# Patient Record
Sex: FEMALE | Race: WHITE | Employment: UNEMPLOYED | URBAN - METROPOLITAN AREA
[De-identification: names, ages, dates, MRNs, and addresses within clinical notes are randomized per-mention and may not be internally consistent; named-entity substitution may affect disease eponyms.]

---

## 2018-07-13 ENCOUNTER — OFFICE VISIT (OUTPATIENT)
Dept: FAMILY MEDICINE CLINIC | Age: 58
End: 2018-07-13

## 2018-07-13 ENCOUNTER — HOSPITAL ENCOUNTER (OUTPATIENT)
Dept: LAB | Age: 58
Discharge: HOME | End: 2018-07-13
Payer: MEDICAID

## 2018-07-13 VITALS
SYSTOLIC BLOOD PRESSURE: 137 MMHG | RESPIRATION RATE: 16 BRPM | OXYGEN SATURATION: 93 % | HEART RATE: 85 BPM | TEMPERATURE: 97.3 F | WEIGHT: 214 LBS | DIASTOLIC BLOOD PRESSURE: 81 MMHG | HEIGHT: 64 IN | BODY MASS INDEX: 36.54 KG/M2

## 2018-07-13 DIAGNOSIS — Z12.39 SCREENING FOR MALIGNANT NEOPLASM OF BREAST: ICD-10-CM

## 2018-07-13 DIAGNOSIS — F33.9 EPISODE OF RECURRENT MAJOR DEPRESSIVE DISORDER, UNSPECIFIED DEPRESSION EPISODE SEVERITY (HCC): ICD-10-CM

## 2018-07-13 DIAGNOSIS — Z76.89 ENCOUNTER TO ESTABLISH CARE: Primary | ICD-10-CM

## 2018-07-13 DIAGNOSIS — Z76.89 ENCOUNTER TO ESTABLISH CARE: ICD-10-CM

## 2018-07-13 PROBLEM — E66.01 SEVERE OBESITY (BMI 35.0-39.9): Status: ACTIVE | Noted: 2018-07-13

## 2018-07-13 LAB
ALBUMIN SERPL-MCNC: 3.5 G/DL (ref 3.4–5)
ALBUMIN/GLOB SERPL: 0.7 {RATIO} (ref 0.8–1.7)
ALP SERPL-CCNC: 144 U/L (ref 45–117)
ALT SERPL-CCNC: 74 U/L (ref 13–56)
ANION GAP SERPL CALC-SCNC: 7 MMOL/L (ref 3–18)
AST SERPL-CCNC: 62 U/L (ref 15–37)
BASOPHILS # BLD: 0 K/UL (ref 0–0.1)
BASOPHILS NFR BLD: 0 % (ref 0–2)
BILIRUB SERPL-MCNC: 0.3 MG/DL (ref 0.2–1)
BUN SERPL-MCNC: 9 MG/DL (ref 7–18)
BUN/CREAT SERPL: 11 (ref 12–20)
CALCIUM SERPL-MCNC: 9.1 MG/DL (ref 8.5–10.1)
CHLORIDE SERPL-SCNC: 102 MMOL/L (ref 100–108)
CHOLEST SERPL-MCNC: 138 MG/DL
CO2 SERPL-SCNC: 29 MMOL/L (ref 21–32)
CREAT SERPL-MCNC: 0.84 MG/DL (ref 0.6–1.3)
DIFFERENTIAL METHOD BLD: ABNORMAL
EOSINOPHIL # BLD: 0.2 K/UL (ref 0–0.4)
EOSINOPHIL NFR BLD: 3 % (ref 0–5)
ERYTHROCYTE [DISTWIDTH] IN BLOOD BY AUTOMATED COUNT: 15.1 % (ref 11.6–14.5)
GLOBULIN SER CALC-MCNC: 4.8 G/DL (ref 2–4)
GLUCOSE SERPL-MCNC: 109 MG/DL (ref 74–99)
HCT VFR BLD AUTO: 48.4 % (ref 35–45)
HDLC SERPL-MCNC: 29 MG/DL (ref 40–60)
HDLC SERPL: 4.8 {RATIO} (ref 0–5)
HGB BLD-MCNC: 15.7 G/DL (ref 12–16)
LDLC SERPL CALC-MCNC: 59 MG/DL (ref 0–100)
LIPID PROFILE,FLP: ABNORMAL
LYMPHOCYTES # BLD: 2.8 K/UL (ref 0.9–3.6)
LYMPHOCYTES NFR BLD: 40 % (ref 21–52)
MCH RBC QN AUTO: 31.3 PG (ref 24–34)
MCHC RBC AUTO-ENTMCNC: 32.4 G/DL (ref 31–37)
MCV RBC AUTO: 96.6 FL (ref 74–97)
MONOCYTES # BLD: 0.6 K/UL (ref 0.05–1.2)
MONOCYTES NFR BLD: 8 % (ref 3–10)
NEUTS SEG # BLD: 3.4 K/UL (ref 1.8–8)
NEUTS SEG NFR BLD: 49 % (ref 40–73)
PLATELET # BLD AUTO: 259 K/UL (ref 135–420)
PMV BLD AUTO: 10.3 FL (ref 9.2–11.8)
POTASSIUM SERPL-SCNC: 4.5 MMOL/L (ref 3.5–5.5)
PROT SERPL-MCNC: 8.3 G/DL (ref 6.4–8.2)
RBC # BLD AUTO: 5.01 M/UL (ref 4.2–5.3)
SODIUM SERPL-SCNC: 138 MMOL/L (ref 136–145)
T4 FREE SERPL-MCNC: 1 NG/DL (ref 0.7–1.5)
TRIGL SERPL-MCNC: 250 MG/DL (ref ?–150)
TSH SERPL DL<=0.05 MIU/L-ACNC: 1.9 UIU/ML (ref 0.36–3.74)
VLDLC SERPL CALC-MCNC: 50 MG/DL
WBC # BLD AUTO: 7.1 K/UL (ref 4.6–13.2)

## 2018-07-13 PROCEDURE — 80053 COMPREHEN METABOLIC PANEL: CPT | Performed by: NURSE PRACTITIONER

## 2018-07-13 PROCEDURE — 80061 LIPID PANEL: CPT | Performed by: NURSE PRACTITIONER

## 2018-07-13 PROCEDURE — 85025 COMPLETE CBC W/AUTO DIFF WBC: CPT | Performed by: NURSE PRACTITIONER

## 2018-07-13 PROCEDURE — 84443 ASSAY THYROID STIM HORMONE: CPT | Performed by: NURSE PRACTITIONER

## 2018-07-13 PROCEDURE — 36415 COLL VENOUS BLD VENIPUNCTURE: CPT | Performed by: NURSE PRACTITIONER

## 2018-07-13 PROCEDURE — 84439 ASSAY OF FREE THYROXINE: CPT | Performed by: NURSE PRACTITIONER

## 2018-07-13 RX ORDER — SERTRALINE HYDROCHLORIDE 50 MG/1
50 TABLET, FILM COATED ORAL DAILY
Qty: 30 TAB | Refills: 0 | Status: SHIPPED | OUTPATIENT
Start: 2018-07-13 | End: 2018-07-20 | Stop reason: DRUGHIGH

## 2018-07-13 NOTE — PROGRESS NOTES
Chief Complaint   Patient presents with    New Patient     Patient states that she suffers from depression and has a history of HTN     1. Have you been to the ER, urgent care clinic since your last visit? Hospitalized since your last visit? No    2. Have you seen or consulted any other health care providers outside of the [address] [place] [address] since your last visit? Include any pap smears or colon screening.  No     Health Maintenance Due   Topic Date Due    Hepatitis C Screening  1960    DTaP/Tdap/Td series (1 - Tdap) 10/29/1982    PAP AKA CERVICAL CYTOLOGY  10/29/1982    BREAST CANCER SCRN MAMMOGRAM  10/29/2011    FOBT Q 1 YEAR AGE 50-75  10/29/2011

## 2018-07-13 NOTE — PROGRESS NOTES
MARIA EUGENIA  Florentino Chambers is a 65 y.o. female  Chief Complaint   Patient presents with    New Patient     Patient states that she suffers from depression and has a history of HTN     Reports a history of depression and reports being on Zoloft in the past and she feels like she needs it. Reports she is in a behavioral health care group and she is on a methadone clinic schedule. Reports she attends the methadone clinic daily since Jan.2018. Admits to sweating caused by her treatments at the methadone clinic. Reports 40 year history of substance abuse. Denies any recent substance use. Denies being on zoloft currently as she lost her insurance earlier in the year. Reports being on 100 mg of Zoloft in the past but states she thought she needed to increase her dose prior to stopping. 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 Denies desire to hurt or harm herself or others. Denies suicidal ideations, chest pain, chest palpitations, anxiety, shortness of breath, or insomnia. Admits to obesity and being overweight which she thinks her eating is related to her depression. Denies having a recent mammogram.    Past Medical History  No past medical history on file. Surgical History  No past surgical history on file. Medications  Current Outpatient Prescriptions   Medication Sig Dispense Refill    methadone HCl (METHADONE PO) Take 85 mg by mouth daily.  sertraline (ZOLOFT) 50 mg tablet Take 1 Tab by mouth daily. 27 Tab 0       Allergies  No Known Allergies    Family History  Family History   Problem Relation Age of Onset    Hypertension Mother     High Cholesterol Mother     COPD Mother     Hypertension Father        Social History  Social History     Social History    Marital status: SINGLE     Spouse name: N/A    Number of children: N/A    Years of education: N/A     Occupational History    Not on file.      Social History Main Topics    Smoking status: Current Every Day Smoker Packs/day: 1.00     Types: Cigarettes    Smokeless tobacco: Never Used    Alcohol use No    Drug use: Yes     Special: Heroin    Sexual activity: No     Other Topics Concern    Not on file     Social History Narrative    No narrative on file       Problem List  Patient Active Problem List   Diagnosis Code    [facility] 35.0-39.9) [facility] E66.01       Review of Systems  Review of Systems   Constitutional: Positive for diaphoresis. Respiratory: Negative for shortness of breath. Cardiovascular: Negative for chest pain and palpitations. Gastrointestinal: Negative for nausea and vomiting. Neurological: Negative for dizziness. Psychiatric/Behavioral: Positive for depression and substance abuse. Negative for suicidal ideas. The patient is not nervous/anxious and does not have insomnia.         Vital Signs  Vitals:    07/13/18 0833   BP: 137/81   Pulse: 85   Resp: 16   Temp: 97.3 °F (36.3 °C)   TempSrc: Oral   SpO2: 93%   Weight: 214 lb (97.1 kg)   Height: 5' 4\" (1.626 m)   PainSc:   4   PainLoc: Knee     PHQ over the last two weeks 7/13/2018   Little interest or pleasure in doing things More than half the days   Feeling down, depressed or hopeless Nearly every day   Total Score PHQ 2 5   Trouble falling or staying asleep, or sleeping too much Not at all   Feeling tired or having little energy More than half the days   Poor appetite or overeating More than half the days   Feeling bad about yourself - or that you are a failure or have let yourself or your family down Nearly every day   Trouble concentrating on things such as school, work, reading or watching TV Not at all   Moving or speaking so slowly that other people could have noticed; or the opposite being so fidgety that others notice Not at all   Thoughts of being better off dead, or hurting yourself in some way Several days   PHQ 9 Score 13   How difficult have these problems made it for you to do your work, take care of your home and get along with others Very difficult       Physical Exam  Physical Exam   Constitutional: She is oriented to person, place, and time. HENT:   Mouth/Throat: Oropharynx is clear and moist.   Cardiovascular: Normal rate, regular rhythm and normal heart sounds. Pulmonary/Chest: Effort normal and breath sounds normal. No respiratory distress. Neurological: She is alert and oriented to person, place, and time. Skin: Skin is warm and dry. Psychiatric: She has a normal mood and affect. Her behavior is normal. Judgment and thought content normal.   Vitals reviewed. Diagnostics  Orders Placed This Encounter    SILVERIO MAMMOGRAM SCREENING DIGITAL BILAT     Standing Status:   Future     Standing Expiration Date:   8/13/2019     Order Specific Question:   Reason for Exam     Answer:   Breast cancer screening    CBC WITH AUTOMATED DIFF     Standing Status:   Future     Standing Expiration Date:   3/65/9327    METABOLIC PANEL, COMPREHENSIVE     Standing Status:   Future     Standing Expiration Date:   1/10/2019    LIPID PANEL     Standing Status:   Future     Standing Expiration Date:   1/10/2019    TSH 3RD GENERATION     Standing Status:   Future     Standing Expiration Date:   1/10/2019    T4, FREE     Standing Status:   Future     Standing Expiration Date:   7/14/2019    REFERRAL TO PSYCHOLOGY     Referral Priority:   Routine     Referral Type:   Behavioral Health     Referral Reason:   Specialty Services Required    REFERRAL TO PSYCHIATRY     Referral Priority:   Routine     Referral Type:   Behavioral Health     Referral Reason:   Specialty Services Required    methadone HCl (METHADONE PO)     Sig: Take 85 mg by mouth daily.  sertraline (ZOLOFT) 50 mg tablet     Sig: Take 1 Tab by mouth daily. Dispense:  30 Tab     Refill:  0       Results  No results found for this or any previous visit. Assessment and Plan  Diagnoses and all orders for this visit:    1.  Encounter to establish care  - REFERRAL TO PSYCHOLOGY  -     [facility] WITH AUTOMATED DIFF; Future  -     METABOLIC PANEL, COMPREHENSIVE; Future  -     LIPID PANEL; Future  -     TSH 3RD GENERATION; Future  -     T4, FREE; Future  -     REFERRAL TO PSYCHIATRY    2. Episode of recurrent major depressive disorder, unspecified depression episode severity ([facility]  -     REFERRAL TO PSYCHOLOGY  -     [facility] WITH AUTOMATED DIFF; Future  -     METABOLIC PANEL, COMPREHENSIVE; Future  -     REFERRAL TO PSYCHIATRY  -     sertraline (ZOLOFT) 50 mg tablet; Take 1 Tab by mouth daily. 3. BMI 36.0-36.9,adult  -     REFERRAL TO PSYCHOLOGY  -     [facility] WITH AUTOMATED DIFF; Future  -     METABOLIC PANEL, COMPREHENSIVE; Future  -     LIPID PANEL; Future  -     TSH 3RD GENERATION; Future  -     T4, FREE; Future    4. Screening for malignant neoplasm of breast  -     SILVERIO MAMMOGRAM SCREENING DIGITAL BILAT; Future    After care summary printed and reviewed with patient. Plan reviewed with patient. Questions answered. Patient verbalized understanding of plan and is in agreement with plan. Patient to follow up in one week or earlier if symptoms worsen. May need to adjust Zoloft at that time. MILADYS Bauman    Discussed the patient's BMI with her. The BMI follow up plan is as follows:     dietary management education, guidance, and counseling  encourage exercise  monitor weight  prescribed dietary intake    An After Visit Summary was printed and given to the patient.     MILADYS Bauman

## 2018-07-13 NOTE — PATIENT INSTRUCTIONS
Please contact our office if you have any questions about your visit today. Body Mass Index: Care Instructions  Your Care Instructions    Body mass index (BMI) can help you see if your weight is raising your risk for health problems. It uses a formula to compare how much you weigh with how tall you are. · A BMI lower than 18.5 is considered underweight. · A BMI between 18.5 and 24.9 is considered healthy. · A BMI between 25 and 29.9 is considered overweight. A BMI of 30 or higher is considered obese. If your BMI is in the normal range, it means that you have a lower risk for weight-related health problems. If your BMI is in the overweight or obese range, you may be at increased risk for weight-related health problems, such as high blood pressure, heart disease, stroke, arthritis or joint pain, and diabetes. If your BMI is in the underweight range, you may be at increased risk for health problems such as fatigue, lower protection (immunity) against illness, muscle loss, bone loss, hair loss, and hormone problems. BMI is just one measure of your risk for weight-related health problems. You may be at higher risk for health problems if you are not active, you eat an unhealthy diet, or you drink too much alcohol or use tobacco products. Follow-up care is a key part of your treatment and safety. Be sure to make and go to all appointments, and call your doctor if you are having problems. It's also a good idea to know your test results and keep a list of the medicines you take. How can you care for yourself at home? · Practice healthy eating habits. This includes eating plenty of fruits, vegetables, whole grains, lean protein, and low-fat dairy. · If your doctor recommends it, get more exercise. Walking is a good choice. Bit by bit, increase the amount you walk every day. Try for at least 30 minutes on most days of the week. · Do not smoke. Smoking can increase your risk for health problems.  If you need help quitting, talk to your doctor about stop-smoking programs and medicines. These can increase your chances of quitting for good. · Limit alcohol to 2 drinks a day for men and 1 drink a day for women. Too much alcohol can cause health problems. If you have a BMI higher than 25  · Your doctor may do other tests to check your risk for weight-related health problems. This may include measuring the distance around your waist. A waist measurement of more than 40 inches in men or 35 inches in women can increase the risk of weight-related health problems. · Talk with your doctor about steps you can take to stay healthy or improve your health. You may need to make lifestyle changes to lose weight and stay healthy, such as changing your diet and getting regular exercise. If you have a BMI lower than 18.5  · Your doctor may do other tests to check your risk for health problems. · Talk with your doctor about steps you can take to stay healthy or improve your health. You may need to make lifestyle changes to gain or maintain weight and stay healthy, such as getting more healthy foods in your diet and doing exercises to build muscle. Where can you learn more? Go to http://[employer].info/. Enter S176 in the search box to learn more about \"Body Mass Index: Care Instructions. \"  Current as of: October 13, 2016  Content Version: 11.4  © 6784-4650 Healthwise, [employer] [place]. Care instructions adapted under license by [employer] (which disclaims liability or warranty for this information). If you have questions about a medical condition or this instruction, always ask your healthcare professional. [place] [address] any warranty or liability for your use of this information.    Sertraline (Zoloft) - (By mouth)   Why this medicine is used:   Treats depression, obsessive-compulsive disorder (OCD), posttraumatic stress disorder (PTSD), premenstrual dysphoric disorder (PMDD), social anxiety, and panic disorder. Contact a nurse or doctor right away if you have:  · Blistering, peeling, red skin rash  · Thoughts of hurting yourself, seeing or hearing things that are not there  · Anxiety, restlessness, fast heartbeat, fever, sweating  · Bloody vomit or vomit that looks like coffee grounds; bloody or black, tarry stools  · Tremors, muscle twitching, uncontrollable movements     Common side effects:  · Sleepiness, trouble sleeping  · Sexual problems  · Mild diarrhea, nausea, vomiting, dry mouth  © 2017 [address]  Information is for End User's use only and may not be sold, redistributed or otherwise used for commercial purposes. Sertraline (By mouth)   Sertraline (SER-tra-jose)  Treats depression, obsessive-compulsive disorder (OCD), posttraumatic stress disorder (PTSD), premenstrual dysphoric disorder (PMDD), social anxiety disorder, and panic disorder. This medicine is an SSRI. Brand Name(s): Zoloft   There may be other brand names for this medicine. When This Medicine Should Not Be Used: This medicine is not right for everyone. Do not use it if you had an allergic reaction to sertraline. How to Use This Medicine:   Liquid, Tablet  · Take your medicine as directed. Your dose may need to be changed several times to find what works best for you. You may need to take it for a few weeks or months before you feel better. · Oral liquid: Use the dropper provided to remove the medicine and mix it with 1/2 cup (4 ounces) of water, ginger ale, lemon-lime soda, lemonade, or orange juice. Drink the mixture right away. It is normal for it to look a bit hazy. · This medicine should come with a Medication Guide. Ask your pharmacist for a copy if you do not have one. · Missed dose: Take a dose as soon as you remember. If it is almost time for your next dose, wait until then and take a regular dose. Do not take extra medicine to make up for a missed dose.   · Store the medicine in a closed container at room temperature, away from heat, moisture, and direct light. Drugs and Foods to Avoid:   Ask your doctor or pharmacist before using any other medicine, including over-the-counter medicines, vitamins, and herbal products. · Do not use this medicine together with pimozide. Do not use this medicine and an MAO inhibitor (MAOI) within 14 days of each other. Do not use the oral liquid form of sertraline if you are also using disulfiram.  · Some medicines can affect how sertraline works. Tell your doctor if you are using the following:   ¨ Buspirone, cimetidine, cisapride, diazepam, digitoxin, fentanyl, flecainide, lithium, phenytoin, propafenone, Doyle's wort, tramadol, tryptophan supplements, or valproate  ¨ A blood thinner (such as warfarin), a diuretic (water pill), an NSAID pain or arthritis medicine (such as aspirin, diclofenac, ibuprofen), a tricyclic antidepressant, a triptan medicine for migraine headaches  · Do not drink alcohol while you are using this medicine. Warnings While Using This Medicine:   · Tell your doctor if you are pregnant or breastfeeding, or if you have liver disease, bleeding problems, glaucoma, heart disease, or a seizure disorder. · For some children, teenagers, and young adults, this medicine may increase mental or emotional problems. This may lead to thoughts of suicide and violence. Talk with your doctor right away if you have any thoughts or behavior changes that concern you. Tell your doctor if you or anyone in your family has a history of bipolar disorder or suicide attempts. · This medicine may cause the following problems:   ¨ Serotonin syndrome (when taken with certain medicines)  ¨ Low sodium levels (more common in elderly patients and those who take diuretics or become dehydrated)  · Tell your doctor if you are sensitive to latex, because the oral liquid comes with a latex rubber dropper. · This medicine may make you dizzy or drowsy.  Do not drive or do anything that could be dangerous until you know how this medicine affects you. · Do not stop using this medicine suddenly. Your doctor will need to slowly decrease your dose before you stop it completely. · Your doctor will check your progress and the effects of this medicine at regular visits. Keep all appointments. · Keep all medicine out of the reach of children. Never share your medicine with anyone. Possible Side Effects While Using This Medicine:   Call your doctor right away if you notice any of these side effects:  · Allergic reaction: Itching or hives, swelling in your face or hands, swelling or tingling in your mouth or throat, chest tightness, trouble breathing  · Anxiety, restlessness, fast heartbeat, fever, sweating, muscle spasms, twitching, nausea, vomiting, diarrhea, seeing or hearing things that are not there  · Blistering, peeling, or red skin rash  · Confusion, weakness, and muscle twitching  · Eye pain, vision changes, seeing halos around lights  · Feeling more excited or energetic than usual  · Thoughts of hurting yourself or others, unusual behavior  · Unusual bleeding or bruising  If you notice these less serious side effects, talk with your doctor:   · Dry mouth  · Loss of appetite, weight loss  · Mild diarrhea, constipation, nausea, vomiting  · Sexual problems  · Sleepiness, or trouble sleeping  If you notice other side effects that you think are caused by this medicine, tell your doctor. Call your doctor for medical advice about side effects. You may report side effects to FDA at 8-851-FDA-9835  © 2017 [address] Information is for End User's use only and may not be sold, redistributed or otherwise used for commercial purposes. The above information is an  only. It is not intended as medical advice for individual conditions or treatments.  Talk to your doctor, nurse or pharmacist before following any medical regimen to see if it is safe and effective for you.

## 2018-07-13 NOTE — MR AVS SNAPSHOT
[facility] 
 
 
 [address] [address] [address] [address] [phone] Patient: Jane Varela MRN: [id number] :1960 Visit Information Date & Time Provider Department Dept. Phone Encounter #  
 2018  8:30 AM Piedad Nair NP [address] [id number] Follow-up Instructions Return in about 1 week (around 2018), or if symptoms worsen or fail to improve, for 30 min follow up. Upcoming Health Maintenance Date Due Hepatitis C Screening 1960 DTaP/Tdap/Td series (1 - Tdap) 10/29/1982 PAP AKA CERVICAL CYTOLOGY 10/29/1982 BREAST CANCER SCRN MAMMOGRAM 10/29/2011 FOBT Q 1 YEAR AGE 50-75 10/29/2011 Influenza Age 5 to Adult 2018 Allergies as of 2018  Review Complete On: 2018 By: Johann Martinez LPN No Known Allergies Current Immunizations  Never Reviewed No immunizations on file. Not reviewed this visit You Were Diagnosed With   
  
 Codes Comments Encounter to establish care    -  Primary ICD-10-CM: Z76.89 
ICD-9-CM: V65.8 Episode of recurrent major depressive disorder, unspecified depression episode severity ([facility]     ICD-10-CM: F33.9 ICD-9-CM: 296.30 BMI 36.0-36.9,adult     ICD-10-CM: X72.47 
ICD-9-CM: V85.36 Screening for malignant neoplasm of breast     ICD-10-CM: Z12.31 
ICD-9-CM: V76.10 Vitals BP Pulse Temp Resp Height(growth percentile) Weight(growth percentile) 137/81 (BP 1 Location: Right arm, BP Patient Position: Sitting) 85 97.3 °F (36.3 °C) (Oral) 16 5' 4\" (1.626 m) 214 lb (97.1 kg) SpO2 BMI OB Status Smoking Status 93% 36.73 kg/m2 Menopause Current Every Day Smoker BMI and BSA Data Body Mass Index Body Surface Area  
 36.73 kg/m 2 2.09 m 2 Preferred Pharmacy Pharmacy Name Phone [facility], [facility] [address] [facility] Your Updated Medication List  
  
   
This list is accurate as of 7/13/18  9:41 AM.  Always use your most recent med list.  
  
  
  
  
 METHADONE PO Take 85 mg by mouth daily. sertraline 50 mg tablet Commonly known as:  ZOLOFT Take 1 Tab by mouth daily. Prescriptions Sent to Pharmacy Refills  
 sertraline (ZOLOFT) 50 mg tablet 0 Sig: Take 1 Tab by mouth daily. Class: Normal  
 Pharmacy: [address] #: [phone] Route: Oral  
  
We Performed the Following REFERRAL TO PSYCHIATRY [REF91 Custom] Comments:  
 Please evaluate and treat patient for depression. REFERRAL TO PSYCHOLOGY [XSD37 Custom] Comments:  
 Please evaluate and treat patient for depression. Follow-up Instructions Return in about 1 week (around 7/20/2018), or if symptoms worsen or fail to improve, for 30 min follow up. To-Do List   
 07/13/2018 Lab:  CBC WITH AUTOMATED DIFF   
  
 07/13/2018 Imaging:  SILVERIO MAMMO BI SCREENING INCL CAD   
  
 07/13/2018 Lab:  T4, FREE Around 10/11/2018 Lab:  LIPID PANEL Around 10/11/2018 Lab:  METABOLIC PANEL, COMPREHENSIVE Around 10/11/2018 Lab:  TSH 3RD GENERATION Referral Information Referral ID Referred By Referred To  
  
 [id number] Naseem YATES Not Available Visits Status Start Date End Date 1 New Request 7/13/18 7/13/19 If your referral has a status of pending review or denied, additional information will be sent to support the outcome of this decision. Referral ID Referred By Referred To  
 [id number] Naseem YATES Not Available Visits Status Start Date End Date 1 New Request 7/13/18 7/13/19 If your referral has a status of pending review or denied, additional information will be sent to support the outcome of this decision. Patient Instructions Please contact our office if you have any questions about your visit today. Body Mass Index: Care Instructions Your Care Instructions Body mass index (BMI) can help you see if your weight is raising your risk for health problems. It uses a formula to compare how much you weigh with how tall you are. · A BMI lower than 18.5 is considered underweight. · A BMI between 18.5 and 24.9 is considered healthy. · A BMI between 25 and 29.9 is considered overweight. A BMI of 30 or higher is considered obese. If your BMI is in the normal range, it means that you have a lower risk for weight-related health problems. If your BMI is in the overweight or obese range, you may be at increased risk for weight-related health problems, such as high blood pressure, heart disease, stroke, arthritis or joint pain, and diabetes. If your BMI is in the underweight range, you may be at increased risk for health problems such as fatigue, lower protection (immunity) against illness, muscle loss, bone loss, hair loss, and hormone problems. BMI is just one measure of your risk for weight-related health problems. You may be at higher risk for health problems if you are not active, you eat an unhealthy diet, or you drink too much alcohol or use tobacco products. Follow-up care is a key part of your treatment and safety. Be sure to make and go to all appointments, and call your doctor if you are having problems. It's also a good idea to know your test results and keep a list of the medicines you take. How can you care for yourself at home? · Practice healthy eating habits. This includes eating plenty of fruits, vegetables, whole grains, lean protein, and low-fat dairy. · If your doctor recommends it, get more exercise. Walking is a good choice. Bit by bit, increase the amount you walk every day. Try for at least 30 minutes on most days of the week. · Do not smoke. Smoking can increase your risk for health problems.  If you need help quitting, talk to your doctor about stop-smoking programs and medicines. These can increase your chances of quitting for good. · Limit alcohol to 2 drinks a day for men and 1 drink a day for women. Too much alcohol can cause health problems. If you have a BMI higher than 25 · Your doctor may do other tests to check your risk for weight-related health problems. This may include measuring the distance around your waist. A waist measurement of more than 40 inches in men or 35 inches in women can increase the risk of weight-related health problems. · Talk with your doctor about steps you can take to stay healthy or improve your health. You may need to make lifestyle changes to lose weight and stay healthy, such as changing your diet and getting regular exercise. If you have a BMI lower than 18.5 · Your doctor may do other tests to check your risk for health problems. · Talk with your doctor about steps you can take to stay healthy or improve your health. You may need to make lifestyle changes to gain or maintain weight and stay healthy, such as getting more healthy foods in your diet and doing exercises to build muscle. Where can you learn more? Go to http://sameera-miles.info/. Enter S176 in the search box to learn more about \"Body Mass Index: Care Instructions. \" Current as of: October 13, 2016 Content Version: 11.4 © 1369-5960 [employer]wise, [employer]orporated. Care instructions adapted under license by [employer] (which disclaims liability or warranty for this information). If you have questions about a medical condition or this instruction, always ask your healthcare professional. [place] [address] any warranty or liability for your use of this information. Sertraline (Zoloft) - (By mouth) Why this medicine is used:  
Treats depression, obsessive-compulsive disorder (OCD), posttraumatic stress disorder (PTSD), premenstrual dysphoric disorder (PMDD), social anxiety, and panic disorder. Contact a nurse or doctor right away if you have: · Blistering, peeling, red skin rash · Thoughts of hurting yourself, seeing or hearing things that are not there · Anxiety, restlessness, fast heartbeat, fever, sweating · Bloody vomit or vomit that looks like coffee grounds; bloody or black, tarry stools · Tremors, muscle twitching, uncontrollable movements Common side effects: · Sleepiness, trouble sleeping · Sexual problems · Mild diarrhea, nausea, vomiting, dry mouth © 2017 [address] Information is for End User's use only and may not be sold, redistributed or otherwise used for commercial purposes. Sertraline (By mouth) Sertraline (SER-tra-jose) Treats depression, obsessive-compulsive disorder (OCD), posttraumatic stress disorder (PTSD), premenstrual dysphoric disorder (PMDD), social anxiety disorder, and panic disorder. This medicine is an SSRI. Brand Name(s): Zoloft There may be other brand names for this medicine. When This Medicine Should Not Be Used: This medicine is not right for everyone. Do not use it if you had an allergic reaction to sertraline. How to Use This Medicine:  
Liquid, Tablet · Take your medicine as directed. Your dose may need to be changed several times to find what works best for you. You may need to take it for a few weeks or months before you feel better. · Oral liquid: Use the dropper provided to remove the medicine and mix it with 1/2 cup (4 ounces) of water, ginger ale, lemon-lime soda, lemonade, or orange juice. Drink the mixture right away. It is normal for it to look a bit hazy. · This medicine should come with a Medication Guide. Ask your pharmacist for a copy if you do not have one. · Missed dose: Take a dose as soon as you remember. If it is almost time for your next dose, wait until then and take a regular dose.  Do not take extra medicine to make up for a missed dose. · Store the medicine in a closed container at room temperature, away from heat, moisture, and direct light. Drugs and Foods to Avoid: Ask your doctor or pharmacist before using any other medicine, including over-the-counter medicines, vitamins, and herbal products. · Do not use this medicine together with pimozide. Do not use this medicine and an MAO inhibitor (MAOI) within 14 days of each other. Do not use the oral liquid form of sertraline if you are also using disulfiram. 
· Some medicines can affect how sertraline works. Tell your doctor if you are using the following:  
¨ Buspirone, cimetidine, cisapride, diazepam, digitoxin, fentanyl, flecainide, lithium, phenytoin, propafenone, Doyle's wort, tramadol, tryptophan supplements, or valproate ¨ A blood thinner (such as warfarin), a diuretic (water pill), an NSAID pain or arthritis medicine (such as aspirin, diclofenac, ibuprofen), a tricyclic antidepressant, a triptan medicine for migraine headaches · Do not drink alcohol while you are using this medicine. Warnings While Using This Medicine: · Tell your doctor if you are pregnant or breastfeeding, or if you have liver disease, bleeding problems, glaucoma, heart disease, or a seizure disorder. · For some children, teenagers, and young adults, this medicine may increase mental or emotional problems. This may lead to thoughts of suicide and violence. Talk with your doctor right away if you have any thoughts or behavior changes that concern you. Tell your doctor if you or anyone in your family has a history of bipolar disorder or suicide attempts. · This medicine may cause the following problems:  
¨ Serotonin syndrome (when taken with certain medicines) ¨ Low sodium levels (more common in elderly patients and those who take diuretics or become dehydrated) · Tell your doctor if you are sensitive to latex, because the oral liquid comes with a latex rubber dropper. · This medicine may make you dizzy or drowsy. Do not drive or do anything that could be dangerous until you know how this medicine affects you. · Do not stop using this medicine suddenly. Your doctor will need to slowly decrease your dose before you stop it completely. · Your doctor will check your progress and the effects of this medicine at regular visits. Keep all appointments. · Keep all medicine out of the reach of children. Never share your medicine with anyone. Possible Side Effects While Using This Medicine:  
Call your doctor right away if you notice any of these side effects: · Allergic reaction: Itching or hives, swelling in your face or hands, swelling or tingling in your mouth or throat, chest tightness, trouble breathing · Anxiety, restlessness, fast heartbeat, fever, sweating, muscle spasms, twitching, nausea, vomiting, diarrhea, seeing or hearing things that are not there · Blistering, peeling, or red skin rash · Confusion, weakness, and muscle twitching · Eye pain, vision changes, seeing halos around lights · Feeling more excited or energetic than usual 
· Thoughts of hurting yourself or others, unusual behavior · Unusual bleeding or bruising If you notice these less serious side effects, talk with your doctor: · Dry mouth · Loss of appetite, weight loss · Mild diarrhea, constipation, nausea, vomiting · Sexual problems · Sleepiness, or trouble sleeping If you notice other side effects that you think are caused by this medicine, tell your doctor. Call your doctor for medical advice about side effects. You may report side effects to FDA at 5-882-FDA-8004 © 2017 [address] Information is for End User's use only and may not be sold, redistributed or otherwise used for commercial purposes. The above information is an  only. It is not intended as medical advice for individual conditions or treatments.  Talk to your doctor, nurse or pharmacist before following any medical regimen to see if it is safe and effective for you. Introducing [facility]! [facility] introduces Goozzy patient portal. Now you can access parts of your medical record, email your doctor's office, and request medication refills online. 1. In your internet browser, go to [email or web address] 2. Click on the First Time User? Click Here link in the Sign In box. You will see the New Member Sign Up page. 3. Enter your Goozzy Access Code exactly as it appears below. You will not need to use this code after youve completed the sign-up process. If you do not sign up before the expiration date, you must request a new code. · Goozzy Access Code: [email or web address] Expires: 10/11/2018  9:30 AM 
 
4. Enter the last four digits of your Social Security Number (xxxx) and Date of Birth (mm/dd/yyyy) as indicated and click Submit. You will be taken to the next sign-up page. 5. Create a Goozzy ID. This will be your Goozzy login ID and cannot be changed, so think of one that is secure and easy to remember. 6. Create a Goozzy password. You can change your password at any time. 7. Enter your Password Reset Question and Answer. This can be used at a later time if you forget your password. 8. Enter your e-mail address. You will receive e-mail notification when new information is available in [address] 9. Click Sign Up. You can now view and download portions of your medical record. 10. Click the Download Summary menu link to download a portable copy of your medical information. If you have questions, please visit the Frequently Asked Questions section of the Goozzy website. Remember, Goozzy is NOT to be used for urgent needs. For medical emergencies, dial 911. Now available from your iPhone and Android! Please provide this summary of care documentation to your next provider. If you have any questions after today's visit, please call [phone].

## 2018-07-20 ENCOUNTER — HOSPITAL ENCOUNTER (OUTPATIENT)
Dept: MAMMOGRAPHY | Age: 58
Discharge: HOME | End: 2018-07-20
Attending: NURSE PRACTITIONER
Payer: MEDICAID

## 2018-07-20 ENCOUNTER — HOSPITAL ENCOUNTER (OUTPATIENT)
Dept: LAB | Age: 58
Discharge: HOME | End: 2018-07-20
Payer: MEDICAID

## 2018-07-20 ENCOUNTER — OFFICE VISIT (OUTPATIENT)
Dept: FAMILY MEDICINE CLINIC | Age: 58
End: 2018-07-20

## 2018-07-20 ENCOUNTER — TELEPHONE (OUTPATIENT)
Dept: FAMILY MEDICINE CLINIC | Age: 58
End: 2018-07-20

## 2018-07-20 VITALS
DIASTOLIC BLOOD PRESSURE: 74 MMHG | TEMPERATURE: 97.7 F | OXYGEN SATURATION: 92 % | RESPIRATION RATE: 16 BRPM | HEART RATE: 76 BPM | BODY MASS INDEX: 36.02 KG/M2 | SYSTOLIC BLOOD PRESSURE: 124 MMHG | WEIGHT: 211 LBS | HEIGHT: 64 IN

## 2018-07-20 DIAGNOSIS — F33.9 EPISODE OF RECURRENT MAJOR DEPRESSIVE DISORDER, UNSPECIFIED DEPRESSION EPISODE SEVERITY (HCC): ICD-10-CM

## 2018-07-20 DIAGNOSIS — Z71.2 ENCOUNTER TO DISCUSS TEST RESULTS: Primary | ICD-10-CM

## 2018-07-20 DIAGNOSIS — Z11.59 NEED FOR HEPATITIS C SCREENING TEST: ICD-10-CM

## 2018-07-20 DIAGNOSIS — Z12.11 COLON CANCER SCREENING: ICD-10-CM

## 2018-07-20 DIAGNOSIS — Z12.39 SCREENING FOR MALIGNANT NEOPLASM OF BREAST: ICD-10-CM

## 2018-07-20 DIAGNOSIS — E78.1 HIGH TRIGLYCERIDES: ICD-10-CM

## 2018-07-20 PROCEDURE — 77067 SCR MAMMO BI INCL CAD: CPT

## 2018-07-20 PROCEDURE — 36415 COLL VENOUS BLD VENIPUNCTURE: CPT | Performed by: NURSE PRACTITIONER

## 2018-07-20 PROCEDURE — 86803 HEPATITIS C AB TEST: CPT | Performed by: NURSE PRACTITIONER

## 2018-07-20 RX ORDER — SERTRALINE HYDROCHLORIDE 100 MG/1
100 TABLET, FILM COATED ORAL DAILY
Qty: 30 TAB | Refills: 0 | Status: SHIPPED | OUTPATIENT
Start: 2018-07-20 | End: 2018-07-20 | Stop reason: SDUPTHER

## 2018-07-20 RX ORDER — SERTRALINE HYDROCHLORIDE 100 MG/1
100 TABLET, FILM COATED ORAL DAILY
Qty: 30 TAB | Refills: 0 | Status: SHIPPED | OUTPATIENT
Start: 2018-07-20 | End: 2018-10-01 | Stop reason: SDUPTHER

## 2018-07-20 NOTE — PROGRESS NOTES
MARIA EUGENIA Casiano is a 65 y.o. female  Chief Complaint   Patient presents with    Follow-up     Patient was seen 1 week ago to establish care. Patient in office today to discuss lab results and follow up to initial visit     Reports she is taking the Zoloft but states she has had no improvement at all. Denies desire to hurt or harm herself or others. Denies being hurt or harmed. Requesting an increase in the dose. Requesting lab results. Denies chest pain or shortness of breath. Denies anxiousness but does report sweating as this has been present since she was at the methadone clinic. Requesting to be screened for Hep C due to her history of drug use and abuse. Past Medical History  No past medical history on file. Surgical History  No past surgical history on file. Medications  Current Outpatient Prescriptions   Medication Sig Dispense Refill    sertraline (ZOLOFT) 100 mg tablet Take 1 Tab by mouth daily. 30 Tab 0    methadone HCl (METHADONE PO) Take 85 mg by mouth daily. Allergies  No Known Allergies    Family History  Family History   Problem Relation Age of Onset    Hypertension Mother     High Cholesterol Mother     COPD Mother     Hypertension Father        Social History  Social History     Social History    Marital status: SINGLE     Spouse name: N/A    Number of children: N/A    Years of education: N/A     Occupational History    Not on file. 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    Respiratory: Negative for shortness of breath. Cardiovascular: Negative for chest pain and palpitations. Gastrointestinal: Negative for nausea and vomiting. Psychiatric/Behavioral: Positive for depression. Negative for suicidal ideas. Vital Signs  Vitals:    07/20/18 0742 07/20/18 0743   BP: 141/85 124/74   Pulse: 76    Resp: 16    Temp: 97.7 °F (36.5 °C)    TempSrc: Oral    SpO2: 92%    Weight: 211 lb (95.7 kg)    Height: 5' 4\" (1.626 m)    PainSc:   0 - No pain        Physical Exam  Physical Exam   Constitutional: She is oriented to person, place, and time. HENT:   Mouth/Throat: Oropharynx is clear and moist.   Eyes: Pupils are equal, round, and reactive to light. Cardiovascular: Normal rate, regular rhythm and normal heart sounds. No murmur heard. Pulmonary/Chest: Effort normal and breath sounds normal. No respiratory distress. Neurological: She is alert and oriented to person, place, and time. Psychiatric: She has a normal mood and affect. Her behavior is normal. Thought content normal.   Vitals reviewed. Diagnostics  Orders Placed This Encounter    OCCULT BLOOD, IMMUNOASSAY (FIT)     Standing Status:   Future     Standing Expiration Date:   7/21/2019    HEPATITIS C AB     Standing Status:   Future     Standing Expiration Date:   7/21/2019    DISCONTD: sertraline (ZOLOFT) 100 mg tablet     Sig: Take 1 Tab by mouth daily. Dispense:  30 Tab     Refill:  0    sertraline (ZOLOFT) 100 mg tablet     Sig: Take 1 Tab by mouth daily.      Dispense:  30 Tab     Refill:  0       Results  Results for orders placed or performed during the hospital encounter of 07/13/18   CBC WITH AUTOMATED DIFF   Result Value Ref Range    WBC 7.1 4.6 - 13.2 K/uL    RBC 5.01 4.20 - 5.30 M/uL    HGB 15.7 12.0 - 16.0 g/dL    HCT 48.4 (H) 35.0 - 45.0 %    MCV 96.6 74.0 - 97.0 FL    MCH 31.3 24.0 - 34.0 PG    MCHC 32.4 31.0 - 37.0 g/dL    RDW 15.1 (H) 11.6 - 14.5 %    PLATELET 960 625 - 803 K/uL    MPV 10.3 9.2 - 11.8 FL NEUTROPHILS 49 40 - 73 %    LYMPHOCYTES 40 21 - 52 %    MONOCYTES 8 3 - 10 %    EOSINOPHILS 3 0 - 5 %    BASOPHILS 0 0 - 2 %    ABS. NEUTROPHILS 3.4 1.8 - 8.0 K/UL    ABS. LYMPHOCYTES 2.8 0.9 - 3.6 K/UL    ABS. MONOCYTES 0.6 0.05 - 1.2 K/UL    ABS. EOSINOPHILS 0.2 0.0 - 0.4 K/UL    ABS. BASOPHILS 0.0 0.0 - 0.1 K/UL    DF AUTOMATED     METABOLIC PANEL, COMPREHENSIVE   Result Value Ref Range    Sodium 138 136 - 145 mmol/L    Potassium 4.5 3.5 - 5.5 mmol/L    Chloride 102 100 - 108 mmol/L    CO2 29 21 - 32 mmol/L    Anion gap 7 3.0 - 18 mmol/L    Glucose 109 (H) 74 - 99 mg/dL    BUN 9 7.0 - 18 MG/DL    Creatinine 0.84 0.6 - 1.3 MG/DL    BUN/Creatinine ratio 11 (L) 12 - 20      GFR est AA >60 >60 ml/min/1.73m2    GFR est non-AA >60 >60 ml/min/1.73m2    Calcium 9.1 8.5 - 10.1 MG/DL    Bilirubin, total 0.3 0.2 - 1.0 MG/DL    ALT (SGPT) 74 (H) 13 - 56 U/L    AST (SGOT) 62 (H) 15 - 37 U/L    Alk. phosphatase 144 (H) 45 - 117 U/L    Protein, total 8.3 (H) 6.4 - 8.2 g/dL    Albumin 3.5 3.4 - 5.0 g/dL    Globulin 4.8 (H) 2.0 - 4.0 g/dL    A-G Ratio 0.7 (L) 0.8 - 1.7     LIPID PANEL   Result Value Ref Range    LIPID PROFILE          Cholesterol, total 138 <200 MG/DL    Triglyceride 250 (H) <150 MG/DL    HDL Cholesterol 29 (L) 40 - 60 MG/DL    LDL, calculated 59 0 - 100 MG/DL    VLDL, calculated 50 MG/DL    CHOL/HDL Ratio 4.8 0 - 5.0     TSH 3RD GENERATION   Result Value Ref Range    TSH 1.90 0.36 - 3.74 uIU/mL   T4, FREE   Result Value Ref Range    T4, Free 1.0 0.7 - 1.5 NG/DL         Assessment and Plan  Diagnoses and all orders for this visit:    1. Encounter to discuss test results    2. Need for hepatitis C screening test  -     HEPATITIS C AB; Future    3. Colon cancer screening  -     OCCULT BLOOD, IMMUNOASSAY (FIT); Future    4. Episode of recurrent major depressive disorder, unspecified depression episode severity (HCC)  -     sertraline (ZOLOFT) 100 mg tablet; Take 1 Tab by mouth daily.     5. High triglycerides      Medication, side effects, possible allergic reactions and warnings reviewed with patient. Patient verbalized understanding. Discussed labs. Discussed diet and exercise in detail. Patient to take OTC Omega 3/fish oil. After care summary printed and reviewed with patient. Plan reviewed with patient. Questions answered. Patient verbalized understanding of plan and is in agreement with plan. Patient to follow up in one month or earlier if symptoms worsen.     CHASTITY MontelongoC

## 2018-07-20 NOTE — PROGRESS NOTES
Chief Complaint   Patient presents with    Follow-up     Patient was seen 1 week ago to establish care. Patient in office today to discuss lab results and follow up to initial visit     1. Have you been to the ER, urgent care clinic since your last visit? Hospitalized since your last visit? No    2. Have you seen or consulted any other health care providers outside of the [address] [place] [address] since your last visit? Include any pap smears or colon screening.  No     Health Maintenance Due   Topic Date Due    Hepatitis C Screening  1960    Pneumococcal 19-64 Medium Risk (1 of 1 - PPSV23) 10/29/1980    DTaP/Tdap/Td series (1 - Tdap) 10/29/1982    PAP AKA CERVICAL CYTOLOGY  10/29/1982    BREAST CANCER SCRN MAMMOGRAM  10/29/2011    FOBT Q 1 YEAR AGE 50-75  10/29/2011

## 2018-07-20 NOTE — PATIENT INSTRUCTIONS
Please contact our office if you have any questions about your visit today. Sertraline (Zoloft) - (By mouth)   Why this medicine is used:   Treats depression, obsessive-compulsive disorder (OCD), posttraumatic stress disorder (PTSD), premenstrual dysphoric disorder (PMDD), social anxiety, and panic disorder. Contact a nurse or doctor right away if you have:  · Blistering, peeling, red skin rash  · Thoughts of hurting yourself, seeing or hearing things that are not there  · Anxiety, restlessness, fast heartbeat, fever, sweating  · Bloody vomit or vomit that looks like coffee grounds; bloody or black, tarry stools  · Tremors, muscle twitching, uncontrollable movements     Common side effects:  · Sleepiness, trouble sleeping  · Sexual problems  · Mild diarrhea, nausea, vomiting, dry mouth  © 2017 [address]  Information is for End User's use only and may not be sold, redistributed or otherwise used for commercial purposes. Sertraline (By mouth)   Sertraline (SER-tra-jose)  Treats depression, obsessive-compulsive disorder (OCD), posttraumatic stress disorder (PTSD), premenstrual dysphoric disorder (PMDD), social anxiety disorder, and panic disorder. This medicine is an SSRI. Brand Name(s): Zoloft   There may be other brand names for this medicine. When This Medicine Should Not Be Used: This medicine is not right for everyone. Do not use it if you had an allergic reaction to sertraline. How to Use This Medicine:   Liquid, Tablet  · Take your medicine as directed. Your dose may need to be changed several times to find what works best for you. You may need to take it for a few weeks or months before you feel better. · Oral liquid: Use the dropper provided to remove the medicine and mix it with 1/2 cup (4 ounces) of water, ginger ale, lemon-lime soda, lemonade, or orange juice. Drink the mixture right away. It is normal for it to look a bit hazy.   · This medicine should come with a Medication Guide. Ask your pharmacist for a copy if you do not have one. · Missed dose: Take a dose as soon as you remember. If it is almost time for your next dose, wait until then and take a regular dose. Do not take extra medicine to make up for a missed dose. · Store the medicine in a closed container at room temperature, away from heat, moisture, and direct light. Drugs and Foods to Avoid:   Ask your doctor or pharmacist before using any other medicine, including over-the-counter medicines, vitamins, and herbal products. · Do not use this medicine together with pimozide. Do not use this medicine and an MAO inhibitor (MAOI) within 14 days of each other. Do not use the oral liquid form of sertraline if you are also using disulfiram.  · Some medicines can affect how sertraline works. Tell your doctor if you are using the following:   ¨ Buspirone, cimetidine, cisapride, diazepam, digitoxin, fentanyl, flecainide, lithium, phenytoin, propafenone, Doyle's wort, tramadol, tryptophan supplements, or valproate  ¨ A blood thinner (such as warfarin), a diuretic (water pill), an NSAID pain or arthritis medicine (such as aspirin, diclofenac, ibuprofen), a tricyclic antidepressant, a triptan medicine for migraine headaches  · Do not drink alcohol while you are using this medicine. Warnings While Using This Medicine:   · Tell your doctor if you are pregnant or breastfeeding, or if you have liver disease, bleeding problems, glaucoma, heart disease, or a seizure disorder. · For some children, teenagers, and young adults, this medicine may increase mental or emotional problems. This may lead to thoughts of suicide and violence. Talk with your doctor right away if you have any thoughts or behavior changes that concern you. Tell your doctor if you or anyone in your family has a history of bipolar disorder or suicide attempts.   · This medicine may cause the following problems:   ¨ Serotonin syndrome (when taken with certain medicines)  ¨ Low sodium levels (more common in elderly patients and those who take diuretics or become dehydrated)  · Tell your doctor if you are sensitive to latex, because the oral liquid comes with a latex rubber dropper. · This medicine may make you dizzy or drowsy. Do not drive or do anything that could be dangerous until you know how this medicine affects you. · Do not stop using this medicine suddenly. Your doctor will need to slowly decrease your dose before you stop it completely. · Your doctor will check your progress and the effects of this medicine at regular visits. Keep all appointments. · Keep all medicine out of the reach of children. Never share your medicine with anyone. Possible Side Effects While Using This Medicine:   Call your doctor right away if you notice any of these side effects:  · Allergic reaction: Itching or hives, swelling in your face or hands, swelling or tingling in your mouth or throat, chest tightness, trouble breathing  · Anxiety, restlessness, fast heartbeat, fever, sweating, muscle spasms, twitching, nausea, vomiting, diarrhea, seeing or hearing things that are not there  · Blistering, peeling, or red skin rash  · Confusion, weakness, and muscle twitching  · Eye pain, vision changes, seeing halos around lights  · Feeling more excited or energetic than usual  · Thoughts of hurting yourself or others, unusual behavior  · Unusual bleeding or bruising  If you notice these less serious side effects, talk with your doctor:   · Dry mouth  · Loss of appetite, weight loss  · Mild diarrhea, constipation, nausea, vomiting  · Sexual problems  · Sleepiness, or trouble sleeping  If you notice other side effects that you think are caused by this medicine, tell your doctor. Call your doctor for medical advice about side effects.  You may report side effects to FDA at 1-979-FDA-9912  © 2017 [address] Information is for End User's use only and may not be sold, redistributed or otherwise used for commercial purposes. The above information is an  only. It is not intended as medical advice for individual conditions or treatments. Talk to your doctor, nurse or pharmacist before following any medical regimen to see if it is safe and effective for you.

## 2018-07-20 NOTE — MR AVS SNAPSHOT
[facility] 
 
 
 [address] [address] [address] [address] [phone] Patient: Marylee Bristol MRN: [id number] :1960 Visit Information Date & Time Provider Department Dept. Phone Encounter #  
 2018  7:45 AM Sindhu Baca NP [address] [phone] [id number] Follow-up Instructions Return in about 1 month (around 2018), or if symptoms worsen or fail to improve, for 45 min follow up. Upcoming Health Maintenance Date Due Hepatitis C Screening 1960 Pneumococcal 19-64 Medium Risk (1 of 1 - PPSV23) 10/29/1980 DTaP/Tdap/Td series (1 - Tdap) 10/29/1982 PAP AKA CERVICAL CYTOLOGY 10/29/1982 BREAST CANCER SCRN MAMMOGRAM 10/29/2011 FOBT Q 1 YEAR AGE 50-75 10/29/2011 Influenza Age 5 to Adult 2018 Allergies as of 2018  Review Complete On: 2018 By: Sindhu Baca NP No Known Allergies Current Immunizations  Never Reviewed No immunizations on file. Not reviewed this visit You Were Diagnosed With   
  
 Codes Comments Encounter to discuss test results    -  Primary ICD-10-CM: Z71.89 ICD-9-CM: V65.49 Need for hepatitis C screening test     ICD-10-CM: Z11.59 
ICD-9-CM: V73.89 Colon cancer screening     ICD-10-CM: Z12.11 ICD-9-CM: V76.51 Episode of recurrent major depressive disorder, unspecified depression episode severity ([facility] [address]     ICD-10-CM: F33.9 ICD-9-CM: 296.30 Vitals BP Pulse Temp Resp Height(growth percentile) Weight(growth percentile) 124/74 (BP 1 Location: Right arm, BP Patient Position: Sitting) 76 97.7 °F (36.5 °C) (Oral) 16 5' 4\" (1.626 m) 211 lb (95.7 kg) SpO2 BMI OB Status Smoking Status 92% 36.22 kg/m2 Menopause Current Every Day Smoker Vitals History BMI and BSA Data Body Mass Index Body Surface Area  
 36.22 kg/m 2 2.08 m 2 Preferred Pharmacy Pharmacy Name Phone [facility] [address] [place] [address] Your Updated Medication List  
  
   
This list is accurate as of 7/20/18  8:03 AM.  Always use your most recent med list.  
  
  
  
  
 METHADONE PO Take 85 mg by mouth daily. Follow-up Instructions Return in about 1 month (around 8/20/2018), or if symptoms worsen or fail to improve, for 45 min follow up. To-Do List   
 07/20/2018 Lab:  OCCULT BLOOD, IMMUNOASSAY (FIT)   
  
 07/20/2018  1:30 PM  
  Appointment with [facility]  1 at [address] [place] [address] ([phone]) OUTSIDE FILMS  - Any outside films related to the study being scheduled should be brought with you on the day of the exam.  If this cannot be done there may be a delay in the reading of the study. MEDICATIONS  - Patient must bring a complete list of all medications currently taking to include prescriptions, over-the-counter meds, herbals, vitamins & any dietary supplements  GENERAL INSTRUCTIONS  - On the day of your exam do not use any bath powder, deodorant or lotions on the armpit area. -Tenderness of breasts may cause an increase of discomfort during procedure. If you are experiencing breast tenderness on the day of your appointment and would like to reschedule, please call [phone]. Patient Instructions Please contact our office if you have any questions about your visit today. Sertraline (Zoloft) - (By mouth) Why this medicine is used:  
Treats depression, obsessive-compulsive disorder (OCD), posttraumatic stress disorder (PTSD), premenstrual dysphoric disorder (PMDD), social anxiety, and panic disorder. Contact a nurse or doctor right away if you have: · Blistering, peeling, red skin rash · Thoughts of hurting yourself, seeing or hearing things that are not there · Anxiety, restlessness, fast heartbeat, fever, sweating · Bloody vomit or vomit that looks like coffee grounds; bloody or black, tarry stools · Tremors, muscle twitching, uncontrollable movements Common side effects: · Sleepiness, trouble sleeping · Sexual problems · Mild diarrhea, nausea, vomiting, dry mouth © 2017 [address] Information is for End User's use only and may not be sold, redistributed or otherwise used for commercial purposes. Sertraline (By mouth) Sertraline (SER-tra-jose) Treats depression, obsessive-compulsive disorder (OCD), posttraumatic stress disorder (PTSD), premenstrual dysphoric disorder (PMDD), social anxiety disorder, and panic disorder. This medicine is an SSRI. Brand Name(s): Zoloft There may be other brand names for this medicine. When This Medicine Should Not Be Used: This medicine is not right for everyone. Do not use it if you had an allergic reaction to sertraline. How to Use This Medicine:  
Liquid, Tablet · Take your medicine as directed. Your dose may need to be changed several times to find what works best for you. You may need to take it for a few weeks or months before you feel better. · Oral liquid: Use the dropper provided to remove the medicine and mix it with 1/2 cup (4 ounces) of water, ginger ale, lemon-lime soda, lemonade, or orange juice. Drink the mixture right away. It is normal for it to look a bit hazy. · This medicine should come with a Medication Guide. Ask your pharmacist for a copy if you do not have one. · Missed dose: Take a dose as soon as you remember. If it is almost time for your next dose, wait until then and take a regular dose. Do not take extra medicine to make up for a missed dose. · Store the medicine in a closed container at room temperature, away from heat, moisture, and direct light. Drugs and Foods to Avoid: Ask your doctor or pharmacist before using any other medicine, including over-the-counter medicines, vitamins, and herbal products. · Do not use this medicine together with pimozide.  Do not use this medicine and an MAO inhibitor (MAOI) within 14 days of each other. Do not use the oral liquid form of sertraline if you are also using disulfiram. 
· Some medicines can affect how sertraline works. Tell your doctor if you are using the following:  
¨ Buspirone, cimetidine, cisapride, diazepam, digitoxin, fentanyl, flecainide, lithium, phenytoin, propafenone, Doyle's wort, tramadol, tryptophan supplements, or valproate ¨ A blood thinner (such as warfarin), a diuretic (water pill), an NSAID pain or arthritis medicine (such as aspirin, diclofenac, ibuprofen), a tricyclic antidepressant, a triptan medicine for migraine headaches · Do not drink alcohol while you are using this medicine. Warnings While Using This Medicine: · Tell your doctor if you are pregnant or breastfeeding, or if you have liver disease, bleeding problems, glaucoma, heart disease, or a seizure disorder. · For some children, teenagers, and young adults, this medicine may increase mental or emotional problems. This may lead to thoughts of suicide and violence. Talk with your doctor right away if you have any thoughts or behavior changes that concern you. Tell your doctor if you or anyone in your family has a history of bipolar disorder or suicide attempts. · This medicine may cause the following problems:  
¨ Serotonin syndrome (when taken with certain medicines) ¨ Low sodium levels (more common in elderly patients and those who take diuretics or become dehydrated) · Tell your doctor if you are sensitive to latex, because the oral liquid comes with a latex rubber dropper. · This medicine may make you dizzy or drowsy. Do not drive or do anything that could be dangerous until you know how this medicine affects you. · Do not stop using this medicine suddenly. Your doctor will need to slowly decrease your dose before you stop it completely.  
· Your doctor will check your progress and the effects of this medicine at regular visits. Keep all appointments. · Keep all medicine out of the reach of children. Never share your medicine with anyone. Possible Side Effects While Using This Medicine:  
Call your doctor right away if you notice any of these side effects: · Allergic reaction: Itching or hives, swelling in your face or hands, swelling or tingling in your mouth or throat, chest tightness, trouble breathing · Anxiety, restlessness, fast heartbeat, fever, sweating, muscle spasms, twitching, nausea, vomiting, diarrhea, seeing or hearing things that are not there · Blistering, peeling, or red skin rash · Confusion, weakness, and muscle twitching · Eye pain, vision changes, seeing halos around lights · Feeling more excited or energetic than usual 
· Thoughts of hurting yourself or others, unusual behavior · Unusual bleeding or bruising If you notice these less serious side effects, talk with your doctor: · Dry mouth · Loss of appetite, weight loss · Mild diarrhea, constipation, nausea, vomiting · Sexual problems · Sleepiness, or trouble sleeping If you notice other side effects that you think are caused by this medicine, tell your doctor. Call your doctor for medical advice about side effects. You may report side effects to FDA at 0-723-FDA-1381 © 2017 [facility] Information is for End User's use only and may not be sold, redistributed or otherwise used for commercial purposes. The above information is an  only. It is not intended as medical advice for individual conditions or treatments. Talk to your doctor, nurse or pharmacist before following any medical regimen to see if it is safe and effective for you. Introducing [facility]! Tim Lara introduces [employer] patient portal. Now you can access parts of your medical record, email your doctor's office, and request medication refills online.    
 
1. In your internet browser, go to [email or web address] 2. Click on the First Time User? Click Here link in the Sign In box. You will see the New Member Sign Up page. 3. Enter your [employer] Access Code exactly as it appears below. You will not need to use this code after youve completed the sign-up process. If you do not sign up before the expiration date, you must request a new code. · [employer] Access Code: [email or web address] Expires: 10/11/2018  9:30 AM 
 
4. Enter the last four digits of your Social Security Number (xxxx) and Date of Birth (mm/dd/yyyy) as indicated and click Submit. You will be taken to the next sign-up page. 5. Create a [employer]t ID. This will be your [employer] login ID and cannot be changed, so think of one that is secure and easy to remember. 6. Create a [employer] password. You can change your password at any time. 7. Enter your Password Reset Question and Answer. This can be used at a later time if you forget your password. 8. Enter your e-mail address. You will receive e-mail notification when new information is available in [address] 9. Click Sign Up. You can now view and download portions of your medical record. 10. Click the Download Summary menu link to download a portable copy of your medical information. If you have questions, please visit the Frequently Asked Questions section of the [employer] website. Remember, [employer] is NOT to be used for urgent needs. For medical emergencies, dial 911. Now available from your iPhone and Android! Please provide this summary of care documentation to your next provider. If you have any questions after today's visit, please call [phone].

## 2018-07-20 NOTE — TELEPHONE ENCOUNTER
[address] [place] [address] called and wanted to know if the dosage on the  script for Zoloft 100 mg that was sent over this morning is correct. The patient was seen in the office on July 13 th and she was give a prescription for 50 mg tabs.  Please advise

## 2018-07-20 NOTE — TELEPHONE ENCOUNTER
I called the pharmacy and made them spoke with them about the change is the script this morning.      Thanks

## 2018-07-23 LAB
HCV AB SER IA-ACNC: >11 INDEX
HCV AB SERPL QL IA: POSITIVE
HCV COMMENT,HCGAC: ABNORMAL

## 2018-07-24 ENCOUNTER — TELEPHONE (OUTPATIENT)
Dept: FAMILY MEDICINE CLINIC | Age: 58
End: 2018-07-24

## 2018-07-24 DIAGNOSIS — B19.20 HEPATITIS C VIRUS INFECTION WITHOUT HEPATIC COMA, UNSPECIFIED CHRONICITY: Primary | ICD-10-CM

## 2018-07-24 NOTE — TELEPHONE ENCOUNTER
Patient verified her name, , and address. Made patient aware of Hep. C  Results and referral. Patient also aware of mammogram results.  [facility]

## 2018-08-17 ENCOUNTER — OFFICE VISIT (OUTPATIENT)
Dept: FAMILY MEDICINE CLINIC | Age: 58
End: 2018-08-17

## 2018-08-17 VITALS
WEIGHT: 214 LBS | DIASTOLIC BLOOD PRESSURE: 79 MMHG | RESPIRATION RATE: 16 BRPM | TEMPERATURE: 98.1 F | HEART RATE: 76 BPM | SYSTOLIC BLOOD PRESSURE: 130 MMHG | BODY MASS INDEX: 36.54 KG/M2 | HEIGHT: 64 IN

## 2018-08-17 DIAGNOSIS — Z71.2 ENCOUNTER TO DISCUSS TEST RESULTS: Primary | ICD-10-CM

## 2018-08-17 DIAGNOSIS — E78.1 HIGH TRIGLYCERIDES: ICD-10-CM

## 2018-08-17 DIAGNOSIS — B19.20 HEPATITIS C VIRUS INFECTION WITHOUT HEPATIC COMA, UNSPECIFIED CHRONICITY: ICD-10-CM

## 2018-08-17 DIAGNOSIS — F33.9 EPISODE OF RECURRENT MAJOR DEPRESSIVE DISORDER, UNSPECIFIED DEPRESSION EPISODE SEVERITY (HCC): ICD-10-CM

## 2018-08-17 NOTE — MR AVS SNAPSHOT
[facility] 
 
 
 [address] [address] [address] [address] [phone] Patient: Misael Queen MRN: [id number] :1960 Visit Information Date & Time Provider Department Dept. Phone Encounter #  
 2018  8:00 AM Mary Mota NP [address] [id number] Follow-up Instructions Return in about 1 month (around 2018), or if symptoms worsen or fail to improve. Upcoming Health Maintenance Date Due Pneumococcal 19-64 Medium Risk (1 of 1 - PPSV23) 10/29/1980 DTaP/Tdap/Td series (1 - Tdap) 10/29/1982 PAP AKA CERVICAL CYTOLOGY 10/29/1982 FOBT Q 1 YEAR AGE 50-75 10/29/2011 Influenza Age 5 to Adult 2018* BREAST CANCER SCRN MAMMOGRAM 2020 *Topic was postponed. The date shown is not the original due date. Allergies as of 2018  Review Complete On: 2018 By: Mary Mota NP No Known Allergies Current Immunizations  Never Reviewed No immunizations on file. Not reviewed this visit You Were Diagnosed With   
  
 Codes Comments Encounter to discuss test results    -  Primary ICD-10-CM: Z71.89 ICD-9-CM: V65.49 Episode of recurrent major depressive disorder, unspecified depression episode severity ([facility]     ICD-10-CM: F33.9 ICD-9-CM: 296.30 High triglycerides     ICD-10-CM: E78.1 ICD-9-CM: 272.1 Hepatitis C virus infection without hepatic coma, unspecified chronicity     ICD-10-CM: B19.20 ICD-9-CM: 070.70 Vitals BP Pulse Temp Resp Height(growth percentile) Weight(growth percentile) 130/79 (BP 1 Location: Left arm, BP Patient Position: Sitting) 76 98.1 °F (36.7 °C) (Oral) 16 5' 4\" (1.626 m) 214 lb (97.1 kg) BMI OB Status Smoking Status 36.73 kg/m2 Menopause Current Every Day Smoker BMI and BSA Data Body Mass Index Body Surface Area  
 36.73 kg/m 2 2.09 m 2 Preferred Pharmacy Pharmacy Name Phone [facility] Your Updated Medication List  
  
   
This list is accurate as of 8/17/18  8:27 AM.  Always use your most recent med list.  
  
  
  
  
 METHADONE PO Take 85 mg by mouth daily. sertraline 100 mg tablet Commonly known as:  ZOLOFT Take 1 Tab by mouth daily. Follow-up Instructions Return in about 1 month (around 9/17/2018), or if symptoms worsen or fail to improve. Patient Instructions Please contact our office if you have any questions about your visit today. Hepatitis C: Care Instructions Your Care Instructions Hepatitis C is an infection of the liver caused by a virus. This virus spreads when blood or body fluids from an infected person enter another person's body. This occurs most often when people share needles that have the virus on them. In the past, people got the virus through blood transfusions and organ transplants. But since 1992, all donated blood and organs have been screened for hepatitis C. So getting the virus this way is now very rare. Less often, hepatitis C can spread through sex and sharing items such as razor blades or toothbrushes. Needles used for tattoos and body piercings can also spread the virus. The virus doesn't always cause symptoms. But you may feel tired. And you may have a headache, sore muscles, nausea, and pain in the upper right belly. Other symptoms include yellowish skin and dark urine. Home treatment can help ease symptoms. And your doctor may prescribe antiviral medicine. Long-term infection can lead to severe liver damage. So make sure to go to your follow-up appointments. Follow-up care is a key part of your treatment and safety. Be sure to make and go to all appointments, and call your doctor if you are having problems. It's also a good idea to know your test results and keep a list of the medicines you take. How can you care for yourself at home? · Be safe with medicines. If your doctor prescribes antiviral medicine, take it exactly as prescribed. Call your doctor if you think you are having a problem with your medicine. · Do not drink alcohol. Alcohol can damage the liver. Tell your doctor if you need help to quit. Counseling, support groups, and sometimes medicines can help you stay sober. · Do not take drugs or herbal medicines. They can make liver problems worse. · Make sure your doctor knows all of the medicines you take. Some medicines, such as acetaminophen (Tylenol), can make liver problems worse. Do not take any new medicines unless your doctor tells you to. This includes over-the-counter medicines. · Maintain a healthy lifestyle. Get plenty of exercise if you feel up to it. Eat a healthy diet. · Drink plenty of fluids, enough so that your urine is light yellow or clear like water. If you have kidney, heart, or liver disease and have to limit fluids, talk with your doctor before you increase the amount of fluids you drink. · Get the vaccines (if you have not already) to protect yourself from hepatitis A and hepatitis B, influenza, and pneumococcus. · The infection can make you itch. Keep cool and stay out of the sun. Try to wear cotton clothing. Talk to your doctor about using over-the-counter medicines for itching. These include diphenhydramine (Benadryl) and chlorpheniramine (Chlor-Trimeton). Follow the instructions on the label. · If you feel depressed, talk to your doctor about treatment. Many people who have long-term illnesses get depressed. Keep in mind that antiviral medicine can make depression worse. To avoid spreading hepatitis C to others · Tell the people that you live with or have sex with about your illness as soon as you can. · Don't share needles to inject drugs. Don't share other equipment (such as cotton, spoons, and water) with others.  Find out if a needle exchange program is available in your area, and use it. Get into a drug treatment program. 
· Practice safer sex. Reduce your number of sex partners if you have more than one. Unless you are in a long-term relationship in which neither partner has sex with anyone else, always use latex condoms when you have sex. · Don't donate blood or blood products, organs, semen, or eggs (ova). · Make sure that all equipment is sterilized if you get a tattoo, have your body pierced, or have acupuncture. · Do not share your personal items. These include razors, toothbrushes, towels, and nail files. · Tell your doctor, dentist, and anyone else who may come in contact with your blood about your illness. · Prevent others from coming in contact with your blood and other body fluids. Keep any cuts, scrapes, or blisters covered. · Wash your hands-and any object that has come in contact with your blood-thoroughly with water and soap. When should you call for help? Call 911 anytime you think you may need emergency care. For example, call if: 
  · You have trouble breathing.  
  · You vomit blood or what looks like coffee grounds.  
 Call your doctor now or seek immediate medical care if: 
  · You feel very sleepy or confused.  
  · You have a fever.  
  · There is a new or increasing yellow tint to your skin or the whites of your eyes.  
  · You have new or worse belly pain.  
  · You have any abnormal bleeding, such as: 
¨ Nosebleeds. ¨ Vaginal bleeding that is different (heavier, more frequent, at a different time of the month) than what you are used to. ¨ Bloody or black stools, or rectal bleeding. ¨ Bloody or pink urine.  
 Watch closely for changes in your health, and be sure to contact your doctor if: 
  · You have any problems.  
  · Your belly is getting bigger.  
  · You are gaining weight. Where can you learn more? Go to http://sameera-miles.info/. Enter V118 in the search box to learn more about \"Hepatitis C: Care Instructions. \" Current as of: November 18, 2017 Content Version: 11.7 © 5556-1900 [employer]. Care instructions adapted under license by [employer] (which disclaims liability or warranty for this information). If you have questions about a medical condition or this instruction, always ask your healthcare professional. [address] any warranty or liability for your use of this information. Learning About Hepatitis C What is hepatitis C? Hepatitis C is a liver infection. It is caused by the hepatitis C virus. The virus is spread through infected blood and body fluids. Hepatitis C is often spread when a person shares infected needles used to inject illegal drugs. It also can be spread if a person uses a needle that has infected blood on it. This could happen when you get a tattoo or piercing. Or it can happen when you get a shot in some developing countries where they use needles more than once to give shots. In rare cases, a mother with hepatitis C can spread the virus to her baby at birth. Or a health care worker may accidentally be exposed to blood that is infected with hepatitis C. There is a very small risk of getting the virus through sexual contact. The risk is higher if your sex partner also has HIV or another sexually transmitted infection, or if you have many sex partners. You can't get hepatitis C from casual contact. This is contact such as hugging, kissing, sneezing, coughing, and sharing food or drinks. What happens when you have hepatitis C? Some people who get hepatitis C have it for a short time and then get better. This is called acute hepatitis C. But most people get long-term, or chronic, hepatitis C. This can lead to liver damage as well as cirrhosis, liver cancer, and liver failure. Experts recommend that certain groups of people get tested for the virus. These include people who have signs of liver disease or have ever shared needles while using illegal drugs. Ask your doctor if testing is right for you. You can also buy a home test called a Home Access Hepatitis C Check kit at [facility]. If the test shows that you have been exposed to the virus in the past, be sure to talk to your doctor to find out if you have the virus now. What are the symptoms? Most people who get hepatitis C do not have symptoms at first. Symptoms may include: · Tiredness. · Headache. · Sore muscles. · Nausea. · Pain in the upper right belly. · Yellowing of your skin and eyes (jaundice). · Dark urine. How can you prevent hepatitis C? There is no vaccine to prevent the disease. Anyone who has hepatitis C can spread the virus to someone else. You can take steps to make infection less likely. · Do not share needles to inject drugs. · Follow safety guidelines if you work in a health care setting. Wear protective gloves and clothing. Dispose of needles and other sharp objects properly. · Make sure all instruments and supplies are sterilized if you get a tattoo, have your body pierced, or have acupuncture. To avoid spreading hepatitis C if you have it: 
· Do not share needles or other equipment, such as cotton, spoons, and water, if you use needles to inject drugs. · Keep cuts, scrapes, and blisters covered. This will prevent others from coming in contact with your blood and other body fluids. Throw out any blood-soaked items such as used bandages. · Do not donate blood or sperm. · Wash your hands and anything that has come in contact with your blood. Use soap and water. · Do not share your toothbrush, razor, nail clippers, or anything else that might have your blood on it. · Use latex condoms during sex if you have HIV, multiple sex partners, or a sexually transmitted infection. How is hepatitis C treated? · If you have acute hepatitis C, your doctor will probably prescribe medicine. · If you have chronic hepatitis C, your treatment depends on whether you have liver damage, other health problems you may have, and how much virus is in your body and what type it is. · You will need to see your doctor regularly to have blood tests to check your liver. Follow-up care is a key part of your treatment and safety. Be sure to make and go to all appointments, and call your doctor if you are having problems. It's also a good idea to know your test results and keep a list of the medicines you take. Where can you learn more? Go to http://sameera-miles.info/. Enter C666 in the search box to learn more about \"Learning About Hepatitis C.\" 
Current as of: November 18, 2017 Content Version: 11.7 © 2802-3452 [employer]. Care instructions adapted under license by [employer] (which disclaims liability or warranty for this information). If you have questions about a medical condition or this instruction, always ask your healthcare professional. [place] [address] any warranty or liability for your use of this information. Introducing [facility]! [employer] introduces [employer] patient portal. Now you can access parts of your medical record, email your doctor's office, and request medication refills online. 1. In your internet browser, go to [email or web address] 2. Click on the First Time User? Click Here link in the Sign In box. You will see the New Member Sign Up page. 3. Enter your [employer] Access Code exactly as it appears below. You will not need to use this code after youve completed the sign-up process. If you do not sign up before the expiration date, you must request a new code. · [employer] Access Code: [email or web address] Expires: 10/11/2018  9:30 AM 
 
4.  Enter the last four digits of your Social Security Number (xxxx) and Date of Birth (mm/dd/yyyy) as indicated and click Submit. You will be taken to the next sign-up page. 5. Create a Lumoid ID. This will be your Lumoid login ID and cannot be changed, so think of one that is secure and easy to remember. 6. Create a Lumoid password. You can change your password at any time. 7. Enter your Password Reset Question and Answer. This can be used at a later time if you forget your password. 8. Enter your e-mail address. You will receive e-mail notification when new information is available in [address] 9. Click Sign Up. You can now view and download portions of your medical record. 10. Click the Download Summary menu link to download a portable copy of your medical information. If you have questions, please visit the Frequently Asked Questions section of the Lumoid website. Remember, Lumoid is NOT to be used for urgent needs. For medical emergencies, dial 911. Now available from your iPhone and Android! Please provide this summary of care documentation to your next provider. Your primary care clinician is listed as Mary Jane Ornelas. If you have any questions after today's visit, please call [phone].

## 2018-08-17 NOTE — PATIENT INSTRUCTIONS
Please contact our office if you have any questions about your visit today. Hepatitis C: Care Instructions  Your Care Instructions    Hepatitis C is an infection of the liver caused by a virus. This virus spreads when blood or body fluids from an infected person enter another person's body. This occurs most often when people share needles that have the virus on them. In the past, people got the virus through blood transfusions and organ transplants. But since 1992, all donated blood and organs have been screened for hepatitis C. So getting the virus this way is now very rare. Less often, hepatitis C can spread through sex and sharing items such as razor blades or toothbrushes. Needles used for tattoos and body piercings can also spread the virus. The virus doesn't always cause symptoms. But you may feel tired. And you may have a headache, sore muscles, nausea, and pain in the upper right belly. Other symptoms include yellowish skin and dark urine. Home treatment can help ease symptoms. And your doctor may prescribe antiviral medicine. Long-term infection can lead to severe liver damage. So make sure to go to your follow-up appointments. Follow-up care is a key part of your treatment and safety. Be sure to make and go to all appointments, and call your doctor if you are having problems. It's also a good idea to know your test results and keep a list of the medicines you take. How can you care for yourself at home? · Be safe with medicines. If your doctor prescribes antiviral medicine, take it exactly as prescribed. Call your doctor if you think you are having a problem with your medicine. · Do not drink alcohol. Alcohol can damage the liver. Tell your doctor if you need help to quit. Counseling, support groups, and sometimes medicines can help you stay sober. · Do not take drugs or herbal medicines. They can make liver problems worse. · Make sure your doctor knows all of the medicines you take.  Some medicines, such as acetaminophen (Tylenol), can make liver problems worse. Do not take any new medicines unless your doctor tells you to. This includes over-the-counter medicines. · Maintain a healthy lifestyle. Get plenty of exercise if you feel up to it. Eat a healthy diet. · Drink plenty of fluids, enough so that your urine is light yellow or clear like water. If you have kidney, heart, or liver disease and have to limit fluids, talk with your doctor before you increase the amount of fluids you drink. · Get the vaccines (if you have not already) to protect yourself from hepatitis A and hepatitis B, influenza, and pneumococcus. · The infection can make you itch. Keep cool and stay out of the sun. Try to wear cotton clothing. Talk to your doctor about using over-the-counter medicines for itching. These include diphenhydramine (Benadryl) and chlorpheniramine (Chlor-Trimeton). Follow the instructions on the label. · If you feel depressed, talk to your doctor about treatment. Many people who have long-term illnesses get depressed. Keep in mind that antiviral medicine can make depression worse. To avoid spreading hepatitis C to others  · Tell the people that you live with or have sex with about your illness as soon as you can. · Don't share needles to inject drugs. Don't share other equipment (such as cotton, spoons, and water) with others. Find out if a needle exchange program is available in your area, and use it. Get into a drug treatment program.  · Practice safer sex. Reduce your number of sex partners if you have more than one. Unless you are in a long-term relationship in which neither partner has sex with anyone else, always use latex condoms when you have sex. · Don't donate blood or blood products, organs, semen, or eggs (ova). · Make sure that all equipment is sterilized if you get a tattoo, have your body pierced, or have acupuncture. · Do not share your personal items.  These include razors, toothbrushes, towels, and nail files. · Tell your doctor, dentist, and anyone else who may come in contact with your blood about your illness. · Prevent others from coming in contact with your blood and other body fluids. Keep any cuts, scrapes, or blisters covered. · Wash your hands-and any object that has come in contact with your blood-thoroughly with water and soap. When should you call for help? Call 911 anytime you think you may need emergency care. For example, call if:    · You have trouble breathing.     · You vomit blood or what looks like coffee grounds.    Call your doctor now or seek immediate medical care if:    · You feel very sleepy or confused.     · You have a fever.     · There is a new or increasing yellow tint to your skin or the whites of your eyes.     · You have new or worse belly pain.     · You have any abnormal bleeding, such as:  ¨ Nosebleeds. ¨ Vaginal bleeding that is different (heavier, more frequent, at a different time of the month) than what you are used to. ¨ Bloody or black stools, or rectal bleeding. ¨ Bloody or pink urine.    Watch closely for changes in your health, and be sure to contact your doctor if:    · You have any problems.     · Your belly is getting bigger.     · You are gaining weight. Where can you learn more? Go to http://sameera-miles.info/. Enter X534 in the search box to learn more about \"Hepatitis C: Care Instructions. \"  Current as of: November 18, 2017  Content Version: 11.7  © 5177-3229 [employer]. Care instructions adapted under license by [employer] (which disclaims liability or warranty for this information). If you have questions about a medical condition or this instruction, always ask your healthcare professional. [place] [address] any warranty or liability for your use of this information. Learning About Hepatitis C  What is hepatitis C? Hepatitis C is a liver infection. It is caused by the hepatitis C virus. The virus is spread through infected blood and body fluids. Hepatitis C is often spread when a person shares infected needles used to inject illegal drugs. It also can be spread if a person uses a needle that has infected blood on it. This could happen when you get a tattoo or piercing. Or it can happen when you get a shot in some developing countries where they use needles more than once to give shots. In rare cases, a mother with hepatitis C can spread the virus to her baby at birth. Or a health care worker may accidentally be exposed to blood that is infected with hepatitis C. There is a very small risk of getting the virus through sexual contact. The risk is higher if your sex partner also has HIV or another sexually transmitted infection, or if you have many sex partners. You can't get hepatitis C from casual contact. This is contact such as hugging, kissing, sneezing, coughing, and sharing food or drinks. What happens when you have hepatitis C? Some people who get hepatitis C have it for a short time and then get better. This is called acute hepatitis C. But most people get long-term, or chronic, hepatitis C. This can lead to liver damage as well as cirrhosis, liver cancer, and liver failure. Experts recommend that certain groups of people get tested for the virus. These include people who have signs of liver disease or have ever shared needles while using illegal drugs. Ask your doctor if testing is right for you. You can also buy a home test called a Home Access Hepatitis C Check kit at [facility]. If the test shows that you have been exposed to the virus in the past, be sure to talk to your doctor to find out if you have the virus now. What are the symptoms? Most people who get hepatitis C do not have symptoms at first. Symptoms may include:  · Tiredness. · Headache. · Sore muscles. · Nausea. · Pain in the upper right belly.   · Yellowing of your skin and eyes (jaundice). · Dark urine. How can you prevent hepatitis C? There is no vaccine to prevent the disease. Anyone who has hepatitis C can spread the virus to someone else. You can take steps to make infection less likely. · Do not share needles to inject drugs. · Follow safety guidelines if you work in a health care setting. Wear protective gloves and clothing. Dispose of needles and other sharp objects properly. · Make sure all instruments and supplies are sterilized if you get a tattoo, have your body pierced, or have acupuncture. To avoid spreading hepatitis C if you have it:  · Do not share needles or other equipment, such as cotton, spoons, and water, if you use needles to inject drugs. · Keep cuts, scrapes, and blisters covered. This will prevent others from coming in contact with your blood and other body fluids. Throw out any blood-soaked items such as used bandages. · Do not donate blood or sperm. · Wash your hands and anything that has come in contact with your blood. Use soap and water. · Do not share your toothbrush, razor, nail clippers, or anything else that might have your blood on it. · Use latex condoms during sex if you have HIV, multiple sex partners, or a sexually transmitted infection. How is hepatitis C treated? · If you have acute hepatitis C, your doctor will probably prescribe medicine. · If you have chronic hepatitis C, your treatment depends on whether you have liver damage, other health problems you may have, and how much virus is in your body and what type it is. · You will need to see your doctor regularly to have blood tests to check your liver. Follow-up care is a key part of your treatment and safety. Be sure to make and go to all appointments, and call your doctor if you are having problems. It's also a good idea to know your test results and keep a list of the medicines you take. Where can you learn more?   Go to http://sameera-miles.info/. Enter C666 in the search box to learn more about \"Learning About Hepatitis C.\"  Current as of: November 18, 2017  Content Version: 11.7  © 9184-3092 [employer]. Care instructions adapted under license by [employer] (which disclaims liability or warranty for this information). If you have questions about a medical condition or this instruction, always ask your healthcare professional. [place] [address] any warranty or liability for your use of this information.

## 2018-08-17 NOTE — PROGRESS NOTES
Chief Complaint   Patient presents with    Follow-up     1 month    Depression    Results     discuss lab results       Health Maintenance Due   Topic Date Due    Pneumococcal 19-64 Medium Risk (1 of 1 - PPSV23) 10/29/1980    DTaP/Tdap/Td series (1 - Tdap) 10/29/1982    PAP AKA CERVICAL CYTOLOGY  10/29/1982    FOBT Q 1 YEAR AGE 50-75  10/29/2011    Influenza Age 9 to Adult  08/01/2018       Health Maintenance reviewed       1. Have you been to the ER, urgent care clinic since your last visit? Hospitalized since your last visit? No    2. Have you seen or consulted any other health care providers outside of the [address] [place] [address] since your last visit? Include any pap smears or colon screening.  No      PHQ screening updated

## 2018-08-17 NOTE — PROGRESS NOTES
HPI  Charise Angelucci is a 65 y.o. female  Chief Complaint   Patient presents with    Follow-up     1 month    Depression    Results     discuss lab results   Requesting to review her lab results. Hep C - Reports appointment with liver specialist is next Monday. Denies nausea, vomiting, diarrhea, or constipation. Denies blood in her urine or stool. Reports having normal bowels and has no trouble with her appetite. Denies fevers or chills. Denies night sweats. Denies abdominal pain. Denies desire to hurt or harm herself or others. Denies suicidal ideations. Reports she feels her depression is better and states she just started feeling better in the last two weeks. Reports she does not want to increase her dosage at this time but would like to wait to see if this dose continue to be effective. Denies chest pain or shortness of breath. 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 Past Medical History  No past medical history on file. Surgical History  No past surgical history on file. Medications  Current Outpatient Prescriptions   Medication Sig Dispense Refill    sertraline (ZOLOFT) 100 mg tablet Take 1 Tab by mouth daily. 30 Tab 0    methadone HCl (METHADONE PO) Take 85 mg by mouth daily. Allergies  No Known Allergies    Family History  Family History   Problem Relation Age of Onset    Hypertension Mother     High Cholesterol Mother     COPD Mother     Hypertension Father        Social History  Social History     Social History    Marital status: SINGLE     Spouse name: N/A    Number of children: N/A    Years of education: N/A     Occupational History    Not on file.      Social History Main Topics    Smoking status: Current Every Day Smoker     Packs/day: 1.00     Types: Cigarettes    Smokeless tobacco: Never Used    Alcohol use No    Drug use: Yes     Special: Heroin    Sexual activity: No     Other Topics Concern    Not on file Social History Narrative       Problem List  Patient Active Problem List   Diagnosis Code    Severe obesity (BMI 35.0-39.9) ([facility] E66.01       Review of Systems  Review of Systems   Constitutional: Negative for chills and fever. Respiratory: Negative for shortness of breath. Cardiovascular: Negative for chest pain. Gastrointestinal: Negative for abdominal pain, blood in stool, constipation, diarrhea, nausea and vomiting. Neurological: Negative for weakness. Psychiatric/Behavioral: Positive for depression. Negative for substance abuse and suicidal ideas. Vital Signs  Vitals:    08/17/18 0809   BP: 130/79   Pulse: 76   Resp: 16   Temp: 98.1 °F (36.7 °C)   TempSrc: Oral   Weight: 214 lb (97.1 kg)   Height: 5' 4\" (1.626 m)   PainSc:   0 - No pain     PHQ over the last two weeks 8/17/2018   Little interest or pleasure in doing things Several days   Feeling down, depressed, irritable, or hopeless Several days   Total Score PHQ 2 2   Trouble falling or staying asleep, or sleeping too much -   Feeling tired or having little energy -   Poor appetite, weight loss, or overeating -   Feeling bad about yourself - or that you are a failure or have let yourself or your family down -   Trouble concentrating on things such as school, work, reading, or watching TV -   Moving or speaking so slowly that other people could have noticed; or the opposite being so fidgety that others notice -   Thoughts of being better off dead, or hurting yourself in some way -   PHQ 9 Score -   How difficult have these problems made it for you to do your work, take care of your home and get along with others -     Physical Exam  Physical Exam   Constitutional: She is oriented to person, place, and time. HENT:   Mouth/Throat: Oropharynx is clear and moist.   Eyes: Pupils are equal, round, and reactive to light. Cardiovascular: Normal rate, regular rhythm, normal heart sounds and intact distal pulses.     Pulmonary/Chest: Effort normal and breath sounds normal.   Abdominal: Soft. Bowel sounds are normal. She exhibits no distension. There is no tenderness. There is no rebound and no guarding. Neurological: She is alert and oriented to person, place, and time. Skin: Skin is warm and dry. Psychiatric: She has a normal mood and affect. Her behavior is normal. Judgment and thought content normal.   Vitals reviewed. Diagnostics  No orders of the defined types were placed in this encounter. Results  Results for orders placed or performed during the hospital encounter of 07/20/18   HEPATITIS C AB   Result Value Ref Range    Hepatitis C virus Ab >11.00 (H) <0.80 Index    Hep C  virus Ab Interp. POSITIVE (A) NEG      Hep C  virus Ab comment                 Assessment and Plan  Diagnoses and all orders for this visit:    1. Encounter to discuss test results    2. Episode of recurrent major depressive disorder, unspecified depression episode severity ([facility]    3. High triglycerides    4. Hepatitis C virus infection without hepatic coma, unspecified chronicity      Will keep on current dosage of zoloft. She should continue stress reduction. Treatment for triglycerides discussed. She declines at this time and I am in agreement in light of her liver function. She agrees to work on her diet and exercise. After care summary printed and reviewed with patient. Plan reviewed with patient. Questions answered. Patient verbalized understanding of plan and is in agreement with plan. Patient to follow up in one month or earlier if symptoms worsen. Will evaluate the need to change dosage of zoloft at next visit.      Lázaro Lopez, CHASTITYC

## 2018-08-23 ENCOUNTER — HOSPITAL ENCOUNTER (OUTPATIENT)
Dept: ULTRASOUND IMAGING | Age: 58
Discharge: HOME | End: 2018-08-23
Attending: INTERNAL MEDICINE
Payer: MEDICAID

## 2018-08-23 DIAGNOSIS — R79.89 ABNORMAL LFTS (LIVER FUNCTION TESTS): ICD-10-CM

## 2018-08-23 DIAGNOSIS — Z12.11 COLON CANCER SCREENING: ICD-10-CM

## 2018-08-23 DIAGNOSIS — B18.2 CHRONIC HEPATITIS C (HCC): ICD-10-CM

## 2018-08-23 PROCEDURE — 76705 ECHO EXAM OF ABDOMEN: CPT

## 2018-10-01 ENCOUNTER — OFFICE VISIT (OUTPATIENT)
Dept: FAMILY MEDICINE CLINIC | Age: 58
End: 2018-10-01

## 2018-10-01 VITALS
WEIGHT: 215.5 LBS | HEIGHT: 64 IN | SYSTOLIC BLOOD PRESSURE: 135 MMHG | RESPIRATION RATE: 20 BRPM | BODY MASS INDEX: 36.79 KG/M2 | TEMPERATURE: 98.9 F | DIASTOLIC BLOOD PRESSURE: 86 MMHG | HEART RATE: 77 BPM

## 2018-10-01 DIAGNOSIS — F33.9 EPISODE OF RECURRENT MAJOR DEPRESSIVE DISORDER, UNSPECIFIED DEPRESSION EPISODE SEVERITY (HCC): ICD-10-CM

## 2018-10-01 RX ORDER — SERTRALINE HYDROCHLORIDE 100 MG/1
TABLET, FILM COATED ORAL
Qty: 60 TAB | Refills: 1 | Status: SHIPPED | OUTPATIENT
Start: 2018-10-01 | End: 2018-11-29 | Stop reason: DRUGHIGH

## 2018-10-01 NOTE — MR AVS SNAPSHOT
[facility] 
 
 
 [address] [id number] [address] [address] [phone] Patient: Gail Dorantes MRN: [id number] :1960 Visit Information Date & Time Provider Department Dept. Phone Encounter #  
 10/1/2018  8:30 AM Kari Tinoco NP [id number] [id number] Follow-up Instructions Return in about 2 months (around 2018), or if symptoms worsen or fail to improve. Upcoming Health Maintenance Date Due Pneumococcal 19-64 Medium Risk (1 of 1 - PPSV23) 10/29/1980 DTaP/Tdap/Td series (1 - Tdap) 10/29/1982 PAP AKA CERVICAL CYTOLOGY 10/29/1982 Shingrix Vaccine Age 50> (1 of 2) 10/29/2011 FOBT Q 1 YEAR AGE 50-75 10/29/2011 Influenza Age 5 to Adult 10/1/2019* BREAST CANCER SCRN MAMMOGRAM 2020 *Topic was postponed. The date shown is not the original due date. Allergies as of 10/1/2018  Review Complete On: 10/1/2018 By: Kari Tinoco NP No Known Allergies Current Immunizations  Never Reviewed No immunizations on file. Not reviewed this visit You Were Diagnosed With   
  
 Codes Comments Episode of recurrent major depressive disorder, unspecified depression episode severity ([facility]     ICD-10-CM: F33.9 ICD-9-CM: 296.30 Vitals BP Pulse Temp Resp Height(growth percentile) Weight(growth percentile) 135/86 (BP 1 Location: Left arm, BP Patient Position: Sitting) 77 98.9 °F (37.2 °C) (Oral) 20 5' 4\" (1.626 m) 215 lb 8 oz (97.8 kg) BMI OB Status Smoking Status 36.99 kg/m2 Menopause Current Every Day Smoker BMI and BSA Data Body Mass Index Body Surface Area  
 36.99 kg/m 2 2.1 m 2 Preferred Pharmacy Pharmacy Name Phone [facility] Your Updated Medication List  
  
   
This list is accurate as of 10/1/18  8:53 AM.  Always use your most recent med list.  
 MAVYRET PO Take  by mouth. METHADONE PO Take 85 mg by mouth daily. sertraline 100 mg tablet Commonly known as:  ZOLOFT Take one and a half tablets a day for a total of 150 mg daily. Prescriptions Sent to Pharmacy Refills  
 sertraline (ZOLOFT) 100 mg tablet 1 Sig: Take one and a half tablets a day for a total of 150 mg daily. Class: Normal  
 Pharmacy: [address] #: [phone] Follow-up Instructions Return in about 2 months (around 12/1/2018), or if symptoms worsen or fail to improve. To-Do List   
 10/04/2018 8:00 AM  
  Appointment with [facility] MRI RM 1 at [facility] ([phone]) DIET RESTRICTIONS  Nothing to eat or drink 4 hours prior to study. May have water to take meds. GENERAL INSTRUCTIONS  Bring information (ID card) if you have any medically implanted devices. You will be required to lie still while the procedure is being performed. Remove any jewelry (including body piercing, hairpins) prior to MRI. If you have had a creatinine level drawn within the past 30 days, please bring most recent results to your appt. Bring any films, CD's, and reports related to your study with you on the day of your exam.  This only includes studies done outside of [address] [place] [address], [facility], [facility], and [facility]. Bring a complete list of all medications you are currently taking to include prescriptions, over-the-counter meds, herbals, vitamins & any dietary supplements. If you were given medications for claustrophobia or anxiety, please arrange to have someone drive you to your appointment. QUESTIONS  Notify the MRI Department if you have any questions concerning your study. [facility] - [phone] [address] - [phone] Patient Instructions Please contact our office if you have any questions about your visit today. Sertraline (By mouth) Sertraline (SER-tra-jose) Treats depression, obsessive-compulsive disorder (OCD), posttraumatic stress disorder (PTSD), premenstrual dysphoric disorder (PMDD), social anxiety disorder, and panic disorder. This medicine is an SSRI. Brand Name(s): Zoloft There may be other brand names for this medicine. When This Medicine Should Not Be Used: This medicine is not right for everyone. Do not use it if you had an allergic reaction to sertraline. How to Use This Medicine:  
Liquid, Tablet · Take your medicine as directed. Your dose may need to be changed several times to find what works best for you. You may need to take it for a few weeks or months before you feel better. · Oral liquid: Use the dropper provided to remove the medicine and mix it with 1/2 cup (4 ounces) of water, ginger ale, lemon-lime soda, lemonade, or orange juice. Drink the mixture right away. It is normal for it to look a bit hazy. · This medicine should come with a Medication Guide. Ask your pharmacist for a copy if you do not have one. · Missed dose: Take a dose as soon as you remember. If it is almost time for your next dose, wait until then and take a regular dose. Do not take extra medicine to make up for a missed dose. · Store the medicine in a closed container at room temperature, away from heat, moisture, and direct light. Drugs and Foods to Avoid: Ask your doctor or pharmacist before using any other medicine, including over-the-counter medicines, vitamins, and herbal products. · Do not use this medicine together with pimozide. Do not use this medicine and an MAO inhibitor (MAOI) within 14 days of each other. Do not use the oral liquid form of sertraline if you are also using disulfiram. 
· Some medicines can affect how sertraline works.  Tell your doctor if you are using the following:  
¨ Buspirone, cimetidine, cisapride, diazepam, digitoxin, fentanyl, flecainide, lithium, phenytoin, propafenone, Doyle's wort, tramadol, tryptophan supplements, or valproate ¨ A blood thinner (such as warfarin), a diuretic (water pill), an NSAID pain or arthritis medicine (such as aspirin, diclofenac, ibuprofen), a tricyclic antidepressant, a triptan medicine for migraine headaches · Do not drink alcohol while you are using this medicine. Warnings While Using This Medicine: · Tell your doctor if you are pregnant or breastfeeding, or if you have liver disease, bleeding problems, glaucoma, heart disease, or a seizure disorder. · For some children, teenagers, and young adults, this medicine may increase mental or emotional problems. This may lead to thoughts of suicide and violence. Talk with your doctor right away if you have any thoughts or behavior changes that concern you. Tell your doctor if you or anyone in your family has a history of bipolar disorder or suicide attempts. · This medicine may cause the following problems:  
¨ Serotonin syndrome (when taken with certain medicines) ¨ Low sodium levels (more common in elderly patients and those who take diuretics or become dehydrated) · Tell your doctor if you are sensitive to latex, because the oral liquid comes with a latex rubber dropper. · This medicine may make you dizzy or drowsy. Do not drive or do anything that could be dangerous until you know how this medicine affects you. · Do not stop using this medicine suddenly. Your doctor will need to slowly decrease your dose before you stop it completely. · Your doctor will check your progress and the effects of this medicine at regular visits. Keep all appointments. · Keep all medicine out of the reach of children. Never share your medicine with anyone. Possible Side Effects While Using This Medicine:  
Call your doctor right away if you notice any of these side effects: · Allergic reaction: Itching or hives, swelling in your face or hands, swelling or tingling in your mouth or throat, chest tightness, trouble breathing · Anxiety, restlessness, fast heartbeat, fever, sweating, muscle spasms, twitching, nausea, vomiting, diarrhea, seeing or hearing things that are not there · Blistering, peeling, or red skin rash · Confusion, weakness, and muscle twitching · Eye pain, vision changes, seeing halos around lights · Feeling more excited or energetic than usual 
· Thoughts of hurting yourself or others, unusual behavior · Unusual bleeding or bruising If you notice these less serious side effects, talk with your doctor: · Dry mouth · Loss of appetite, weight loss · Mild diarrhea, constipation, nausea, vomiting · Sexual problems · Sleepiness, or trouble sleeping If you notice other side effects that you think are caused by this medicine, tell your doctor. Call your doctor for medical advice about side effects. You may report side effects to FDA at 8-775-FDA-5112 © 2017 [address]  Information is for End User's use only and may not be sold, redistributed or otherwise used for commercial purposes. The above information is an  only. It is not intended as medical advice for individual conditions or treatments. Talk to your doctor, nurse or pharmacist before following any medical regimen to see if it is safe and effective for you. Sertraline (Zoloft) - (By mouth) Why this medicine is used:  
Treats depression, obsessive-compulsive disorder (OCD), posttraumatic stress disorder (PTSD), premenstrual dysphoric disorder (PMDD), social anxiety, and panic disorder. Contact a nurse or doctor right away if you have: · Blistering, peeling, red skin rash · Thoughts of hurting yourself, seeing or hearing things that are not there · Anxiety, restlessness, fast heartbeat, fever, sweating · Bloody vomit or vomit that looks like coffee grounds; bloody or black, tarry stools · Tremors, muscle twitching, uncontrollable movements Common side effects: · Sleepiness, trouble sleeping · Sexual problems · Mild diarrhea, nausea, vomiting, dry mouth © 2017 [facility] Information is for End User's use only and may not be sold, redistributed or otherwise used for commercial purposes. Introducing \[facility]\"" SERVICES! Yessica Irvin introduces [employer] patient portal. Now you can access parts of your medical record, email your doctor's office, and request medication refills online. 1. In your internet browser, go to [email or web address] 2. Click on the First Time User? Click Here link in the Sign In box. You will see the New Member Sign Up page. 3. Enter your [employer] Access Code exactly as it appears below. You will not need to use this code after youve completed the sign-up process. If you do not sign up before the expiration date, you must request a new code. · [employer] Access Code: [email or web address] Expires: 10/11/2018  9:30 AM 
 
4. Enter the last four digits of your Social Security Number (xxxx) and Date of Birth (mm/dd/yyyy) as indicated and click Submit. You will be taken to the next sign-up page. 5. Create a [employer] ID. This will be your [employer] login ID and cannot be changed, so think of one that is secure and easy to remember. 6. Create a [employer] password. You can change your password at any time. 7. Enter your Password Reset Question and Answer. This can be used at a later time if you forget your password. 8. Enter your e-mail address. You will receive e-mail notification when new information is available in [address] 9. Click Sign Up. You can now view and download portions of your medical record. 10. Click the Download Summary menu link to download a portable copy of your medical information.  
 
If you have questions, please visit the Frequently Asked Questions section of the [employer]. Remember, [employer] is NOT to be used for urgent needs. For medical emergencies, dial 911. Now available from your iPhone and Android! Please provide this summary of care documentation to your next provider. Your primary care clinician is listed as Jonah Rivas. If you have any questions after today's visit, please call [phone].

## 2018-10-01 NOTE — PROGRESS NOTES
MARIA EUGENIA Parrish is a 65 y.o. female Chief Complaint Patient presents with  Follow-up 1 month f/u  Depression  
  patient states dosage need to be increased on Zoloft Reports she has more sad days than good days but states she does feel better than she did prior to starting on the Zoloft. 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 She denies the desire to hurt or harm herself or others. Denies suicidal ideations. Denies desire to hurt or harm herself or others. Requesting adjustment of her Zoloft. Past Medical History No past medical history on file. Surgical History No past surgical history on file. Medications Current Outpatient Prescriptions Medication Sig Dispense Refill  sertraline (ZOLOFT) 100 mg tablet Take one and a half tablets a day for a total of 150 mg daily. 60 Tab 1  
 glecaprevir/pibrentasvir (MAVYRET PO) Take  by mouth.  methadone HCl (METHADONE PO) Take 85 mg by mouth daily. Allergies No Known Allergies Family History Family History Problem Relation Age of Onset  Hypertension Mother  High Cholesterol Mother  COPD Mother  Hypertension Father Social History Social History Social History  Marital status: SINGLE Spouse name: N/A  
 Number of children: N/A  
 Years of education: N/A Occupational History  Not on file. Social History Main Topics  Smoking status: Current Every Day Smoker Packs/day: 1.00 Types: Cigarettes  Smokeless tobacco: Never Used  Alcohol use No  
 Drug use: Yes Special: Heroin  Sexual activity: No  
 
Other Topics Concern  Not on file Social History Narrative Problem List 
Patient Active Problem List  
Diagnosis Code  Severe obesity (BMI 35.0-39.9) ([facility]) E66.01 Review of Systems Review of Systems Respiratory: Negative for shortness of breath. Cardiovascular: Negative for chest pain and palpitations. Gastrointestinal: Negative for abdominal pain, nausea and vomiting. Psychiatric/Behavioral: Negative for depression, substance abuse and suicidal ideas. Vital Signs Vitals:  
 10/01/18 0831 BP: 135/86 Pulse: 77 Resp: 20 Temp: 98.9 °F (37.2 °C) TempSrc: Oral  
Weight: 215 lb 8 oz (97.8 kg) Height: 5' 4\" (1.626 m) PainSc:   0 - No pain PHQ over the last two weeks 10/1/2018 Little interest or pleasure in doing things Nearly every day Feeling down, depressed, irritable, or hopeless Several days Total Score PHQ 2 4 Trouble falling or staying asleep, or sleeping too much Several days Feeling tired or having little energy Nearly every day Poor appetite, weight loss, or overeating Several days Feeling bad about yourself - or that you are a failure or have let yourself or your family down Nearly every day Trouble concentrating on things such as school, work, reading, or watching TV Nearly every day Moving or speaking so slowly that other people could have noticed; or the opposite being so fidgety that others notice Not at all Thoughts of being better off dead, or hurting yourself in some way Not at all PHQ 9 Score 15 How difficult have these problems made it for you to do your work, take care of your home and get along with others Somewhat difficult Physical Exam 
Physical Exam  
Constitutional: She is oriented to person, place, and time. HENT:  
Mouth/Throat: Oropharynx is clear and moist.  
Eyes: Pupils are equal, round, and reactive to light. Cardiovascular: Normal rate, regular rhythm and normal heart sounds. Exam reveals no friction rub. No murmur heard. Pulmonary/Chest: Effort normal and breath sounds normal. No respiratory distress. Abdominal: Soft. Bowel sounds are normal. She exhibits no distension. There is no tenderness. Neurological: She is alert and oriented to person, place, and time. Psychiatric: She has a normal mood and affect. Her speech is normal and behavior is normal. Judgment and thought content normal. Cognition and memory are normal.  
Vitals reviewed. Diagnostics Orders Placed This Encounter  sertraline (ZOLOFT) 100 mg tablet Sig: Take one and a half tablets a day for a total of 150 mg daily. Dispense:  60 Tab Refill:  1  
 glecaprevir/pibrentasvir (MAVYRET PO) Sig: Take  by mouth. Results Results for orders placed or performed during the hospital encounter of 07/20/18 HEPATITIS C AB Result Value Ref Range Hepatitis C virus Ab >11.00 (H) <0.80 Index Hep C  virus Ab Interp. POSITIVE (A) NEG Hep C  virus Ab comment Assessment and Plan Diagnoses and all orders for this visit: 1. Episode of recurrent major depressive disorder, unspecified depression episode severity (HCC) 
-     sertraline (ZOLOFT) 100 mg tablet; Take one and a half tablets a day for a total of 150 mg daily. Medication, side effects, possible allergic reactions and warnings re-reviewed with patient. Patient verbalized understanding. After care summary printed and reviewed with patient. Plan reviewed with patient. Questions answered. Patient verbalized understanding of plan and is in agreement with plan. Patient to follow up in two months or earlier if symptoms worsen.  
 
Stephie Rose, CHASTITYC

## 2018-10-01 NOTE — PROGRESS NOTES
Chief Complaint Patient presents with  Follow-up 1 month f/u  Depression  
  patient states dosage need to be increased on Zoloft Health Maintenance Due Topic Date Due  Pneumococcal 19-64 Medium Risk (1 of 1 - PPSV23) 10/29/1980  
 DTaP/Tdap/Td series (1 - Tdap) 10/29/1982  PAP AKA CERVICAL CYTOLOGY  10/29/1982  Shingrix Vaccine Age 50> (1 of 2) 10/29/2011  
 FOBT Q 1 YEAR AGE 50-75  10/29/2011  Influenza Age 5 to Adult  08/01/2018 Health Maintenance reviewed 1. Have you been to the ER, urgent care clinic since your last visit? Hospitalized since your last visit? No 
 
2. Have you seen or consulted any other health care providers outside of the [address] [place] [address] since your last visit? Include any pap smears or colon screening. No 
 
 
PHQ screening updated

## 2018-10-01 NOTE — PATIENT INSTRUCTIONS
Please contact our office if you have any questions about your visit today. Sertraline (By mouth) Sertraline (SER-tra-jose) Treats depression, obsessive-compulsive disorder (OCD), posttraumatic stress disorder (PTSD), premenstrual dysphoric disorder (PMDD), social anxiety disorder, and panic disorder. This medicine is an SSRI. Brand Name(s): Zoloft There may be other brand names for this medicine. When This Medicine Should Not Be Used: This medicine is not right for everyone. Do not use it if you had an allergic reaction to sertraline. How to Use This Medicine:  
Liquid, Tablet · Take your medicine as directed. Your dose may need to be changed several times to find what works best for you. You may need to take it for a few weeks or months before you feel better. · Oral liquid: Use the dropper provided to remove the medicine and mix it with 1/2 cup (4 ounces) of water, ginger ale, lemon-lime soda, lemonade, or orange juice. Drink the mixture right away. It is normal for it to look a bit hazy. · This medicine should come with a Medication Guide. Ask your pharmacist for a copy if you do not have one. · Missed dose: Take a dose as soon as you remember. If it is almost time for your next dose, wait until then and take a regular dose. Do not take extra medicine to make up for a missed dose. · Store the medicine in a closed container at room temperature, away from heat, moisture, and direct light. Drugs and Foods to Avoid: Ask your doctor or pharmacist before using any other medicine, including over-the-counter medicines, vitamins, and herbal products. · Do not use this medicine together with pimozide. Do not use this medicine and an MAO inhibitor (MAOI) within 14 days of each other. Do not use the oral liquid form of sertraline if you are also using disulfiram. 
· Some medicines can affect how sertraline works. Tell your doctor if you are using the following: ¨ Buspirone, cimetidine, cisapride, diazepam, digitoxin, fentanyl, flecainide, lithium, phenytoin, propafenone, Doyle's wort, tramadol, tryptophan supplements, or valproate ¨ A blood thinner (such as warfarin), a diuretic (water pill), an NSAID pain or arthritis medicine (such as aspirin, diclofenac, ibuprofen), a tricyclic antidepressant, a triptan medicine for migraine headaches · Do not drink alcohol while you are using this medicine. Warnings While Using This Medicine: · Tell your doctor if you are pregnant or breastfeeding, or if you have liver disease, bleeding problems, glaucoma, heart disease, or a seizure disorder. · For some children, teenagers, and young adults, this medicine may increase mental or emotional problems. This may lead to thoughts of suicide and violence. Talk with your doctor right away if you have any thoughts or behavior changes that concern you. Tell your doctor if you or anyone in your family has a history of bipolar disorder or suicide attempts. · This medicine may cause the following problems:  
¨ Serotonin syndrome (when taken with certain medicines) ¨ Low sodium levels (more common in elderly patients and those who take diuretics or become dehydrated) · Tell your doctor if you are sensitive to latex, because the oral liquid comes with a latex rubber dropper. · This medicine may make you dizzy or drowsy. Do not drive or do anything that could be dangerous until you know how this medicine affects you. · Do not stop using this medicine suddenly. Your doctor will need to slowly decrease your dose before you stop it completely. · Your doctor will check your progress and the effects of this medicine at regular visits. Keep all appointments. · Keep all medicine out of the reach of children. Never share your medicine with anyone. Possible Side Effects While Using This Medicine:  
Call your doctor right away if you notice any of these side effects: · Allergic reaction: Itching or hives, swelling in your face or hands, swelling or tingling in your mouth or throat, chest tightness, trouble breathing · Anxiety, restlessness, fast heartbeat, fever, sweating, muscle spasms, twitching, nausea, vomiting, diarrhea, seeing or hearing things that are not there · Blistering, peeling, or red skin rash · Confusion, weakness, and muscle twitching · Eye pain, vision changes, seeing halos around lights · Feeling more excited or energetic than usual 
· Thoughts of hurting yourself or others, unusual behavior · Unusual bleeding or bruising If you notice these less serious side effects, talk with your doctor: · Dry mouth · Loss of appetite, weight loss · Mild diarrhea, constipation, nausea, vomiting · Sexual problems · Sleepiness, or trouble sleeping If you notice other side effects that you think are caused by this medicine, tell your doctor. Call your doctor for medical advice about side effects. You may report side effects to FDA at 8-051-YFA-7680 © 2017 [facility] Information is for End User's use only and may not be sold, redistributed or otherwise used for commercial purposes. The above information is an  only. It is not intended as medical advice for individual conditions or treatments. Talk to your doctor, nurse or pharmacist before following any medical regimen to see if it is safe and effective for you. Sertraline (Zoloft) - (By mouth) Why this medicine is used:  
Treats depression, obsessive-compulsive disorder (OCD), posttraumatic stress disorder (PTSD), premenstrual dysphoric disorder (PMDD), social anxiety, and panic disorder. Contact a nurse or doctor right away if you have: · Blistering, peeling, red skin rash · Thoughts of hurting yourself, seeing or hearing things that are not there · Anxiety, restlessness, fast heartbeat, fever, sweating · Bloody vomit or vomit that looks like coffee grounds; bloody or black, tarry stools · Tremors, muscle twitching, uncontrollable movements Common side effects: · Sleepiness, trouble sleeping · Sexual problems · Mild diarrhea, nausea, vomiting, dry mouth © 2017 [facility] Information is for End User's use only and may not be sold, redistributed or otherwise used for commercial purposes.

## 2018-10-10 ENCOUNTER — HOSPITAL ENCOUNTER (OUTPATIENT)
Age: 58
Discharge: HOME | End: 2018-10-10
Attending: INTERNAL MEDICINE
Payer: MEDICAID

## 2018-10-10 DIAGNOSIS — K83.8 OTHER SPECIFIED DISEASES OF BILIARY TRACT: ICD-10-CM

## 2018-10-10 PROCEDURE — 74181 MRI ABDOMEN W/O CONTRAST: CPT

## 2018-11-07 ENCOUNTER — APPOINTMENT (OUTPATIENT)
Dept: CT IMAGING | Age: 58
End: 2018-11-07
Attending: EMERGENCY MEDICINE
Payer: SELF-PAY

## 2018-11-07 ENCOUNTER — APPOINTMENT (OUTPATIENT)
Dept: GENERAL RADIOLOGY | Age: 58
End: 2018-11-07
Attending: EMERGENCY MEDICINE
Payer: SELF-PAY

## 2018-11-07 ENCOUNTER — HOSPITAL ENCOUNTER (EMERGENCY)
Age: 58
Discharge: HOME | End: 2018-11-07
Attending: EMERGENCY MEDICINE | Admitting: EMERGENCY MEDICINE
Payer: SELF-PAY

## 2018-11-07 VITALS
SYSTOLIC BLOOD PRESSURE: 189 MMHG | WEIGHT: 200 LBS | DIASTOLIC BLOOD PRESSURE: 98 MMHG | HEIGHT: 64 IN | RESPIRATION RATE: 16 BRPM | HEART RATE: 65 BPM | TEMPERATURE: 98.4 F | BODY MASS INDEX: 34.15 KG/M2 | OXYGEN SATURATION: 99 %

## 2018-11-07 DIAGNOSIS — R10.84 ABDOMINAL PAIN, GENERALIZED: Primary | ICD-10-CM

## 2018-11-07 LAB
ALBUMIN SERPL-MCNC: 3 G/DL (ref 3.4–5)
ALBUMIN/GLOB SERPL: 0.5 {RATIO} (ref 0.8–1.7)
ALP SERPL-CCNC: 141 U/L (ref 45–117)
ALT SERPL-CCNC: 21 U/L (ref 13–56)
ANION GAP SERPL CALC-SCNC: 10 MMOL/L (ref 3–18)
AST SERPL-CCNC: 22 U/L (ref 15–37)
ATRIAL RATE: 78 BPM
BASOPHILS # BLD: 0 K/UL (ref 0–0.1)
BASOPHILS NFR BLD: 0 % (ref 0–2)
BILIRUB SERPL-MCNC: 0.2 MG/DL (ref 0.2–1)
BUN SERPL-MCNC: 11 MG/DL (ref 7–18)
BUN/CREAT SERPL: 15 (ref 12–20)
CALCIUM SERPL-MCNC: 8.8 MG/DL (ref 8.5–10.1)
CALCULATED P AXIS, ECG09: -2 DEGREES
CALCULATED R AXIS, ECG10: 8 DEGREES
CALCULATED T AXIS, ECG11: 50 DEGREES
CHLORIDE SERPL-SCNC: 100 MMOL/L (ref 100–108)
CO2 SERPL-SCNC: 27 MMOL/L (ref 21–32)
CREAT SERPL-MCNC: 0.75 MG/DL (ref 0.6–1.3)
D DIMER PPP FEU-MCNC: 1.14 UG/ML(FEU)
DIAGNOSIS, 93000: NORMAL
DIFFERENTIAL METHOD BLD: ABNORMAL
EOSINOPHIL # BLD: 0.3 K/UL (ref 0–0.4)
EOSINOPHIL NFR BLD: 4 % (ref 0–5)
ERYTHROCYTE [DISTWIDTH] IN BLOOD BY AUTOMATED COUNT: 14.4 % (ref 11.6–14.5)
GLOBULIN SER CALC-MCNC: 6 G/DL (ref 2–4)
GLUCOSE SERPL-MCNC: 101 MG/DL (ref 74–99)
HCT VFR BLD AUTO: 45 % (ref 35–45)
HGB BLD-MCNC: 14.1 G/DL (ref 12–16)
LIPASE SERPL-CCNC: 136 U/L (ref 73–393)
LYMPHOCYTES # BLD: 2.4 K/UL (ref 0.9–3.6)
LYMPHOCYTES NFR BLD: 32 % (ref 21–52)
MCH RBC QN AUTO: 30.5 PG (ref 24–34)
MCHC RBC AUTO-ENTMCNC: 31.3 G/DL (ref 31–37)
MCV RBC AUTO: 97.2 FL (ref 74–97)
MONOCYTES # BLD: 0.6 K/UL (ref 0.05–1.2)
MONOCYTES NFR BLD: 8 % (ref 3–10)
NEUTS SEG # BLD: 4.3 K/UL (ref 1.8–8)
NEUTS SEG NFR BLD: 56 % (ref 40–73)
P-R INTERVAL, ECG05: 126 MS
PLATELET # BLD AUTO: 348 K/UL (ref 135–420)
PMV BLD AUTO: 9.3 FL (ref 9.2–11.8)
POTASSIUM SERPL-SCNC: 3.8 MMOL/L (ref 3.5–5.5)
PROT SERPL-MCNC: 9 G/DL (ref 6.4–8.2)
Q-T INTERVAL, ECG07: 398 MS
QRS DURATION, ECG06: 84 MS
QTC CALCULATION (BEZET), ECG08: 453 MS
RBC # BLD AUTO: 4.63 M/UL (ref 4.2–5.3)
SODIUM SERPL-SCNC: 137 MMOL/L (ref 136–145)
VENTRICULAR RATE, ECG03: 78 BPM
WBC # BLD AUTO: 7.7 K/UL (ref 4.6–13.2)

## 2018-11-07 PROCEDURE — 85379 FIBRIN DEGRADATION QUANT: CPT | Performed by: EMERGENCY MEDICINE

## 2018-11-07 PROCEDURE — 71046 X-RAY EXAM CHEST 2 VIEWS: CPT

## 2018-11-07 PROCEDURE — 96374 THER/PROPH/DIAG INJ IV PUSH: CPT

## 2018-11-07 PROCEDURE — 80053 COMPREHEN METABOLIC PANEL: CPT | Performed by: EMERGENCY MEDICINE

## 2018-11-07 PROCEDURE — 99284 EMERGENCY DEPT VISIT MOD MDM: CPT

## 2018-11-07 PROCEDURE — 71275 CT ANGIOGRAPHY CHEST: CPT

## 2018-11-07 PROCEDURE — 99283 EMERGENCY DEPT VISIT LOW MDM: CPT

## 2018-11-07 PROCEDURE — 85025 COMPLETE CBC W/AUTO DIFF WBC: CPT | Performed by: EMERGENCY MEDICINE

## 2018-11-07 PROCEDURE — 93005 ELECTROCARDIOGRAM TRACING: CPT

## 2018-11-07 PROCEDURE — 74011636320 HC RX REV CODE- 636/320: Performed by: EMERGENCY MEDICINE

## 2018-11-07 PROCEDURE — 83690 ASSAY OF LIPASE: CPT | Performed by: EMERGENCY MEDICINE

## 2018-11-07 PROCEDURE — 74011250636 HC RX REV CODE- 250/636: Performed by: EMERGENCY MEDICINE

## 2018-11-07 PROCEDURE — 96375 TX/PRO/DX INJ NEW DRUG ADDON: CPT

## 2018-11-07 RX ORDER — ONDANSETRON 2 MG/ML
4 INJECTION INTRAMUSCULAR; INTRAVENOUS
Status: COMPLETED | OUTPATIENT
Start: 2018-11-07 | End: 2018-11-07

## 2018-11-07 RX ORDER — MORPHINE SULFATE 4 MG/ML
4 INJECTION INTRAVENOUS
Status: COMPLETED | OUTPATIENT
Start: 2018-11-07 | End: 2018-11-07

## 2018-11-07 RX ADMIN — ONDANSETRON 4 MG: 2 INJECTION INTRAMUSCULAR; INTRAVENOUS at 11:35

## 2018-11-07 RX ADMIN — IOPAMIDOL 80 ML: 755 INJECTION, SOLUTION INTRAVENOUS at 11:14

## 2018-11-07 RX ADMIN — MORPHINE SULFATE 4 MG: 4 INJECTION INTRAVENOUS at 11:36

## 2018-11-07 NOTE — ED TRIAGE NOTES
Pt presents with mid right and left back pain and upper right and left abdominal pain since 10/15/18 and shortness of breath x 2 days. Pt states seen at [facility] on 10/18 for same, had mri and ultrasound for same. States only result info she knows from testing is blocked bile duct. States hx of Hep C, states taking tylenol frequently for pain but not getting relief.

## 2018-11-07 NOTE — ED NOTES
Pritesh Gonzalez is a 63 y.o. female that was discharged in stable. Pt was accompanied by friend. Pt is not driving. The patients diagnosis, condition and treatment were explained to  patient and aftercare instructions were given. The patient verbalized understanding. Patient armband removed and shredded.

## 2018-11-07 NOTE — ED NOTES
Bedside and Verbal shift change report given to Shey Epperson RN (oncoming nurse) by Selena Isaac RN (offgoing nurse). Report included the following information SBAR, ED Summary and Procedure Summary.

## 2018-11-07 NOTE — DISCHARGE INSTRUCTIONS

## 2018-11-07 NOTE — ED PROVIDER NOTES
EMERGENCY DEPARTMENT HISTORY AND PHYSICAL EXAM 
 
10:40 AM 
 
 
Date: 11/7/2018 Patient Name: Jacquie Little History of Presenting Illness Chief Complaint Patient presents with  Abdominal Pain  Back Pain  Shortness of Breath History Provided By: Patient Chief Complaint: Abdominal Pain Duration: 1 Months Timing:  Constant Location: RUQ, LUQ Quality: Rock Reas Severity: Moderate Modifying Factors: worse with taking a deep breath Associated Symptoms: radiates to upper back Additional History (Context): Jacquie Little is a 63 y.o. female with PMHx of Hepatitis C who presents with constant sharp moderate RUQ and LUQ abdominal pain that started x 1 month ago and continues to worsen with taking a deep breath. The pain radiates to her upper back. She has tried Tylenol which provides some relief. Pt reports she is currently seeing Dr. Renetta Callejas for treatment for her Hepatitis C. She recently had an ultrasound and MRI of her gallbladder. Denies n/v, problems with bowel movements, chest pain. Denies any further complaints or symptoms at the moment. PCP: Jon Alvarez NP Current Outpatient Medications Medication Sig Dispense Refill  glecaprevir/pibrentasvir (MAVYRET PO) Take  by mouth.  sertraline (ZOLOFT) 100 mg tablet Take one and a half tablets a day for a total of 150 mg daily. (Patient taking differently: Take 150 mg by mouth daily. Take one and a half tablets a day for a total of 150 mg daily.) 60 Tab 1  
 methadone HCl (METHADONE PO) Take 85 mg by mouth daily. Past History Past Medical History: 
Past Medical History:  
Diagnosis Date  Depression  Hepatitis C Past Surgical History: 
History reviewed. No pertinent surgical history. Family History: 
Family History Problem Relation Age of Onset  Hypertension Mother  High Cholesterol Mother  COPD Mother  Hypertension Father Social History: 
Social History Tobacco Use  Smoking status: Current Every Day Smoker Packs/day: 0.25 Types: Cigarettes  Smokeless tobacco: Never Used Substance Use Topics  Alcohol use: No  
 Drug use: No  
  Comment: on methadone matinance program  
 
 
Allergies: 
No Known Allergies Review of Systems Review of Systems Constitutional: Negative for chills, fatigue and fever. HENT: Negative. Negative for sore throat. Eyes: Negative. Respiratory: Negative for cough and shortness of breath. Cardiovascular: Negative for chest pain and palpitations. Gastrointestinal: Positive for abdominal pain. Negative for nausea and vomiting. Genitourinary: Negative for dysuria. Musculoskeletal: Positive for back pain. Skin: Negative. Neurological: Negative for dizziness, weakness, light-headedness and headaches. Psychiatric/Behavioral: Negative. All other systems reviewed and are negative. Physical Exam  
 
Visit Vitals BP (!) 189/105 (BP 1 Location: Left arm) Pulse 83 Temp 98.4 °F (36.9 °C) Resp 18 Ht 5' 4\" (1.626 m) Wt 90.7 kg (200 lb) SpO2 98% BMI 34.33 kg/m² Physical Exam  
Constitutional: She is oriented to person, place, and time. She appears well-developed and well-nourished. HENT:  
Head: Normocephalic and atraumatic. Mouth/Throat: Oropharynx is clear and moist.  
Eyes: Conjunctivae are normal. Pupils are equal, round, and reactive to light. No scleral icterus. Neck: Normal range of motion. Neck supple. No JVD present. No tracheal deviation present. Cardiovascular: Normal rate, regular rhythm and normal heart sounds. Pulmonary/Chest: Effort normal and breath sounds normal. No respiratory distress. She has no wheezes. Abdominal: Soft. Bowel sounds are normal.  
Positive for mild upper abdominal TTP. No peritoneal signs. Musculoskeletal: Normal range of motion. Neurological: She is alert and oriented to person, place, and time.  She has normal strength. Gait normal. GCS eye subscore is 4. GCS verbal subscore is 5. GCS motor subscore is 6. Skin: Skin is warm and dry. Psychiatric: She has a normal mood and affect. Nursing note and vitals reviewed. Diagnostic Study Results Labs - Recent Results (from the past 12 hour(s)) EKG, 12 LEAD, INITIAL Collection Time: 11/07/18 10:22 AM  
Result Value Ref Range Ventricular Rate 78 BPM  
 Atrial Rate 78 BPM  
 P-R Interval 126 ms  
 QRS Duration 84 ms Q-T Interval 398 ms QTC Calculation (Bezet) 453 ms Calculated P Axis -2 degrees Calculated R Axis 8 degrees Calculated T Axis 50 degrees Diagnosis Normal sinus rhythm Normal ECG No previous ECGs available CBC WITH AUTOMATED DIFF Collection Time: 11/07/18 10:27 AM  
Result Value Ref Range WBC 7.7 4.6 - 13.2 K/uL  
 RBC 4.63 4.20 - 5.30 M/uL  
 HGB 14.1 12.0 - 16.0 g/dL HCT 45.0 35.0 - 45.0 % MCV 97.2 (H) 74.0 - 97.0 FL  
 MCH 30.5 24.0 - 34.0 PG  
 MCHC 31.3 31.0 - 37.0 g/dL  
 RDW 14.4 11.6 - 14.5 % PLATELET 314 422 - 527 K/uL MPV 9.3 9.2 - 11.8 FL  
 NEUTROPHILS 56 40 - 73 % LYMPHOCYTES 32 21 - 52 % MONOCYTES 8 3 - 10 % EOSINOPHILS 4 0 - 5 % BASOPHILS 0 0 - 2 %  
 ABS. NEUTROPHILS 4.3 1.8 - 8.0 K/UL  
 ABS. LYMPHOCYTES 2.4 0.9 - 3.6 K/UL  
 ABS. MONOCYTES 0.6 0.05 - 1.2 K/UL  
 ABS. EOSINOPHILS 0.3 0.0 - 0.4 K/UL  
 ABS. BASOPHILS 0.0 0.0 - 0.1 K/UL  
 DF AUTOMATED METABOLIC PANEL, COMPREHENSIVE Collection Time: 11/07/18 10:27 AM  
Result Value Ref Range Sodium 137 136 - 145 mmol/L Potassium 3.8 3.5 - 5.5 mmol/L Chloride 100 100 - 108 mmol/L  
 CO2 27 21 - 32 mmol/L Anion gap 10 3.0 - 18 mmol/L Glucose 101 (H) 74 - 99 mg/dL BUN 11 7.0 - 18 MG/DL Creatinine 0.75 0.6 - 1.3 MG/DL  
 BUN/Creatinine ratio 15 12 - 20 GFR est AA >60 >60 ml/min/1.73m2 GFR est non-AA >60 >60 ml/min/1.73m2  Calcium 8.8 8.5 - 10.1 MG/DL  
 Bilirubin, total 0.2 0.2 - 1.0 MG/DL  
 ALT (SGPT) 21 13 - 56 U/L  
 AST (SGOT) 22 15 - 37 U/L Alk. phosphatase 141 (H) 45 - 117 U/L Protein, total 9.0 (H) 6.4 - 8.2 g/dL Albumin 3.0 (L) 3.4 - 5.0 g/dL Globulin 6.0 (H) 2.0 - 4.0 g/dL A-G Ratio 0.5 (L) 0.8 - 1.7 LIPASE Collection Time: 11/07/18 10:27 AM  
Result Value Ref Range Lipase 136 73 - 393 U/L  
D DIMER Collection Time: 11/07/18 10:27 AM  
Result Value Ref Range D DIMER 1.14 (H) <0.46 ug/ml(FEU) Radiologic Studies -  
CTA CHEST W OR W WO CONT Final Result IMPRESSION: 
      
1.  No convincing evidence of pulmonary embolism. 2.  Multiple pulmonary nodules.  The source for the nodules is unclear. Post-inflammatory changes vs. metastatic. 3.  Suspected discitis at T7-8.  MR can be utilized to confirm. 4.  Hepatosteatosis and hepatomegaly with mild splenomegaly.  Multiple 
nonspecific periportal slightly enlarged nodes. XR CHEST PA LAT Final Result IMPRESSION: 
1. Sequela of mild to moderate congestion. Follow-up with plain imaging. Medical Decision Making I am the first provider for this patient. I reviewed the vital signs, available nursing notes, past medical history, past surgical history, family history and social history. Vital Signs-Reviewed the patient's vital signs. Pulse Oximetry Analysis -  98 % on RA, normal   
 
EKG: Interpreted by the EP. Time Interpreted: 10:22 AM  
 Rate: 78 bpm  
 Rhythm: NSR Interpretation: no acute changes Records Reviewed: Nursing Notes and Old Medical Records (Time of Review: 10:40 AM) ED Course: Progress Notes, Reevaluation, and Consults: 
 
Provider Notes (Medical Decision Making): Results reviwed with pt, she feels better knowing that PE workup is negative. She agrees to continue with her out patient workup that is currently underway for these sx of long-term duration. Agrees to return to ED if any sx change or worsen. Isabella Mai MD 
12:52 PM 
 
 
 
Diagnosis Clinical Impression: No diagnosis found. Disposition:  
 
Follow-up Information None Medication List  
  
ASK your doctor about these medications MAVYRET PO 
  
METHADONE PO 
  
sertraline 100 mg tablet Commonly known as:  ZOLOFT Take one and a half tablets a day for a total of 150 mg daily. _______________________________ Scribe Attestation Doris Saleem (Aj) acting as a scribe for and in the presence of Shilpa Hugo MD     
November 07, 2018 at 10:40 AM 
    
Provider Attestation:     
I personally performed the services described in the documentation, reviewed the documentation, as recorded by the scribe in my presence, and it accurately and completely records my words and actions. November 07, 2018 at 10:40 AM - Shilpa Hugo MD   
 
 
_______________________________

## 2018-11-11 ENCOUNTER — APPOINTMENT (OUTPATIENT)
Age: 58
DRG: 478 | End: 2018-11-11
Attending: EMERGENCY MEDICINE
Payer: SELF-PAY

## 2018-11-11 ENCOUNTER — HOSPITAL ENCOUNTER (INPATIENT)
Age: 58
LOS: 4 days | Discharge: HOME HEALTH | DRG: 478 | End: 2018-11-15
Attending: EMERGENCY MEDICINE | Admitting: INTERNAL MEDICINE
Payer: SELF-PAY

## 2018-11-11 DIAGNOSIS — M46.44 DISCITIS OF THORACIC REGION: Primary | ICD-10-CM

## 2018-11-11 LAB
ANION GAP SERPL CALC-SCNC: 10 MMOL/L (ref 3–18)
APPEARANCE UR: CLEAR
BACTERIA URNS QL MICRO: ABNORMAL /HPF
BASOPHILS # BLD: 0 K/UL (ref 0–0.1)
BASOPHILS NFR BLD: 0 % (ref 0–2)
BILIRUB UR QL: NEGATIVE
BUN SERPL-MCNC: 15 MG/DL (ref 7–18)
BUN/CREAT SERPL: 20 (ref 12–20)
CALCIUM SERPL-MCNC: 9.2 MG/DL (ref 8.5–10.1)
CHLORIDE SERPL-SCNC: 99 MMOL/L (ref 100–108)
CO2 SERPL-SCNC: 29 MMOL/L (ref 21–32)
COLOR UR: YELLOW
CREAT SERPL-MCNC: 0.76 MG/DL (ref 0.6–1.3)
DIFFERENTIAL METHOD BLD: ABNORMAL
EOSINOPHIL # BLD: 0.2 K/UL (ref 0–0.4)
EOSINOPHIL NFR BLD: 2 % (ref 0–5)
EPITH CASTS URNS QL MICRO: ABNORMAL /LPF (ref 0–5)
ERYTHROCYTE [DISTWIDTH] IN BLOOD BY AUTOMATED COUNT: 14.2 % (ref 11.6–14.5)
ERYTHROCYTE [SEDIMENTATION RATE] IN BLOOD: 26 MM/HR (ref 0–30)
GLUCOSE SERPL-MCNC: 83 MG/DL (ref 74–99)
GLUCOSE UR STRIP.AUTO-MCNC: NEGATIVE MG/DL
HCT VFR BLD AUTO: 43.3 % (ref 35–45)
HGB BLD-MCNC: 13.7 G/DL (ref 12–16)
HGB UR QL STRIP: NEGATIVE
KETONES UR QL STRIP.AUTO: NEGATIVE MG/DL
LEUKOCYTE ESTERASE UR QL STRIP.AUTO: ABNORMAL
LYMPHOCYTES # BLD: 2.7 K/UL (ref 0.9–3.6)
LYMPHOCYTES NFR BLD: 31 % (ref 21–52)
MAGNESIUM SERPL-MCNC: 1.9 MG/DL (ref 1.6–2.6)
MCH RBC QN AUTO: 30.2 PG (ref 24–34)
MCHC RBC AUTO-ENTMCNC: 31.6 G/DL (ref 31–37)
MCV RBC AUTO: 95.6 FL (ref 74–97)
MONOCYTES # BLD: 0.6 K/UL (ref 0.05–1.2)
MONOCYTES NFR BLD: 7 % (ref 3–10)
MUCOUS THREADS URNS QL MICRO: ABNORMAL /LPF
NEUTS SEG # BLD: 5.3 K/UL (ref 1.8–8)
NEUTS SEG NFR BLD: 60 % (ref 40–73)
NITRITE UR QL STRIP.AUTO: NEGATIVE
PH UR STRIP: 6 [PH] (ref 5–8)
PLATELET # BLD AUTO: 333 K/UL (ref 135–420)
PMV BLD AUTO: 9.1 FL (ref 9.2–11.8)
POTASSIUM SERPL-SCNC: 3.3 MMOL/L (ref 3.5–5.5)
PROT UR STRIP-MCNC: ABNORMAL MG/DL
RBC # BLD AUTO: 4.53 M/UL (ref 4.2–5.3)
RBC #/AREA URNS HPF: ABNORMAL /HPF (ref 0–5)
SODIUM SERPL-SCNC: 138 MMOL/L (ref 136–145)
SP GR UR REFRACTOMETRY: >1.03 (ref 1–1.03)
UROBILINOGEN UR QL STRIP.AUTO: 1 EU/DL (ref 0.2–1)
WBC # BLD AUTO: 8.7 K/UL (ref 4.6–13.2)
WBC URNS QL MICRO: ABNORMAL /HPF (ref 0–4)

## 2018-11-11 PROCEDURE — 74011636320 HC RX REV CODE- 636/320: Performed by: EMERGENCY MEDICINE

## 2018-11-11 PROCEDURE — 86141 C-REACTIVE PROTEIN HS: CPT

## 2018-11-11 PROCEDURE — A9575 INJ GADOTERATE MEGLUMI 0.1ML: HCPCS | Performed by: EMERGENCY MEDICINE

## 2018-11-11 PROCEDURE — 85025 COMPLETE CBC W/AUTO DIFF WBC: CPT

## 2018-11-11 PROCEDURE — 72157 MRI CHEST SPINE W/O & W/DYE: CPT

## 2018-11-11 PROCEDURE — 96374 THER/PROPH/DIAG INJ IV PUSH: CPT

## 2018-11-11 PROCEDURE — 74011250636 HC RX REV CODE- 250/636: Performed by: EMERGENCY MEDICINE

## 2018-11-11 PROCEDURE — 80048 BASIC METABOLIC PNL TOTAL CA: CPT

## 2018-11-11 PROCEDURE — 74011250637 HC RX REV CODE- 250/637: Performed by: INTERNAL MEDICINE

## 2018-11-11 PROCEDURE — 87040 BLOOD CULTURE FOR BACTERIA: CPT

## 2018-11-11 PROCEDURE — 99285 EMERGENCY DEPT VISIT HI MDM: CPT

## 2018-11-11 PROCEDURE — 96375 TX/PRO/DX INJ NEW DRUG ADDON: CPT

## 2018-11-11 PROCEDURE — 87086 URINE CULTURE/COLONY COUNT: CPT

## 2018-11-11 PROCEDURE — 85652 RBC SED RATE AUTOMATED: CPT

## 2018-11-11 PROCEDURE — 77010033678 HC OXYGEN DAILY

## 2018-11-11 PROCEDURE — 81001 URINALYSIS AUTO W/SCOPE: CPT

## 2018-11-11 PROCEDURE — 83735 ASSAY OF MAGNESIUM: CPT

## 2018-11-11 PROCEDURE — 65270000029 HC RM PRIVATE

## 2018-11-11 RX ORDER — PROCHLORPERAZINE EDISYLATE 5 MG/ML
5 INJECTION INTRAMUSCULAR; INTRAVENOUS
Status: DISCONTINUED | OUTPATIENT
Start: 2018-11-11 | End: 2018-11-15 | Stop reason: HOSPADM

## 2018-11-11 RX ORDER — ONDANSETRON 2 MG/ML
4 INJECTION INTRAMUSCULAR; INTRAVENOUS
Status: COMPLETED | OUTPATIENT
Start: 2018-11-11 | End: 2018-11-11

## 2018-11-11 RX ORDER — SODIUM CHLORIDE 9 MG/ML
125 INJECTION, SOLUTION INTRAVENOUS CONTINUOUS
Status: DISCONTINUED | OUTPATIENT
Start: 2018-11-11 | End: 2018-11-13

## 2018-11-11 RX ORDER — METHADONE HYDROCHLORIDE 10 MG/ML
85 CONCENTRATE ORAL DAILY
Status: DISCONTINUED | OUTPATIENT
Start: 2018-11-12 | End: 2018-11-15 | Stop reason: HOSPADM

## 2018-11-11 RX ORDER — KETOROLAC TROMETHAMINE 30 MG/ML
30 INJECTION, SOLUTION INTRAMUSCULAR; INTRAVENOUS
Status: DISCONTINUED | OUTPATIENT
Start: 2018-11-11 | End: 2018-11-15 | Stop reason: HOSPADM

## 2018-11-11 RX ORDER — SERTRALINE HYDROCHLORIDE 50 MG/1
150 TABLET, FILM COATED ORAL DAILY
Status: DISCONTINUED | OUTPATIENT
Start: 2018-11-12 | End: 2018-11-15 | Stop reason: HOSPADM

## 2018-11-11 RX ORDER — ACETAMINOPHEN 325 MG/1
650 TABLET ORAL
Status: DISCONTINUED | OUTPATIENT
Start: 2018-11-11 | End: 2018-11-15 | Stop reason: HOSPADM

## 2018-11-11 RX ORDER — KETOROLAC TROMETHAMINE 30 MG/ML
15 INJECTION, SOLUTION INTRAMUSCULAR; INTRAVENOUS
Status: COMPLETED | OUTPATIENT
Start: 2018-11-11 | End: 2018-11-11

## 2018-11-11 RX ORDER — MORPHINE SULFATE 4 MG/ML
4 INJECTION INTRAVENOUS
Status: COMPLETED | OUTPATIENT
Start: 2018-11-11 | End: 2018-11-11

## 2018-11-11 RX ADMIN — SODIUM CHLORIDE 125 ML/HR: 900 INJECTION, SOLUTION INTRAVENOUS at 15:25

## 2018-11-11 RX ADMIN — KETOROLAC TROMETHAMINE 15 MG: 30 INJECTION, SOLUTION INTRAMUSCULAR at 15:31

## 2018-11-11 RX ADMIN — ONDANSETRON 4 MG: 2 INJECTION INTRAMUSCULAR; INTRAVENOUS at 15:31

## 2018-11-11 RX ADMIN — GADOTERATE MEGLUMINE 20 ML: 376.9 INJECTION INTRAVENOUS at 13:28

## 2018-11-11 RX ADMIN — ACETAMINOPHEN 650 MG: 325 TABLET ORAL at 23:40

## 2018-11-11 RX ADMIN — ACETAMINOPHEN 650 MG: 325 TABLET ORAL at 19:53

## 2018-11-11 RX ADMIN — MORPHINE SULFATE 4 MG: 4 INJECTION INTRAVENOUS at 15:31

## 2018-11-11 NOTE — ED NOTES
Patient noted to have oxygen sats per pulse oximetry of 88% on room air. MD made aware of patient's sats.   Per vorbv, patient placed on 2 liters / min supplemental oxygen via NC.

## 2018-11-11 NOTE — ED PROVIDER NOTES
EMERGENCY DEPARTMENT HISTORY AND PHYSICAL EXAM 
 
12:24 PM 
 
 
Date: 11/11/2018 Patient Name: Rhonda Wild History of Presenting Illness Chief Complaint Patient presents with  Back Pain  Nausea  Vomiting  Diarrhea History Provided By: Patient Chief Complaint: Back Pain Duration:  1 Month Timing:  Constant Location: Lower thoracic back radiating bilaterally and anterior around to chest, worse on the left Quality: N/A Severity: Moderate Modifying Factors: No improvement with Tylenol Associated Symptoms: Nausea; Vomiting Additional History (Context): Rhonda Wild is a 63 y.o. female with PMHx of hepatitis C presenting to the ED c/o constant moderate back pain for the past month. Pt was seen here 4 days ago for epigastric pain and states pain has now moved around to her back, which is where her pain started one month ago. States her current pain is located in her lower thoracic back radiating bilaterally and anteriorly around to chest, worse on her left side. Reports taking Tylenol with no significant relief. Associated symptoms include nausea and vomiting. Admits to past history of IV drug use. Denies any other symptoms or complaints. PCP: Kwabena Gotti NP Current Outpatient Medications Medication Sig Dispense Refill  sertraline (ZOLOFT) 100 mg tablet Take one and a half tablets a day for a total of 150 mg daily. (Patient taking differently: Take 150 mg by mouth daily. Take one and a half tablets a day for a total of 150 mg daily.) 60 Tab 1  
 glecaprevir/pibrentasvir (MAVYRET PO) Take  by mouth.  methadone HCl (METHADONE PO) Take 85 mg by mouth daily. Past History Past Medical History: 
Past Medical History:  
Diagnosis Date  Depression  Hepatitis C Past Surgical History: 
History reviewed. No pertinent surgical history. Family History: 
Family History Problem Relation Age of Onset  Hypertension Mother  High Cholesterol Mother  COPD Mother  Hypertension Father Social History: 
Social History Tobacco Use  Smoking status: Current Every Day Smoker Packs/day: 0.25 Types: Cigarettes  Smokeless tobacco: Never Used Substance Use Topics  Alcohol use: No  
 Drug use: No  
  Comment: on methadone matinance program  
 
 
Allergies: 
No Known Allergies Review of Systems Review of Systems Constitutional: Negative for activity change, fatigue and fever. HENT: Negative for congestion and rhinorrhea. Eyes: Negative for visual disturbance. Respiratory: Negative for shortness of breath. Cardiovascular: Negative for chest pain and palpitations. Gastrointestinal: Positive for nausea and vomiting. Negative for abdominal pain. Genitourinary: Negative for dysuria and hematuria. Musculoskeletal: Positive for back pain. Skin: Negative for rash. Neurological: Negative for dizziness, weakness and light-headedness. All other systems reviewed and are negative. Physical Exam  
 
Visit Vitals BP (!) 151/92 Pulse 76 Temp 98.7 °F (37.1 °C) Resp 14 Ht 5' 4\" (1.626 m) Wt 97.5 kg (215 lb) SpO2 96% BMI 36.90 kg/m² Physical Exam  
Constitutional: She is oriented to person, place, and time. She appears well-developed and well-nourished. No distress. HENT:  
Head: Normocephalic and atraumatic. Right Ear: External ear normal.  
Left Ear: External ear normal.  
Nose: Nose normal.  
Mouth/Throat: Oropharynx is clear and moist.  
Eyes: Conjunctivae and EOM are normal. Pupils are equal, round, and reactive to light. No scleral icterus. Neck: Normal range of motion. Neck supple. No JVD present. No tracheal deviation present. No thyromegaly present. Cardiovascular: Normal rate, regular rhythm, normal heart sounds and intact distal pulses. Exam reveals no gallop and no friction rub. No murmur heard. Pulmonary/Chest: Effort normal and breath sounds normal. She exhibits no tenderness. Abdominal: Soft. Bowel sounds are normal. She exhibits no distension. There is no tenderness. There is no rebound and no guarding. Musculoskeletal: Normal range of motion. She exhibits no edema or tenderness. Localized tenderness to palp along lower thoracic spine over T7-T8 area. No swelling or skin changes Lymphadenopathy:  
  She has no cervical adenopathy. Neurological: She is alert and oriented to person, place, and time. No cranial nerve deficit. Coordination normal.  
Skin: Skin is warm and dry. Psychiatric: She has a normal mood and affect. Her behavior is normal. Judgment and thought content normal.  
Nursing note and vitals reviewed. Diagnostic Study Results Labs - No results found for this or any previous visit (from the past 12 hour(s)). Radiologic Studies -  
MRI [facility] SPINE W WO CONT Final Result IMPRESSION Impression: 
  
Significant soft tissue and osseous inflammation centered at the T7-8 disc level 
and pronounced circumferential prevertebral and epidural inflammation centered 
at this level most suggestive of discitis osteomyelitis. No peripherally 
enhancing collection to suggest abscess. Mild compromise of the central canal at 
the level of T7-8. Medical Decision Making I am the first provider for this patient. I reviewed the vital signs, available nursing notes, past medical history, past surgical history, family history and social history. Vital Signs-Reviewed the patient's vital signs. Records Reviewed: Nursing Notes and Old Medical Records (Time of Review: 12:24 PM) 
 
ED Course: Progress Notes, Reevaluation, and Consults: 
2:28 PM Consult: I discussed care with Dr. Chela Diaz (Hospitalist). It was a standard discussion including patient history, chief complaint, available diagnostic results, and predicted treatment course.  Agrees with admission. Requests I discuss with Dr. Jacquelyn Franklin for antibiotic guidance. 2:52 PM Consult: I discussed care with Dr. Merle Humphrey (Orthopedics). It was a standard discussion including patient history, chief complaint, available diagnostic results, and predicted treatment course. Agrees to consult. Provider Notes (Medical Decision Making): Pt has a thoracic discitis, presumed source is past hx of IV drug use. Will obtain cultures and IV access, pain control, admit to [facility] for continued care. Pt understands and agrees with this plan. Isabella Mai MD 
3:05 PM 
 
 
For Hospitalized Patients: 
 
1. Hospitalization Decision Time: The decision to hospitalize the patient was made by Dr. Deepthi Berg at 2:28 on 11/11/2018 2. Aspirin: Aspirin was not given because the patient did not present with a stroke at the time of their Emergency Department evaluation Diagnosis Clinical Impression: 1. Discitis of thoracic region Disposition: admit Follow-up Information None Medication List  
  
ASK your doctor about these medications MAVYRET PO 
  
METHADONE PO 
  
sertraline 100 mg tablet Commonly known as:  ZOLOFT Take one and a half tablets a day for a total of 150 mg daily. _______________________________ Scribe Attestation Asia Dumas acting as a scribe for and in the presence of Shilpa Hugo MD     
November 11, 2018 at 12:24 PM 
    
Provider Attestation:     
I personally performed the services described in the documentation, reviewed the documentation, as recorded by the scribe in my presence, and it accurately and completely records my words and actions. November 11, 2018 at 12:24 PM - Shilpa Hugo MD   
 
 
_______________________________

## 2018-11-11 NOTE — H&P
History & Physical 
Patient: Tracy Joy MRN: [id number]  CSN: [id number] YOB: 1960  Age: 63 y.o. Sex: female DOA: 11/11/2018 CC: back pain HPI:  
 
Tracy Joy is a 63 y.o. female with medical history listed below presented to [facility] ER with chronic back pain. She has had this mid-back pain for a month but seem to worsened over the last few days. She initially though it was abdominal pain, but the pain was described as \"wraping around the trunk\" from back to front. She has came to the ER several times. She had associated n/v. No fever, no chill. In the ER, MRI thoracic spine showed soft tissue and osseous inflammation at T7-8 with prevertebral and epidural inflammation suggestive of discitis osteomyelitis. Dr. Nisha Sidhu was consult and recommended to hold off antibiotic until he can do biopsy of the area. She remains hemodynamically table without fever, no leukocytosis. ROS: no fever/chills, no headache, no dizziness, no facial pain, no sinus congestion, No swallowing pain, No chest pain, no palpitation, no shortness of breath, no abd pain, ++back pain No diarrhea, no urinary complaint, no leg pain or swelling Past Medical History:  
Diagnosis Date  Depression  Hepatitis C   
 Heroin use disorder, moderate, in early remission ([facility] [address] NOW on [facility] History reviewed. No pertinent surgical history. Family History Problem Relation Age of Onset  Hypertension Mother  High Cholesterol Mother  COPD Mother  Hypertension Father Social History Socioeconomic History  Marital status: SINGLE Spouse name: Not on file  Number of children: Not on file  Years of education: Not on file  Highest education level: Not on file Social Needs  Financial resource strain: Not on file  Food insecurity - worry: Not on file  Food insecurity - inability: Not on file  Transportation needs - medical: Not on file  Transportation needs - non-medical: Not on file Occupational History  Not on file Tobacco Use  Smoking status: Current Every Day Smoker Packs/day: 0.25 Types: Cigarettes  Smokeless tobacco: Never Used Substance and Sexual Activity  Alcohol use: No  
 Drug use: No  
  Comment: on methadone matinance program  
 Sexual activity: No  
Other Topics Concern  Not on file Social History Narrative  Not on file Prior to Admission medications Medication Sig Start Date End Date Taking? Authorizing Provider  
sertraline (ZOLOFT) 100 mg tablet Take one and a half tablets a day for a total of 150 mg daily. Patient taking differently: Take 150 mg by mouth daily. Take one and a half tablets a day for a total of 150 mg daily. 10/1/18  Yes Gloria YATES NP  
glecaprevir/pibrentasvir (MAVYRET PO) Take  by mouth. Yes Provider, Historical  
methadone HCl (METHADONE PO) Take 85 mg by mouth daily. Yes Provider, Historical  
 
 
No Known Allergies Physical Exam:  
  
Visit Vitals BP (!) 151/92 (BP 1 Location: Right arm, BP Patient Position: At rest) Pulse 65 Temp 97.4 °F (36.3 °C) Resp 18 Ht 5' 4\" (1.626 m) Wt 97.5 kg (215 lb) SpO2 96% BMI 36.90 kg/m² Physical Exam: 
General:  Cooperative, Not in acute distress, speaks in full sentence while in bed HEENT: PERRL, EOMI, supple neck, no JVD, dry oral mucosa Cardiovascular: S1S2 regular, no rub/gallop Pulmonary: Clear air entry bilaterally, no wheezing, no crackle Thoracic spine with tenderness on palpation at mid region GI:  Soft, non tender, non distended, +bs, no guarding Extremities:  No pedal edema, +distal pulses appreciated Neuro: AOx3, moving all extremities, no gross deficit. Lab/Data Review: 
Labs: Results:  
   
Chemistry Recent Labs 11/11/18 [address] GLU 83   
K 3.3*  
CL 99* CO2 29 BUN 15  
CREA 0.76 CA 9.2 AGAP 10 BUCR 20 CBC w/Diff Recent Labs 11/11/18 [address] WBC 8.7  
RBC 4.53 HGB 13.7 HCT 43.3  GRANS 60  
LYMPH 31 EOS 2 Coagulation No results for input(s): PTP, INR, APTT in the last 72 hours. No lab exists for component: INREXT, INREXT Iron/Ferritin No results for input(s): IRON in the last 72 hours. No lab exists for component: TIBCCALC BNP No results for input(s): BNPP in the last 72 hours. Cardiac Enzymes No results for input(s): CPK, CKND1, KB in the last 72 hours. No lab exists for component: Omaira Bethany Liver Enzymes No results for input(s): TP, ALB, TBIL, AP, SGOT, GPT in the last 72 hours. No lab exists for component: DBIL Thyroid Studies Lab Results Component Value Date/Time TSH 1.90 07/13/2018 09:44 AM  
    
 
All Micro Results Procedure Component Value Units Date/Time CULTURE, URINE [[id number]] Collected:  11/11/18 1510 Order Status:  Completed Specimen:  Urine from Clean catch Updated:  11/11/18 1808 CULTURE, BLOOD [[id number]] Collected:  11/11/18 1510 Order Status:  Completed Specimen:  Blood Updated:  11/11/18 1708 Imaging Reviewed: MRI thoracic spine: 
Impression: 
  
Significant soft tissue and osseous inflammation centered at the T7-8 disc level 
and pronounced circumferential prevertebral and epidural inflammation centered 
at this level most suggestive of discitis osteomyelitis. No peripherally 
enhancing collection to suggest abscess. Mild compromise of the central canal at 
the level of T7-8. Assessment:  
 
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 
3)  Heroin use in remission, on methadone dose 4)  Depression 5)  Active tobacco smoking. 6)  Asymptomatic bacteriuria, no need for treatmetn Plan:  
 
1) Admit for further evaluation, including tissue biopsy. Dr. Harrison Bull consultation appreciated. Will need ID consult for complete care. Blood culture follow up Will hold off antibiotic for now 2) She is to bring her home hep c medication for use here. 3)  Pharmacy will dose her methadone, with continue Toradol prn for pain control. 4)  Smoke cessation education DVT prophylaxis: SCD Full Code Bryan Robins MD 
11/11/2018, 6:20 PM

## 2018-11-11 NOTE — ROUTINE PROCESS
TRANSFER - OUT REPORT: 
 
Verbal report given to Milena Ham RN(name) on Rhonda Wild  being transferred to [facility] room 512(unit) for routine progression of care Report consisted of patients Situation, Background, Assessment and  
Recommendations(SBAR). Information from the following report(s) SBAR, ED Summary, [facility] and Recent Results was reviewed with the receiving nurse. Opportunity for questions and clarification was provided.

## 2018-11-11 NOTE — ED TRIAGE NOTES
Patient c/o torso pain and back pain. C/o diarrhea, vomiting and nausea. Patient states that she is currently being treated for Hepatitis.

## 2018-11-11 NOTE — ROUTINE PROCESS
Patient admitted to floor from [facility] ER. She is awake and A&O x4. No acute distress noted. Dr.Nguyen marcano.

## 2018-11-12 ENCOUNTER — APPOINTMENT (OUTPATIENT)
Dept: INTERVENTIONAL RADIOLOGY/VASCULAR | Age: 58
DRG: 478 | End: 2018-11-12
Attending: PHYSICIAN ASSISTANT
Payer: SELF-PAY

## 2018-11-12 ENCOUNTER — APPOINTMENT (OUTPATIENT)
Dept: CT IMAGING | Age: 58
DRG: 478 | End: 2018-11-12
Attending: PHYSICIAN ASSISTANT
Payer: SELF-PAY

## 2018-11-12 LAB
ANION GAP SERPL CALC-SCNC: 6 MMOL/L (ref 3–18)
APTT PPP: 32 SEC (ref 23–36.4)
BUN SERPL-MCNC: 11 MG/DL (ref 7–18)
BUN/CREAT SERPL: 18 (ref 12–20)
CALCIUM SERPL-MCNC: 8.6 MG/DL (ref 8.5–10.1)
CHLORIDE SERPL-SCNC: 105 MMOL/L (ref 100–108)
CO2 SERPL-SCNC: 29 MMOL/L (ref 21–32)
CREAT SERPL-MCNC: 0.62 MG/DL (ref 0.6–1.3)
ERYTHROCYTE [DISTWIDTH] IN BLOOD BY AUTOMATED COUNT: 14 % (ref 11.6–14.5)
GLUCOSE SERPL-MCNC: 96 MG/DL (ref 74–99)
HCT VFR BLD AUTO: 41.8 % (ref 35–45)
HGB BLD-MCNC: 13.6 G/DL (ref 12–16)
INR PPP: 1 (ref 0.8–1.2)
MAGNESIUM SERPL-MCNC: 2.3 MG/DL (ref 1.6–2.6)
MCH RBC QN AUTO: 30.8 PG (ref 24–34)
MCHC RBC AUTO-ENTMCNC: 32.5 G/DL (ref 31–37)
MCV RBC AUTO: 94.8 FL (ref 74–97)
PLATELET # BLD AUTO: 272 K/UL (ref 135–420)
PMV BLD AUTO: 9.4 FL (ref 9.2–11.8)
POTASSIUM SERPL-SCNC: 3.5 MMOL/L (ref 3.5–5.5)
PROTHROMBIN TIME: 12.6 SEC (ref 11.5–15.2)
RBC # BLD AUTO: 4.41 M/UL (ref 4.2–5.3)
SODIUM SERPL-SCNC: 140 MMOL/L (ref 136–145)
WBC # BLD AUTO: 7.1 K/UL (ref 4.6–13.2)

## 2018-11-12 PROCEDURE — 74011250637 HC RX REV CODE- 250/637: Performed by: INTERNAL MEDICINE

## 2018-11-12 PROCEDURE — 83735 ASSAY OF MAGNESIUM: CPT

## 2018-11-12 PROCEDURE — 74011250636 HC RX REV CODE- 250/636

## 2018-11-12 PROCEDURE — 77010033678 HC OXYGEN DAILY

## 2018-11-12 PROCEDURE — 74011250636 HC RX REV CODE- 250/636: Performed by: RADIOLOGY

## 2018-11-12 PROCEDURE — 65270000029 HC RM PRIVATE

## 2018-11-12 PROCEDURE — 87070 CULTURE OTHR SPECIMN AEROBIC: CPT

## 2018-11-12 PROCEDURE — 85610 PROTHROMBIN TIME: CPT

## 2018-11-12 PROCEDURE — 87106 FUNGI IDENTIFICATION YEAST: CPT

## 2018-11-12 PROCEDURE — 85730 THROMBOPLASTIN TIME PARTIAL: CPT

## 2018-11-12 PROCEDURE — 88307 TISSUE EXAM BY PATHOLOGIST: CPT

## 2018-11-12 PROCEDURE — 85027 COMPLETE CBC AUTOMATED: CPT

## 2018-11-12 PROCEDURE — 07DS3ZX EXTRACTION OF VERTEBRAL BONE MARROW, PERCUTANEOUS APPROACH, DIAGNOSTIC: ICD-10-PCS | Performed by: RADIOLOGY

## 2018-11-12 PROCEDURE — 88311 DECALCIFY TISSUE: CPT

## 2018-11-12 PROCEDURE — 74011250636 HC RX REV CODE- 250/636: Performed by: EMERGENCY MEDICINE

## 2018-11-12 PROCEDURE — 74150 CT ABDOMEN W/O CONTRAST: CPT

## 2018-11-12 PROCEDURE — 80048 BASIC METABOLIC PNL TOTAL CA: CPT

## 2018-11-12 PROCEDURE — 87102 FUNGUS ISOLATION CULTURE: CPT

## 2018-11-12 PROCEDURE — 99152 MOD SED SAME PHYS/QHP 5/>YRS: CPT

## 2018-11-12 PROCEDURE — 36415 COLL VENOUS BLD VENIPUNCTURE: CPT

## 2018-11-12 PROCEDURE — 87206 SMEAR FLUORESCENT/ACID STAI: CPT

## 2018-11-12 PROCEDURE — 74011250636 HC RX REV CODE- 250/636: Performed by: INTERNAL MEDICINE

## 2018-11-12 RX ORDER — MIDAZOLAM HYDROCHLORIDE 1 MG/ML
INJECTION, SOLUTION INTRAMUSCULAR; INTRAVENOUS
Status: COMPLETED
Start: 2018-11-12 | End: 2018-11-12

## 2018-11-12 RX ORDER — FENTANYL CITRATE 50 UG/ML
50 INJECTION, SOLUTION INTRAMUSCULAR; INTRAVENOUS
Status: DISCONTINUED | OUTPATIENT
Start: 2018-11-12 | End: 2018-11-13 | Stop reason: HOSPADM

## 2018-11-12 RX ORDER — LIDOCAINE HYDROCHLORIDE 10 MG/ML
INJECTION, SOLUTION EPIDURAL; INFILTRATION; INTRACAUDAL; PERINEURAL
Status: DISPENSED
Start: 2018-11-12 | End: 2018-11-13

## 2018-11-12 RX ORDER — SODIUM CHLORIDE 0.9 % (FLUSH) 0.9 %
5-10 SYRINGE (ML) INJECTION EVERY 8 HOURS
Status: DISCONTINUED | OUTPATIENT
Start: 2018-11-12 | End: 2018-11-15 | Stop reason: HOSPADM

## 2018-11-12 RX ORDER — LIDOCAINE HYDROCHLORIDE 10 MG/ML
INJECTION, SOLUTION EPIDURAL; INFILTRATION; INTRACAUDAL; PERINEURAL
Status: COMPLETED
Start: 2018-11-12 | End: 2018-11-12

## 2018-11-12 RX ORDER — FENTANYL CITRATE 50 UG/ML
INJECTION, SOLUTION INTRAMUSCULAR; INTRAVENOUS
Status: COMPLETED
Start: 2018-11-12 | End: 2018-11-12

## 2018-11-12 RX ORDER — SODIUM CHLORIDE 0.9 % (FLUSH) 0.9 %
5-10 SYRINGE (ML) INJECTION AS NEEDED
Status: DISCONTINUED | OUTPATIENT
Start: 2018-11-12 | End: 2018-11-15 | Stop reason: HOSPADM

## 2018-11-12 RX ORDER — FLUMAZENIL 0.1 MG/ML
0.2 INJECTION INTRAVENOUS
Status: DISCONTINUED | OUTPATIENT
Start: 2018-11-12 | End: 2018-11-13 | Stop reason: HOSPADM

## 2018-11-12 RX ORDER — NIFEDIPINE 10 MG/1
10 CAPSULE ORAL ONCE
Status: COMPLETED | OUTPATIENT
Start: 2018-11-12 | End: 2018-11-12

## 2018-11-12 RX ORDER — NALOXONE HYDROCHLORIDE 0.4 MG/ML
0.1 INJECTION, SOLUTION INTRAMUSCULAR; INTRAVENOUS; SUBCUTANEOUS
Status: DISCONTINUED | OUTPATIENT
Start: 2018-11-12 | End: 2018-11-13 | Stop reason: HOSPADM

## 2018-11-12 RX ORDER — MIDAZOLAM HYDROCHLORIDE 1 MG/ML
1 INJECTION, SOLUTION INTRAMUSCULAR; INTRAVENOUS
Status: DISCONTINUED | OUTPATIENT
Start: 2018-11-12 | End: 2018-11-13 | Stop reason: HOSPADM

## 2018-11-12 RX ADMIN — FENTANYL CITRATE 50 MCG: 50 INJECTION INTRAMUSCULAR; INTRAVENOUS at 13:25

## 2018-11-12 RX ADMIN — KETOROLAC TROMETHAMINE 30 MG: 30 INJECTION, SOLUTION INTRAMUSCULAR at 07:47

## 2018-11-12 RX ADMIN — LIDOCAINE HYDROCHLORIDE: 10 INJECTION, SOLUTION EPIDURAL; INFILTRATION; INTRACAUDAL; PERINEURAL at 15:12

## 2018-11-12 RX ADMIN — ACETAMINOPHEN 650 MG: 325 TABLET ORAL at 21:27

## 2018-11-12 RX ADMIN — FENTANYL CITRATE 50 MCG: 50 INJECTION INTRAMUSCULAR; INTRAVENOUS at 15:10

## 2018-11-12 RX ADMIN — FENTANYL CITRATE 50 MCG: 50 INJECTION INTRAMUSCULAR; INTRAVENOUS at 14:50

## 2018-11-12 RX ADMIN — SODIUM CHLORIDE 125 ML/HR: 900 INJECTION, SOLUTION INTRAVENOUS at 08:18

## 2018-11-12 RX ADMIN — Medication 10 ML: at 14:00

## 2018-11-12 RX ADMIN — MIDAZOLAM HYDROCHLORIDE 1 MG: 2 INJECTION, SOLUTION INTRAMUSCULAR; INTRAVENOUS at 14:50

## 2018-11-12 RX ADMIN — MIDAZOLAM HYDROCHLORIDE 1 MG: 2 INJECTION, SOLUTION INTRAMUSCULAR; INTRAVENOUS at 14:45

## 2018-11-12 RX ADMIN — KETOROLAC TROMETHAMINE 30 MG: 30 INJECTION, SOLUTION INTRAMUSCULAR at 23:23

## 2018-11-12 RX ADMIN — NIFEDIPINE 10 MG: 10 CAPSULE, LIQUID FILLED ORAL at 00:24

## 2018-11-12 RX ADMIN — KETOROLAC TROMETHAMINE 30 MG: 30 INJECTION, SOLUTION INTRAMUSCULAR at 18:06

## 2018-11-12 RX ADMIN — MIDAZOLAM HYDROCHLORIDE 1 MG: 2 INJECTION, SOLUTION INTRAMUSCULAR; INTRAVENOUS at 13:25

## 2018-11-12 RX ADMIN — SERTRALINE HYDROCHLORIDE 150 MG: 50 TABLET ORAL at 08:16

## 2018-11-12 RX ADMIN — FENTANYL CITRATE 50 MCG: 50 INJECTION INTRAMUSCULAR; INTRAVENOUS at 14:45

## 2018-11-12 RX ADMIN — FENTANYL CITRATE 50 MCG: 50 INJECTION INTRAMUSCULAR; INTRAVENOUS at 15:05

## 2018-11-12 RX ADMIN — Medication 10 ML: at 21:27

## 2018-11-12 RX ADMIN — METHADONE HYDROCHLORIDE 85 MG: 10 CONCENTRATE ORAL at 09:42

## 2018-11-12 RX ADMIN — KETOROLAC TROMETHAMINE 30 MG: 30 INJECTION, SOLUTION INTRAMUSCULAR at 01:50

## 2018-11-12 NOTE — CONSULTS
Consult Note    Patient: Pritesh Gonzalez               Sex: female          DOA: 11/11/2018         YOB: 1960      Age:  63 y.o.        LOS:  LOS: 1 day              HPI:     Pritesh Gonzalez is a 63 y.o. female who has been seen in evaluation of low back pain. Patient presented to the ED on 11/11 with complaints of worsening mid-back pain x 1 month with radiation around the trunk. MRI of the thoracic spine showed soft tissue and osseous inflammation at T7-8 with pronounced prevertebral and epidural inflammation suggestive of discitis osteomyelitis. Orthopedic Surgery was consulted and the patient was admitted for further management. Past Medical History:   Diagnosis Date    Depression     Hepatitis C     Heroin use disorder, moderate, in early remission ([facility] [address]     NOW on [facility] clinic       History reviewed. No pertinent surgical history.     Family History   Problem Relation Age of Onset    Hypertension Mother     High Cholesterol Mother     COPD Mother     Hypertension Father        Social History     Socioeconomic History    Marital status: SINGLE     Spouse name: Not on file    Number of children: Not on file    Years of education: Not on file    Highest education level: Not on file   Social Needs    Financial resource strain: Not on file    Food insecurity - worry: Not on file    Food insecurity - inability: Not on file   [employer] needs - medical: Not on file   [employer] needs - non-medical: Not on file   Occupational History    Not on file   Tobacco Use    Smoking status: Current Every Day Smoker     Packs/day: 0.25     Types: Cigarettes    Smokeless tobacco: Never Used   Substance and Sexual Activity    Alcohol use: No    Drug use: No     Comment: on methadone matinance program    Sexual activity: No   Other Topics Concern    Not on file   Social History Narrative    Not on file       Prior to Admission medications    Medication Sig Start Date End Date Taking? Authorizing Provider   sertraline (ZOLOFT) 100 mg tablet Take one and a half tablets a day for a total of 150 mg daily. Patient taking differently: Take 150 mg by mouth daily. Take one and a half tablets a day for a total of 150 mg daily. 10/1/18  Yes Candace YATES NP   glecaprevir/pibrentasvir (MAVYRET PO) Take  by mouth. Yes Provider, Historical   methadone HCl (METHADONE PO) Take 85 mg by mouth daily. Yes Provider, Historical       No Known Allergies    Review of Systems  Pertinent items are noted in the History of Present Illness. Physical Exam:      Visit Vitals  /89   Pulse 69   Temp 98.6 °F (37 °C)   Resp 17   Ht 5' 4\" (1.626 m)   Wt 97.5 kg (215 lb)   SpO2 100%   BMI 36.90 kg/m²       Physical Exam:  Constitutional: NAD. A&Ox4. Obese. Respiratory: Normal respiratory effort. Symmetrical rise and fall of chest.   Cardiovascular: Regular rate. Gastrointestinal: Soft, NT, ND.    MSK: No tenderness to palpation. Labs Reviewed:  CMP:   Lab Results   Component Value Date/Time     11/12/2018 04:28 AM    K 3.5 11/12/2018 04:28 AM     11/12/2018 04:28 AM    CO2 29 11/12/2018 04:28 AM    AGAP 6 11/12/2018 04:28 AM    GLU 96 11/12/2018 04:28 AM    BUN 11 11/12/2018 04:28 AM    CREA 0.62 11/12/2018 04:28 AM    GFRAA >60 11/12/2018 04:28 AM    GFRNA >60 11/12/2018 04:28 AM    CA 8.6 11/12/2018 04:28 AM    MG 2.3 11/12/2018 04:28 AM     CBC:   Lab Results   Component Value Date/Time    WBC 7.1 11/12/2018 04:28 AM    HGB 13.6 11/12/2018 04:28 AM    HCT 41.8 11/12/2018 04:28 AM     11/12/2018 04:28 AM     COAGS: No results found for: APTT, PTP, INR    Imaging:  Procedure: MRI of the thoracic spine with and without contrast.     Indications: Discitis     Comparisons: CT chest 11/7/2018        Technique: T1 weighted, T2 weighted FSE, and FSE inversion recovery sagittal  images of the thoracic spine are supplemented by T2 weighted FSE axial images.   Patient received 20 mL Dotarem intravenously and postcontrast images were  obtained.     Findings:      No vertebral subluxation. Mild central T7 vertebral body height loss with  irregularity and concavity of the inferior endplate. Intense diffuse enhancing  bone marrow edema of T7 and T8 vertebral body centered at the T7-8 disc. Subtle  irregularity of the endplate with subchondral intensely enhancing linear edema  signal. Small linear high T2 signal within the disc. There is intense  prevertebral enhancing soft tissue thickening extending from T5-6 to mid T8  level anteriorly. Intense enhancing circumferential epidural thickening  extending ventrally from mid T5-6 to T7-8 level and dorsally from C5-6 level to  low T10 level. No peripherally enhancing fluid collection to indicate abscess. Mild compromise of the thecal sac at the level of T7 and T8 vertebral body but  no high-grade canal stenosis. Otherwise the spinal canal and neural foramina are  patent.     Remainder of the vertebral body heights are maintained. Otherwise mild  discogenic degenerative reactive bone marrow changes T11-12. Remainder of disc  space heights and signal are unremarkable.      Thoracic cord is unremarkable in morphology and signal intensity. Conus  medullaris ends at the L1 vertebral body level.     Multilevel degenerative disc disease in the cervical and lumbar spine noted on  the localizer images.     IMPRESSION  Impression:     Significant soft tissue and osseous inflammation centered at the T7-8 disc level  and pronounced circumferential prevertebral and epidural inflammation centered  at this level most suggestive of discitis osteomyelitis. No peripherally  enhancing collection to suggest abscess. Mild compromise of the central canal at  the level of T7-8. Assessment/Plan     Active Problems:    Discitis of thoracic region (11/11/2018)      Case and images reviewed by Dr. Emily Garvey.  Will plan for image-guided T7-8 disc biopsy with moderate sedation as IR schedule allows. Patient to remain NPO with blood thinning medications held. Consent to be obtained prior to procedure.

## 2018-11-12 NOTE — PROGRESS NOTES
Preprocedure Assessment Today 11/12/2018 Indication/Symptoms:   Ophelia Bearden is a 63 y.o. female with a history of inflamed T7-8 on MRI who who presents to IR for an image-guided T7-8 biopsy with moderate sedation. Medications and labs reviewed. Patient has been NPO since midnight. Blood thinners held. The H & P and/or progress notes and any available imaging were reviewed. The risks, indications and possible alternatives to the procedure, including doing nothing, were discussed and informed consent was obtained. Physical Exam: 
  
 Heart:  Regular rate Lungs:  Normal respiratory effort The patient is an appropriate candidate to undergo the planned procedure and sedation.  
 
Evangelina Serrama

## 2018-11-12 NOTE — CONSULTS
Infectious Disease Consultation Note    Requested by: Dr. Luis A Xiong    Reason: thoracic spine discitis, hep c. Current abx Prior abx         Lines:       Assessment :    63 y.o. female with PMHx of hepatitis C presenting to the ED c/o constant moderate back pain for the past month. MRI thoracic spine 11/11: showed soft tissue and osseous inflammation at T7-8 with prevertebral and epidural inflammation suggestive of discitis osteomyelitis. Patient presents with highly complex clinical picture. History and MRI findings concerning for T7-T8 discitis/osteomyelitis. But lack of fever, chills, bacteremia, low esr argues against routine bacterial infection. D/D; discitis due to indolent pathogens related to poor dentition versus indolent fungal/mycobacterial infection related to IVDA versus undiagnosed malignancy. Multiple pulmonary nodules seen on ct chest 11/7, unexplained biliary dilatation concerning for undiagnosed malignancy. Rule out HIV    Recommendations:    1. Hold off abx  2. Add on bacterial/fungal/afb cultures for disc tissue bx - d/w IR - orders entered  3. Add on crp to today am's lab  4. F/u ct abd  5. F/u histology of disc tissue  6. ?oncology evaluation for pulmonary nodules  7. Obtain HIV serology    Advance Care planning: full code: discussed  with patient/surrogate decision maker:Ankita Monaco: [phone]      Thank you for consultation request. Above plan was discussed in details with patient, will d/w dr. Alida Roy and dr Verónica Guzman. Please call me if any further questions or concerns. Will continue to participate in the care of this patient. HPI:    63 y.o. female with PMHx of hepatitis C presenting to the ED c/o constant moderate back pain for the past month. Patient was referred to GI for treatment of hepatitis C. Had [address] liver in august - noted to have dilated cbd. Subsequently had MRCP which confirmed dilated cbd.  Was started on treatment for hepatitis C in September. Took dose in September, October. Few days after her dose in October, she started having back pain. Pt was seen here 5 days ago for epigastric pain and states pain has now moved around to her back, which is where her pain started one month ago. States her current pain is located in her lower thoracic back radiating bilaterally and anteriorly around to chest, worse on her left side. Admits to past history of IV drug use - last use few months ago. Denies any other symptoms or complaints. In the ER, MRI thoracic spine showed soft tissue and osseous inflammation at T7-8 with prevertebral and epidural inflammation suggestive of discitis osteomyelitis. Dr. Fish Aldridge was consult and recommended to hold off antibiotic until he can do biopsy of the area. She remains hemodynamically table without fever, no leukocytosis. I have been consulted for further recommendations. Patient denies subjective fever/chills throughout this time. Still has pain in entire torso. Denies significant RUQ pain. Patient denies headaches, visual disturbances, sore throat, runny nose, earaches, cp, sob, chills, cough, diarrhea, burning micturition, pain or weakness in extremities. no/flank pain. denies recent sick contacts. No h/o recent travel. No known h/o MRSA colonization or infection in the past.              Past Medical History:   Diagnosis Date    Depression     Hepatitis C     Heroin use disorder, moderate, in early remission ([facility] [address]     [facility]       History reviewed. No pertinent surgical history. Medication List      ASK your doctor about these medications    MAVYRET PO     METHADONE PO     sertraline 100 mg tablet  Commonly known as:  ZOLOFT  Take one and a half tablets a day for a total of 150 mg daily.             Current Facility-Administered Medications   Medication Dose Route Frequency    0.9% sodium chloride infusion  125 mL/hr IntraVENous CONTINUOUS    methadone (DOLOPHINE) 10 mg/mL concentrated solution 85 mg  85 mg Oral DAILY    sertraline (ZOLOFT) tablet 150 mg  150 mg Oral DAILY    acetaminophen (TYLENOL) tablet 650 mg  650 mg Oral Q4H PRN    prochlorperazine (COMPAZINE) injection 5 mg  5 mg IntraVENous Q8H PRN    ketorolac (TORADOL) injection 30 mg  30 mg IntraVENous Q6H PRN       Allergies: Patient has no known allergies. Family History   Problem Relation Age of Onset    Hypertension Mother     High Cholesterol Mother     COPD Mother     Hypertension Father      Social History     Socioeconomic History    Marital status: SINGLE     Spouse name: Not on file    Number of children: Not on file    Years of education: Not on file    Highest education level: Not on file   Social Needs    Financial resource strain: Not on file    Food insecurity - worry: Not on file    Food insecurity - inability: Not on file   [employer] needs - medical: Not on file   [employer] needs - non-medical: Not on file   Occupational History    Not on file   Tobacco Use    Smoking status: Current Every Day Smoker     Packs/day: 0.25     Types: Cigarettes    Smokeless tobacco: Never Used   Substance and Sexual Activity    Alcohol use: No    Drug use: No     Comment: on methadone matinance program    Sexual activity: No   Other Topics Concern    Not on file   Social History Narrative    Not on file     Social History     Tobacco Use   Smoking Status Current Every Day Smoker    Packs/day: 0.25    Types: Cigarettes   Smokeless Tobacco Never Used        Temp (24hrs), Av °F (36.7 °C), Min:96.9 °F (36.1 °C), Max:98.8 °F (37.1 °C)    Visit Vitals  BP (!) 166/98 (BP 1 Location: Right arm) Comment: nurse knows   Pulse 64   Temp 98.6 °F (37 °C)   Resp 18   Ht 5' 4\" (1.626 m)   Wt 97.5 kg (215 lb)   SpO2 97%   BMI 36.90 kg/m²       ROS: 12 point ROS obtained in details.  Pertinent positives as mentioned in HPI,   otherwise negative    Physical Exam:    Constitutional: She is oriented to person, place, and time. She appears well-developed and well-nourished. No distress. HENT:   Head: Normocephalic and atraumatic. Right Ear: External ear normal.   Left Ear: External ear normal.   Nose: Nose normal.   Mouth/Throat: Oropharynx is clear and moist.   Eyes: Conjunctivae and EOM are normal. Pupils are equal, round, and reactive to light. No scleral icterus. Neck: Normal range of motion. Neck supple. No JVD present. No tracheal deviation present. No thyromegaly present. Cardiovascular: Normal rate, regular rhythm, normal heart sounds and intact distal pulses. Exam reveals no gallop and no friction rub. No murmur heard. Pulmonary/Chest: Effort normal and breath sounds normal. She exhibits no tenderness. Abdominal: Soft. Bowel sounds are normal. She exhibits no distension. There is no tenderness. There is no rebound and no guarding. Musculoskeletal: Normal range of motion. She exhibits no edema or tenderness. Localized tenderness to palp along lower thoracic spine over T7-T8 area. No swelling or skin changes   Lymphadenopathy:     She has no cervical adenopathy. Neurological: She is alert and oriented to person, place, and time. No cranial nerve deficit. Coordination normal.   Skin: Skin is warm and dry. Psychiatric: She has a normal mood and affect.  Her behavior is normal. Judgment and thought content normal.   Nursing note and vitals reviewed.              Labs: Results:   Chemistry Recent Labs     11/12/18  0428 11/11/18  1510   GLU 96 83    138   K 3.5 3.3*    99*   CO2 29 29   BUN 11 15   CREA 0.62 0.76   CA 8.6 9.2   AGAP 6 10   BUCR 18 20      CBC w/Diff Recent Labs     11/12/18  0428 11/11/18  1510   WBC 7.1 8.7   RBC 4.41 4.53   HGB 13.6 13.7   HCT 41.8 43.3    333   GRANS  --  60   LYMPH  --  31   EOS  --  2      Microbiology Recent Labs     11/11/18  1510   CULT NO GROWTH AFTER 17 HOURS  NO GROWTH AFTER 14 HOURS          RADIOLOGY:    All available imaging studies/reports in [facility] for this admission were reviewed    Dr. Myla Fry, Infectious Disease Specialist  [phone]  November 12, 2018  1:03 PM

## 2018-11-12 NOTE — PROGRESS NOTES
Dr. Kenney Bound in talking with pt, explaining the need to move pt from CT to IR for better positioning and imaging for the T7/T8 biopsy. Pt given Fentanyl 50 mcg IV and Versed 1 mg IV. Will cont to monitor pt until transported to IR.

## 2018-11-12 NOTE — PROCEDURES
RADIOLOGY POST PROCEDURE NOTE          Preoperative Diagnosis:   T7/8 dscitis. Postoperative Diagnosis:  Successful IR Flouro and coned beam CT guided Bone marrow biopsy. :  Shweta Bullard    Assistant:  None. Type of Anesthesia: 1% plain lidocaine, moderate sedation. Procedure/Description:  Bone marrow biopsy in IR. Findings:   Patient was placed prone. T7 vertebral body was localized. 11 gauge needle was inserted and the needle directed towards the end plate at W6/9. Core was obtained and sent for culture and histology. Estimated blood Loss:  Minimal    Specimen Removed:   Yes, Hematology. Complications: None    Condition: Stable    Discharge Plan:  Transferred back to nursing unit for observation.     Jerod Cornejo MD

## 2018-11-12 NOTE — ROUTINE PROCESS
Received pt from angio. Pt is alert and oriented x4. Pt advised that she is on bedrest for 4 hours. Diet ordered for patient. Pt has no pain. Dressing to the back dry and intact.

## 2018-11-12 NOTE — ROUTINE PROCESS
Bedside and Verbal shift change report given to Fernie Herlion Martinez (oncoming nurse) by Mary Quiroz RN (offgoing nurse). Report included the following information SBAR, Kardex, MAR and Recent Results. SITUATION:  
? Code Status: Full Code 
? Reason for Admission: Discitis of thoracic region ? Hospital day: 0 
? Problem List:  
   
Hospital Problems  Date Reviewed: 10/1/2018 Codes Class Noted POA Discitis of thoracic region ICD-10-CM: M46.44 
ICD-9-CM: 722.92  11/11/2018 Unknown BACKGROUND:  
 Past Medical History:  
Past Medical History:  
Diagnosis Date  Depression  Hepatitis C   
 Heroin use disorder, moderate, in early remission ([facility] [address] NOW on [facility] Patient taking anticoagulants no ASSESSMENT:  
? Changes in Assessment Throughout Shift: No 
 
? Patient has Central Line: no Reasons if yes: No 
? Patient has Larkin Cath: no Reasons if yes: No  
 
? Last Vitals: 
  
Vitals:  
 11/11/18 1630 11/11/18 1645 11/11/18 1808 11/11/18 1920 BP: 152/85 (!) 151/95 (!) 153/91 (!) 151/92 Pulse:   71 65 Resp:   16 18 Temp:   98.8 °F (37.1 °C) 97.4 °F (36.3 °C) SpO2: 96% 97% 95% 96% Weight:      
Height:      
 
 
? IV and DRAINS (will only show if present) Peripheral IV 11/11/18 Right Forearm-Site Assessment: Clean, dry, & intact ? WOUND (if present) Wound Type:  none Dressing present Dressing Present : No 
 Wound Concerns/Notes:  none ? PAIN Pain Assessment Pain Intensity 1: 3 (11/11/18 1958) Pain Location 1: Back Patient Stated Pain Goal: 0 
o Interventions for Pain:  none 
o Intervention effective: no 
o Time of last intervention: 0700  
o Reassessment Completed: no  
 
? Last 3 Weights: 
Last 3 Recorded Weights in this Encounter 11/11/18 1214 Weight: 97.5 kg (215 lb) Weight change: ? INTAKE/OUPUT Current Shift: 11/11 1901 - 11/12 0700 In: -  
Out: 400 [Urine:400] Last three shifts: No intake/output data recorded. ? LAB RESULTS Recent Labs 11/11/18 [address] WBC 8.7 HGB 13.7 HCT 43.3  Recent Labs 11/11/18 [address]   
K 3.3*  
GLU 83 BUN 15  
CREA 0.76 CA 9.2 MG 1.9  
 
 
RECOMMENDATIONS AND DISCHARGE PLANNING 1. Pending tests/procedures/ Plan of Care or Other Needs: TBD 2. Discharge plan for patient and Needs/Barriers: TBD 3. Estimated Discharge Date: TBD Posted on Whiteboard in Patients Room: no   
 
4. The patient's care plan was reviewed with the oncoming nurse. \"HEALS\" SAFETY CHECK Fall Risk Total Score: 1 Safety Measures: Safety Measures: Bed/Chair alarm on, Bed/Chair-Wheels locked, Bed in low position, Call light within reach, Fall prevention (comment) A safety check occurred in the patient's room between off going nurse and oncoming nurse listed above. The safety check included the below items Area Items H High Alert Medications ? Verify all high alert medication drips (heparin, PCA, etc.) E Equipment ? Suction is set up for ALL patients (with casimiro) ? Red plugs utilized for all equipment (IV pumps, etc.) ? WOWs wiped down at end of shift. ? Room stocked with oxygen, suction, and other unit-specific supplies A Alarms ? Bed alarm is set for fall risk patients ? Ensure chair alarm is in place and activated if patient is up in a chair L Lines ? Check IV for any infiltration ? Larkin bag is empty if patient has a Lrakin ? Tubing and IV bags are labeled Rahanuj Rosado Safety ? Room is clean, patient is clean, and equipment is clean. ? Hallways are clear from equipment besides carts. ? Fall bracelet on for fall risk patients ? Ensure room is clear and free of clutter ? Suction is set up for ALL patients (with casimiro) ? Hallways are clear from equipment besides carts. ? Isolation precautions followed, supplies available outside room, sign posted Hang Nash RN

## 2018-11-12 NOTE — PROGRESS NOTES
When  attempted to visit patient it was not a good time to visit. Patient was out of the hospital room.  provided a Spiritual Care greeting card and a devotional. Toni Navarrete will provide spiritual care as needed, as requested. Brandan Robertson MDiv. Board Certified [employer] [phone]

## 2018-11-12 NOTE — PROGRESS NOTES
[facility] Progress Note Patient: Cassius Collado Age: 63 y.o. : 1960 MR#: [id number] SSN: [id number] Date/Time: 2018 Subjective: I personally saw and evaluated this patient on 18. Patient in NAD, awake, alert, s/p Bx Assessment/Plan: 1- T7-8 Discitis,OM 2- Back Pain 3- h/o IVDA, on methadone 4- h/o Hep c 
5-Lung Nodules on recent CT 
 
PLAN Follow path ID, Spine following, Off abx for now Bowel regimen, IS 
Pulm consult in am 
D/w ID Case discussed with:  [x]Patient  []Family  []Nursing  []Case Management DVT Prophylaxis:  []Lovenox  []Hep SQ  [x]SCDs  []Coumadin   []On Heparin gtt Objective:  
VS:  
Visit Vitals /86 (BP 1 Location: Left arm, BP Patient Position: At rest) Pulse 67 Temp 98.6 °F (37 °C) Resp 12 Ht 5' 4\" (1.626 m) Wt 97.5 kg (215 lb) SpO2 97% BMI 36.90 kg/m² Tmax/24hrs: Temp (24hrs), Av °F (36.7 °C), Min:96.9 °F (36.1 °C), Max:98.8 °F (37.1 °C) Input/Output:  
 
Intake/Output Summary (Last 24 hours) at 2018 1433 Last data filed at 2018 8846 Gross per 24 hour Intake 0 ml Output 1900 ml Net -1900 ml General:  Awake, alert Cardiovascular:  S1S2+, RRR Pulmonary:  CTA b/l GI:  Soft, BS+, NT, ND Extremities:  No edema Labs:   
Recent Results (from the past 24 hour(s)) METABOLIC PANEL, BASIC Collection Time: 18  3:10 PM  
Result Value Ref Range Sodium 138 136 - 145 mmol/L Potassium 3.3 (L) 3.5 - 5.5 mmol/L Chloride 99 (L) 100 - 108 mmol/L  
 CO2 29 21 - 32 mmol/L Anion gap 10 3.0 - 18 mmol/L Glucose 83 74 - 99 mg/dL BUN 15 7.0 - 18 MG/DL Creatinine 0.76 0.6 - 1.3 MG/DL  
 BUN/Creatinine ratio 20 12 - 20 GFR est AA >60 >60 ml/min/1.73m2 GFR est non-AA >60 >60 ml/min/1.73m2 Calcium 9.2 8.5 - 10.1 MG/DL  
CBC WITH AUTOMATED DIFF Collection Time: 18  3:10 PM  
Result Value Ref Range WBC 8.7 4.6 - 13.2 K/uL  
 RBC 4.53 4.20 - 5.30 M/uL  
 HGB 13.7 12.0 - 16.0 g/dL HCT 43.3 35.0 - 45.0 % MCV 95.6 74.0 - 97.0 FL  
 MCH 30.2 24.0 - 34.0 PG  
 MCHC 31.6 31.0 - 37.0 g/dL  
 RDW 14.2 11.6 - 14.5 % PLATELET 152 965 - 039 K/uL MPV 9.1 (L) 9.2 - 11.8 FL  
 NEUTROPHILS 60 40 - 73 % LYMPHOCYTES 31 21 - 52 % MONOCYTES 7 3 - 10 % EOSINOPHILS 2 0 - 5 % BASOPHILS 0 0 - 2 %  
 ABS. NEUTROPHILS 5.3 1.8 - 8.0 K/UL  
 ABS. LYMPHOCYTES 2.7 0.9 - 3.6 K/UL  
 ABS. MONOCYTES 0.6 0.05 - 1.2 K/UL  
 ABS. EOSINOPHILS 0.2 0.0 - 0.4 K/UL  
 ABS. BASOPHILS 0.0 0.0 - 0.1 K/UL  
 DF AUTOMATED MAGNESIUM Collection Time: 11/11/18  3:10 PM  
Result Value Ref Range Magnesium 1.9 1.6 - 2.6 mg/dL CULTURE, BLOOD Collection Time: 11/11/18  3:10 PM  
Result Value Ref Range Special Requests: NO SPECIAL REQUESTS Culture result: NO GROWTH AFTER 14 HOURS    
URINALYSIS W/ RFLX MICROSCOPIC Collection Time: 11/11/18  3:10 PM  
Result Value Ref Range Color YELLOW Appearance CLEAR Specific gravity >1.030 (H) 1.005 - 1.030  
 pH (UA) 6.0 5.0 - 8.0 Protein TRACE (A) NEG mg/dL Glucose NEGATIVE  NEG mg/dL Ketone NEGATIVE  NEG mg/dL Bilirubin NEGATIVE  NEG Blood NEGATIVE  NEG Urobilinogen 1.0 0.2 - 1.0 EU/dL Nitrites NEGATIVE  NEG Leukocyte Esterase TRACE (A) NEG    
CULTURE, URINE Collection Time: 11/11/18  3:10 PM  
Result Value Ref Range Special Requests: NO SPECIAL REQUESTS Culture result: NO GROWTH AFTER 17 HOURS    
SED RATE (ESR) Collection Time: 11/11/18  3:10 PM  
Result Value Ref Range Sed rate, automated 26 0 - 30 mm/hr URINE MICROSCOPIC ONLY Collection Time: 11/11/18  3:10 PM  
Result Value Ref Range WBC 0 to 3 0 - 4 /hpf  
 RBC NONE 0 - 5 /hpf Epithelial cells 2+ 0 - 5 /lpf Bacteria FEW (A) NEG /hpf Mucus 2+ (A) NEG /lpf METABOLIC PANEL, BASIC  Collection Time: 11/12/18  4:28 AM  
 Result Value Ref Range Sodium 140 136 - 145 mmol/L Potassium 3.5 3.5 - 5.5 mmol/L Chloride 105 100 - 108 mmol/L  
 CO2 29 21 - 32 mmol/L Anion gap 6 3.0 - 18 mmol/L Glucose 96 74 - 99 mg/dL BUN 11 7.0 - 18 MG/DL Creatinine 0.62 0.6 - 1.3 MG/DL  
 BUN/Creatinine ratio 18 12 - 20 GFR est AA >60 >60 ml/min/1.73m2 GFR est non-AA >60 >60 ml/min/1.73m2 Calcium 8.6 8.5 - 10.1 MG/DL MAGNESIUM Collection Time: 11/12/18  4:28 AM  
Result Value Ref Range Magnesium 2.3 1.6 - 2.6 mg/dL CBC W/O DIFF Collection Time: 11/12/18  4:28 AM  
Result Value Ref Range WBC 7.1 4.6 - 13.2 K/uL  
 RBC 4.41 4.20 - 5.30 M/uL  
 HGB 13.6 12.0 - 16.0 g/dL HCT 41.8 35.0 - 45.0 % MCV 94.8 74.0 - 97.0 FL  
 MCH 30.8 24.0 - 34.0 PG  
 MCHC 32.5 31.0 - 37.0 g/dL  
 RDW 14.0 11.6 - 14.5 % PLATELET 555 999 - 142 K/uL MPV 9.4 9.2 - 11.8 FL PROTHROMBIN TIME + INR Collection Time: 11/12/18  9:10 AM  
Result Value Ref Range Prothrombin time 12.6 11.5 - 15.2 sec INR 1.0 0.8 - 1.2 PTT Collection Time: 11/12/18  9:10 AM  
Result Value Ref Range aPTT 32.0 23.0 - 36.4 SEC Additional Data Reviewed:   
 
Signed By: Enedina Quach MD   
 November 12, 2018 2:33 PM

## 2018-11-12 NOTE — CONSULTS
Consult    Patient: Daisha Stratton MRN: [id number]  SSN: [id number]    YOB: 1960  Age: 63 y.o. Sex: female      Subjective:      Daisha Stratton is a 63 y.o. female who is being seen for thoracic back pain. Has had subacute course of back pain for over a month. Initially LBP now focused in mid thoracic region. No fever chills. No other hx infections other than bad dentition. Has previous hx of IVDA and hepatitis C, but denies any recent drug use. Pain currently in mid thoracic region, paraspinal bilaterally no midline pain or tenderness. PE normal  Motor sensation reflex, normal ambulation. No bowel or bladder issues. Patient in no apparent distress. MRI reviewed demonstrates apparent discitis at T7/8 without abscess. WBC is normal at 7 and ESR 26. CRP P. Patient with pan cultures in ER. Past Medical History:   Diagnosis Date    Depression     Hepatitis C     Heroin use disorder, moderate, in early remission ([facility] [address]     NOW on [facility]     History reviewed. No pertinent surgical history.    Family History   Problem Relation Age of Onset    Hypertension Mother     High Cholesterol Mother     COPD Mother     Hypertension Father      Social History     Tobacco Use    Smoking status: Current Every Day Smoker     Packs/day: 0.25     Types: Cigarettes    Smokeless tobacco: Never Used   Substance Use Topics    Alcohol use: No      Current Facility-Administered Medications   Medication Dose Route Frequency Provider Last Rate Last Dose    0.9% sodium chloride infusion  125 mL/hr IntraVENous CONTINUOUS Meño Mae  mL/hr at 11/11/18 1525 125 mL/hr at 11/11/18 1525    methadone (DOLOPHINE) 10 mg/mL concentrated solution 85 mg  85 mg Oral DAILY Valerio Soulier, MD        sertraline (ZOLOFT) tablet 150 mg  150 mg Oral DAILY Valerio Soulier, MD        acetaminophen (TYLENOL) tablet 650 mg  650 mg Oral Q4H PRN Valerio Soulier, MD   650 mg at 11/11/18 9851    prochlorperazine (COMPAZINE) injection 5 mg  5 mg IntraVENous Q8H PRN Coco Hall MD        ketorolac (TORADOL) injection 30 mg  30 mg IntraVENous Q6H PRN Coco Hall MD   30 mg at 11/12/18 0150        No Known Allergies    Review of Systems:  A comprehensive review of systems was negative except for that written in the History of Present Illness. Objective:     Vitals:    11/11/18 2350 11/12/18 0144 11/12/18 0436 11/12/18 0438   BP: (!) 168/98 159/89 133/78 98/67   Pulse: 68 80 68 85   Resp: 18 17 18 18   Temp: 97.5 °F (36.4 °C) 96.9 °F (36.1 °C) 97.7 °F (36.5 °C) 98 °F (36.7 °C)   SpO2: 96% 94% 96% 100%   Weight:       Height:            Physical Exam:  General:  Alert, cooperative, no distress, appears stated age. Eyes:  Conjunctivae/corneas clear. PERRL, EOMs intact. Fundi benign   Ears:  Normal TMs and external ear canals both ears. Nose: Nares normal. Septum midline. Mucosa normal. No drainage or sinus tenderness. Mouth/Throat: Lips, mucosa, and tongue normal. Teeth and gums normal.   Neck: Supple, symmetrical, trachea midline, no adenopathy, thyroid: no enlargment/tenderness/nodules, no carotid bruit and no JVD. Back:   Symmetric, no curvature. ROM normal. No CVA tenderness. Lungs:   Clear to auscultation bilaterally. Heart:  Regular rate and rhythm, S1, S2 normal, no murmur, click, rub or gallop. Abdomen:   Soft, non-tender. Bowel sounds normal. No masses,  No organomegaly. Extremities: Extremities normal, atraumatic, no cyanosis or edema. Pulses: 2+ and symmetric all extremities. Skin: Skin color, texture, turgor normal. No rashes or lesions   Lymph nodes: Cervical, supraclavicular, and axillary nodes normal.   Neurologic: CNII-XII intact. Normal strength, sensation and reflexes throughout. Assessment:     Patient with progressive thoracic pain, with MRI findings cw discitis. No clear source for infection, nl wbc and ESR.   No abscess and normal exam other than pain.    Plan:     Would hold abx until lesion cultured  Have contacted IR for biopsy and cultures/path. Maybe a indolent or unusual organism if infection  ID consult to determine appropriate abx treatment  No surgical indication at this time.       Signed By: Luis Fernando Wasserman MD     November 12, 2018

## 2018-11-12 NOTE — ROUTINE PROCESS
2200: Pt resting in bed. Tylenol given for pain. No complain of nausea , vomiting or diarrhea. Pt ambulates well without assist to the bathroom. Voiding well clear yellow urine. No issue. Will continue to monitor the pt.

## 2018-11-12 NOTE — ROUTINE PROCESS
Mobility Intervention:  
 
  [x] Pt dangled at edge of bed 
  [] Pt assisted OOB to bedside commode 
  [] Pt assisted OOB to chair 
  [x] Pt ambulated to bathroom [x] Patient was ambulated in room/hallway Assistive Device Utilized:  
  
 [] Rolling walker 
 [] Crutches 
 [] Straight Cane 
 [] Knee immobilizer [x] IV pole After Mobilization:  
 
[x] Patient left in no apparent distress sitting up in chair 
[x] Patient left in no apparent distress in bed 
[x] Call bell left within reach [x] SCDs on & machine turned on 
[] Ice applied 
[] RN notified 
[] Caregiver present 
[] Bed alarm activated Reason patient not mobilized:  
 
 [] Patient refused 
 [] Nausea/vomiting 
 [] Low blood pressure 
 [] Drowsy/lethargic Pain Rating:  
 
[] 0 [] 1 Assistive Device:       
[] 2 [x] 3 [] 4 
[] 5 
[] 6 Assistive Device:       
[] 7 
[] 8 
[] 9 [] 10 Comments:

## 2018-11-12 NOTE — PROGRESS NOTES
Problem: Falls - Risk of 
Goal: *Absence of Falls Document Efrem Lim Fall Risk and appropriate interventions in the flowsheet. Outcome: Progressing Towards Goal 
Fall Risk Interventions: 
  
 
  
 
Medication Interventions: Teach patient to arise slowly

## 2018-11-13 LAB
BACTERIA SPEC CULT: NORMAL
CRP SERPL HS-MCNC: 17.87 MG/L (ref 0–3)
SERVICE CMNT-IMP: NORMAL

## 2018-11-13 PROCEDURE — 36415 COLL VENOUS BLD VENIPUNCTURE: CPT

## 2018-11-13 PROCEDURE — 87389 HIV-1 AG W/HIV-1&-2 AB AG IA: CPT

## 2018-11-13 PROCEDURE — 74011250636 HC RX REV CODE- 250/636: Performed by: INTERNAL MEDICINE

## 2018-11-13 PROCEDURE — 65270000029 HC RM PRIVATE

## 2018-11-13 PROCEDURE — 74011250636 HC RX REV CODE- 250/636: Performed by: HOSPITALIST

## 2018-11-13 PROCEDURE — 74011250637 HC RX REV CODE- 250/637: Performed by: EMERGENCY MEDICINE

## 2018-11-13 PROCEDURE — 77030028872 HC BN BIOP NDL ON CNTRL TY TELE -C

## 2018-11-13 PROCEDURE — 74011250637 HC RX REV CODE- 250/637: Performed by: INTERNAL MEDICINE

## 2018-11-13 RX ORDER — DOCUSATE SODIUM 100 MG/1
100 CAPSULE, LIQUID FILLED ORAL 2 TIMES DAILY
Status: DISCONTINUED | OUTPATIENT
Start: 2018-11-13 | End: 2018-11-15 | Stop reason: HOSPADM

## 2018-11-13 RX ORDER — HYDRALAZINE HYDROCHLORIDE 20 MG/ML
10 INJECTION INTRAMUSCULAR; INTRAVENOUS ONCE
Status: COMPLETED | OUTPATIENT
Start: 2018-11-13 | End: 2018-11-13

## 2018-11-13 RX ORDER — BISACODYL 5 MG
5 TABLET, DELAYED RELEASE (ENTERIC COATED) ORAL DAILY PRN
Status: DISCONTINUED | OUTPATIENT
Start: 2018-11-13 | End: 2018-11-15 | Stop reason: HOSPADM

## 2018-11-13 RX ADMIN — ACETAMINOPHEN 650 MG: 325 TABLET ORAL at 10:30

## 2018-11-13 RX ADMIN — METHADONE HYDROCHLORIDE 85 MG: 10 CONCENTRATE ORAL at 08:58

## 2018-11-13 RX ADMIN — ACETAMINOPHEN 650 MG: 325 TABLET ORAL at 21:53

## 2018-11-13 RX ADMIN — Medication 10 ML: at 14:00

## 2018-11-13 RX ADMIN — HYDRALAZINE HYDROCHLORIDE 10 MG: 20 INJECTION INTRAMUSCULAR; INTRAVENOUS at 21:46

## 2018-11-13 RX ADMIN — DOCUSATE SODIUM 100 MG: 100 CAPSULE, LIQUID FILLED ORAL at 17:38

## 2018-11-13 RX ADMIN — DOCUSATE SODIUM 100 MG: 100 CAPSULE, LIQUID FILLED ORAL at 09:05

## 2018-11-13 RX ADMIN — ACETAMINOPHEN 650 MG: 325 TABLET ORAL at 15:32

## 2018-11-13 RX ADMIN — KETOROLAC TROMETHAMINE 30 MG: 30 INJECTION, SOLUTION INTRAMUSCULAR at 18:34

## 2018-11-13 RX ADMIN — KETOROLAC TROMETHAMINE 30 MG: 30 INJECTION, SOLUTION INTRAMUSCULAR at 05:42

## 2018-11-13 RX ADMIN — Medication 10 ML: at 21:47

## 2018-11-13 RX ADMIN — SERTRALINE HYDROCHLORIDE 150 MG: 50 TABLET ORAL at 09:05

## 2018-11-13 RX ADMIN — KETOROLAC TROMETHAMINE 30 MG: 30 INJECTION, SOLUTION INTRAMUSCULAR at 12:35

## 2018-11-13 NOTE — PROGRESS NOTES
Pt refused iv fluids , notified Dr Carolyn Humphrey, orders received to d/c fluids for now, no accuted distress noted, call bell in reach

## 2018-11-13 NOTE — CONSULTS
[facility]. Pulmonary, Critical Care, and Sleep Medicine    Initial Patient Consult    Name: Rhonda Wild MRN: [id number]   : 1960 Hospital: [address] [place] [address]    Date: 2018        This patient has been seen and evaluated at the request of Dr. Abhinav Pate for nodules. IMPRESSION:   · B/L pulmonary nodules:  Etiology unclear at this point, however they are all subcentimeter - about 5mm at largest, some are ground glass -- likely due to inflammatory process - pt is active smoker. Pt is higher risk category but risk for malignancy is lower based on size of nodules  · Suspect underlying COPD  · Diskitis/osteomyelitis  · Hx of IVDU on methadone  · Hx of Hep C  · Tobacco dependence: Pt smoking 0.25-0.5PPD, prior 1PPD smoker (suspect higher amount of exposure)  · Biliary duct dilation     Patient Active Problem List   Diagnosis Code    Severe obesity (BMI 35.0-39. 9) E66.01    Discitis of thoracic region M46.44      RECOMMENDATIONS:   · Based on results of CT scan, nodules are too small to be biopsied and even a PET/CT scan will be of no value based on small size. Based on [facility]  guidelines updated in 2017, pt will need a repeat CT chest in 3-6 months. Pt can undergo any planned treatments for her spine at this time and we will reevaluate at that time if biopsy or additional procedure is necessary  · Discussed CT scan with pt and primary service  · ABx per ID  · Diskitis management per Spine Surgery  · I offered bronchodilators to patient including MDI or nebulizers, pt declined at this time.  Would advise use PRN  · PFTs as outpatient  · Management of biliary duct dilation per primary service and GI  Supplemental oxygen to maintain SpO2 >88%  Please assess for home oxygen need prior to discharge  Aggressive pulmonary toileting/bronchial hygiene  Frequent incentive spirometry  Aspiration precautions including elevating HOB >30deg  PT/OT, OOB, ambulate with assistance as tolerated  DVT ppx per primary service  · Followup in pulmonary clinic in 4-6 weeks post discharge  · We will sign off at this time  ·      Subjective:     Patient is a 63 y.o. female with hx of IVDU and tobacco dependence presented to [facility] with complaints of worsening lower back pain. Pt reported constant back pain in the lower thoracic region for about a month, progressively worsening - radiating down around her lower chest - no relief with tylenol, aggravated by deep inspiration or exertion, pain was 10/10 at max, currently controlled. Pt reported hx of IVDU in the past.  Of note, pt reports some dyspnea that has been present during this time, prior to that, no dyspnea. Pt reports dyspnea caused by pain, pt feels like she can't take deep inspirations - no alleviating factors, no other aggravating factors. Pt denies N/V/D, sputum, hemoptysis, night sweats, wt loss, voice hoarseness. Workup at [facility] revealed diskitis in thoracic spine and CT chest showed subcentimeter pulm nodules. Pt reports she continues to smoke, but it has went down to a few cigarettes per day, prior to this she was a heavy smoker. Past Medical History:   Diagnosis Date    Depression     Hepatitis C     Heroin use disorder, moderate, in early remission ([facility] [address]     NOW on [facility]      History reviewed. No pertinent surgical history. Prior to Admission medications    Medication Sig Start Date End Date Taking? Authorizing Provider   sertraline (ZOLOFT) 100 mg tablet Take one and a half tablets a day for a total of 150 mg daily. Patient taking differently: Take 150 mg by mouth daily. Take one and a half tablets a day for a total of 150 mg daily. 10/1/18  Yes Hollie YATES, NP   glecaprevir/pibrentasvir (MAVYRET PO) Take  by mouth. Yes Provider, Historical   methadone HCl (METHADONE PO) Take 85 mg by mouth daily.    Yes Provider, Historical     No Known Allergies   Social History     Tobacco Use    Smoking status: Current Every Day Smoker     Packs/day: 0.25     Types: Cigarettes    Smokeless tobacco: Never Used   Substance Use Topics    Alcohol use: No      Family History   Problem Relation Age of Onset    Hypertension Mother     High Cholesterol Mother     COPD Mother     Hypertension Father         Current Facility-Administered Medications   Medication Dose Route Frequency    docusate sodium (COLACE) capsule 100 mg  100 mg Oral BID    sodium chloride (NS) flush 5-10 mL  5-10 mL IntraVENous Q8H    methadone (DOLOPHINE) 10 mg/mL concentrated solution 85 mg  85 mg Oral DAILY    sertraline (ZOLOFT) tablet 150 mg  150 mg Oral DAILY       Review of Systems:  A comprehensive ROS was obtained as stated in HPI, all others are negative      Objective:   Vital Signs:    Visit Vitals  BP (!) 166/98 (BP 1 Location: Left arm, BP Patient Position: At rest)   Pulse 69   Temp 97.3 °F (36.3 °C)   Resp 16   Ht 5' 4\" (1.626 m)   Wt 97.5 kg (215 lb)   SpO2 94% Comment: room air   BMI 36.90 kg/m²       O2 Device: Room air   O2 Flow Rate (L/min): 2 l/min   Temp (24hrs), Av.7 °F (36.5 °C), Min:97.3 °F (36.3 °C), Max:98.2 °F (36.8 °C)       Intake/Output:   Last shift:      No intake/output data recorded. Last 3 shifts:  1901 -  0700  In: 540 [P.O.:540]  Out: 3150 [Urine:3150]    Intake/Output Summary (Last 24 hours) at 2018 1302  Last data filed at 2018 8069  Gross per 24 hour   Intake 540 ml   Output 1250 ml   Net -710 ml      Physical Exam:   General:  Alert, cooperative, no distress, appears stated age, sitting upright in bed on room air   Head:  Normocephalic, without obvious abnormality, atraumatic. Eyes:  Conjunctivae/corneas clear. ANicteric, PERRLA, EOMI   Nose: Nares normal. Mucosa normal. No drainage or sinus tenderness. Throat: Lips, mucosa dry. NO thrush; fair dentition, no oral lesions   Neck: Supple, symmetrical, trachea midline, no adenopathy, no carotid bruit and no JVD.  No crepitus Back:   Symmetric, no curvature, no spine tenderness or flank pain   Lungs:   Good air entry B/L, CTABL, no wheezes/rales/rhonchi   Chest wall:  No tenderness or deformity. NO CREPITUS   Heart:  Regular rate and rhythm, S1, S2 normal, no murmur, click, rub or gallop. Abdomen:   Soft, non-tender. Bowel sounds normal. No masses,  No organomegaly. No paradoxical motion   Extremities: normal, atraumatic, no cyanosis or edema. Pulses: 1-2+ and symmetric all extremities. Skin: Skin color, texture, turgor normal. No rashes or lesions   Lymph nodes: Cervical, supraclavicular, and axillary nodes normal.   Neurologic: Grossly nonfocal, some mild strength limitation in legs B/L but good ROM, arms have full strength, no coordination deficits grossly          Data review:   Labs:  Recent Results (from the past 24 hour(s))   CULTURE, TISSUE W GRAM STAIN    Collection Time: 11/12/18  3:00 PM   Result Value Ref Range    Special Requests: T7,T8 DISK BOX     GRAM STAIN NO WBC'S SEEN      GRAM STAIN NO ORGANISMS SEEN      Culture result: NO GROWTH AFTER 18 HOURS     CULTURE, FUNGUS    Collection Time: 11/12/18  3:00 PM   Result Value Ref Range    Special Requests: T7,T8 DISK BOX     FUNGUS SMEAR NO FUNGAL ELEMENTS SEEN      Culture result: PENDING      ABG:  No results found for: PH, PHI, PCO2, PCO2I, PO2, PO2I, HCO3, HCO3I, FIO2, FIO2I        PFT Results  (Last 48 hours)    None        Echo Results  (Last 48 hours)    None        Imaging:  I have personally reviewed the patients radiographs (personal review of CT chest from 11/12/18 shows some mild emphysematous changes B/L as well as some scattered subcentimeter nodules with no masses or effusions and have reviewed the reports:  CXR Results  (Last 48 hours)    None        CT Results  (Last 48 hours)               11/12/18 1344  CT ABD WO CONT Final result    Impression:  IMPRESSION:       1. CT-guided biopsy procedure changed to fluoroscopic procedure.  Biopsy planning   CT performed. 2. T7-T8 osteomyelitis/discitis redemonstrated. Refer to MR thoracic spine   report. 3. Mild hepatosplenomegaly with hepatic steatosis. 4. Evidence of chronic pancreatitis. Narrative:  CT abdomen without IV contrast        INDICATION: CT of T7-T8 discitis planning. TECHNIQUE: Serial axial images were obtained of the abdomen without intravenous   contrast. Images are performed prone for the intended CT procedure. Decision was   then made by Dr. Juan Jose Rob to perform the procedure fluoroscopically in IR. All CT scans are performed using dose optimization techniques as appropriate to   the performed exam including the following: Automated exposure control,   adjustment of mA and/or kV according to patient size, and use of iterative   reconstructive technique. COMPARISON: CTA chest 11/7/2018. MRI thoracic spine 11/11/2018. FINDINGS:       Hepatic steatosis. Mild hepatosplenomegaly. Chronic calcifications in the   pancreas without acute findings. The kidneys, adrenal glands, gallbladder and   partially imaged bowel are within normal limits. No lymphadenopathy or free   fluid. The level of T7-T8 osteomyelitis/discitis has mild surrounding soft tissue   thickening as seen on prior MRI. Refer to prior MRI report. High complexity decision making was performed during the evaluation of this patient at high risk for decompensation with multiple organ involvement     Above mentioned total time spent on reviewing the case/medical record/data/notes/EMR/patient examination/documentation/coordinating care with nurse/consultants, exclusive of procedures with complex decision making performed and > 50% time spent in face to face evaluation.            Juany Miranda MD/MPH     Pulmonary, Critical Care Medicine  [facility]

## 2018-11-13 NOTE — PROGRESS NOTES
Infectious Disease Progress Note Requested by: Dr. Neri Cabezas 
 
Reason: thoracic spine discitis, hep c. Current abx Prior abx Lines:  
 
 
Assessment : 
 
63 y.o. female with PMHx of hepatitis C presenting to the ED c/o constant moderate back pain for the past month. MRI thoracic spine 11/11: showed soft tissue and osseous inflammation at T7-8 with prevertebral and epidural inflammation suggestive of discitis osteomyelitis. Patient presents with highly complex clinical picture. History and MRI findings concerning for T7-T8 discitis/osteomyelitis. But lack of fever, chills, bacteremia, low esr argues against routine bacterial infection. D/D; discitis due to indolent pathogens related to poor dentition versus indolent fungal/mycobacterial infection related to IVDA versus undiagnosed malignancy. Multiple pulmonary nodules seen on ct chest 11/7, unexplained biliary dilatation concerning for undiagnosed malignancy. Rule out HIV Recommendations: 
 
1. Hold off abx 
2. f/u bacterial/fungal/afb cultures for disc tissue bx - d/w IR - orders entered 3. F/u histology of disc tissue 4. F/u pulmonary recommendations regarding lung nodules 5. f/u HIV serology Advance Care planning: full code: discussed  with patient/surrogate decision maker:Ankita Monaco: [phone] Above plan was discussed in details with patient, dr. Milo Murphy. Please call me if any further questions or concerns. Will continue to participate in the care of this patient. subjective: 
 
Still has back pain. Patient denies subjective fever/chills throughout this time. Still has pain in entire torso. Denies significant RUQ pain. Patient denies headaches, visual disturbances, sore throat, runny nose, earaches, cp, sob, chills, cough, diarrhea, burning micturition, pain or weakness in extremities. no/flank pain. Medication List  
  
ASK your doctor about these medications MAVYRET PO 
  
 METHADONE PO 
  
sertraline 100 mg tablet Commonly known as:  ZOLOFT Take one and a half tablets a day for a total of 150 mg daily. Current Facility-Administered Medications Medication Dose Route Frequency  docusate sodium (COLACE) capsule 100 mg  100 mg Oral BID  
 bisacodyl (DULCOLAX) tablet 5 mg  5 mg Oral DAILY PRN  
 sodium chloride (NS) flush 5-10 mL  5-10 mL IntraVENous Q8H  
 sodium chloride (NS) flush 5-10 mL  5-10 mL IntraVENous PRN  
 methadone (DOLOPHINE) 10 mg/mL concentrated solution 85 mg  85 mg Oral DAILY  sertraline (ZOLOFT) tablet 150 mg  150 mg Oral DAILY  acetaminophen (TYLENOL) tablet 650 mg  650 mg Oral Q4H PRN  prochlorperazine (COMPAZINE) injection 5 mg  5 mg IntraVENous Q8H PRN  
 ketorolac (TORADOL) injection 30 mg  30 mg IntraVENous Q6H PRN Allergies: Patient has no known allergies. Temp (24hrs), Av.7 °F (36.5 °C), Min:97.3 °F (36.3 °C), Max:98.2 °F (36.8 °C) Visit Vitals BP (!) 166/98 (BP 1 Location: Left arm, BP Patient Position: At rest) Pulse 69 Temp 97.3 °F (36.3 °C) Resp 16 Ht 5' 4\" (1.626 m) Wt 97.5 kg (215 lb) SpO2 94% Comment: room air BMI 36.90 kg/m² ROS: 12 point ROS obtained in details. Pertinent positives as mentioned in HPI,  
otherwise negative Physical Exam: 
 
Constitutional: She is oriented to person, place, and time. She appears well-developed and well-nourished. No distress. HENT:  
Head: Normocephalic and atraumatic. Right Ear: External ear normal.  
Left Ear: External ear normal.  
Nose: Nose normal.  
Mouth/Throat: Oropharynx is clear and moist.  
Eyes: Conjunctivae and EOM are normal. Pupils are equal, round, and reactive to light. No scleral icterus. Neck: Normal range of motion. Neck supple. No JVD present. No tracheal deviation present. No thyromegaly present.   
Cardiovascular: Normal rate, regular rhythm, normal heart sounds and intact distal pulses. Exam reveals no gallop and no friction rub. No murmur heard. Pulmonary/Chest: Effort normal and breath sounds normal. She exhibits no tenderness. Abdominal: Soft. Bowel sounds are normal. She exhibits no distension. There is no tenderness. There is no rebound and no guarding. Musculoskeletal: Normal range of motion. She exhibits no edema or tenderness. Localized tenderness to palp along lower thoracic spine over T7-T8 area. No swelling or skin changes Lymphadenopathy:  
  She has no cervical adenopathy. Neurological: She is alert and oriented to person, place, and time. No cranial nerve deficit. Coordination normal.  
Skin: Skin is warm and dry. Psychiatric: She has a normal mood and affect. Her behavior is normal. Judgment and thought content normal.  
Nursing note and vitals reviewed. 
  
  
 
 
 
Labs: Results:  
Chemistry Recent Labs 11/12/18 
0428 11/11/18 [address] GLU 96 83  138  
K 3.5 3.3*  
 99* CO2 29 29 BUN 11 15 CREA 0.62 0.76 CA 8.6 9.2 AGAP 6 10 BUCR 18 20 CBC w/Diff Recent Labs 11/12/18 
0428 11/11/18 [address] WBC 7.1 8.7  
RBC 4.41 4.53 HGB 13.6 13.7 HCT 41.8 43.3  333 GRANS  --  60  
LYMPH  --  31  
EOS  --  2 Microbiology Recent Labs 11/12/18 
1500 11/11/18 [address] CULT NO GROWTH AFTER 18 HOURS  PENDING NO GROWTH 2 DAYS  NO GROWTH 2 DAYS  
  
 
 
RADIOLOGY: 
 
All available imaging studies/reports in [facility] for this admission were reviewed Dr. Brady Hamilton, Infectious Disease Specialist 
[phone] November 13, 2018 
1:03 PM

## 2018-11-13 NOTE — PROGRESS NOTES
vss afeb Neuro intact Continued pain CRP P Cultures and biopsy P Agree with castañeda outlined by ID

## 2018-11-13 NOTE — PROGRESS NOTES
PT orders received, screen completed. Patient found sitting up in recliner eating lunch. Patient reports she has been ambulating in room without balance or strength impairment, denies need for PT services at this time. Will D/C current orders.  
 
Thank you, Ary Nielsen PT, DPT

## 2018-11-13 NOTE — ROUTINE PROCESS
Bedside and Verbal shift change report given to [address] (oncoming nurse) by Latha Luu RN (offgoing nurse). Report included the following information SBAR, Kardex and MAR.

## 2018-11-13 NOTE — PROGRESS NOTES
[facility] Hospitalist Group Progress Note Patient: Naomie Hernandez Age: 63 y.o. : 1960 MR#: [id number] SSN: [id number] Date/Time: 2018 Subjective:  
 
Patient sitting in bed in NAD, awake , alert Assessment/Plan: 1- T7-8 Discitis,OM 2- Back Pain 3- h/o IVDA, on methadone 4- h/o Hep c 
5-Lung Nodules on recent CT 
 
PLAN Follow path ID, spine, input noted Bowel regimen, IS 
D/w pulm , OP f/u Discharge planning Case discussed with:  [x]Patient  []Family  []Nursing  []Case Management DVT Prophylaxis:  []Lovenox  []Hep SQ  [x]SCDs  []Coumadin   []On Heparin gtt Objective:  
VS:  
Visit Vitals BP (!) 184/95 Comment: RN notified Pulse 69 Temp 98.3 °F (36.8 °C) Resp 18 Ht 5' 4\" (1.626 m) Wt 97.5 kg (215 lb) SpO2 95% BMI 36.90 kg/m² Tmax/24hrs: Temp (24hrs), Av.8 °F (36.6 °C), Min:97.3 °F (36.3 °C), Max:98.3 °F (36.8 °C) Input/Output:  
 
Intake/Output Summary (Last 24 hours) at 2018 1725 Last data filed at 2018 6417 Gross per 24 hour Intake 540 ml Output 700 ml Net -160 ml General:  Awake, alert Cardiovascular:  S1S2+, RRR Pulmonary:  CTA b/l GI:  Soft, BS+, NT, ND Extremities:  No edema Labs:   
No results found for this or any previous visit (from the past 24 hour(s)).  
Additional Data Reviewed:   
 
Signed By: Jose Pérez MD   
 2018 2:33 PM

## 2018-11-14 LAB
HIV 1+2 AB+HIV1 P24 AG SERPL QL IA: NONREACTIVE
HIV12 RESULT COMMENT, HHIVC: NORMAL

## 2018-11-14 PROCEDURE — 65270000029 HC RM PRIVATE

## 2018-11-14 PROCEDURE — 74011250637 HC RX REV CODE- 250/637: Performed by: INTERNAL MEDICINE

## 2018-11-14 PROCEDURE — 74011250637 HC RX REV CODE- 250/637: Performed by: EMERGENCY MEDICINE

## 2018-11-14 PROCEDURE — 97165 OT EVAL LOW COMPLEX 30 MIN: CPT

## 2018-11-14 PROCEDURE — 74011250636 HC RX REV CODE- 250/636: Performed by: INTERNAL MEDICINE

## 2018-11-14 RX ORDER — HYDROCHLOROTHIAZIDE 25 MG/1
12.5 TABLET ORAL DAILY
Status: DISCONTINUED | OUTPATIENT
Start: 2018-11-14 | End: 2018-11-15 | Stop reason: HOSPADM

## 2018-11-14 RX ORDER — HYDRALAZINE HYDROCHLORIDE 25 MG/1
25 TABLET, FILM COATED ORAL
Status: DISCONTINUED | OUTPATIENT
Start: 2018-11-14 | End: 2018-11-15 | Stop reason: HOSPADM

## 2018-11-14 RX ADMIN — METHADONE HYDROCHLORIDE 85 MG: 10 CONCENTRATE ORAL at 09:45

## 2018-11-14 RX ADMIN — ACETAMINOPHEN 650 MG: 325 TABLET ORAL at 05:53

## 2018-11-14 RX ADMIN — ACETAMINOPHEN 650 MG: 325 TABLET ORAL at 17:25

## 2018-11-14 RX ADMIN — KETOROLAC TROMETHAMINE 30 MG: 30 INJECTION, SOLUTION INTRAMUSCULAR at 00:43

## 2018-11-14 RX ADMIN — ACETAMINOPHEN 650 MG: 325 TABLET ORAL at 21:43

## 2018-11-14 RX ADMIN — DOCUSATE SODIUM 100 MG: 100 CAPSULE, LIQUID FILLED ORAL at 08:17

## 2018-11-14 RX ADMIN — DOCUSATE SODIUM 100 MG: 100 CAPSULE, LIQUID FILLED ORAL at 17:25

## 2018-11-14 RX ADMIN — KETOROLAC TROMETHAMINE 30 MG: 30 INJECTION, SOLUTION INTRAMUSCULAR at 14:13

## 2018-11-14 RX ADMIN — Medication 10 ML: at 05:56

## 2018-11-14 RX ADMIN — KETOROLAC TROMETHAMINE 30 MG: 30 INJECTION, SOLUTION INTRAMUSCULAR at 20:25

## 2018-11-14 RX ADMIN — SERTRALINE HYDROCHLORIDE 150 MG: 50 TABLET ORAL at 08:17

## 2018-11-14 RX ADMIN — HYDROCHLOROTHIAZIDE 12.5 MG: 25 TABLET ORAL at 12:16

## 2018-11-14 RX ADMIN — KETOROLAC TROMETHAMINE 30 MG: 30 INJECTION, SOLUTION INTRAMUSCULAR at 08:17

## 2018-11-14 RX ADMIN — Medication 10 ML: at 21:48

## 2018-11-14 RX ADMIN — ACETAMINOPHEN 650 MG: 325 TABLET ORAL at 12:17

## 2018-11-14 RX ADMIN — HYDRALAZINE HYDROCHLORIDE 25 MG: 25 TABLET, FILM COATED ORAL at 20:25

## 2018-11-14 NOTE — ROUTINE PROCESS
Bedside and Verbal shift change report given to [address] [place] [address] (oncoming nurse) by Rizwan Sosa RN (offgoing nurse). Report included the following information SBAR, Kardex, MAR and Recent Results. SITUATION:  
? Code Status: Full Code 
? Reason for Admission: Discitis of thoracic region ? Hospital day: 2 
? Problem List:  
   
Hospital Problems  Date Reviewed: 10/1/2018 Codes Class Noted POA Discitis of thoracic region ICD-10-CM: M46.44 
ICD-9-CM: 722.92  11/11/2018 Unknown BACKGROUND:  
 Past Medical History:  
Past Medical History:  
Diagnosis Date  Depression  Hepatitis C   
 Heroin use disorder, moderate, in early remission ([facility] [address] NOW on [facility] Patient taking anticoagulants no ASSESSMENT:  
? Changes in Assessment Throughout Shift: No 
 
? Patient has Central Line: no Reasons if yes: No 
? Patient has Larkin Cath: no Reasons if yes: No  
 
? Last Vitals: 
  
Vitals:  
 11/13/18 0726 11/13/18 1124 11/13/18 1554 11/13/18 2036 BP: (!) 182/92 (!) 166/98 (!) 184/95 146/81 Pulse: 65 69 69 68 Resp: 18 16 18 18 Temp: 97.9 °F (36.6 °C) 97.3 °F (36.3 °C) 98.3 °F (36.8 °C) 99.9 °F (37.7 °C) SpO2: 92% 94% 95% 92% Weight:      
Height:      
 
 
? IV and DRAINS (will only show if present) [REMOVED] Peripheral IV 11/11/18 Right Forearm-Site Assessment: Clean, dry, & intact Peripheral IV 11/12/18 Right Forearm-Site Assessment: Clean, dry, & intact ? WOUND (if present) Wound Type:  none Dressing present Dressing Present : No 
 Wound Concerns/Notes:  none ? PAIN Pain Assessment Pain Intensity 1: 6 (11/13/18 1933) Pain Location 1: Abdomen, Back Pain Intervention(s) 1: Medication (see MAR) Patient Stated Pain Goal: 0 
o Interventions for Pain:  none 
o Intervention effective: no 
o Time of last intervention: 0700  
o Reassessment Completed: no  
 
? Last 3 Weights: 
Last 3 Recorded Weights in this Encounter 11/11/18 1214 Weight: 97.5 kg (215 lb) Weight change: ? INTAKE/OUPUT Current Shift: No intake/output data recorded. Last three shifts: 11/12 0701 - 11/13 1900 In: 540 [P.O.:540] Out: 1350 [[id number]] ? LAB RESULTS Recent Labs 11/12/18 
0428 11/11/18 [address] WBC 7.1 8.7 HGB 13.6 13.7 HCT 41.8 43.3  333 Recent Labs 11/12/18 
0910 11/12/18 
0428 11/11/18 [address] NA  --  140 138 K  --  3.5 3.3*  
GLU  --  96 83 BUN  --  11 15 CREA  --  0.62 0.76 CA  --  8.6 9.2 MG  --  2.3 1.9 INR 1.0  --   --   
 
 
RECOMMENDATIONS AND DISCHARGE PLANNING 1. Pending tests/procedures/ Plan of Care or Other Needs: TBD 2. Discharge plan for patient and Needs/Barriers: TBD 3. Estimated Discharge Date: TBD Posted on Whiteboard in Patients Room: no   
 
4. The patient's care plan was reviewed with the oncoming nurse. \"HEALS\" SAFETY CHECK Fall Risk Total Score: 0 Safety Measures: Safety Measures: Bed/Chair alarm on, Bed/Chair-Wheels locked, Bed in low position, Call light within reach, Fall prevention (comment) A safety check occurred in the patient's room between off going nurse and oncoming nurse listed above. The safety check included the below items Area Items H High Alert Medications ? Verify all high alert medication drips (heparin, PCA, etc.) E Equipment ? Suction is set up for ALL patients (with yanker) ? Red plugs utilized for all equipment (IV pumps, etc.) ? WOWs wiped down at end of shift. ? Room stocked with oxygen, suction, and other unit-specific supplies A Alarms ? Bed alarm is set for fall risk patients ? Ensure chair alarm is in place and activated if patient is up in a chair L Lines ? Check IV for any infiltration ? Larkin bag is empty if patient has a Larkin ? Tubing and IV bags are labeled [facility] ? Room is clean, patient is clean, and equipment is clean. ? Hallways are clear from equipment besides carts. ? Fall bracelet on for fall risk patients ? Ensure room is clear and free of clutter ? Suction is set up for ALL patients (with casimiro) ? Hallways are clear from equipment besides carts. ? Isolation precautions followed, supplies available outside room, sign posted Chiqui Reddy RN

## 2018-11-14 NOTE — ROUTINE PROCESS
Spoke to Dr. Saskia Hammer about blood pressure. Reported he will go in chart, review and put orders in.

## 2018-11-14 NOTE — PROGRESS NOTES
[facility] Patient: Jordan Goodman Age: 63 y.o. : 1960 MR#: [id number] SSN: [id number] Date: 2018 Subjective:  
 
Pt reports back pain. Otherwise, no complaints. No chest pain, SOB, abd pain, F/C. Assessment/Plan: 1- T7-8 Discitis,OM 2- Back Pain 3- H/o IVDA, on methadone 4- H/o Hep c 
5- Lung Nodules on recent CT 
  
PLAN Pathology w/ bone fibrosis w/ no findings of malignancy. ID, spine, input noted. Bowel regimen, IS. Pain control. Attending d/w Pulm recs - OP f/u. Spine surg, ID, and IR ffg. Plan for image-guided repeat T7-8 biopsy tomorrow. NPO at midnight. Off antibiotics for IR procedure tomorrow. PICC insert tomorrow as well for IV abx. Discharge planning. Goals of care: full code Disposition:  [x]PT/OT ordered   [x] Case management referral 
 
Case discussed with:  [x]Patient  []Family  [x]Nursing  []Case Management DVT Prophylaxis:  []Lovenox  []Hep SQ  []SCDs  []Coumadin   []On Heparin gtt Objective:  
VS:  
Visit Vitals BP (!) 158/91 (BP 1 Location: Left arm, BP Patient Position: At rest) Pulse 64 Temp 99.2 °F (37.3 °C) Resp 18 Ht 5' 4\" (1.626 m) Wt 97.5 kg (215 lb) SpO2 95% BMI 36.90 kg/m² Tmax/24hrs: Temp (24hrs), Av.1 °F (36.7 °C), Min:97.1 °F (36.2 °C), Max:99.9 °F (37.7 °C) Intake/Output Summary (Last 24 hours) at 2018 1607 Last data filed at 2018 3693 Gross per 24 hour Intake 150 ml Output 1200 ml Net -1050 ml General:  Awake, alert, NAD Cardiovascular:  RRR Pulmonary: CTA  
GI:  NT, normal BS Extremities:  No edema or cyanosis Neuro: AAOx3 Labs:   
No results found for this or any previous visit (from the past 24 hour(s)). Signed By: Jaelyn Ritchie PA-C  2018 4:07 PM

## 2018-11-14 NOTE — ROUTINE PROCESS
Mobility Intervention:  
 
  [x] Pt dangled at edge of bed 
  [] Pt assisted OOB to bedside commode 
  [] Pt assisted OOB to chair 
  [x] Pt ambulated to bathroom [x] Patient was ambulated in room/hallway Assistive Device Utilized:  
  
 [] Rolling walker 
 [] Crutches 
 [] Straight Cane 
 [] Knee immobilizer [] IV pole After Mobilization:  
 
[x] Patient left in no apparent distress sitting up in chair 
[x] Patient left in no apparent distress in bed 
[x] Call bell left within reach [x] SCDs on & machine turned on 
[] Ice applied 
[] RN notified 
[] Caregiver present 
[] Bed alarm activated Reason patient not mobilized:  
 
 [] Patient refused 
 [] Nausea/vomiting 
 [] Low blood pressure 
 [] Drowsy/lethargic Pain Rating:  
 
[] 0 [] 1 Assistive Device:       
[x] 2 [] 3 [] 4 
[] 5 
[] 6 Assistive Device:       
[] 7 
[] 8 
[] 9 [] 10 Comments:

## 2018-11-14 NOTE — ROUTINE PROCESS
Made a page out to DR Anand about patient blood pressure. 1st read was 199/100 and the second read was 187/97. Waiting for Call back

## 2018-11-14 NOTE — ROUTINE PROCESS
Bedside and Verbal shift change report given to Arnulfo James (oncoming nurse) by Jaswinder Santana RN (offgoing nurse). Report included the following information SBAR, Kardex and MAR.

## 2018-11-14 NOTE — CONSULTS
Interventional Radiology     Consult received from Dr. Mely Alvarenga for evaluation of T7-8 suspected discitis/osteomyelitis.     Patient known to our service as she had a T7/8 biopsy on 11/12 by Dr. Jerod Lakhani. Cultures with no growth to date. Case and images reviewed by Dr. Brian Perkins. Will plan for image-guided repeat T7-8 biopsy with moderate sedation as IR schedule allows. Patient to remain NPO after midnight with any blood thinning medications held. Orders placed.  Consent to be obtained prior to procedure.         Erika Mcknight 91.

## 2018-11-14 NOTE — PROGRESS NOTES
Problem: Self Care Deficits Care Plan (Adult) Goal: *Acute Goals and Plan of Care (Insert Text) Outcome: Resolved/Met Date Met: 11/14/18 Occupational Therapy EVALUATION/discharge Patient: Tracy Joy (89 y.o. female) Date: 11/14/2018 Primary Diagnosis: Discitis of thoracic region Precautions:   (back protection) ASSESSMENT AND RECOMMENDATIONS: 
Pt approached for OT session sitting in [facility] anmd agreeable to OT evaluation. Pt described back pain as constant and worse with movement wrapping around bust line and to thoracic back region. Pt educated on back protection strategies to alleviate pain (no twisting/bending) and pt stated understanding. Pt then educated on the benefits of AE for dressing and bathing to increase I and decrease back pain. Pt states she has shower chair at home and OT recommended use of shower chair with family near by in case of back spasm and decreased balance. Pt agreed. Pt then provided with demonstration of reacher, sock aide, and LH sponge with pt declining to practice at this time but states she attempt at a later time today. Pt receptove to education and states that she has had deficits in UE and has been able to walk around room without any issues at this time transferring to commode as needed. Skilled occupational therapy is not indicated at this time. Discharge Recommendations: None Further Equipment Recommendations for Discharge: N/A Barriers to Learning/Limitations: None Compensate with: visual, verbal, tactile, kinesthetic cues/model COMPLEXITY Eval Complexity: History: LOW Complexity : Brief history review ; Examination: LOW Complexity : 1-3 performance deficits relating to physical, cognitive , or psychosocial skils that result in activity limitations and / or participation restrictions ;  Decision Making:MEDIUM Complexity : Patient may present with comorbidities that affect occupational performnce. Miniml to moderate modification of tasks or assistance (eg, physical or verbal ) with assesment(s) is necessary to enable patient to complete evaluation  Assessment: low Complexity G-CODES:  
 
Self Care  Current  CI= 1-19%  Goal  CI= 1-19%  D/C  CI= 1-19%. The severity rating is based on the Level of Assistance required for Functional Mobility and ADLs. SUBJECTIVE:  
Patient stated I really want to take a shower.  Pt educated on requesting nurse okay shower prior to getting incision back wet OBJECTIVE DATA SUMMARY:  
 
Past Medical History:  
Diagnosis Date  Depression  Hepatitis C   
 Heroin use disorder, moderate, in early remission ([facility] [address] [facility] History reviewed. No pertinent surgical history. Prior Level of Function/Home Situation: PTA pt I in ADL and ambulation Home Situation Home Environment: Private residence # Steps to Enter: 4 One/Two Story Residence: One story Living Alone: No 
Support Systems: Family member(s) Patient Expects to be Discharged to[de-identified] Private residence Current DME Used/Available at Home: Shower chair Tub or Shower Type: Tub/Shower combination []     Right hand dominant   []     Left hand dominantCognitive/Behavioral Status: 
Neurologic State: Alert Orientation Level: Oriented X4 Cognition: Appropriate decision making; Follows commands Safety/Judgement: Fall prevention;Home safety Skin: intact BUEs Edema: none noted BUEs Coordination: 
Fine Motor Skills-Upper: Left Intact; Right Intact Gross Motor Skills-Upper: Left Intact; Right Intact Balance: 
Sitting: Intact Standing: Intact ADL Assessment: 
Feeding: Independent Oral Facial Hygiene/Grooming: Independent Bathing: Modified independent Upper Body Dressing: Modified independent Lower Body Dressing: Modified independent Toileting: Modified independent ADL Intervention: 
 Cognitive Retraining Safety/Judgement: Fall prevention;Home safety Pain: 
Pt reports pain in abdomen and thoracic back area with movement. Activity Tolerance:  
good Please refer to the flowsheet for vital signs taken during this treatment. After treatment:  
[x]  Patient left in no apparent distress sitting up in chair 
[]  Patient left in no apparent distress in bed 
[x]  Call bell left within reach [x]  Nursing notified 
[]  Caregiver present 
[]  Bed alarm activated COMMUNICATION/EDUCATION:  
Communication/Collaboration: 
[x]      Home safety education was provided and the patient/caregiver indicated understanding. [x]      Patient/family have participated as able and agree with findings and recommendations. []      Patient is unable to participate in plan of care at this time. Alberta Bales, OT Time Calculation: 19 mins

## 2018-11-14 NOTE — PROGRESS NOTES
Outpatient infusion set up at [facility] on 11/16/18 at 11am. Notified pt. CM will continue to monitor for transitional needs. Eugenia Ganser, BSN, RN Pager # [phone] Care Manager

## 2018-11-14 NOTE — PROGRESS NOTES
Infectious Disease Progress Note Requested by: Dr. Masha Glover 
 
Reason: thoracic spine discitis, hep c. Current abx Prior abx Lines:  
 
 
Assessment : 
 
63 y.o. female with PMHx of hepatitis C presenting to the ED c/o constant moderate back pain for the past month. MRI thoracic spine 11/11: showed soft tissue and osseous inflammation at T7-8 with prevertebral and epidural inflammation suggestive of discitis osteomyelitis. Patient presents with highly complex clinical picture. History and MRI findings concerning for T7-T8 discitis/osteomyelitis. But lack of fever, chills, bacteremia, low esr argues against routine bacterial infection. D/D; discitis due to indolent pathogens related to poor dentition versus indolent fungal/mycobacterial infection related to IVDA versus undiagnosed malignancy. Multiple pulmonary nodules seen on ct chest 11/7, unexplained biliary dilatation concerning for undiagnosed malignancy. HIV serology 11/13- negative D/w patient about options of observation without antibiotics and f/u imaging studies, inflammatory markers versus empiric abx while awaiting cultures, f/u with MRI. Patient wishes to proceed with empiric abx. Discussed with dr. Ricardo Bro. He recommends to repeat disc biopsy to improve the yield of cultures Recommendations: 
 
1. Hold off abx 
2. f/u bacterial/fungal/afb cultures for disc tissue bx 3. Repeat disc biopsy - send tissue for  Histology, AFB/fungal cultures - start abx after repeat biopsy in am 
4. F/u pulmonary recommendations regarding lung nodules 5. Will start making arrangements for outpatient iv abx 6. Patient will f/u with dr. Alonso Clark and dr. Jeannette Beltrán (ID) as outpatient. F/u MRI after 3 weeks, f/u with dr. Jeannette Beltrán after 4 weeks. 7. Weekly cbc, bmo, LFTs, crp, cpk while on abx. Will use ceftriaxone/daptomycin as outpatient since patient will go to [facility]x. Advance Care planning: full code: discussed  with patient/surrogate decision maker:Ankita Monaco: [phone] Above plan was discussed in details with patient, dr. Annie Ku, dr. Emmalene Severance. Please call me if any further questions or concerns. Will continue to participate in the care of this patient. subjective: 
 
Still has back pain. Patient denies subjective fever/chills throughout this time. Still has pain in entire torso. Denies significant RUQ pain. Patient denies headaches, visual disturbances, sore throat, runny nose, earaches, cp, sob, chills, cough, diarrhea, burning micturition, pain or weakness in extremities. no/flank pain. Medication List  
  
ASK your doctor about these medications MAVYRET PO 
  
METHADONE PO 
  
sertraline 100 mg tablet Commonly known as:  ZOLOFT Take one and a half tablets a day for a total of 150 mg daily. Current Facility-Administered Medications Medication Dose Route Frequency  docusate sodium (COLACE) capsule 100 mg  100 mg Oral BID  
 bisacodyl (DULCOLAX) tablet 5 mg  5 mg Oral DAILY PRN  
 sodium chloride (NS) flush 5-10 mL  5-10 mL IntraVENous Q8H  
 sodium chloride (NS) flush 5-10 mL  5-10 mL IntraVENous PRN  
 methadone (DOLOPHINE) 10 mg/mL concentrated solution 85 mg  85 mg Oral DAILY  sertraline (ZOLOFT) tablet 150 mg  150 mg Oral DAILY  acetaminophen (TYLENOL) tablet 650 mg  650 mg Oral Q4H PRN  prochlorperazine (COMPAZINE) injection 5 mg  5 mg IntraVENous Q8H PRN  
 ketorolac (TORADOL) injection 30 mg  30 mg IntraVENous Q6H PRN Allergies: Patient has no known allergies. Temp (24hrs), Av °F (36.7 °C), Min:97.1 °F (36.2 °C), Max:99.9 °F (37.7 °C) Visit Vitals BP (!) 188/97 (BP 1 Location: Left arm, BP Patient Position: At rest) Comment: nurse knows Pulse 68 Temp 97.2 °F (36.2 °C) Resp 17 Ht 5' 4\" (1.626 m) Wt 97.5 kg (215 lb) SpO2 97% BMI 36.90 kg/m² ROS: 12 point ROS obtained in details. Pertinent positives as mentioned in HPI,  
otherwise negative Physical Exam: 
 
Constitutional: She is oriented to person, place, and time. She appears well-developed and well-nourished. No distress. HENT:  
Head: Normocephalic and atraumatic. Right Ear: External ear normal.  
Left Ear: External ear normal.  
Nose: Nose normal.  
Mouth/Throat: Oropharynx is clear and moist.  
Eyes: Conjunctivae and EOM are normal. Pupils are equal, round, and reactive to light. No scleral icterus. Neck: Normal range of motion. Neck supple. No JVD present. No tracheal deviation present. No thyromegaly present. Cardiovascular: Normal rate, regular rhythm, normal heart sounds and intact distal pulses. Exam reveals no gallop and no friction rub. No murmur heard. Pulmonary/Chest: Effort normal and breath sounds normal. She exhibits no tenderness. Abdominal: Soft. Bowel sounds are normal. She exhibits no distension. There is no tenderness. There is no rebound and no guarding. Musculoskeletal: Normal range of motion. She exhibits no edema or tenderness. Localized tenderness to palp along lower thoracic spine over T7-T8 area. No swelling or skin changes Lymphadenopathy:  
  She has no cervical adenopathy. Neurological: She is alert and oriented to person, place, and time. No cranial nerve deficit. Coordination normal.  
Skin: Skin is warm and dry. Psychiatric: She has a normal mood and affect. Her behavior is normal. Judgment and thought content normal.  
Nursing note and vitals reviewed. 
  
  
 
 
 
Labs: Results:  
Chemistry Recent Labs 11/12/18 
0428 11/11/18 [address] GLU 96 83  138  
K 3.5 3.3*  
 99* CO2 29 29 BUN 11 15 CREA 0.62 0.76 CA 8.6 9.2 AGAP 6 10 BUCR 18 20 CBC w/Diff Recent Labs 11/12/18 
0428 11/11/18 [address] WBC 7.1 8.7  
RBC 4.41 4.53 HGB 13.6 13.7 HCT 41.8 43.3  333 GRANS  --  60  
LYMPH  --  31  
 EOS  --  2 Microbiology Recent Labs 11/12/18 
1500 11/11/18 [address] CULT NO GROWTH 2 DAYS  PENDING NO GROWTH 3 DAYS  NO GROWTH 2 DAYS  
  
 
 
RADIOLOGY: 
 
All available imaging studies/reports in [facility] for this admission were reviewed Dr. Jack Tian, Infectious Disease Specialist 
[phone] November 14, 2018 
1:03 PM

## 2018-11-14 NOTE — PROGRESS NOTES
vss afeb bp still elevated 171/86 Neuro intact Continued pain 
crp elevated 17 Wbc nl Cultures/path negative to date Plan Likely indolent infection with elevated crp nl esr/wbc 
rx with abx per ID - have not started yet

## 2018-11-14 NOTE — ROUTINE PROCESS
2321: Pt stable and sleeping. Pain and scheduled medication given. No nausea or vomiting. Will continue to monitor the pt.

## 2018-11-14 NOTE — PROGRESS NOTES
Pt reports Thoracic back pain w/ thoracic radicular pain remains unchanged Pt NPO past midnight tonight for repeat IR bx. Afebrile Neuro intact Naz Lombardo, SHANI

## 2018-11-15 ENCOUNTER — APPOINTMENT (OUTPATIENT)
Dept: INTERVENTIONAL RADIOLOGY/VASCULAR | Age: 58
DRG: 478 | End: 2018-11-15
Attending: PHYSICIAN ASSISTANT
Payer: SELF-PAY

## 2018-11-15 ENCOUNTER — APPOINTMENT (OUTPATIENT)
Dept: INTERVENTIONAL RADIOLOGY/VASCULAR | Age: 58
DRG: 478 | End: 2018-11-15
Attending: INTERNAL MEDICINE
Payer: SELF-PAY

## 2018-11-15 VITALS
RESPIRATION RATE: 16 BRPM | HEIGHT: 64 IN | HEART RATE: 69 BPM | TEMPERATURE: 97 F | SYSTOLIC BLOOD PRESSURE: 157 MMHG | DIASTOLIC BLOOD PRESSURE: 86 MMHG | BODY MASS INDEX: 36.7 KG/M2 | WEIGHT: 215 LBS | OXYGEN SATURATION: 92 %

## 2018-11-15 LAB
ANION GAP SERPL CALC-SCNC: 6 MMOL/L (ref 3–18)
BASOPHILS # BLD: 0 K/UL (ref 0–0.1)
BASOPHILS NFR BLD: 0 % (ref 0–2)
BUN SERPL-MCNC: 9 MG/DL (ref 7–18)
BUN/CREAT SERPL: 12 (ref 12–20)
CALCIUM SERPL-MCNC: 8.9 MG/DL (ref 8.5–10.1)
CHLORIDE SERPL-SCNC: 103 MMOL/L (ref 100–108)
CO2 SERPL-SCNC: 30 MMOL/L (ref 21–32)
CREAT SERPL-MCNC: 0.78 MG/DL (ref 0.6–1.3)
DIFFERENTIAL METHOD BLD: ABNORMAL
EOSINOPHIL # BLD: 0.3 K/UL (ref 0–0.4)
EOSINOPHIL NFR BLD: 4 % (ref 0–5)
ERYTHROCYTE [DISTWIDTH] IN BLOOD BY AUTOMATED COUNT: 14.1 % (ref 11.6–14.5)
GLUCOSE SERPL-MCNC: 94 MG/DL (ref 74–99)
HCT VFR BLD AUTO: 38.7 % (ref 35–45)
HGB BLD-MCNC: 12.7 G/DL (ref 12–16)
LYMPHOCYTES # BLD: 2.4 K/UL (ref 0.9–3.6)
LYMPHOCYTES NFR BLD: 34 % (ref 21–52)
MCH RBC QN AUTO: 30.6 PG (ref 24–34)
MCHC RBC AUTO-ENTMCNC: 32.8 G/DL (ref 31–37)
MCV RBC AUTO: 93.3 FL (ref 74–97)
MONOCYTES # BLD: 0.5 K/UL (ref 0.05–1.2)
MONOCYTES NFR BLD: 7 % (ref 3–10)
NEUTS SEG # BLD: 3.9 K/UL (ref 1.8–8)
NEUTS SEG NFR BLD: 55 % (ref 40–73)
PLATELET # BLD AUTO: 258 K/UL (ref 135–420)
PMV BLD AUTO: 9.5 FL (ref 9.2–11.8)
POTASSIUM SERPL-SCNC: 3.4 MMOL/L (ref 3.5–5.5)
RBC # BLD AUTO: 4.15 M/UL (ref 4.2–5.3)
SODIUM SERPL-SCNC: 139 MMOL/L (ref 136–145)
WBC # BLD AUTO: 7 K/UL (ref 4.6–13.2)

## 2018-11-15 PROCEDURE — 02HV33Z INSERTION OF INFUSION DEVICE INTO SUPERIOR VENA CAVA, PERCUTANEOUS APPROACH: ICD-10-PCS | Performed by: PHYSICIAN ASSISTANT

## 2018-11-15 PROCEDURE — 74011250636 HC RX REV CODE- 250/636: Performed by: INTERNAL MEDICINE

## 2018-11-15 PROCEDURE — 99153 MOD SED SAME PHYS/QHP EA: CPT

## 2018-11-15 PROCEDURE — 87102 FUNGUS ISOLATION CULTURE: CPT

## 2018-11-15 PROCEDURE — 36415 COLL VENOUS BLD VENIPUNCTURE: CPT

## 2018-11-15 PROCEDURE — 87116 MYCOBACTERIA CULTURE: CPT

## 2018-11-15 PROCEDURE — 87106 FUNGI IDENTIFICATION YEAST: CPT

## 2018-11-15 PROCEDURE — 74011250636 HC RX REV CODE- 250/636

## 2018-11-15 PROCEDURE — 87070 CULTURE OTHR SPECIMN AEROBIC: CPT

## 2018-11-15 PROCEDURE — 0PB43ZX EXCISION OF THORACIC VERTEBRA, PERCUTANEOUS APPROACH, DIAGNOSTIC: ICD-10-PCS | Performed by: STUDENT IN AN ORGANIZED HEALTH CARE EDUCATION/TRAINING PROGRAM

## 2018-11-15 PROCEDURE — 85025 COMPLETE CBC W/AUTO DIFF WBC: CPT

## 2018-11-15 PROCEDURE — 77030028872 HC BN BIOP NDL ON CNTRL TY TELE -C

## 2018-11-15 PROCEDURE — 74011250637 HC RX REV CODE- 250/637: Performed by: INTERNAL MEDICINE

## 2018-11-15 PROCEDURE — 74011250636 HC RX REV CODE- 250/636: Performed by: STUDENT IN AN ORGANIZED HEALTH CARE EDUCATION/TRAINING PROGRAM

## 2018-11-15 PROCEDURE — 87186 SC STD MICRODIL/AGAR DIL: CPT

## 2018-11-15 PROCEDURE — 74011250637 HC RX REV CODE- 250/637: Performed by: EMERGENCY MEDICINE

## 2018-11-15 PROCEDURE — 76937 US GUIDE VASCULAR ACCESS: CPT

## 2018-11-15 PROCEDURE — 87077 CULTURE AEROBIC IDENTIFY: CPT

## 2018-11-15 PROCEDURE — 74011000250 HC RX REV CODE- 250: Performed by: INTERNAL MEDICINE

## 2018-11-15 PROCEDURE — 80048 BASIC METABOLIC PNL TOTAL CA: CPT

## 2018-11-15 RX ORDER — HYDROCHLOROTHIAZIDE 12.5 MG/1
12.5 TABLET ORAL DAILY
Qty: 30 TAB | Refills: 0 | Status: SHIPPED | OUTPATIENT
Start: 2018-11-16 | End: 2019-01-07 | Stop reason: SDUPTHER

## 2018-11-15 RX ORDER — MIDAZOLAM HYDROCHLORIDE 1 MG/ML
1 INJECTION, SOLUTION INTRAMUSCULAR; INTRAVENOUS
Status: DISCONTINUED | OUTPATIENT
Start: 2018-11-15 | End: 2018-11-15 | Stop reason: ALTCHOICE

## 2018-11-15 RX ORDER — VANCOMYCIN/0.9 % SOD CHLORIDE 1.5G/250ML
1500 PLASTIC BAG, INJECTION (ML) INTRAVENOUS ONCE
Status: COMPLETED | OUTPATIENT
Start: 2018-11-15 | End: 2018-11-15

## 2018-11-15 RX ORDER — LIDOCAINE HYDROCHLORIDE 10 MG/ML
30 INJECTION, SOLUTION EPIDURAL; INFILTRATION; INTRACAUDAL; PERINEURAL ONCE
Status: COMPLETED | OUTPATIENT
Start: 2018-11-15 | End: 2018-11-15

## 2018-11-15 RX ORDER — DOCUSATE SODIUM 100 MG/1
100 CAPSULE, LIQUID FILLED ORAL 2 TIMES DAILY
Qty: 60 CAP | Refills: 0 | Status: SHIPPED | OUTPATIENT
Start: 2018-11-15 | End: 2020-02-04 | Stop reason: ALTCHOICE

## 2018-11-15 RX ORDER — POTASSIUM CHLORIDE 20 MEQ/1
40 TABLET, EXTENDED RELEASE ORAL
Status: COMPLETED | OUTPATIENT
Start: 2018-11-15 | End: 2018-11-15

## 2018-11-15 RX ORDER — FENTANYL CITRATE 50 UG/ML
12.5-5 INJECTION, SOLUTION INTRAMUSCULAR; INTRAVENOUS
Status: DISCONTINUED | OUTPATIENT
Start: 2018-11-15 | End: 2018-11-15 | Stop reason: ALTCHOICE

## 2018-11-15 RX ORDER — DIPHENHYDRAMINE HYDROCHLORIDE 50 MG/ML
INJECTION, SOLUTION INTRAMUSCULAR; INTRAVENOUS
Status: COMPLETED
Start: 2018-11-15 | End: 2018-11-15

## 2018-11-15 RX ADMIN — FENTANYL CITRATE 50 MCG: 50 INJECTION INTRAMUSCULAR; INTRAVENOUS at 10:20

## 2018-11-15 RX ADMIN — ACETAMINOPHEN 650 MG: 325 TABLET ORAL at 02:33

## 2018-11-15 RX ADMIN — FENTANYL CITRATE 25 MCG: 50 INJECTION INTRAMUSCULAR; INTRAVENOUS at 10:45

## 2018-11-15 RX ADMIN — MIDAZOLAM HYDROCHLORIDE 0.5 MG: 2 INJECTION, SOLUTION INTRAMUSCULAR; INTRAVENOUS at 10:50

## 2018-11-15 RX ADMIN — Medication 10 ML: at 06:00

## 2018-11-15 RX ADMIN — ACETAMINOPHEN 650 MG: 325 TABLET ORAL at 06:45

## 2018-11-15 RX ADMIN — FENTANYL CITRATE 50 MCG: 50 INJECTION INTRAMUSCULAR; INTRAVENOUS at 10:10

## 2018-11-15 RX ADMIN — FENTANYL CITRATE 25 MCG: 50 INJECTION INTRAMUSCULAR; INTRAVENOUS at 10:50

## 2018-11-15 RX ADMIN — FENTANYL CITRATE 25 MCG: 50 INJECTION INTRAMUSCULAR; INTRAVENOUS at 10:40

## 2018-11-15 RX ADMIN — FENTANYL CITRATE 25 MCG: 50 INJECTION INTRAMUSCULAR; INTRAVENOUS at 10:30

## 2018-11-15 RX ADMIN — SERTRALINE HYDROCHLORIDE 150 MG: 50 TABLET ORAL at 08:33

## 2018-11-15 RX ADMIN — HYDROCHLOROTHIAZIDE 12.5 MG: 25 TABLET ORAL at 08:33

## 2018-11-15 RX ADMIN — MIDAZOLAM HYDROCHLORIDE 1 MG: 2 INJECTION, SOLUTION INTRAMUSCULAR; INTRAVENOUS at 10:25

## 2018-11-15 RX ADMIN — LIDOCAINE HYDROCHLORIDE 30 ML: 10 INJECTION, SOLUTION EPIDURAL; INFILTRATION; INTRACAUDAL; PERINEURAL at 10:10

## 2018-11-15 RX ADMIN — MIDAZOLAM HYDROCHLORIDE 1 MG: 2 INJECTION, SOLUTION INTRAMUSCULAR; INTRAVENOUS at 10:20

## 2018-11-15 RX ADMIN — KETOROLAC TROMETHAMINE 30 MG: 30 INJECTION, SOLUTION INTRAMUSCULAR at 02:33

## 2018-11-15 RX ADMIN — DOCUSATE SODIUM 100 MG: 100 CAPSULE, LIQUID FILLED ORAL at 08:33

## 2018-11-15 RX ADMIN — MIDAZOLAM HYDROCHLORIDE 1 MG: 2 INJECTION, SOLUTION INTRAMUSCULAR; INTRAVENOUS at 10:10

## 2018-11-15 RX ADMIN — METHADONE HYDROCHLORIDE 85 MG: 10 CONCENTRATE ORAL at 08:41

## 2018-11-15 RX ADMIN — MIDAZOLAM HYDROCHLORIDE 0.5 MG: 2 INJECTION, SOLUTION INTRAMUSCULAR; INTRAVENOUS at 10:45

## 2018-11-15 RX ADMIN — CEFTRIAXONE SODIUM 2 G: 2 INJECTION, POWDER, FOR SOLUTION INTRAMUSCULAR; INTRAVENOUS at 14:15

## 2018-11-15 RX ADMIN — ACETAMINOPHEN 650 MG: 325 TABLET ORAL at 13:31

## 2018-11-15 RX ADMIN — MIDAZOLAM HYDROCHLORIDE 1 MG: 2 INJECTION, SOLUTION INTRAMUSCULAR; INTRAVENOUS at 10:15

## 2018-11-15 RX ADMIN — MIDAZOLAM HYDROCHLORIDE 0.5 MG: 2 INJECTION, SOLUTION INTRAMUSCULAR; INTRAVENOUS at 10:40

## 2018-11-15 RX ADMIN — POTASSIUM CHLORIDE 40 MEQ: 20 TABLET, EXTENDED RELEASE ORAL at 14:14

## 2018-11-15 RX ADMIN — FENTANYL CITRATE 50 MCG: 50 INJECTION INTRAMUSCULAR; INTRAVENOUS at 10:25

## 2018-11-15 RX ADMIN — VANCOMYCIN HYDROCHLORIDE 1500 MG: 10 INJECTION, POWDER, LYOPHILIZED, FOR SOLUTION INTRAVENOUS at 14:51

## 2018-11-15 RX ADMIN — KETOROLAC TROMETHAMINE 30 MG: 30 INJECTION, SOLUTION INTRAMUSCULAR at 14:30

## 2018-11-15 RX ADMIN — DIPHENHYDRAMINE HYDROCHLORIDE 25 MG: 50 INJECTION, SOLUTION INTRAMUSCULAR; INTRAVENOUS at 10:10

## 2018-11-15 RX ADMIN — Medication 10 ML: at 14:15

## 2018-11-15 RX ADMIN — MIDAZOLAM HYDROCHLORIDE 0.5 MG: 2 INJECTION, SOLUTION INTRAMUSCULAR; INTRAVENOUS at 10:30

## 2018-11-15 RX ADMIN — FENTANYL CITRATE 50 MCG: 50 INJECTION INTRAMUSCULAR; INTRAVENOUS at 10:15

## 2018-11-15 RX ADMIN — KETOROLAC TROMETHAMINE 30 MG: 30 INJECTION, SOLUTION INTRAMUSCULAR at 08:30

## 2018-11-15 NOTE — PROGRESS NOTES
TRANSFER - IN REPORT: 
 
Verbal report received from 5S(name) on [employer]  being received from [facility] RN(unit) for ordered procedure Report consisted of patients Situation, Background, Assessment and  
Recommendations(SBAR). Information from the following report(s) SBAR, Kardex, Intake/Output and MAR was reviewed with the receiving nurse. Opportunity for questions and clarification was provided. Assessment completed upon patients arrival to unit and care assumed.

## 2018-11-15 NOTE — BRIEF OP NOTE
Interventional Radiology Brief Procedure Note Interventional Radiologist: Debbie Huntley MD 
Pre-operative Diagnosis: T7/8 discitis/osteomyelitis Post-operative Diagnosis: Same as pre-operative diagnosis Procedure(s) Performed:  T7/8 disc biopsy. Anesthesia:  Local and Moderate Sedation Findings: Successful. 11 G core of the T7/8 disc obtained. Split in two, sent for gram stain, bacterial culture, AFB, fungal culture Complications: None Estimated Blood Loss:  minimal 
 
Tubes and Drains: None Specimens: One 6 G core of the T7/8 disc Condition: Good Disposition: Will stay in  for PICC line placement Debbie Huntley MD 
11/15/2018

## 2018-11-15 NOTE — PROGRESS NOTES
vss afeb Continued pain No neuro Cultures negative Plan Repeat biopsy today PICC and abx per ID Peewee Juan see in office after discharge

## 2018-11-15 NOTE — DISCHARGE SUMMARY
[facility]  [facility]       Patient: Eloina Parrish Age: 63 y.o. : 1960 MR#: [id number] SSN: [id number]  PCP on record: Judy Singer NP  Admit date: 2018  Discharge date: 11/15/2018    Disposition:    []Home   []Home with Home Health   []SNF/NH   []Rehab   [x]Home with family   []Alternate Facility:____________________    Admission Diagnoses:  Discitis of thoracic region    Discharge Diagnoses:                             1- T7-8 Discitis,OM  2- Back Pain  3- H/o IVDA, on methadone  4- H/o Hep c  5- Lung Nodules on recent CT  6- Methadone dependence     Discharge Medications:     Current Discharge Medication List      START taking these medications    Details   docusate sodium (COLACE) 100 mg capsule Take 1 Cap by mouth two (2) times a day. Qty: 60 Cap, Refills: 0      cefTRIAXone 2 gram IV syringe 2 gm iv daily through 18 at [facility]- arranged by case management  Qty: 30 Dose, Refills: 0      OTHER Daptomycin through 18 at [facility]- arranged by   Qty: 1 Each, Refills: 0      hydroCHLOROthiazide (HYDRODIURIL) 12.5 mg tablet Take 1 Tab by mouth daily. Qty: 30 Tab, Refills: 0         CONTINUE these medications which have NOT CHANGED    Details   sertraline (ZOLOFT) 100 mg tablet Take one and a half tablets a day for a total of 150 mg daily. Qty: 60 Tab, Refills: 1    Associated Diagnoses: Episode of recurrent major depressive disorder, unspecified depression episode severity ([address]      glecaprevir/pibrentasvir (MAVYRET PO) Take  by mouth.      methadone HCl (METHADONE PO) Take 85 mg by mouth daily.              Consults: Infectious Disease,  Interventional Radiology,  Spine Surgery  -   Procedures: Fluoroscopically guided T7-8 disc biopsy on 11/15/18, DUAL LUMEN PICC on 11/15/18, transpedicular T8-7-8 disc biopsy using  transparticular approach of T7 on 2018  -     Significant Diagnostic Studies:   18 MRI Thoracic spine  IMPRESSION  Impression:     Significant soft tissue and osseous inflammation centered at the T7-8 disc level  and pronounced circumferential prevertebral and epidural inflammation centered  at this level most suggestive of discitis osteomyelitis. No peripherally  enhancing collection to suggest abscess. Mild compromise of the central canal at  the level of T7-8. CT Abd/pelvis:  IMPRESSION:     1. CT-guided biopsy procedure changed to fluoroscopic procedure. Biopsy planning  CT performed. 2. T7-T8 osteomyelitis/discitis redemonstrated. Refer to MR thoracic spine  report. 3. Mild hepatosplenomegaly with hepatic steatosis. 4. Evidence of chronic pancreatitis. Hospital Course by Problem   HPI per admitting MD  Marce Tobar is a 63 y.o. female with medical history listed below presented to [facility] ER with chronic back pain. She has had this mid-back pain for a month but seem to worsened over the last few days. She initially though it was abdominal pain, but the pain was described as \"wraping around the trunk\" from back to front. She has came to the ER several times. She had associated n/v. No fever, no chill.      In the ER, MRI thoracic spine showed soft tissue and osseous inflammation at T7-8 with prevertebral and epidural inflammation suggestive of discitis osteomyelitis. Dr. Carrol Cortez was consult and recommended to hold off antibiotic until he can do biopsy of the area. She remains hemodynamically table without fever, no leukocytosis.      ROS: no fever/chills, no headache, no dizziness, no facial pain, no sinus congestion,   No swallowing pain, No chest pain, no palpitation, no shortness of breath, no abd pain, ++back pain\"    1- T7-8 Discitis,OM: Admitted due to chronic back pain with findings suggestive of discitis osteomyelitis on MRI T-spine. Spine surgery was consulted and recommended IR consult for biopsy and cultures/path. ID was also consulted.  When procedure done, pt was started on empiric abx. Initial pathology showed bone fibrosis with crush artifact with no malignancy. Then abx was held, and repeat biopsy was done per spine surgery recs and then restarted. Repeat path pending. ID recommended a PICC line and IV abx (ceftriaxone and daptomycin) infusion at [facility] infusion center on 18. Pt will follow up with spine surgery and Dr. Osbaldo Harris (ID) as outpatient. F/u MRI after 3 weeks with Weekly cbc, bmp, LFTs, crp, cpk while on abx. 2- Back Pain: managed with pain control. 3- H/o IVDA, on methadone  4- H/o Hep c  5- Lung Nodules on recent CTA chest: not done at this admission. Pt is to follow up with PCP on this. 6- Methadone dependence s/t #3    Today's examination of the patient revealed:     Subjective:   Pt reports improvement in her back pain. No chest pain, SOB, abd pain, N/V.    Objective:   VS:   Visit Vitals  /84 (BP 1 Location: Right arm, BP Patient Position: Sitting)   Pulse 66   Temp 97 °F (36.1 °C)   Resp 16   Ht 5' 4\" (1.626 m)   Wt 97.5 kg (215 lb)   SpO2 91%   BMI 36.90 kg/m²      Tmax/24hrs: Temp (24hrs), Av °F (36.7 °C), Min:97 °F (36.1 °C), Max:99.2 °F (37.3 °C)     Input/Output:     Intake/Output Summary (Last 24 hours) at 11/15/2018 1512  Last data filed at 11/15/2018 0647  Gross per 24 hour   Intake 970 ml   Output 1750 ml   Net -780 ml       General:  Alert, NAD  Cardiovascular:  RRR  Pulmonary:  LSC throughout; respiratory effort WNL  GI:  +BS in all four quadrants, soft, non-tender  Extremities:  No edema; 2+ dorsalis pedis pulses bilaterally  Additional:      Labs:    Recent Results (from the past 24 hour(s))   CBC WITH AUTOMATED DIFF    Collection Time: 11/15/18  3:16 AM   Result Value Ref Range    WBC 7.0 4.6 - 13.2 K/uL    RBC 4.15 (L) 4.20 - 5.30 M/uL    HGB 12.7 12.0 - 16.0 g/dL    HCT 38.7 35.0 - 45.0 %    MCV 93.3 74.0 - 97.0 FL    MCH 30.6 24.0 - 34.0 PG    MCHC 32.8 31.0 - 37.0 g/dL    RDW 14.1 11.6 - 14.5 %    PLATELET 688 473 - 878 K/uL MPV 9.5 9.2 - 11.8 FL    NEUTROPHILS 55 40 - 73 %    LYMPHOCYTES 34 21 - 52 %    MONOCYTES 7 3 - 10 %    EOSINOPHILS 4 0 - 5 %    BASOPHILS 0 0 - 2 %    ABS. NEUTROPHILS 3.9 1.8 - 8.0 K/UL    ABS. LYMPHOCYTES 2.4 0.9 - 3.6 K/UL    ABS. MONOCYTES 0.5 0.05 - 1.2 K/UL    ABS. EOSINOPHILS 0.3 0.0 - 0.4 K/UL    ABS. BASOPHILS 0.0 0.0 - 0.1 K/UL    DF AUTOMATED     METABOLIC PANEL, BASIC    Collection Time: 11/15/18  3:16 AM   Result Value Ref Range    Sodium 139 136 - 145 mmol/L    Potassium 3.4 (L) 3.5 - 5.5 mmol/L    Chloride 103 100 - 108 mmol/L    CO2 30 21 - 32 mmol/L    Anion gap 6 3.0 - 18 mmol/L    Glucose 94 74 - 99 mg/dL    BUN 9 7.0 - 18 MG/DL    Creatinine 0.78 0.6 - 1.3 MG/DL    BUN/Creatinine ratio 12 12 - 20      GFR est AA >60 >60 ml/min/1.73m2    GFR est non-AA >60 >60 ml/min/1.73m2    Calcium 8.9 8.5 - 10.1 MG/DL   CULTURE, FUNGUS    Collection Time: 11/15/18 10:30 AM   Result Value Ref Range    Special Requests: T7 T8 DISK BX     FUNGUS SMEAR NO FUNGAL ELEMENTS SEEN      Culture result: PENDING    CULTURE, TISSUE W GRAM STAIN    Collection Time: 11/15/18 10:30 AM   Result Value Ref Range    Special Requests: D7 D8 DISK BX     GRAM STAIN NO WBC'S SEEN      GRAM STAIN NO ORGANISMS SEEN      Culture result: PENDING      Additional Data Reviewed:     Condition: Stable  Follow-up Appointments:   1. Your PCP: Tia Jimenes NP, within 7-10days  2. Your Infectious Disease physician: Franko Pa MD within 2 weeks  3.  Your Spine Surgeon: Dr. Carrol Cortez within 2 weeks      >30 minutes spent coordinating this discharge (review instructions/follow-up, prescriptions, preparing report for sign off)    Signed:  Nieves Wisdom PA-C  11/15/2018  3:12 PM

## 2018-11-15 NOTE — PROGRESS NOTES
TRANSFER - OUT REPORT: 
 
Verbal report given to Mireya SERRATO(name) on Johnny Escobar  being transferred to (unit) for routine post - op Report consisted of patients Situation, Background, Assessment and  
Recommendations(SBAR). Information from the following report(s) SBAR, Kardex, Procedure Summary, MAR and Cardiac Rhythm NSR was reviewed with the receiving nurse. Lines: PICC Double Lumen 71/66/45 Right;Basilic (Active) Peripheral IV 11/12/18 Right Forearm (Active) Site Assessment Clean, dry, & intact 11/13/2018  8:52 AM  
Phlebitis Assessment 0 11/13/2018  8:52 AM  
Infiltration Assessment 0 11/13/2018  8:52 AM  
Dressing Status Clean, dry, & intact 11/13/2018  8:52 AM  
Dressing Type Tape;Transparent 11/12/2018  8:25 AM  
Hub Color/Line Status Pink; Infusing 11/12/2018  8:25 AM  
Alcohol Cap Used Yes 11/12/2018  8:25 AM  
  
 
Opportunity for questions and clarification was provided. Patient transported with: 
 [employer]

## 2018-11-15 NOTE — CDMP QUERY
Please clarify if this patient is being treated/managed for: 
 
=> Methadone dependence  
=>Other Explanation of clinical findings =>Unable to Determine (no explanation of clinical findings) The medical record reflects the following: 
 
Risk:58 y.o. female with ED c/o constant moderate back pain Clinical Indicators:hx chronic back pain and Heroin use disorder, moderate, in early remission,NOW on [facility],presently dx Discitis osteomyelitis of T7-8 per MRI 
  
Treatment: Methadone daily Please clarify and document your clinical opinion in the progress notes and discharge summary including the definitive and/or presumptive diagnosis, (suspected or probable), related to the above clinical findings. Please include clinical findings supporting your diagnosis. If you DECLINE this query or would like to communicate with [facility], please utilize the \"[facility] message box\" at the TOP of the Progress Note on the right. Thank you,. ..Cecille . Marie Robles ext [phone]

## 2018-11-15 NOTE — PROGRESS NOTES
Discharge order noted for today. Met with pt and agreeable to the transition plan today. Transport has been arranged with friend. GURMEET Lopez, RN Pager # [phone] Care Manager

## 2018-11-15 NOTE — H&P
[facility] Halle is a 63 y.o. female with suspected discitis/osteomyelitis who is here for T7-8 disc biopsy. Past Medical History:  
Diagnosis Date  Depression  Hepatitis C   
 Heroin use disorder, moderate, in early remission ([address] NOW on [facility] clinic No Known Allergies Home Medications:  
 
Prior to Admission medications Medication Sig Start Date End Date Taking? Authorizing Provider  
sertraline (ZOLOFT) 100 mg tablet Take one and a half tablets a day for a total of 150 mg daily. Patient taking differently: Take 150 mg by mouth daily. Take one and a half tablets a day for a total of 150 mg daily. 10/1/18  Yes Marko YATES, SHANI  
glecaprevir/pibrentasvir (MAVYRET PO) Take  by mouth. Yes Provider, Historical  
methadone HCl (METHADONE PO) Take 85 mg by mouth daily. Yes Provider, Historical  
 
 
 
Data Basic Metabolic Profile Lab Results Component Value Date  11/15/2018 CO2 30 11/15/2018 BUN 9 11/15/2018 BUN 11 11/12/2018 BUN 15 11/11/2018 CBC w/Diff Lab Results Component Value Date/Time WBC 7.0 11/15/2018 03:16 AM  
 WBC 7.1 11/12/2018 04:28 AM  
 WBC 8.7 11/11/2018 03:10 PM  
 HCT 38.7 11/15/2018 03:16 AM  
 HCT 41.8 11/12/2018 04:28 AM  
 HCT 43.3 11/11/2018 03:10 PM  
 Lab Results Component Value Date/Time MONOS 7 11/15/2018 03:16 AM  
 EOS 4 11/15/2018 03:16 AM  
 BASOS 0 11/15/2018 03:16 AM  
 RDW 14.1 11/15/2018 03:16 AM  
  
 
Coagulation Lab Results Component Value Date INR 1.0 11/12/2018 APTT 32.0 11/12/2018 Hepatic Function No results found for: ALBUMIN No components found for: SGOTAST, SGPTALT, CONJUGATEDF, BILIT Physical Assessment:  
 
Visit Vitals /86 (BP 1 Location: Right arm, BP Patient Position: At rest) Pulse (!) 59 Temp 97.2 °F (36.2 °C) Resp 18 Ht 5' 4\" (1.626 m) Wt 97.5 kg (215 lb) SpO2 95% BMI 36.90 kg/m² Heart: RR 
Lungs: clear, no wheezes, rales, ronchi Psych: appropriate mood and affect Impression/Plan:  
Patient is an appropriate candidate for procedure and for moderate sedation. Valeria Gustafson MD 
11/15/2018

## 2018-11-15 NOTE — ROUTINE PROCESS
2359; Pt is stable. NPO as of midnight. Pain medication given. Denies nausea or vomiting. Will continue to monitor the pt.

## 2018-11-15 NOTE — H&P
[id number],  Pritesh Gonzalez is a 63 y.o. female with discitis/osteomyelitis who is here for PICC placement. Past Medical History:  
Diagnosis Date  Depression  Hepatitis C   
 Heroin use disorder, moderate, in early remission ([address] [facility] No Known Allergies Home Medications:  
 
Prior to Admission medications Medication Sig Start Date End Date Taking? Authorizing Provider  
sertraline (ZOLOFT) 100 mg tablet Take one and a half tablets a day for a total of 150 mg daily. Patient taking differently: Take 150 mg by mouth daily. Take one and a half tablets a day for a total of 150 mg daily. 10/1/18  Yes Danielle YATES, SHANI  
glecaprevir/pibrentasvir (MAVYRET PO) Take  by mouth. Yes Provider, Historical  
methadone HCl (METHADONE PO) Take 85 mg by mouth daily. Yes Provider, Historical  
 
 
 
Data Basic Metabolic Profile Lab Results Component Value Date  11/15/2018 CO2 30 11/15/2018 BUN 9 11/15/2018 BUN 11 11/12/2018 BUN 15 11/11/2018 CBC w/Diff Lab Results Component Value Date/Time WBC 7.0 11/15/2018 03:16 AM  
 WBC 7.1 11/12/2018 04:28 AM  
 WBC 8.7 11/11/2018 03:10 PM  
 HCT 38.7 11/15/2018 03:16 AM  
 HCT 41.8 11/12/2018 04:28 AM  
 HCT 43.3 11/11/2018 03:10 PM  
 Lab Results Component Value Date/Time MONOS 7 11/15/2018 03:16 AM  
 EOS 4 11/15/2018 03:16 AM  
 BASOS 0 11/15/2018 03:16 AM  
 RDW 14.1 11/15/2018 03:16 AM  
  
 
Coagulation Lab Results Component Value Date INR 1.0 11/12/2018 APTT 32.0 11/12/2018 Hepatic Function No results found for: ALBUMIN No components found for: SGOTAST, SGPTALT, CONJUGATEDF, BILIT Physical Assessment:  
 
Visit Vitals /86 (BP 1 Location: Right arm, BP Patient Position: At rest) Pulse (!) 59 Temp 97.2 °F (36.2 °C) Resp 18 Ht 5' 4\" (1.626 m) Wt 97.5 kg (215 lb) SpO2 95% BMI 36.90 kg/m² Heart: RR 
Lungs: clear, no wheezes, rales, ronchi Psych: appropriate mood and affect Impression/Plan:  
Patient is an appropriate candidate for procedure and for moderate sedation. Elijah Ayoub MD 
11/15/2018

## 2018-11-15 NOTE — ROUTINE PROCESS
Mobility Intervention:  
 
  [x] Pt dangled at edge of bed 
  [] Pt assisted OOB to bedside commode 
  [] Pt assisted OOB to chair 
  [x] Pt ambulated to bathroom [x] Patient was ambulated in room/hallway Assistive Device Utilized:  
  
 [] Rolling walker 
 [] Crutches 
 [] Straight Cane 
 [] Knee immobilizer [] IV pole After Mobilization:  
 
[x] Patient left in no apparent distress sitting up in chair 
[x] Patient left in no apparent distress in bed 
[x] Call bell left within reach [x] SCDs on & machine turned on 
[] Ice applied 
[] RN notified 
[] Caregiver present 
[] Bed alarm activated Reason patient not mobilized:  
 
 [] Patient refused 
 [] Nausea/vomiting 
 [] Low blood pressure 
 [] Drowsy/lethargic Pain Rating:  
 
[] 0 [] 1 Assistive Device:       
[] 2 [x] 3 [] 4 
[] 5 
[] 6 Assistive Device:       
[] 7 
[] 8 
[] 9 [] 10 Comments:

## 2018-11-15 NOTE — PROGRESS NOTES
Infectious Disease Progress Note Requested by: Dr. Theodore Mcarthur 
 
Reason: thoracic spine discitis, hep c. Current abx Prior abx Lines:  
 
 
Assessment : 
 
63 y.o. female with PMHx of hepatitis C presenting to the ED c/o constant moderate back pain for the past month. MRI thoracic spine 11/11: showed soft tissue and osseous inflammation at T7-8 with prevertebral and epidural inflammation suggestive of discitis osteomyelitis. Patient presents with highly complex clinical picture. History and MRI findings concerning for T7-T8 discitis/osteomyelitis. But lack of fever, chills, bacteremia, low esr argues against routine bacterial infection. D/D; discitis due to indolent pathogens related to poor dentition versus indolent fungal/mycobacterial infection related to IVDA versus undiagnosed malignancy. Multiple pulmonary nodules seen on ct chest 11/7, unexplained biliary dilatation concerning for undiagnosed malignancy. HIV serology 11/13- negative D/w patient about options of observation without antibiotics and f/u imaging studies, inflammatory markers versus empiric abx while awaiting cultures, f/u with MRI. Patient wishes to proceed with empiric abx. Discussed with dr. Corina Peres. He recommends to repeat disc biopsy to improve the yield of cultures S/p repeat biopsy 11/15 Recommendations: 1. Start ceftriaxone, vancomycin. Switch to ceftriaxone and daptomycin at [facility] till 12/29/18 as outpatient 2. f/u bacterial/fungal/afb cultures for disc tissue bx  
3. F/u Repeat disc biopsy - send tissue for  Histology, AFB/fungal cultures - start abx after repeat biopsy in am 
5. F/u pulmonary recommendations regarding lung nodules 6. Patient will f/u with dr. Boy Onofre and dr. Navi Echevarria (ID) as outpatient. F/u MRI after 3 weeks, f/u with dr. Navi Echevarria after 2 weeks. 7. Weekly cbc, bmp, LFTs, crp, cpk while on abx. Advance Care planning: full code: discussed  with patient/surrogate decision maker:Ankita Monaco: [phone] Above plan was discussed in details with patient, dr. Mabel Mustafa, dr. Ricardo Bro. Please call me if any further questions or concerns. Will continue to participate in the care of this patient. subjective: 
 
Still has back pain. Patient denies subjective fever/chills throughout this time. Still has pain in entire torso. Denies significant RUQ pain. Patient denies headaches, visual disturbances, sore throat, runny nose, earaches, cp, sob, chills, cough, diarrhea, burning micturition, pain or weakness in extremities. no/flank pain. Medication List  
  
ASK your doctor about these medications MAVYRET PO 
  
METHADONE PO 
  
sertraline 100 mg tablet Commonly known as:  ZOLOFT Take one and a half tablets a day for a total of 150 mg daily. Current Facility-Administered Medications Medication Dose Route Frequency  hydrALAZINE (APRESOLINE) tablet 25 mg  25 mg Oral Q6H PRN  
 hydroCHLOROthiazide (HYDRODIURIL) tablet 12.5 mg  12.5 mg Oral DAILY  docusate sodium (COLACE) capsule 100 mg  100 mg Oral BID  
 bisacodyl (DULCOLAX) tablet 5 mg  5 mg Oral DAILY PRN  
 sodium chloride (NS) flush 5-10 mL  5-10 mL IntraVENous Q8H  
 sodium chloride (NS) flush 5-10 mL  5-10 mL IntraVENous PRN  
 methadone (DOLOPHINE) 10 mg/mL concentrated solution 85 mg  85 mg Oral DAILY  sertraline (ZOLOFT) tablet 150 mg  150 mg Oral DAILY  acetaminophen (TYLENOL) tablet 650 mg  650 mg Oral Q4H PRN  prochlorperazine (COMPAZINE) injection 5 mg  5 mg IntraVENous Q8H PRN  
 ketorolac (TORADOL) injection 30 mg  30 mg IntraVENous Q6H PRN Facility-Administered Medications Ordered in Other Encounters Medication Dose Route Frequency  [START ON 11/16/2018] cefTRIAXone (ROCEPHIN) 2 g in sterile water (preservative free) 20 mL IV syringe  2 g IntraVENous Q24H  [START ON 2018] DAPTOmycin (CUBICIN) 600 mg in sodium chloride 0.9% 12 mL IV Syringe  600 mg IntraVENous ONCE Allergies: Patient has no known allergies. Temp (24hrs), Av °F (36.7 °C), Min:97.2 °F (36.2 °C), Max:99.2 °F (37.3 °C) Visit Vitals BP (!) 173/108 (BP 1 Location: Right arm, BP Patient Position: Prone) Pulse 72 Temp 97.2 °F (36.2 °C) Resp 14 Ht 5' 4\" (1.626 m) Wt 97.5 kg (215 lb) SpO2 97% BMI 36.90 kg/m² ROS: 12 point ROS obtained in details. Pertinent positives as mentioned in HPI,  
otherwise negative Physical Exam: 
 
Constitutional: She is oriented to person, place, and time. She appears well-developed and well-nourished. No distress. HENT:  
Head: Normocephalic and atraumatic. Right Ear: External ear normal.  
Left Ear: External ear normal.  
Nose: Nose normal.  
Mouth/Throat: Oropharynx is clear and moist.  
Eyes: Conjunctivae and EOM are normal. Pupils are equal, round, and reactive to light. No scleral icterus. Neck: Normal range of motion. Neck supple. No JVD present. No tracheal deviation present. No thyromegaly present. Cardiovascular: Normal rate, regular rhythm, normal heart sounds and intact distal pulses. Exam reveals no gallop and no friction rub. No murmur heard. Pulmonary/Chest: Effort normal and breath sounds normal. She exhibits no tenderness. Abdominal: Soft. Bowel sounds are normal. She exhibits no distension. There is no tenderness. There is no rebound and no guarding. Musculoskeletal: Normal range of motion. She exhibits no edema or tenderness. Localized tenderness to palp along lower thoracic spine over T7-T8 area. No swelling or skin changes Lymphadenopathy:  
  She has no cervical adenopathy. Neurological: She is alert and oriented to person, place, and time. No cranial nerve deficit. Coordination normal.  
Skin: Skin is warm and dry. Psychiatric: She has a normal mood and affect.  Her behavior is normal. Judgment and thought content normal.  
Nursing note and vitals reviewed. 
  
  
 
 
 
Labs: Results:  
Chemistry Recent Labs 11/15/18 
0316 GLU 94   
K 3.4*  
 CO2 30 BUN 9  
CREA 0.78  
CA 8.9 AGAP 6  
BUCR 12  
  
CBC w/Diff Recent Labs 11/15/18 
0316 WBC 7.0  
RBC 4.15* HGB 12.7 HCT 38.7  GRANS 55 LYMPH 34 EOS 4 Microbiology Recent Labs 11/12/18 
1500 CULT NO GROWTH 3 DAYS  PENDING  
  
 
 
RADIOLOGY: 
 
All available imaging studies/reports in [facility] for this admission were reviewed Dr. Ad Denny, Infectious Disease Specialist 
[phone] November 15, 2018 
1:03 PM

## 2018-11-15 NOTE — ROUTINE PROCESS
Bedside and Verbal shift change report given to Fernie Sharon Juan (oncoming nurse) by Lindy Martin RN (offgoing nurse). Report included the following information SBAR, Kardex, MAR and Recent Results. SITUATION:  
? Code Status: Full Code 
? Reason for Admission: Discitis of thoracic region ? Hospital day: 4 
? Problem List:  
   
Hospital Problems  Date Reviewed: 10/1/2018 Codes Class Noted POA Discitis of thoracic region ICD-10-CM: M46.44 
ICD-9-CM: 722.92  11/11/2018 Unknown BACKGROUND:  
 Past Medical History:  
Past Medical History:  
Diagnosis Date  Depression  Hepatitis C   
 Heroin use disorder, moderate, in early remission ([facility] [address] NOW on [facility] Patient taking anticoagulants no ASSESSMENT:  
? Changes in Assessment Throughout Shift: No 
 
? Patient has Central Line: no Reasons if yes: No 
? Patient has Larkin Cath: no Reasons if yes: No  
 
? Last Vitals: 
  
Vitals:  
 11/14/18 1247 11/14/18 1542 11/14/18 1949 11/14/18 2347 BP: 163/84 (!) 158/91 (!) 166/93 Pulse: 68 64 62 (P) 65 Resp: 19 18 18 (P) 18 Temp: 97.3 °F (36.3 °C) 99.2 °F (37.3 °C) 98.3 °F (36.8 °C) (P) 98.1 °F (36.7 °C) SpO2: 95%  94% (P) 92% Weight:      
Height:      
 
 
? IV and DRAINS (will only show if present) [REMOVED] Peripheral IV 11/11/18 Right Forearm-Site Assessment: Clean, dry, & intact Peripheral IV 11/12/18 Right Forearm-Site Assessment: Clean, dry, & intact ? WOUND (if present) Wound Type:  none Dressing present Dressing Present : No 
 Wound Concerns/Notes:  none ? PAIN Pain Assessment Pain Intensity 1: 6 (11/14/18 2036) Pain Location 1: Back Pain Intervention(s) 1: Medication (see MAR) Patient Stated Pain Goal: 0 
o Interventions for Pain:  none 
o Intervention effective: no 
o Time of last intervention: 0700  
o Reassessment Completed: no  
 
? Last 3 Weights: 
Last 3 Recorded Weights in this Encounter 11/11/18 1214 Weight: 97.5 kg (215 lb) Weight change: ? INTAKE/OUPUT Current Shift: 11/14 1901 - 11/15 0700 In: -  
Out: 149 [NIFPH:387] Last three shifts: 11/13 0701 - 11/14 1900 In: 18 [P.O.:570] Out: 1700 [Urine:1700] ? LAB RESULTS Recent Labs 11/12/18 
4171 WBC 7.1 HGB 13.6 HCT 41.8  Recent Labs 11/12/18 
0910 11/12/18 
4651 NA  --  140 K  --  3.5 GLU  --  96 BUN  --  11  
CREA  --  0.62 CA  --  8.6 MG  --  2.3 INR 1.0  --   
 
 
RECOMMENDATIONS AND DISCHARGE PLANNING 1. Pending tests/procedures/ Plan of Care or Other Needs: TBD 2. Discharge plan for patient and Needs/Barriers: TBD 3. Estimated Discharge Date: TBD Posted on Whiteboard in Patients Room: no   
 
4. The patient's care plan was reviewed with the oncoming nurse. \"HEALS\" SAFETY CHECK Fall Risk Total Score: 0 Safety Measures: Safety Measures: Bed/Chair alarm on, Bed/Chair-Wheels locked, Bed in low position, Call light within reach, Side rails X 3 A safety check occurred in the patient's room between off going nurse and oncoming nurse listed above. The safety check included the below items Area Items H High Alert Medications ? Verify all high alert medication drips (heparin, PCA, etc.) E Equipment ? Suction is set up for ALL patients (with yanker) ? Red plugs utilized for all equipment (IV pumps, etc.) ? WOWs wiped down at end of shift. ? Room stocked with oxygen, suction, and other unit-specific supplies A Alarms ? Bed alarm is set for fall risk patients ? Ensure chair alarm is in place and activated if patient is up in a chair L Lines ? Check IV for any infiltration ? Larkin bag is empty if patient has a Larkin ? Tubing and IV bags are labeled Dorothey Lazaro Safety ? Room is clean, patient is clean, and equipment is clean. ? Hallways are clear from equipment besides carts. ? Fall bracelet on for fall risk patients ? Ensure room is clear and free of clutter ? Suction is set up for ALL patients (with casimiro) ? Hallways are clear from equipment besides carts. ? Isolation precautions followed, supplies available outside room, sign posted Pako Olivera RN

## 2018-11-16 ENCOUNTER — HOSPITAL ENCOUNTER (OUTPATIENT)
Dept: INFUSION THERAPY | Age: 58
Discharge: HOME | End: 2018-11-16
Payer: MEDICAID

## 2018-11-16 ENCOUNTER — OFFICE VISIT (OUTPATIENT)
Dept: FAMILY MEDICINE CLINIC | Age: 58
End: 2018-11-16

## 2018-11-16 VITALS
HEIGHT: 64 IN | OXYGEN SATURATION: 100 % | HEART RATE: 72 BPM | TEMPERATURE: 98.2 F | BODY MASS INDEX: 36.37 KG/M2 | DIASTOLIC BLOOD PRESSURE: 81 MMHG | WEIGHT: 213 LBS | SYSTOLIC BLOOD PRESSURE: 161 MMHG | RESPIRATION RATE: 20 BRPM

## 2018-11-16 VITALS
OXYGEN SATURATION: 95 % | RESPIRATION RATE: 18 BRPM | HEART RATE: 72 BPM | TEMPERATURE: 98.3 F | SYSTOLIC BLOOD PRESSURE: 157 MMHG | DIASTOLIC BLOOD PRESSURE: 91 MMHG

## 2018-11-16 DIAGNOSIS — G89.29 CHRONIC BILATERAL THORACIC BACK PAIN: ICD-10-CM

## 2018-11-16 DIAGNOSIS — M54.6 CHRONIC BILATERAL THORACIC BACK PAIN: ICD-10-CM

## 2018-11-16 DIAGNOSIS — M46.44 DISCITIS OF THORACIC REGION: Primary | ICD-10-CM

## 2018-11-16 PROCEDURE — 74011250636 HC RX REV CODE- 250/636

## 2018-11-16 PROCEDURE — 74011250636 HC RX REV CODE- 250/636: Performed by: INTERNAL MEDICINE

## 2018-11-16 PROCEDURE — 36593 DECLOT VASCULAR DEVICE: CPT

## 2018-11-16 PROCEDURE — 96375 TX/PRO/DX INJ NEW DRUG ADDON: CPT

## 2018-11-16 PROCEDURE — 74011000250 HC RX REV CODE- 250: Performed by: INTERNAL MEDICINE

## 2018-11-16 PROCEDURE — 96374 THER/PROPH/DIAG INJ IV PUSH: CPT

## 2018-11-16 RX ORDER — KETOROLAC TROMETHAMINE 30 MG/ML
60 INJECTION, SOLUTION INTRAMUSCULAR; INTRAVENOUS ONCE
Qty: 1 VIAL | Refills: 0
Start: 2018-11-16 | End: 2018-11-16

## 2018-11-16 RX ORDER — KETOROLAC TROMETHAMINE 30 MG/ML
60 INJECTION, SOLUTION INTRAMUSCULAR; INTRAVENOUS ONCE
Qty: 1 VIAL | Refills: 0
Start: 2018-11-16 | End: 2018-11-16 | Stop reason: CLARIF

## 2018-11-16 RX ORDER — SODIUM CHLORIDE 0.9 % (FLUSH) 0.9 %
10-40 SYRINGE (ML) INJECTION AS NEEDED
Status: DISCONTINUED | OUTPATIENT
Start: 2018-11-16 | End: 2018-11-20 | Stop reason: HOSPADM

## 2018-11-16 RX ORDER — HEPARIN 100 UNIT/ML
500 SYRINGE INTRAVENOUS AS NEEDED
Status: DISCONTINUED | OUTPATIENT
Start: 2018-11-16 | End: 2018-11-20 | Stop reason: HOSPADM

## 2018-11-16 RX ORDER — HEPARIN 100 UNIT/ML
SYRINGE INTRAVENOUS
Status: COMPLETED
Start: 2018-11-16 | End: 2018-11-16

## 2018-11-16 RX ADMIN — CEFTRIAXONE SODIUM 2 G: 2 INJECTION, POWDER, FOR SOLUTION INTRAMUSCULAR; INTRAVENOUS at 11:37

## 2018-11-16 RX ADMIN — Medication 10 ML: at 11:30

## 2018-11-16 RX ADMIN — WATER 2 MG: 1 INJECTION INTRAMUSCULAR; INTRAVENOUS; SUBCUTANEOUS at 11:45

## 2018-11-16 RX ADMIN — HEPARIN 500 UNITS: 100 SYRINGE at 11:42

## 2018-11-16 RX ADMIN — Medication 20 ML: at 12:30

## 2018-11-16 RX ADMIN — Medication 10 ML: at 11:23

## 2018-11-16 RX ADMIN — HEPARIN 500 UNITS: 100 SYRINGE at 12:31

## 2018-11-16 RX ADMIN — HEPARIN 500 UNITS: 100 SYRINGE at 11:28

## 2018-11-16 RX ADMIN — Medication 10 ML: at 11:36

## 2018-11-16 RX ADMIN — Medication 10 ML: at 11:41

## 2018-11-16 RX ADMIN — Medication 10 ML: at 11:27

## 2018-11-16 RX ADMIN — DAPTOMYCIN 600 MG: 500 INJECTION, POWDER, LYOPHILIZED, FOR SOLUTION INTRAVENOUS at 11:24

## 2018-11-16 NOTE — PROGRESS NOTES
OFFICE NOTE    Ophelia Bearden is a 63 y.o. female presenting today for office visit. 11/16/2018  9:04 AM    Chief Complaint   Patient presents with    Back Pain     chronic pain       HPI: Patient is coming into the office today for chronic back pain. She was previously in the hospital for SOB and chronic back pain. Patient states she is taking tylenol with no relief. She is on Methadone for history of drug abuse but is not requesting any narcotics today but needs some relief for her back pain. Patient having a hard time sitting, walking and standing. She was diagnosed with Discitis of thoracic region while in the hospital.  Patient describes pain as squeezing, tight and stabbing at times and pain goes from her flank areas to the mid and lower back areas. Pain is a 9/10 today. Patient states the pain is unbearable today and need relief. Denies SOB, urinary difficulties, pelvic pain,  or Chest pain. Review of Systems   Constitutional: Negative for chills, fatigue and fever. HENT: Negative. Eyes: Negative. Respiratory: Negative for cough, chest tightness, shortness of breath and wheezing. Cardiovascular: Negative for chest pain and leg swelling. Gastrointestinal: Negative. Genitourinary: Negative. Negative for difficulty urinating. Musculoskeletal: Positive for back pain and gait problem. Negative for neck pain and neck stiffness. Neurological: Negative for dizziness, light-headedness, numbness and headaches.          PHQ Screening   PHQ over the last two weeks 10/1/2018   Little interest or pleasure in doing things Nearly every day   Feeling down, depressed, irritable, or hopeless Several days   Total Score PHQ 2 4   Trouble falling or staying asleep, or sleeping too much Several days   Feeling tired or having little energy Nearly every day   Poor appetite, weight loss, or overeating Several days   Feeling bad about yourself - or that you are a failure or have let yourself or your family down Nearly every day   Trouble concentrating on things such as school, work, reading, or watching TV Nearly every day   Moving or speaking so slowly that other people could have noticed; or the opposite being so fidgety that others notice Not at all   Thoughts of being better off dead, or hurting yourself in some way Not at all   PHQ 9 Score 15   How difficult have these problems made it for you to do your work, take care of your home and get along with others Somewhat difficult         History  Past Medical History:   Diagnosis Date    Depression     Hepatitis C     Heroin use disorder, moderate, in early remission ([facility] [address]     NOW on [address]       No past surgical history on file. Social History     Socioeconomic History    Marital status: SINGLE     Spouse name: Not on file    Number of children: Not on file    Years of education: Not on file    Highest education level: Not on file   Social Needs    Financial resource strain: Not on file    Food insecurity - worry: Not on file    Food insecurity - inability: Not on file    Transportation needs - medical: Not on file   [employer] needs - non-medical: Not on file   Occupational History    Not on file   Tobacco Use    Smoking status: Current Every Day Smoker     Packs/day: 0.25     Types: Cigarettes    Smokeless tobacco: Never Used   Substance and Sexual Activity    Alcohol use: No    Drug use: No     Comment: on methadone matinance program    Sexual activity: No   Other Topics Concern    Not on file   Social History Narrative    Not on file       Family History   Problem Relation Age of Onset    Hypertension Mother     High Cholesterol Mother     COPD Mother     Hypertension Father        No Known Allergies    Current Outpatient Medications   Medication Sig Dispense Refill    ketorolac tromethamine (TORADOL) 60 mg/2 mL soln 2 mL by IntraMUSCular route once for 1 dose.  1 Vial 0    docusate sodium (COLACE) 100 mg capsule Take 1 Cap by mouth two (2) times a day. 60 Cap 0    hydroCHLOROthiazide (HYDRODIURIL) 12.5 mg tablet Take 1 Tab by mouth daily. 30 Tab 0    sertraline (ZOLOFT) 100 mg tablet Take one and a half tablets a day for a total of 150 mg daily. (Patient taking differently: Take 150 mg by mouth daily. Take one and a half tablets a day for a total of 150 mg daily.) 60 Tab 1    glecaprevir/pibrentasvir (MAVYRET PO) Take  by mouth.  methadone HCl (METHADONE PO) Take 85 mg by mouth daily.       cefTRIAXone 2 gram IV syringe 2 gm iv daily through 12/29/18 at [facility]- arranged by case management 30 Dose 0    OTHER Daptomycin through 12/29/18 at [facility]- arranged by  1 Each 0     Facility-Administered Medications Ordered in Other Visits   Medication Dose Route Frequency Provider Last Rate Last Dose    sodium chloride (NS) flush 10-40 mL  10-40 mL IntraVENous PRN Linh Mansfield MD   20 mL at 11/16/18 1230    heparin (porcine) pf 500 Units  500 Units InterCATHeter PRN Linh Mansfield MD   500 Units at 11/16/18 1231    [START ON 11/17/2018] DAPTOmycin (CUBICIN) 600 mg in sodium chloride 0.9% 12 mL IV Syringe  600 mg IntraVENous ONCE Sofya Fuller MD       [address] [place] [address] ON 11/17/2018] cefTRIAXone (ROCEPHIN) 2 g in sterile water (preservative free) 20 mL IV syringe  2 g IntraVENous ONCE Sofya Fuller MD        [START ON 11/18/2018] DAPTOmycin (CUBICIN) 600 mg in sodium chloride 0.9% 12 mL IV Syringe  600 mg IntraVENous ONCE Sofya Fuller MD       [address] [place] [address] ON 11/18/2018] cefTRIAXone (ROCEPHIN) 2 g in sterile water (preservative free) 20 mL IV syringe  2 g IntraVENous ONCE Sofya Fuller MD       [address] [place] [address] ON 11/19/2018] cefTRIAXone (ROCEPHIN) 2 g in sterile water (preservative free) 20 mL IV syringe  2 g IntraVENous ONCE Sofya Fuller MD        [START ON 11/19/2018] DAPTOmycin (CUBICIN) 600 mg in sodium chloride 0.9% 12 mL IV Syringe  600 mg IntraVENous ONCE MD Karen Cordon ON 11/20/2018] cefTRIAXone (ROCEPHIN) 2 g in sterile water (preservative free) 20 mL IV syringe  2 g IntraVENous ONCE Bonnie Etienne MD        [START ON 11/20/2018] DAPTOmycin (CUBICIN) 600 mg in sodium chloride 0.9% 12 mL IV Syringe  600 mg IntraVENous ONCE Katelyn Hand MD       [address] [place] [address] Doing ON 11/21/2018] cefTRIAXone (ROCEPHIN) 2 g in sterile water (preservative free) 20 mL IV syringe  2 g IntraVENous ONCE Bonnie Etienne MD        [facility] Doing ON 11/21/2018] DAPTOmycin (CUBICIN) 600 mg in sodium chloride 0.9% 12 mL IV Syringe  600 mg IntraVENous ONCE Katelyn Hand MD        [START ON 11/22/2018] DAPTOmycin (CUBICIN) 600 mg in sodium chloride 0.9% 12 mL IV Syringe  600 mg IntraVENous ONCE Katelyn Hand MD       [address] [place] [address] Doing ON 11/22/2018] cefTRIAXone (ROCEPHIN) 2 g in sterile water (preservative free) 20 mL IV syringe  2 g IntraVENous ONCE Katelyn Hand MD       [address] [place] [address] Doing ON 11/23/2018] cefTRIAXone (ROCEPHIN) 2 g in sterile water (preservative free) 20 mL IV syringe  2 g IntraVENous ONCE Bonnie Etienne MD       [address] [place] [address] Doing ON 11/23/2018] DAPTOmycin (CUBICIN) 600 mg in sodium chloride 0.9% 12 mL IV Syringe  600 mg IntraVENous ONCE Katelyn Hand MD        cefTRIAXone (ROCEPHIN) 2 g in sterile water (preservative free) 20 mL IV syringe  2 g IntraVENous Q24H Katelyn Hand MD   2 g at 11/16/18 1137         Patient Care Team:  Patient Care Team:  George Garcia NP as PCP - General (Nurse Practitioner)        LABS/Results:  Results for orders placed or performed during the hospital encounter of 11/11/18   CULTURE, BLOOD   Result Value Ref Range    Special Requests: NO SPECIAL REQUESTS      Culture result: NO GROWTH 5 DAYS     CULTURE, URINE   Result Value Ref Range    Special Requests: NO SPECIAL REQUESTS      Culture result: NO GROWTH 2 DAYS     CULTURE, TISSUE W GRAM STAIN   Result Value Ref Range    Special Requests: T7,T8 DISK BOX     GRAM STAIN NO WBC'S SEEN      GRAM STAIN NO ORGANISMS SEEN      Culture result: NO GROWTH 4 DAYS     CULTURE, FUNGUS   Result Value Ref Range    Special Requests: T7,T8 DISK BOX     FUNGUS SMEAR NO FUNGAL ELEMENTS SEEN      Culture result: PENDING    AFB CULTURE + SMEAR W/RFLX PCR AND ID   Result Value Ref Range    Source TISSUE BIOPSY T7, T8 DISK BOX     AFB Specimen processing Concentration     Acid Fast Smear NEGATIVE       Acid Fast Culture PENDING    CULTURE, FUNGUS   Result Value Ref Range    Special Requests: T7 T8 DISK BX     FUNGUS SMEAR NO FUNGAL ELEMENTS SEEN      Culture result: PENDING    CULTURE, TISSUE W GRAM STAIN   Result Value Ref Range    Special Requests: D7 D8 DISK BX     GRAM STAIN NO WBC'S SEEN      GRAM STAIN NO ORGANISMS SEEN      Culture result: CULTURE IN PROGRESS,FURTHER UPDATES TO FOLLOW     METABOLIC PANEL, BASIC   Result Value Ref Range    Sodium 138 136 - 145 mmol/L    Potassium 3.3 (L) 3.5 - 5.5 mmol/L    Chloride 99 (L) 100 - 108 mmol/L    CO2 29 21 - 32 mmol/L    Anion gap 10 3.0 - 18 mmol/L    Glucose 83 74 - 99 mg/dL    BUN 15 7.0 - 18 MG/DL    Creatinine 0.76 0.6 - 1.3 MG/DL    BUN/Creatinine ratio 20 12 - 20      GFR est AA >60 >60 ml/min/1.73m2    GFR est non-AA >60 >60 ml/min/1.73m2    Calcium 9.2 8.5 - 10.1 MG/DL   CBC WITH AUTOMATED DIFF   Result Value Ref Range    WBC 8.7 4.6 - 13.2 K/uL    RBC 4.53 4.20 - 5.30 M/uL    HGB 13.7 12.0 - 16.0 g/dL    HCT 43.3 35.0 - 45.0 %    MCV 95.6 74.0 - 97.0 FL    MCH 30.2 24.0 - 34.0 PG    MCHC 31.6 31.0 - 37.0 g/dL    RDW 14.2 11.6 - 14.5 %    PLATELET 505 737 - 644 K/uL    MPV 9.1 (L) 9.2 - 11.8 FL    NEUTROPHILS 60 40 - 73 %    LYMPHOCYTES 31 21 - 52 %    MONOCYTES 7 3 - 10 %    EOSINOPHILS 2 0 - 5 %    BASOPHILS 0 0 - 2 %    ABS. NEUTROPHILS 5.3 1.8 - 8.0 K/UL    ABS. LYMPHOCYTES 2.7 0.9 - 3.6 K/UL    ABS. MONOCYTES 0.6 0.05 - 1.2 K/UL    ABS. EOSINOPHILS 0.2 0.0 - 0.4 K/UL    ABS.  BASOPHILS 0.0 0.0 - 0.1 K/UL    DF AUTOMATED     MAGNESIUM   Result Value Ref Range    Magnesium 1.9 1.6 - 2.6 mg/dL URINALYSIS W/ RFLX MICROSCOPIC   Result Value Ref Range    Color YELLOW      Appearance CLEAR      Specific gravity >1.030 (H) 1.005 - 1.030    pH (UA) 6.0 5.0 - 8.0      Protein TRACE (A) NEG mg/dL    Glucose NEGATIVE  NEG mg/dL    Ketone NEGATIVE  NEG mg/dL    Bilirubin NEGATIVE  NEG      Blood NEGATIVE  NEG      Urobilinogen 1.0 0.2 - 1.0 EU/dL    Nitrites NEGATIVE  NEG      Leukocyte Esterase TRACE (A) NEG     CRP, HIGH SENSITIVITY   Result Value Ref Range    C-Reactive Protein, Cardiac 17.87 (H) 0.00 - 3.00 mg/L   SED RATE (ESR)   Result Value Ref Range    Sed rate, automated 26 0 - 30 mm/hr   URINE MICROSCOPIC ONLY   Result Value Ref Range    WBC 0 to 3 0 - 4 /hpf    RBC NONE 0 - 5 /hpf    Epithelial cells 2+ 0 - 5 /lpf    Bacteria FEW (A) NEG /hpf    Mucus 2+ (A) NEG /lpf   METABOLIC PANEL, BASIC   Result Value Ref Range    Sodium 140 136 - 145 mmol/L    Potassium 3.5 3.5 - 5.5 mmol/L    Chloride 105 100 - 108 mmol/L    CO2 29 21 - 32 mmol/L    Anion gap 6 3.0 - 18 mmol/L    Glucose 96 74 - 99 mg/dL    BUN 11 7.0 - 18 MG/DL    Creatinine 0.62 0.6 - 1.3 MG/DL    BUN/Creatinine ratio 18 12 - 20      GFR est AA >60 >60 ml/min/1.73m2    GFR est non-AA >60 >60 ml/min/1.73m2    Calcium 8.6 8.5 - 10.1 MG/DL   MAGNESIUM   Result Value Ref Range    Magnesium 2.3 1.6 - 2.6 mg/dL   CBC W/O DIFF   Result Value Ref Range    WBC 7.1 4.6 - 13.2 K/uL    RBC 4.41 4.20 - 5.30 M/uL    HGB 13.6 12.0 - 16.0 g/dL    HCT 41.8 35.0 - 45.0 %    MCV 94.8 74.0 - 97.0 FL    MCH 30.8 24.0 - 34.0 PG    MCHC 32.5 31.0 - 37.0 g/dL    RDW 14.0 11.6 - 14.5 %    PLATELET 973 353 - 545 K/uL    MPV 9.4 9.2 - 11.8 FL   PROTHROMBIN TIME + INR   Result Value Ref Range    Prothrombin time 12.6 11.5 - 15.2 sec    INR 1.0 0.8 - 1.2     PTT   Result Value Ref Range    aPTT 32.0 23.0 - 36.4 SEC   HIV 1/2 AG/AB, 4TH GENERATION,W RFLX CONFIRM   Result Value Ref Range    HIV 1/2 Interpretation NONREACTIVE NR      HIV 1/2 result comment SEE NOTE     CBC WITH AUTOMATED DIFF   Result Value Ref Range    WBC 7.0 4.6 - 13.2 K/uL    RBC 4.15 (L) 4.20 - 5.30 M/uL    HGB 12.7 12.0 - 16.0 g/dL    HCT 38.7 35.0 - 45.0 %    MCV 93.3 74.0 - 97.0 FL    MCH 30.6 24.0 - 34.0 PG    MCHC 32.8 31.0 - 37.0 g/dL    RDW 14.1 11.6 - 14.5 %    PLATELET 512 205 - 575 K/uL    MPV 9.5 9.2 - 11.8 FL    NEUTROPHILS 55 40 - 73 %    LYMPHOCYTES 34 21 - 52 %    MONOCYTES 7 3 - 10 %    EOSINOPHILS 4 0 - 5 %    BASOPHILS 0 0 - 2 %    ABS. NEUTROPHILS 3.9 1.8 - 8.0 K/UL    ABS. LYMPHOCYTES 2.4 0.9 - 3.6 K/UL    ABS. MONOCYTES 0.5 0.05 - 1.2 K/UL    ABS. EOSINOPHILS 0.3 0.0 - 0.4 K/UL    ABS. BASOPHILS 0.0 0.0 - 0.1 K/UL    DF AUTOMATED     METABOLIC PANEL, BASIC   Result Value Ref Range    Sodium 139 136 - 145 mmol/L    Potassium 3.4 (L) 3.5 - 5.5 mmol/L    Chloride 103 100 - 108 mmol/L    CO2 30 21 - 32 mmol/L    Anion gap 6 3.0 - 18 mmol/L    Glucose 94 74 - 99 mg/dL    BUN 9 7.0 - 18 MG/DL    Creatinine 0.78 0.6 - 1.3 MG/DL    BUN/Creatinine ratio 12 12 - 20      GFR est AA >60 >60 ml/min/1.73m2    GFR est non-AA >60 >60 ml/min/1.73m2    Calcium 8.9 8.5 - 10.1 MG/DL     RADIOLOGY:  None new to review      Physical Exam   Constitutional: She is oriented to person, place, and time. She appears well-developed and well-nourished. Neck: Normal range of motion. Cardiovascular: Normal rate, regular rhythm and normal heart sounds. Pulmonary/Chest: Effort normal and breath sounds normal.   Abdominal: Soft. Bowel sounds are normal. She exhibits no distension. There is no tenderness. There is no rebound and no guarding. Musculoskeletal: She exhibits tenderness (mid and lower back ). She exhibits no edema or deformity. Slow gait   Neurological: She is alert and oriented to person, place, and time. She has normal reflexes. Skin: Skin is warm and dry. Psychiatric: She has a normal mood and affect.          Vitals:    11/16/18 0847   BP: 161/81   Pulse: 72   Resp: 20   Temp: 98.2 °F (36.8 °C) TempSrc: Oral   SpO2: 100%   Weight: 213 lb (96.6 kg)   Height: 5' 4\" (1.626 m)   PainSc:   7   PainLoc: Back         Assessment and Plan      ICD-10-CM ICD-9-CM    1. Discitis of thoracic region M46.44 722.92 KETOROLAC TROMETHAMINE INJ      MD THER/PROPH/DIAG INJECTION, SUBCUT/IM      CANCELED: MD THER/PROPH/DIAG INJECTION, SUBCUT/IM      CANCELED: KETOROLAC TROMETHAMINE INJ   2. Chronic bilateral thoracic back pain M54.6 724.1 KETOROLAC TROMETHAMINE INJ    G89.29 338.29 MD THER/PROPH/DIAG INJECTION, SUBCUT/IM      CANCELED: KETOROLAC TROMETHAMINE INJ       **Patient has a scheduled follow-up visit with PCP on 11/29/2018    ** Patient was reassessed in the office after 20 minutes after injection with no  concerns. Patient expressed relief with pain now 5/10. *Plan of care reviewed with patient. Patient in agreement with plan and expresses understanding. All questions answered and patient encouraged to call or RTO if further questions or concerns. *After summary care printed, reviewed and given to patient. Follow-up Disposition:  Return if symptoms worsen or fail to improve with PCP, for Appt scheduled with PCP.

## 2018-11-16 NOTE — PATIENT INSTRUCTIONS
Back Pain: Care Instructions  Your Care Instructions    Back pain has many possible causes. It is often related to problems with muscles and ligaments of the back. It may also be related to problems with the nerves, discs, or bones of the back. Moving, lifting, standing, sitting, or sleeping in an awkward way can strain the back. Sometimes you don't notice the injury until later. Arthritis is another common cause of back pain. Although it may hurt a lot, back pain usually improves on its own within several weeks. Most people recover in 12 weeks or less. Using good home treatment and being careful not to stress your back can help you feel better sooner. Follow-up care is a key part of your treatment and safety. Be sure to make and go to all appointments, and call your doctor if you are having problems. It's also a good idea to know your test results and keep a list of the medicines you take. How can you care for yourself at home? · Sit or lie in positions that are most comfortable and reduce your pain. Try one of these positions when you lie down:  ? Lie on your back with your knees bent and supported by large pillows. ? Lie on the floor with your legs on the seat of a sofa or chair. ? Lie on your side with your knees and hips bent and a pillow between your legs. ? Lie on your stomach if it does not make pain worse. · Do not sit up in bed, and avoid soft couches and twisted positions. Bed rest can help relieve pain at first, but it delays healing. Avoid bed rest after the first day of back pain. · Change positions every 30 minutes. If you must sit for long periods of time, take breaks from sitting. Get up and walk around, or lie in a comfortable position. · Try using a heating pad on a low or medium setting for 15 to 20 minutes every 2 or 3 hours. Try a warm shower in place of one session with the heating pad. · You can also try an ice pack for 10 to 15 minutes every 2 to 3 hours.  Put a thin cloth between the ice pack and your skin. · Take pain medicines exactly as directed. ? If the doctor gave you a prescription medicine for pain, take it as prescribed. ? If you are not taking a prescription pain medicine, ask your doctor if you can take an over-the-counter medicine. · Take short walks several times a day. You can start with 5 to 10 minutes, 3 or 4 times a day, and work up to longer walks. Walk on level surfaces and avoid hills and stairs until your back is better. · Return to work and other activities as soon as you can. Continued rest without activity is usually not good for your back. · To prevent future back pain, do exercises to stretch and strengthen your back and stomach. Learn how to use good posture, safe lifting techniques, and proper body mechanics. When should you call for help? Call your doctor now or seek immediate medical care if:    · You have new or worsening numbness in your legs.     · You have new or worsening weakness in your legs. (This could make it hard to stand up.)     · You lose control of your bladder or bowels.    Watch closely for changes in your health, and be sure to contact your doctor if:    · You have a fever, lose weight, or don't feel well.     · You do not get better as expected. Where can you learn more? Go to http://sameera-miles.info/. Enter B236 in the search box to learn more about \"Back Pain: Care Instructions. \"  Current as of: November 29, 2017  Content Version: 11.8  © 9956-5521 [employer]. Care instructions adapted under license by [employer] (which disclaims liability or warranty for this information). If you have questions about a medical condition or this instruction, always ask your healthcare professional. [place] [address] any warranty or liability for your use of this information.

## 2018-11-16 NOTE — PROGRESS NOTES
Gail Dorantes is a 63 y.o. female presented in clinic for   Chief Complaint   Patient presents with    Back Pain     chronic pain         1. Have you been to the ER, urgent care clinic since your last visit? Hospitalized since your last visit? yes, [facility] 11/11/18 and discharged 11/15/18    2. Have you seen or consulted any other health care providers outside of the [address] [place] [address] since your last visit? Include any pap smears or colon screening.  No

## 2018-11-16 NOTE — PROGRESS NOTES
[facility] Progress Note Date: 2018 Name: Albert Bowser MRN: [id number] : 1960 Daily dapto/rocephin injections Ms. Nigel Santos arrived to [facility] at 1100 ambulatory. States back pain 5/10. States she took pain medication prior opic admit. Reassured and positioned for comfort Ms. Nigel Santos was assessed and education was provided. Care notes given for dapto/rocephin. Patient verbalized understanding of actions, route, side effects, possible reaction, management in case of reaction, and care of picc line Ms. Matute's vitals were reviewed. Visit Vitals BP (!) 157/91 (BP 1 Location: Left arm, BP Patient Position: At rest) Pulse 72 Temp 98.3 °F (36.8 °C) Resp 18 SpO2 95% Pt with RIGHT upper arm double lumen PICC line. Purple lumen flushes without difficulty and positive for blood return. Dressing CDI. No redness, bruising, or swelling noted at site and/ or extremity. Pt denies tenderness. Daptomycin 600 mg was administered IVP over 2 minutes as ordered via purple lumen followed by normal saline flush. Unable to flush red lumen. Cath paulo 2 mg/ 2 ml instilled to dwell 30-45 minutes. Rocephin 2 gms given IVP over 3 minutes via purple lumen. Brisk flush/blood returns noted. Purple lumen heparinized with 500 units of heparin per protocol. Red lumen with brisk blood returns post cath paulo. Flushed with Normal Saline 20 ml, and heparinized with 500 units per protocol. Curos applied to both lumens, lumens wrapped with coban, and stockinette applied over picc site Ms. Matute tolerated well without complaints. Patient Vitals for the past 8 hrs: 
 Temp Pulse Resp BP SpO2  
18 1233 98.3 °F (36.8 °C) 72 18 (!) 157/91   
18 1100 98.5 °F (36.9 °C) 71 18 153/90 95 % Patient to resume pain and blood pressure medications on return home when due.  
 
Ms. Nigel Santos was discharged from [place] [address] in stable condition at 1235. She is to return on 11/17/18 at 1000 for her next appointment. Estelita Bailey RN November 16, 2018

## 2018-11-17 ENCOUNTER — HOSPITAL ENCOUNTER (OUTPATIENT)
Dept: INFUSION THERAPY | Age: 58
Discharge: HOME | End: 2018-11-17
Payer: MEDICAID

## 2018-11-17 VITALS
HEART RATE: 70 BPM | DIASTOLIC BLOOD PRESSURE: 85 MMHG | TEMPERATURE: 97.7 F | RESPIRATION RATE: 18 BRPM | SYSTOLIC BLOOD PRESSURE: 147 MMHG

## 2018-11-17 LAB
BACTERIA SPEC CULT: NORMAL
SERVICE CMNT-IMP: NORMAL

## 2018-11-17 PROCEDURE — 96375 TX/PRO/DX INJ NEW DRUG ADDON: CPT

## 2018-11-17 PROCEDURE — 74011250636 HC RX REV CODE- 250/636: Performed by: INTERNAL MEDICINE

## 2018-11-17 PROCEDURE — 74011000250 HC RX REV CODE- 250: Performed by: INTERNAL MEDICINE

## 2018-11-17 PROCEDURE — 74011250636 HC RX REV CODE- 250/636

## 2018-11-17 PROCEDURE — 96374 THER/PROPH/DIAG INJ IV PUSH: CPT

## 2018-11-17 RX ORDER — HEPARIN 100 UNIT/ML
SYRINGE INTRAVENOUS
Status: DISPENSED
Start: 2018-11-17 | End: 2018-11-17

## 2018-11-17 RX ORDER — SODIUM CHLORIDE 0.9 % (FLUSH) 0.9 %
5-10 SYRINGE (ML) INJECTION EVERY 8 HOURS
Status: COMPLETED | OUTPATIENT
Start: 2018-11-17 | End: 2018-11-17

## 2018-11-17 RX ORDER — HEPARIN 100 UNIT/ML
500 SYRINGE INTRAVENOUS ONCE
Status: COMPLETED | OUTPATIENT
Start: 2018-11-17 | End: 2018-11-17

## 2018-11-17 RX ADMIN — DAPTOMYCIN 600 MG: 500 INJECTION, POWDER, LYOPHILIZED, FOR SOLUTION INTRAVENOUS at 09:00

## 2018-11-17 RX ADMIN — HEPARIN 500 UNITS: 100 SYRINGE at 09:03

## 2018-11-17 RX ADMIN — Medication 10 ML: at 09:06

## 2018-11-17 RX ADMIN — CEFTRIAXONE SODIUM 2 G: 2 INJECTION, POWDER, FOR SOLUTION INTRAMUSCULAR; INTRAVENOUS at 09:01

## 2018-11-17 NOTE — PROGRESS NOTES
[facility] Progress Note Date: 2018 Name: Gopal Wright MRN: [id number] : 1960 Daily dapto/rocephin injections Ms. Qing Triplett arrived to [facility] at 4700 ambulatory. States back pain /10. Ms. Qing Triplett was assessed and education was provided. Care notes given for dapto/rocephin. Patient verbalized understanding of actions, route, side effects, possible reaction, management in case of reaction, and care of picc line Ms. Matute's vitals were reviewed. Visit Vitals /85 (BP 1 Location: Left arm, BP Patient Position: Sitting) Pulse 70 Temp 97.7 °F (36.5 °C) Resp 18 Pt with RIGHT upper arm double lumen PICC line. Purple lumen flushes without difficulty and positive for blood return. Dressing CDI. No redness, bruising, or swelling noted at site and/ or extremity. Pt denies tenderness. Daptomycin 600 mg was administered IVP over 2 minutes as ordered via purple lumen followed by normal saline flush. Rocephin 2 gms given IVP over 3 minutes via purple lumen. Brisk flush/blood returns noted. Purple lumen heparinized with 500 units of heparin per protocol. Red lumen with brisk blood return. Flushed with Normal Saline 20 ml, and heparinized with 500 units per protocol. Curos applied to both lumens, lumens wrapped with coban, and stockinette applied over picc site Ms. Matute tolerated well without complaints. Patient Vitals for the past 8 hrs: 
 Temp Pulse Resp BP  
18 0854 97.7 °F (36.5 °C) 70 18 147/85 Patient to resume pain and blood pressure medications on return home when due. Ms. Qing Triplett was discharged from [place] [address] in stable condition at [phone] 26. She is to return on 18 at 1000 for her next appointment. Nadine Beasley RN 2018

## 2018-11-18 ENCOUNTER — HOSPITAL ENCOUNTER (OUTPATIENT)
Dept: INFUSION THERAPY | Age: 58
Discharge: HOME | End: 2018-11-18
Payer: MEDICAID

## 2018-11-18 VITALS
HEART RATE: 79 BPM | SYSTOLIC BLOOD PRESSURE: 177 MMHG | TEMPERATURE: 97.4 F | DIASTOLIC BLOOD PRESSURE: 105 MMHG | OXYGEN SATURATION: 95 % | RESPIRATION RATE: 18 BRPM

## 2018-11-18 LAB
BACTERIA SPEC CULT: ABNORMAL
BACTERIA SPEC CULT: ABNORMAL
GRAM STN SPEC: ABNORMAL
GRAM STN SPEC: ABNORMAL
SERVICE CMNT-IMP: ABNORMAL

## 2018-11-18 PROCEDURE — 74011250636 HC RX REV CODE- 250/636: Performed by: INTERNAL MEDICINE

## 2018-11-18 PROCEDURE — 96375 TX/PRO/DX INJ NEW DRUG ADDON: CPT

## 2018-11-18 PROCEDURE — 74011250636 HC RX REV CODE- 250/636

## 2018-11-18 PROCEDURE — 96374 THER/PROPH/DIAG INJ IV PUSH: CPT

## 2018-11-18 PROCEDURE — 74011000250 HC RX REV CODE- 250: Performed by: INTERNAL MEDICINE

## 2018-11-18 RX ORDER — SODIUM CHLORIDE 0.9 % (FLUSH) 0.9 %
10-40 SYRINGE (ML) INJECTION AS NEEDED
Status: DISCONTINUED | OUTPATIENT
Start: 2018-11-18 | End: 2018-11-22 | Stop reason: HOSPADM

## 2018-11-18 RX ORDER — HEPARIN 100 UNIT/ML
500 SYRINGE INTRAVENOUS ONCE
Status: COMPLETED | OUTPATIENT
Start: 2018-11-18 | End: 2018-11-18

## 2018-11-18 RX ORDER — HEPARIN 100 UNIT/ML
SYRINGE INTRAVENOUS
Status: COMPLETED
Start: 2018-11-18 | End: 2018-11-18

## 2018-11-18 RX ADMIN — CEFTRIAXONE SODIUM 2 G: 2 INJECTION, POWDER, FOR SOLUTION INTRAMUSCULAR; INTRAVENOUS at 09:01

## 2018-11-18 RX ADMIN — DAPTOMYCIN 600 MG: 500 INJECTION, POWDER, LYOPHILIZED, FOR SOLUTION INTRAVENOUS at 09:01

## 2018-11-18 RX ADMIN — HEPARIN 500 UNITS: 100 SYRINGE at 09:01

## 2018-11-18 RX ADMIN — Medication 40 ML: at 09:00

## 2018-11-18 NOTE — PROGRESS NOTES
[address] [facility] Progress Note Date: 2018 Name: Albert Earl MRN: [id number] : 1960 Daily dapto/rocephin injections Ms. Judith Garcia arrived to [facility] at 4736 ambulatory. Patient states her pain is 10 out of 10 this morning. Ms. Judith Garcia was assessed and education was provided. Care notes given for dapto/rocephin. Patient verbalized understanding of actions, route, side effects, possible reaction, management in case of reaction, and care of picc line Patient Vitals for the past 12 hrs: 
 Temp Pulse Resp BP SpO2  
18 0912    (!) 177/105   
18 0905    (!) 192/109   
18 0901 97.4 °F (36.3 °C) 79 18 (!) 219/106 95 % Patient's BP is elevated. She denies any chest pain. She states she is in a lot of pain this morning but she did take her BP medication. Patients BP did trend down during her visit. Patient was advised that if her pain does not improve or if she begins to have any chest pain she needs to report to the ED. Patient verbalized understanding. Pt with RIGHT upper arm double lumen PICC line. Purple lumen flushes without difficulty and positive for blood return. Dressing CDI. No redness, bruising, or swelling noted at site and/ or extremity. Pt denies tenderness. Daptomycin 600 mg was administered IVP over 2 minutes as ordered via purple lumen followed by normal saline flush. Rocephin 2 gms given IVP over 3 minutes via purple lumen. Brisk flush/blood returns noted. Purple lumen heparinized with 500 units of heparin per protocol. Red lumen with brisk blood return. Flushed with Normal Saline 20 ml, and heparinized with 500 units per protocol. Curos applied to both lumens, lumens wrapped with coban, and stockinette applied over picc site Ms. Matute tolerated well without complaints. Ms. Judith Garcia was discharged from [place] [address] in stable condition at [address] [place] [address].   She is to return on 18 at 1000 for her next appointment. Terrie Suarez RN November 18, 2018

## 2018-11-19 ENCOUNTER — HOSPITAL ENCOUNTER (OUTPATIENT)
Dept: INFUSION THERAPY | Age: 58
Discharge: HOME | End: 2018-11-19
Payer: MEDICAID

## 2018-11-19 VITALS
SYSTOLIC BLOOD PRESSURE: 135 MMHG | HEART RATE: 85 BPM | TEMPERATURE: 98 F | DIASTOLIC BLOOD PRESSURE: 78 MMHG | RESPIRATION RATE: 18 BRPM | OXYGEN SATURATION: 94 %

## 2018-11-19 LAB
ALBUMIN SERPL-MCNC: 3.4 G/DL (ref 3.4–5)
ALBUMIN/GLOB SERPL: 0.7 {RATIO} (ref 0.8–1.7)
ALP SERPL-CCNC: 147 U/L (ref 45–117)
ALT SERPL-CCNC: 16 U/L (ref 13–56)
ANION GAP SERPL CALC-SCNC: 7 MMOL/L (ref 3–18)
AST SERPL-CCNC: 14 U/L (ref 15–37)
BASO+EOS+MONOS # BLD AUTO: 0.4 K/UL (ref 0–2.3)
BASO+EOS+MONOS # BLD AUTO: 6 % (ref 0.1–17)
BILIRUB SERPL-MCNC: 0.3 MG/DL (ref 0.2–1)
BUN SERPL-MCNC: 14 MG/DL (ref 7–18)
BUN/CREAT SERPL: 16 (ref 12–20)
CALCIUM SERPL-MCNC: 8.9 MG/DL (ref 8.5–10.1)
CHLORIDE SERPL-SCNC: 100 MMOL/L (ref 100–108)
CK SERPL-CCNC: 24 U/L (ref 26–192)
CO2 SERPL-SCNC: 29 MMOL/L (ref 21–32)
CREAT SERPL-MCNC: 0.85 MG/DL (ref 0.6–1.3)
CRP SERPL-MCNC: 4.1 MG/DL (ref 0–0.3)
DIFFERENTIAL METHOD BLD: ABNORMAL
ERYTHROCYTE [DISTWIDTH] IN BLOOD BY AUTOMATED COUNT: 14 % (ref 11.5–14.5)
GLOBULIN SER CALC-MCNC: 4.9 G/DL (ref 2–4)
GLUCOSE SERPL-MCNC: 113 MG/DL (ref 74–99)
HCT VFR BLD AUTO: 41.4 % (ref 36–48)
HGB BLD-MCNC: 13.4 G/DL (ref 12–16)
LYMPHOCYTES # BLD: 1.7 K/UL (ref 1.1–5.9)
LYMPHOCYTES NFR BLD: 23 % (ref 14–44)
MCH RBC QN AUTO: 31.1 PG (ref 25–35)
MCHC RBC AUTO-ENTMCNC: 32.4 G/DL (ref 31–37)
MCV RBC AUTO: 96.1 FL (ref 78–102)
NEUTS SEG # BLD: 5.4 K/UL (ref 1.8–9.5)
NEUTS SEG NFR BLD: 72 % (ref 40–70)
PLATELET # BLD AUTO: 287 K/UL (ref 140–440)
POTASSIUM SERPL-SCNC: 3.7 MMOL/L (ref 3.5–5.5)
PROT SERPL-MCNC: 8.3 G/DL (ref 6.4–8.2)
RBC # BLD AUTO: 4.31 M/UL (ref 4.1–5.1)
SODIUM SERPL-SCNC: 136 MMOL/L (ref 136–145)
WBC # BLD AUTO: 7.5 K/UL (ref 4.5–13)

## 2018-11-19 PROCEDURE — 74011250636 HC RX REV CODE- 250/636: Performed by: INTERNAL MEDICINE

## 2018-11-19 PROCEDURE — 74011250636 HC RX REV CODE- 250/636

## 2018-11-19 PROCEDURE — 85025 COMPLETE CBC W/AUTO DIFF WBC: CPT

## 2018-11-19 PROCEDURE — 80053 COMPREHEN METABOLIC PANEL: CPT

## 2018-11-19 PROCEDURE — 96374 THER/PROPH/DIAG INJ IV PUSH: CPT

## 2018-11-19 PROCEDURE — 86140 C-REACTIVE PROTEIN: CPT

## 2018-11-19 PROCEDURE — 96375 TX/PRO/DX INJ NEW DRUG ADDON: CPT

## 2018-11-19 PROCEDURE — 82550 ASSAY OF CK (CPK): CPT

## 2018-11-19 PROCEDURE — 74011000250 HC RX REV CODE- 250: Performed by: INTERNAL MEDICINE

## 2018-11-19 RX ORDER — SODIUM CHLORIDE 0.9 % (FLUSH) 0.9 %
10-40 SYRINGE (ML) INJECTION AS NEEDED
Status: DISCONTINUED | OUTPATIENT
Start: 2018-11-19 | End: 2018-11-23 | Stop reason: HOSPADM

## 2018-11-19 RX ORDER — HEPARIN 100 UNIT/ML
500 SYRINGE INTRAVENOUS AS NEEDED
Status: DISCONTINUED | OUTPATIENT
Start: 2018-11-19 | End: 2018-11-23 | Stop reason: HOSPADM

## 2018-11-19 RX ORDER — HEPARIN 100 UNIT/ML
SYRINGE INTRAVENOUS
Status: COMPLETED
Start: 2018-11-19 | End: 2018-11-19

## 2018-11-19 RX ADMIN — HEPARIN 500 UNITS: 100 SYRINGE at 10:23

## 2018-11-19 RX ADMIN — Medication 20 ML: at 10:22

## 2018-11-19 RX ADMIN — HEPARIN 500 UNITS: 100 SYRINGE at 10:22

## 2018-11-19 RX ADMIN — CEFTRIAXONE SODIUM 2 G: 2 INJECTION, POWDER, FOR SOLUTION INTRAMUSCULAR; INTRAVENOUS at 10:19

## 2018-11-19 RX ADMIN — DAPTOMYCIN 600 MG: 500 INJECTION, POWDER, LYOPHILIZED, FOR SOLUTION INTRAVENOUS at 10:15

## 2018-11-19 RX ADMIN — Medication 10 ML: at 10:19

## 2018-11-19 RX ADMIN — Medication 30 ML: at 10:15

## 2018-11-19 NOTE — PROGRESS NOTES
[facility] Progress Note Date: 2018 Name: Jagdeep Westfall MRN: [id number] : 1960 Daily Dapto/Rocephin Antibiotics Ms. Patrick Arnold arrived to [facility] at 1010 ambulatory. Patient states her pain is 4 out of 10 this morning. Ms. Patrick Arnold was assessed and education was provided. Patient Vitals for the past 12 hrs: 
 Temp Pulse Resp BP SpO2  
18 1013 98 °F (36.7 °C) 85 18 135/78 94 % Pt with RIGHT upper arm double lumen PICC line. Purple lumen flushes without difficulty and positive for blood return. Dressing CDI. No redness, bruising, or swelling noted at site and/ or extremity. Pt denies tenderness. Daptomycin 600 mg was administered IVP over 2 minutes as ordered via purple lumen followed by normal saline flush. Rocephin 2 gms given IVP over 3 minutes via purple lumen. Brisk flush/blood returns noted. Purple lumen heparinized with 500 units of heparin per protocol. Red lumen with brisk blood return. Flushed with Normal Saline 20 ml, and heparinized with 500 units per protocol. Curos applied to both lumens, lumens wrapped with coban, and stockinette applied over picc site Ms. Matute tolerated well without complaints. Ms. Patrick Arnold was discharged from [place] [address] in stable condition at 1025. She is to return on 18 at 1000 for her next appointment. Geraldo Quintana RN 2018 
1025

## 2018-11-20 ENCOUNTER — HOSPITAL ENCOUNTER (OUTPATIENT)
Dept: INFUSION THERAPY | Age: 58
Discharge: HOME | End: 2018-11-20
Payer: MEDICAID

## 2018-11-20 VITALS
TEMPERATURE: 98.1 F | DIASTOLIC BLOOD PRESSURE: 79 MMHG | OXYGEN SATURATION: 94 % | SYSTOLIC BLOOD PRESSURE: 131 MMHG | HEART RATE: 84 BPM | RESPIRATION RATE: 18 BRPM

## 2018-11-20 PROCEDURE — 96375 TX/PRO/DX INJ NEW DRUG ADDON: CPT

## 2018-11-20 PROCEDURE — 74011000250 HC RX REV CODE- 250: Performed by: INTERNAL MEDICINE

## 2018-11-20 PROCEDURE — 74011250636 HC RX REV CODE- 250/636

## 2018-11-20 PROCEDURE — 74011250636 HC RX REV CODE- 250/636: Performed by: INTERNAL MEDICINE

## 2018-11-20 PROCEDURE — 96374 THER/PROPH/DIAG INJ IV PUSH: CPT

## 2018-11-20 RX ORDER — SODIUM CHLORIDE 0.9 % (FLUSH) 0.9 %
10-40 SYRINGE (ML) INJECTION AS NEEDED
Status: DISCONTINUED | OUTPATIENT
Start: 2018-11-20 | End: 2018-11-24 | Stop reason: HOSPADM

## 2018-11-20 RX ORDER — HEPARIN 100 UNIT/ML
500 SYRINGE INTRAVENOUS AS NEEDED
Status: DISCONTINUED | OUTPATIENT
Start: 2018-11-20 | End: 2018-11-24 | Stop reason: HOSPADM

## 2018-11-20 RX ORDER — HEPARIN 100 UNIT/ML
SYRINGE INTRAVENOUS
Status: COMPLETED
Start: 2018-11-20 | End: 2018-11-20

## 2018-11-20 RX ADMIN — Medication 10 ML: at 09:32

## 2018-11-20 RX ADMIN — Medication 10 ML: at 09:28

## 2018-11-20 RX ADMIN — HEPARIN 500 UNITS: 100 SYRINGE at 09:32

## 2018-11-20 RX ADMIN — HEPARIN 500 UNITS: 100 SYRINGE at 09:33

## 2018-11-20 RX ADMIN — DAPTOMYCIN 600 MG: 500 INJECTION, POWDER, LYOPHILIZED, FOR SOLUTION INTRAVENOUS at 09:29

## 2018-11-20 RX ADMIN — CEFTRIAXONE SODIUM 2 G: 2 INJECTION, POWDER, FOR SOLUTION INTRAMUSCULAR; INTRAVENOUS at 09:29

## 2018-11-20 RX ADMIN — Medication 10 ML: at 09:33

## 2018-11-20 RX ADMIN — Medication 10 ML: at 09:29

## 2018-11-20 NOTE — PROGRESS NOTES
[facility] Progress Note Date: 2018 Name: Colletta Kite MRN: [id number] : 1960 Daily Dapto/Rocephin Antibiotics Ms. Sebastian Lott arrived to [facility] at 5101 ambulatory. Patient states her pain is 3 out of 10 this morning. Ms. Sebastian Lott was assessed and education was provided. Patient Vitals for the past 12 hrs: 
 Temp Pulse Resp BP SpO2  
18 0927 98.1 °F (36.7 °C) 84 18 131/79 94 % Pt with RIGHT upper arm double lumen PICC line. Purple lumen flushes without difficulty and positive for blood return. Dressing CDI. No redness, bruising, or swelling noted at site and/ or extremity. Pt denies tenderness. Daptomycin 600 mg was administered IVP over 2 minutes as ordered via purple lumen followed by normal saline flush. Rocephin 2 gms given IVP over 3 minutes via purple lumen. Brisk flush/blood returns noted. Purple lumen heparinized with 500 units of heparin per protocol. Red lumen with brisk blood return. Flushed with Normal Saline 20 ml, and heparinized with 500 units per protocol. Curos applied to both lumens, lumens wrapped with coban, and stockinette applied over picc site Ms. Matute tolerated well without complaints. Ms. Sebastian Lott was discharged from [place] [address] in stable condition at [address] She is to return on 18 at 1000 for her next appointment. Charan Maya RN 2018 
[id number]

## 2018-11-21 ENCOUNTER — HOSPITAL ENCOUNTER (OUTPATIENT)
Dept: INFUSION THERAPY | Age: 58
Discharge: HOME | End: 2018-11-21
Payer: MEDICAID

## 2018-11-21 VITALS
OXYGEN SATURATION: 95 % | SYSTOLIC BLOOD PRESSURE: 142 MMHG | DIASTOLIC BLOOD PRESSURE: 87 MMHG | TEMPERATURE: 98.3 F | HEART RATE: 72 BPM | RESPIRATION RATE: 18 BRPM

## 2018-11-21 PROCEDURE — 96375 TX/PRO/DX INJ NEW DRUG ADDON: CPT

## 2018-11-21 PROCEDURE — 96374 THER/PROPH/DIAG INJ IV PUSH: CPT

## 2018-11-21 PROCEDURE — 74011250636 HC RX REV CODE- 250/636: Performed by: INTERNAL MEDICINE

## 2018-11-21 PROCEDURE — 74011250636 HC RX REV CODE- 250/636

## 2018-11-21 PROCEDURE — 74011000250 HC RX REV CODE- 250: Performed by: INTERNAL MEDICINE

## 2018-11-21 RX ORDER — HEPARIN 100 UNIT/ML
500 SYRINGE INTRAVENOUS AS NEEDED
Status: DISCONTINUED | OUTPATIENT
Start: 2018-11-21 | End: 2018-11-24 | Stop reason: HOSPADM

## 2018-11-21 RX ORDER — HEPARIN 100 UNIT/ML
SYRINGE INTRAVENOUS
Status: COMPLETED
Start: 2018-11-21 | End: 2018-11-21

## 2018-11-21 RX ORDER — SODIUM CHLORIDE 0.9 % (FLUSH) 0.9 %
10-40 SYRINGE (ML) INJECTION AS NEEDED
Status: DISCONTINUED | OUTPATIENT
Start: 2018-11-21 | End: 2018-11-24 | Stop reason: HOSPADM

## 2018-11-21 RX ADMIN — WATER 2 G: 1 INJECTION INTRAMUSCULAR; INTRAVENOUS; SUBCUTANEOUS at 09:19

## 2018-11-21 RX ADMIN — Medication 10 ML: at 09:25

## 2018-11-21 RX ADMIN — HEPARIN 500 UNITS: 100 SYRINGE at 09:30

## 2018-11-21 RX ADMIN — Medication 10 ML: at 09:18

## 2018-11-21 RX ADMIN — Medication 10 ML: at 09:29

## 2018-11-21 RX ADMIN — Medication 10 ML: at 09:23

## 2018-11-21 RX ADMIN — HEPARIN 500 UNITS: 100 SYRINGE at 09:24

## 2018-11-21 RX ADMIN — DAPTOMYCIN 600 MG: 500 INJECTION, POWDER, LYOPHILIZED, FOR SOLUTION INTRAVENOUS at 09:26

## 2018-11-22 ENCOUNTER — HOSPITAL ENCOUNTER (OUTPATIENT)
Dept: INFUSION THERAPY | Age: 58
Discharge: HOME | End: 2018-11-22
Payer: MEDICAID

## 2018-11-22 VITALS
DIASTOLIC BLOOD PRESSURE: 82 MMHG | HEART RATE: 83 BPM | SYSTOLIC BLOOD PRESSURE: 149 MMHG | RESPIRATION RATE: 16 BRPM | TEMPERATURE: 99.5 F

## 2018-11-22 PROCEDURE — 74011250636 HC RX REV CODE- 250/636: Performed by: INTERNAL MEDICINE

## 2018-11-22 PROCEDURE — 96374 THER/PROPH/DIAG INJ IV PUSH: CPT

## 2018-11-22 PROCEDURE — 99211 OFF/OP EST MAY X REQ PHY/QHP: CPT

## 2018-11-22 PROCEDURE — 74011000250 HC RX REV CODE- 250: Performed by: INTERNAL MEDICINE

## 2018-11-22 PROCEDURE — 74011250636 HC RX REV CODE- 250/636

## 2018-11-22 PROCEDURE — 96375 TX/PRO/DX INJ NEW DRUG ADDON: CPT

## 2018-11-22 RX ORDER — HEPARIN 100 UNIT/ML
500 SYRINGE INTRAVENOUS AS NEEDED
Status: DISCONTINUED | OUTPATIENT
Start: 2018-11-22 | End: 2018-11-26 | Stop reason: HOSPADM

## 2018-11-22 RX ORDER — SODIUM CHLORIDE 0.9 % (FLUSH) 0.9 %
10-40 SYRINGE (ML) INJECTION AS NEEDED
Status: DISCONTINUED | OUTPATIENT
Start: 2018-11-22 | End: 2018-11-26 | Stop reason: HOSPADM

## 2018-11-22 RX ORDER — HEPARIN 100 UNIT/ML
SYRINGE INTRAVENOUS
Status: COMPLETED
Start: 2018-11-22 | End: 2018-11-22

## 2018-11-22 RX ADMIN — Medication 10 ML: at 09:58

## 2018-11-22 RX ADMIN — Medication 10 ML: at 09:45

## 2018-11-22 RX ADMIN — Medication 10 ML: at 10:00

## 2018-11-22 RX ADMIN — CEFTRIAXONE SODIUM 2 G: 2 INJECTION, POWDER, FOR SOLUTION INTRAMUSCULAR; INTRAVENOUS at 09:50

## 2018-11-22 RX ADMIN — Medication 10 ML: at 10:02

## 2018-11-22 RX ADMIN — HEPARIN 500 UNITS: 100 SYRINGE at 10:00

## 2018-11-22 RX ADMIN — Medication 10 ML: at 09:46

## 2018-11-22 RX ADMIN — DAPTOMYCIN 600 MG: 500 INJECTION, POWDER, LYOPHILIZED, FOR SOLUTION INTRAVENOUS at 09:55

## 2018-11-22 RX ADMIN — Medication 10 ML: at 09:54

## 2018-11-22 RX ADMIN — HEPARIN 500 UNITS: 100 SYRINGE at 10:02

## 2018-11-22 NOTE — PROGRESS NOTES
[facility] Progress Note Date: 2018 Name: Leane Gitelman MRN: [id number] : 1960 Ms. Raf King arrived in the [facility] today at [facility], in stable condition, here for Daily IV Rocephin (Ceftriaxone) & Daptomycin antibiotic therapies. She was assessed and education was provided. Ms. Cory Cogan vitals were reviewed. Visit Vitals /82 (BP 1 Location: Left arm, BP Patient Position: Sitting) Pulse 83 Temp 99.5 °F (37.5 °C) Resp 16 Her right upper arm double lumen PICC line was noted to be dry and intact. Rocephin (Ceftriaxone) 2 grams IV, was administered over approximately 3 minutes, per order, and without incident. Daptomycin 600 mg IV, was administered over approximately 2 minutes, per order, and without incident. The PICC line dressing was changed per protocol, and without incident. After completion of all ordered medications, both lumens of the PICC line were flushed well per protocol with NS & Heparin, and the PICC line was left dry and intact. Ms. Raf King tolerated well, and had no complaints. Ms. Raf King was discharged from [place] [address] in stable condition at 1015. Marcos Anna She is to return on tomorrow, Friday, 18, at 1000, for her next appointment, for daily IV antibiotic therapy. Rox Gooden RN 2018 
9:42 AM

## 2018-11-23 ENCOUNTER — HOSPITAL ENCOUNTER (OUTPATIENT)
Dept: INFUSION THERAPY | Age: 58
Discharge: HOME | End: 2018-11-23
Payer: MEDICAID

## 2018-11-23 VITALS
OXYGEN SATURATION: 97 % | SYSTOLIC BLOOD PRESSURE: 171 MMHG | RESPIRATION RATE: 18 BRPM | HEART RATE: 85 BPM | TEMPERATURE: 98.9 F | DIASTOLIC BLOOD PRESSURE: 88 MMHG

## 2018-11-23 LAB
BACTERIA SPEC CULT: ABNORMAL
GRAM STN SPEC: ABNORMAL
GRAM STN SPEC: ABNORMAL
SERVICE CMNT-IMP: ABNORMAL

## 2018-11-23 PROCEDURE — 74011250636 HC RX REV CODE- 250/636: Performed by: INTERNAL MEDICINE

## 2018-11-23 PROCEDURE — 96375 TX/PRO/DX INJ NEW DRUG ADDON: CPT

## 2018-11-23 PROCEDURE — C1751 CATH, INF, PER/CENT/MIDLINE: HCPCS

## 2018-11-23 PROCEDURE — 96374 THER/PROPH/DIAG INJ IV PUSH: CPT

## 2018-11-23 PROCEDURE — 74011250636 HC RX REV CODE- 250/636

## 2018-11-23 PROCEDURE — 74011000250 HC RX REV CODE- 250: Performed by: INTERNAL MEDICINE

## 2018-11-23 RX ORDER — HEPARIN 100 UNIT/ML
500 SYRINGE INTRAVENOUS AS NEEDED
Status: DISCONTINUED | OUTPATIENT
Start: 2018-11-23 | End: 2018-11-27 | Stop reason: HOSPADM

## 2018-11-23 RX ORDER — SODIUM CHLORIDE 0.9 % (FLUSH) 0.9 %
10-40 SYRINGE (ML) INJECTION AS NEEDED
Status: DISCONTINUED | OUTPATIENT
Start: 2018-11-23 | End: 2018-11-27 | Stop reason: HOSPADM

## 2018-11-23 RX ORDER — HEPARIN 100 UNIT/ML
SYRINGE INTRAVENOUS
Status: COMPLETED
Start: 2018-11-23 | End: 2018-11-23

## 2018-11-23 RX ADMIN — Medication 10 ML: at 09:58

## 2018-11-23 RX ADMIN — Medication 10 ML: at 10:02

## 2018-11-23 RX ADMIN — CEFTRIAXONE SODIUM 2 G: 2 INJECTION, POWDER, FOR SOLUTION INTRAMUSCULAR; INTRAVENOUS at 09:58

## 2018-11-23 RX ADMIN — DAPTOMYCIN 600 MG: 500 INJECTION, POWDER, LYOPHILIZED, FOR SOLUTION INTRAVENOUS at 09:58

## 2018-11-23 RX ADMIN — SODIUM CHLORIDE, PRESERVATIVE FREE 500 UNITS: 5 INJECTION INTRAVENOUS at 10:02

## 2018-11-23 RX ADMIN — SODIUM CHLORIDE, PRESERVATIVE FREE 500 UNITS: 5 INJECTION INTRAVENOUS at 10:01

## 2018-11-23 RX ADMIN — Medication 10 ML: at 10:01

## 2018-11-23 NOTE — PROGRESS NOTES
[facility] Progress Note Date: 2018 Name: Albert Bowser MRN: [id number] : 1960 Daily Dapto/Rocephin Antibiotics Ms. Nigel Santos arrived to [facility] at 1728 ambulatory. Patient states her pain is 3 out of 10 this morning. Ms. Nigel Santos was assessed and education was provided. Patient Vitals for the past 12 hrs: 
 Temp Pulse Resp BP SpO2  
18 0957 98.9 °F (37.2 °C) 85 18 171/88 97 % Pt with RIGHT upper arm double lumen PICC line. Purple lumen flushes without difficulty and positive for blood return. Dressing CDI. No redness, bruising, or swelling noted at site and/ or extremity. Pt denies tenderness. Daptomycin 600 mg was administered IVP over 2 minutes as ordered via purple lumen followed by normal saline flush. Rocephin 2 gms given IVP over 3 minutes via purple lumen. Brisk flush/blood returns noted. Purple lumen heparinized with 500 units of heparin per protocol. Red lumen with brisk blood return. Flushed with Normal Saline 20 ml, and heparinized with 500 units per protocol. Curos applied to both lumens, lumens wrapped with coban, and stockinette applied over picc site Ms. Matute tolerated well without complaints. Ms. Nigel Santos was discharged from [place] [address] in stable condition at 1010. She is to return on 18 at 1000 for her next appointment. Stacie Ho RN 2018 
1010

## 2018-11-24 ENCOUNTER — HOSPITAL ENCOUNTER (OUTPATIENT)
Dept: INFUSION THERAPY | Age: 58
Discharge: HOME | End: 2018-11-24
Payer: MEDICAID

## 2018-11-24 VITALS
SYSTOLIC BLOOD PRESSURE: 170 MMHG | DIASTOLIC BLOOD PRESSURE: 105 MMHG | TEMPERATURE: 98.2 F | HEART RATE: 76 BPM | RESPIRATION RATE: 20 BRPM

## 2018-11-24 PROCEDURE — 74011000250 HC RX REV CODE- 250: Performed by: INTERNAL MEDICINE

## 2018-11-24 PROCEDURE — 96374 THER/PROPH/DIAG INJ IV PUSH: CPT

## 2018-11-24 PROCEDURE — 74011250636 HC RX REV CODE- 250/636

## 2018-11-24 PROCEDURE — 96375 TX/PRO/DX INJ NEW DRUG ADDON: CPT

## 2018-11-24 PROCEDURE — 74011250636 HC RX REV CODE- 250/636: Performed by: INTERNAL MEDICINE

## 2018-11-24 RX ORDER — HEPARIN 100 UNIT/ML
SYRINGE INTRAVENOUS
Status: DISPENSED
Start: 2018-11-24 | End: 2018-11-24

## 2018-11-24 RX ORDER — HEPARIN 100 UNIT/ML
500 SYRINGE INTRAVENOUS AS NEEDED
Status: DISCONTINUED | OUTPATIENT
Start: 2018-11-24 | End: 2018-11-28 | Stop reason: HOSPADM

## 2018-11-24 RX ORDER — SODIUM CHLORIDE 0.9 % (FLUSH) 0.9 %
10-40 SYRINGE (ML) INJECTION AS NEEDED
Status: DISCONTINUED | OUTPATIENT
Start: 2018-11-24 | End: 2018-11-28 | Stop reason: HOSPADM

## 2018-11-24 RX ADMIN — Medication 10 ML: at 09:36

## 2018-11-24 RX ADMIN — CEFTRIAXONE SODIUM 2 G: 2 INJECTION, POWDER, FOR SOLUTION INTRAMUSCULAR; INTRAVENOUS at 09:40

## 2018-11-24 RX ADMIN — Medication 20 ML: at 09:50

## 2018-11-24 RX ADMIN — Medication 10 ML: at 09:35

## 2018-11-24 RX ADMIN — HEPARIN 500 UNITS: 100 SYRINGE at 09:50

## 2018-11-24 RX ADMIN — HEPARIN 500 UNITS: 100 SYRINGE at 09:48

## 2018-11-24 RX ADMIN — DAPTOMYCIN 600 MG: 500 INJECTION, POWDER, LYOPHILIZED, FOR SOLUTION INTRAVENOUS at 09:45

## 2018-11-24 RX ADMIN — Medication 10 ML: at 09:44

## 2018-11-24 RX ADMIN — Medication 20 ML: at 09:48

## 2018-11-24 NOTE — PROGRESS NOTES
[facility] Progress Note Date: 2018 Name: Brooke Perez MRN: [id number] : 1960 Ms. Nikolai Mast arrived in the [facility] today at 0930, in stable condition, here for Daily IV Rocephin (Ceftriaxone) & Daptomycin antibiotic therapies. She was assessed and education was provided. Ms. Dinesh Tabor vitals were reviewed. Visit Vitals BP (!) 166/104 (BP 1 Location: Left arm, BP Patient Position: Sitting) Pulse 69 Temp 98.2 °F (36.8 °C) Resp 20 Ms. Matute's blood pressure reading listed above, was reviewed. Ms. Nikolai Mast stated that she already took her BP medicine this AM, and had no further doses to take today. Her BP was taken again at 0945 (167/108), and again at 0950 (170/105). Ms. Nikolai Mast was strongly encouraged to report to the emergency department for BP management. She stated that she knew that her BP was dangerously high, and also stated that she would think about going to the ED. Her right upper arm double lumen PICC line was noted to be dry and intact. Rocephin (Ceftriaxone) 2 grams IV, was administered over approximately 3 minutes, per order, and without incident. Daptomycin 600 mg IV, was administered over approximately 2 minutes, per order, and without incident. After completion of all ordered medications, both lumens of the PICC line were flushed well per protocol with NS & Heparin, and the PICC line was left dry and intact. Ms. Nikolai Mast tolerated well, and had no complaints. Ms. Nikolai Mast was discharged from [place] [address] in stable condition at [phone]. Edilberto Rios She is to return on tomorrow, , 18, at 0930, for her next appointment, for daily IV antibiotic therapy. Isabel Ramirez RN 2018 
9:42 AM

## 2018-11-25 ENCOUNTER — HOSPITAL ENCOUNTER (OUTPATIENT)
Dept: INFUSION THERAPY | Age: 58
Discharge: HOME | End: 2018-11-25
Payer: MEDICAID

## 2018-11-25 ENCOUNTER — HOSPITAL ENCOUNTER (EMERGENCY)
Age: 58
Discharge: HOME | End: 2018-11-25
Attending: EMERGENCY MEDICINE
Payer: SUBSIDIZED

## 2018-11-25 VITALS
HEIGHT: 64 IN | TEMPERATURE: 98.3 F | BODY MASS INDEX: 36.7 KG/M2 | RESPIRATION RATE: 18 BRPM | OXYGEN SATURATION: 98 % | HEART RATE: 76 BPM | WEIGHT: 215 LBS | SYSTOLIC BLOOD PRESSURE: 170 MMHG | DIASTOLIC BLOOD PRESSURE: 103 MMHG

## 2018-11-25 VITALS
TEMPERATURE: 98.3 F | HEART RATE: 76 BPM | SYSTOLIC BLOOD PRESSURE: 175 MMHG | RESPIRATION RATE: 20 BRPM | DIASTOLIC BLOOD PRESSURE: 96 MMHG

## 2018-11-25 DIAGNOSIS — G89.29 OTHER CHRONIC PAIN: ICD-10-CM

## 2018-11-25 DIAGNOSIS — M46.49 DISCITIS OF MULTIPLE SITES OF SPINE: Primary | ICD-10-CM

## 2018-11-25 LAB
ALBUMIN SERPL-MCNC: 3.1 G/DL (ref 3.4–5)
ALBUMIN/GLOB SERPL: 0.5 {RATIO} (ref 0.8–1.7)
ALP SERPL-CCNC: 126 U/L (ref 45–117)
ALT SERPL-CCNC: 20 U/L (ref 13–56)
ANION GAP SERPL CALC-SCNC: 9 MMOL/L (ref 3–18)
AST SERPL-CCNC: 28 U/L (ref 15–37)
BILIRUB SERPL-MCNC: 0.2 MG/DL (ref 0.2–1)
BUN SERPL-MCNC: 9 MG/DL (ref 7–18)
BUN/CREAT SERPL: 14 (ref 12–20)
CALCIUM SERPL-MCNC: 9.1 MG/DL (ref 8.5–10.1)
CHLORIDE SERPL-SCNC: 99 MMOL/L (ref 100–108)
CO2 SERPL-SCNC: 30 MMOL/L (ref 21–32)
CREAT SERPL-MCNC: 0.66 MG/DL (ref 0.6–1.3)
GLOBULIN SER CALC-MCNC: 5.7 G/DL (ref 2–4)
GLUCOSE SERPL-MCNC: 107 MG/DL (ref 74–99)
POTASSIUM SERPL-SCNC: 3.5 MMOL/L (ref 3.5–5.5)
PROT SERPL-MCNC: 8.8 G/DL (ref 6.4–8.2)
SODIUM SERPL-SCNC: 138 MMOL/L (ref 136–145)

## 2018-11-25 PROCEDURE — 74011250636 HC RX REV CODE- 250/636

## 2018-11-25 PROCEDURE — 96374 THER/PROPH/DIAG INJ IV PUSH: CPT

## 2018-11-25 PROCEDURE — 80053 COMPREHEN METABOLIC PANEL: CPT

## 2018-11-25 PROCEDURE — 74011000250 HC RX REV CODE- 250: Performed by: INTERNAL MEDICINE

## 2018-11-25 PROCEDURE — 74011250636 HC RX REV CODE- 250/636: Performed by: INTERNAL MEDICINE

## 2018-11-25 PROCEDURE — 96375 TX/PRO/DX INJ NEW DRUG ADDON: CPT

## 2018-11-25 PROCEDURE — 74011250636 HC RX REV CODE- 250/636: Performed by: EMERGENCY MEDICINE

## 2018-11-25 PROCEDURE — 99282 EMERGENCY DEPT VISIT SF MDM: CPT

## 2018-11-25 RX ORDER — SODIUM CHLORIDE 0.9 % (FLUSH) 0.9 %
10-40 SYRINGE (ML) INJECTION AS NEEDED
Status: DISCONTINUED | OUTPATIENT
Start: 2018-11-25 | End: 2018-11-30 | Stop reason: HOSPADM

## 2018-11-25 RX ORDER — KETOROLAC TROMETHAMINE 30 MG/ML
30 INJECTION, SOLUTION INTRAMUSCULAR; INTRAVENOUS
Status: COMPLETED | OUTPATIENT
Start: 2018-11-25 | End: 2018-11-25

## 2018-11-25 RX ORDER — NAPROXEN 500 MG/1
500 TABLET ORAL 2 TIMES DAILY WITH MEALS
Qty: 20 TAB | Refills: 0 | Status: SHIPPED | OUTPATIENT
Start: 2018-11-25 | End: 2018-12-05

## 2018-11-25 RX ORDER — HEPARIN 100 UNIT/ML
SYRINGE INTRAVENOUS
Status: DISPENSED
Start: 2018-11-25 | End: 2018-11-25

## 2018-11-25 RX ORDER — HEPARIN 100 UNIT/ML
500 SYRINGE INTRAVENOUS AS NEEDED
Status: DISCONTINUED | OUTPATIENT
Start: 2018-11-25 | End: 2018-11-30 | Stop reason: HOSPADM

## 2018-11-25 RX ADMIN — Medication 20 ML: at 09:44

## 2018-11-25 RX ADMIN — KETOROLAC TROMETHAMINE 30 MG: 30 INJECTION, SOLUTION INTRAMUSCULAR at 08:27

## 2018-11-25 RX ADMIN — Medication 10 ML: at 09:39

## 2018-11-25 RX ADMIN — Medication 10 ML: at 09:33

## 2018-11-25 RX ADMIN — HEPARIN 500 UNITS: 100 SYRINGE at 09:45

## 2018-11-25 RX ADMIN — Medication 10 ML: at 09:34

## 2018-11-25 RX ADMIN — HEPARIN 500 UNITS: 100 SYRINGE at 09:44

## 2018-11-25 RX ADMIN — Medication 20 ML: at 09:45

## 2018-11-25 RX ADMIN — DAPTOMYCIN 600 MG: 500 INJECTION, POWDER, LYOPHILIZED, FOR SOLUTION INTRAVENOUS at 09:40

## 2018-11-25 RX ADMIN — CEFTRIAXONE SODIUM 2 G: 2 INJECTION, POWDER, FOR SOLUTION INTRAMUSCULAR; INTRAVENOUS at 09:35

## 2018-11-25 NOTE — ED TRIAGE NOTES
Patient states she has osteomyelitis and c/o severe back pain. She states she normally takes methadone but hasn't taken it. Se states she was seen here before and given Toradol which helped before.

## 2018-11-25 NOTE — PROGRESS NOTES
[facility] Progress Note Date: 2018 Name: Colletta Kite MRN: [id number] : 1960 Ms. Sebastian Lott arrived in the [facility] today at [id number], in stable condition, here for Daily IV Rocephin (Ceftriaxone) & Daptomycin antibiotic therapies. She was assessed and education was provided. Ms. Sebastian Lott stated that she did go to the ED this morning, for severe back pain & hypertension. She stated that she was given a dose of Toradol for pain. However, she stated that she was told that her blood pressure would not be treated, only monitored for now, since her hypertension was likely caused by her severe pain. Ms. Dashawn Deras vitals were reviewed. Visit Vitals BP (!) 175/96 (BP 1 Location: Left arm, BP Patient Position: Sitting) Pulse 76 Temp 98.3 °F (36.8 °C) Resp 20 Her right upper arm double lumen PICC line was noted to be dry and intact. Rocephin (Ceftriaxone) 2 grams IV, was administered over approximately 3 minutes, per order, and without incident. Daptomycin 600 mg IV, was administered over approximately 2 minutes, per order, and without incident. After completion of all ordered medications, both lumens of the PICC line were flushed well per protocol with NS & Heparin, and the PICC line was left dry and intact. Ms. Sebastian Lott tolerated well, and had no complaints. Ms. Sebastian Lott was discharged from [place] [address] in stable condition at [phone]. Luan Paredes She is to return on tomorrow, Monday, 18, at 0900, for her next appointment, for daily IV antibiotic therapy. Mario Dennis RN 2018 
9:42 AM

## 2018-11-25 NOTE — ED PROVIDER NOTES
EMERGENCY DEPARTMENT HISTORY AND PHYSICAL EXAM 
 
7:38 AM 
 
 
Date: 11/25/2018 Patient Name: Cassius Neri History of Presenting Illness Chief Complaint Patient presents with  Back Pain  Hypertension History Provided By: Patient Chief Complaint: Back Pain Duration:  6 weeks Timing:  Constant and Worsening Location: Lower Back with radiation up spine Quality: Burning and Rudolfo Glee Severity: Severe Modifying Factors: Minimal relief with Naproxen taken at home. Associated Symptoms: hot flashes (occurs at baseline) Additional History (Context): Cassius Neri is a 63 y.o. female with a hx of hepatitis C, IVDA and depression who presents with c/o severe, constant and worsening, burning and sharp lower back pain onset 6 weeks. Pt reports she was recently admitted to the hospital and discharged on November 15th. Pt has been experiencing chronic back pain that started as a sharp and burning pain in her lower back and radiates up her back. She reports that it feels like someone has \"dug their fingers into her spine and is pulling it apart\". Pt does get IV Infusions for pain at 9:30 AM daily, she has not had her infusion yet today. She admits that she is a recovering addict and currently is compliant with Methadone 85 mg daily, says she wants to stay clean. Pt notes that her pain is best controlled with Tylenol and Toradol, she does take Naproxen at home but says that she gets minimal relief. Denies any fever, chills, CP, SOB, abdominal pain, nausea, vomiting, diarrhea, urinary sx and any associated signs or sx. No further concerns or complaints at this time. PCP: Natasha Olsen NP Current Outpatient Medications Medication Sig Dispense Refill  naproxen (NAPROSYN) 500 mg tablet Take 1 Tab by mouth two (2) times daily (with meals) for 10 days. 20 Tab 0  
 docusate sodium (COLACE) 100 mg capsule Take 1 Cap by mouth two (2) times a day.  60 Cap 0  
  cefTRIAXone 2 gram IV syringe 2 gm iv daily through 12/29/18 at [facility]- arranged by case management 30 Dose 0  
 OTHER Daptomycin through 12/29/18 at [facility]- arranged by  1 Each 0  
 hydroCHLOROthiazide (HYDRODIURIL) 12.5 mg tablet Take 1 Tab by mouth daily. 30 Tab 0  
 sertraline (ZOLOFT) 100 mg tablet Take one and a half tablets a day for a total of 150 mg daily. (Patient taking differently: Take 150 mg by mouth daily. Take one and a half tablets a day for a total of 150 mg daily.) 60 Tab 1  
 methadone HCl (METHADONE PO) Take 85 mg by mouth daily.  glecaprevir/pibrentasvir (MAVYRET PO) Take  by mouth. Facility-Administered Medications Ordered in Other Encounters Medication Dose Route Frequency Provider Last Rate Last Dose  sodium chloride (NS) flush 10-40 mL  10-40 mL IntraVENous PRN Darien Magallanes MD   20 mL at 11/25/18 0945  
 heparin (porcine) pf 500 Units  500 Units InterCATHeter PRN Darien Magallanes MD   500 Units at 11/25/18 0945  
 heparin (porcine) pf 100 unit/mL  sodium chloride (NS) flush 10-40 mL  10-40 mL IntraVENous PRN Darien Magallanes MD   20 mL at 11/24/18 4814  heparin (porcine) pf 500 Units  500 Units InterCATHeter PRN Darien Magallanes MD   500 Units at 11/24/18 0173  sodium chloride (NS) flush 10-40 mL  10-40 mL IntraVENous PRN Darien Magallanes MD   10 mL at 11/23/18 1002  heparin (porcine) pf 500 Units  500 Units InterCATHeter PRN Darien Magallanes MD   500 Units at 11/23/18 1002  [START ON 11/29/2018] cefTRIAXone (ROCEPHIN) 2 g in sterile water (preservative free) 20 mL IV syringe  2 g IntraVENous ONCE Ashly Etienne MD      
 [START ON 11/29/2018] DAPTOmycin (CUBICIN) 600 mg in sodium chloride 0.9% 12 mL IV Syringe  600 mg IntraVENous ONCE Ashly Etienne MD      
 sodium chloride (NS) flush 10-40 mL  10-40 mL IntraVENous PRN Darien Magallanes MD   10 mL at 11/22/18 1002  heparin (porcine) pf 500 Units  500 Units InterCATHeter PRN Calvin Rausch MD   500 Units at 11/22/18 1002  [START ON 11/27/2018] cefTRIAXone (ROCEPHIN) 2 g in sterile water (preservative free) 20 mL IV syringe  2 g IntraVENous ONCE Edna Etienne MD      
 [START ON 11/27/2018] DAPTOmycin (CUBICIN) 600 mg in sodium chloride 0.9% 12 mL IV Syringe  600 mg IntraVENous ONCE Mirlande Bower MD      
[address] [place] [address] ON 11/28/2018] cefTRIAXone (ROCEPHIN) 2 g in sterile water (preservative free) 20 mL IV syringe  2 g IntraVENous ONCE Mirlande Bower MD      
 [START ON 11/28/2018] DAPTOmycin (CUBICIN) 600 mg in sodium chloride 0.9% 12 mL IV Syringe  600 mg IntraVENous ONCE Mirlande Bower MD      
[address] [place] [address] ON 11/26/2018] cefTRIAXone (ROCEPHIN) 2 g in sterile water (preservative free) 20 mL IV syringe  2 g IntraVENous ONCE Edna Etienne MD      
[address] [place] [address] ON 11/26/2018] DAPTOmycin (CUBICIN) 600 mg in sodium chloride 0.9% 12 mL IV Syringe  600 mg IntraVENous ONCE Mirlande Bower MD      
 
 
Past History Past Medical History: 
Past Medical History:  
Diagnosis Date  Depression  Hepatitis C   
 Heroin use disorder, moderate, in early remission ([facility] [address] NOW on [facility]  Ill-defined condition Chronic back pain Past Surgical History: 
History reviewed. No pertinent surgical history. Family History: 
Family History Problem Relation Age of Onset  Hypertension Mother  High Cholesterol Mother  COPD Mother  Hypertension Father Social History: 
Social History Tobacco Use  Smoking status: Current Every Day Smoker Packs/day: 0.25 Types: Cigarettes  Smokeless tobacco: Never Used Substance Use Topics  Alcohol use: No  
 Drug use: No  
  Comment: on methadone matinance program  
 
 
Allergies: 
No Known Allergies Review of Systems Review of Systems Constitutional: Negative for activity change, fatigue and fever. HENT: Negative for congestion and rhinorrhea. Eyes: Negative for visual disturbance. Respiratory: Negative for shortness of breath. Cardiovascular: Negative for chest pain and palpitations. Gastrointestinal: Negative for abdominal pain, diarrhea, nausea and vomiting. Genitourinary: Negative for dysuria and hematuria. Musculoskeletal: Positive for back pain. Skin: Negative for rash. Neurological: Negative for dizziness, weakness and light-headedness. All other systems reviewed and are negative. Physical Exam  
 
Visit Vitals BP (!) 170/103 (BP 1 Location: Left arm) Pulse 76 Temp 98.3 °F (36.8 °C) Resp 18 Ht 5' 4\" (1.626 m) Wt 97.5 kg (215 lb) SpO2 98% BMI 36.90 kg/m² Physical Exam  
Constitutional: She is oriented to person, place, and time. She appears well-developed and well-nourished. No distress. Lying down, able to get up and move around the bed HENT:  
Head: Normocephalic and atraumatic. Right Ear: External ear normal.  
Left Ear: External ear normal.  
Nose: Nose normal.  
Mouth/Throat: Oropharynx is clear and moist.  
Eyes: Conjunctivae and EOM are normal. Pupils are equal, round, and reactive to light. No scleral icterus. Neck: Normal range of motion. Neck supple. No JVD present. No tracheal deviation present. No thyromegaly present. Cardiovascular: Normal rate, regular rhythm, normal heart sounds and intact distal pulses. Exam reveals no gallop and no friction rub. No murmur heard. Pulmonary/Chest: Effort normal and breath sounds normal. She exhibits no tenderness. Abdominal: Soft. Bowel sounds are normal. She exhibits no distension. There is no tenderness. There is no rebound and no guarding. Musculoskeletal: Normal range of motion. She exhibits no edema or tenderness. Paraspinal and midline pain from approximately T7-L3, no erythema R arm with PICC in place Lymphadenopathy:  
  She has no cervical adenopathy. Neurological: She is alert and oriented to person, place, and time. No cranial nerve deficit. Coordination normal.  
Ambulates with steady gait Skin: Skin is warm and dry. Psychiatric: She has a normal mood and affect. Her behavior is normal. Judgment and thought content normal.  
Nursing note and vitals reviewed. Diagnostic Study Results Labs - Recent Results (from the past 12 hour(s)) METABOLIC PANEL, COMPREHENSIVE Collection Time: 11/25/18  8:00 AM  
Result Value Ref Range Sodium 138 136 - 145 mmol/L Potassium 3.5 3.5 - 5.5 mmol/L Chloride 99 (L) 100 - 108 mmol/L  
 CO2 30 21 - 32 mmol/L Anion gap 9 3.0 - 18 mmol/L Glucose 107 (H) 74 - 99 mg/dL BUN 9 7.0 - 18 MG/DL Creatinine 0.66 0.6 - 1.3 MG/DL  
 BUN/Creatinine ratio 14 12 - 20 GFR est AA >60 >60 ml/min/1.73m2 GFR est non-AA >60 >60 ml/min/1.73m2 Calcium 9.1 8.5 - 10.1 MG/DL Bilirubin, total 0.2 0.2 - 1.0 MG/DL  
 ALT (SGPT) 20 13 - 56 U/L  
 AST (SGOT) 28 15 - 37 U/L Alk. phosphatase 126 (H) 45 - 117 U/L Protein, total 8.8 (H) 6.4 - 8.2 g/dL Albumin 3.1 (L) 3.4 - 5.0 g/dL Globulin 5.7 (H) 2.0 - 4.0 g/dL A-G Ratio 0.5 (L) 0.8 - 1.7 Radiologic Studies - No orders to display Medical Decision Making I am the first provider for this patient. I reviewed the vital signs, available nursing notes, past medical history, past surgical history, family history and social history. Vital Signs-Reviewed the patient's vital signs. Pulse Oximetry Analysis -  98% on room air (Normal) Cardiac Monitor: 
Rate: 76 Rhythm:  Normal Sinus Rhythm Records Reviewed: Nursing Notes (Time of Review: 7:38 AM) ED Course: Progress Notes, Reevaluation, and Consults: 
 
Provider Notes (Medical Decision Making): MDM Number of Diagnoses or Management Options Diagnosis management comments: Pt is a 59yo female with a hx of Hep C, HTN, active OM/discitis at T7-8, hx of IVDA now on MMTP with AM dosing presents with midthoracic pain that is not responding to home meds. She is working to avoid any dependence forming medications and is asking for toradol. Will check renal fucntion, if reassuring, will give toradol and proceed with close outpatient care. Will continue close f/u for her BP elevation that may be pain related. Tony Enriquez, DO 8:00 AM 
 
 
 
Diagnosis Clinical Impression: 1. Discitis of multiple sites of spine 2. Other chronic pain Disposition: Home. Follow-up Information Follow up With Specialties Details Why Contact Info Obdulio Villafuerte, NP Nurse Practitioner Go in 2 days  [address] [facility] 
[phone] [address] [facility] EMERGENCY DEPT Emergency Medicine Go to As needed, If symptoms worsen [address] [address] 
[phone] Medication List  
  
START taking these medications   
naproxen 500 mg tablet Commonly known as:  NAPROSYN Take 1 Tab by mouth two (2) times daily (with meals) for 10 days. ASK your doctor about these medications   
cefTRIAXone 2 gram IV syringe 2 gm iv daily through 12/29/18 at [address] [place] [address]- arranged by case management 
  
docusate sodium 100 mg capsule Commonly known as:  Orien  Take 1 Cap by mouth two (2) times a day. hydroCHLOROthiazide 12.5 mg tablet Commonly known as:  HYDRODIURIL Take 1 Tab by mouth daily. MAVYRET PO 
  
METHADONE PO 
  
OTHER Daptomycin through 12/29/18 at [address] [place] [address]- arranged by  
  
sertraline 100 mg tablet Commonly known as:  ZOLOFT Take one and a half tablets a day for a total of 150 mg daily. Where to Get Your Medications These medications were sent to [place] [address], [address], [address] [address] Phone:  [phone]  
· naproxen 500 mg tablet 
  
 
_______________________________ Scribe Attestation Hermelinda Aguilar acting as a scribe for and in the presence of Jailene Blackwell MD     
November 25, 2018 at 7:38 AM 
    
Provider Attestation:     
I personally performed the services described in the documentation, reviewed the documentation, as recorded by the scribe in my presence, and it accurately and completely records my words and actions. November 25, 2018 at 7:38 AM - Jailene Blackwell MD   
 
_______________________________

## 2018-11-25 NOTE — DISCHARGE INSTRUCTIONS

## 2018-11-25 NOTE — ED NOTES
I have reviewed discharge instructions with the patient. The patient verbalized understanding. Current Discharge Medication List  
  
START taking these medications Details  
naproxen (NAPROSYN) 500 mg tablet Take 1 Tab by mouth two (2) times daily (with meals) for 10 days. Qty: 20 Tab, Refills: 0 Patient armband removed and shredded

## 2018-11-26 ENCOUNTER — HOSPITAL ENCOUNTER (OUTPATIENT)
Dept: INFUSION THERAPY | Age: 58
Discharge: HOME | End: 2018-11-26
Payer: MEDICAID

## 2018-11-26 VITALS
TEMPERATURE: 98.4 F | DIASTOLIC BLOOD PRESSURE: 90 MMHG | RESPIRATION RATE: 20 BRPM | SYSTOLIC BLOOD PRESSURE: 146 MMHG | HEART RATE: 78 BPM

## 2018-11-26 LAB
ALBUMIN SERPL-MCNC: 3.2 G/DL (ref 3.4–5)
ALBUMIN/GLOB SERPL: 0.6 {RATIO} (ref 0.8–1.7)
ALP SERPL-CCNC: 126 U/L (ref 45–117)
ALT SERPL-CCNC: 19 U/L (ref 13–56)
ANION GAP SERPL CALC-SCNC: 6 MMOL/L (ref 3–18)
AST SERPL-CCNC: 30 U/L (ref 15–37)
BILIRUB DIRECT SERPL-MCNC: <0.1 MG/DL (ref 0–0.2)
BILIRUB SERPL-MCNC: 0.1 MG/DL (ref 0.2–1)
BUN SERPL-MCNC: 12 MG/DL (ref 7–18)
BUN/CREAT SERPL: 18 (ref 12–20)
CALCIUM SERPL-MCNC: 9 MG/DL (ref 8.5–10.1)
CHLORIDE SERPL-SCNC: 99 MMOL/L (ref 100–108)
CK SERPL-CCNC: 120 U/L (ref 26–192)
CO2 SERPL-SCNC: 31 MMOL/L (ref 21–32)
CREAT SERPL-MCNC: 0.67 MG/DL (ref 0.6–1.3)
CRP SERPL-MCNC: 2.2 MG/DL (ref 0–0.3)
GLOBULIN SER CALC-MCNC: 5.8 G/DL (ref 2–4)
GLUCOSE SERPL-MCNC: 80 MG/DL (ref 74–99)
POTASSIUM SERPL-SCNC: 3.4 MMOL/L (ref 3.5–5.5)
PROT SERPL-MCNC: 9 G/DL (ref 6.4–8.2)
SODIUM SERPL-SCNC: 136 MMOL/L (ref 136–145)

## 2018-11-26 PROCEDURE — 96375 TX/PRO/DX INJ NEW DRUG ADDON: CPT

## 2018-11-26 PROCEDURE — 80048 BASIC METABOLIC PNL TOTAL CA: CPT

## 2018-11-26 PROCEDURE — 74011250636 HC RX REV CODE- 250/636

## 2018-11-26 PROCEDURE — 74011250636 HC RX REV CODE- 250/636: Performed by: INTERNAL MEDICINE

## 2018-11-26 PROCEDURE — 80076 HEPATIC FUNCTION PANEL: CPT

## 2018-11-26 PROCEDURE — 96374 THER/PROPH/DIAG INJ IV PUSH: CPT

## 2018-11-26 PROCEDURE — 36415 COLL VENOUS BLD VENIPUNCTURE: CPT

## 2018-11-26 PROCEDURE — 85025 COMPLETE CBC W/AUTO DIFF WBC: CPT

## 2018-11-26 PROCEDURE — 86140 C-REACTIVE PROTEIN: CPT

## 2018-11-26 PROCEDURE — 74011000250 HC RX REV CODE- 250: Performed by: INTERNAL MEDICINE

## 2018-11-26 PROCEDURE — 82550 ASSAY OF CK (CPK): CPT

## 2018-11-26 RX ORDER — HEPARIN 100 UNIT/ML
SYRINGE INTRAVENOUS
Status: DISPENSED
Start: 2018-11-26 | End: 2018-11-26

## 2018-11-26 RX ORDER — HEPARIN 100 UNIT/ML
500 SYRINGE INTRAVENOUS AS NEEDED
Status: DISCONTINUED | OUTPATIENT
Start: 2018-11-26 | End: 2018-11-30 | Stop reason: HOSPADM

## 2018-11-26 RX ORDER — SODIUM CHLORIDE 0.9 % (FLUSH) 0.9 %
10-40 SYRINGE (ML) INJECTION AS NEEDED
Status: DISCONTINUED | OUTPATIENT
Start: 2018-11-26 | End: 2018-11-30 | Stop reason: HOSPADM

## 2018-11-26 RX ADMIN — CEFTRIAXONE SODIUM 2 G: 2 INJECTION, POWDER, FOR SOLUTION INTRAMUSCULAR; INTRAVENOUS at 09:26

## 2018-11-26 RX ADMIN — DAPTOMYCIN 600 MG: 500 INJECTION, POWDER, LYOPHILIZED, FOR SOLUTION INTRAVENOUS at 09:23

## 2018-11-26 RX ADMIN — SODIUM CHLORIDE, PRESERVATIVE FREE 500 UNITS: 5 INJECTION INTRAVENOUS at 09:30

## 2018-11-26 RX ADMIN — Medication 50 ML: at 09:21

## 2018-11-26 NOTE — PROGRESS NOTES
[facility] Progress Note Date: 2018 Name: Carlos Mansfield MRN: [id number] : 1960 Ms. Denton Hinojosa arrived in the [facility] today at 96 86 26, in stable condition, here for Daily IV Rocephin (Ceftriaxone) & Daptomycin antibiotic therapies. She was assessed and education was provided. Ms. Sharmila Callahan vitals were reviewed. Visit Vitals /90 (BP 1 Location: Left arm, BP Patient Position: Sitting) Pulse 78 Temp 98.4 °F (36.9 °C) Resp 20 Her right upper arm double lumen PICC line was noted to be dry and intact. Labs drawn from red lumen after flushing with 10 ml NS, and aspirating 10 ml blood for waste. Flushed with 20 ml NS and 5 ml Heparin. Line clamped and Curos cap applied. Rocephin (Ceftriaxone) 2 grams IV, was administered over approximately 3 minutes, per order, and without incident, in purple lumen. Daptomycin 600 mg IV, was administered over approximately 2 minutes, per order, and without incident, in purple lumen, after flushing with 10 ml NS. After completion of all ordered medications, both lumens of the PICC line were flushed well per protocol with NS & Heparin, and the PICC line was left dry and intact. Ms. Denton Hinojosa tolerated well, and had no complaints. Ms. Denton Hinojosa was discharged from [place] [address] in stable condition at [address] Tonye Cogan She is to return on tomorrow, Tuesday, 18, at 1030, for her next appointment, for daily IV antibiotic therapy. Sheila Hutchins RN 2018 1215 pm

## 2018-11-27 ENCOUNTER — HOSPITAL ENCOUNTER (OUTPATIENT)
Dept: INFUSION THERAPY | Age: 58
Discharge: HOME | End: 2018-11-27
Payer: MEDICAID

## 2018-11-27 VITALS
SYSTOLIC BLOOD PRESSURE: 172 MMHG | OXYGEN SATURATION: 94 % | TEMPERATURE: 98.3 F | DIASTOLIC BLOOD PRESSURE: 89 MMHG | HEART RATE: 89 BPM | RESPIRATION RATE: 20 BRPM

## 2018-11-27 LAB
BASOPHILS # BLD: 0 K/UL (ref 0–0.1)
BASOPHILS NFR BLD: 0 % (ref 0–2)
DIFFERENTIAL METHOD BLD: NORMAL
EOSINOPHIL # BLD: 0.2 K/UL (ref 0–0.4)
EOSINOPHIL NFR BLD: 2 % (ref 0–5)
ERYTHROCYTE [DISTWIDTH] IN BLOOD BY AUTOMATED COUNT: 14.5 % (ref 11.6–14.5)
HCT VFR BLD AUTO: 41.6 % (ref 35–45)
HGB BLD-MCNC: 13.4 G/DL (ref 12–16)
LYMPHOCYTES # BLD: 2 K/UL (ref 0.9–3.6)
LYMPHOCYTES NFR BLD: 23 % (ref 21–52)
MCH RBC QN AUTO: 30.9 PG (ref 24–34)
MCHC RBC AUTO-ENTMCNC: 32.2 G/DL (ref 31–37)
MCV RBC AUTO: 95.9 FL (ref 74–97)
MONOCYTES # BLD: 0.6 K/UL (ref 0.05–1.2)
MONOCYTES NFR BLD: 7 % (ref 3–10)
NEUTS SEG # BLD: 5.9 K/UL (ref 1.8–8)
NEUTS SEG NFR BLD: 68 % (ref 40–73)
PLATELET # BLD AUTO: 368 K/UL (ref 135–420)
PMV BLD AUTO: 10 FL (ref 9.2–11.8)
RBC # BLD AUTO: 4.34 M/UL (ref 4.2–5.3)
WBC # BLD AUTO: 8.7 K/UL (ref 4.6–13.2)

## 2018-11-27 PROCEDURE — 74011250636 HC RX REV CODE- 250/636: Performed by: INTERNAL MEDICINE

## 2018-11-27 PROCEDURE — 74011250636 HC RX REV CODE- 250/636

## 2018-11-27 PROCEDURE — 96374 THER/PROPH/DIAG INJ IV PUSH: CPT

## 2018-11-27 PROCEDURE — 96375 TX/PRO/DX INJ NEW DRUG ADDON: CPT

## 2018-11-27 PROCEDURE — 74011000250 HC RX REV CODE- 250: Performed by: INTERNAL MEDICINE

## 2018-11-27 RX ORDER — SODIUM CHLORIDE 0.9 % (FLUSH) 0.9 %
10-40 SYRINGE (ML) INJECTION AS NEEDED
Status: DISCONTINUED | OUTPATIENT
Start: 2018-11-27 | End: 2018-12-01 | Stop reason: HOSPADM

## 2018-11-27 RX ORDER — HEPARIN 100 UNIT/ML
500 SYRINGE INTRAVENOUS AS NEEDED
Status: DISCONTINUED | OUTPATIENT
Start: 2018-11-27 | End: 2018-12-01 | Stop reason: HOSPADM

## 2018-11-27 RX ORDER — HEPARIN 100 UNIT/ML
SYRINGE INTRAVENOUS
Status: COMPLETED
Start: 2018-11-27 | End: 2018-11-27

## 2018-11-27 RX ADMIN — Medication 20 ML: at 10:43

## 2018-11-27 RX ADMIN — SODIUM CHLORIDE, PRESERVATIVE FREE 500 UNITS: 5 INJECTION INTRAVENOUS at 10:44

## 2018-11-27 RX ADMIN — SODIUM CHLORIDE, PRESERVATIVE FREE 500 UNITS: 5 INJECTION INTRAVENOUS at 10:43

## 2018-11-27 RX ADMIN — DAPTOMYCIN 600 MG: 500 INJECTION, POWDER, LYOPHILIZED, FOR SOLUTION INTRAVENOUS at 10:39

## 2018-11-27 RX ADMIN — CEFTRIAXONE SODIUM 2 G: 2 INJECTION, POWDER, FOR SOLUTION INTRAMUSCULAR; INTRAVENOUS at 10:39

## 2018-11-27 RX ADMIN — Medication 20 ML: at 10:39

## 2018-11-27 NOTE — PROGRESS NOTES
[facility] Progress Note Date: 2018 Name: Carlos Mansfield MRN: [id number] : 1960 Daily Dapto/Rocephin Antibiotics Ms. Denton Hinojosa arrived to [facility] at [phone] ambulatory. Patient states her pain is 4 out of 10 this morning. Ms. Denton Hinojosa was assessed and education was provided. Patient Vitals for the past 12 hrs: 
 Temp Pulse Resp BP SpO2  
18 1040 98.3 °F (36.8 °C) 89 20 172/89 94 % Pt with RIGHT upper arm double lumen PICC line. Purple lumen flushes without difficulty and positive for blood return. Dressing CDI. No redness, bruising, or swelling noted at site and/ or extremity. Pt denies tenderness. Daptomycin 600 mg was administered IVP over 2 minutes as ordered via purple lumen followed by normal saline flush. Rocephin 2 gms given IVP over 3 minutes via red lumen. Brisk flush/blood returns noted. Purple lumen heparinized with 500 units of heparin per protocol. Red lumen with brisk blood return. Flushed with Normal Saline 20 ml, and heparinized with 500 units per protocol. Curos applied to both lumens, lumens wrapped with coban, and stockinette applied over picc site Ms. Matute tolerated well without complaints. Ms. Denton Hinojosa was discharged from [place] [address] in stable condition at 1045. She is to return on 18 at 1000 for her next appointment. Ang Crocker RN 2018 
1045

## 2018-11-28 ENCOUNTER — HOSPITAL ENCOUNTER (OUTPATIENT)
Dept: INFUSION THERAPY | Age: 58
Discharge: HOME | End: 2018-11-28
Payer: MEDICAID

## 2018-11-28 VITALS
SYSTOLIC BLOOD PRESSURE: 149 MMHG | TEMPERATURE: 98 F | DIASTOLIC BLOOD PRESSURE: 89 MMHG | RESPIRATION RATE: 20 BRPM | HEART RATE: 77 BPM | OXYGEN SATURATION: 93 %

## 2018-11-28 PROCEDURE — 96375 TX/PRO/DX INJ NEW DRUG ADDON: CPT

## 2018-11-28 PROCEDURE — 74011250636 HC RX REV CODE- 250/636: Performed by: INTERNAL MEDICINE

## 2018-11-28 PROCEDURE — 74011000250 HC RX REV CODE- 250: Performed by: INTERNAL MEDICINE

## 2018-11-28 PROCEDURE — 96374 THER/PROPH/DIAG INJ IV PUSH: CPT

## 2018-11-28 PROCEDURE — 74011250636 HC RX REV CODE- 250/636

## 2018-11-28 RX ORDER — HEPARIN 100 UNIT/ML
500 SYRINGE INTRAVENOUS AS NEEDED
Status: DISCONTINUED | OUTPATIENT
Start: 2018-11-28 | End: 2018-12-02 | Stop reason: HOSPADM

## 2018-11-28 RX ORDER — HEPARIN 100 UNIT/ML
SYRINGE INTRAVENOUS
Status: COMPLETED
Start: 2018-11-28 | End: 2018-11-28

## 2018-11-28 RX ORDER — SODIUM CHLORIDE 0.9 % (FLUSH) 0.9 %
10-40 SYRINGE (ML) INJECTION AS NEEDED
Status: DISCONTINUED | OUTPATIENT
Start: 2018-11-28 | End: 2018-12-02 | Stop reason: HOSPADM

## 2018-11-28 RX ADMIN — HEPARIN 500 UNITS: 100 SYRINGE at 15:25

## 2018-11-28 RX ADMIN — Medication 10 ML: at 15:19

## 2018-11-28 RX ADMIN — Medication 10 ML: at 15:20

## 2018-11-28 RX ADMIN — CEFTRIAXONE SODIUM 2 G: 2 INJECTION, POWDER, FOR SOLUTION INTRAMUSCULAR; INTRAVENOUS at 15:20

## 2018-11-28 RX ADMIN — Medication 10 ML: at 15:25

## 2018-11-28 RX ADMIN — Medication 10 ML: at 15:24

## 2018-11-28 RX ADMIN — DAPTOMYCIN 600 MG: 500 INJECTION, POWDER, LYOPHILIZED, FOR SOLUTION INTRAVENOUS at 15:21

## 2018-11-28 NOTE — PROGRESS NOTES
[facility] Progress Note Date: 2018 Name: Chloé Malik MRN: [id number] : 1960 Daily dapto/rocephin injections Ms. Yenni Hayden arrived to [facility] at  ambulatory. States back pain 6/10. States she took pain medication prior opic admit. Reassured and positioned for comfort Ms. Yenni Hayden was assessed and education was provided. Ms. Elvira Locke vitals were reviewed. Visit Vitals /89 (BP 1 Location: Left arm, BP Patient Position: Sitting) Pulse 77 Temp 98 °F (36.7 °C) Resp 20 SpO2 93% Pt with RIGHT upper arm double lumen PICC line. Both lumens flushes without difficulty and positive for blood return. Dressing CDI. No redness, bruising, or swelling noted at site and/ or extremity. Pt denies tenderness. Daptomycin 600 mg was administered IVP over 2 minutes as ordered via red lumen followed by normal saline flush. Rocephin 2 gms given IVP over 3 minutes via purple lumen. Brisk flush/blood returns noted. Both lumens heparinized with 500 units of heparin per protocol each Curos applied to both lumens, lumens wrapped with coban, and stockinette applied over picc site Ms. Matute tolerated well without complaints. Patient Vitals for the past 8 hrs: 
 Temp Pulse Resp BP SpO2  
18 1526 98 °F (36.7 °C) 77 20 149/89 93 % Ms. Yenni Hayden was discharged from [place] [address] in stable condition at 1525. She is to return on 18 at 0930 for her next appointment. Amrit Marin RN 2018

## 2018-11-29 ENCOUNTER — HOSPITAL ENCOUNTER (OUTPATIENT)
Dept: INFUSION THERAPY | Age: 58
Discharge: HOME | End: 2018-11-29
Payer: MEDICAID

## 2018-11-29 ENCOUNTER — OFFICE VISIT (OUTPATIENT)
Dept: FAMILY MEDICINE CLINIC | Age: 58
End: 2018-11-29

## 2018-11-29 VITALS
HEIGHT: 64 IN | BODY MASS INDEX: 36.06 KG/M2 | HEART RATE: 80 BPM | TEMPERATURE: 98.5 F | RESPIRATION RATE: 20 BRPM | WEIGHT: 211.2 LBS | DIASTOLIC BLOOD PRESSURE: 64 MMHG | SYSTOLIC BLOOD PRESSURE: 100 MMHG

## 2018-11-29 VITALS
HEART RATE: 81 BPM | SYSTOLIC BLOOD PRESSURE: 168 MMHG | TEMPERATURE: 98.8 F | OXYGEN SATURATION: 94 % | DIASTOLIC BLOOD PRESSURE: 93 MMHG | RESPIRATION RATE: 18 BRPM

## 2018-11-29 DIAGNOSIS — G89.29 CHRONIC BILATERAL THORACIC BACK PAIN: ICD-10-CM

## 2018-11-29 DIAGNOSIS — F33.9 EPISODE OF RECURRENT MAJOR DEPRESSIVE DISORDER, UNSPECIFIED DEPRESSION EPISODE SEVERITY (HCC): Primary | ICD-10-CM

## 2018-11-29 DIAGNOSIS — M54.6 CHRONIC BILATERAL THORACIC BACK PAIN: ICD-10-CM

## 2018-11-29 PROCEDURE — 74011000250 HC RX REV CODE- 250: Performed by: INTERNAL MEDICINE

## 2018-11-29 PROCEDURE — 96375 TX/PRO/DX INJ NEW DRUG ADDON: CPT

## 2018-11-29 PROCEDURE — 74011250636 HC RX REV CODE- 250/636: Performed by: INTERNAL MEDICINE

## 2018-11-29 PROCEDURE — 96374 THER/PROPH/DIAG INJ IV PUSH: CPT

## 2018-11-29 PROCEDURE — C1751 CATH, INF, PER/CENT/MIDLINE: HCPCS

## 2018-11-29 PROCEDURE — 74011250636 HC RX REV CODE- 250/636

## 2018-11-29 RX ORDER — HEPARIN 100 UNIT/ML
500 SYRINGE INTRAVENOUS ONCE
Status: COMPLETED | OUTPATIENT
Start: 2018-11-29 | End: 2018-11-29

## 2018-11-29 RX ORDER — SODIUM CHLORIDE 0.9 % (FLUSH) 0.9 %
10-40 SYRINGE (ML) INJECTION AS NEEDED
Status: DISCONTINUED | OUTPATIENT
Start: 2018-11-29 | End: 2018-12-03 | Stop reason: HOSPADM

## 2018-11-29 RX ORDER — SERTRALINE HYDROCHLORIDE 100 MG/1
100 TABLET, FILM COATED ORAL DAILY
Status: CANCELLED | OUTPATIENT
Start: 2018-11-29

## 2018-11-29 RX ORDER — METHOCARBAMOL 500 MG/1
500 TABLET, FILM COATED ORAL 3 TIMES DAILY
Qty: 45 TAB | Refills: 0 | Status: SHIPPED | OUTPATIENT
Start: 2018-11-29 | End: 2019-01-07 | Stop reason: SDUPTHER

## 2018-11-29 RX ADMIN — DAPTOMYCIN 600 MG: 500 INJECTION, POWDER, LYOPHILIZED, FOR SOLUTION INTRAVENOUS at 09:39

## 2018-11-29 RX ADMIN — CEFTRIAXONE SODIUM 2 G: 2 INJECTION, POWDER, FOR SOLUTION INTRAMUSCULAR; INTRAVENOUS at 09:39

## 2018-11-29 RX ADMIN — Medication 40 ML: at 09:38

## 2018-11-29 RX ADMIN — HEPARIN 500 UNITS: 100 SYRINGE at 09:38

## 2018-11-29 NOTE — PROGRESS NOTES
MARIA EUGENIA  Mg Gage is a 63 y.o. female  Chief Complaint   Patient presents with    Depression     states she is doing better but feel like med should be increased a little. Denies desire to hurt or harm herself or others. Denies suicidal ideations. Denies being hurt or harmed. Reports she does think the medicine is working but states she is just not there yet. Reports she is seeing a counselor with her last visit this morning. Reports she has a lot going on  with new medical concerns and she is worried. Requesting something for pain that is in the middle of her back as she is feeling a pulling. Reports she was diagnosed with osteomyelitis and she has had this back infection that is causing her pain. Reports the pain is sharp pain. Reports the pain is 9/10 and is constant. Reports movement causes her back pain to increase. Requesting a shot of Toradol as this did help the last time she was here. Denies taking narcotic pain medications or any illegal substances. Willing to try a muscle relaxer but declines flexeril as she has had this in the past and this generally does not work for her. Past Medical History  Past Medical History:   Diagnosis Date    Depression     Hepatitis C     Heroin use disorder, moderate, in early remission ([facility] [address]     NOW on [facility]    Ill-defined condition     Chronic back pain       Surgical History  No past surgical history on file. Medications  Current Outpatient Medications   Medication Sig Dispense Refill    methocarbamol (ROBAXIN) 500 mg tablet Take 1 Tab by mouth three (3) times daily. 45 Tab 0    naproxen (NAPROSYN) 500 mg tablet Take 1 Tab by mouth two (2) times daily (with meals) for 10 days. 20 Tab 0    docusate sodium (COLACE) 100 mg capsule Take 1 Cap by mouth two (2) times a day.  60 Cap 0    cefTRIAXone 2 gram IV syringe 2 gm iv daily through 12/29/18 at [facility]- arranged by case management 30 Dose 0    OTHER Daptomycin through 12/29/18 at [facility]- arranged by  1 Each 0    hydroCHLOROthiazide (HYDRODIURIL) 12.5 mg tablet Take 1 Tab by mouth daily. 30 Tab 0    glecaprevir/pibrentasvir (MAVYRET PO) Take  by mouth.  methadone HCl (METHADONE PO) Take 85 mg by mouth daily.        Facility-Administered Medications Ordered in Other Visits   Medication Dose Route Frequency Provider Last Rate Last Dose    sodium chloride (NS) flush 10-40 mL  10-40 mL IntraVENous PRN Linh Mansfield MD   40 mL at 11/29/18 0938    [START ON 12/6/2018] cefTRIAXone (ROCEPHIN) 2 g in sterile water (preservative free) 20 mL IV syringe  2 g IntraVENous ONCE Desiree Etienne MD        [START ON 12/6/2018] DAPTOmycin (CUBICIN) 600 mg in sodium chloride 0.9% 12 mL IV Syringe  600 mg IntraVENous ONCE Sofya Fuller MD        heparin (porcine) pf 500 Units  500 Units InterCATHeter PRN Linh Mansfield MD   500 Units at 11/28/18 1525    sodium chloride (NS) flush 10-40 mL  10-40 mL IntraVENous PRN Linh Mansfield MD   10 mL at 11/28/18 1525    [START ON 12/1/2018] DAPTOmycin (CUBICIN) 600 mg in sodium chloride 0.9% 12 mL IV Syringe  600 mg IntraVENous ONCE MD Sergey Cordon.Jazmin Preston ON 12/1/2018] cefTRIAXone (ROCEPHIN) 2 g in sterile water (preservative free) 20 mL IV syringe  2 g IntraVENous ONCE MD Sergey Cordon.Jazmin Preston ON 12/2/2018] cefTRIAXone (ROCEPHIN) 2 g in sterile water (preservative free) 20 mL IV syringe  2 g IntraVENous ONCE MD Sergey Cordon.Jazmin Preston ON 12/2/2018] DAPTOmycin (CUBICIN) 600 mg in sodium chloride 0.9% 12 mL IV Syringe  600 mg IntraVENous ONCE MD Sergey Cordon.Jazmin Preston ON 12/3/2018] cefTRIAXone (ROCEPHIN) 2 g in sterile water (preservative free) 20 mL IV syringe  2 g IntraVENous ONCE Desiree Etienne MD  Ruddy Alba ON 12/3/2018] DAPTOmycin (CUBICIN) 600 mg in sodium chloride 0.9% 12 mL IV Syringe  600 mg IntraVENous ONCE Sofya Fuller MD        [START ON 12/4/2018] DAPTOmycin (CUBICIN) 600 mg in sodium chloride 0.9% 12 mL IV Syringe  600 mg IntraVENous ONCE Angle Etienne MD        [START ON 12/4/2018] cefTRIAXone (ROCEPHIN) 2 g in sterile water (preservative free) 20 mL IV syringe  2 g IntraVENous ONCE Angle Etienne MD        [START ON 12/5/2018] cefTRIAXone (ROCEPHIN) 2 g in sterile water (preservative free) 20 mL IV syringe  2 g IntraVENous ONCE Angle Etienne MD  Morna Fortemily ON 12/5/2018] DAPTOmycin (CUBICIN) 600 mg in sodium chloride 0.9% 12 mL IV Syringe  600 mg IntraVENous ONCE Paloma Rene MD        heparin (porcine) pf 500 Units  500 Units InterCATHeter PRN Rita Galeana MD   500 Units at 11/27/18 1044    sodium chloride (NS) flush 10-40 mL  10-40 mL IntraVENous PRN Rita Galeana MD   20 mL at 11/27/18 1043    [START ON 11/30/2018] cefTRIAXone (ROCEPHIN) 2 g in sterile water (preservative free) 20 mL IV syringe  2 g IntraVENous ONCE Kianna Etienne MD  Morna Fortemily ON 11/30/2018] DAPTOmycin (CUBICIN) 600 mg in sodium chloride 0.9% 12 mL IV Syringe  600 mg IntraVENous ONCE Paloma Rene MD        heparin (porcine) pf 500 Units  500 Units InterCATHeter PRN Rita Galeana MD   500 Units at 11/26/18 0930    sodium chloride (NS) flush 10-40 mL  10-40 mL IntraVENous PRN Rita Galeana MD   50 mL at 11/26/18 3199    sodium chloride (NS) flush 10-40 mL  10-40 mL IntraVENous PRN Rita Galeana MD   20 mL at 11/25/18 0945    heparin (porcine) pf 500 Units  500 Units InterCATHeter PRN Rita Galeana MD   500 Units at 11/25/18 0945       Allergies  No Known Allergies    Family History  Family History   Problem Relation Age of Onset    Hypertension Mother     High Cholesterol Mother     COPD Mother     Hypertension Father        Social History  Social History     Socioeconomic History    Marital status: SINGLE     Spouse name: Not on file    Number of children: Not on file    Years of education: Not on file    Highest education level: Not on file   Social Needs    Financial resource strain: Not on file    Food insecurity - worry: Not on file    Food insecurity - inability: Not on file    Transportation needs - medical: Not on file   Shoefitr needs - non-medical: Not on file   Occupational History    Not on file   Tobacco Use    Smoking status: Current Every Day Smoker     Packs/day: 0.25     Types: Cigarettes    Smokeless tobacco: Never Used   Substance and Sexual Activity    Alcohol use: No    Drug use: No     Comment: on methadone matinance program    Sexual activity: No   Other Topics Concern    Not on file   Social History Narrative    Not on file       Problem List  Patient Active Problem List   Diagnosis Code    Severe obesity (BMI 35.0-39. 9) E66.01    Discitis of thoracic region M46.44       Review of Systems  Review of Systems   Constitutional: Negative for chills and fever. Respiratory: Negative for shortness of breath. Cardiovascular: Negative for chest pain. Musculoskeletal: Positive for back pain. Psychiatric/Behavioral: Positive for depression. Negative for substance abuse and suicidal ideas. Vital Signs  Vitals:    11/29/18 1337   BP: 100/64   Pulse: 80   Resp: 20   Temp: 98.5 °F (36.9 °C)   TempSrc: Oral   Weight: 211 lb 3.2 oz (95.8 kg)   Height: 5' 4\" (1.626 m)   PainSc:   9   PainLoc: Back       Physical Exam  Physical Exam   Constitutional: She is oriented to person, place, and time. HENT:   Mouth/Throat: Oropharynx is clear and moist.   Eyes: Pupils are equal, round, and reactive to light. Cardiovascular: Normal rate and regular rhythm. Pulmonary/Chest: Effort normal and breath sounds normal.   Musculoskeletal: She exhibits tenderness. She exhibits no edema. Neurological: She is alert and oriented to person, place, and time. Skin: Skin is warm and dry. 2 small scabs to left upper back area and one small scab to mid back area. No redness or bruising to back. Point tenderness to mid upper lumbar area.    Psychiatric: She has a normal mood and affect. Her behavior is normal. Judgment and thought content normal.       Diagnostics  Orders Placed This Encounter    REFERRAL TO PSYCHIATRY     Referral Priority:   Routine     Referral Type:   Behavioral Health     Referral Reason:   Specialty Services Required     Referred to Provider:   Trey Martinez MD    methocarbamol (ROBAXIN) 500 mg tablet     Sig: Take 1 Tab by mouth three (3) times daily. Dispense:  45 Tab     Refill:  0       Results  Results for orders placed or performed during the hospital encounter of 11/26/18   HEPATIC FUNCTION PANEL   Result Value Ref Range    Protein, total 9.0 (H) 6.4 - 8.2 g/dL    Albumin 3.2 (L) 3.4 - 5.0 g/dL    Globulin 5.8 (H) 2.0 - 4.0 g/dL    A-G Ratio 0.6 (L) 0.8 - 1.7      Bilirubin, total 0.1 (L) 0.2 - 1.0 MG/DL    Bilirubin, direct <0.1 0.0 - 0.2 MG/DL    Alk.  phosphatase 126 (H) 45 - 117 U/L    AST (SGOT) 30 15 - 37 U/L    ALT (SGPT) 19 13 - 56 U/L   METABOLIC PANEL, BASIC   Result Value Ref Range    Sodium 136 136 - 145 mmol/L    Potassium 3.4 (L) 3.5 - 5.5 mmol/L    Chloride 99 (L) 100 - 108 mmol/L    CO2 31 21 - 32 mmol/L    Anion gap 6 3.0 - 18 mmol/L    Glucose 80 74 - 99 mg/dL    BUN 12 7.0 - 18 MG/DL    Creatinine 0.67 0.6 - 1.3 MG/DL    BUN/Creatinine ratio 18 12 - 20      GFR est AA >60 >60 ml/min/1.73m2    GFR est non-AA >60 >60 ml/min/1.73m2    Calcium 9.0 8.5 - 10.1 MG/DL   CK   Result Value Ref Range     26 - 192 U/L   C REACTIVE PROTEIN, QT   Result Value Ref Range    C-Reactive protein 2.2 (H) 0 - 0.3 mg/dL   CBC WITH AUTOMATED DIFF   Result Value Ref Range    WBC 8.7 4.6 - 13.2 K/uL    RBC 4.34 4.20 - 5.30 M/uL    HGB 13.4 12.0 - 16.0 g/dL    HCT 41.6 35.0 - 45.0 %    MCV 95.9 74.0 - 97.0 FL    MCH 30.9 24.0 - 34.0 PG    MCHC 32.2 31.0 - 37.0 g/dL    RDW 14.5 11.6 - 14.5 %    PLATELET 572 987 - 450 K/uL    MPV 10.0 9.2 - 11.8 FL    NEUTROPHILS 68 40 - 73 %    LYMPHOCYTES 23 21 - 52 %    MONOCYTES 7 3 - 10 % EOSINOPHILS 2 0 - 5 %    BASOPHILS 0 0 - 2 %    ABS. NEUTROPHILS 5.9 1.8 - 8.0 K/UL    ABS. LYMPHOCYTES 2.0 0.9 - 3.6 K/UL    ABS. MONOCYTES 0.6 0.05 - 1.2 K/UL    ABS. EOSINOPHILS 0.2 0.0 - 0.4 K/UL    ABS. BASOPHILS 0.0 0.0 - 0.1 K/UL    DF AUTOMATED           Assessment and Plan  Diagnoses and all orders for this visit:    1. Episode of recurrent major depressive disorder, unspecified depression episode severity ([facility] [address]  -     REFERRAL TO PSYCHIATRY    2. Chronic bilateral thoracic back pain  -     methocarbamol (ROBAXIN) 500 mg tablet; Take 1 Tab by mouth three (3) times daily. Continue to see psychology. List of area psychologist given. After care summary printed and reviewed with patient. Plan reviewed with patient. Questions answered. Patient verbalized understanding of plan and is in agreement with plan. Patient to follow up in one month or earlier if symptoms worsen.      Lucius Cadet, FNP-C

## 2018-11-29 NOTE — PROGRESS NOTES
[facility] Progress Note Date: 2018 Name: Melvina Suero MRN: [id number] : 1960 Daily dapto/rocephin injections Ms. Peyton Joshi arrived to [facility] at [address] [facility] ambulatory. Ms. Peyton Joshi was assessed and education was provided. Ms. Roman Logan vitals were reviewed. Visit Vitals BP (!) 168/93 (BP 1 Location: Left arm, BP Patient Position: Sitting) Pulse 81 Temp 98.8 °F (37.1 °C) Resp 18 SpO2 94% Pt with RIGHT upper arm double lumen PICC line. Both lumens flushes without difficulty and positive for blood return. No redness, bruising, or swelling noted at site and/ or extremity. Pt denies tenderness. Daptomycin 600 mg was administered IVP over 2 minutes as ordered via red lumen followed by normal saline flush. Rocephin 2 gms given IVP over 3 minutes via purple lumen. Brisk flush/blood returns noted. Dressing changed per protocol. Stat lock, end caps and stockinet changed as well. Both lumens heparinized with 500 units of heparin per protocol each Curos applied to both lumens, lumens wrapped with coban, and stockinette applied over picc site Ms. Matute tolerated well without complaints. Ms. Peyton Joshi was discharged from [place] [address] in stable condition at 1000. She is to return on 18 at 1430 for her next appointment. Grace Alonso RN 2018

## 2018-11-29 NOTE — PROGRESS NOTES
Chief Complaint   Patient presents with    Depression     states she is doing better but feel like med should be increased a little. 1. Have you been to the ER, urgent care clinic since your last visit? Hospitalized since your last visit? No    2. Have you seen or consulted any other health care providers outside of the [address] [place] [address] since your last visit? Include any pap smears or colon screening.  No

## 2018-11-30 ENCOUNTER — HOSPITAL ENCOUNTER (OUTPATIENT)
Dept: INFUSION THERAPY | Age: 58
Discharge: HOME | End: 2018-11-30
Payer: MEDICAID

## 2018-11-30 VITALS
HEART RATE: 88 BPM | RESPIRATION RATE: 18 BRPM | SYSTOLIC BLOOD PRESSURE: 126 MMHG | TEMPERATURE: 98 F | OXYGEN SATURATION: 96 % | DIASTOLIC BLOOD PRESSURE: 78 MMHG

## 2018-11-30 PROCEDURE — 74011250636 HC RX REV CODE- 250/636: Performed by: INTERNAL MEDICINE

## 2018-11-30 PROCEDURE — 96374 THER/PROPH/DIAG INJ IV PUSH: CPT

## 2018-11-30 PROCEDURE — 74011000250 HC RX REV CODE- 250: Performed by: INTERNAL MEDICINE

## 2018-11-30 PROCEDURE — 74011250636 HC RX REV CODE- 250/636

## 2018-11-30 PROCEDURE — 96375 TX/PRO/DX INJ NEW DRUG ADDON: CPT

## 2018-11-30 RX ORDER — HEPARIN 100 UNIT/ML
500 SYRINGE INTRAVENOUS ONCE
Status: COMPLETED | OUTPATIENT
Start: 2018-11-30 | End: 2018-11-30

## 2018-11-30 RX ORDER — SODIUM CHLORIDE 0.9 % (FLUSH) 0.9 %
10-40 SYRINGE (ML) INJECTION AS NEEDED
Status: COMPLETED | OUTPATIENT
Start: 2018-11-30 | End: 2018-11-30

## 2018-11-30 RX ADMIN — HEPARIN 500 UNITS: 100 SYRINGE at 14:34

## 2018-11-30 RX ADMIN — DAPTOMYCIN 600 MG: 500 INJECTION, POWDER, LYOPHILIZED, FOR SOLUTION INTRAVENOUS at 14:33

## 2018-11-30 RX ADMIN — HEPARIN 500 UNITS: 100 SYRINGE at 14:33

## 2018-11-30 RX ADMIN — CEFTRIAXONE SODIUM 2 G: 2 INJECTION, POWDER, FOR SOLUTION INTRAMUSCULAR; INTRAVENOUS at 14:33

## 2018-11-30 RX ADMIN — Medication 20 ML: at 14:34

## 2018-11-30 NOTE — PROGRESS NOTES
[facility] Progress Note Date: 2018 Name: Gopal Wright MRN: [id number] : 1960 Daily dapto/rocephin injections Ms. Qing Triplett arrived to [facility] at 1430 ambulatory. Ms. Qing Triplett was assessed and education was provided. Ms. Kelsi Reveles vitals were reviewed. Visit Vitals /78 (BP 1 Location: Left arm) Pulse 88 Temp 98 °F (36.7 °C) Resp 18 SpO2 96% Pt with RIGHT upper arm double lumen PICC line. Both lumens flushes without difficulty and positive for blood return. No redness, bruising, or swelling noted at site and/ or extremity. Pt denies tenderness. Daptomycin 600 mg was administered IVP over 2 minutes as ordered via red lumen followed by normal saline flush. Rocephin 2 gms given IVP over 3 minutes via purple lumen. Brisk flush/blood returns noted. Both lumens heparinized with 500 units of heparin per protocol each Curos applied to both lumens, lumens wrapped with coban, and stockinette applied over picc site Ms. Matute tolerated well without complaints. Ms. Qing Triplett was discharged from [place] [address] in stable condition at [phone] 90. She is to return on 18 at 1000 for her next appointment. Nadine Beasley RN 2018

## 2018-12-01 ENCOUNTER — HOSPITAL ENCOUNTER (OUTPATIENT)
Dept: INFUSION THERAPY | Age: 58
Discharge: HOME | End: 2018-12-01
Payer: SUBSIDIZED

## 2018-12-01 VITALS
DIASTOLIC BLOOD PRESSURE: 92 MMHG | OXYGEN SATURATION: 95 % | TEMPERATURE: 98.6 F | HEART RATE: 76 BPM | RESPIRATION RATE: 18 BRPM | SYSTOLIC BLOOD PRESSURE: 185 MMHG

## 2018-12-01 PROCEDURE — 74011000250 HC RX REV CODE- 250: Performed by: INTERNAL MEDICINE

## 2018-12-01 PROCEDURE — 74011250636 HC RX REV CODE- 250/636: Performed by: INTERNAL MEDICINE

## 2018-12-01 PROCEDURE — 96374 THER/PROPH/DIAG INJ IV PUSH: CPT

## 2018-12-01 PROCEDURE — 96375 TX/PRO/DX INJ NEW DRUG ADDON: CPT

## 2018-12-01 PROCEDURE — 74011250636 HC RX REV CODE- 250/636

## 2018-12-01 RX ORDER — HEPARIN 100 UNIT/ML
500 SYRINGE INTRAVENOUS AS NEEDED
Status: DISCONTINUED | OUTPATIENT
Start: 2018-12-01 | End: 2018-12-05 | Stop reason: HOSPADM

## 2018-12-01 RX ORDER — SODIUM CHLORIDE 0.9 % (FLUSH) 0.9 %
5-10 SYRINGE (ML) INJECTION AS NEEDED
Status: DISCONTINUED | OUTPATIENT
Start: 2018-12-01 | End: 2018-12-05 | Stop reason: HOSPADM

## 2018-12-01 RX ORDER — HEPARIN 100 UNIT/ML
SYRINGE INTRAVENOUS
Status: DISPENSED
Start: 2018-12-01 | End: 2018-12-01

## 2018-12-01 RX ADMIN — Medication 10 ML: at 09:31

## 2018-12-01 RX ADMIN — Medication 10 ML: at 09:24

## 2018-12-01 RX ADMIN — CEFTRIAXONE SODIUM 2 G: 2 INJECTION, POWDER, FOR SOLUTION INTRAMUSCULAR; INTRAVENOUS at 09:25

## 2018-12-01 RX ADMIN — SODIUM CHLORIDE, PRESERVATIVE FREE 500 UNITS: 5 INJECTION INTRAVENOUS at 09:31

## 2018-12-01 RX ADMIN — DAPTOMYCIN 600 MG: 500 INJECTION, POWDER, LYOPHILIZED, FOR SOLUTION INTRAVENOUS at 09:24

## 2018-12-01 RX ADMIN — Medication 10 ML: at 09:30

## 2018-12-01 RX ADMIN — SODIUM CHLORIDE, PRESERVATIVE FREE 500 UNITS: 5 INJECTION INTRAVENOUS at 09:30

## 2018-12-01 RX ADMIN — Medication 10 ML: at 09:25

## 2018-12-01 NOTE — PROGRESS NOTES
[facility] Progress Note Date: 2018 Name: Gopal Wright MRN: [id number] : 1960 Daily dapto/rocephin injections Ms. Qing Triplett arrived to [facility] at 56  ambulatory. Ms. Qing Triplett was assessed and education was provided. Ms. Kelsi Reveles vitals were reviewed. Visit Vitals BP (!) 185/92 Pulse 76 Temp 98.6 °F (37 °C) Resp 18 SpO2 95% Breastfeeding? No  
 
 
Pt with RIGHT upper arm double lumen PICC line. Both lumens flushes without difficulty and positive for blood return. No redness, bruising, or swelling noted at site and/ or extremity. Pt denies tenderness. Daptomycin 600 mg was administered IVP over 2 minutes as ordered via purple followed by normal saline flush. Rocephin 2 gms given IVP over 3 minutes via red lumen. Brisk flush/blood returns noted. Both lumens heparinized with 500 units of heparin per protocol each. Curos applied to both lumens, lumens wrapped with coban, and stockinette applied over picc site Ms. Matute tolerated well without complaints. Ms. Qing Triplett was discharged from [place] [address] in stable condition at [address] [place] [address]. She is to return on 18 at 1000 for her next appointment. Jocy Yeboah RN 2018

## 2018-12-02 ENCOUNTER — HOSPITAL ENCOUNTER (OUTPATIENT)
Dept: INFUSION THERAPY | Age: 58
Discharge: HOME | End: 2018-12-02
Payer: SUBSIDIZED

## 2018-12-02 VITALS
DIASTOLIC BLOOD PRESSURE: 86 MMHG | HEART RATE: 79 BPM | RESPIRATION RATE: 18 BRPM | TEMPERATURE: 98 F | OXYGEN SATURATION: 93 % | SYSTOLIC BLOOD PRESSURE: 161 MMHG

## 2018-12-02 PROCEDURE — 74011250636 HC RX REV CODE- 250/636: Performed by: INTERNAL MEDICINE

## 2018-12-02 PROCEDURE — 96375 TX/PRO/DX INJ NEW DRUG ADDON: CPT

## 2018-12-02 PROCEDURE — 74011250636 HC RX REV CODE- 250/636

## 2018-12-02 PROCEDURE — 74011000250 HC RX REV CODE- 250: Performed by: INTERNAL MEDICINE

## 2018-12-02 PROCEDURE — 96374 THER/PROPH/DIAG INJ IV PUSH: CPT

## 2018-12-02 RX ORDER — SODIUM CHLORIDE 0.9 % (FLUSH) 0.9 %
10-40 SYRINGE (ML) INJECTION AS NEEDED
Status: DISCONTINUED | OUTPATIENT
Start: 2018-12-02 | End: 2018-12-06 | Stop reason: HOSPADM

## 2018-12-02 RX ORDER — HEPARIN 100 UNIT/ML
500 SYRINGE INTRAVENOUS AS NEEDED
Status: DISCONTINUED | OUTPATIENT
Start: 2018-12-02 | End: 2018-12-06 | Stop reason: HOSPADM

## 2018-12-02 RX ORDER — HEPARIN 100 UNIT/ML
SYRINGE INTRAVENOUS
Status: DISPENSED
Start: 2018-12-02 | End: 2018-12-02

## 2018-12-02 RX ADMIN — SODIUM CHLORIDE, PRESERVATIVE FREE 500 UNITS: 5 INJECTION INTRAVENOUS at 09:30

## 2018-12-02 RX ADMIN — Medication 10 ML: at 09:25

## 2018-12-02 RX ADMIN — DAPTOMYCIN 600 MG: 500 INJECTION, POWDER, LYOPHILIZED, FOR SOLUTION INTRAVENOUS at 09:25

## 2018-12-02 RX ADMIN — Medication 10 ML: at 09:30

## 2018-12-02 RX ADMIN — Medication 10 ML: at 09:27

## 2018-12-02 RX ADMIN — Medication 10 ML: at 09:24

## 2018-12-02 RX ADMIN — SODIUM CHLORIDE, PRESERVATIVE FREE 500 UNITS: 5 INJECTION INTRAVENOUS at 09:31

## 2018-12-02 RX ADMIN — CEFTRIAXONE SODIUM 2 G: 2 INJECTION, POWDER, FOR SOLUTION INTRAMUSCULAR; INTRAVENOUS at 09:26

## 2018-12-02 NOTE — PROGRESS NOTES
[facility] Progress Note Date: 2018 Name: Sarah Mccray MRN: [id number] : 1960 Daily dapto/rocephin injections Ms. Tess Rees arrived to [facility]. Ms. Tess Rees was assessed and education was provided. Ms. Catrina Morales vitals were reviewed. Visit Vitals /86 Pulse 79 Temp 98 °F (36.7 °C) Resp 18 SpO2 93% Pt with RIGHT upper arm double lumen PICC line. Both lumens flushes without difficulty and positive for blood return. No redness, bruising, or swelling noted at site and/ or extremity. Pt denies tenderness. Daptomycin 600 mg was administered IVP over 2 minutes as ordered via red followed by normal saline flush. Rocephin 2 gms given IVP over 3 minutes via purple lumen. Brisk flush/blood returns noted. Both lumens heparinized with 500 units of heparin per protocol each. Curos applied to both lumens, lumens wrapped with coban, and stockinette applied over picc site Ms. Matute tolerated well without complaints. Ms. Tess Rees was discharged from [place] [address] in stable condition at [address] [place] [address]. She is to return on 12/3/18 at 56 for her next appointment. Tu Mccabe RN 2018

## 2018-12-03 ENCOUNTER — HOSPITAL ENCOUNTER (OUTPATIENT)
Dept: INFUSION THERAPY | Age: 58
Discharge: HOME | End: 2018-12-03
Payer: SUBSIDIZED

## 2018-12-03 VITALS
SYSTOLIC BLOOD PRESSURE: 159 MMHG | RESPIRATION RATE: 18 BRPM | DIASTOLIC BLOOD PRESSURE: 93 MMHG | HEART RATE: 81 BPM | OXYGEN SATURATION: 94 % | TEMPERATURE: 98 F

## 2018-12-03 LAB
ALBUMIN SERPL-MCNC: 3.3 G/DL (ref 3.4–5)
ALBUMIN/GLOB SERPL: 0.7 {RATIO} (ref 0.8–1.7)
ALP SERPL-CCNC: 125 U/L (ref 45–117)
ALT SERPL-CCNC: 35 U/L (ref 13–56)
ANION GAP SERPL CALC-SCNC: 6 MMOL/L (ref 3–18)
AST SERPL-CCNC: 52 U/L (ref 15–37)
BASO+EOS+MONOS # BLD AUTO: 0.6 K/UL (ref 0–2.3)
BASO+EOS+MONOS # BLD AUTO: 9 % (ref 0.1–17)
BILIRUB DIRECT SERPL-MCNC: <0.1 MG/DL (ref 0–0.2)
BILIRUB SERPL-MCNC: 0.2 MG/DL (ref 0.2–1)
BUN SERPL-MCNC: 13 MG/DL (ref 7–18)
BUN/CREAT SERPL: 19 (ref 12–20)
CALCIUM SERPL-MCNC: 8.7 MG/DL (ref 8.5–10.1)
CHLORIDE SERPL-SCNC: 100 MMOL/L (ref 100–108)
CK SERPL-CCNC: 392 U/L (ref 26–192)
CO2 SERPL-SCNC: 29 MMOL/L (ref 21–32)
CREAT SERPL-MCNC: 0.67 MG/DL (ref 0.6–1.3)
DIFFERENTIAL METHOD BLD: NORMAL
ERYTHROCYTE [DISTWIDTH] IN BLOOD BY AUTOMATED COUNT: 13.7 % (ref 11.5–14.5)
GLOBULIN SER CALC-MCNC: 4.9 G/DL (ref 2–4)
GLUCOSE SERPL-MCNC: 109 MG/DL (ref 74–99)
HCT VFR BLD AUTO: 39.6 % (ref 36–48)
HGB BLD-MCNC: 13 G/DL (ref 12–16)
LYMPHOCYTES # BLD: 2 K/UL (ref 1.1–5.9)
LYMPHOCYTES NFR BLD: 28 % (ref 14–44)
MCH RBC QN AUTO: 31.2 PG (ref 25–35)
MCHC RBC AUTO-ENTMCNC: 32.8 G/DL (ref 31–37)
MCV RBC AUTO: 95 FL (ref 78–102)
NEUTS SEG # BLD: 4.8 K/UL (ref 1.8–9.5)
NEUTS SEG NFR BLD: 64 % (ref 40–70)
PLATELET # BLD AUTO: 310 K/UL (ref 140–440)
POTASSIUM SERPL-SCNC: 3.7 MMOL/L (ref 3.5–5.5)
PROT SERPL-MCNC: 8.2 G/DL (ref 6.4–8.2)
RBC # BLD AUTO: 4.17 M/UL (ref 4.1–5.1)
SODIUM SERPL-SCNC: 135 MMOL/L (ref 136–145)
WBC # BLD AUTO: 7.4 K/UL (ref 4.5–13)

## 2018-12-03 PROCEDURE — 74011250636 HC RX REV CODE- 250/636: Performed by: INTERNAL MEDICINE

## 2018-12-03 PROCEDURE — 74011000250 HC RX REV CODE- 250: Performed by: INTERNAL MEDICINE

## 2018-12-03 PROCEDURE — 85025 COMPLETE CBC W/AUTO DIFF WBC: CPT

## 2018-12-03 PROCEDURE — 74011250636 HC RX REV CODE- 250/636

## 2018-12-03 PROCEDURE — 96375 TX/PRO/DX INJ NEW DRUG ADDON: CPT

## 2018-12-03 PROCEDURE — 96374 THER/PROPH/DIAG INJ IV PUSH: CPT

## 2018-12-03 PROCEDURE — 86140 C-REACTIVE PROTEIN: CPT

## 2018-12-03 PROCEDURE — 82550 ASSAY OF CK (CPK): CPT

## 2018-12-03 PROCEDURE — 80048 BASIC METABOLIC PNL TOTAL CA: CPT

## 2018-12-03 PROCEDURE — 80076 HEPATIC FUNCTION PANEL: CPT

## 2018-12-03 RX ORDER — SODIUM CHLORIDE 0.9 % (FLUSH) 0.9 %
10-40 SYRINGE (ML) INJECTION AS NEEDED
Status: DISCONTINUED | OUTPATIENT
Start: 2018-12-03 | End: 2018-12-07 | Stop reason: HOSPADM

## 2018-12-03 RX ORDER — HEPARIN 100 UNIT/ML
SYRINGE INTRAVENOUS
Status: COMPLETED
Start: 2018-12-03 | End: 2018-12-03

## 2018-12-03 RX ORDER — HEPARIN 100 UNIT/ML
500 SYRINGE INTRAVENOUS AS NEEDED
Status: DISCONTINUED | OUTPATIENT
Start: 2018-12-03 | End: 2018-12-07 | Stop reason: HOSPADM

## 2018-12-03 RX ADMIN — HEPARIN 500 UNITS: 100 SYRINGE at 10:55

## 2018-12-03 RX ADMIN — Medication 10 ML: at 10:54

## 2018-12-03 RX ADMIN — Medication 10 ML: at 10:55

## 2018-12-03 RX ADMIN — Medication 10 ML: at 10:59

## 2018-12-03 RX ADMIN — Medication 30 ML: at 10:50

## 2018-12-03 RX ADMIN — CEFTRIAXONE SODIUM 2 G: 2 INJECTION, POWDER, FOR SOLUTION INTRAMUSCULAR; INTRAVENOUS at 10:56

## 2018-12-03 RX ADMIN — HEPARIN 500 UNITS: 100 SYRINGE at 11:00

## 2018-12-03 RX ADMIN — DAPTOMYCIN 600 MG: 500 INJECTION, POWDER, LYOPHILIZED, FOR SOLUTION INTRAVENOUS at 10:52

## 2018-12-03 NOTE — PROGRESS NOTES
[facility] Progress Note Date: December 3, 2018 Name: Leane Gitelman MRN: [id number] : 1960 Daily dapto/rocephin injections Ms. Raf King arrived to [facility] at [id number] ambulatory. States back pain 6/10. States she took pain medication prior opic admit. Reassured and positioned for comfort. Took bp medication today. States MD aware of hypertension and is following same Ms. Raf King was assessed and education was provided. Ms. Cory Cogan vitals were reviewed. Visit Vitals BP (!) 159/93 Pulse 81 Temp 98 °F (36.7 °C) Resp 18 SpO2 94% Pt with RIGHT upper arm double lumen PICC line. Both lumens flushes without difficulty and positive for blood return. Dressing CDI. No redness, bruising, or swelling noted at site and/ or extremity. Pt denies tenderness. Blood sample obtained from red lumen for cbc,bmp,hepatic panel,crp and cpk Daptomycin 600 mg was administered IVP over 2 minutes as ordered via red lumen followed by normal saline flush. Rocephin 2 gms given IVP over 3 minutes via purple lumen. Brisk flush/blood returns noted. Both lumens heparinized with 500 units of heparin per protocol each Curos applied to both lumens, lumens wrapped with coban, and stockinette applied over picc site Ms. Matute tolerated well without complaints. Patient Vitals for the past 8 hrs: 
 Temp Pulse Resp BP SpO2  
18 1040 98 °F (36.7 °C) 81 18 (!) 159/93 94 % Ms. Raf King was discharged from [place] [address] in stable condition at 1100. She is to return on 18 at 1000 for her next appointment. Alexis Alaniz RN December 3, 2018

## 2018-12-04 ENCOUNTER — HOSPITAL ENCOUNTER (OUTPATIENT)
Dept: INFUSION THERAPY | Age: 58
Discharge: HOME | End: 2018-12-04
Payer: SUBSIDIZED

## 2018-12-04 VITALS
SYSTOLIC BLOOD PRESSURE: 154 MMHG | HEART RATE: 80 BPM | OXYGEN SATURATION: 95 % | TEMPERATURE: 97.7 F | DIASTOLIC BLOOD PRESSURE: 104 MMHG | RESPIRATION RATE: 18 BRPM

## 2018-12-04 LAB — CRP SERPL-MCNC: 5.6 MG/DL (ref 0–0.3)

## 2018-12-04 PROCEDURE — 74011000250 HC RX REV CODE- 250: Performed by: INTERNAL MEDICINE

## 2018-12-04 PROCEDURE — 74011250636 HC RX REV CODE- 250/636

## 2018-12-04 PROCEDURE — 74011250636 HC RX REV CODE- 250/636: Performed by: INTERNAL MEDICINE

## 2018-12-04 PROCEDURE — 96374 THER/PROPH/DIAG INJ IV PUSH: CPT

## 2018-12-04 PROCEDURE — 96375 TX/PRO/DX INJ NEW DRUG ADDON: CPT

## 2018-12-04 RX ORDER — HEPARIN 100 UNIT/ML
500 SYRINGE INTRAVENOUS ONCE
Status: COMPLETED | OUTPATIENT
Start: 2018-12-04 | End: 2018-12-04

## 2018-12-04 RX ORDER — SODIUM CHLORIDE 0.9 % (FLUSH) 0.9 %
10-40 SYRINGE (ML) INJECTION AS NEEDED
Status: COMPLETED | OUTPATIENT
Start: 2018-12-04 | End: 2018-12-04

## 2018-12-04 RX ADMIN — CEFTRIAXONE SODIUM 2 G: 2 INJECTION, POWDER, FOR SOLUTION INTRAMUSCULAR; INTRAVENOUS at 10:09

## 2018-12-04 RX ADMIN — Medication 20 ML: at 10:10

## 2018-12-04 RX ADMIN — HEPARIN 500 UNITS: 100 SYRINGE at 10:11

## 2018-12-04 RX ADMIN — DAPTOMYCIN 600 MG: 500 INJECTION, POWDER, LYOPHILIZED, FOR SOLUTION INTRAVENOUS at 10:09

## 2018-12-04 NOTE — PROGRESS NOTES
[facility] Progress Note Date: 2018 Name: Gopal Wright MRN: [id number] : 1960 Daily dapto/rocephin injections Ms. Qing Triplett arrived to [facility] at 1000 ambulatory. States back pain 9/10. States she took pain medication prior opic admit. Reassured and positioned for comfort. Ms. Qing Triplett was assessed and education was provided. Ms. Kelsi Reveles vitals were reviewed. Visit Vitals BP (!) 154/104 Pulse 80 Temp 97.7 °F (36.5 °C) Resp 18 SpO2 95% Pt with RIGHT upper arm double lumen PICC line. Both lumens flushes without difficulty and positive for blood return. Dressing CDI. No redness, bruising, or swelling noted at site and/ or extremity. Pt denies tenderness. Daptomycin 600 mg was administered IVP over 2 minutes as ordered via red lumen followed by normal saline flush. Rocephin 2 gms given IVP over 3 minutes via purple lumen. Brisk flush/blood returns noted. Both lumens heparinized with 500 units of heparin per protocol each Curos applied to both lumens, lumens wrapped with coban, and stockinette applied over picc site Ms. Matute tolerated well without complaints. Patient Vitals for the past 8 hrs: 
 Temp Pulse Resp BP SpO2  
18 1007 97.7 °F (36.5 °C) 80 18 (!) 154/104 95 % Ms. Qing Triplett was discharged from [place] [address] in stable condition at 1020. She is to return on 18 at 0900 for her next appointment. Nadine Beasley RN 2018

## 2018-12-05 ENCOUNTER — HOSPITAL ENCOUNTER (OUTPATIENT)
Dept: INFUSION THERAPY | Age: 58
Discharge: HOME | End: 2018-12-05
Payer: SUBSIDIZED

## 2018-12-05 RX ORDER — HEPARIN 100 UNIT/ML
500 SYRINGE INTRAVENOUS ONCE
Status: ACTIVE | OUTPATIENT
Start: 2018-12-05 | End: 2018-12-05

## 2018-12-05 RX ORDER — SODIUM CHLORIDE 0.9 % (FLUSH) 0.9 %
10-40 SYRINGE (ML) INJECTION AS NEEDED
Status: DISCONTINUED | OUTPATIENT
Start: 2018-12-05 | End: 2019-01-05 | Stop reason: HOSPADM

## 2018-12-06 ENCOUNTER — HOSPITAL ENCOUNTER (OUTPATIENT)
Dept: INFUSION THERAPY | Age: 58
Discharge: HOME | End: 2018-12-06
Payer: SUBSIDIZED

## 2018-12-06 VITALS
HEART RATE: 80 BPM | TEMPERATURE: 97.5 F | SYSTOLIC BLOOD PRESSURE: 178 MMHG | OXYGEN SATURATION: 94 % | RESPIRATION RATE: 18 BRPM | DIASTOLIC BLOOD PRESSURE: 92 MMHG

## 2018-12-06 DIAGNOSIS — F33.9 EPISODE OF RECURRENT MAJOR DEPRESSIVE DISORDER, UNSPECIFIED DEPRESSION EPISODE SEVERITY (HCC): ICD-10-CM

## 2018-12-06 PROCEDURE — 74011000250 HC RX REV CODE- 250: Performed by: INTERNAL MEDICINE

## 2018-12-06 PROCEDURE — 96374 THER/PROPH/DIAG INJ IV PUSH: CPT

## 2018-12-06 PROCEDURE — 74011250636 HC RX REV CODE- 250/636: Performed by: INTERNAL MEDICINE

## 2018-12-06 PROCEDURE — C1751 CATH, INF, PER/CENT/MIDLINE: HCPCS

## 2018-12-06 PROCEDURE — 74011250636 HC RX REV CODE- 250/636

## 2018-12-06 PROCEDURE — 96375 TX/PRO/DX INJ NEW DRUG ADDON: CPT

## 2018-12-06 RX ORDER — HEPARIN 100 UNIT/ML
500 SYRINGE INTRAVENOUS ONCE
Status: COMPLETED | OUTPATIENT
Start: 2018-12-06 | End: 2018-12-06

## 2018-12-06 RX ORDER — SERTRALINE HYDROCHLORIDE 100 MG/1
TABLET, FILM COATED ORAL
Qty: 45 TAB | Refills: 0 | Status: SHIPPED | OUTPATIENT
Start: 2018-12-06 | End: 2019-01-07 | Stop reason: SDUPTHER

## 2018-12-06 RX ORDER — HEPARIN 100 UNIT/ML
SYRINGE INTRAVENOUS
Status: DISPENSED
Start: 2018-12-06 | End: 2018-12-06

## 2018-12-06 RX ORDER — SODIUM CHLORIDE 0.9 % (FLUSH) 0.9 %
10-40 SYRINGE (ML) INJECTION AS NEEDED
Status: COMPLETED | OUTPATIENT
Start: 2018-12-06 | End: 2018-12-06

## 2018-12-06 RX ADMIN — Medication 30 ML: at 10:15

## 2018-12-06 RX ADMIN — CEFTRIAXONE SODIUM 2 G: 2 INJECTION, POWDER, FOR SOLUTION INTRAMUSCULAR; INTRAVENOUS at 10:15

## 2018-12-06 RX ADMIN — HEPARIN 500 UNITS: 100 SYRINGE at 10:15

## 2018-12-06 RX ADMIN — DAPTOMYCIN 600 MG: 500 INJECTION, POWDER, LYOPHILIZED, FOR SOLUTION INTRAVENOUS at 10:14

## 2018-12-06 NOTE — PROGRESS NOTES
[facility] Progress Note Date: 2018 Name: Fabrice [place] MRN: [id number] : 1960 Daily dapto/rocephin injections Ms. Sherlyn Gao arrived to [facility] at 1000 ambulatory. Ms. Sherlyn Gao was assessed and education was provided. Ms. Cordell Carias vitals were reviewed. Visit Vitals BP (!) 178/92 Pulse 80 Temp 97.5 °F (36.4 °C) Resp 18 SpO2 94% Pt with RIGHT upper arm double lumen PICC line. Both lumens flushes without difficulty and positive for blood return. Dressing CDI. No redness, bruising, or swelling noted at site and/ or extremity. Pt denies tenderness. DRSG changed per protocol using sterile technique. Daptomycin 600 mg was administered IVP over 2 minutes as ordered via red lumen followed by normal saline flush. Rocephin 2 gms given IVP over 3 minutes via purple lumen. Brisk flush/blood returns noted. Both lumens heparinized with 500 units of heparin per protocol each Curos applied to both lumens, lumens wrapped with coban, and stockinette applied over picc site Ms. Matute tolerated well without complaints. Patient Vitals for the past 8 hrs: 
 Temp Pulse Resp BP SpO2  
18 1001 97.5 °F (36.4 °C) 80 18 (!) 178/92 94 % Ms. Sherlyn Gao was discharged from [place] [address] in stable condition at 1020. She is to return on 18 at 1000 for her next appointment. Isaias Marmolejo RN 2018

## 2018-12-07 ENCOUNTER — HOSPITAL ENCOUNTER (OUTPATIENT)
Dept: INFUSION THERAPY | Age: 58
Discharge: HOME | End: 2018-12-07
Payer: SUBSIDIZED

## 2018-12-07 VITALS
RESPIRATION RATE: 18 BRPM | TEMPERATURE: 98.3 F | HEART RATE: 79 BPM | DIASTOLIC BLOOD PRESSURE: 94 MMHG | OXYGEN SATURATION: 96 % | SYSTOLIC BLOOD PRESSURE: 166 MMHG

## 2018-12-07 PROCEDURE — 74011250636 HC RX REV CODE- 250/636: Performed by: INTERNAL MEDICINE

## 2018-12-07 PROCEDURE — 74011000250 HC RX REV CODE- 250: Performed by: INTERNAL MEDICINE

## 2018-12-07 PROCEDURE — 74011250636 HC RX REV CODE- 250/636

## 2018-12-07 PROCEDURE — 96374 THER/PROPH/DIAG INJ IV PUSH: CPT

## 2018-12-07 PROCEDURE — 96375 TX/PRO/DX INJ NEW DRUG ADDON: CPT

## 2018-12-07 RX ORDER — HEPARIN 100 UNIT/ML
500 SYRINGE INTRAVENOUS AS NEEDED
Status: DISCONTINUED | OUTPATIENT
Start: 2018-12-07 | End: 2018-12-11 | Stop reason: HOSPADM

## 2018-12-07 RX ORDER — SODIUM CHLORIDE 0.9 % (FLUSH) 0.9 %
10-40 SYRINGE (ML) INJECTION AS NEEDED
Status: DISCONTINUED | OUTPATIENT
Start: 2018-12-07 | End: 2018-12-11 | Stop reason: HOSPADM

## 2018-12-07 RX ADMIN — WATER 2 G: 1 INJECTION INTRAMUSCULAR; INTRAVENOUS; SUBCUTANEOUS at 10:08

## 2018-12-07 RX ADMIN — Medication 40 ML: at 10:08

## 2018-12-07 RX ADMIN — DAPTOMYCIN 600 MG: 500 INJECTION, POWDER, LYOPHILIZED, FOR SOLUTION INTRAVENOUS at 10:07

## 2018-12-07 RX ADMIN — SODIUM CHLORIDE, PRESERVATIVE FREE 500 UNITS: 5 INJECTION INTRAVENOUS at 10:12

## 2018-12-07 RX ADMIN — SODIUM CHLORIDE, PRESERVATIVE FREE 500 UNITS: 5 INJECTION INTRAVENOUS at 10:13

## 2018-12-07 RX ADMIN — Medication 20 ML: at 10:14

## 2018-12-07 NOTE — PROGRESS NOTES
[facility] Progress Note Date: 2018 Name: Gopal [place] MRN: [id number] : 1960 Daily Dapto/Rocephin Antibiotics Ms. Qing Triplett arrived to [facility] at [phone] ambulatory. Patient states her pain is 5 out of 10 this morning. Ms. Qing Triplett was assessed and education was provided. Patient Vitals for the past 12 hrs: 
 Temp Pulse Resp BP SpO2  
18 1006 98.3 °F (36.8 °C) 79 18 (!) 166/94 96 % Pt with RIGHT upper arm double lumen PICC line. Both lumens flush without difficulty and positive for blood return. Dressing CDI. No redness, bruising, or swelling noted at site and/ or extremity. Pt denies tenderness. Daptomycin 600 mg was administered IVP over 2 minutes as ordered via purple lumen followed by normal saline flush. Rocephin 2 gms given IVP over 3 minutes via red lumen. Brisk flush/blood returns noted. Both lumens flushed with Normal Saline 20 ml, and heparinized with 500 units per protocol. Curos applied to both lumens, lumens wrapped with coban, and stockinette applied over picc site Ms. Matute tolerated well without complaints. Ms. Qing Triplett was discharged from [place] [address] in stable condition at 1015. She is to return on 18 at 0900 for her next appointment. Jimmy Goins RN 2018 [address]

## 2018-12-08 ENCOUNTER — HOSPITAL ENCOUNTER (OUTPATIENT)
Dept: INFUSION THERAPY | Age: 58
Discharge: HOME | End: 2018-12-08
Payer: SUBSIDIZED

## 2018-12-08 VITALS
DIASTOLIC BLOOD PRESSURE: 90 MMHG | RESPIRATION RATE: 18 BRPM | TEMPERATURE: 97.8 F | HEART RATE: 79 BPM | OXYGEN SATURATION: 95 % | SYSTOLIC BLOOD PRESSURE: 150 MMHG

## 2018-12-08 PROCEDURE — 96374 THER/PROPH/DIAG INJ IV PUSH: CPT

## 2018-12-08 PROCEDURE — 74011000250 HC RX REV CODE- 250: Performed by: INTERNAL MEDICINE

## 2018-12-08 PROCEDURE — 74011250636 HC RX REV CODE- 250/636

## 2018-12-08 PROCEDURE — 74011250636 HC RX REV CODE- 250/636: Performed by: INTERNAL MEDICINE

## 2018-12-08 PROCEDURE — 96375 TX/PRO/DX INJ NEW DRUG ADDON: CPT

## 2018-12-08 RX ORDER — HEPARIN 100 UNIT/ML
500 SYRINGE INTRAVENOUS ONCE
Status: COMPLETED | OUTPATIENT
Start: 2018-12-08 | End: 2018-12-08

## 2018-12-08 RX ORDER — SODIUM CHLORIDE 0.9 % (FLUSH) 0.9 %
5-10 SYRINGE (ML) INJECTION AS NEEDED
Status: COMPLETED | OUTPATIENT
Start: 2018-12-08 | End: 2018-12-08

## 2018-12-08 RX ORDER — HEPARIN 100 UNIT/ML
SYRINGE INTRAVENOUS
Status: DISPENSED
Start: 2018-12-08 | End: 2018-12-08

## 2018-12-08 RX ADMIN — Medication 10 ML: at 09:28

## 2018-12-08 RX ADMIN — HEPARIN 500 UNITS: 100 SYRINGE at 09:26

## 2018-12-08 RX ADMIN — DAPTOMYCIN 600 MG: 500 INJECTION, POWDER, LYOPHILIZED, FOR SOLUTION INTRAVENOUS at 09:25

## 2018-12-08 RX ADMIN — CEFTRIAXONE SODIUM 2 G: 2 INJECTION, POWDER, FOR SOLUTION INTRAMUSCULAR; INTRAVENOUS at 09:26

## 2018-12-08 NOTE — PROGRESS NOTES
[facility] Progress Note Date: 2018 Name: Fabrice [place] MRN: [id number] : 1960 Daily Dapto/Rocephin Antibiotics Ms. Sherlyn Gao arrived to [facility] at 0546 ambulatory. Patient states her pain is 5 out of 10 this morning. Ms. Sherlyn Gao was assessed and education was provided. Patient Vitals for the past 12 hrs: 
 Temp Pulse Resp BP SpO2  
18 0924 97.8 °F (36.6 °C) 79 18 150/90 95 % Pt with RIGHT upper arm double lumen PICC line. Both lumens flush without difficulty and positive for blood return. Dressing CDI. No redness, bruising, or swelling noted at site and/ or extremity. Pt denies tenderness. Daptomycin 600 mg was administered IVP over 2 minutes as ordered via purple lumen followed by normal saline flush. Rocephin 2 gms given IVP over 3 minutes via red lumen. Brisk flush/blood returns noted. Both lumens flushed with Normal Saline 20 ml, and heparinized with 500 units per protocol. Curos applied to both lumens, lumens wrapped with coban, and stockinette applied over picc site Ms. Matute tolerated well without complaints. Ms. Sherlyn Gao was discharged from [place] [address] in stable condition at [address] [place] [address]. She is to return on 18 at 0900 for her next appointment. Isaias Marmolejo RN 2018

## 2018-12-09 ENCOUNTER — HOSPITAL ENCOUNTER (OUTPATIENT)
Dept: INFUSION THERAPY | Age: 58
Discharge: HOME | End: 2018-12-09
Payer: SUBSIDIZED

## 2018-12-09 VITALS
RESPIRATION RATE: 18 BRPM | SYSTOLIC BLOOD PRESSURE: 165 MMHG | HEART RATE: 81 BPM | DIASTOLIC BLOOD PRESSURE: 92 MMHG | OXYGEN SATURATION: 97 % | TEMPERATURE: 97.5 F

## 2018-12-09 PROCEDURE — 96375 TX/PRO/DX INJ NEW DRUG ADDON: CPT

## 2018-12-09 PROCEDURE — 74011000250 HC RX REV CODE- 250: Performed by: INTERNAL MEDICINE

## 2018-12-09 PROCEDURE — 74011250636 HC RX REV CODE- 250/636

## 2018-12-09 PROCEDURE — 96374 THER/PROPH/DIAG INJ IV PUSH: CPT

## 2018-12-09 PROCEDURE — 74011250636 HC RX REV CODE- 250/636: Performed by: INTERNAL MEDICINE

## 2018-12-09 RX ORDER — HEPARIN 100 UNIT/ML
500 SYRINGE INTRAVENOUS ONCE
Status: COMPLETED | OUTPATIENT
Start: 2018-12-09 | End: 2018-12-09

## 2018-12-09 RX ORDER — SODIUM CHLORIDE 0.9 % (FLUSH) 0.9 %
10-40 SYRINGE (ML) INJECTION AS NEEDED
Status: COMPLETED | OUTPATIENT
Start: 2018-12-09 | End: 2018-12-09

## 2018-12-09 RX ORDER — HEPARIN 100 UNIT/ML
SYRINGE INTRAVENOUS
Status: DISPENSED
Start: 2018-12-09 | End: 2018-12-09

## 2018-12-09 RX ADMIN — HEPARIN 500 UNITS: 100 SYRINGE at 09:32

## 2018-12-09 RX ADMIN — Medication 40 ML: at 09:32

## 2018-12-09 RX ADMIN — HEPARIN 500 UNITS: 100 SYRINGE at 09:33

## 2018-12-09 RX ADMIN — DAPTOMYCIN 600 MG: 500 INJECTION, POWDER, LYOPHILIZED, FOR SOLUTION INTRAVENOUS at 09:32

## 2018-12-09 RX ADMIN — CEFTRIAXONE SODIUM 2 G: 2 INJECTION, POWDER, FOR SOLUTION INTRAMUSCULAR; INTRAVENOUS at 09:32

## 2018-12-09 NOTE — PROGRESS NOTES
[facility] Progress Note Date: 2018 Name: Fernanda Ulloa MRN: [id number] : 1960 Daily Dapto/Rocephin Antibiotics Ms. Bella Suggs arrived to [facility] at 8424 ambulatory. Patient states her pain is 3 out of 10 this morning. Ms. Bella Suggs was assessed and education was provided. Patient Vitals for the past 12 hrs: 
 Temp Pulse Resp BP SpO2  
18 0928 97.5 °F (36.4 °C) 81 18 (!) 165/92 97 % Pt with RIGHT upper arm double lumen PICC line. Both lumens flush without difficulty and positive for blood return. Dressing CDI. No redness, bruising, or swelling noted at site and/ or extremity. Pt denies tenderness. Daptomycin 600 mg was administered IVP over 2 minutes as ordered via purple lumen followed by normal saline flush. Rocephin 2 gms given IVP over 3 minutes via red lumen. Brisk flush/blood returns noted. Both lumens flushed with Normal Saline 20 ml, and heparinized with 500 units per protocol. Curos applied to both lumens, lumens wrapped with coban, and stockinette applied over picc site Ms. Matute tolerated well without complaints. Ms. Bella Suggs was discharged from [place] [address] in stable condition at [address] She is to return on 12/10/18 at 0900 for her next appointment. Gregoria Catherine RN 2018

## 2018-12-10 ENCOUNTER — HOSPITAL ENCOUNTER (OUTPATIENT)
Dept: INFUSION THERAPY | Age: 58
Discharge: HOME | End: 2018-12-10
Payer: SUBSIDIZED

## 2018-12-10 VITALS
SYSTOLIC BLOOD PRESSURE: 138 MMHG | HEART RATE: 78 BPM | TEMPERATURE: 97.7 F | RESPIRATION RATE: 18 BRPM | OXYGEN SATURATION: 96 % | DIASTOLIC BLOOD PRESSURE: 90 MMHG

## 2018-12-10 LAB
ALBUMIN SERPL-MCNC: 3.5 G/DL (ref 3.4–5)
ALBUMIN/GLOB SERPL: 0.8 {RATIO} (ref 0.8–1.7)
ALP SERPL-CCNC: 110 U/L (ref 45–117)
ALT SERPL-CCNC: 17 U/L (ref 13–56)
ANION GAP SERPL CALC-SCNC: 6 MMOL/L (ref 3–18)
AST SERPL-CCNC: 18 U/L (ref 15–37)
BACTERIA SPEC CULT: ABNORMAL
BASO+EOS+MONOS # BLD AUTO: 0.7 K/UL (ref 0–2.3)
BASO+EOS+MONOS # BLD AUTO: 9 % (ref 0.1–17)
BILIRUB DIRECT SERPL-MCNC: 0.1 MG/DL (ref 0–0.2)
BILIRUB SERPL-MCNC: 0.5 MG/DL (ref 0.2–1)
BUN SERPL-MCNC: 15 MG/DL (ref 7–18)
BUN/CREAT SERPL: 24 (ref 12–20)
CALCIUM SERPL-MCNC: 8.9 MG/DL (ref 8.5–10.1)
CHLORIDE SERPL-SCNC: 98 MMOL/L (ref 100–108)
CK SERPL-CCNC: 90 U/L (ref 26–192)
CO2 SERPL-SCNC: 31 MMOL/L (ref 21–32)
CREAT SERPL-MCNC: 0.62 MG/DL (ref 0.6–1.3)
CRP SERPL-MCNC: 2.2 MG/DL (ref 0–0.3)
DIFFERENTIAL METHOD BLD: NORMAL
ERYTHROCYTE [DISTWIDTH] IN BLOOD BY AUTOMATED COUNT: 13.7 % (ref 11.5–14.5)
FUNGUS SMEAR,FNGSMR: ABNORMAL
GLOBULIN SER CALC-MCNC: 4.6 G/DL (ref 2–4)
GLUCOSE SERPL-MCNC: 60 MG/DL (ref 74–99)
HCT VFR BLD AUTO: 39.9 % (ref 36–48)
HGB BLD-MCNC: 13.3 G/DL (ref 12–16)
LYMPHOCYTES # BLD: 2.1 K/UL (ref 1.1–5.9)
LYMPHOCYTES NFR BLD: 26 % (ref 14–44)
MCH RBC QN AUTO: 31.2 PG (ref 25–35)
MCHC RBC AUTO-ENTMCNC: 33.3 G/DL (ref 31–37)
MCV RBC AUTO: 93.7 FL (ref 78–102)
NEUTS SEG # BLD: 5.3 K/UL (ref 1.8–9.5)
NEUTS SEG NFR BLD: 66 % (ref 40–70)
PLATELET # BLD AUTO: 349 K/UL (ref 140–440)
POTASSIUM SERPL-SCNC: 4 MMOL/L (ref 3.5–5.5)
PROT SERPL-MCNC: 8.1 G/DL (ref 6.4–8.2)
RBC # BLD AUTO: 4.26 M/UL (ref 4.1–5.1)
SERVICE CMNT-IMP: ABNORMAL
SODIUM SERPL-SCNC: 135 MMOL/L (ref 136–145)
WBC # BLD AUTO: 8.1 K/UL (ref 4.5–13)

## 2018-12-10 PROCEDURE — 96375 TX/PRO/DX INJ NEW DRUG ADDON: CPT

## 2018-12-10 PROCEDURE — 74011000250 HC RX REV CODE- 250: Performed by: INTERNAL MEDICINE

## 2018-12-10 PROCEDURE — 80076 HEPATIC FUNCTION PANEL: CPT

## 2018-12-10 PROCEDURE — 86140 C-REACTIVE PROTEIN: CPT

## 2018-12-10 PROCEDURE — 74011250636 HC RX REV CODE- 250/636: Performed by: INTERNAL MEDICINE

## 2018-12-10 PROCEDURE — 82550 ASSAY OF CK (CPK): CPT

## 2018-12-10 PROCEDURE — 96374 THER/PROPH/DIAG INJ IV PUSH: CPT

## 2018-12-10 PROCEDURE — 74011250636 HC RX REV CODE- 250/636

## 2018-12-10 PROCEDURE — 80048 BASIC METABOLIC PNL TOTAL CA: CPT

## 2018-12-10 PROCEDURE — 85025 COMPLETE CBC W/AUTO DIFF WBC: CPT

## 2018-12-10 RX ORDER — HEPARIN 100 UNIT/ML
SYRINGE INTRAVENOUS
Status: DISPENSED
Start: 2018-12-10 | End: 2018-12-10

## 2018-12-10 RX ORDER — HEPARIN 100 UNIT/ML
500 SYRINGE INTRAVENOUS AS NEEDED
Status: DISCONTINUED | OUTPATIENT
Start: 2018-12-10 | End: 2018-12-14 | Stop reason: HOSPADM

## 2018-12-10 RX ORDER — SODIUM CHLORIDE 0.9 % (FLUSH) 0.9 %
10-40 SYRINGE (ML) INJECTION AS NEEDED
Status: DISCONTINUED | OUTPATIENT
Start: 2018-12-10 | End: 2018-12-14 | Stop reason: HOSPADM

## 2018-12-10 RX ADMIN — CEFTRIAXONE SODIUM 2 G: 2 INJECTION, POWDER, FOR SOLUTION INTRAMUSCULAR; INTRAVENOUS at 10:06

## 2018-12-10 RX ADMIN — HEPARIN 500 UNITS: 100 SYRINGE at 10:04

## 2018-12-10 RX ADMIN — HEPARIN 500 UNITS: 100 SYRINGE at 10:11

## 2018-12-10 RX ADMIN — Medication 30 ML: at 09:58

## 2018-12-10 RX ADMIN — Medication 10 ML: at 10:05

## 2018-12-10 RX ADMIN — Medication 10 ML: at 10:10

## 2018-12-10 RX ADMIN — Medication 10 ML: at 10:03

## 2018-12-10 RX ADMIN — DAPTOMYCIN 600 MG: 500 INJECTION, POWDER, LYOPHILIZED, FOR SOLUTION INTRAVENOUS at 10:00

## 2018-12-10 NOTE — PROGRESS NOTES
[facility] Progress Note Date: December 10, 2018 Name: Albert Earl MRN: [id number] : 1960 Daily dapto/rocephin injections Ms. Judith Garcia arrived to [place] at 7969 ambulatory. States back pain 6/10. States she took pain medication prior opic admit. Reassured and positioned for comfort. Took bp medication today. States MD aware of hypertension and is following same Ms. Judith Garcia was assessed and education was provided. Ms. Carmen Grigsby vitals were reviewed. Visit Vitals /90 (BP 1 Location: Left arm, BP Patient Position: Sitting) Pulse 78 Temp 97.7 °F (36.5 °C) Resp 18 SpO2 96% Pt with RIGHT upper arm double lumen PICC line. Both lumens flushes without difficulty and positive for blood return. Dressing CDI. No redness, bruising, or swelling noted at site and/ or extremity. Pt denies tenderness. Blood sample obtained from red lumen for cbc,bmp,hepatic panel,crp and cpk Daptomycin 600 mg was administered IVP over 2 minutes as ordered via red lumen followed by normal saline flush. Rocephin 2 gms given IVP over 3 minutes via purple lumen. Brisk flush/blood returns noted. Both lumens heparinized with 500 units of heparin per protocol each Curos applied to both lumens, lumens wrapped with coban, and stockinette applied over picc site Ms. Matute tolerated well without complaints. Patient Vitals for the past 8 hrs: 
 Temp Pulse Resp BP SpO2  
12/10/18 0950 97.7 °F (36.5 °C) 78 18 138/90 96 % Ms. Judith Garcia was discharged from [place] [address] in stable condition at 1015. She is to return on 18 at 1000 for her next appointment. Robin Hansen RN December 10, 2018

## 2018-12-11 ENCOUNTER — OFFICE VISIT (OUTPATIENT)
Dept: INTERNAL MEDICINE CLINIC | Age: 58
End: 2018-12-11

## 2018-12-11 ENCOUNTER — TELEPHONE (OUTPATIENT)
Dept: INTERNAL MEDICINE CLINIC | Age: 58
End: 2018-12-11

## 2018-12-11 ENCOUNTER — HOSPITAL ENCOUNTER (OUTPATIENT)
Dept: INFUSION THERAPY | Age: 58
Discharge: HOME | End: 2018-12-11
Payer: SUBSIDIZED

## 2018-12-11 ENCOUNTER — OFFICE VISIT (OUTPATIENT)
Dept: ORTHOPEDIC SURGERY | Age: 58
End: 2018-12-11

## 2018-12-11 VITALS
HEART RATE: 79 BPM | TEMPERATURE: 98.3 F | OXYGEN SATURATION: 93 % | SYSTOLIC BLOOD PRESSURE: 147 MMHG | RESPIRATION RATE: 18 BRPM | DIASTOLIC BLOOD PRESSURE: 87 MMHG

## 2018-12-11 VITALS
HEIGHT: 64 IN | DIASTOLIC BLOOD PRESSURE: 100 MMHG | SYSTOLIC BLOOD PRESSURE: 148 MMHG | TEMPERATURE: 98.2 F | WEIGHT: 207.5 LBS | HEART RATE: 82 BPM | BODY MASS INDEX: 35.42 KG/M2 | RESPIRATION RATE: 14 BRPM | OXYGEN SATURATION: 98 %

## 2018-12-11 VITALS
BODY MASS INDEX: 35.51 KG/M2 | RESPIRATION RATE: 19 BRPM | WEIGHT: 208 LBS | HEIGHT: 64 IN | DIASTOLIC BLOOD PRESSURE: 84 MMHG | SYSTOLIC BLOOD PRESSURE: 149 MMHG | HEART RATE: 83 BPM

## 2018-12-11 DIAGNOSIS — B37.9 CANDIDA PARAPSILOSIS INFECTION: ICD-10-CM

## 2018-12-11 DIAGNOSIS — F11.90 METHADONE USE: ICD-10-CM

## 2018-12-11 DIAGNOSIS — M54.6 THORACIC SPINE PAIN: Primary | ICD-10-CM

## 2018-12-11 DIAGNOSIS — F19.90 IVDU (INTRAVENOUS DRUG USER): ICD-10-CM

## 2018-12-11 DIAGNOSIS — E66.01 SEVERE OBESITY WITH BODY MASS INDEX (BMI) OF 35.0 TO 39.9 WITH SERIOUS COMORBIDITY (HCC): ICD-10-CM

## 2018-12-11 DIAGNOSIS — Z86.19 HISTORY OF HEPATITIS C: ICD-10-CM

## 2018-12-11 DIAGNOSIS — R91.8 PULMONARY NODULES: ICD-10-CM

## 2018-12-11 DIAGNOSIS — M46.44 DISCITIS OF THORACIC REGION: Primary | ICD-10-CM

## 2018-12-11 DIAGNOSIS — B37.9 CANDIDA INFECTION: ICD-10-CM

## 2018-12-11 PROCEDURE — 74011000250 HC RX REV CODE- 250: Performed by: INTERNAL MEDICINE

## 2018-12-11 PROCEDURE — 96374 THER/PROPH/DIAG INJ IV PUSH: CPT

## 2018-12-11 PROCEDURE — 74011250636 HC RX REV CODE- 250/636

## 2018-12-11 PROCEDURE — 96375 TX/PRO/DX INJ NEW DRUG ADDON: CPT

## 2018-12-11 PROCEDURE — 74011250636 HC RX REV CODE- 250/636: Performed by: INTERNAL MEDICINE

## 2018-12-11 RX ORDER — HEPARIN 100 UNIT/ML
SYRINGE INTRAVENOUS
Status: COMPLETED
Start: 2018-12-11 | End: 2018-12-11

## 2018-12-11 RX ORDER — FLUCONAZOLE 2 MG/ML
400 INJECTION, SOLUTION INTRAVENOUS ONCE
Status: CANCELLED | OUTPATIENT
Start: 2018-12-14 | End: 2018-12-14

## 2018-12-11 RX ORDER — SODIUM CHLORIDE 0.9 % (FLUSH) 0.9 %
10-40 SYRINGE (ML) INJECTION AS NEEDED
Status: DISCONTINUED | OUTPATIENT
Start: 2018-12-11 | End: 2018-12-15 | Stop reason: HOSPADM

## 2018-12-11 RX ORDER — LIDOCAINE 50 MG/G
PATCH TOPICAL
Qty: 28 EACH | Refills: 0 | Status: SHIPPED | OUTPATIENT
Start: 2018-12-11 | End: 2019-01-07 | Stop reason: ALTCHOICE

## 2018-12-11 RX ORDER — FLUCONAZOLE 2 MG/ML
400 INJECTION, SOLUTION INTRAVENOUS ONCE
Status: CANCELLED | OUTPATIENT
Start: 2018-12-13 | End: 2018-12-13

## 2018-12-11 RX ORDER — KETOROLAC TROMETHAMINE 10 MG/1
10 TABLET, FILM COATED ORAL
Qty: 60 TAB | Refills: 1 | Status: SHIPPED | OUTPATIENT
Start: 2018-12-11 | End: 2018-12-20 | Stop reason: ALTCHOICE

## 2018-12-11 RX ORDER — FLUCONAZOLE 200 MG/1
400 TABLET ORAL DAILY
Qty: 4 TAB | Refills: 0 | Status: SHIPPED | OUTPATIENT
Start: 2018-12-11 | End: 2018-12-11 | Stop reason: ALTCHOICE

## 2018-12-11 RX ADMIN — Medication 40 ML: at 10:09

## 2018-12-11 RX ADMIN — WATER 2 G: 1 INJECTION INTRAMUSCULAR; INTRAVENOUS; SUBCUTANEOUS at 10:08

## 2018-12-11 RX ADMIN — DAPTOMYCIN 600 MG: 500 INJECTION, POWDER, LYOPHILIZED, FOR SOLUTION INTRAVENOUS at 10:08

## 2018-12-11 RX ADMIN — HEPARIN 500 UNITS: 100 SYRINGE at 10:09

## 2018-12-11 NOTE — TELEPHONE ENCOUNTER
Received call from infusion Center [facility]) stating that the diflucan prescribed today interacts with the patients methadone. They would like to switch to Anidulafungin. Phone number is [phone]. Please advise.

## 2018-12-11 NOTE — LETTER
12/11/2018 11:43 AM 
 
Ms. Delonte Hughes [address] [facility] [address] The patient was seen today in the infectious disease clinic. She is being followed up after recent hospital discharge with the presumptive diagnosis of discitis and osteomyelitis of thoracic spine of possible infectious etiology. She will remain on antibiotic therapy to complete 6 weeks as planned from the hospital and is recommended to start an antifungal medication for the next couple of weeks due to bone culture results. She is recommended to follow up in clinic closely and contact us in case of any clinical changes or concerns.   
 
Sincerely, 
 
 
Lidia Castillo MD

## 2018-12-11 NOTE — TELEPHONE ENCOUNTER
Faxed order to change to diflucan to amphotericin  470 mg or 450 mg per DR. Peraza Krishna to infusion center attention Lola or Jessica Razo.

## 2018-12-11 NOTE — PROGRESS NOTES
[address]  [address], [address]  [place]  Phone: [phone]  Fax: [phone]  INITIAL CONSULTATION  Patient: Natasha Mcginnis                MRN: [id number]       SSN: [id number]  YOB: 1960        AGE: 63 y.o. SEX: female  Body mass index is 35.7 kg/m². PCP: Landen Klein NP  12/11/18    Chief Complaint   Patient presents with    Back Pain     fu from hospital         HISTORY OF PRESENT ILLNESS, RADIOGRAPHS, and PLAN:         HISTORY OF PRESENT ILLNESS:  Ms. Raghav Regalado returns today. I had previously seen her in the hospital. She had suffered from thoracic discitis with presumed staph in mid 11/2018. She was treated. It was an early infection with minimal changes, treated with IV antibiotics with followup with ID. She comes back in today having had continued back pain. Her white count and CRP have trended down. It is now 2.2. She has a normal white count, relatively low ESR. She says her pain has not improved at all. If anything, she feels it is worse. She has had no neurology. PHYSICAL EXAMINATION:  Her physical exam demonstrates normal strength, sensation, and reflexes. RADIOGRAPHS:  Her x-rays I obtained in my office demonstrate what appears to be subsidence and collapse at T7-8 anteriorly. Cultures have just come back late, demonstrating candida in both the surgical cultures obtained. ID has already contacted her and have changed her antibiotic regimen to cover her evident fungal discitis. The patient is an intravenous drug abuser, and this atypical organism is consistent with something that she may get. ASSESSMENT/PLAN:  I discussed the findings. She is neurologically intact. Now that we have her on these antifungal agents, we will have to continue to monitor her. I will see her back after an MRI that has been ordered to see degree of involvement of the segments. It is an indolent infection. She is already had bone loss. I have concern she may require a thoracotomy and debridement of this fungal abscess. I will see her back after her MRI. She would like to avoid surgery if possible. It is going to take time to see if she can resolve this with nonsurgical means. cc:  Mary Ann Head, FNP-C          Past Medical History:   Diagnosis Date    Depression     Hepatitis C     Heroin use disorder, moderate, in early remission ([facility] [address]     [facility]    Ill-defined condition     Chronic back pain       Family History   Problem Relation Age of Onset    Hypertension Mother     High Cholesterol Mother     COPD Mother     Hypertension Father        Current Outpatient Medications   Medication Sig Dispense Refill    ketorolac (TORADOL) 10 mg tablet Take 1 Tab by mouth every six (6) hours as needed for Pain. 60 Tab 1    lidocaine (LIDODERM) 5 % Apply patch to the affected area for 12 hours a day and remove for 12 hours a day. 28 Each 0    sertraline (ZOLOFT) 100 mg tablet TAKE 1 & 1/2 TABLET BY MOUTH FOR A TOTAL OF 150MG DAILY 45 Tab 0    docusate sodium (COLACE) 100 mg capsule Take 1 Cap by mouth two (2) times a day. 60 Cap 0    cefTRIAXone 2 gram IV syringe 2 gm iv daily through 12/29/18 at [facility]- arranged by case management 30 Dose 0    hydroCHLOROthiazide (HYDRODIURIL) 12.5 mg tablet Take 1 Tab by mouth daily. 30 Tab 0    glecaprevir/pibrentasvir (MAVYRET PO) Take  by mouth.  methadone HCl (METHADONE PO) Take 85 mg by mouth daily.  methocarbamol (ROBAXIN) 500 mg tablet Take 1 Tab by mouth three (3) times daily.  45 Tab 0    OTHER Daptomycin through 12/29/18 at [facility]- arranged by  1 Each 0     Facility-Administered Medications Ordered in Other Visits   Medication Dose Route Frequency Provider Last Rate Last Dose    sodium chloride (NS) flush 10-40 mL  10-40 mL IntraVENous PRN Linh Mansfield MD   40 mL at 12/11/18 1009    sodium chloride (NS) flush 10-40 mL  10-40 mL IntraVENous PRN Sophie Hernandez MD   10 mL at 12/10/18 1010    heparin (porcine) pf 500 Units  500 Units InterCATHeter PRN Sophie Hernandez MD   500 Units at 12/10/18 1011    sodium chloride (NS) flush 10-40 mL  10-40 mL IntraVENous PRN Sophie Hernandez MD           No Known Allergies    History reviewed. No pertinent surgical history. Past Medical History:   Diagnosis Date    Depression     Hepatitis C     Heroin use disorder, moderate, in early remission ([facility] [address]     NOW on [facility]    Ill-defined condition     Chronic back pain       Social History     Socioeconomic History    Marital status: SINGLE     Spouse name: Not on file    Number of children: Not on file    Years of education: Not on file    Highest education level: Not on file   Social Needs    Financial resource strain: Not on file    Food insecurity - worry: Not on file    Food insecurity - inability: Not on file    Transportation needs - medical: Not on file   [place] needs - non-medical: Not on file   Occupational History    Not on file   Tobacco Use    Smoking status: Current Every Day Smoker     Packs/day: 0.25     Types: Cigarettes    Smokeless tobacco: Never Used   Substance and Sexual Activity    Alcohol use: No    Drug use: No     Comment: on methadone matinance program    Sexual activity: No   Other Topics Concern    Not on file   Social History Narrative    Not on file           REVIEW OF SYSTEMS:   CONSTITUTIONAL SYMPTOMS:  Negative. EYES:  Negative. EARS, NOSE, THROAT AND MOUTH:  Negative. CARDIOVASCULAR:  Negative. RESPIRATORY:  Negative. GENITOURINARY: Per HPI. GASTROINTESTINAL:  Per HPI. INTEGUMENTARY (SKIN AND/OR BREAST):  Negative. MUSCULOSKELETAL: Per HPI.   ENDOCRINE/RHEUMATOLOGIC:  Negative. NEUROLOGICAL:  Per HPI. HEMATOLOGIC/LYMPHATIC:  Negative. ALLERGIC/IMMUNOLOGIC:  Negative. PSYCHIATRIC:  Negative.     PHYSICAL EXAMINATION:   Visit Vitals  /84   Pulse 83   Resp 19   Ht 5' 4\" (1.626 m)   Wt 208 lb (94.3 kg)   BMI 35.70 kg/m²    PAIN SCALE: 6/10    CONSTITUTIONAL: The patient is in no apparent distress and is alert and oriented x 3. HEENT: Normocephalic. Hearing grossly intact. NECK: Supple and symmetric. no tenderness, or masses were felt. RESPIRATORY: No labored breathing. CARDIOVASCULAR: The carotid pulses were normal. Peripheral pulses were 2+. CHEST: Normal AP diameter and normal contour without any kyphoscoliosis. LYMPHATIC: No lymphadenopathy was appreciated in the neck, axillae or groin. SKIN:  Negative for scars, rashes, lesions, or ulcers on the right upper, right lower, left upper, left lower and trunk. NEUROLOGICAL: Alert and oriented x 3. Ambulation without assistive device. FWB. EXTREMITIES:  See musculoskeletal.  MUSCULOSKELETAL:   Head and Neck:  Negative for misalignment, asymmetry, crepitation, defects, tenderness masses or effusions.  Left Upper Extremity: Inspection, percussion and palpation performed. Camachos sign is negative.  Right Upper Extremity: Inspection, percussion and palpation performed. Camachos sign is negative.  Spine, Ribs and Pelvis: Inspection, percussion and palpation performed. Negative for misalignment, asymmetry, crepitation, defects, tenderness masses or effusions.  Left Lower Extremity: Inspection, percussion and palpation performed. Negative straight leg raise.  Right Lower Extremity: Inspection, percussion and palpation performed. Negative straight leg raise. SPINE EXAM:     Cervical spine: Neck is midline. Normal muscle tone. No focal atrophy is noted. Lumbar spine: No rash, ecchymosis, or gross obliquity. No focal atrophy is noted. ASSESSMENT    ICD-10-CM ICD-9-CM    1. Thoracic spine pain M54.6 724.1 AMB POC XRAY, SPINE; THORACIC, 2 VIEW   2.  Candida infection B37.9 112.9        Written by Elkin Galarza, as dictated by Cory Padron MD.    I, Dr. Cory Padron MD, confirm that all documentation is accurate.

## 2018-12-11 NOTE — PROGRESS NOTES
1. Have you been to the ER, urgent care clinic or hospitalized since your last visit? YES.     2. Have you seen or consulted any other health care providers outside of the [address] [place] [address] since your last visit (Include any pap smears or colon screening)? YES  Seeing a counselor for her addiction/recovery, Dr Agatha Morales you have an Advanced Directive? NO    Would you like information on Advanced Directives?  YES

## 2018-12-11 NOTE — PROGRESS NOTES
[facility] Progress Note Date: 2018 Name: Jagdeep Westfall MRN: [id number] : 1960 Daily Dapto/Rocephin Antibiotics Ms. Patrick Arnold arrived to [facility] at 1 ambulatory. Patient states her pain is 5 out of 10 this morning. Ms. Patrick Arnold was assessed and education was provided. Patient Vitals for the past 12 hrs: 
 Temp Pulse Resp BP SpO2  
18 1007 98.3 °F (36.8 °C) 79 18 147/87 93 % Pt with RIGHT upper arm double lumen PICC line. Both lumens flush without difficulty and positive for blood return. Dressing CDI. No redness, bruising, or swelling noted at site and/ or extremity. Pt denies tenderness. Daptomycin 600 mg was administered IVP over 2 minutes as ordered via purple lumen followed by normal saline flush. Rocephin 2 gms given IVP over 3 minutes via red lumen. Brisk flush/blood returns noted. Both lumens flushed with Normal Saline 20 ml, and heparinized with 500 units per protocol. Curos applied to both lumens, lumens wrapped with coban, and stockinette applied over picc site Ms. Matute tolerated well without complaints. Ms. Patrick Arnold was discharged from [place] [address] in stable condition at 1020. She is to return on 18 at 56 for her next appointment. Farzana Quintana RN 2018

## 2018-12-11 NOTE — TELEPHONE ENCOUNTER
[facility] called- she had already picked  up the medication you called them about- they contacted the PT and she will not take it

## 2018-12-11 NOTE — PATIENT INSTRUCTIONS

## 2018-12-12 ENCOUNTER — TELEPHONE (OUTPATIENT)
Dept: INTERNAL MEDICINE CLINIC | Age: 58
End: 2018-12-12

## 2018-12-12 ENCOUNTER — HOSPITAL ENCOUNTER (OUTPATIENT)
Dept: INFUSION THERAPY | Age: 58
Discharge: HOME | End: 2018-12-12
Payer: SUBSIDIZED

## 2018-12-12 NOTE — TELEPHONE ENCOUNTER
Chief Complaint   Patient presents with    Prior Auth     PA submitted for Toradol 10 mg thru cover my meds.

## 2018-12-13 ENCOUNTER — HOSPITAL ENCOUNTER (OUTPATIENT)
Dept: INFUSION THERAPY | Age: 58
Discharge: HOME | End: 2018-12-13
Payer: SUBSIDIZED

## 2018-12-13 VITALS
OXYGEN SATURATION: 95 % | TEMPERATURE: 99.7 F | SYSTOLIC BLOOD PRESSURE: 173 MMHG | HEART RATE: 77 BPM | RESPIRATION RATE: 18 BRPM | DIASTOLIC BLOOD PRESSURE: 98 MMHG

## 2018-12-13 PROCEDURE — C1751 CATH, INF, PER/CENT/MIDLINE: HCPCS

## 2018-12-13 PROCEDURE — 74011000258 HC RX REV CODE- 258: Performed by: INTERNAL MEDICINE

## 2018-12-13 PROCEDURE — 96366 THER/PROPH/DIAG IV INF ADDON: CPT

## 2018-12-13 PROCEDURE — 96365 THER/PROPH/DIAG IV INF INIT: CPT

## 2018-12-13 PROCEDURE — 74011250636 HC RX REV CODE- 250/636: Performed by: INTERNAL MEDICINE

## 2018-12-13 PROCEDURE — 74011250636 HC RX REV CODE- 250/636

## 2018-12-13 RX ORDER — HEPARIN 100 UNIT/ML
SYRINGE INTRAVENOUS
Status: COMPLETED
Start: 2018-12-13 | End: 2018-12-13

## 2018-12-13 RX ORDER — SODIUM CHLORIDE 0.9 % (FLUSH) 0.9 %
10-40 SYRINGE (ML) INJECTION AS NEEDED
Status: DISCONTINUED | OUTPATIENT
Start: 2018-12-13 | End: 2018-12-17 | Stop reason: HOSPADM

## 2018-12-13 RX ORDER — HEPARIN 100 UNIT/ML
500 SYRINGE INTRAVENOUS ONCE
Status: COMPLETED | OUTPATIENT
Start: 2018-12-13 | End: 2018-12-13

## 2018-12-13 RX ADMIN — HEPARIN 500 UNITS: 100 SYRINGE at 12:51

## 2018-12-13 RX ADMIN — AMPHOTERICIN B 450 MG: 50 INJECTION, POWDER, LYOPHILIZED, FOR SOLUTION INTRAVENOUS at 10:42

## 2018-12-13 RX ADMIN — Medication 20 ML: at 12:47

## 2018-12-13 NOTE — PROGRESS NOTES
[facility] Progress Note    Date: 2018    Name: Rufina Clark    MRN: [id number]         : 1960    [facility]    Ms. Matute to [facility], ambulatory at 1030. Pt was assessed and education was provided. Ms. Shakeel Triana vitals were reviewed. Visit Vitals  BP (!) 173/98 (BP 1 Location: Left arm, BP Patient Position: Supine)   Pulse 77   Temp 99.7 °F (37.6 °C)   Resp 18   SpO2 95%       Right upper arm PICC flushed easily and had brisk blood return via both ports. PICC dressing c/d/i and not due to be changed. No swelling, redness, streaking, warmth or drainage noted in arm. Pt denied c/o pain in arm. [x] Ambisome 450 mg     []  Invanz     []  Cubicin     []  Rocephin    was infused over 120 minutes as ordered. Line flushed with D5 before and after. PICC dressing and stat lock changed under sterile technique after cleansing site with chlorhexidine. Biopatch and skin protectant applied, and microclaves changed. No redness, streaking, warmth, or drainage noted at site. Ms. Heather Lomas tolerated infusion, and had no complaints at this time. Both lumens of PICC flushed with NS 10 ml and Heparin 250 units. Green end caps applied, and lumens wrapped with guaze and paper tape. Stockinette placed over dressing for protection. Patient armband removed and shredded. Ms. Heather Lomas was discharged from [place] [address] in stable condition at 1300. She is to return on 18 at 1000 for her next antibiotic appointment.     Jaci Saenz RN  2018

## 2018-12-14 ENCOUNTER — HOSPITAL ENCOUNTER (OUTPATIENT)
Dept: INFUSION THERAPY | Age: 58
Discharge: HOME | End: 2018-12-14
Payer: SUBSIDIZED

## 2018-12-14 VITALS
HEART RATE: 67 BPM | OXYGEN SATURATION: 95 % | TEMPERATURE: 98 F | RESPIRATION RATE: 18 BRPM | SYSTOLIC BLOOD PRESSURE: 169 MMHG | DIASTOLIC BLOOD PRESSURE: 96 MMHG

## 2018-12-14 PROCEDURE — 74011250636 HC RX REV CODE- 250/636: Performed by: INTERNAL MEDICINE

## 2018-12-14 PROCEDURE — 96366 THER/PROPH/DIAG IV INF ADDON: CPT

## 2018-12-14 PROCEDURE — 74011000258 HC RX REV CODE- 258: Performed by: INTERNAL MEDICINE

## 2018-12-14 PROCEDURE — 96365 THER/PROPH/DIAG IV INF INIT: CPT

## 2018-12-14 RX ORDER — SODIUM CHLORIDE 0.9 % (FLUSH) 0.9 %
10-40 SYRINGE (ML) INJECTION AS NEEDED
Status: DISCONTINUED | OUTPATIENT
Start: 2018-12-14 | End: 2018-12-18 | Stop reason: HOSPADM

## 2018-12-14 RX ORDER — HEPARIN 100 UNIT/ML
500 SYRINGE INTRAVENOUS AS NEEDED
Status: DISCONTINUED | OUTPATIENT
Start: 2018-12-14 | End: 2018-12-18 | Stop reason: HOSPADM

## 2018-12-14 RX ADMIN — SODIUM CHLORIDE, PRESERVATIVE FREE 500 UNITS: 5 INJECTION INTRAVENOUS at 12:31

## 2018-12-14 RX ADMIN — Medication 30 ML: at 12:30

## 2018-12-14 RX ADMIN — SODIUM CHLORIDE, PRESERVATIVE FREE 500 UNITS: 5 INJECTION INTRAVENOUS at 12:30

## 2018-12-14 RX ADMIN — Medication 10 ML: at 10:14

## 2018-12-14 RX ADMIN — AMPHOTERICIN B 450 MG: 50 INJECTION, POWDER, LYOPHILIZED, FOR SOLUTION INTRAVENOUS at 10:14

## 2018-12-14 NOTE — PROGRESS NOTES
[facility] Progress Note    Date: 2018    Name: Fabrice     MRN: [id number]         : 1960    [facility]    Ms. Matute to [facility], ambulatory at 1000. Pt was assessed and education was provided. Ms. Cordell Carias vitals were reviewed. Visit Vitals  BP (!) 169/96 (BP 1 Location: Left arm, BP Patient Position: Sitting)   Pulse 67   Temp 98 °F (36.7 °C)   Resp 18   SpO2 95%       Right upper arm PICC flushed easily and had brisk blood return via both ports. PICC dressing c/d/i and not due to be changed. No swelling, redness, streaking, warmth or drainage noted in arm. Pt denied c/o pain in arm. [x] Ambisome 450 mg     []  Invanz     []  Cubicin     []  Rocephin    was infused over 120 minutes as ordered. Line flushed with D5 before and after. Ms. Sherlyn Gao tolerated infusion, and had no complaints at this time. Both lumens of PICC flushed with NS 10 ml and Heparin 500 units. Green end caps applied, and lumens wrapped with guaze and paper tape. Stockinette placed over dressing for protection. Patient armband removed and shredded. Ms. Sherlyn Gao was discharged from [place] [address] in stable condition at 1230. She is to return on 12/15/18 at 0830 for her next antibiotic appointment.     Claude Jennings RN  2018  1230

## 2018-12-15 ENCOUNTER — HOSPITAL ENCOUNTER (OUTPATIENT)
Dept: INFUSION THERAPY | Age: 58
Discharge: HOME | End: 2018-12-15
Payer: SUBSIDIZED

## 2018-12-15 VITALS
HEART RATE: 70 BPM | DIASTOLIC BLOOD PRESSURE: 76 MMHG | RESPIRATION RATE: 18 BRPM | TEMPERATURE: 99.2 F | SYSTOLIC BLOOD PRESSURE: 123 MMHG

## 2018-12-15 PROCEDURE — 74011250636 HC RX REV CODE- 250/636: Performed by: INTERNAL MEDICINE

## 2018-12-15 PROCEDURE — 74011000258 HC RX REV CODE- 258: Performed by: INTERNAL MEDICINE

## 2018-12-15 PROCEDURE — 96365 THER/PROPH/DIAG IV INF INIT: CPT

## 2018-12-15 RX ORDER — HEPARIN 100 UNIT/ML
SYRINGE INTRAVENOUS
Status: DISPENSED
Start: 2018-12-15 | End: 2018-12-15

## 2018-12-15 RX ORDER — HEPARIN 100 UNIT/ML
500 SYRINGE INTRAVENOUS ONCE
Status: COMPLETED | OUTPATIENT
Start: 2018-12-15 | End: 2018-12-15

## 2018-12-15 RX ORDER — SODIUM CHLORIDE 0.9 % (FLUSH) 0.9 %
10-40 SYRINGE (ML) INJECTION AS NEEDED
Status: DISCONTINUED | OUTPATIENT
Start: 2018-12-15 | End: 2018-12-19 | Stop reason: HOSPADM

## 2018-12-15 RX ORDER — DEXTROSE MONOHYDRATE 50 MG/ML
25 INJECTION, SOLUTION INTRAVENOUS ONCE
Status: COMPLETED | OUTPATIENT
Start: 2018-12-15 | End: 2018-12-15

## 2018-12-15 RX ADMIN — Medication 30 ML: at 10:12

## 2018-12-15 RX ADMIN — AMPHOTERICIN B 450 MG: 50 INJECTION, POWDER, LYOPHILIZED, FOR SOLUTION INTRAVENOUS at 08:49

## 2018-12-15 RX ADMIN — HEPARIN 500 UNITS: 100 SYRINGE at 10:11

## 2018-12-15 RX ADMIN — DEXTROSE MONOHYDRATE 25 ML/HR: 5 INJECTION, SOLUTION INTRAVENOUS at 08:49

## 2018-12-15 NOTE — PROGRESS NOTES
[facility] Progress Note    Date: December 15, 2018    Name: Natalie Espinosa    MRN: [id number]         : 1960    [facility]    Ms. Francisca Patel to [facility], ambulatory at [address] [place] [address]. Pt was assessed and education was provided. Ms. Lawyer Escobar vitals were reviewed. Visit Vitals  /76 (BP 1 Location: Left arm, BP Patient Position: Sitting)   Pulse 70   Temp 99.2 °F (37.3 °C)   Resp 18       Right upper arm PICC flushed easily and had brisk blood return via both ports. PICC dressing c/d/i and not due to be changed. No swelling, redness, streaking, warmth or drainage noted in arm. Pt denied c/o pain in arm. [x] Ambisome 450 mg     []  Invanz     []  Cubicin     []  Rocephin    was infused over 60 minutes as ordered. (Per pharmacy since pt has tolerated previous infusions) Line flushed with D5 before and after. Ms. Francisca Patel tolerated infusion, and had no complaints at this time. Both lumens of PICC flushed with NS 10 ml and Heparin 500 units. Green end caps applied, and lumens wrapped with guaze and paper tape. Stockinette placed over dressing for protection. Patient armband removed and shredded. Ms. Francisca Patel was discharged from [place] [address] in stable condition at 1020. She is to return on 18 at 0830 for her next antibiotic appointment.     Juice Echevarria RN  December 15, 2018

## 2018-12-16 ENCOUNTER — HOSPITAL ENCOUNTER (OUTPATIENT)
Dept: INFUSION THERAPY | Age: 58
Discharge: HOME | End: 2018-12-16
Payer: SUBSIDIZED

## 2018-12-16 VITALS
DIASTOLIC BLOOD PRESSURE: 91 MMHG | SYSTOLIC BLOOD PRESSURE: 174 MMHG | TEMPERATURE: 98.8 F | RESPIRATION RATE: 18 BRPM | HEART RATE: 63 BPM

## 2018-12-16 PROCEDURE — 74011250636 HC RX REV CODE- 250/636: Performed by: INTERNAL MEDICINE

## 2018-12-16 PROCEDURE — 96365 THER/PROPH/DIAG IV INF INIT: CPT

## 2018-12-16 PROCEDURE — 74011000258 HC RX REV CODE- 258: Performed by: INTERNAL MEDICINE

## 2018-12-16 RX ORDER — HEPARIN 100 UNIT/ML
SYRINGE INTRAVENOUS
Status: DISPENSED
Start: 2018-12-16 | End: 2018-12-16

## 2018-12-16 RX ORDER — HEPARIN 100 UNIT/ML
500 SYRINGE INTRAVENOUS ONCE
Status: COMPLETED | OUTPATIENT
Start: 2018-12-16 | End: 2018-12-16

## 2018-12-16 RX ORDER — DEXTROSE MONOHYDRATE 50 MG/ML
25 INJECTION, SOLUTION INTRAVENOUS ONCE
Status: COMPLETED | OUTPATIENT
Start: 2018-12-16 | End: 2018-12-16

## 2018-12-16 RX ORDER — SODIUM CHLORIDE 0.9 % (FLUSH) 0.9 %
10-40 SYRINGE (ML) INJECTION AS NEEDED
Status: DISCONTINUED | OUTPATIENT
Start: 2018-12-16 | End: 2018-12-20 | Stop reason: HOSPADM

## 2018-12-16 RX ADMIN — Medication 10 ML: at 08:45

## 2018-12-16 RX ADMIN — AMPHOTERICIN B 450 MG: 50 INJECTION, POWDER, LYOPHILIZED, FOR SOLUTION INTRAVENOUS at 08:46

## 2018-12-16 RX ADMIN — DEXTROSE MONOHYDRATE 25 ML/HR: 5 INJECTION, SOLUTION INTRAVENOUS at 08:46

## 2018-12-16 RX ADMIN — Medication 10 ML: at 09:50

## 2018-12-16 RX ADMIN — HEPARIN 500 UNITS: 100 SYRINGE at 09:51

## 2018-12-16 NOTE — PROGRESS NOTES
[facility] Progress Note    Date: 2018    Name: Sita Bustillos    MRN: [id number]         : 1960    [facility]    Ms. Tonny Rios to [facility], ambulatory at [address] [address] [facility]. Pt was assessed and education was provided. Ms. Emigdio Nogueira vitals were reviewed. Visit Vitals  BP (!) 174/91 (BP 1 Location: Left arm, BP Patient Position: Sitting)   Pulse 63   Temp 98.8 °F (37.1 °C)   Resp 18       Right upper arm PICC flushed easily and had brisk blood return via both ports. PICC dressing c/d/i and not due to be changed. No swelling, redness, streaking, warmth or drainage noted in arm. Pt denied c/o pain in arm. [x] Ambisome 450 mg     []  Invanz     []  Cubicin     []  Rocephin    was infused over 60 minutes as ordered. (Per pharmacy since pt has tolerated previous infusions) Line flushed with D5 before and after. Ms. Tonny Rios tolerated infusion, and had no complaints at this time. Both lumens of PICC flushed with NS 10 ml and Heparin 500 units. Green end caps applied, and lumens wrapped with guaze and paper tape. Stockinette placed over dressing for protection. Patient armband removed and shredded. Ms. Tonny Rios was discharged from [place] [address] in stable condition at [phone]. She is to return on 18 at 1000 for her next antibiotic appointment.     Yodit Wolff RN  2018

## 2018-12-17 ENCOUNTER — HOSPITAL ENCOUNTER (OUTPATIENT)
Dept: INFUSION THERAPY | Age: 58
Discharge: HOME | End: 2018-12-17
Payer: SUBSIDIZED

## 2018-12-17 VITALS
OXYGEN SATURATION: 95 % | HEART RATE: 82 BPM | SYSTOLIC BLOOD PRESSURE: 128 MMHG | DIASTOLIC BLOOD PRESSURE: 85 MMHG | TEMPERATURE: 98.4 F | RESPIRATION RATE: 18 BRPM

## 2018-12-17 LAB
ANION GAP SERPL CALC-SCNC: 10 MMOL/L (ref 3–18)
BACTERIA SPEC CULT: NORMAL
BASO+EOS+MONOS # BLD AUTO: 0.4 K/UL (ref 0–2.3)
BASO+EOS+MONOS # BLD AUTO: 6 % (ref 0.1–17)
BUN SERPL-MCNC: 28 MG/DL (ref 7–18)
BUN/CREAT SERPL: 15 (ref 12–20)
CALCIUM SERPL-MCNC: 9.4 MG/DL (ref 8.5–10.1)
CHLORIDE SERPL-SCNC: 99 MMOL/L (ref 100–108)
CO2 SERPL-SCNC: 25 MMOL/L (ref 21–32)
CREAT SERPL-MCNC: 1.85 MG/DL (ref 0.6–1.3)
CRP SERPL-MCNC: 0.5 MG/DL (ref 0–0.3)
DIFFERENTIAL METHOD BLD: NORMAL
ERYTHROCYTE [DISTWIDTH] IN BLOOD BY AUTOMATED COUNT: 13.2 % (ref 11.5–14.5)
FUNGUS SMEAR,FNGSMR: NORMAL
GLUCOSE SERPL-MCNC: 89 MG/DL (ref 74–99)
HCT VFR BLD AUTO: 41.7 % (ref 36–48)
HGB BLD-MCNC: 14 G/DL (ref 12–16)
LYMPHOCYTES # BLD: 2.1 K/UL (ref 1.1–5.9)
LYMPHOCYTES NFR BLD: 29 % (ref 14–44)
MCH RBC QN AUTO: 30.6 PG (ref 25–35)
MCHC RBC AUTO-ENTMCNC: 33.6 G/DL (ref 31–37)
MCV RBC AUTO: 91 FL (ref 78–102)
NEUTS SEG # BLD: 4.8 K/UL (ref 1.8–9.5)
NEUTS SEG NFR BLD: 65 % (ref 40–70)
PLATELET # BLD AUTO: 377 K/UL (ref 140–440)
POTASSIUM SERPL-SCNC: 3.6 MMOL/L (ref 3.5–5.5)
RBC # BLD AUTO: 4.58 M/UL (ref 4.1–5.1)
SERVICE CMNT-IMP: NORMAL
SODIUM SERPL-SCNC: 134 MMOL/L (ref 136–145)
WBC # BLD AUTO: 7.3 K/UL (ref 4.5–13)

## 2018-12-17 PROCEDURE — 86140 C-REACTIVE PROTEIN: CPT

## 2018-12-17 PROCEDURE — 74011250636 HC RX REV CODE- 250/636: Performed by: INTERNAL MEDICINE

## 2018-12-17 PROCEDURE — 74011000258 HC RX REV CODE- 258: Performed by: INTERNAL MEDICINE

## 2018-12-17 PROCEDURE — 74011250636 HC RX REV CODE- 250/636

## 2018-12-17 PROCEDURE — 96365 THER/PROPH/DIAG IV INF INIT: CPT

## 2018-12-17 PROCEDURE — 85025 COMPLETE CBC W/AUTO DIFF WBC: CPT

## 2018-12-17 PROCEDURE — 80048 BASIC METABOLIC PNL TOTAL CA: CPT

## 2018-12-17 RX ORDER — SODIUM CHLORIDE 0.9 % (FLUSH) 0.9 %
10-40 SYRINGE (ML) INJECTION AS NEEDED
Status: DISCONTINUED | OUTPATIENT
Start: 2018-12-17 | End: 2018-12-20 | Stop reason: HOSPADM

## 2018-12-17 RX ORDER — HEPARIN 100 UNIT/ML
500 SYRINGE INTRAVENOUS AS NEEDED
Status: DISCONTINUED | OUTPATIENT
Start: 2018-12-17 | End: 2018-12-20 | Stop reason: HOSPADM

## 2018-12-17 RX ORDER — HEPARIN 100 UNIT/ML
SYRINGE INTRAVENOUS
Status: COMPLETED
Start: 2018-12-17 | End: 2018-12-17

## 2018-12-17 RX ADMIN — AMPHOTERICIN B 450 MG: 50 INJECTION, POWDER, LYOPHILIZED, FOR SOLUTION INTRAVENOUS at 10:30

## 2018-12-17 RX ADMIN — Medication 500 UNITS: at 11:40

## 2018-12-17 RX ADMIN — Medication 20 ML: at 10:25

## 2018-12-17 RX ADMIN — HEPARIN 500 UNITS: 100 SYRINGE at 11:37

## 2018-12-17 RX ADMIN — Medication 10 ML: at 11:36

## 2018-12-17 RX ADMIN — Medication 10 ML: at 11:38

## 2018-12-17 NOTE — PROGRESS NOTES
[facility] Progress Note    Date: 2018    Name: Emely James    MRN: [id number]         : 1960    [facility]    Ms. Matute to [facility], ambulatory at 1020. Pain to back 3/10. No distress noted     Pt was assessed and education was provided. Ms. Mason Saldaña vitals were reviewed. Visit Vitals  /85 (BP 1 Location: Left arm, BP Patient Position: Sitting)   Pulse 82   Temp 98.4 °F (36.9 °C)   Resp 18   SpO2 95%       Right upper arm PICC flushed easily and had brisk blood return via both ports. Blood sample obtained via red lumen for cbc,cmp and crp. PICC dressing c/d/i and not due to be changed. No swelling, redness, streaking, warmth or drainage noted in arm. Pt denied c/o pain in arm. [x] Ambisome 450 mg     []  Invanz     []  Cubicin     []  Rocephin    was infused over 60 minutes as ordered via red lumen. (Per pharmacy since pt has tolerated previous infusions) Line flushed with D5 before and after. Ms. Keila Powell tolerated infusion, and had no complaints at this time. Both lumens of PICC flushed with NS 10 ml and Heparin 500 units each. Picc site and drsg without change. Green end caps applied, and lumens wrapped with guaze and paper tape. Stockinette placed over dressing for protection. Patient armband removed and shredded. Ms. Keila Powell was discharged from [place] [address] in stable condition at 1145. She is to return on 18 at 1130 for her next antibiotic appointment.     Zenaida Caruso RN  2018

## 2018-12-18 ENCOUNTER — TELEPHONE (OUTPATIENT)
Dept: INTERNAL MEDICINE CLINIC | Age: 58
End: 2018-12-18

## 2018-12-18 NOTE — PROGRESS NOTES
Stopped ambisome. Planning on starting fluconazol, but will have to discuss with methadone clinic to see if patient can decrease dose, to prevent side effects including QT prolongation and increased levels of methadone with concomitant use. This was discussed with patient and she agreed. In the meantime, will switch to anidalofungin with close monitoring. 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

## 2018-12-18 NOTE — PROGRESS NOTES
MARIA EUGENIA Bearden is a 63 y.o. female with relevant past medical depression/anxiety, history of IVDU now on methadone clinic, h/o Hep C infection s/p treatment with a pangenotypic agent (Luvenia Ta) around Sept- Nov, 2018, severe obesity, HIV status neg (11/13/18) here for outpatient ID follow up of thoracic discitis/osteomyelitis. The patient apparently presented with progressively worse abdominal/thoracic back pain since around Oct, 2018, but no fever, chills, malaise, night sweats. No recent trauma, falls. Last IVDU a few months ago, unclear dates. She was seen in the ED on 11/7/18 and had a CTA to rule out PE, that showed multiple pulmonary nodules, suspected discitis at T7-8, hepatosteatosis and hepatomegaly with mild splenomegaly. Multiple nonspecific periportal slight enlarged nodules. She was discharged to continue outpatient work up. She returned to the ED on 11/1/18 due to persistent pain, associated with nausea and vomiting, an MRI of her thoracic spine was done on11/11/18 that showed significant soft tissue and osseous inflammation centered at the T7-8 disc level  and pronounced circumferential prevertebral and epidural inflammation centered at this level most suggestive of discitis osteomyelitis. No peripheral enhancing collection to suggest abscess. Mild compromise of the central canal at the level of T7-8. On 11/15/18 IR performs T7-8 biopsy. BC on 11/11/18 neg x , UC neg  Disc biopsy culture from 11/12/18  And 11/15/18: Candida parapsilosis. Bone cultures from 11/15/18 also grew MRSE. This results apparently were not available at the time of discharge. Fungal and AFB cultures neg from bone. The patient was initially treated with ceftriaxone and vancomycin. She was discharged on ceftriaxone and daptomycin to complete around 12/29/18 with out patient ID follow up for further recommendations.    The patient was seen again in the ED on 11/15/18 for persistent severe pain and was recommended to continue with treatment as recommended from discharge. The patient persists with severe thoracic pain today and reports minimal improvement. Denies any systemic symptoms. Reports compliance with medication, but tells me she might not be able to get antibiotic dose tomorrow due to having a medical appointment conflicting with dose. CRP 2.2 (12/10) from 5.6 (12/3) from 2.2 (11/26) from 4.1 (11/19)  CRP 0.5 on 12/17    ROS  As above included in HPI. Otherwise 11 point review of systems negative including constitutional, skin, HENT, eyes, respiratory, cardiovascular, gastrointestinal, genitourinary, musculoskeletal, endocrine, hematologic, allergy, and neurologic. Past Medical History  Past Medical History:   Diagnosis Date    Depression     Hepatitis C     Heroin use disorder, moderate, in early remission ([facility] [address]     NOW on [facility] [address]    Ill-defined condition     Chronic back pain     History reviewed. No pertinent surgical history. Family History  Family History   Problem Relation Age of Onset    Hypertension Mother     High Cholesterol Mother     COPD Mother     Hypertension Father        Social History  She  reports that she has been smoking cigarettes. She has been smoking about 0.25 packs per day. she has never used smokeless tobacco.   Social History     Substance and Sexual Activity   Alcohol Use No       Immunization History    There is no immunization history on file for this patient. Allergies  No Known Allergies    Medications  Current Outpatient Medications   Medication Sig    ketorolac (TORADOL) 10 mg tablet Take 1 Tab by mouth every six (6) hours as needed for Pain.  lidocaine (LIDODERM) 5 % Apply patch to the affected area for 12 hours a day and remove for 12 hours a day.  sertraline (ZOLOFT) 100 mg tablet TAKE 1 & 1/2 TABLET BY MOUTH FOR A TOTAL OF 150MG DAILY    methocarbamol (ROBAXIN) 500 mg tablet Take 1 Tab by mouth three (3) times daily.     docusate sodium (COLACE) 100 mg capsule Take 1 Cap by mouth two (2) times a day.  cefTRIAXone 2 gram IV syringe 2 gm iv daily through 12/29/18 at [facility]- arranged by case management    OTHER Daptomycin through 12/29/18 at [facility]- arranged by     hydroCHLOROthiazide (HYDRODIURIL) 12.5 mg tablet Take 1 Tab by mouth daily.  glecaprevir/pibrentasvir (MAVYRET PO) Take  by mouth.  methadone HCl (METHADONE PO) Take 85 mg by mouth daily. No current facility-administered medications for this visit. Facility-Administered Medications Ordered in Other Visits   Medication Dose Route Frequency    [START ON 12/22/2018] amphotericin B liposome (AMBISOme) 450 mg in dextrose 5% 250 mL IVPB  450 mg IntraVENous ONCE    sodium chloride (NS) flush 10-40 mL  10-40 mL IntraVENous PRN    heparin (porcine) pf 500 Units  500 Units InterCATHeter ONCE    sodium chloride (NS) flush 10-40 mL  10-40 mL IntraVENous PRN    heparin (porcine) pf 500 Units  500 Units InterCATHeter PRN    sodium chloride (NS) flush 10-40 mL  10-40 mL IntraVENous PRN    sodium chloride (NS) flush 10-40 mL  10-40 mL IntraVENous PRN    sodium chloride (NS) flush 10-40 mL  10-40 mL IntraVENous PRN         Visit Vitals  BP (!) 148/100 (BP 1 Location: Left arm, BP Patient Position: Sitting)   Pulse 82   Temp 98.2 °F (36.8 °C) (Oral)   Resp 14   Ht 5' 4\" (1.626 m)   Wt 207 lb 8 oz (94.1 kg)   SpO2 98%   BMI 35.62 kg/m²     Body mass index is 35.62 kg/m². Physical Exam   Constitutional: She is oriented to person, place, and time. She appears distressed (from pain, looks uncomfortable but non toxic). HENT:   Head: Normocephalic and atraumatic. Neck: Neck supple. Cardiovascular: Normal rate and regular rhythm. Pulmonary/Chest: Effort normal and breath sounds normal.   Abdominal: Soft. Musculoskeletal: She exhibits no edema. Neurological: She is alert and oriented to person, place, and time. Skin: Skin is warm.    Mid back is slightly tender to touch, extending around costal area on both sides   Nursing note and vitals reviewed. [address] Outpatient Visit on 12/10/2018   Component Date Value Ref Range Status    WBC 12/10/2018 8.1  4.5 - 13.0 K/uL Final    RBC 12/10/2018 4.26  4.10 - 5.10 M/uL Final    HGB 12/10/2018 13.3  12.0 - 16 g/dL Final    HCT 12/10/2018 39.9  36 - 48 % Final    MCV 12/10/2018 93.7  78 - 102 FL Final    MCH 12/10/2018 31.2  25.0 - 35.0 PG Final    MCHC 12/10/2018 33.3  31 - 37 g/dL Final    RDW 12/10/2018 13.7  11.5 - 14.5 % Final    PLATELET 01/51/6149 572  140 - 440 K/uL Final    NEUTROPHILS 12/10/2018 66  40 - 70 % Final    MIXED CELLS 12/10/2018 9  0.1 - 17 % Final    LYMPHOCYTES 12/10/2018 26  14 - 44 % Final    ABS. NEUTROPHILS 12/10/2018 5.3  1.8 - 9.5 K/UL Final    ABS. MIXED CELLS 12/10/2018 0.7  0.0 - 2.3 K/uL Final    ABS. LYMPHOCYTES 12/10/2018 2.1  1.1 - 5.9 K/UL Final    DF 12/10/2018 AUTOMATED    Final    Sodium 12/10/2018 135* 136 - 145 mmol/L Final    Potassium 12/10/2018 4.0  3.5 - 5.5 mmol/L Final    Chloride 12/10/2018 98* 100 - 108 mmol/L Final    CO2 12/10/2018 31  21 - 32 mmol/L Final    Anion gap 12/10/2018 6  3.0 - 18 mmol/L Final    Glucose 12/10/2018 60* 74 - 99 mg/dL Final    BUN 12/10/2018 15  7.0 - 18 MG/DL Final    Creatinine 12/10/2018 0.62  0.6 - 1.3 MG/DL Final    BUN/Creatinine ratio 12/10/2018 24* 12 - 20   Final    GFR est AA 12/10/2018 >60  >60 ml/min/1.73m2 Final    GFR est non-AA 12/10/2018 >60  >60 ml/min/1.73m2 Final    Calcium 12/10/2018 8.9  8.5 - 10.1 MG/DL Final    Protein, total 12/10/2018 8.1  6.4 - 8.2 g/dL Final    Albumin 12/10/2018 3.5  3.4 - 5.0 g/dL Final    Globulin 12/10/2018 4.6* 2.0 - 4.0 g/dL Final    A-G Ratio 12/10/2018 0.8  0.8 - 1.7   Final    Bilirubin, total 12/10/2018 0.5  0.2 - 1.0 MG/DL Final    Bilirubin, direct 12/10/2018 0.1  0.0 - 0.2 MG/DL Final    Alk.  phosphatase 12/10/2018 110 45 - 117 U/L Final    AST (SGOT) 12/10/2018 18  15 - 37 U/L Final    ALT (SGPT) 12/10/2018 17  13 - 56 U/L Final    CK 12/10/2018 90  26 - 192 U/L Final    C-Reactive protein 12/10/2018 2.2* 0 - 0.3 mg/dL Final   Hospital Outpatient Visit on 12/03/2018   Component Date Value Ref Range Status    WBC 12/03/2018 7.4  4.5 - 13.0 K/uL Final    RBC 12/03/2018 4.17  4.10 - 5.10 M/uL Final    HGB 12/03/2018 13.0  12.0 - 16 g/dL Final    HCT 12/03/2018 39.6  36 - 48 % Final    MCV 12/03/2018 95.0  78 - 102 FL Final    MCH 12/03/2018 31.2  25.0 - 35.0 PG Final    MCHC 12/03/2018 32.8  31 - 37 g/dL Final    RDW 12/03/2018 13.7  11.5 - 14.5 % Final    PLATELET 62/93/5381 889  140 - 440 K/uL Final    NEUTROPHILS 12/03/2018 64  40 - 70 % Final    MIXED CELLS 12/03/2018 9  0.1 - 17 % Final    LYMPHOCYTES 12/03/2018 28  14 - 44 % Final    ABS. NEUTROPHILS 12/03/2018 4.8  1.8 - 9.5 K/UL Final    ABS. MIXED CELLS 12/03/2018 0.6  0.0 - 2.3 K/uL Final    ABS.  LYMPHOCYTES 12/03/2018 2.0  1.1 - 5.9 K/UL Final    DF 12/03/2018 AUTOMATED    Final    Sodium 12/03/2018 135* 136 - 145 mmol/L Final    Potassium 12/03/2018 3.7  3.5 - 5.5 mmol/L Final    Chloride 12/03/2018 100  100 - 108 mmol/L Final    CO2 12/03/2018 29  21 - 32 mmol/L Final    Anion gap 12/03/2018 6  3.0 - 18 mmol/L Final    Glucose 12/03/2018 109* 74 - 99 mg/dL Final    BUN 12/03/2018 13  7.0 - 18 MG/DL Final    Creatinine 12/03/2018 0.67  0.6 - 1.3 MG/DL Final    BUN/Creatinine ratio 12/03/2018 19  12 - 20   Final    GFR est AA 12/03/2018 >60  >60 ml/min/1.73m2 Final    GFR est non-AA 12/03/2018 >60  >60 ml/min/1.73m2 Final    Calcium 12/03/2018 8.7  8.5 - 10.1 MG/DL Final    Protein, total 12/03/2018 8.2  6.4 - 8.2 g/dL Final    Albumin 12/03/2018 3.3* 3.4 - 5.0 g/dL Final    Globulin 12/03/2018 4.9* 2.0 - 4.0 g/dL Final    A-G Ratio 12/03/2018 0.7* 0.8 - 1.7   Final    Bilirubin, total 12/03/2018 0.2  0.2 - 1.0 MG/DL Final    Bilirubin, direct 12/03/2018 <0.1  0.0 - 0.2 MG/DL Final    Alk. phosphatase 12/03/2018 125* 45 - 117 U/L Final    AST (SGOT) 12/03/2018 52* 15 - 37 U/L Final    ALT (SGPT) 12/03/2018 35  13 - 56 U/L Final    C-Reactive protein 12/03/2018 5.6* 0 - 0.3 mg/dL Final    CK 12/03/2018 392* 26 - 192 U/L Final   Hospital Outpatient Visit on 11/26/2018   Component Date Value Ref Range Status    Protein, total 11/26/2018 9.0* 6.4 - 8.2 g/dL Final    Albumin 11/26/2018 3.2* 3.4 - 5.0 g/dL Final    Globulin 11/26/2018 5.8* 2.0 - 4.0 g/dL Final    A-G Ratio 11/26/2018 0.6* 0.8 - 1.7   Final    Bilirubin, total 11/26/2018 0.1* 0.2 - 1.0 MG/DL Final    Bilirubin, direct 11/26/2018 <0.1  0.0 - 0.2 MG/DL Final    Alk.  phosphatase 11/26/2018 126* 45 - 117 U/L Final    AST (SGOT) 11/26/2018 30  15 - 37 U/L Final    ALT (SGPT) 11/26/2018 19  13 - 56 U/L Final    Sodium 11/26/2018 136  136 - 145 mmol/L Final    Potassium 11/26/2018 3.4* 3.5 - 5.5 mmol/L Final    Chloride 11/26/2018 99* 100 - 108 mmol/L Final    CO2 11/26/2018 31  21 - 32 mmol/L Final    Anion gap 11/26/2018 6  3.0 - 18 mmol/L Final    Glucose 11/26/2018 80  74 - 99 mg/dL Final    BUN 11/26/2018 12  7.0 - 18 MG/DL Final    Creatinine 11/26/2018 0.67  0.6 - 1.3 MG/DL Final    BUN/Creatinine ratio 11/26/2018 18  12 - 20   Final    GFR est AA 11/26/2018 >60  >60 ml/min/1.73m2 Final    GFR est non-AA 11/26/2018 >60  >60 ml/min/1.73m2 Final    Calcium 11/26/2018 9.0  8.5 - 10.1 MG/DL Final    CK 11/26/2018 120  26 - 192 U/L Final    C-Reactive protein 11/26/2018 2.2* 0 - 0.3 mg/dL Final    WBC 11/26/2018 8.7  4.6 - 13.2 K/uL Final    RBC 11/26/2018 4.34  4.20 - 5.30 M/uL Final    HGB 11/26/2018 13.4  12.0 - 16.0 g/dL Final    HCT 11/26/2018 41.6  35.0 - 45.0 % Final    MCV 11/26/2018 95.9  74.0 - 97.0 FL Final    MCH 11/26/2018 30.9  24.0 - 34.0 PG Final    MCHC 11/26/2018 32.2  31.0 - 37.0 g/dL Final    RDW 11/26/2018 14.5  11.6 - 14.5 % Final    PLATELET 94/73/2193 403  135 - 420 K/uL Final    MPV 11/26/2018 10.0  9.2 - 11.8 FL Final    NEUTROPHILS 11/26/2018 68  40 - 73 % Final    LYMPHOCYTES 11/26/2018 23  21 - 52 % Final    MONOCYTES 11/26/2018 7  3 - 10 % Final    EOSINOPHILS 11/26/2018 2  0 - 5 % Final    BASOPHILS 11/26/2018 0  0 - 2 % Final    ABS. NEUTROPHILS 11/26/2018 5.9  1.8 - 8.0 K/UL Final    ABS. LYMPHOCYTES 11/26/2018 2.0  0.9 - 3.6 K/UL Final    ABS. MONOCYTES 11/26/2018 0.6  0.05 - 1.2 K/UL Final    ABS. EOSINOPHILS 11/26/2018 0.2  0.0 - 0.4 K/UL Final    ABS. BASOPHILS 11/26/2018 0.0  0.0 - 0.1 K/UL Final    DF 11/26/2018 AUTOMATED    Final   Admission on 11/25/2018, Discharged on 11/25/2018   Component Date Value Ref Range Status    Sodium 11/25/2018 138  136 - 145 mmol/L Final    Potassium 11/25/2018 3.5  3.5 - 5.5 mmol/L Final    Chloride 11/25/2018 99* 100 - 108 mmol/L Final    CO2 11/25/2018 30  21 - 32 mmol/L Final    Anion gap 11/25/2018 9  3.0 - 18 mmol/L Final    Glucose 11/25/2018 107* 74 - 99 mg/dL Final    BUN 11/25/2018 9  7.0 - 18 MG/DL Final    Creatinine 11/25/2018 0.66  0.6 - 1.3 MG/DL Final    BUN/Creatinine ratio 11/25/2018 14  12 - 20   Final    GFR est AA 11/25/2018 >60  >60 ml/min/1.73m2 Final    GFR est non-AA 11/25/2018 >60  >60 ml/min/1.73m2 Final    Calcium 11/25/2018 9.1  8.5 - 10.1 MG/DL Final    Bilirubin, total 11/25/2018 0.2  0.2 - 1.0 MG/DL Final    ALT (SGPT) 11/25/2018 20  13 - 56 U/L Final    AST (SGOT) 11/25/2018 28  15 - 37 U/L Final    Alk.  phosphatase 11/25/2018 126* 45 - 117 U/L Final    Protein, total 11/25/2018 8.8* 6.4 - 8.2 g/dL Final    Albumin 11/25/2018 3.1* 3.4 - 5.0 g/dL Final    Globulin 11/25/2018 5.7* 2.0 - 4.0 g/dL Final    A-G Ratio 11/25/2018 0.5* 0.8 - 1.7   Final   Hospital Outpatient Visit on 11/19/2018   Component Date Value Ref Range Status    WBC 11/19/2018 7.5  4.5 - 13.0 K/uL Final    RBC 11/19/2018 4.31  4.10 - 5.10 M/uL Final    HGB 11/19/2018 13.4  12.0 - 16 g/dL Final    HCT 11/19/2018 41.4  36 - 48 % Final    MCV 11/19/2018 96.1  78 - 102 FL Final    MCH 11/19/2018 31.1  25.0 - 35.0 PG Final    MCHC 11/19/2018 32.4  31 - 37 g/dL Final    RDW 11/19/2018 14.0  11.5 - 14.5 % Final    PLATELET 82/10/6182 364  140 - 440 K/uL Final    NEUTROPHILS 11/19/2018 72* 40 - 70 % Final    MIXED CELLS 11/19/2018 6  0.1 - 17 % Final    LYMPHOCYTES 11/19/2018 23  14 - 44 % Final    ABS. NEUTROPHILS 11/19/2018 5.4  1.8 - 9.5 K/UL Final    ABS. MIXED CELLS 11/19/2018 0.4  0.0 - 2.3 K/uL Final    ABS. LYMPHOCYTES 11/19/2018 1.7  1.1 - 5.9 K/UL Final    DF 11/19/2018 AUTOMATED    Final    Sodium 11/19/2018 136  136 - 145 mmol/L Final    Potassium 11/19/2018 3.7  3.5 - 5.5 mmol/L Final    Chloride 11/19/2018 100  100 - 108 mmol/L Final    CO2 11/19/2018 29  21 - 32 mmol/L Final    Anion gap 11/19/2018 7  3.0 - 18 mmol/L Final    Glucose 11/19/2018 113* 74 - 99 mg/dL Final    BUN 11/19/2018 14  7.0 - 18 MG/DL Final    Creatinine 11/19/2018 0.85  0.6 - 1.3 MG/DL Final    BUN/Creatinine ratio 11/19/2018 16  12 - 20   Final    GFR est AA 11/19/2018 >60  >60 ml/min/1.73m2 Final    GFR est non-AA 11/19/2018 >60  >60 ml/min/1.73m2 Final    Calcium 11/19/2018 8.9  8.5 - 10.1 MG/DL Final    Bilirubin, total 11/19/2018 0.3  0.2 - 1.0 MG/DL Final    ALT (SGPT) 11/19/2018 16  13 - 56 U/L Final    AST (SGOT) 11/19/2018 14* 15 - 37 U/L Final    Alk.  phosphatase 11/19/2018 147* 45 - 117 U/L Final    Protein, total 11/19/2018 8.3* 6.4 - 8.2 g/dL Final    Albumin 11/19/2018 3.4  3.4 - 5.0 g/dL Final    Globulin 11/19/2018 4.9* 2.0 - 4.0 g/dL Final    A-G Ratio 11/19/2018 0.7* 0.8 - 1.7   Final    CK 11/19/2018 24* 26 - 192 U/L Final    C-Reactive protein 11/19/2018 4.1* 0 - 0.3 mg/dL Final   Admission on 11/11/2018, Discharged on 11/15/2018   Component Date Value Ref Range Status    Sodium 11/11/2018 138  136 - 145 mmol/L Final    Potassium 11/11/2018 3.3* 3.5 - 5.5 mmol/L Final    Chloride 11/11/2018 99* 100 - 108 mmol/L Final    CO2 11/11/2018 29  21 - 32 mmol/L Final    Anion gap 11/11/2018 10  3.0 - 18 mmol/L Final    Glucose 11/11/2018 83  74 - 99 mg/dL Final    BUN 11/11/2018 15  7.0 - 18 MG/DL Final    Creatinine 11/11/2018 0.76  0.6 - 1.3 MG/DL Final    BUN/Creatinine ratio 11/11/2018 20  12 - 20   Final    GFR est AA 11/11/2018 >60  >60 ml/min/1.73m2 Final    GFR est non-AA 11/11/2018 >60  >60 ml/min/1.73m2 Final    Calcium 11/11/2018 9.2  8.5 - 10.1 MG/DL Final    WBC 11/11/2018 8.7  4.6 - 13.2 K/uL Final    RBC 11/11/2018 4.53  4.20 - 5.30 M/uL Final    HGB 11/11/2018 13.7  12.0 - 16.0 g/dL Final    HCT 11/11/2018 43.3  35.0 - 45.0 % Final    MCV 11/11/2018 95.6  74.0 - 97.0 FL Final    MCH 11/11/2018 30.2  24.0 - 34.0 PG Final    MCHC 11/11/2018 31.6  31.0 - 37.0 g/dL Final    RDW 11/11/2018 14.2  11.6 - 14.5 % Final    PLATELET 11/01/0439 260  135 - 420 K/uL Final    MPV 11/11/2018 9.1* 9.2 - 11.8 FL Final    NEUTROPHILS 11/11/2018 60  40 - 73 % Final    LYMPHOCYTES 11/11/2018 31  21 - 52 % Final    MONOCYTES 11/11/2018 7  3 - 10 % Final    EOSINOPHILS 11/11/2018 2  0 - 5 % Final    BASOPHILS 11/11/2018 0  0 - 2 % Final    ABS. NEUTROPHILS 11/11/2018 5.3  1.8 - 8.0 K/UL Final    ABS. LYMPHOCYTES 11/11/2018 2.7  0.9 - 3.6 K/UL Final    ABS. MONOCYTES 11/11/2018 0.6  0.05 - 1.2 K/UL Final    ABS. EOSINOPHILS 11/11/2018 0.2  0.0 - 0.4 K/UL Final    ABS.  BASOPHILS 11/11/2018 0.0  0.0 - 0.1 K/UL Final    DF 11/11/2018 AUTOMATED    Final    Magnesium 11/11/2018 1.9  1.6 - 2.6 mg/dL Final    Special Requests: 11/11/2018 NO SPECIAL REQUESTS    Final    Culture result: 11/11/2018 NO GROWTH 6 DAYS    Final    Color 11/11/2018 YELLOW    Final    Appearance 11/11/2018 CLEAR    Final    Specific gravity 11/11/2018 >1.030* 1.005 - 1.030 Final    pH (UA) 11/11/2018 6.0  5.0 - 8.0   Final    Protein 11/11/2018 TRACE* NEG mg/dL Final    Glucose 11/11/2018 NEGATIVE   NEG mg/dL Final    Ketone 11/11/2018 NEGATIVE   NEG mg/dL Final    Bilirubin 11/11/2018 NEGATIVE   NEG   Final    Blood 11/11/2018 NEGATIVE   NEG   Final    Urobilinogen 11/11/2018 1.0  0.2 - 1.0 EU/dL Final    Nitrites 11/11/2018 NEGATIVE   NEG   Final    Leukocyte Esterase 11/11/2018 TRACE* NEG   Final    Special Requests: 11/11/2018 NO SPECIAL REQUESTS    Final    Culture result: 11/11/2018 NO GROWTH 2 DAYS    Final    C-Reactive Protein, Cardiac 11/11/2018 17.87* 0.00 - 3.00 mg/L Final    Sed rate, automated 11/11/2018 26  0 - 30 mm/hr Final    WBC 11/11/2018 0 to 3  0 - 4 /hpf Final    RBC 11/11/2018 NONE  0 - 5 /hpf Final    Epithelial cells 11/11/2018 2+  0 - 5 /lpf Final    Bacteria 11/11/2018 FEW* NEG /hpf Final    Mucus 11/11/2018 2+* NEG /lpf Final    Sodium 11/12/2018 140  136 - 145 mmol/L Final    Potassium 11/12/2018 3.5  3.5 - 5.5 mmol/L Final    Chloride 11/12/2018 105  100 - 108 mmol/L Final    CO2 11/12/2018 29  21 - 32 mmol/L Final    Anion gap 11/12/2018 6  3.0 - 18 mmol/L Final    Glucose 11/12/2018 96  74 - 99 mg/dL Final    BUN 11/12/2018 11  7.0 - 18 MG/DL Final    Creatinine 11/12/2018 0.62  0.6 - 1.3 MG/DL Final    BUN/Creatinine ratio 11/12/2018 18  12 - 20   Final    GFR est AA 11/12/2018 >60  >60 ml/min/1.73m2 Final    GFR est non-AA 11/12/2018 >60  >60 ml/min/1.73m2 Final    Calcium 11/12/2018 8.6  8.5 - 10.1 MG/DL Final    Magnesium 11/12/2018 2.3  1.6 - 2.6 mg/dL Final    WBC 11/12/2018 7.1  4.6 - 13.2 K/uL Final    RBC 11/12/2018 4.41  4.20 - 5.30 M/uL Final    HGB 11/12/2018 13.6  12.0 - 16.0 g/dL Final    HCT 11/12/2018 41.8  35.0 - 45.0 % Final    MCV 11/12/2018 94.8  74.0 - 97.0 FL Final    MCH 11/12/2018 30.8  24.0 - 34.0 PG Final    MCHC 11/12/2018 32.5  31.0 - 37.0 g/dL Final    RDW 11/12/2018 14.0  11.6 - 14.5 % Final    PLATELET 45/22/5599 362  135 - 420 K/uL Final    MPV 11/12/2018 9.4 9.2 - 11.8 FL Final    Prothrombin time 11/12/2018 12.6  11.5 - 15.2 sec Final    INR 11/12/2018 1.0  0.8 - 1.2   Final    aPTT 11/12/2018 32.0  23.0 - 36.4 SEC Final    Special Requests: 11/12/2018 T7,T8 DISK BOX   Final    GRAM STAIN 11/12/2018 NO WBC'S SEEN    Final    GRAM STAIN 11/12/2018 NO ORGANISMS SEEN    Final    Culture result: 11/12/2018 CANDIDA PARAPSILOSIS ISOLATED FROM BROTH ONLY*   Final    Special Requests: 11/12/2018 T7,T8 DISK BOX   Final    FUNGUS SMEAR 11/12/2018 NO FUNGAL ELEMENTS SEEN    Final    Culture result: 11/12/2018 NO FUNGUS ISOLATED 35 DAYS    Final    Source 11/12/2018 TISSUE BIOPSY T7, T8 DISK BOX   Corrected    AFB Specimen processing 11/12/2018 Concentration   Final    Acid Fast Smear 11/12/2018 NEGATIVE     Final    Acid Fast Culture 11/12/2018 PENDING   Incomplete    HIV 1/2 Interpretation 11/13/2018 NONREACTIVE  NR   Final    HIV 1/2 result comment 11/13/2018 SEE NOTE    Final    Source 11/15/2018 BIOPSY    Corrected    AFB Specimen processing 11/15/2018 Comment    Final    Acid Fast Smear 11/15/2018 NEGATIVE     Final    Acid Fast Culture 11/15/2018 PENDING   Incomplete    Special Requests: 11/15/2018 T7 T8 DISK BX   Final    FUNGUS SMEAR 11/15/2018 NO FUNGAL ELEMENTS SEEN    Final    Culture result: 11/15/2018 FEW CANDIDA PARAPSILOSIS*   Final    Special Requests: 11/15/2018 D7 D8 DISK BX   Final    GRAM STAIN 11/15/2018 NO WBC'S SEEN    Final    GRAM STAIN 11/15/2018 NO ORGANISMS SEEN    Final    Culture result: 11/15/2018 RARE STAPHYLOCOCCUS EPIDERMIDIS*   Final    Culture result: 11/15/2018 FEW CANDIDA PARAPSILOSIS*   Final    WBC 11/15/2018 7.0  4.6 - 13.2 K/uL Final    RBC 11/15/2018 4.15* 4.20 - 5.30 M/uL Final    HGB 11/15/2018 12.7  12.0 - 16.0 g/dL Final    HCT 11/15/2018 38.7  35.0 - 45.0 % Final    MCV 11/15/2018 93.3  74.0 - 97.0 FL Final    MCH 11/15/2018 30.6  24.0 - 34.0 PG Final    MCHC 11/15/2018 32.8  31.0 - 37.0 g/dL Final  RDW 11/15/2018 14.1  11.6 - 14.5 % Final    PLATELET 37/27/9205 885  135 - 420 K/uL Final    MPV 11/15/2018 9.5  9.2 - 11.8 FL Final    NEUTROPHILS 11/15/2018 55  40 - 73 % Final    LYMPHOCYTES 11/15/2018 34  21 - 52 % Final    MONOCYTES 11/15/2018 7  3 - 10 % Final    EOSINOPHILS 11/15/2018 4  0 - 5 % Final    BASOPHILS 11/15/2018 0  0 - 2 % Final    ABS. NEUTROPHILS 11/15/2018 3.9  1.8 - 8.0 K/UL Final    ABS. LYMPHOCYTES 11/15/2018 2.4  0.9 - 3.6 K/UL Final    ABS. MONOCYTES 11/15/2018 0.5  0.05 - 1.2 K/UL Final    ABS. EOSINOPHILS 11/15/2018 0.3  0.0 - 0.4 K/UL Final    ABS. BASOPHILS 11/15/2018 0.0  0.0 - 0.1 K/UL Final    DF 11/15/2018 AUTOMATED    Final    Sodium 11/15/2018 139  136 - 145 mmol/L Final    Potassium 11/15/2018 3.4* 3.5 - 5.5 mmol/L Final    Chloride 11/15/2018 103  100 - 108 mmol/L Final    CO2 11/15/2018 30  21 - 32 mmol/L Final    Anion gap 11/15/2018 6  3.0 - 18 mmol/L Final    Glucose 11/15/2018 94  74 - 99 mg/dL Final    BUN 11/15/2018 9  7.0 - 18 MG/DL Final    Creatinine 11/15/2018 0.78  0.6 - 1.3 MG/DL Final    BUN/Creatinine ratio 11/15/2018 12  12 - 20   Final    GFR est AA 11/15/2018 >60  >60 ml/min/1.73m2 Final    GFR est non-AA 11/15/2018 >60  >60 ml/min/1.73m2 Final    Calcium 11/15/2018 8.9  8.5 - 10.1 MG/DL Final         CT Results (most recent):  Results from [place] encounter on 11/11/18   CT ABD WO CONT    Narrative CT abdomen without IV contrast     INDICATION: CT of T7-T8 discitis planning. TECHNIQUE: Serial axial images were obtained of the abdomen without intravenous  contrast. Images are performed prone for the intended CT procedure. Decision was  then made by Dr. Nata Zuñiga to perform the procedure fluoroscopically in IR.     All CT scans are performed using dose optimization techniques as appropriate to  the performed exam including the following: Automated exposure control,  adjustment of mA and/or kV according to patient size, and use of iterative  reconstructive technique. COMPARISON: CTA chest 11/7/2018. MRI thoracic spine 11/11/2018. FINDINGS:    Hepatic steatosis. Mild hepatosplenomegaly. Chronic calcifications in the  pancreas without acute findings. The kidneys, adrenal glands, gallbladder and  partially imaged bowel are within normal limits. No lymphadenopathy or free  fluid. The level of T7-T8 osteomyelitis/discitis has mild surrounding soft tissue  thickening as seen on prior MRI. Refer to prior MRI report. Impression IMPRESSION:    1. CT-guided biopsy procedure changed to fluoroscopic procedure. Biopsy planning  CT performed. 2. T7-T8 osteomyelitis/discitis redemonstrated. Refer to MR thoracic spine  report. 3. Mild hepatosplenomegaly with hepatic steatosis. 4. Evidence of chronic pancreatitis. XR Results (most recent):  Results from Hospital Encounter encounter on 11/07/18   XR CHEST PA LAT    Narrative HISTORY: Shortness of breath. Exam: Chest.    Technique: Two views of the chest were obtained. No prior studies for  comparison. FINDINGS: Mild to moderate bilateral diffuse interstitial prominence is noted  with basilar dominance without hemothorax pneumonia or pleural effusions. Heart  is enlarged. Vascular pedicle is unremarkable. Visualized bony thorax and soft  tissues are within normal limits. Impression  IMPRESSION:  1. Sequela of mild to moderate congestion. Follow-up with plain imaging. CT   All Micro Results     None              DIAGNOSIS AND PLAN  Patient Active Problem List   Diagnosis Code    Severe obesity with body mass index (BMI) of 35.0 to 39.9 with serious comorbidity (HCC) E66.01    Discitis of thoracic region M46.44     1. Discitis of thoracic region  2. Severe obesity with body mass index (BMI) of 35.0 to 39.9 with serious comorbidity (HCC)  3. Candida parapsilosis infection  4. IVDU (intravenous drug user)  5. Methadone use ([facility] [address]  6. History of hepatitis C  7. 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 Patient not significantly improving with ceftriaxone and daptomycin. Given culture results and indolent clinical presentation in the setting of a patient with h/o IVDU, her infection is most likely due to Candida parapsilosis. I recommend to complete IV antibiotics as prescribed from hospital to complete on 12/29/18. Patient has been on antibiotics since approx 11/15/18. Last apparent dose of antibiotics per infusion center notes was 11/11/18. Patient will not be able to return to infusion center until 11/13/18 she says. Initially given a prescription to start fluconazol by mouth 400 mg until able to return to infusion center to start doses IV. This prescription was discontinued before patient took the first dose for concerns of QT prolongation given use of high dose methadone. This was discussed with patient and given nature of expected less response with echinocandins with c. Parapsilosis, we decided to proceed with Ambisome, which the patient started on 12/13/18. Repeat MRI ordered today to rule out progression/extension of infection and rule out abscess formation- pending. Addendum. At the closure of this noted, review of labs from 12/17/18 were notable for elevated Cr of 1.85 on Ambisome from 12/13 to 12/17 (5 days), 2018. Patient and infusion center were notified that we need to stop this medication due to renal complications. I have discussed with the patient that the best treatment option at this point would be fluconazol, but we would prefer to discuss with [facility] if okay to lower methadone dose to avoid increased levels expected with fluconazol and risk of QT prolongation. The patient will need to be on fluconazol for 6-12 months.  For now we will have patient on anidalofungin 100 mg IV daily until cleared to proceed with fluconazol, possibly 2 weeks and then transition to fluconazol by mouth, if clinically improving. CRP has improved on labs from 12/17/18 to 0.5. Follow-up Disposition:  Return in about 2 weeks (around 12/25/2018). Brenda Srinivasan MD

## 2018-12-19 ENCOUNTER — HOSPITAL ENCOUNTER (OUTPATIENT)
Dept: INFUSION THERAPY | Age: 58
Discharge: HOME | End: 2018-12-19
Payer: SUBSIDIZED

## 2018-12-19 VITALS
RESPIRATION RATE: 18 BRPM | TEMPERATURE: 97.9 F | DIASTOLIC BLOOD PRESSURE: 83 MMHG | HEART RATE: 65 BPM | SYSTOLIC BLOOD PRESSURE: 146 MMHG | OXYGEN SATURATION: 98 %

## 2018-12-19 PROCEDURE — 96366 THER/PROPH/DIAG IV INF ADDON: CPT

## 2018-12-19 PROCEDURE — 74011250636 HC RX REV CODE- 250/636: Performed by: INTERNAL MEDICINE

## 2018-12-19 PROCEDURE — 96365 THER/PROPH/DIAG IV INF INIT: CPT

## 2018-12-19 PROCEDURE — 74011000258 HC RX REV CODE- 258: Performed by: INTERNAL MEDICINE

## 2018-12-19 RX ORDER — HEPARIN 100 UNIT/ML
500 SYRINGE INTRAVENOUS AS NEEDED
Status: DISCONTINUED | OUTPATIENT
Start: 2018-12-19 | End: 2018-12-23 | Stop reason: HOSPADM

## 2018-12-19 RX ORDER — SODIUM CHLORIDE 0.9 % (FLUSH) 0.9 %
10-40 SYRINGE (ML) INJECTION AS NEEDED
Status: DISCONTINUED | OUTPATIENT
Start: 2018-12-19 | End: 2018-12-23 | Stop reason: HOSPADM

## 2018-12-19 RX ADMIN — Medication 10 ML: at 13:14

## 2018-12-19 RX ADMIN — SODIUM CHLORIDE, PRESERVATIVE FREE 500 UNITS: 5 INJECTION INTRAVENOUS at 13:15

## 2018-12-19 RX ADMIN — SODIUM CHLORIDE, PRESERVATIVE FREE 500 UNITS: 5 INJECTION INTRAVENOUS at 13:14

## 2018-12-19 RX ADMIN — Medication 10 ML: at 13:15

## 2018-12-19 RX ADMIN — SODIUM CHLORIDE 100 MG: 900 INJECTION, SOLUTION INTRAVENOUS at 11:55

## 2018-12-19 NOTE — PROGRESS NOTES
[facility] Progress Note    Date: 2018    Name: Natalie Espinosa    MRN: [id number]         : 1960    Anidalofungin (Eraxis) Infusion    Ms. Francisca Patel to [facility], ambulatory at . Pain to back /10. No distress noted     Pt was assessed and education was provided. Carenotes provided. Ms. Lawyer Escobar vitals were reviewed. Visit Vitals  /83 (BP 1 Location: Left arm, BP Patient Position: Sitting)   Pulse 65   Temp 97.9 °F (36.6 °C)   Resp 18   SpO2 98%       Right upper arm PICC flushed easily and had brisk blood return via both ports. PICC dressing c/d/i and not due to be changed. No swelling, redness, streaking, warmth or drainage noted in arm. Pt denied c/o pain in arm. Anidalofungin infused as ordered. Care notes provided and reviewed with patient. Patient declined an observation period, and verbalized signs of an allergic reaction, and appropriate actions to take. Ms. Francisca Patel tolerated infusion, and had no complaints at this time. Both lumens of PICC flushed with NS 10 ml and Heparin 500 units each. Green end caps applied, and lumens wrapped with guaze and paper tape. Stockinette placed over dressing for protection. Patient armband removed and shredded. Ms. Francisca Patel was discharged from [place] [address] in stable condition at 1325. She is to return on 18 at 1130 for her next antibiotic appointment.     Leann De La Cruz RN  2018  1341

## 2018-12-20 ENCOUNTER — OFFICE VISIT (OUTPATIENT)
Dept: INTERNAL MEDICINE CLINIC | Age: 58
End: 2018-12-20

## 2018-12-20 ENCOUNTER — HOSPITAL ENCOUNTER (OUTPATIENT)
Dept: INFUSION THERAPY | Age: 58
Discharge: HOME | End: 2018-12-20
Payer: SUBSIDIZED

## 2018-12-20 VITALS
TEMPERATURE: 98.2 F | DIASTOLIC BLOOD PRESSURE: 48 MMHG | HEART RATE: 79 BPM | SYSTOLIC BLOOD PRESSURE: 146 MMHG | OXYGEN SATURATION: 96 % | RESPIRATION RATE: 18 BRPM

## 2018-12-20 VITALS
DIASTOLIC BLOOD PRESSURE: 82 MMHG | SYSTOLIC BLOOD PRESSURE: 164 MMHG | BODY MASS INDEX: 35.65 KG/M2 | WEIGHT: 208.8 LBS | RESPIRATION RATE: 26 BRPM | TEMPERATURE: 98.4 F | HEART RATE: 74 BPM | HEIGHT: 64 IN | OXYGEN SATURATION: 97 %

## 2018-12-20 DIAGNOSIS — M46.44 DISCITIS OF THORACIC REGION: Primary | ICD-10-CM

## 2018-12-20 DIAGNOSIS — B37.9 CANDIDA PARAPSILOSIS INFECTION: ICD-10-CM

## 2018-12-20 DIAGNOSIS — E66.01 SEVERE OBESITY WITH BODY MASS INDEX (BMI) OF 35.0 TO 39.9 WITH SERIOUS COMORBIDITY (HCC): ICD-10-CM

## 2018-12-20 DIAGNOSIS — F11.90 METHADONE USE: ICD-10-CM

## 2018-12-20 DIAGNOSIS — F19.90 IVDU (INTRAVENOUS DRUG USER): ICD-10-CM

## 2018-12-20 PROCEDURE — 74011000258 HC RX REV CODE- 258: Performed by: INTERNAL MEDICINE

## 2018-12-20 PROCEDURE — C1751 CATH, INF, PER/CENT/MIDLINE: HCPCS

## 2018-12-20 PROCEDURE — 74011250636 HC RX REV CODE- 250/636: Performed by: INTERNAL MEDICINE

## 2018-12-20 PROCEDURE — 96365 THER/PROPH/DIAG IV INF INIT: CPT

## 2018-12-20 PROCEDURE — 74011250636 HC RX REV CODE- 250/636

## 2018-12-20 PROCEDURE — 96366 THER/PROPH/DIAG IV INF ADDON: CPT

## 2018-12-20 RX ORDER — HEPARIN 100 UNIT/ML
SYRINGE INTRAVENOUS
Status: COMPLETED
Start: 2018-12-20 | End: 2018-12-20

## 2018-12-20 RX ORDER — HEPARIN 100 UNIT/ML
500 SYRINGE INTRAVENOUS AS NEEDED
Status: DISCONTINUED | OUTPATIENT
Start: 2018-12-20 | End: 2018-12-24 | Stop reason: HOSPADM

## 2018-12-20 RX ORDER — SODIUM CHLORIDE 0.9 % (FLUSH) 0.9 %
10-40 SYRINGE (ML) INJECTION AS NEEDED
Status: DISCONTINUED | OUTPATIENT
Start: 2018-12-20 | End: 2018-12-24 | Stop reason: HOSPADM

## 2018-12-20 RX ADMIN — SODIUM CHLORIDE 100 MG: 900 INJECTION, SOLUTION INTRAVENOUS at 11:54

## 2018-12-20 RX ADMIN — Medication 10 ML: at 13:19

## 2018-12-20 RX ADMIN — Medication 10 ML: at 11:38

## 2018-12-20 RX ADMIN — HEPARIN 500 UNITS: 100 SYRINGE at 13:20

## 2018-12-20 RX ADMIN — HEPARIN 500 UNITS: 100 SYRINGE at 11:39

## 2018-12-20 RX ADMIN — Medication 10 ML: at 11:50

## 2018-12-20 NOTE — PROGRESS NOTES
[facility]    Date: 2018    Name: Dennis Treadwell    MRN: [id number]         : 1960    Anidalofungin (Eraxis) Infusion    Ms. Elio Ruano to [facility], ambulatory at [facility]. Pain to back 3/10. No distress noted. Took pain meds today     Pt was assessed and education was provided. Ms. Eddie Tim vitals were reviewed. Visit Vitals  /48 (BP 1 Location: Left arm, BP Patient Position: Sitting)   Pulse 79   Temp 98.2 °F (36.8 °C)   Resp 18   SpO2 96%       Right upper arm PICC flushed easily and had brisk blood return via both ports. PICC dressing c/d/i and due to be changed. No swelling, redness, streaking, warmth, tenderness or drainage noted in arm. Pt denied c/o pain in arm. Drsg change done per protocol using sterile technique. Biopatch and stat lock used. End caps changed. Anidalofungin infused as ordered. Picc site and drsg without change. Ms. Elio Ruano tolerated infusion, and had no complaints. Both lumens of PICC flushed with NS 10 ml and Heparin 500 units each. Green end caps applied, and lumens wrapped with guaze and paper tape. Stockinette placed over dressing for protection. Patient armband removed and shredded. Ms. Elio Ruano was discharged from [place] [address] in stable condition at 1325. She is to return on 18 at 56 for her next antibiotic appointment.     Alex Taylor RN  2018  1341

## 2018-12-20 NOTE — PROGRESS NOTES
Amari Quintana is a 63 y.o. female (: 1960) presenting to address:    Chief Complaint   Patient presents with    Wound Check       Vitals:    18 1337   BP: 164/82   Pulse: 74   Resp: 26   Temp: 98.4 °F (36.9 °C)   TempSrc: Oral   SpO2: 97%   Weight: 208 lb 12.8 oz (94.7 kg)   Height: 5' 4\" (1.626 m)   PainSc:   4       Hearing/Vision:   No exam data present    Learning Assessment:     Learning Assessment 2018   PRIMARY LEARNER Patient   HIGHEST LEVEL OF EDUCATION - PRIMARY LEARNER  GRADUATED HIGH SCHOOL OR GED   BARRIERS PRIMARY LEARNER NONE   CO-LEARNER CAREGIVER No   PRIMARY LANGUAGE ENGLISH   LEARNER PREFERENCE PRIMARY VIDEOS   ANSWERED BY patient    RELATIONSHIP SELF     Depression Screening:     PHQ over the last two weeks 2018   Little interest or pleasure in doing things Not at all   Feeling down, depressed, irritable, or hopeless Not at all   Total Score PHQ 2 0   Trouble falling or staying asleep, or sleeping too much -   Feeling tired or having little energy -   Poor appetite, weight loss, or overeating -   Feeling bad about yourself - or that you are a failure or have let yourself or your family down -   Trouble concentrating on things such as school, work, reading, or watching TV -   Moving or speaking so slowly that other people could have noticed; or the opposite being so fidgety that others notice -   Thoughts of being better off dead, or hurting yourself in some way -   PHQ 9 Score -   How difficult have these problems made it for you to do your work, take care of your home and get along with others -     Fall Risk Assessment:   No flowsheet data found. Abuse Screening:     Abuse Screening Questionnaire 2018   Do you ever feel afraid of your partner? N   Are you in a relationship with someone who physically or mentally threatens you? N   Is it safe for you to go home? Y     Coordination of Care Questionaire:   1.  Have you been to the ER, urgent care clinic since your last visit? Hospitalized since your last visit? NO    2. Have you seen or consulted any other health care providers outside of the [address] [place] [address] since your last visit? Include any pap smears or colon screening. YES, patient was seen on 12/20/18 for infusion. Advanced Directive:   1. Do you have an Advanced Directive? NO    2. Would you like information on Advanced Directives?  NO

## 2018-12-20 NOTE — PROGRESS NOTES
MARIA EUGENIA Thorne is a 63 y.o. female with relevant past medical depression/anxiety, history of IVDU now on methadone clinic, h/o Hep C infection s/p treatment with a pangenotypic agent (Frosty Jania) around Sept- Nov, 2018, severe obesity, HIV status neg (11/13/18) here for outpatient ID follow up of thoracic discitis/osteomyelitis. The patient apparently presented with progressively worse abdominal/thoracic back pain since around Oct, 2018, but no fever, chills, malaise, night sweats. No recent trauma, falls. Last IVDU a few months ago, unclear dates. She was seen in the ED on 11/7/18 and had a CTA to rule out PE, that showed multiple pulmonary nodules, suspected discitis at T7-8, hepatosteatosis and hepatomegaly with mild splenomegaly. Multiple nonspecific periportal slight enlarged nodules. She was discharged to continue outpatient work up. She returned to the ED on 11/1/18 due to persistent pain, associated with nausea and vomiting, an MRI of her thoracic spine was done on11/11/18 that showed significant soft tissue and osseous inflammation centered at the T7-8 disc level  and pronounced circumferential prevertebral and epidural inflammation centered at this level most suggestive of discitis osteomyelitis. No peripheral enhancing collection to suggest abscess. Mild compromise of the central canal at the level of T7-8. On 11/15/18 IR performs T7-8 biopsy. BC on 11/11/18 neg x , UC neg  Disc biopsy culture from 11/12/18  And 11/15/18: Candida parapsilosis. Bone cultures from 11/15/18 also grew MRSE. This results apparently were not available at the time of discharge. Fungal and AFB cultures neg from bone. The patient was initially treated with ceftriaxone and vancomycin. She was discharged on ceftriaxone and daptomycin to complete around 12/29/18 with out patient ID follow up for further recommendations.    The patient was seen again in the ED on 11/15/18 for persistent severe pain and was recommended to continue with treatment as recommended from discharge. The patient completed daptomycin and ceftriaxone from 11/15/18 to 12/11/18 (+-4 weeks), followed by ambisome from 12/13/18 to 12/17/18. Stopped due to increased creatinine to 1.85. The patient persists with thoracic pain but reports mild improvement since starting antifungal therapy. Denies any systemic symptoms. CRP 2.2 (12/10) from 5.6 (12/3) from 2.2 (11/26) from 4.1 (11/19)  CRP 0.5 on 12/17    ROS  As above included in HPI. Otherwise 11 point review of systems negative including constitutional, skin, HENT, eyes, respiratory, cardiovascular, gastrointestinal, genitourinary, musculoskeletal, endocrine, hematologic, allergy, and neurologic. Past Medical History  Past Medical History:   Diagnosis Date    Depression     Hepatitis C     Heroin use disorder, moderate, in early remission ([facility] [address]     NOW on [facility]    Ill-defined condition     Chronic back pain     History reviewed. No pertinent surgical history. Family History  Family History   Problem Relation Age of Onset    Hypertension Mother     High Cholesterol Mother     COPD Mother     Hypertension Father        Social History  She  reports that she has been smoking cigarettes. She has been smoking about 0.25 packs per day. she has never used smokeless tobacco.   Social History     Substance and Sexual Activity   Alcohol Use No       Immunization History    There is no immunization history on file for this patient. Allergies  No Known Allergies    Medications  Current Outpatient Medications   Medication Sig    ketorolac (TORADOL) 10 mg tablet Take 1 Tab by mouth every six (6) hours as needed for Pain.  lidocaine (LIDODERM) 5 % Apply patch to the affected area for 12 hours a day and remove for 12 hours a day.     sertraline (ZOLOFT) 100 mg tablet TAKE 1 & 1/2 TABLET BY MOUTH FOR A TOTAL OF 150MG DAILY    methocarbamol (ROBAXIN) 500 mg tablet Take 1 Tab by mouth three (3) times daily.  docusate sodium (COLACE) 100 mg capsule Take 1 Cap by mouth two (2) times a day.  cefTRIAXone 2 gram IV syringe 2 gm iv daily through 12/29/18 at [facility]- arranged by case management    OTHER Daptomycin through 12/29/18 at [facility]- arranged by     hydroCHLOROthiazide (HYDRODIURIL) 12.5 mg tablet Take 1 Tab by mouth daily.  glecaprevir/pibrentasvir (MAVYRET PO) Take  by mouth.  methadone HCl (METHADONE PO) Take 85 mg by mouth daily. No current facility-administered medications for this visit. Facility-Administered Medications Ordered in Other Visits   Medication Dose Route Frequency    sodium chloride (NS) flush 10-40 mL  10-40 mL IntraVENous PRN    heparin (porcine) pf 500 Units  500 Units InterCATHeter PRN    heparin (porcine) pf 500 Units  500 Units InterCATHeter PRN    sodium chloride (NS) flush 10-40 mL  10-40 mL IntraVENous PRN    [START ON 12/21/2018] anidulafungin (ERAXIS) 100 mg in 0.9% sodium chloride 130 mL IVPB  100 mg IntraVENous ONCE    sodium chloride (NS) flush 10-40 mL  10-40 mL IntraVENous PRN    sodium chloride (NS) flush 10-40 mL  10-40 mL IntraVENous PRN         Visit Vitals  /82 (BP 1 Location: Left arm, BP Patient Position: Sitting) Comment: manual   Pulse 74   Temp 98.4 °F (36.9 °C) (Oral)   Resp 26   Ht 5' 4\" (1.626 m)   Wt 208 lb 12.8 oz (94.7 kg)   SpO2 97%   BMI 35.84 kg/m²     Body mass index is 35.84 kg/m². Physical Exam   Constitutional: She is oriented to person, place, and time. She appears distressed (from pain, looks uncomfortable but non toxic). HENT:   Head: Normocephalic and atraumatic. Neck: Neck supple. Cardiovascular: Normal rate and regular rhythm. Pulmonary/Chest: Effort normal and breath sounds normal.   Abdominal: Soft. Musculoskeletal: She exhibits no edema. Neurological: She is alert and oriented to person, place, and time. Skin: Skin is warm.    Mid back is slightly tender to touch, extending around costal area on both sides   Nursing note and vitals reviewed. [address] Outpatient Visit on 12/17/2018   Component Date Value Ref Range Status    WBC 12/17/2018 7.3  4.5 - 13.0 K/uL Final    RBC 12/17/2018 4.58  4.10 - 5.10 M/uL Final    HGB 12/17/2018 14.0  12.0 - 16 g/dL Final    HCT 12/17/2018 41.7  36 - 48 % Final    MCV 12/17/2018 91.0  78 - 102 FL Final    MCH 12/17/2018 30.6  25.0 - 35.0 PG Final    MCHC 12/17/2018 33.6  31 - 37 g/dL Final    RDW 12/17/2018 13.2  11.5 - 14.5 % Final    PLATELET 08/95/8913 266  140 - 440 K/uL Final    NEUTROPHILS 12/17/2018 65  40 - 70 % Final    MIXED CELLS 12/17/2018 6  0.1 - 17 % Final    LYMPHOCYTES 12/17/2018 29  14 - 44 % Final    ABS. NEUTROPHILS 12/17/2018 4.8  1.8 - 9.5 K/UL Final    ABS. MIXED CELLS 12/17/2018 0.4  0.0 - 2.3 K/uL Final    ABS.  LYMPHOCYTES 12/17/2018 2.1  1.1 - 5.9 K/UL Final    DF 12/17/2018 AUTOMATED    Final    Sodium 12/17/2018 134* 136 - 145 mmol/L Final    Potassium 12/17/2018 3.6  3.5 - 5.5 mmol/L Final    Chloride 12/17/2018 99* 100 - 108 mmol/L Final    CO2 12/17/2018 25  21 - 32 mmol/L Final    Anion gap 12/17/2018 10  3.0 - 18 mmol/L Final    Glucose 12/17/2018 89  74 - 99 mg/dL Final    BUN 12/17/2018 28* 7.0 - 18 MG/DL Final    Creatinine 12/17/2018 1.85* 0.6 - 1.3 MG/DL Final    BUN/Creatinine ratio 12/17/2018 15  12 - 20   Final    GFR est AA 12/17/2018 34* >60 ml/min/1.73m2 Final    GFR est non-AA 12/17/2018 28* >60 ml/min/1.73m2 Final    Calcium 12/17/2018 9.4  8.5 - 10.1 MG/DL Final    C-Reactive protein 12/17/2018 0.5* 0 - 0.3 mg/dL Final   Hospital Outpatient Visit on 12/10/2018   Component Date Value Ref Range Status    WBC 12/10/2018 8.1  4.5 - 13.0 K/uL Final    RBC 12/10/2018 4.26  4.10 - 5.10 M/uL Final    HGB 12/10/2018 13.3  12.0 - 16 g/dL Final    HCT 12/10/2018 39.9  36 - 48 % Final    MCV 12/10/2018 93.7  78 - 102 FL Final    MCH 12/10/2018 31.2  25.0 - 35.0 PG Final    MCHC 12/10/2018 33.3  31 - 37 g/dL Final    RDW 12/10/2018 13.7  11.5 - 14.5 % Final    PLATELET 95/93/7746 698  140 - 440 K/uL Final    NEUTROPHILS 12/10/2018 66  40 - 70 % Final    MIXED CELLS 12/10/2018 9  0.1 - 17 % Final    LYMPHOCYTES 12/10/2018 26  14 - 44 % Final    ABS. NEUTROPHILS 12/10/2018 5.3  1.8 - 9.5 K/UL Final    ABS. MIXED CELLS 12/10/2018 0.7  0.0 - 2.3 K/uL Final    ABS. LYMPHOCYTES 12/10/2018 2.1  1.1 - 5.9 K/UL Final    DF 12/10/2018 AUTOMATED    Final    Sodium 12/10/2018 135* 136 - 145 mmol/L Final    Potassium 12/10/2018 4.0  3.5 - 5.5 mmol/L Final    Chloride 12/10/2018 98* 100 - 108 mmol/L Final    CO2 12/10/2018 31  21 - 32 mmol/L Final    Anion gap 12/10/2018 6  3.0 - 18 mmol/L Final    Glucose 12/10/2018 60* 74 - 99 mg/dL Final    BUN 12/10/2018 15  7.0 - 18 MG/DL Final    Creatinine 12/10/2018 0.62  0.6 - 1.3 MG/DL Final    BUN/Creatinine ratio 12/10/2018 24* 12 - 20   Final    GFR est AA 12/10/2018 >60  >60 ml/min/1.73m2 Final    GFR est non-AA 12/10/2018 >60  >60 ml/min/1.73m2 Final    Calcium 12/10/2018 8.9  8.5 - 10.1 MG/DL Final    Protein, total 12/10/2018 8.1  6.4 - 8.2 g/dL Final    Albumin 12/10/2018 3.5  3.4 - 5.0 g/dL Final    Globulin 12/10/2018 4.6* 2.0 - 4.0 g/dL Final    A-G Ratio 12/10/2018 0.8  0.8 - 1.7   Final    Bilirubin, total 12/10/2018 0.5  0.2 - 1.0 MG/DL Final    Bilirubin, direct 12/10/2018 0.1  0.0 - 0.2 MG/DL Final    Alk.  phosphatase 12/10/2018 110  45 - 117 U/L Final    AST (SGOT) 12/10/2018 18  15 - 37 U/L Final    ALT (SGPT) 12/10/2018 17  13 - 56 U/L Final    CK 12/10/2018 90  26 - 192 U/L Final    C-Reactive protein 12/10/2018 2.2* 0 - 0.3 mg/dL Final   Hospital Outpatient Visit on 12/03/2018   Component Date Value Ref Range Status    WBC 12/03/2018 7.4  4.5 - 13.0 K/uL Final    RBC 12/03/2018 4.17  4.10 - 5.10 M/uL Final    HGB 12/03/2018 13.0  12.0 - 16 g/dL Final    HCT 12/03/2018 39.6  36 - 48 % Final    MCV 12/03/2018 95.0  78 - 102 FL Final    MCH 12/03/2018 31.2  25.0 - 35.0 PG Final    MCHC 12/03/2018 32.8  31 - 37 g/dL Final    RDW 12/03/2018 13.7  11.5 - 14.5 % Final    PLATELET 69/95/8512 282  140 - 440 K/uL Final    NEUTROPHILS 12/03/2018 64  40 - 70 % Final    MIXED CELLS 12/03/2018 9  0.1 - 17 % Final    LYMPHOCYTES 12/03/2018 28  14 - 44 % Final    ABS. NEUTROPHILS 12/03/2018 4.8  1.8 - 9.5 K/UL Final    ABS. MIXED CELLS 12/03/2018 0.6  0.0 - 2.3 K/uL Final    ABS. LYMPHOCYTES 12/03/2018 2.0  1.1 - 5.9 K/UL Final    DF 12/03/2018 AUTOMATED    Final    Sodium 12/03/2018 135* 136 - 145 mmol/L Final    Potassium 12/03/2018 3.7  3.5 - 5.5 mmol/L Final    Chloride 12/03/2018 100  100 - 108 mmol/L Final    CO2 12/03/2018 29  21 - 32 mmol/L Final    Anion gap 12/03/2018 6  3.0 - 18 mmol/L Final    Glucose 12/03/2018 109* 74 - 99 mg/dL Final    BUN 12/03/2018 13  7.0 - 18 MG/DL Final    Creatinine 12/03/2018 0.67  0.6 - 1.3 MG/DL Final    BUN/Creatinine ratio 12/03/2018 19  12 - 20   Final    GFR est AA 12/03/2018 >60  >60 ml/min/1.73m2 Final    GFR est non-AA 12/03/2018 >60  >60 ml/min/1.73m2 Final    Calcium 12/03/2018 8.7  8.5 - 10.1 MG/DL Final    Protein, total 12/03/2018 8.2  6.4 - 8.2 g/dL Final    Albumin 12/03/2018 3.3* 3.4 - 5.0 g/dL Final    Globulin 12/03/2018 4.9* 2.0 - 4.0 g/dL Final    A-G Ratio 12/03/2018 0.7* 0.8 - 1.7   Final    Bilirubin, total 12/03/2018 0.2  0.2 - 1.0 MG/DL Final    Bilirubin, direct 12/03/2018 <0.1  0.0 - 0.2 MG/DL Final    Alk.  phosphatase 12/03/2018 125* 45 - 117 U/L Final    AST (SGOT) 12/03/2018 52* 15 - 37 U/L Final    ALT (SGPT) 12/03/2018 35  13 - 56 U/L Final    C-Reactive protein 12/03/2018 5.6* 0 - 0.3 mg/dL Final    CK 12/03/2018 392* 26 - 192 U/L Final   Hospital Outpatient Visit on 11/26/2018   Component Date Value Ref Range Status    Protein, total 11/26/2018 9.0* 6.4 - 8.2 g/dL Final    Albumin 11/26/2018 3.2* 3.4 - 5.0 g/dL Final    Globulin 11/26/2018 5.8* 2.0 - 4.0 g/dL Final    A-G Ratio 11/26/2018 0.6* 0.8 - 1.7   Final    Bilirubin, total 11/26/2018 0.1* 0.2 - 1.0 MG/DL Final    Bilirubin, direct 11/26/2018 <0.1  0.0 - 0.2 MG/DL Final    Alk. phosphatase 11/26/2018 126* 45 - 117 U/L Final    AST (SGOT) 11/26/2018 30  15 - 37 U/L Final    ALT (SGPT) 11/26/2018 19  13 - 56 U/L Final    Sodium 11/26/2018 136  136 - 145 mmol/L Final    Potassium 11/26/2018 3.4* 3.5 - 5.5 mmol/L Final    Chloride 11/26/2018 99* 100 - 108 mmol/L Final    CO2 11/26/2018 31  21 - 32 mmol/L Final    Anion gap 11/26/2018 6  3.0 - 18 mmol/L Final    Glucose 11/26/2018 80  74 - 99 mg/dL Final    BUN 11/26/2018 12  7.0 - 18 MG/DL Final    Creatinine 11/26/2018 0.67  0.6 - 1.3 MG/DL Final    BUN/Creatinine ratio 11/26/2018 18  12 - 20   Final    GFR est AA 11/26/2018 >60  >60 ml/min/1.73m2 Final    GFR est non-AA 11/26/2018 >60  >60 ml/min/1.73m2 Final    Calcium 11/26/2018 9.0  8.5 - 10.1 MG/DL Final    CK 11/26/2018 120  26 - 192 U/L Final    C-Reactive protein 11/26/2018 2.2* 0 - 0.3 mg/dL Final    WBC 11/26/2018 8.7  4.6 - 13.2 K/uL Final    RBC 11/26/2018 4.34  4.20 - 5.30 M/uL Final    HGB 11/26/2018 13.4  12.0 - 16.0 g/dL Final    HCT 11/26/2018 41.6  35.0 - 45.0 % Final    MCV 11/26/2018 95.9  74.0 - 97.0 FL Final    MCH 11/26/2018 30.9  24.0 - 34.0 PG Final    MCHC 11/26/2018 32.2  31.0 - 37.0 g/dL Final    RDW 11/26/2018 14.5  11.6 - 14.5 % Final    PLATELET 95/88/2824 607  135 - 420 K/uL Final    MPV 11/26/2018 10.0  9.2 - 11.8 FL Final    NEUTROPHILS 11/26/2018 68  40 - 73 % Final    LYMPHOCYTES 11/26/2018 23  21 - 52 % Final    MONOCYTES 11/26/2018 7  3 - 10 % Final    EOSINOPHILS 11/26/2018 2  0 - 5 % Final    BASOPHILS 11/26/2018 0  0 - 2 % Final    ABS. NEUTROPHILS 11/26/2018 5.9  1.8 - 8.0 K/UL Final    ABS. LYMPHOCYTES 11/26/2018 2.0  0.9 - 3.6 K/UL Final    ABS. MONOCYTES 11/26/2018 0.6  0.05 - 1.2 K/UL Final    ABS. EOSINOPHILS 11/26/2018 0.2  0.0 - 0.4 K/UL Final    ABS. BASOPHILS 11/26/2018 0.0  0.0 - 0.1 K/UL Final    DF 11/26/2018 AUTOMATED    Final   Admission on 11/25/2018, Discharged on 11/25/2018   Component Date Value Ref Range Status    Sodium 11/25/2018 138  136 - 145 mmol/L Final    Potassium 11/25/2018 3.5  3.5 - 5.5 mmol/L Final    Chloride 11/25/2018 99* 100 - 108 mmol/L Final    CO2 11/25/2018 30  21 - 32 mmol/L Final    Anion gap 11/25/2018 9  3.0 - 18 mmol/L Final    Glucose 11/25/2018 107* 74 - 99 mg/dL Final    BUN 11/25/2018 9  7.0 - 18 MG/DL Final    Creatinine 11/25/2018 0.66  0.6 - 1.3 MG/DL Final    BUN/Creatinine ratio 11/25/2018 14  12 - 20   Final    GFR est AA 11/25/2018 >60  >60 ml/min/1.73m2 Final    GFR est non-AA 11/25/2018 >60  >60 ml/min/1.73m2 Final    Calcium 11/25/2018 9.1  8.5 - 10.1 MG/DL Final    Bilirubin, total 11/25/2018 0.2  0.2 - 1.0 MG/DL Final    ALT (SGPT) 11/25/2018 20  13 - 56 U/L Final    AST (SGOT) 11/25/2018 28  15 - 37 U/L Final    Alk. phosphatase 11/25/2018 126* 45 - 117 U/L Final    Protein, total 11/25/2018 8.8* 6.4 - 8.2 g/dL Final    Albumin 11/25/2018 3.1* 3.4 - 5.0 g/dL Final    Globulin 11/25/2018 5.7* 2.0 - 4.0 g/dL Final    A-G Ratio 11/25/2018 0.5* 0.8 - 1.7   Final         CT Results (most recent):  Results from Hospital Encounter encounter on 11/11/18   CT ABD WO CONT    Narrative CT abdomen without IV contrast     INDICATION: CT of T7-T8 discitis planning. TECHNIQUE: Serial axial images were obtained of the abdomen without intravenous  contrast. Images are performed prone for the intended CT procedure. Decision was  then made by Dr. Juan Jose Rob to perform the procedure fluoroscopically in IR.     All CT scans are performed using dose optimization techniques as appropriate to  the performed exam including the following: Automated exposure control,  adjustment of mA and/or kV according to patient size, and use of iterative  reconstructive technique. COMPARISON: CTA chest 11/7/2018. MRI thoracic spine 11/11/2018. FINDINGS:    Hepatic steatosis. Mild hepatosplenomegaly. Chronic calcifications in the  pancreas without acute findings. The kidneys, adrenal glands, gallbladder and  partially imaged bowel are within normal limits. No lymphadenopathy or free  fluid. The level of T7-T8 osteomyelitis/discitis has mild surrounding soft tissue  thickening as seen on prior MRI. Refer to prior MRI report. Impression IMPRESSION:    1. CT-guided biopsy procedure changed to fluoroscopic procedure. Biopsy planning  CT performed. 2. T7-T8 osteomyelitis/discitis redemonstrated. Refer to MR thoracic spine  report. 3. Mild hepatosplenomegaly with hepatic steatosis. 4. Evidence of chronic pancreatitis. XR Results (most recent):  Results from Hospital Encounter encounter on 11/07/18   XR CHEST PA LAT    Narrative HISTORY: Shortness of breath. Exam: Chest.    Technique: Two views of the chest were obtained. No prior studies for  comparison. FINDINGS: Mild to moderate bilateral diffuse interstitial prominence is noted  with basilar dominance without hemothorax pneumonia or pleural effusions. Heart  is enlarged. Vascular pedicle is unremarkable. Visualized bony thorax and soft  tissues are within normal limits. Impression  IMPRESSION:  1. Sequela of mild to moderate congestion. Follow-up with plain imaging. CT   All Micro Results     None              DIAGNOSIS AND PLAN  Patient Active Problem List   Diagnosis Code    Severe obesity with body mass index (BMI) of 35.0 to 39.9 with serious comorbidity (HCC) E66.01    Discitis of thoracic region M46.44     1. Discitis of thoracic region  2. Severe obesity with body mass index (BMI) of 35.0 to 39.9 with serious comorbidity (HCC)  3. Candida parapsilosis infection  4. IVDU (intravenous drug user)  5. Methadone use ([facility] [address]  6.  History of hepatitis C  7. 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 Patient not significantly improving with ceftriaxone and daptomycin. Given culture results and indolent clinical presentation in the setting of a patient with h/o IVDU, her infection is most likely due to Candida parapsilosis. S/p daptomycin and ceftriaxone from 11/15/18 to 12/11/18 (+-4 weeks), followed by ambisome from 12/13/18 to 12/17/18. Stopped due to increased creatinine to 1.85. I have discussed with the patient that the best treatment option at this point would be fluconazol, but we would prefer to discuss with methadone clinic first if okay to lower methadone dose to avoid increased levels expected with fluconazol and risk of QT prolongation. The patient will need to be on fluconazol for 6-12 months. For now we will have patient on anidalofungin 100 mg IV daily until Sigifredo 10, 2019 (4 weeks), depending on clinical progress may need to transition to fluconazol sooner. The patient has tolerated the medication well so far. CRP has improved on labs from 12/17/18 to 0.5. MRI thoracic spine is pending  The patient is very concerned about coming off methadone or lowering the dose. She says that it not only helps with her pain, but also otherwise she is concerned about relapsing in heroin use. This is a big concern to me as well, because of the risk of QT prolongation on high doses of fluconazol the patient would require for a very prolonged period. The patient was very overwhelmed today after this discussion, and asked politely to leave, and resume this conversation on her next clinic visit. Recommended to stop NSAIDs including toradol due to renal function issues. Follow-up Disposition: Not on File     Brenda Finn MD

## 2018-12-21 ENCOUNTER — HOSPITAL ENCOUNTER (OUTPATIENT)
Dept: INFUSION THERAPY | Age: 58
Discharge: HOME | End: 2018-12-21
Payer: SUBSIDIZED

## 2018-12-21 ENCOUNTER — TELEPHONE (OUTPATIENT)
Dept: INTERNAL MEDICINE CLINIC | Age: 58
End: 2018-12-21

## 2018-12-21 VITALS
RESPIRATION RATE: 18 BRPM | DIASTOLIC BLOOD PRESSURE: 84 MMHG | OXYGEN SATURATION: 95 % | HEART RATE: 67 BPM | TEMPERATURE: 98.6 F | SYSTOLIC BLOOD PRESSURE: 167 MMHG

## 2018-12-21 PROCEDURE — 74011250636 HC RX REV CODE- 250/636: Performed by: INTERNAL MEDICINE

## 2018-12-21 PROCEDURE — 74011000258 HC RX REV CODE- 258: Performed by: INTERNAL MEDICINE

## 2018-12-21 PROCEDURE — 96365 THER/PROPH/DIAG IV INF INIT: CPT

## 2018-12-21 RX ORDER — SODIUM CHLORIDE 0.9 % (FLUSH) 0.9 %
10-40 SYRINGE (ML) INJECTION AS NEEDED
Status: DISCONTINUED | OUTPATIENT
Start: 2018-12-21 | End: 2018-12-25 | Stop reason: HOSPADM

## 2018-12-21 RX ORDER — HEPARIN 100 UNIT/ML
500 SYRINGE INTRAVENOUS AS NEEDED
Status: DISCONTINUED | OUTPATIENT
Start: 2018-12-21 | End: 2018-12-25 | Stop reason: HOSPADM

## 2018-12-21 RX ADMIN — SODIUM CHLORIDE 100 MG: 900 INJECTION, SOLUTION INTRAVENOUS at 10:31

## 2018-12-21 RX ADMIN — Medication 10 ML: at 10:31

## 2018-12-21 RX ADMIN — Medication 10 ML: at 12:02

## 2018-12-21 RX ADMIN — SODIUM CHLORIDE, PRESERVATIVE FREE 500 UNITS: 5 INJECTION INTRAVENOUS at 12:02

## 2018-12-21 RX ADMIN — SODIUM CHLORIDE, PRESERVATIVE FREE 500 UNITS: 5 INJECTION INTRAVENOUS at 12:03

## 2018-12-21 RX ADMIN — Medication 10 ML: at 12:01

## 2018-12-21 NOTE — TELEPHONE ENCOUNTER
Patient picked up the note you left for her. She just called back because she has to go to the methadone clinic every day. She is worried because you are out of the office next week. Can you please write her something so they can do the swab since they wont be able to speak with you until you are back in the office?

## 2018-12-21 NOTE — PROGRESS NOTES
[facility] Progress Note    Date: 2018    Name: Gail Valencia    MRN: [id number]         : 1960    Anidalofungin (Eraxis) Infusion    Ms. Matute to [facility], ambulatory at 1020. Pain to back 5/10. No distress noted. Took pain meds today     Pt was assessed and education was provided. Ms. Yojana Pedersen vitals were reviewed. Visit Vitals  /84 (BP 1 Location: Left arm, BP Patient Position: Sitting)   Pulse 67   Temp 98.6 °F (37 °C)   Resp 18   SpO2 95%       Right upper arm PICC flushed easily and had brisk blood return via both ports. Anidalofungin infused as ordered. PICC dressing clean,dry, and intact. Ms. Adalgisa Gong tolerated infusion, and had no complaints. Both lumens of PICC flushed with NS 10 ml and Heparin 500 units each. Green end caps applied, and lumens wrapped with guaze and paper tape. Stockinette placed over dressing for protection. Patient armband removed and shredded. Ms. Adalgisa Gong was discharged from [place] [address] in stable condition at 1325. She is to return on 18 at 56 for her next antibiotic appointment.     Porsha Kumar RN  2018  1222

## 2018-12-21 NOTE — TELEPHONE ENCOUNTER
Thank you for your help. I would like to speak to them about this and other issues related to her care. I will send a note with her so they will reach back to us.

## 2018-12-21 NOTE — TELEPHONE ENCOUNTER
Patient goes to the Methadone clinic and has to do urine screens. All of the medicine she is currently on affects how she urinates. It either goes towards the front or the back and she has to stand up to go. She has to be watched when they do the screen and she doesn't feel comfortable standing in front of someone. They can do a drug screen through a mouth swab. She is asking for you to write her a note stating that until she is off of the medications she needs to be tested through a swab. She will be downstairs for her infusion at 10:30 this morning so she's hoping to be able to come up and get this before she drives and hour and a half back home.

## 2018-12-21 NOTE — TELEPHONE ENCOUNTER
If they call back, and I am not in the office, please let Claude Ruffin or Consuelo Sharp know, to notify me, and I will call them back to the number they provide.  Thanks

## 2018-12-21 NOTE — TELEPHONE ENCOUNTER
ARMIDAI: The Methadone clinic is going to call next week to speak to Dr. Janet Paredes. Please let Silvano Machuca know so that she can contact Dr. Janet Paredes so she can call and speak to them prior to them giving her treatment.

## 2018-12-22 ENCOUNTER — HOSPITAL ENCOUNTER (OUTPATIENT)
Dept: INFUSION THERAPY | Age: 58
Discharge: HOME | End: 2018-12-22
Payer: SUBSIDIZED

## 2018-12-22 VITALS
SYSTOLIC BLOOD PRESSURE: 148 MMHG | TEMPERATURE: 98.3 F | DIASTOLIC BLOOD PRESSURE: 84 MMHG | OXYGEN SATURATION: 94 % | RESPIRATION RATE: 18 BRPM | HEART RATE: 79 BPM

## 2018-12-22 PROCEDURE — 74011250636 HC RX REV CODE- 250/636: Performed by: INTERNAL MEDICINE

## 2018-12-22 PROCEDURE — 96366 THER/PROPH/DIAG IV INF ADDON: CPT

## 2018-12-22 PROCEDURE — 74011000258 HC RX REV CODE- 258: Performed by: INTERNAL MEDICINE

## 2018-12-22 PROCEDURE — 96365 THER/PROPH/DIAG IV INF INIT: CPT

## 2018-12-22 RX ORDER — HEPARIN 100 UNIT/ML
SYRINGE INTRAVENOUS
Status: DISPENSED
Start: 2018-12-22 | End: 2018-12-22

## 2018-12-22 RX ORDER — HEPARIN 100 UNIT/ML
500 SYRINGE INTRAVENOUS AS NEEDED
Status: DISCONTINUED | OUTPATIENT
Start: 2018-12-22 | End: 2018-12-26 | Stop reason: HOSPADM

## 2018-12-22 RX ORDER — SODIUM CHLORIDE 0.9 % (FLUSH) 0.9 %
5-10 SYRINGE (ML) INJECTION AS NEEDED
Status: DISCONTINUED | OUTPATIENT
Start: 2018-12-22 | End: 2018-12-26 | Stop reason: HOSPADM

## 2018-12-22 RX ADMIN — Medication 10 ML: at 10:14

## 2018-12-22 RX ADMIN — SODIUM CHLORIDE, PRESERVATIVE FREE 500 UNITS: 5 INJECTION INTRAVENOUS at 08:45

## 2018-12-22 RX ADMIN — Medication 10 ML: at 08:30

## 2018-12-22 RX ADMIN — SODIUM CHLORIDE 100 MG: 9 INJECTION, SOLUTION INTRAVENOUS at 08:35

## 2018-12-22 RX ADMIN — SODIUM CHLORIDE, PRESERVATIVE FREE 500 UNITS: 5 INJECTION INTRAVENOUS at 10:15

## 2018-12-22 RX ADMIN — Medication 10 ML: at 08:45

## 2018-12-23 ENCOUNTER — HOSPITAL ENCOUNTER (OUTPATIENT)
Dept: INFUSION THERAPY | Age: 58
Discharge: HOME | End: 2018-12-23
Payer: SUBSIDIZED

## 2018-12-23 VITALS
HEART RATE: 69 BPM | OXYGEN SATURATION: 94 % | TEMPERATURE: 98.3 F | SYSTOLIC BLOOD PRESSURE: 148 MMHG | RESPIRATION RATE: 18 BRPM | DIASTOLIC BLOOD PRESSURE: 88 MMHG

## 2018-12-23 PROCEDURE — 74011250636 HC RX REV CODE- 250/636: Performed by: INTERNAL MEDICINE

## 2018-12-23 PROCEDURE — 96366 THER/PROPH/DIAG IV INF ADDON: CPT

## 2018-12-23 PROCEDURE — 74011000258 HC RX REV CODE- 258: Performed by: INTERNAL MEDICINE

## 2018-12-23 PROCEDURE — 96365 THER/PROPH/DIAG IV INF INIT: CPT

## 2018-12-23 RX ORDER — SODIUM CHLORIDE 0.9 % (FLUSH) 0.9 %
10-40 SYRINGE (ML) INJECTION AS NEEDED
Status: DISCONTINUED | OUTPATIENT
Start: 2018-12-23 | End: 2018-12-27 | Stop reason: HOSPADM

## 2018-12-23 RX ORDER — HEPARIN 100 UNIT/ML
500 SYRINGE INTRAVENOUS AS NEEDED
Status: DISCONTINUED | OUTPATIENT
Start: 2018-12-23 | End: 2018-12-27 | Stop reason: HOSPADM

## 2018-12-23 RX ORDER — HEPARIN 100 UNIT/ML
SYRINGE INTRAVENOUS
Status: DISPENSED
Start: 2018-12-23 | End: 2018-12-23

## 2018-12-23 RX ADMIN — Medication 10 ML: at 10:09

## 2018-12-23 RX ADMIN — SODIUM CHLORIDE, PRESERVATIVE FREE 500 UNITS: 5 INJECTION INTRAVENOUS at 08:44

## 2018-12-23 RX ADMIN — SODIUM CHLORIDE 100 MG: 9 INJECTION, SOLUTION INTRAVENOUS at 08:37

## 2018-12-23 RX ADMIN — Medication 10 ML: at 08:44

## 2018-12-23 RX ADMIN — SODIUM CHLORIDE, PRESERVATIVE FREE 500 UNITS: 5 INJECTION INTRAVENOUS at 10:09

## 2018-12-23 RX ADMIN — Medication 10 ML: at 08:37

## 2018-12-23 NOTE — PROGRESS NOTES
[facility] Progress Note    Date: 2018    Name: Jean-Pierre Gutierrez    MRN: [id number]         : 1960      Ms. Mendel Burnette arrived to [facility]. Ms. Mendel Burnette was assessed and education was provided. Ms. Sylvain Zuniga vitals were reviewed. Visit Vitals  /88 (BP 1 Location: Left arm, BP Patient Position: Sitting)   Pulse 69   Temp 98.3 °F (36.8 °C)   Resp 18   SpO2 94%       Pt with Right upper arm double lumen PICC line. Each lumen flushes without difficulty and positive for blood return. Dressing CDI. No redness, bruising, or swelling noted at site and/ or extremity. Pt denies tenderness. Anidulafungin was administered as ordered via purple lumen followed by normal saline flush. Ms. Mendel Burnette tolerated well without complaints. Flushed both lumens of pt's PICC line with heparin per order. New curos caps applied to the end of each lumen. Ms. Mendel Burnette was discharged from [place] [address] in stable condition at 1015. She is to return on 2019 at 11:00 for her next appointment.     Monalisa Cohen RN  2018  1015

## 2018-12-24 ENCOUNTER — HOSPITAL ENCOUNTER (OUTPATIENT)
Dept: INFUSION THERAPY | Age: 58
Discharge: HOME | End: 2018-12-24
Payer: SUBSIDIZED

## 2018-12-24 VITALS
SYSTOLIC BLOOD PRESSURE: 174 MMHG | HEART RATE: 76 BPM | OXYGEN SATURATION: 95 % | DIASTOLIC BLOOD PRESSURE: 87 MMHG | RESPIRATION RATE: 18 BRPM | TEMPERATURE: 98.6 F

## 2018-12-24 LAB
ALBUMIN SERPL-MCNC: 3.5 G/DL (ref 3.4–5)
ALBUMIN/GLOB SERPL: 0.9 {RATIO} (ref 0.8–1.7)
ALP SERPL-CCNC: 120 U/L (ref 45–117)
ALT SERPL-CCNC: 16 U/L (ref 13–56)
ANION GAP SERPL CALC-SCNC: 6 MMOL/L (ref 3–18)
AST SERPL-CCNC: 18 U/L (ref 15–37)
BASO+EOS+MONOS # BLD AUTO: 0.6 K/UL (ref 0–2.3)
BASO+EOS+MONOS # BLD AUTO: 8 % (ref 0.1–17)
BILIRUB SERPL-MCNC: 0.3 MG/DL (ref 0.2–1)
BUN SERPL-MCNC: 11 MG/DL (ref 7–18)
BUN/CREAT SERPL: 17 (ref 12–20)
CALCIUM SERPL-MCNC: 9.1 MG/DL (ref 8.5–10.1)
CHLORIDE SERPL-SCNC: 103 MMOL/L (ref 100–108)
CO2 SERPL-SCNC: 30 MMOL/L (ref 21–32)
CREAT SERPL-MCNC: 0.64 MG/DL (ref 0.6–1.3)
CRP SERPL-MCNC: 0.8 MG/DL (ref 0–0.3)
DIFFERENTIAL METHOD BLD: ABNORMAL
ERYTHROCYTE [DISTWIDTH] IN BLOOD BY AUTOMATED COUNT: 14.8 % (ref 11.5–14.5)
GLOBULIN SER CALC-MCNC: 4 G/DL (ref 2–4)
GLUCOSE SERPL-MCNC: 110 MG/DL (ref 74–99)
HCT VFR BLD AUTO: 37 % (ref 36–48)
HGB BLD-MCNC: 12.2 G/DL (ref 12–16)
LYMPHOCYTES # BLD: 2 K/UL (ref 1.1–5.9)
LYMPHOCYTES NFR BLD: 29 % (ref 14–44)
MCH RBC QN AUTO: 31.2 PG (ref 25–35)
MCHC RBC AUTO-ENTMCNC: 33 G/DL (ref 31–37)
MCV RBC AUTO: 94.6 FL (ref 78–102)
NEUTS SEG # BLD: 4.3 K/UL (ref 1.8–9.5)
NEUTS SEG NFR BLD: 63 % (ref 40–70)
PLATELET # BLD AUTO: 268 K/UL (ref 140–440)
POTASSIUM SERPL-SCNC: 3.5 MMOL/L (ref 3.5–5.5)
PROT SERPL-MCNC: 7.5 G/DL (ref 6.4–8.2)
RBC # BLD AUTO: 3.91 M/UL (ref 4.1–5.1)
SODIUM SERPL-SCNC: 139 MMOL/L (ref 136–145)
WBC # BLD AUTO: 6.9 K/UL (ref 4.5–13)

## 2018-12-24 PROCEDURE — 96366 THER/PROPH/DIAG IV INF ADDON: CPT

## 2018-12-24 PROCEDURE — 74011250636 HC RX REV CODE- 250/636: Performed by: INTERNAL MEDICINE

## 2018-12-24 PROCEDURE — 85025 COMPLETE CBC W/AUTO DIFF WBC: CPT

## 2018-12-24 PROCEDURE — 86140 C-REACTIVE PROTEIN: CPT

## 2018-12-24 PROCEDURE — 80053 COMPREHEN METABOLIC PANEL: CPT

## 2018-12-24 PROCEDURE — 96365 THER/PROPH/DIAG IV INF INIT: CPT

## 2018-12-24 PROCEDURE — 74011000258 HC RX REV CODE- 258: Performed by: INTERNAL MEDICINE

## 2018-12-24 RX ORDER — HEPARIN 100 UNIT/ML
500 SYRINGE INTRAVENOUS ONCE
Status: ACTIVE | OUTPATIENT
Start: 2018-12-24 | End: 2018-12-24

## 2018-12-24 RX ORDER — HEPARIN 100 UNIT/ML
SYRINGE INTRAVENOUS
Status: DISPENSED
Start: 2018-12-24 | End: 2018-12-24

## 2018-12-24 RX ORDER — SODIUM CHLORIDE 0.9 % (FLUSH) 0.9 %
10-40 SYRINGE (ML) INJECTION AS NEEDED
Status: DISCONTINUED | OUTPATIENT
Start: 2018-12-24 | End: 2018-12-28 | Stop reason: HOSPADM

## 2018-12-24 RX ADMIN — SODIUM CHLORIDE 100 MG: 900 INJECTION, SOLUTION INTRAVENOUS at 10:02

## 2018-12-24 RX ADMIN — Medication 20 ML: at 10:02

## 2018-12-24 NOTE — PROGRESS NOTES
[facility] Progress Note    Date: 2018    Name: Dennis Treadwell    MRN: [id number]         : 1960      Ms. Elio Ruano arrived to [facility] at 1102. Ms. Elio Ruano was assessed and education was provided. Ms. Eddie Tim vitals were reviewed. Visit Vitals  /87 (BP 1 Location: Left arm, BP Patient Position: Sitting)   Pulse 76   Temp 98.6 °F (37 °C)   Resp 18   SpO2 95%       Pt with Right upper arm double lumen PICC line. Each lumen flushes without difficulty and positive for blood return. Dressing CDI. No redness, bruising, or swelling noted at site and/ or extremity. Pt denies tenderness. Blood drawn off for CBC, CMP and CRP as ordered. Anidulafungin was administered as ordered via purple lumen followed by normal saline flush. Ms. Elio Ruano tolerated well without complaints. Flushed both lumens of pt's PICC line with heparin per order. New curos caps applied to the end of each lumen. Ms. Elio Ruano was discharged from [place] [address] in stable condition at 1135. She is to return on 2019 for her next appointment.     Harmeet Bernabe RN  2018

## 2018-12-25 ENCOUNTER — HOSPITAL ENCOUNTER (OUTPATIENT)
Dept: INFUSION THERAPY | Age: 58
Discharge: HOME | End: 2018-12-25
Payer: SUBSIDIZED

## 2018-12-25 VITALS
HEART RATE: 70 BPM | RESPIRATION RATE: 16 BRPM | TEMPERATURE: 98.4 F | OXYGEN SATURATION: 95 % | SYSTOLIC BLOOD PRESSURE: 160 MMHG | DIASTOLIC BLOOD PRESSURE: 96 MMHG

## 2018-12-25 PROCEDURE — 96365 THER/PROPH/DIAG IV INF INIT: CPT

## 2018-12-25 PROCEDURE — 74011250636 HC RX REV CODE- 250/636: Performed by: INTERNAL MEDICINE

## 2018-12-25 PROCEDURE — 74011000258 HC RX REV CODE- 258: Performed by: INTERNAL MEDICINE

## 2018-12-25 PROCEDURE — 74011250636 HC RX REV CODE- 250/636

## 2018-12-25 RX ORDER — HEPARIN 100 UNIT/ML
500 SYRINGE INTRAVENOUS AS NEEDED
Status: DISCONTINUED | OUTPATIENT
Start: 2018-12-25 | End: 2018-12-29 | Stop reason: HOSPADM

## 2018-12-25 RX ORDER — SODIUM CHLORIDE 0.9 % (FLUSH) 0.9 %
10-40 SYRINGE (ML) INJECTION AS NEEDED
Status: DISCONTINUED | OUTPATIENT
Start: 2018-12-25 | End: 2018-12-29 | Stop reason: HOSPADM

## 2018-12-25 RX ORDER — HEPARIN 100 UNIT/ML
SYRINGE INTRAVENOUS
Status: COMPLETED
Start: 2018-12-25 | End: 2018-12-25

## 2018-12-25 RX ADMIN — HEPARIN 500 UNITS: 100 SYRINGE at 10:19

## 2018-12-25 RX ADMIN — Medication 20 ML: at 10:19

## 2018-12-25 RX ADMIN — HEPARIN 500 UNITS: 100 SYRINGE at 10:20

## 2018-12-25 RX ADMIN — SODIUM CHLORIDE 100 MG: 900 INJECTION, SOLUTION INTRAVENOUS at 08:51

## 2018-12-25 RX ADMIN — Medication 20 ML: at 10:20

## 2018-12-25 RX ADMIN — Medication 10 ML: at 08:47

## 2018-12-25 NOTE — PROGRESS NOTES
[facility] Progress Note    Date: 2018    Name: Jim Wilde    MRN: [id number]         : 1960      Ms. Nancy Hurley arrived to [facility] at [facility]. Ms. Nancy Hurley was assessed and education was provided. Ms. Naveed Rosas vitals were reviewed. Visit Vitals  BP (!) 160/96 (BP 1 Location: Left arm, BP Patient Position: At rest;Sitting)   Pulse 70   Temp 98.4 °F (36.9 °C)   Resp 16   SpO2 95%       Pt with Right upper arm double lumen PICC line. Each lumen flushes without difficulty and positive for blood return. Dressing CDI. No redness, bruising, or swelling noted at site and/ or extremity. Pt denies tenderness. Anidulafungin 100 mg IVwas administered as ordered via purple lumen after brisk blood return obtained. Ms. Nancy Hurley tolerated well without complaints. Flushed both lumens of pt's PICC line with NS abd heparin per order. New curos caps applied to the end of each lumen. Ms. Nancy Hurley was discharged from [place] [address] in stable condition at 1030. She is to return on 2019  for her next appointment. Pt stated she has appts scheduled until 1/10/19 and has them on a paper at home.     Cullen Chopra RN  2018

## 2018-12-26 ENCOUNTER — HOSPITAL ENCOUNTER (OUTPATIENT)
Dept: INFUSION THERAPY | Age: 58
End: 2018-12-26
Payer: SUBSIDIZED

## 2018-12-26 ENCOUNTER — HOSPITAL ENCOUNTER (OUTPATIENT)
Dept: INFUSION THERAPY | Age: 58
Discharge: HOME | End: 2018-12-26
Payer: SUBSIDIZED

## 2018-12-26 VITALS
TEMPERATURE: 98.5 F | OXYGEN SATURATION: 95 % | RESPIRATION RATE: 18 BRPM | SYSTOLIC BLOOD PRESSURE: 161 MMHG | HEART RATE: 78 BPM | DIASTOLIC BLOOD PRESSURE: 95 MMHG

## 2018-12-26 LAB
ACID FAST STN SPEC: NEGATIVE
MYCOBACTERIUM SPEC QL CULT: NEGATIVE
SPECIMEN PREPARATION: NORMAL
SPECIMEN SOURCE: NORMAL

## 2018-12-26 PROCEDURE — 74011250636 HC RX REV CODE- 250/636: Performed by: INTERNAL MEDICINE

## 2018-12-26 PROCEDURE — 96365 THER/PROPH/DIAG IV INF INIT: CPT

## 2018-12-26 PROCEDURE — 74011000258 HC RX REV CODE- 258: Performed by: INTERNAL MEDICINE

## 2018-12-26 PROCEDURE — 74011250636 HC RX REV CODE- 250/636

## 2018-12-26 PROCEDURE — 96366 THER/PROPH/DIAG IV INF ADDON: CPT

## 2018-12-26 RX ORDER — HEPARIN 100 UNIT/ML
SYRINGE INTRAVENOUS
Status: COMPLETED
Start: 2018-12-26 | End: 2018-12-26

## 2018-12-26 RX ORDER — HEPARIN 100 UNIT/ML
500 SYRINGE INTRAVENOUS ONCE
Status: COMPLETED | OUTPATIENT
Start: 2018-12-26 | End: 2018-12-26

## 2018-12-26 RX ORDER — SODIUM CHLORIDE 0.9 % (FLUSH) 0.9 %
10-40 SYRINGE (ML) INJECTION AS NEEDED
Status: DISCONTINUED | OUTPATIENT
Start: 2018-12-26 | End: 2018-12-30 | Stop reason: HOSPADM

## 2018-12-26 RX ADMIN — HEPARIN 500 UNITS: 100 SYRINGE at 16:45

## 2018-12-26 RX ADMIN — SODIUM CHLORIDE 100 MG: 900 INJECTION, SOLUTION INTRAVENOUS at 15:16

## 2018-12-26 RX ADMIN — Medication 30 ML: at 16:45

## 2018-12-26 NOTE — PROGRESS NOTES
[facility] Progress Note    Date: 2018    Name: Gail Valencia    MRN: [id number]         : 1960    Anidalofungin (Eraxis) Infusion    Ms. Adalgisa Gong to [facility], ambulatory at 97 70 84. Pain to back 2/10. No distress noted. Pt was assessed and education was provided. Ms. Yojana Pedersen vitals were reviewed. Visit Vitals  BP (!) 161/95 (BP 1 Location: Left arm, BP Patient Position: Sitting)   Pulse 78   Temp 98.5 °F (36.9 °C)   Resp 18   SpO2 95%       Right upper arm PICC flushed easily and had brisk blood return via both ports. Anidalofungin infused as ordered. PICC dressing clean,dry, and intact. Ms. Adalgisa Gong tolerated infusion, and had no complaints. Both lumens of PICC flushed with NS 10 ml and Heparin 500 units each. Green end caps applied, and lumens wrapped with guaze and paper tape. Stockinette placed over dressing for protection. Patient armband removed and shredded. Ms. Adalgisa Gong was discharged from [place] [address] in stable condition at 1650. She is to return on 18 at 1430 for her next antibiotic appointment.     Zahra Clay RN  2018

## 2018-12-27 ENCOUNTER — APPOINTMENT (OUTPATIENT)
Dept: INFUSION THERAPY | Age: 58
End: 2018-12-27
Payer: SUBSIDIZED

## 2018-12-27 ENCOUNTER — HOSPITAL ENCOUNTER (OUTPATIENT)
Dept: INFUSION THERAPY | Age: 58
Discharge: HOME | End: 2018-12-27
Payer: SUBSIDIZED

## 2018-12-27 VITALS
HEART RATE: 84 BPM | SYSTOLIC BLOOD PRESSURE: 178 MMHG | TEMPERATURE: 98.1 F | RESPIRATION RATE: 18 BRPM | DIASTOLIC BLOOD PRESSURE: 92 MMHG | OXYGEN SATURATION: 92 %

## 2018-12-27 PROCEDURE — 74011250636 HC RX REV CODE- 250/636: Performed by: INTERNAL MEDICINE

## 2018-12-27 PROCEDURE — 74011000258 HC RX REV CODE- 258: Performed by: INTERNAL MEDICINE

## 2018-12-27 PROCEDURE — 96366 THER/PROPH/DIAG IV INF ADDON: CPT

## 2018-12-27 PROCEDURE — 96365 THER/PROPH/DIAG IV INF INIT: CPT

## 2018-12-27 PROCEDURE — C1751 CATH, INF, PER/CENT/MIDLINE: HCPCS

## 2018-12-27 RX ORDER — HEPARIN 100 UNIT/ML
SYRINGE INTRAVENOUS
Status: DISPENSED
Start: 2018-12-27 | End: 2018-12-28

## 2018-12-27 RX ORDER — SODIUM CHLORIDE 0.9 % (FLUSH) 0.9 %
10-40 SYRINGE (ML) INJECTION AS NEEDED
Status: DISCONTINUED | OUTPATIENT
Start: 2018-12-27 | End: 2018-12-31 | Stop reason: HOSPADM

## 2018-12-27 RX ORDER — HEPARIN 100 UNIT/ML
500 SYRINGE INTRAVENOUS ONCE
Status: ACTIVE | OUTPATIENT
Start: 2018-12-27 | End: 2018-12-28

## 2018-12-27 RX ADMIN — SODIUM CHLORIDE 100 MG: 900 INJECTION, SOLUTION INTRAVENOUS at 14:37

## 2018-12-27 NOTE — PROGRESS NOTES
[facility] Progress Note    Date: 2018    Name: Amna Stanford    MRN: [id number]         : 1960    Anidalofungin (Eraxis) Infusion    Ms. Matute to [facility], ambulatory at 1430. Pain to back 2/10. No distress noted. Pt was assessed and education was provided. Ms. Purvi Gleason vitals were reviewed. Visit Vitals  BP (!) 178/92 (BP 1 Location: Left arm, BP Patient Position: Sitting)   Pulse 84   Temp 98.1 °F (36.7 °C)   Resp 18   SpO2 92%       Right upper arm PICC flushed easily and had brisk blood return via both ports. Anidalofungin infused as ordered over 94 minutes. PICC dressing changed per protocol. End caps changed and new stockinette placed over the dressing. Ms. Cheyenne Green tolerated infusion, and had no complaints. Both lumens of PICC flushed with NS 10 ml and Heparin 500 units each. Green end caps applied, and lumens wrapped with coban. Patient armband removed and shredded. Ms. Cheyenne Green was discharged from [place] [address] in stable condition at 1650. She is to return on 18 at 1430 for her next antibiotic appointment.     Palma Banres RN  2018

## 2018-12-28 ENCOUNTER — HOSPITAL ENCOUNTER (OUTPATIENT)
Dept: INFUSION THERAPY | Age: 58
Discharge: HOME | End: 2018-12-28
Payer: SUBSIDIZED

## 2018-12-28 ENCOUNTER — APPOINTMENT (OUTPATIENT)
Dept: INFUSION THERAPY | Age: 58
End: 2018-12-28
Payer: SUBSIDIZED

## 2018-12-28 VITALS
RESPIRATION RATE: 18 BRPM | TEMPERATURE: 98.3 F | HEART RATE: 77 BPM | SYSTOLIC BLOOD PRESSURE: 174 MMHG | OXYGEN SATURATION: 96 % | DIASTOLIC BLOOD PRESSURE: 94 MMHG

## 2018-12-28 PROCEDURE — 74011250636 HC RX REV CODE- 250/636: Performed by: INTERNAL MEDICINE

## 2018-12-28 PROCEDURE — 74011000258 HC RX REV CODE- 258: Performed by: INTERNAL MEDICINE

## 2018-12-28 PROCEDURE — 96366 THER/PROPH/DIAG IV INF ADDON: CPT

## 2018-12-28 PROCEDURE — 96365 THER/PROPH/DIAG IV INF INIT: CPT

## 2018-12-28 RX ORDER — SODIUM CHLORIDE 0.9 % (FLUSH) 0.9 %
10-40 SYRINGE (ML) INJECTION AS NEEDED
Status: DISCONTINUED | OUTPATIENT
Start: 2018-12-28 | End: 2019-01-01 | Stop reason: HOSPADM

## 2018-12-28 RX ORDER — HEPARIN 100 UNIT/ML
500 SYRINGE INTRAVENOUS ONCE
Status: COMPLETED | OUTPATIENT
Start: 2018-12-28 | End: 2018-12-28

## 2018-12-28 RX ORDER — HEPARIN 100 UNIT/ML
SYRINGE INTRAVENOUS
Status: DISPENSED
Start: 2018-12-28 | End: 2018-12-28

## 2018-12-28 RX ADMIN — Medication 20 ML: at 11:21

## 2018-12-28 RX ADMIN — HEPARIN 500 UNITS: 100 SYRINGE at 11:21

## 2018-12-28 RX ADMIN — SODIUM CHLORIDE 100 MG: 900 INJECTION, SOLUTION INTRAVENOUS at 09:56

## 2018-12-28 NOTE — PROGRESS NOTES
[facility] Progress Note Date: 2018 Name: Yan Garces MRN: [id number] : 1960 Anidalofungin (Eraxis) Infusion Ms. Henrietta Gee to [address], ambulatory at 3702. Pain to back 2/10. No distress noted. Pt was assessed and education was provided. Ms. Abbie Avila vitals were reviewed. Visit Vitals BP (!) 174/94 (BP 1 Location: Left arm, BP Patient Position: Sitting) Pulse 77 Temp 98.3 °F (36.8 °C) Resp 18 SpO2 96% Right upper arm PICC flushed easily and had brisk blood return via both ports. Anidalofungin infused as ordered. PICC dressing clean,dry, and intact. Ms. Henrietta Gee tolerated infusion, and had no complaints. Both lumens of PICC flushed with NS 10 ml and Heparin 500 units each. Green end caps applied, and lumens wrapped with guaze and paper tape. Stockinette placed over dressing for protection. Patient armband removed and shredded. Ms. Henrietta Gee was discharged from [place] [address] in stable condition at 1125. She is to return on 18 at 0800 for her next antibiotic appointment. Terrie Suarez RN 2018

## 2018-12-29 ENCOUNTER — APPOINTMENT (OUTPATIENT)
Dept: INFUSION THERAPY | Age: 58
End: 2018-12-29
Payer: SUBSIDIZED

## 2018-12-29 ENCOUNTER — HOSPITAL ENCOUNTER (OUTPATIENT)
Dept: INFUSION THERAPY | Age: 58
Discharge: HOME | End: 2018-12-29
Payer: SUBSIDIZED

## 2018-12-29 VITALS
RESPIRATION RATE: 18 BRPM | OXYGEN SATURATION: 93 % | SYSTOLIC BLOOD PRESSURE: 173 MMHG | DIASTOLIC BLOOD PRESSURE: 91 MMHG | HEART RATE: 72 BPM | TEMPERATURE: 97.2 F

## 2018-12-29 PROCEDURE — 74011250636 HC RX REV CODE- 250/636: Performed by: INTERNAL MEDICINE

## 2018-12-29 PROCEDURE — 96366 THER/PROPH/DIAG IV INF ADDON: CPT

## 2018-12-29 PROCEDURE — 96365 THER/PROPH/DIAG IV INF INIT: CPT

## 2018-12-29 PROCEDURE — 74011000258 HC RX REV CODE- 258: Performed by: INTERNAL MEDICINE

## 2018-12-29 PROCEDURE — 74011250636 HC RX REV CODE- 250/636

## 2018-12-29 RX ORDER — SODIUM CHLORIDE 0.9 % (FLUSH) 0.9 %
10-40 SYRINGE (ML) INJECTION AS NEEDED
Status: DISCONTINUED | OUTPATIENT
Start: 2018-12-29 | End: 2019-01-02 | Stop reason: HOSPADM

## 2018-12-29 RX ORDER — HEPARIN 100 UNIT/ML
500 SYRINGE INTRAVENOUS ONCE
Status: COMPLETED | OUTPATIENT
Start: 2018-12-29 | End: 2018-12-29

## 2018-12-29 RX ORDER — HEPARIN 100 UNIT/ML
SYRINGE INTRAVENOUS
Status: COMPLETED
Start: 2018-12-29 | End: 2018-12-29

## 2018-12-29 RX ADMIN — Medication 20 ML: at 09:29

## 2018-12-29 RX ADMIN — SODIUM CHLORIDE 100 MG: 900 INJECTION, SOLUTION INTRAVENOUS at 08:05

## 2018-12-29 RX ADMIN — Medication 10 ML: at 08:05

## 2018-12-29 RX ADMIN — HEPARIN 500 UNITS: 100 SYRINGE at 09:30

## 2018-12-29 NOTE — PROGRESS NOTES
[facility] Date: 2018 Name: Keeley Lares MRN: [id number] : 1960 Anidalofungin (Eraxis) Infusion Ms. Nadeem Abraham to [facility], ambulatory at [facility] [employer]. Pain to back 3/10. No distress noted. Pt was assessed and education was provided. Ms. Davon Mclaughlin vitals were reviewed. Visit Vitals BP (!) 173/91 (BP 1 Location: Left arm, BP Patient Position: Sitting) Pulse 72 Temp 97.2 °F (36.2 °C) Resp 18 SpO2 93% Right upper arm PICC flushed easily and had brisk blood return via both ports. Anidalofungin infused as ordered. PICC dressing clean,dry, and intact. Ms. Nadeem Abraham tolerated infusion, and had no complaints. Both lumens of PICC flushed with NS 10 ml and Heparin 500 units each. Green end caps applied, and lumens wrapped with guaze and paper tape. Stockinette placed over dressing for protection. Patient armband removed and shredded. Ms. Nadeem Abraham was discharged from [place] [address] in stable condition at [address] [place] [address]. She is to return on 18 at 0800 for her next antibiotic appointment. Sienna Cabello RN 2018

## 2018-12-30 ENCOUNTER — APPOINTMENT (OUTPATIENT)
Dept: INFUSION THERAPY | Age: 58
End: 2018-12-30
Payer: SUBSIDIZED

## 2018-12-30 ENCOUNTER — HOSPITAL ENCOUNTER (OUTPATIENT)
Dept: INFUSION THERAPY | Age: 58
Discharge: HOME | End: 2018-12-30
Payer: SUBSIDIZED

## 2018-12-30 VITALS
RESPIRATION RATE: 18 BRPM | TEMPERATURE: 98.2 F | DIASTOLIC BLOOD PRESSURE: 92 MMHG | SYSTOLIC BLOOD PRESSURE: 164 MMHG | HEART RATE: 72 BPM

## 2018-12-30 PROCEDURE — 74011250636 HC RX REV CODE- 250/636: Performed by: INTERNAL MEDICINE

## 2018-12-30 PROCEDURE — 96365 THER/PROPH/DIAG IV INF INIT: CPT

## 2018-12-30 PROCEDURE — 74011000258 HC RX REV CODE- 258: Performed by: INTERNAL MEDICINE

## 2018-12-30 PROCEDURE — 74011250636 HC RX REV CODE- 250/636

## 2018-12-30 PROCEDURE — 96366 THER/PROPH/DIAG IV INF ADDON: CPT

## 2018-12-30 RX ORDER — HEPARIN 100 UNIT/ML
500 SYRINGE INTRAVENOUS ONCE
Status: COMPLETED | OUTPATIENT
Start: 2018-12-30 | End: 2018-12-30

## 2018-12-30 RX ORDER — HEPARIN 100 UNIT/ML
SYRINGE INTRAVENOUS
Status: COMPLETED
Start: 2018-12-30 | End: 2018-12-30

## 2018-12-30 RX ORDER — SODIUM CHLORIDE 0.9 % (FLUSH) 0.9 %
10-40 SYRINGE (ML) INJECTION AS NEEDED
Status: DISCONTINUED | OUTPATIENT
Start: 2018-12-30 | End: 2019-01-03 | Stop reason: HOSPADM

## 2018-12-30 RX ADMIN — HEPARIN 500 UNITS: 100 SYRINGE at 09:34

## 2018-12-30 RX ADMIN — SODIUM CHLORIDE 100 MG: 900 INJECTION, SOLUTION INTRAVENOUS at 08:09

## 2018-12-30 RX ADMIN — Medication 10 ML: at 09:34

## 2018-12-30 RX ADMIN — Medication 10 ML: at 08:09

## 2018-12-30 NOTE — PROGRESS NOTES
[facility] Progress Note Date: 2018 Name: Michael Adorno MRN: [id number] : 1960 Anidalofungin (Eraxis) Infusion Ms. Emma Wright to [facility], ambulatory at [facility] [employer]. Pain to back 2/10. No distress noted. Pt was assessed and education was provided. Ms. Rea  vitals were reviewed. Visit Vitals Pulse 72 Temp 98.2 °F (36.8 °C) Resp 18 Right upper arm PICC flushed easily and had brisk blood return via both ports. Anidalofungin infused as ordered. PICC dressing clean,dry, and intact. Ms. Emma Wright tolerated infusion, and had no complaints. Both lumens of PICC flushed with NS 10 ml and Heparin 500 units each. Green end caps applied, and lumens wrapped with guaze and paper tape. Stockinette placed over dressing for protection. Patient armband removed and shredded. Ms. Emma Wright was discharged from [place] [address] in stable condition at [facility]. She is to return on 18 at 0930 for her next antibiotic appointment. Sabrina Hemphill, AMA 2018

## 2018-12-31 ENCOUNTER — HOSPITAL ENCOUNTER (OUTPATIENT)
Dept: INFUSION THERAPY | Age: 58
Discharge: HOME | End: 2018-12-31
Payer: SUBSIDIZED

## 2018-12-31 VITALS
DIASTOLIC BLOOD PRESSURE: 83 MMHG | TEMPERATURE: 98.4 F | SYSTOLIC BLOOD PRESSURE: 148 MMHG | OXYGEN SATURATION: 95 % | HEART RATE: 68 BPM | RESPIRATION RATE: 18 BRPM

## 2018-12-31 LAB
ALBUMIN SERPL-MCNC: 3.6 G/DL (ref 3.4–5)
ALBUMIN/GLOB SERPL: 0.9 {RATIO} (ref 0.8–1.7)
ALP SERPL-CCNC: 131 U/L (ref 45–117)
ALT SERPL-CCNC: 17 U/L (ref 13–56)
ANION GAP SERPL CALC-SCNC: 5 MMOL/L (ref 3–18)
AST SERPL-CCNC: 16 U/L (ref 15–37)
BASO+EOS+MONOS # BLD AUTO: 0.6 K/UL (ref 0–2.3)
BASO+EOS+MONOS NFR BLD AUTO: 9 % (ref 0.1–17)
BILIRUB DIRECT SERPL-MCNC: <0.1 MG/DL (ref 0–0.2)
BILIRUB SERPL-MCNC: 0.3 MG/DL (ref 0.2–1)
BUN SERPL-MCNC: 13 MG/DL (ref 7–18)
BUN/CREAT SERPL: 18 (ref 12–20)
CALCIUM SERPL-MCNC: 8.4 MG/DL (ref 8.5–10.1)
CHLORIDE SERPL-SCNC: 104 MMOL/L (ref 100–108)
CO2 SERPL-SCNC: 29 MMOL/L (ref 21–32)
CREAT SERPL-MCNC: 0.72 MG/DL (ref 0.6–1.3)
DIFFERENTIAL METHOD BLD: ABNORMAL
ERYTHROCYTE [DISTWIDTH] IN BLOOD BY AUTOMATED COUNT: 15.7 % (ref 11.5–14.5)
GLOBULIN SER CALC-MCNC: 4.2 G/DL (ref 2–4)
GLUCOSE SERPL-MCNC: 66 MG/DL (ref 74–99)
HCT VFR BLD AUTO: 38.4 % (ref 36–48)
HGB BLD-MCNC: 12.5 G/DL (ref 12–16)
LYMPHOCYTES # BLD: 2.1 K/UL (ref 1.1–5.9)
LYMPHOCYTES NFR BLD: 31 % (ref 14–44)
MCH RBC QN AUTO: 31.5 PG (ref 25–35)
MCHC RBC AUTO-ENTMCNC: 32.6 G/DL (ref 31–37)
MCV RBC AUTO: 96.7 FL (ref 78–102)
NEUTS SEG # BLD: 4.1 K/UL (ref 1.8–9.5)
NEUTS SEG NFR BLD: 61 % (ref 40–70)
PLATELET # BLD AUTO: 252 K/UL (ref 140–440)
POTASSIUM SERPL-SCNC: 4.1 MMOL/L (ref 3.5–5.5)
PROT SERPL-MCNC: 7.8 G/DL (ref 6.4–8.2)
RBC # BLD AUTO: 3.97 M/UL (ref 4.1–5.1)
SODIUM SERPL-SCNC: 138 MMOL/L (ref 136–145)
WBC # BLD AUTO: 6.8 K/UL (ref 4.5–13)

## 2018-12-31 PROCEDURE — 74011250636 HC RX REV CODE- 250/636: Performed by: INTERNAL MEDICINE

## 2018-12-31 PROCEDURE — 96365 THER/PROPH/DIAG IV INF INIT: CPT

## 2018-12-31 PROCEDURE — 80076 HEPATIC FUNCTION PANEL: CPT

## 2018-12-31 PROCEDURE — 86141 C-REACTIVE PROTEIN HS: CPT

## 2018-12-31 PROCEDURE — 74011000258 HC RX REV CODE- 258: Performed by: INTERNAL MEDICINE

## 2018-12-31 PROCEDURE — 85025 COMPLETE CBC W/AUTO DIFF WBC: CPT

## 2018-12-31 PROCEDURE — 96366 THER/PROPH/DIAG IV INF ADDON: CPT

## 2018-12-31 PROCEDURE — 80048 BASIC METABOLIC PNL TOTAL CA: CPT

## 2018-12-31 RX ORDER — HEPARIN 100 UNIT/ML
500 SYRINGE INTRAVENOUS AS NEEDED
Status: DISCONTINUED | OUTPATIENT
Start: 2018-12-31 | End: 2019-01-04 | Stop reason: HOSPADM

## 2018-12-31 RX ORDER — SODIUM CHLORIDE 0.9 % (FLUSH) 0.9 %
10-40 SYRINGE (ML) INJECTION AS NEEDED
Status: DISCONTINUED | OUTPATIENT
Start: 2018-12-31 | End: 2019-01-04 | Stop reason: HOSPADM

## 2018-12-31 RX ORDER — HEPARIN 100 UNIT/ML
SYRINGE INTRAVENOUS
Status: DISPENSED
Start: 2018-12-31 | End: 2018-12-31

## 2018-12-31 RX ADMIN — HEPARIN 500 UNITS: 100 SYRINGE at 11:13

## 2018-12-31 RX ADMIN — Medication 10 ML: at 11:14

## 2018-12-31 RX ADMIN — Medication 30 ML: at 09:45

## 2018-12-31 RX ADMIN — SODIUM CHLORIDE 100 MG: 900 INJECTION, SOLUTION INTRAVENOUS at 09:47

## 2018-12-31 RX ADMIN — HEPARIN 500 UNITS: 100 SYRINGE at 11:15

## 2018-12-31 RX ADMIN — Medication 10 ML: at 11:12

## 2018-12-31 NOTE — PROGRESS NOTES
[facility] Progress Note Date: 2018 Name: Fabrice Luzerne MRN: [id number] : 1960 Ambisome Infusion Ms. Sherlyn Gao to [facility], ambulatory at [address] Pain to back 1/10. No distress noted Pt was assessed and education was provided. Ms. Cordell Carias vitals were reviewed. Visit Vitals /83 (BP 1 Location: Left arm, BP Patient Position: Sitting) Pulse 68 Temp 98.4 °F (36.9 °C) Resp 18 SpO2 95% Right upper arm PICC flushed easily and had brisk blood return via both ports. Blood sample obtained via red lumen for cbc,cmp and crp. PICC dressing c/d/i and not due to be changed. No swelling, redness, streaking, warmth or drainage noted in arm. Pt denied c/o pain in arm. Anidulafungin 100 mg was infused for approximately 94 minutes as ordered via red lumen. Ms. Sherlyn Gao tolerated infusion, and had no complaints. Gorge Duomnt Both lumens of PICC flushed with NS 10 ml and Heparin 500 units each. Picc site and drsg without change. Green end caps applied, and lumens wrapped with guaze and paper tape. Stockinette placed over dressing for protection. Patient armband removed and shredded. Ms. Sherlyn Gao was discharged from [place] [address] in stable condition at 1115. She is to return on 19 at 0930 for her next antibiotic appointment. Brandan Meraz RN 2018

## 2019-01-01 LAB — CRP SERPL HS-MCNC: 2.46 MG/L (ref 0–3)

## 2019-01-02 ENCOUNTER — HOSPITAL ENCOUNTER (OUTPATIENT)
Dept: INFUSION THERAPY | Age: 59
Discharge: HOME | End: 2019-01-02
Payer: MEDICAID

## 2019-01-02 VITALS
SYSTOLIC BLOOD PRESSURE: 160 MMHG | RESPIRATION RATE: 18 BRPM | HEART RATE: 77 BPM | TEMPERATURE: 98.5 F | DIASTOLIC BLOOD PRESSURE: 80 MMHG | OXYGEN SATURATION: 94 %

## 2019-01-02 PROCEDURE — 74011000258 HC RX REV CODE- 258: Performed by: INTERNAL MEDICINE

## 2019-01-02 PROCEDURE — 74011250636 HC RX REV CODE- 250/636: Performed by: INTERNAL MEDICINE

## 2019-01-02 PROCEDURE — 96365 THER/PROPH/DIAG IV INF INIT: CPT

## 2019-01-02 PROCEDURE — 96366 THER/PROPH/DIAG IV INF ADDON: CPT

## 2019-01-02 RX ORDER — SODIUM CHLORIDE 0.9 % (FLUSH) 0.9 %
5-10 SYRINGE (ML) INJECTION AS NEEDED
Status: DISCONTINUED | OUTPATIENT
Start: 2019-01-02 | End: 2019-01-06 | Stop reason: HOSPADM

## 2019-01-02 RX ORDER — HEPARIN 100 UNIT/ML
500 SYRINGE INTRAVENOUS ONCE
Status: COMPLETED | OUTPATIENT
Start: 2019-01-02 | End: 2019-01-02

## 2019-01-02 RX ADMIN — Medication 10 ML: at 16:25

## 2019-01-02 RX ADMIN — SODIUM CHLORIDE 100 MG: 900 INJECTION, SOLUTION INTRAVENOUS at 14:43

## 2019-01-02 RX ADMIN — Medication 500 UNITS: at 16:25

## 2019-01-02 NOTE — PROGRESS NOTES
[facility] Progress Note Date: 2019 Name: Albert Earl MRN: [id number] : 1960 Anidulafugin Infusion Ms. Judith Garcia was assessed and education was provided. Ms. Carmen Grigsby vitals were reviewed. Visit Vitals /80 (BP 1 Location: Left arm, BP Patient Position: Sitting) Pulse 77 Temp 98.5 °F (36.9 °C) Resp 18 SpO2 94% Breastfeeding? No  
 
Anidulafugin 100 mg IV was infused per orders without complications. PICC line flushed per orders, per protocol and secured with curos, caps, coban and stockinette.,  
 
Ms. Judith Garcia tolerated infusion, and had no complaints at this time. Ms. Judith Garcia was discharged from [place] [address] in stable condition at 1630. She is to return on 1/3/19 at 1430 for her next appointment. Armband removed and shredded.   
 
Alexis Chavez RN 
2019 
5:58 PM

## 2019-01-03 ENCOUNTER — HOSPITAL ENCOUNTER (OUTPATIENT)
Dept: INFUSION THERAPY | Age: 59
Discharge: HOME | End: 2019-01-03
Payer: MEDICAID

## 2019-01-03 VITALS
RESPIRATION RATE: 18 BRPM | SYSTOLIC BLOOD PRESSURE: 155 MMHG | HEART RATE: 78 BPM | OXYGEN SATURATION: 92 % | DIASTOLIC BLOOD PRESSURE: 84 MMHG | TEMPERATURE: 98.1 F

## 2019-01-03 PROCEDURE — 96365 THER/PROPH/DIAG IV INF INIT: CPT

## 2019-01-03 PROCEDURE — 74011250636 HC RX REV CODE- 250/636: Performed by: INTERNAL MEDICINE

## 2019-01-03 PROCEDURE — 74011000258 HC RX REV CODE- 258: Performed by: INTERNAL MEDICINE

## 2019-01-03 RX ORDER — HEPARIN 100 UNIT/ML
500 SYRINGE INTRAVENOUS AS NEEDED
Status: DISCONTINUED | OUTPATIENT
Start: 2019-01-03 | End: 2019-01-07 | Stop reason: HOSPADM

## 2019-01-03 RX ORDER — SODIUM CHLORIDE 0.9 % (FLUSH) 0.9 %
10-40 SYRINGE (ML) INJECTION AS NEEDED
Status: DISCONTINUED | OUTPATIENT
Start: 2019-01-03 | End: 2019-01-07 | Stop reason: HOSPADM

## 2019-01-03 RX ADMIN — Medication 10 ML: at 15:23

## 2019-01-03 RX ADMIN — Medication 10 ML: at 14:50

## 2019-01-03 RX ADMIN — SODIUM CHLORIDE 100 MG: 900 INJECTION, SOLUTION INTRAVENOUS at 14:50

## 2019-01-03 RX ADMIN — SODIUM CHLORIDE, PRESERVATIVE FREE 500 UNITS: 5 INJECTION INTRAVENOUS at 15:23

## 2019-01-03 RX ADMIN — SODIUM CHLORIDE, PRESERVATIVE FREE 500 UNITS: 5 INJECTION INTRAVENOUS at 16:14

## 2019-01-03 RX ADMIN — Medication 10 ML: at 16:14

## 2019-01-03 NOTE — PROGRESS NOTES
hospitals Progress Note Date: January 3, 2019 Name: Jim Wilde MRN: [id number] : 1960 Anidulafugin Infusion Ms. Nancy Hurley was assessed and education was provided. Ms. Naveed Rosas vitals were reviewed. Visit Vitals /84 (BP 1 Location: Right arm, BP Patient Position: Sitting) Pulse 78 Temp 98.1 °F (36.7 °C) Resp 18 SpO2 92% Anidulafugin 100 mg IV was infused per orders without complications. PICC dressing changed using sterile technique. New Statlock. Microclave caps changed. Both lumens flushed with 10 ml NS and 5 ml heparin each. Curos caps applied. New stockinette applied. Ms. Nancy Hurley tolerated infusion, and had no complaints at this time. Ms. Nancy Hurley was discharged from [place] [address] in stable condition at 1620. She is to return on 19 at 1430 for her next appointment. Armband removed and shredded. Hailee Mason RN 
January 3, 2019

## 2019-01-04 ENCOUNTER — HOSPITAL ENCOUNTER (OUTPATIENT)
Dept: INFUSION THERAPY | Age: 59
Discharge: HOME | End: 2019-01-04
Payer: MEDICAID

## 2019-01-04 VITALS
TEMPERATURE: 98.4 F | DIASTOLIC BLOOD PRESSURE: 87 MMHG | SYSTOLIC BLOOD PRESSURE: 157 MMHG | HEART RATE: 76 BPM | RESPIRATION RATE: 18 BRPM | OXYGEN SATURATION: 93 %

## 2019-01-04 PROCEDURE — 74011250636 HC RX REV CODE- 250/636: Performed by: INTERNAL MEDICINE

## 2019-01-04 PROCEDURE — 96365 THER/PROPH/DIAG IV INF INIT: CPT

## 2019-01-04 PROCEDURE — 74011000258 HC RX REV CODE- 258: Performed by: INTERNAL MEDICINE

## 2019-01-04 RX ORDER — HEPARIN 100 UNIT/ML
500 SYRINGE INTRAVENOUS AS NEEDED
Status: DISCONTINUED | OUTPATIENT
Start: 2019-01-04 | End: 2019-01-08 | Stop reason: HOSPADM

## 2019-01-04 RX ORDER — SODIUM CHLORIDE 0.9 % (FLUSH) 0.9 %
10-40 SYRINGE (ML) INJECTION AS NEEDED
Status: DISCONTINUED | OUTPATIENT
Start: 2019-01-04 | End: 2019-01-08 | Stop reason: HOSPADM

## 2019-01-04 RX ADMIN — Medication 20 ML: at 15:00

## 2019-01-04 RX ADMIN — SODIUM CHLORIDE, PRESERVATIVE FREE 500 UNITS: 5 INJECTION INTRAVENOUS at 15:00

## 2019-01-04 RX ADMIN — Medication 10 ML: at 14:19

## 2019-01-04 RX ADMIN — SODIUM CHLORIDE 100 MG: 900 INJECTION, SOLUTION INTRAVENOUS at 14:19

## 2019-01-04 NOTE — PROGRESS NOTES
[facility] Progress Note Date: 2019 Name: Carlos Mansfield MRN: [id number] : 1960 Anidulafugin Infusion Ms. Denton Hinojosa was assessed and education was provided. Ms. Sharmila Callahan vitals were reviewed. Visit Vitals /87 (BP 1 Location: Left arm, BP Patient Position: Sitting) Pulse 76 Temp 98.4 °F (36.9 °C) Resp 18 SpO2 93% Anidulafugin 100 mg IV started at 1419. Patient had to stop infusion to leave [facility] to handle an emergency situation at home. Approximately 60 ml's infused this visit. Both lumens flushed with 10 ml NS and 5 ml heparin each. Curos caps applied. New stockinette applied. Ms. Denton Hinojosa tolerated infusion, and had no complaints at this time. Ms. Denton Hinojosa was discharged from [place] [address] in stable condition at 1505. She is to return on 19 at 0800 for her next appointment. Armband removed and shredded. Sheila Hutchins RN 
2019

## 2019-01-05 ENCOUNTER — HOSPITAL ENCOUNTER (OUTPATIENT)
Dept: INFUSION THERAPY | Age: 59
Discharge: HOME | End: 2019-01-05
Payer: MEDICAID

## 2019-01-05 VITALS
RESPIRATION RATE: 18 BRPM | TEMPERATURE: 98.8 F | HEART RATE: 74 BPM | SYSTOLIC BLOOD PRESSURE: 167 MMHG | DIASTOLIC BLOOD PRESSURE: 92 MMHG | OXYGEN SATURATION: 92 %

## 2019-01-05 PROCEDURE — 74011000258 HC RX REV CODE- 258: Performed by: INTERNAL MEDICINE

## 2019-01-05 PROCEDURE — 96366 THER/PROPH/DIAG IV INF ADDON: CPT

## 2019-01-05 PROCEDURE — 74011250636 HC RX REV CODE- 250/636: Performed by: INTERNAL MEDICINE

## 2019-01-05 PROCEDURE — 74011250636 HC RX REV CODE- 250/636

## 2019-01-05 PROCEDURE — 96365 THER/PROPH/DIAG IV INF INIT: CPT

## 2019-01-05 RX ORDER — SODIUM CHLORIDE 0.9 % (FLUSH) 0.9 %
10-40 SYRINGE (ML) INJECTION AS NEEDED
Status: DISCONTINUED | OUTPATIENT
Start: 2019-01-05 | End: 2019-01-09 | Stop reason: HOSPADM

## 2019-01-05 RX ORDER — HEPARIN 100 UNIT/ML
500 SYRINGE INTRAVENOUS AS NEEDED
Status: DISCONTINUED | OUTPATIENT
Start: 2019-01-05 | End: 2019-01-09 | Stop reason: HOSPADM

## 2019-01-05 RX ORDER — HEPARIN 100 UNIT/ML
SYRINGE INTRAVENOUS
Status: COMPLETED
Start: 2019-01-05 | End: 2019-01-05

## 2019-01-05 RX ADMIN — Medication 10 ML: at 08:07

## 2019-01-05 RX ADMIN — HEPARIN 500 UNITS: 100 SYRINGE at 08:08

## 2019-01-05 RX ADMIN — HEPARIN 500 UNITS: 100 SYRINGE at 09:29

## 2019-01-05 RX ADMIN — SODIUM CHLORIDE 100 MG: 900 INJECTION, SOLUTION INTRAVENOUS at 08:05

## 2019-01-05 RX ADMIN — Medication 10 ML: at 08:04

## 2019-01-05 RX ADMIN — Medication 10 ML: at 09:28

## 2019-01-05 NOTE — PROGRESS NOTES
[facility] Progress Note Date: 2019 Name: Janette Yi MRN: [id number] : 1960 Ambisome Infusion Ms. Matute to [facility], ambulatory at 0800. Pain to back 1/10. Patient anxious because her mom is hospitalized Pt was assessed and education was provided. Ms. Vic Flood vitals were reviewed. Visit Vitals BP (!) 167/92 (BP 1 Location: Left arm, BP Patient Position: Post activity) Pulse 74 Temp 98.8 °F (37.1 °C) Resp 18 SpO2 92% Right upper arm PICC flushed easily and had brisk blood return via both ports. PICC dressing c/d/i and not due to be changed. No swelling, redness, streaking, warmth or drainage noted in arm. Pt denied c/o pain in arm. Anidulafungin 100 mg was infused for approximately 85-90 minutes as per label instructions via red lumen. Ms. Jimmy Law tolerated infusion, and had no complaints. Moncho Munoz Both lumens of PICC flushed with NS 10 ml and Heparin 500 units each. Picc site and drsg without change. Green end caps applied, and lumens wrapped with guaze and paper tape. Stockinette placed over dressing for protection. Patient armband removed and shredded. She declined to have BP rechecked. She stated she took her BP medication this am. 
 
Ms. Jimmy Law was discharged from [place] [address] in stable condition at 0930. She is to return on 19 at 0800 for her next antibiotic appointment. Kayla Peck RN 
2019

## 2019-01-06 ENCOUNTER — HOSPITAL ENCOUNTER (OUTPATIENT)
Dept: INFUSION THERAPY | Age: 59
Discharge: HOME | End: 2019-01-06
Payer: MEDICAID

## 2019-01-06 VITALS
HEART RATE: 69 BPM | TEMPERATURE: 98.7 F | DIASTOLIC BLOOD PRESSURE: 81 MMHG | OXYGEN SATURATION: 97 % | RESPIRATION RATE: 18 BRPM | SYSTOLIC BLOOD PRESSURE: 179 MMHG

## 2019-01-06 PROCEDURE — 74011250636 HC RX REV CODE- 250/636: Performed by: INTERNAL MEDICINE

## 2019-01-06 PROCEDURE — 96365 THER/PROPH/DIAG IV INF INIT: CPT

## 2019-01-06 PROCEDURE — 74011000258 HC RX REV CODE- 258: Performed by: INTERNAL MEDICINE

## 2019-01-06 PROCEDURE — 96366 THER/PROPH/DIAG IV INF ADDON: CPT

## 2019-01-06 RX ORDER — SODIUM CHLORIDE 0.9 % (FLUSH) 0.9 %
10-40 SYRINGE (ML) INJECTION AS NEEDED
Status: DISCONTINUED | OUTPATIENT
Start: 2019-01-06 | End: 2019-01-10 | Stop reason: HOSPADM

## 2019-01-06 RX ORDER — HEPARIN 100 UNIT/ML
500 SYRINGE INTRAVENOUS AS NEEDED
Status: DISCONTINUED | OUTPATIENT
Start: 2019-01-06 | End: 2019-01-10 | Stop reason: HOSPADM

## 2019-01-06 RX ORDER — HEPARIN 100 UNIT/ML
SYRINGE INTRAVENOUS
Status: DISPENSED
Start: 2019-01-06 | End: 2019-01-06

## 2019-01-06 RX ADMIN — SODIUM CHLORIDE 100 MG: 900 INJECTION, SOLUTION INTRAVENOUS at 08:05

## 2019-01-06 RX ADMIN — Medication 10 ML: at 08:10

## 2019-01-06 RX ADMIN — HEPARIN 500 UNITS: 100 SYRINGE at 08:11

## 2019-01-06 RX ADMIN — Medication 10 ML: at 09:32

## 2019-01-06 RX ADMIN — HEPARIN 500 UNITS: 100 SYRINGE at 09:33

## 2019-01-06 RX ADMIN — Medication 10 ML: at 08:04

## 2019-01-06 NOTE — PROGRESS NOTES
[facility] Progress Note Date: 2019 Name: Melvina Suero MRN: [id number] : 1960 Ambisome Infusion Ms. Matute to [facility], ambulatory at 0800. Pain to back 2/10. Took PAIN and BP medication this am. 
 
 Pt was assessed and education was provided. Ms. Roman Logan vitals were reviewed. Visit Vitals /81 (BP 1 Location: Left arm, BP Patient Position: Post activity) Pulse 69 Temp 98.7 °F (37.1 °C) Resp 18 SpO2 97% Right upper arm PICC flushed easily and had brisk blood return via both ports. PICC dressing c/d/i and not due to be changed. No swelling, redness, streaking, warmth or drainage noted in arm. Pt denied c/o pain in arm. Anidulafungin 100 mg was infused for approximately 85-90 minutes as per label instructions via purple lumen. Ms. Peyton Joshi tolerated infusion, and had no complaints. Eliana Herring Both lumens of PICC flushed with NS 10 ml and Heparin 500 units each. Picc site and drsg without change. Green end caps applied, and lumens wrapped with guaze and paper tape. Stockinette placed over dressing for protection. Patient armband removed and shredded. Ms. Peyton Joshi was discharged from [place] [address] in stable condition at [address] [place] [address]. She is to return on 19 at 1 pm for her next antibiotic appointment. Rich Christian RN 
2019

## 2019-01-07 ENCOUNTER — TELEPHONE (OUTPATIENT)
Dept: INTERNAL MEDICINE CLINIC | Age: 59
End: 2019-01-07

## 2019-01-07 ENCOUNTER — OFFICE VISIT (OUTPATIENT)
Dept: FAMILY MEDICINE CLINIC | Age: 59
End: 2019-01-07

## 2019-01-07 ENCOUNTER — HOSPITAL ENCOUNTER (OUTPATIENT)
Dept: INFUSION THERAPY | Age: 59
Discharge: HOME | End: 2019-01-07
Payer: MEDICAID

## 2019-01-07 VITALS
DIASTOLIC BLOOD PRESSURE: 90 MMHG | TEMPERATURE: 98.2 F | RESPIRATION RATE: 18 BRPM | OXYGEN SATURATION: 98 % | SYSTOLIC BLOOD PRESSURE: 155 MMHG | HEART RATE: 68 BPM

## 2019-01-07 VITALS
TEMPERATURE: 98 F | WEIGHT: 206.4 LBS | RESPIRATION RATE: 20 BRPM | BODY MASS INDEX: 35.24 KG/M2 | SYSTOLIC BLOOD PRESSURE: 156 MMHG | HEIGHT: 64 IN | HEART RATE: 76 BPM | DIASTOLIC BLOOD PRESSURE: 90 MMHG

## 2019-01-07 DIAGNOSIS — G89.29 CHRONIC BILATERAL THORACIC BACK PAIN: ICD-10-CM

## 2019-01-07 DIAGNOSIS — M54.6 CHRONIC BILATERAL THORACIC BACK PAIN: ICD-10-CM

## 2019-01-07 DIAGNOSIS — F33.9 EPISODE OF RECURRENT MAJOR DEPRESSIVE DISORDER, UNSPECIFIED DEPRESSION EPISODE SEVERITY (HCC): ICD-10-CM

## 2019-01-07 DIAGNOSIS — I10 ESSENTIAL HYPERTENSION: Primary | ICD-10-CM

## 2019-01-07 LAB
ALBUMIN SERPL-MCNC: 3.6 G/DL (ref 3.4–5)
ALBUMIN/GLOB SERPL: 0.9 {RATIO} (ref 0.8–1.7)
ALP SERPL-CCNC: 128 U/L (ref 45–117)
ALT SERPL-CCNC: 17 U/L (ref 13–56)
ANION GAP SERPL CALC-SCNC: 6 MMOL/L (ref 3–18)
AST SERPL-CCNC: 16 U/L (ref 15–37)
BASO+EOS+MONOS # BLD AUTO: 0.4 K/UL (ref 0–2.3)
BASO+EOS+MONOS NFR BLD AUTO: 6 % (ref 0.1–17)
BILIRUB DIRECT SERPL-MCNC: 0.1 MG/DL (ref 0–0.2)
BILIRUB SERPL-MCNC: 0.3 MG/DL (ref 0.2–1)
BUN SERPL-MCNC: 17 MG/DL (ref 7–18)
BUN/CREAT SERPL: 22 (ref 12–20)
CALCIUM SERPL-MCNC: 9 MG/DL (ref 8.5–10.1)
CHLORIDE SERPL-SCNC: 103 MMOL/L (ref 100–108)
CO2 SERPL-SCNC: 28 MMOL/L (ref 21–32)
CREAT SERPL-MCNC: 0.77 MG/DL (ref 0.6–1.3)
DIFFERENTIAL METHOD BLD: ABNORMAL
ERYTHROCYTE [DISTWIDTH] IN BLOOD BY AUTOMATED COUNT: 15.5 % (ref 11.5–14.5)
GLOBULIN SER CALC-MCNC: 4.1 G/DL (ref 2–4)
GLUCOSE SERPL-MCNC: 87 MG/DL (ref 74–99)
HCT VFR BLD AUTO: 39.9 % (ref 36–48)
HGB BLD-MCNC: 12.8 G/DL (ref 12–16)
LYMPHOCYTES # BLD: 2 K/UL (ref 1.1–5.9)
LYMPHOCYTES NFR BLD: 30 % (ref 14–44)
MCH RBC QN AUTO: 31.1 PG (ref 25–35)
MCHC RBC AUTO-ENTMCNC: 32.1 G/DL (ref 31–37)
MCV RBC AUTO: 96.8 FL (ref 78–102)
NEUTS SEG # BLD: 4.3 K/UL (ref 1.8–9.5)
NEUTS SEG NFR BLD: 64 % (ref 40–70)
PLATELET # BLD AUTO: 275 K/UL (ref 140–440)
POTASSIUM SERPL-SCNC: 4 MMOL/L (ref 3.5–5.5)
PROT SERPL-MCNC: 7.7 G/DL (ref 6.4–8.2)
RBC # BLD AUTO: 4.12 M/UL (ref 4.1–5.1)
SODIUM SERPL-SCNC: 137 MMOL/L (ref 136–145)
WBC # BLD AUTO: 6.7 K/UL (ref 4.5–13)

## 2019-01-07 PROCEDURE — 85025 COMPLETE CBC W/AUTO DIFF WBC: CPT

## 2019-01-07 PROCEDURE — 96365 THER/PROPH/DIAG IV INF INIT: CPT

## 2019-01-07 PROCEDURE — 80048 BASIC METABOLIC PNL TOTAL CA: CPT

## 2019-01-07 PROCEDURE — 74011250636 HC RX REV CODE- 250/636: Performed by: INTERNAL MEDICINE

## 2019-01-07 PROCEDURE — 80076 HEPATIC FUNCTION PANEL: CPT

## 2019-01-07 PROCEDURE — 86141 C-REACTIVE PROTEIN HS: CPT

## 2019-01-07 PROCEDURE — 74011250636 HC RX REV CODE- 250/636

## 2019-01-07 PROCEDURE — 74011000258 HC RX REV CODE- 258: Performed by: INTERNAL MEDICINE

## 2019-01-07 RX ORDER — HEPARIN 100 UNIT/ML
500 SYRINGE INTRAVENOUS AS NEEDED
Status: DISCONTINUED | OUTPATIENT
Start: 2019-01-07 | End: 2019-01-11 | Stop reason: HOSPADM

## 2019-01-07 RX ORDER — HYDROCHLOROTHIAZIDE 12.5 MG/1
12.5 TABLET ORAL DAILY
Qty: 30 TAB | Refills: 0 | Status: SHIPPED | OUTPATIENT
Start: 2019-01-07 | End: 2019-02-02 | Stop reason: SDUPTHER

## 2019-01-07 RX ORDER — METHOCARBAMOL 500 MG/1
500 TABLET, FILM COATED ORAL 3 TIMES DAILY
Qty: 45 TAB | Refills: 0 | Status: SHIPPED | OUTPATIENT
Start: 2019-01-07 | End: 2019-02-18 | Stop reason: ALTCHOICE

## 2019-01-07 RX ORDER — SERTRALINE HYDROCHLORIDE 100 MG/1
TABLET, FILM COATED ORAL
Qty: 45 TAB | Refills: 3 | Status: SHIPPED | OUTPATIENT
Start: 2019-01-07 | End: 2019-01-31 | Stop reason: SDUPTHER

## 2019-01-07 RX ORDER — SODIUM CHLORIDE 0.9 % (FLUSH) 0.9 %
10-40 SYRINGE (ML) INJECTION AS NEEDED
Status: DISCONTINUED | OUTPATIENT
Start: 2019-01-07 | End: 2019-01-11 | Stop reason: HOSPADM

## 2019-01-07 RX ORDER — HEPARIN 100 UNIT/ML
SYRINGE INTRAVENOUS
Status: COMPLETED
Start: 2019-01-07 | End: 2019-01-07

## 2019-01-07 RX ADMIN — SODIUM CHLORIDE 100 MG: 900 INJECTION, SOLUTION INTRAVENOUS at 13:25

## 2019-01-07 RX ADMIN — Medication 10 ML: at 14:48

## 2019-01-07 RX ADMIN — HEPARIN 500 UNITS: 100 SYRINGE at 14:49

## 2019-01-07 RX ADMIN — Medication 30 ML: at 13:15

## 2019-01-07 RX ADMIN — HEPARIN 500 UNITS: 100 SYRINGE at 13:21

## 2019-01-07 RX ADMIN — Medication 10 ML: at 13:20

## 2019-01-07 NOTE — PROGRESS NOTES
Chief Complaint   Patient presents with    Depression         Health Maintenance Due   Topic Date Due    Pneumococcal 19-64 Medium Risk (1 of 1 - PPSV23) 10/29/1980    DTaP/Tdap/Td series (1 - Tdap) 10/29/1982    PAP AKA CERVICAL CYTOLOGY  10/29/1982    Shingrix Vaccine Age 50> (1 of 2) 10/29/2011    FOBT Q 1 YEAR AGE 50-75  10/29/2011       Health Maintenance reviewed       1. Have you been to the ER, urgent care clinic since your last visit? Hospitalized since your last visit? No    2. Have you seen or consulted any other health care providers outside of the [address] [place] [address] since your last visit? Include any pap smears or colon screening.  No

## 2019-01-07 NOTE — TELEPHONE ENCOUNTER
[facility] called left an authorization # for pt MRI  Dr Zulma Nunez ordered Auth # [id number]

## 2019-01-07 NOTE — PROGRESS NOTES
MARIA EUGENIA Franks is a 63 y.o. female  Chief Complaint   Patient presents with    Depression     Depression - Denies desire to hurt or harm self or others. Denies suicidal ideations. Denies substance use or abuse. Reports the zoloft is going well and states she would like to continue at this dose. Denies being hurt or harmed. Denies chest pain, shortness of breath, or anxiousness. Reports she is sleeping at night but has to sleep in a chair due to her back pain and issues. Hypertension - Reports her blood pressure is still elevated. Reports she was on a low dose of HCTZ for  30 days and this did help. Reports she thinks some of it may be the pain from her back. Back pain - Reports she is still having chronic back pain and she is getting antibiotic infusions daily. Reports they did give her Toradol for her pain but told her she would need to follow up with her PCP for the muscle relaxer. Her back pain is 4/10. Past Medical History  Past Medical History:   Diagnosis Date    Depression     Hepatitis C     Heroin use disorder, moderate, in early remission ([facility] [address] [facility]     NOW on [facility]    Ill-defined condition     Chronic back pain       Surgical History  No past surgical history on file. Medications  Current Outpatient Medications   Medication Sig Dispense Refill    sertraline (ZOLOFT) 100 mg tablet TAKE 1 & 1/2 TABLET BY MOUTH FOR A TOTAL OF 150MG DAILY 45 Tab 3    hydroCHLOROthiazide (HYDRODIURIL) 12.5 mg tablet Take 1 Tab by mouth daily. 30 Tab 0    methocarbamol (ROBAXIN) 500 mg tablet Take 1 Tab by mouth three (3) times daily. 45 Tab 0    docusate sodium (COLACE) 100 mg capsule Take 1 Cap by mouth two (2) times a day. 60 Cap 0    OTHER Daptomycin through 12/29/18 at [facility]- arranged by  1 Each 0    methadone HCl (METHADONE PO) Take 85 mg by mouth daily.        Facility-Administered Medications Ordered in Other Visits   Medication Dose Route Frequency Provider Last Rate Last Dose    sodium chloride (NS) flush 10-40 mL  10-40 mL IntraVENous PRN Lyric Sharma MD        heparin (porcine) pf 500 Units  500 Units InterCATHeter PRN Lyric Sharma MD        sodium chloride (NS) flush 10-40 mL  10-40 mL IntraVENous PRN Lyric Sharma MD   10 mL at 01/06/19 0932    heparin (porcine) pf 500 Units  500 Units InterCATHeter PRN Lyric Sharma MD   500 Units at 01/06/19 0933    sodium chloride (NS) flush 10-40 mL  10-40 mL IntraVENous PRN Lyric Sharma MD   10 mL at 01/05/19 0928    heparin (porcine) pf 500 Units  500 Units InterCATHeter PRN Lyric Sharma MD   500 Units at 01/05/19 0929    [START ON 1/11/2019] anidulafungin (ERAXIS) 100 mg in 0.9% sodium chloride 130 mL IVPB  100 mg IntraVENous ONCE Maria Ines Kay MD        heparin (porcine) pf 500 Units  500 Units InterCATHeter PRN Lyric Sharma MD   500 Units at 01/04/19 1500    sodium chloride (NS) flush 10-40 mL  10-40 mL IntraVENous PRN Lyric Sharma MD   20 mL at 01/04/19 1500    [START ON 1/10/2019] anidulafungin (ERAXIS) 100 mg in 0.9% sodium chloride 130 mL IVPB  100 mg IntraVENous ONCE Isaac Guillaume MD        [START ON 1/8/2019] anidulafungin (ERAXIS) 100 mg in 0.9% sodium chloride 130 mL IVPB  100 mg IntraVENous ONCE Isaac Guillaume MD        [START ON 1/9/2019] anidulafungin (ERAXIS) 100 mg in 0.9% sodium chloride 130 mL IVPB  100 mg IntraVENous ONCE Isaac Guillaume MD        anidulafungin (ERAXIS) 100 mg in 0.9% sodium chloride 130 mL IVPB  100 mg IntraVENous ONCE Isaac Guillaume MD        sodium chloride (NS) flush 10-40 mL  10-40 mL IntraVENous PRN Lyric Sharma MD           Allergies  No Known Allergies    Family History  Family History   Problem Relation Age of Onset    Hypertension Mother     High Cholesterol Mother     COPD Mother     Hypertension Father        Social History  Social History     Socioeconomic History    Marital status: SINGLE     Spouse name: Not on file    Number of children: Not on file    Years of education: Not on file    Highest education level: Not on file   Social Needs    Financial resource strain: Not on file    Food insecurity - worry: Not on file    Food insecurity - inability: Not on file    Transportation needs - medical: Not on file   [employer] needs - non-medical: Not on file   Occupational History    Not on file   Tobacco Use    Smoking status: Current Every Day Smoker     Packs/day: 0.25     Types: Cigarettes    Smokeless tobacco: Never Used   Substance and Sexual Activity    Alcohol use: No    Drug use: No     Comment: on methadone matinance program    Sexual activity: No   Other Topics Concern    Not on file   Social History Narrative    Not on file       Problem List  Patient Active Problem List   Diagnosis Code    Severe obesity with body mass index (BMI) of 35.0 to 39.9 with serious comorbidity ([facility] E66.01    Discitis of thoracic region M46.44       Review of Systems  Review of Systems   Respiratory: Negative for shortness of breath. Cardiovascular: Negative for chest pain. Gastrointestinal: Negative for abdominal pain, nausea and vomiting. Musculoskeletal: Positive for back pain (chronic). Psychiatric/Behavioral: Positive for depression. Negative for substance abuse and suicidal ideas. The patient is not nervous/anxious. Vital Signs  Vitals:    01/07/19 0914 01/07/19 0922   BP: 193/79 156/90   Pulse: 76    Resp: 20    Temp: 98 °F (36.7 °C)    TempSrc: Oral    Weight: 206 lb 6.4 oz (93.6 kg)    Height: 5' 4\" (1.626 m)    PainSc:   4    PainLoc: Back        Physical Exam  Physical Exam   Constitutional: She is oriented to person, place, and time. HENT:   Mouth/Throat: Oropharynx is clear and moist.   Eyes: Pupils are equal, round, and reactive to light. Cardiovascular: Normal rate, regular rhythm and normal heart sounds.    Pulmonary/Chest: Effort normal and breath sounds normal.   Neurological: She is alert and oriented to person, place, and time. Psychiatric: She has a normal mood and affect. Her behavior is normal. Judgment and thought content normal.       Diagnostics  Orders Placed This Encounter    sertraline (ZOLOFT) 100 mg tablet     Sig: TAKE 1 & 1/2 TABLET BY MOUTH FOR A TOTAL OF 150MG DAILY     Dispense:  45 Tab     Refill:  3    hydroCHLOROthiazide (HYDRODIURIL) 12.5 mg tablet     Sig: Take 1 Tab by mouth daily. Dispense:  30 Tab     Refill:  0    methocarbamol (ROBAXIN) 500 mg tablet     Sig: Take 1 Tab by mouth three (3) times daily. Dispense:  45 Tab     Refill:  0       Results  Results for orders placed or performed during the hospital encounter of 12/31/18   CBC WITH 3 PART DIFF   Result Value Ref Range    WBC 6.8 4.5 - 13.0 K/uL    RBC 3.97 (L) 4.10 - 5.10 M/uL    HGB 12.5 12.0 - 16 g/dL    HCT 38.4 36 - 48 %    MCV 96.7 78 - 102 FL    MCH 31.5 25.0 - 35.0 PG    MCHC 32.6 31 - 37 g/dL    RDW 15.7 (H) 11.5 - 14.5 %    PLATELET 818 253 - 324 K/uL    NEUTROPHILS 61 40 - 70 %    MIXED CELLS 9 0.1 - 17 %    LYMPHOCYTES 31 14 - 44 %    ABS. NEUTROPHILS 4.1 1.8 - 9.5 K/UL    ABS. MIXED CELLS 0.6 0.0 - 2.3 K/uL    ABS.  LYMPHOCYTES 2.1 1.1 - 5.9 K/UL    DF AUTOMATED     METABOLIC PANEL, BASIC   Result Value Ref Range    Sodium 138 136 - 145 mmol/L    Potassium 4.1 3.5 - 5.5 mmol/L    Chloride 104 100 - 108 mmol/L    CO2 29 21 - 32 mmol/L    Anion gap 5 3.0 - 18 mmol/L    Glucose 66 (L) 74 - 99 mg/dL    BUN 13 7.0 - 18 MG/DL    Creatinine 0.72 0.6 - 1.3 MG/DL    BUN/Creatinine ratio 18 12 - 20      GFR est AA >60 >60 ml/min/1.73m2    GFR est non-AA >60 >60 ml/min/1.73m2    Calcium 8.4 (L) 8.5 - 10.1 MG/DL   HEPATIC FUNCTION PANEL   Result Value Ref Range    Protein, total 7.8 6.4 - 8.2 g/dL    Albumin 3.6 3.4 - 5.0 g/dL    Globulin 4.2 (H) 2.0 - 4.0 g/dL    A-G Ratio 0.9 0.8 - 1.7      Bilirubin, total 0.3 0.2 - 1.0 MG/DL    Bilirubin, direct <0.1 0.0 - 0.2 MG/DL    Alk. phosphatase 131 (H) 45 - 117 U/L    AST (SGOT) 16 15 - 37 U/L    ALT (SGPT) 17 13 - 56 U/L   CRP, HIGH SENSITIVITY   Result Value Ref Range    C-Reactive Protein, Cardiac 2.46 0.00 - 3.00 mg/L       Assessment and Plan  Diagnoses and all orders for this visit:    1. Essential hypertension  -     hydroCHLOROthiazide (HYDRODIURIL) 12.5 mg tablet; Take 1 Tab by mouth daily. 2. Episode of recurrent major depressive disorder, unspecified depression episode severity (HCC)  -     sertraline (ZOLOFT) 100 mg tablet; TAKE 1 & 1/2 TABLET BY MOUTH FOR A TOTAL OF 150MG DAILY    3. Chronic bilateral thoracic back pain  -     methocarbamol (ROBAXIN) 500 mg tablet; Take 1 Tab by mouth three (3) times daily. Patient to monitor her blood pressure daily at the infusion center. She is to call the office for a refill if the medication is lowering her blood pressure and it is within range and less than 140/90. She is to return soon if her blood pressure continue to be elevated above 140/90 in the next 3 weeks. After care summary printed and reviewed with patient. Plan reviewed with patient. Questions answered. Patient verbalized understanding of plan and is in agreement with plan. Patient to follow up in three months or earlier if symptoms worsen.     Lucila Sosa, ARIELLAP-C

## 2019-01-08 ENCOUNTER — TELEPHONE (OUTPATIENT)
Dept: INTERNAL MEDICINE CLINIC | Age: 59
End: 2019-01-08

## 2019-01-08 ENCOUNTER — HOSPITAL ENCOUNTER (OUTPATIENT)
Dept: INFUSION THERAPY | Age: 59
Discharge: HOME | End: 2019-01-08
Payer: MEDICAID

## 2019-01-08 VITALS
HEART RATE: 67 BPM | DIASTOLIC BLOOD PRESSURE: 84 MMHG | OXYGEN SATURATION: 95 % | RESPIRATION RATE: 18 BRPM | SYSTOLIC BLOOD PRESSURE: 143 MMHG | TEMPERATURE: 97.9 F

## 2019-01-08 LAB — CRP SERPL HS-MCNC: 5.89 MG/L (ref 0–3)

## 2019-01-08 PROCEDURE — 96365 THER/PROPH/DIAG IV INF INIT: CPT

## 2019-01-08 PROCEDURE — 96366 THER/PROPH/DIAG IV INF ADDON: CPT

## 2019-01-08 PROCEDURE — 74011250636 HC RX REV CODE- 250/636: Performed by: INTERNAL MEDICINE

## 2019-01-08 PROCEDURE — 74011000258 HC RX REV CODE- 258: Performed by: INTERNAL MEDICINE

## 2019-01-08 PROCEDURE — 74011250636 HC RX REV CODE- 250/636

## 2019-01-08 RX ORDER — FLUCONAZOLE 200 MG/1
400 TABLET ORAL DAILY
Qty: 56 TAB | Refills: 5 | Status: SHIPPED | OUTPATIENT
Start: 2019-01-11 | End: 2019-04-09 | Stop reason: SDUPTHER

## 2019-01-08 RX ORDER — HEPARIN 100 UNIT/ML
SYRINGE INTRAVENOUS
Status: COMPLETED
Start: 2019-01-08 | End: 2019-01-08

## 2019-01-08 RX ORDER — SODIUM CHLORIDE 0.9 % (FLUSH) 0.9 %
10-40 SYRINGE (ML) INJECTION AS NEEDED
Status: DISCONTINUED | OUTPATIENT
Start: 2019-01-08 | End: 2019-01-12 | Stop reason: HOSPADM

## 2019-01-08 RX ORDER — HEPARIN 100 UNIT/ML
500 SYRINGE INTRAVENOUS AS NEEDED
Status: DISCONTINUED | OUTPATIENT
Start: 2019-01-08 | End: 2019-01-12 | Stop reason: HOSPADM

## 2019-01-08 RX ADMIN — Medication 20 ML: at 15:00

## 2019-01-08 RX ADMIN — SODIUM CHLORIDE 100 MG: 900 INJECTION, SOLUTION INTRAVENOUS at 13:32

## 2019-01-08 RX ADMIN — SODIUM CHLORIDE, PRESERVATIVE FREE 500 UNITS: 5 INJECTION INTRAVENOUS at 14:59

## 2019-01-08 RX ADMIN — SODIUM CHLORIDE, PRESERVATIVE FREE 500 UNITS: 5 INJECTION INTRAVENOUS at 15:00

## 2019-01-08 RX ADMIN — Medication 20 ML: at 13:32

## 2019-01-08 NOTE — TELEPHONE ENCOUNTER
Worsening CRP to 5.8 on January 7, 2019, from 2.4 on January 1, 2019. To complete 4 weeks of anidulafungin on January 10, 2019. Patient would benefit at this point in transitioning to oral fluconazole high-dose 400 mg daily for the next 6-12 months. He had a lengthy discussion again over the phone, similar to the discussion we had in her last encounter, about the risks of QT prolongation on high-dose fluconazole in the presence of methadone use. I have sent multiple messages to her methadone clinic for them to contact me so we can discuss lowering her dose to decrease this risk, without success of communication. The patient plans to discuss this with them. At this time she agrees to proceed with medication, with close EKG follow-up, and verbalizes complete understanding of the risks involved including a fatal arrhythmia. He is willing to take this risk, rather than hold off on medication as she also understands that this could cause progression of her infection including sepsis and death. She is adamant of stopping methadone, but is willing to discuss with methadone clinic to see if she can go on a lower dose. I have reviewed her EKG from November 2018, with normal QT interval, and plan to repeat a new EKG on her next clinic visit on January 17 of 2019 1 week after being on fluconazole therapy. She was advised to seek immediate care in case of any symptoms concerning for arrhythmia including dizziness, chest pain, palpitations among others.     Ivonne Bright MD

## 2019-01-08 NOTE — PROGRESS NOTES
[facility] Progress Note Date: 2019 Name: Jagdeep Westfall MRN: [id number] : 1960 Anidalofungin (Eraxis) Infusion Ms. Patrick Arnold to [place], ambulatory at 1325. No distress noted. Pt was assessed and education was provided. Ms. Lexie Luna vitals were reviewed. Visit Vitals /84 Pulse 67 Temp 97.9 °F (36.6 °C) Resp 18 SpO2 95% Right upper arm PICC flushed easily and had brisk blood return via both ports. Anidalofungin infused as ordered. PICC dressing clean,dry, and intact. Ms. Patrick Arnold tolerated infusion, and had no complaints. Both lumens of PICC flushed with NS 10 ml and Heparin 500 units each. Green end caps applied, and lumens wrapped with guaze and paper tape. Stockinette placed over dressing for protection. Patient armband removed and shredded. Ms. Patrick Arnold was discharged from [place] [address] in stable condition at 1500. She is to return on 19 at 0930 for her next antibiotic appointment. Geraldo Quintana RN 
2019 
1500

## 2019-01-09 ENCOUNTER — HOSPITAL ENCOUNTER (OUTPATIENT)
Dept: INFUSION THERAPY | Age: 59
Discharge: HOME | End: 2019-01-09
Payer: MEDICAID

## 2019-01-09 VITALS
HEART RATE: 70 BPM | RESPIRATION RATE: 18 BRPM | DIASTOLIC BLOOD PRESSURE: 78 MMHG | TEMPERATURE: 97.4 F | OXYGEN SATURATION: 94 % | SYSTOLIC BLOOD PRESSURE: 125 MMHG

## 2019-01-09 PROCEDURE — 74011000258 HC RX REV CODE- 258: Performed by: INTERNAL MEDICINE

## 2019-01-09 PROCEDURE — 74011250636 HC RX REV CODE- 250/636: Performed by: INTERNAL MEDICINE

## 2019-01-09 PROCEDURE — 96366 THER/PROPH/DIAG IV INF ADDON: CPT

## 2019-01-09 PROCEDURE — 96365 THER/PROPH/DIAG IV INF INIT: CPT

## 2019-01-09 PROCEDURE — 74011250636 HC RX REV CODE- 250/636

## 2019-01-09 RX ORDER — HEPARIN 100 UNIT/ML
SYRINGE INTRAVENOUS
Status: COMPLETED
Start: 2019-01-09 | End: 2019-01-09

## 2019-01-09 RX ORDER — SODIUM CHLORIDE 0.9 % (FLUSH) 0.9 %
10-40 SYRINGE (ML) INJECTION AS NEEDED
Status: DISCONTINUED | OUTPATIENT
Start: 2019-01-09 | End: 2019-01-13 | Stop reason: HOSPADM

## 2019-01-09 RX ORDER — HEPARIN 100 UNIT/ML
500 SYRINGE INTRAVENOUS AS NEEDED
Status: DISCONTINUED | OUTPATIENT
Start: 2019-01-09 | End: 2019-01-13 | Stop reason: HOSPADM

## 2019-01-09 RX ADMIN — Medication 10 ML: at 16:00

## 2019-01-09 RX ADMIN — Medication 10 ML: at 14:28

## 2019-01-09 RX ADMIN — SODIUM CHLORIDE 100 MG: 900 INJECTION, SOLUTION INTRAVENOUS at 14:30

## 2019-01-09 RX ADMIN — HEPARIN 500 UNITS: 100 SYRINGE at 16:02

## 2019-01-09 RX ADMIN — Medication 10 ML: at 16:03

## 2019-01-09 RX ADMIN — HEPARIN 500 UNITS: 100 SYRINGE at 16:04

## 2019-01-09 NOTE — PROGRESS NOTES
[facility] Progress Note Date: 2019 Name: Olvin Alvarez MRN: [id number] : 1960 Ambisome Infusion Ms. Catarina Vidal to [facility], ambulatory at 1425. Pain to back 4/10. Took pain medication prior [facility] admit. No acute distress noted Pt was assessed and education was provided. Ms. Sosa Lawrence vitals were reviewed. Visit Vitals /78 (BP 1 Location: Left arm, BP Patient Position: Sitting) Pulse 70 Temp 97.4 °F (36.3 °C) Resp 18 SpO2 94% Right upper arm PICC flushed easily and had brisk blood return via both ports. PICC dressing c/d/i and not due to be changed. No swelling, redness, streaking, warmth or drainage noted in arm. Pt denied c/o pain in arm. Anidulafungin 100 mg was infused for approximately 90 minutes as ordered via purple lumen. Ms. Catarina Vidal tolerated infusion, and had no complaints Ms. Catarina Vidal was discharged from [place] [address] in stable condition at 1605. She is to return on 1/10/19 at 56 for her next antibiotic appointment. Maria Elena Mesa RN 
2019

## 2019-01-10 ENCOUNTER — HOSPITAL ENCOUNTER (OUTPATIENT)
Age: 59
Discharge: HOME | End: 2019-01-10
Attending: INTERNAL MEDICINE
Payer: MEDICAID

## 2019-01-10 ENCOUNTER — HOSPITAL ENCOUNTER (OUTPATIENT)
Dept: INFUSION THERAPY | Age: 59
Discharge: HOME | End: 2019-01-10
Payer: MEDICAID

## 2019-01-10 VITALS
SYSTOLIC BLOOD PRESSURE: 170 MMHG | RESPIRATION RATE: 18 BRPM | OXYGEN SATURATION: 94 % | HEART RATE: 73 BPM | TEMPERATURE: 98.8 F | DIASTOLIC BLOOD PRESSURE: 94 MMHG

## 2019-01-10 DIAGNOSIS — M46.44 DISCITIS OF THORACIC REGION: ICD-10-CM

## 2019-01-10 DIAGNOSIS — M48.00 SPINAL STENOSIS, UNSPECIFIED SPINAL REGION: ICD-10-CM

## 2019-01-10 DIAGNOSIS — M46.40 DISCITIS, UNSPECIFIED SPINAL REGION: Primary | ICD-10-CM

## 2019-01-10 PROCEDURE — 74011250636 HC RX REV CODE- 250/636: Performed by: INTERNAL MEDICINE

## 2019-01-10 PROCEDURE — 96365 THER/PROPH/DIAG IV INF INIT: CPT

## 2019-01-10 PROCEDURE — A9577 INJ MULTIHANCE: HCPCS | Performed by: INTERNAL MEDICINE

## 2019-01-10 PROCEDURE — 96367 TX/PROPH/DG ADDL SEQ IV INF: CPT

## 2019-01-10 PROCEDURE — C1751 CATH, INF, PER/CENT/MIDLINE: HCPCS

## 2019-01-10 PROCEDURE — 74011636320 HC RX REV CODE- 636/320: Performed by: INTERNAL MEDICINE

## 2019-01-10 PROCEDURE — 96366 THER/PROPH/DIAG IV INF ADDON: CPT

## 2019-01-10 PROCEDURE — 74011000258 HC RX REV CODE- 258: Performed by: INTERNAL MEDICINE

## 2019-01-10 PROCEDURE — A9575 INJ GADOTERATE MEGLUMI 0.1ML: HCPCS | Performed by: INTERNAL MEDICINE

## 2019-01-10 PROCEDURE — 74011250636 HC RX REV CODE- 250/636

## 2019-01-10 PROCEDURE — 72157 MRI CHEST SPINE W/O & W/DYE: CPT

## 2019-01-10 RX ORDER — KETOROLAC TROMETHAMINE 10 MG/1
10 TABLET, FILM COATED ORAL
Qty: 20 TAB | Refills: 0 | Status: SHIPPED | OUTPATIENT
Start: 2019-01-10 | End: 2019-02-18 | Stop reason: ALTCHOICE

## 2019-01-10 RX ORDER — SODIUM CHLORIDE 0.9 % (FLUSH) 0.9 %
10-40 SYRINGE (ML) INJECTION AS NEEDED
Status: DISCONTINUED | OUTPATIENT
Start: 2019-01-10 | End: 2019-01-14 | Stop reason: HOSPADM

## 2019-01-10 RX ORDER — HEPARIN 100 UNIT/ML
500 SYRINGE INTRAVENOUS ONCE
Status: COMPLETED | OUTPATIENT
Start: 2019-01-10 | End: 2019-01-10

## 2019-01-10 RX ORDER — HEPARIN 100 UNIT/ML
SYRINGE INTRAVENOUS
Status: COMPLETED
Start: 2019-01-10 | End: 2019-01-10

## 2019-01-10 RX ORDER — PREDNISONE 20 MG/1
TABLET ORAL
Qty: 30 TAB | Refills: 0 | Status: SHIPPED | OUTPATIENT
Start: 2019-01-10 | End: 2019-02-18 | Stop reason: ALTCHOICE

## 2019-01-10 RX ADMIN — HEPARIN 500 UNITS: 100 SYRINGE at 13:13

## 2019-01-10 RX ADMIN — SODIUM CHLORIDE 100 MG: 900 INJECTION, SOLUTION INTRAVENOUS at 10:35

## 2019-01-10 RX ADMIN — Medication 10 ML: at 11:59

## 2019-01-10 RX ADMIN — Medication 10 ML: at 12:43

## 2019-01-10 RX ADMIN — Medication 10 ML: at 13:13

## 2019-01-10 RX ADMIN — Medication 10 ML: at 10:35

## 2019-01-10 RX ADMIN — GADOTERATE MEGLUMINE 20 ML: 376.9 INJECTION INTRAVENOUS at 10:00

## 2019-01-10 RX ADMIN — SODIUM CHLORIDE 1 G: 900 INJECTION INTRAVENOUS at 12:43

## 2019-01-10 NOTE — PROGRESS NOTES
MRI done today of thoracic spine, reviewed with patient. Read: \"Worsening discitis/osteomyelitis process at T7-T8, with  circumferential epidural enhancement and posterior soft tissue enhancement  No focal abscess. Also developing fragmentation and compression fracture in C7,followed by T8, with retropulsion of T7 fragment into the central canal, causing central stenosis and mild cord displacement but no significant cord compression  or edema. Osteomyelitis now also involves the posterior elements of T7 and T8, including spinous processes. Developing thoracic kyphosis. 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 S/p daptomycin and ceftriaxone from 11/15/18 to 12/11/18 (+-4 weeks), followed by ambisome from 12/13/18 to 12/17/18. Stopped due to increased creatinine to 1.85. Followed by anidalofungin 100 mg IV daily until Sigifredo 10, 2019 (4 weeks today). Patient was not on fluconazol during this time due to concerns of QT prolongation due to interaction with high dose methadone. Patient was explained that Candida parapsilosis might not respond as hoped for to anidalofungin, and if that is the case we would have to transition her to fluconazol and have her discuss decreased dose of methadone with methadone clinic to decrease risk of interactions. She is aware that high dose fluconazol and methadone is a high risk for arrhythmia, and she is willing to take the risk while hoping to adjust her methadone dose. We plan to do close follow up with EKGs in clinic. I also plan to start her on meropenem, given h/o MRSE on bone cultures and broader spectrum given her MRI findings. If not improving on both therapies (fluconazol and ertapenem) may consider adding again MRSA coverage. I have also ordered prednisone course due to spinal inflammation and severe pain, along with toradol.      Prabhakar Hernandez MD

## 2019-01-10 NOTE — PROGRESS NOTES
[facility] Progress Note Date: January 10, 2019 Name: Javid Stephens MRN: [id number] : 1960 Ambisome Infusion/ [facility] Ms. Matute to [facility], ambulatory at 1030. Pain to back 5/10. Pt states she has has an MRI this morning which laying on the MRI table has made her pain worse. Pt was assessed and education was provided. Ms. Flor Albert vitals were reviewed. Visit Vitals BP (!) 170/94 (BP 1 Location: Left arm, BP Patient Position: Sitting) Pulse 73 Temp 98.8 °F (37.1 °C) Resp 18 SpO2 94% Right upper arm PICC flushed easily and had brisk blood return via both ports. PICC dressing due to be changed today. Dressing changed per protocol, new stat lock placed, end caps changed and patient will be given a new stockinette to place over dressing for protection. Anidulafungin 100 mg was infused for approximately 90 minutes as ordered via purple lumen. At the completion of the infusion,  called and stated that the MRI showed the patient's infection was worsening and would like to start her on Invanz. Orders were received. [facility] given to the patient and her questions were answered. Invanz 1G infused over 30 minutes via the red lumen. Both lines flushed with NS and heparin per protocol. Curos caps placed on the ends of the lumens. Lines wrapped with coban. Stockinette placed over dressing for protection. Ms. Rajat Gamez tolerated infusion, and had no complaints Ms. Rajat Gamez was discharged from [place] [address] in stable condition at 1320. She is to return on 19 at 1100 for her next antibiotic appointment. Jacinto Bullock RN 
January 10, 2019

## 2019-01-11 ENCOUNTER — TELEPHONE (OUTPATIENT)
Dept: INTERNAL MEDICINE CLINIC | Age: 59
End: 2019-01-11

## 2019-01-11 ENCOUNTER — HOSPITAL ENCOUNTER (OUTPATIENT)
Dept: INFUSION THERAPY | Age: 59
Discharge: HOME | End: 2019-01-11
Payer: MEDICAID

## 2019-01-11 VITALS
HEART RATE: 76 BPM | DIASTOLIC BLOOD PRESSURE: 82 MMHG | RESPIRATION RATE: 18 BRPM | SYSTOLIC BLOOD PRESSURE: 131 MMHG | TEMPERATURE: 98.9 F | OXYGEN SATURATION: 92 %

## 2019-01-11 PROCEDURE — 96365 THER/PROPH/DIAG IV INF INIT: CPT

## 2019-01-11 PROCEDURE — 74011250636 HC RX REV CODE- 250/636

## 2019-01-11 PROCEDURE — 74011250636 HC RX REV CODE- 250/636: Performed by: INTERNAL MEDICINE

## 2019-01-11 PROCEDURE — 74011000258 HC RX REV CODE- 258: Performed by: INTERNAL MEDICINE

## 2019-01-11 RX ORDER — HEPARIN 100 UNIT/ML
SYRINGE INTRAVENOUS
Status: COMPLETED
Start: 2019-01-11 | End: 2019-01-11

## 2019-01-11 RX ORDER — HEPARIN 100 UNIT/ML
500 SYRINGE INTRAVENOUS AS NEEDED
Status: DISCONTINUED | OUTPATIENT
Start: 2019-01-11 | End: 2019-01-15 | Stop reason: HOSPADM

## 2019-01-11 RX ORDER — SODIUM CHLORIDE 0.9 % (FLUSH) 0.9 %
10-40 SYRINGE (ML) INJECTION AS NEEDED
Status: DISCONTINUED | OUTPATIENT
Start: 2019-01-11 | End: 2019-01-15 | Stop reason: HOSPADM

## 2019-01-11 RX ADMIN — HEPARIN 1000 UNITS: 100 SYRINGE at 11:59

## 2019-01-11 RX ADMIN — SODIUM CHLORIDE 1 G: 900 INJECTION INTRAVENOUS at 11:12

## 2019-01-11 RX ADMIN — Medication 20 ML: at 11:11

## 2019-01-11 RX ADMIN — Medication 10 ML: at 11:59

## 2019-01-11 NOTE — PROGRESS NOTES
[facility] Progress Note Date: 2019 Name: Melvina Suero MRN: [id number] : 1960 Joan Ramirez Ms. Matute to [facility], ambulatory at [id number]. Pain to back 5/10. Pt states she has had 3 episodes of vomiting and 2 episodes of loose stool and her face was red. Called Dr. Aleta Maravilla about patient's symptoms and stated to treat patient today and if she has any complications after she goes home to call and notify center. Pt was assessed and education was provided. Ms. Roman Logan vitals were reviewed. Visit Vitals /82 (BP 1 Location: Left arm, BP Patient Position: At rest;Sitting) Pulse 76 Temp 98.9 °F (37.2 °C) Resp 18 SpO2 92% Right upper arm PICC flushed easily and had brisk blood return via both ports. Invanz 1G infused over 30 minutes via the purple lumen. Both lines flushed with NS and heparin per protocol. Curos caps placed on the ends of the lumens. Lines wrapped with coban. Stockinette placed over dressing for protection. Ms. Peyton Joshi tolerated infusion, and had no complaints Ms. Peyton Joshi was discharged from [place] [address] in stable condition at 1200. She is to return on 19 at 0830 for her next antibiotic appointment. Tasha Bernabe RN 
2019 
4106

## 2019-01-11 NOTE — TELEPHONE ENCOUNTER
Spoke with [facility] nurse, per dr Noé Christy, nurse was instructed to try Ertapenem one more time today and see if she does ok.  If not we will have to change abx     (of note, yesterday pt got first dose of ertapenem AND Eraxis, unsure if this was a reaction to the combination of these 2 meds)

## 2019-01-11 NOTE — TELEPHONE ENCOUNTER
Patient states after the infusion yesterday she had diarrhea and redness in the face last night. Patient denies any difficulty swallowing, swelling of the face, or SOB. Patient was told by infusion center to call DR. Denise Mitchell. Patient advised that if she develops any symptoms that she denied to call 911. Patient verbalizes understanding.

## 2019-01-12 ENCOUNTER — HOSPITAL ENCOUNTER (OUTPATIENT)
Dept: INFUSION THERAPY | Age: 59
Discharge: HOME | End: 2019-01-12
Payer: MEDICAID

## 2019-01-12 VITALS
HEART RATE: 66 BPM | DIASTOLIC BLOOD PRESSURE: 84 MMHG | RESPIRATION RATE: 16 BRPM | SYSTOLIC BLOOD PRESSURE: 136 MMHG | TEMPERATURE: 98.4 F

## 2019-01-12 PROCEDURE — 74011000258 HC RX REV CODE- 258: Performed by: INTERNAL MEDICINE

## 2019-01-12 PROCEDURE — 74011250636 HC RX REV CODE- 250/636: Performed by: INTERNAL MEDICINE

## 2019-01-12 PROCEDURE — 74011250636 HC RX REV CODE- 250/636

## 2019-01-12 PROCEDURE — 96365 THER/PROPH/DIAG IV INF INIT: CPT

## 2019-01-12 RX ORDER — HEPARIN 100 UNIT/ML
SYRINGE INTRAVENOUS
Status: COMPLETED
Start: 2019-01-12 | End: 2019-01-12

## 2019-01-12 RX ORDER — HEPARIN 100 UNIT/ML
500 SYRINGE INTRAVENOUS AS NEEDED
Status: DISCONTINUED | OUTPATIENT
Start: 2019-01-12 | End: 2019-01-15 | Stop reason: HOSPADM

## 2019-01-12 RX ORDER — HEPARIN 100 UNIT/ML
500 SYRINGE INTRAVENOUS ONCE
Status: DISCONTINUED | OUTPATIENT
Start: 2019-01-12 | End: 2019-01-12 | Stop reason: CLARIF

## 2019-01-12 RX ORDER — SODIUM CHLORIDE 0.9 % (FLUSH) 0.9 %
10-40 SYRINGE (ML) INJECTION AS NEEDED
Status: DISCONTINUED | OUTPATIENT
Start: 2019-01-12 | End: 2019-01-15 | Stop reason: HOSPADM

## 2019-01-12 RX ADMIN — HEPARIN 500 UNITS: 100 SYRINGE at 09:13

## 2019-01-12 RX ADMIN — Medication 10 ML: at 08:38

## 2019-01-12 RX ADMIN — SODIUM CHLORIDE 1 G: 900 INJECTION INTRAVENOUS at 08:38

## 2019-01-12 RX ADMIN — Medication 30 ML: at 09:11

## 2019-01-12 RX ADMIN — HEPARIN 500 UNITS: 100 SYRINGE at 09:14

## 2019-01-12 RX ADMIN — Medication 10 ML: at 09:14

## 2019-01-12 NOTE — PROGRESS NOTES
[facility] Progress Note Date: 2019 Name: Colletta Kite MRN: [id number] : 1960 Ms. Sebastian Lott was assessed and education was provided. Ms. Tamez Braeden vitals were reviewed and patient was observed for 5 minutes prior to treatment. Visit Vitals /84 (BP 1 Location: Left arm, BP Patient Position: At rest;Sitting) Pulse 66 Temp 98.4 °F (36.9 °C) Resp 16 Ertapenum 1 gram IV given via PICC line after brisk blood return obtained. Ms. Sebastian Lott tolerated the infusion, and had no complaints. Patient armband removed and shredded. Ms. Sebastian Lott was discharged from [place] [address] in stable condition at [place] [address]. She is to return on 19 at 0830 for her next appointment for daily antibiotic. Garfield Dixon RN 
2019

## 2019-01-13 ENCOUNTER — HOSPITAL ENCOUNTER (OUTPATIENT)
Dept: INFUSION THERAPY | Age: 59
Discharge: HOME | End: 2019-01-13
Payer: MEDICAID

## 2019-01-13 VITALS
DIASTOLIC BLOOD PRESSURE: 79 MMHG | TEMPERATURE: 98.4 F | RESPIRATION RATE: 16 BRPM | HEART RATE: 68 BPM | SYSTOLIC BLOOD PRESSURE: 134 MMHG

## 2019-01-13 PROCEDURE — 74011000258 HC RX REV CODE- 258: Performed by: INTERNAL MEDICINE

## 2019-01-13 PROCEDURE — 96365 THER/PROPH/DIAG IV INF INIT: CPT

## 2019-01-13 PROCEDURE — 74011250636 HC RX REV CODE- 250/636: Performed by: INTERNAL MEDICINE

## 2019-01-13 PROCEDURE — 74011250636 HC RX REV CODE- 250/636

## 2019-01-13 RX ORDER — HEPARIN 100 UNIT/ML
SYRINGE INTRAVENOUS
Status: COMPLETED
Start: 2019-01-13 | End: 2019-01-13

## 2019-01-13 RX ORDER — SODIUM CHLORIDE 0.9 % (FLUSH) 0.9 %
10-40 SYRINGE (ML) INJECTION AS NEEDED
Status: DISCONTINUED | OUTPATIENT
Start: 2019-01-13 | End: 2019-01-17 | Stop reason: HOSPADM

## 2019-01-13 RX ORDER — HEPARIN 100 UNIT/ML
500 SYRINGE INTRAVENOUS AS NEEDED
Status: DISCONTINUED | OUTPATIENT
Start: 2019-01-13 | End: 2019-01-17 | Stop reason: HOSPADM

## 2019-01-13 RX ADMIN — HEPARIN 500 UNITS: 100 SYRINGE at 09:06

## 2019-01-13 RX ADMIN — Medication 10 ML: at 09:07

## 2019-01-13 RX ADMIN — Medication 10 ML: at 09:08

## 2019-01-13 RX ADMIN — Medication 20 ML: at 09:06

## 2019-01-13 RX ADMIN — Medication 10 ML: at 08:33

## 2019-01-13 RX ADMIN — SODIUM CHLORIDE 1 G: 900 INJECTION INTRAVENOUS at 08:34

## 2019-01-13 NOTE — PROGRESS NOTES
[facility] Progress Note Date: 2019 Name: Nikhil Garber MRN: [id number] : 1960 Ms. Nazanin Simmons was assessed and education was provided. Ms. Joanne Zamarripa vitals were reviewed and patient was observed for 5 minutes prior to treatment. Visit Vitals /79 (BP 1 Location: Left arm, BP Patient Position: Sitting) Pulse 68 Temp 98.4 °F (36.9 °C) Resp 16 Ertapenum 1 gram IV given via PICC line after brisk blood return obtained. Ms. Nazanin Simmons tolerated the infusion, and had no complaints. Patient armband removed and shredded. Ms. Nazanin Simmons was discharged from [place] [address] in stable condition at 0910. She is to return on 19 at 1300 for her next appointment for daily antibiotic. Anjana Fagan RN 
2019

## 2019-01-14 ENCOUNTER — HOSPITAL ENCOUNTER (OUTPATIENT)
Dept: INFUSION THERAPY | Age: 59
Discharge: HOME | End: 2019-01-14
Payer: MEDICAID

## 2019-01-14 VITALS
HEART RATE: 67 BPM | OXYGEN SATURATION: 96 % | DIASTOLIC BLOOD PRESSURE: 95 MMHG | TEMPERATURE: 97.9 F | RESPIRATION RATE: 18 BRPM | SYSTOLIC BLOOD PRESSURE: 177 MMHG

## 2019-01-14 LAB
ANION GAP SERPL CALC-SCNC: 5 MMOL/L (ref 3–18)
BASO+EOS+MONOS # BLD AUTO: 0.3 K/UL (ref 0–2.3)
BASO+EOS+MONOS NFR BLD AUTO: 5 % (ref 0.1–17)
BUN SERPL-MCNC: 19 MG/DL (ref 7–18)
BUN/CREAT SERPL: 27 (ref 12–20)
CALCIUM SERPL-MCNC: 9 MG/DL (ref 8.5–10.1)
CHLORIDE SERPL-SCNC: 100 MMOL/L (ref 100–108)
CO2 SERPL-SCNC: 30 MMOL/L (ref 21–32)
CREAT SERPL-MCNC: 0.71 MG/DL (ref 0.6–1.3)
CRP SERPL-MCNC: 0.4 MG/DL (ref 0–0.3)
DIFFERENTIAL METHOD BLD: ABNORMAL
ERYTHROCYTE [DISTWIDTH] IN BLOOD BY AUTOMATED COUNT: 14.5 % (ref 11.5–14.5)
GLUCOSE SERPL-MCNC: 138 MG/DL (ref 74–99)
HCT VFR BLD AUTO: 39.9 % (ref 36–48)
HGB BLD-MCNC: 13.1 G/DL (ref 12–16)
LYMPHOCYTES # BLD: 1 K/UL (ref 1.1–5.9)
LYMPHOCYTES NFR BLD: 16 % (ref 14–44)
MCH RBC QN AUTO: 31.4 PG (ref 25–35)
MCHC RBC AUTO-ENTMCNC: 32.8 G/DL (ref 31–37)
MCV RBC AUTO: 95.7 FL (ref 78–102)
NEUTS SEG # BLD: 4.8 K/UL (ref 1.8–9.5)
NEUTS SEG NFR BLD: 79 % (ref 40–70)
PLATELET # BLD AUTO: 258 K/UL (ref 140–440)
POTASSIUM SERPL-SCNC: 4.1 MMOL/L (ref 3.5–5.5)
RBC # BLD AUTO: 4.17 M/UL (ref 4.1–5.1)
SODIUM SERPL-SCNC: 135 MMOL/L (ref 136–145)
WBC # BLD AUTO: 6.1 K/UL (ref 4.5–13)

## 2019-01-14 PROCEDURE — 80048 BASIC METABOLIC PNL TOTAL CA: CPT

## 2019-01-14 PROCEDURE — 74011250636 HC RX REV CODE- 250/636: Performed by: INTERNAL MEDICINE

## 2019-01-14 PROCEDURE — 74011000258 HC RX REV CODE- 258: Performed by: INTERNAL MEDICINE

## 2019-01-14 PROCEDURE — 96365 THER/PROPH/DIAG IV INF INIT: CPT

## 2019-01-14 PROCEDURE — 86140 C-REACTIVE PROTEIN: CPT

## 2019-01-14 PROCEDURE — 85025 COMPLETE CBC W/AUTO DIFF WBC: CPT

## 2019-01-14 RX ORDER — HEPARIN 100 UNIT/ML
500 SYRINGE INTRAVENOUS AS NEEDED
Status: DISCONTINUED | OUTPATIENT
Start: 2019-01-14 | End: 2019-01-18 | Stop reason: HOSPADM

## 2019-01-14 RX ORDER — SODIUM CHLORIDE 0.9 % (FLUSH) 0.9 %
10-40 SYRINGE (ML) INJECTION AS NEEDED
Status: DISCONTINUED | OUTPATIENT
Start: 2019-01-14 | End: 2019-01-18 | Stop reason: HOSPADM

## 2019-01-14 RX ADMIN — Medication 500 UNITS: at 14:04

## 2019-01-14 RX ADMIN — Medication 30 ML: at 14:03

## 2019-01-14 RX ADMIN — SODIUM CHLORIDE 1 G: 900 INJECTION INTRAVENOUS at 13:25

## 2019-01-14 RX ADMIN — Medication 30 ML: at 13:20

## 2019-01-14 NOTE — PROGRESS NOTES
[facility] Progress Note Date: 2019 Name: Marie Lancaster MRN: [id number] : 1960 Daily Invanz infusion w/ labs today Ms. Jatinder Rooney was assessed and education was provided. Ms. Jigar Cta vitals were reviewed. Visit Vitals BP (!) 177/95 (BP 1 Location: Left arm, BP Patient Position: Sitting;Post activity) Pulse 67 Temp 97.9 °F (36.6 °C) Resp 18 SpO2 96% Good blood return obtained from each lumen of PICC line in right upper arm. Labs (CBC, BMP, CRP) were drawn from red lumenafter 8 ml discard, followed with 20 ml NS flush. Purple lumen flushed with 10 ml NS. Lab results were obtained and reviewed. Recent Results (from the past 12 hour(s)) CBC WITH 3 PART DIFF Collection Time: 19  1:20 PM  
Result Value Ref Range WBC 6.1 4.5 - 13.0 K/uL  
 RBC 4.17 4.10 - 5.10 M/uL  
 HGB 13.1 12.0 - 16 g/dL HCT 39.9 36 - 48 % MCV 95.7 78 - 102 FL  
 MCH 31.4 25.0 - 35.0 PG  
 MCHC 32.8 31 - 37 g/dL  
 RDW 14.5 11.5 - 14.5 % PLATELET 819 401 - 675 K/uL NEUTROPHILS 79 (H) 40 - 70 % MIXED CELLS 5 0.1 - 17 % LYMPHOCYTES 16 14 - 44 % ABS. NEUTROPHILS 4.8 1.8 - 9.5 K/UL  
 ABS. MIXED CELLS 0.3 0.0 - 2.3 K/uL  
 ABS. LYMPHOCYTES 1.0 (L) 1.1 - 5.9 K/UL  
 DF AUTOMATED []  Vancomycin  
  [x]  Invanz 1 gram/50 ml via red lumen  
  []  Cubicin  
  []  Rocephin  
 was infused at 100 ml/hr, followed with 20 ml NS flush, purple lumen flushed with 10 ml NS. Each lumen flushed witht 2.5 ml of 100 units/ml Heparin. New Curos caps placed on each lumen, then lumens wrapped and secured. Ms. Jatinder Rooney tolerated infusion, and had no complaints at this time. Patient armband removed and shredded. Ms. Jatinder Rooney was discharged from [place] [address] in stable condition at 1405. She is to return on 1/15/2019 at 1500 for her next appointment for Ivanz infusion. Ronold Kanner Jonothan Lightning, RN 
2019 
3:33 PM

## 2019-01-15 ENCOUNTER — HOSPITAL ENCOUNTER (OUTPATIENT)
Dept: INFUSION THERAPY | Age: 59
Discharge: HOME | End: 2019-01-15
Payer: MEDICAID

## 2019-01-15 VITALS
DIASTOLIC BLOOD PRESSURE: 94 MMHG | TEMPERATURE: 98.8 F | SYSTOLIC BLOOD PRESSURE: 185 MMHG | RESPIRATION RATE: 18 BRPM | OXYGEN SATURATION: 92 % | HEART RATE: 72 BPM

## 2019-01-15 PROCEDURE — 74011250636 HC RX REV CODE- 250/636

## 2019-01-15 PROCEDURE — 74011250636 HC RX REV CODE- 250/636: Performed by: INTERNAL MEDICINE

## 2019-01-15 PROCEDURE — 96365 THER/PROPH/DIAG IV INF INIT: CPT

## 2019-01-15 PROCEDURE — 74011000258 HC RX REV CODE- 258: Performed by: INTERNAL MEDICINE

## 2019-01-15 RX ORDER — HEPARIN 100 UNIT/ML
500 SYRINGE INTRAVENOUS ONCE
Status: COMPLETED | OUTPATIENT
Start: 2019-01-15 | End: 2019-01-15

## 2019-01-15 RX ORDER — SODIUM CHLORIDE 0.9 % (FLUSH) 0.9 %
10-40 SYRINGE (ML) INJECTION AS NEEDED
Status: DISCONTINUED | OUTPATIENT
Start: 2019-01-15 | End: 2019-01-19 | Stop reason: HOSPADM

## 2019-01-15 RX ORDER — HEPARIN 100 UNIT/ML
SYRINGE INTRAVENOUS
Status: COMPLETED
Start: 2019-01-15 | End: 2019-01-15

## 2019-01-15 RX ADMIN — SODIUM CHLORIDE 1 G: 900 INJECTION INTRAVENOUS at 15:04

## 2019-01-15 RX ADMIN — HEPARIN 500 UNITS: 100 SYRINGE at 15:38

## 2019-01-15 RX ADMIN — Medication 20 ML: at 15:04

## 2019-01-15 RX ADMIN — Medication 10 ML: at 15:38

## 2019-01-15 NOTE — PROGRESS NOTES
[facility] Progress Note Date: January 15, 2019 Name: Vannessa Huggins MRN: [id number] : 1960 Daily Invanz infusion Ms. Edwin Ramírez was assessed and education was provided. Pt arrived ambulatory to [facility] today at 1500. Pain is 1/10. Ms. Heard Cea vitals were reviewed. Visit Vitals BP (!) 185/94 (BP 1 Location: Left arm, BP Patient Position: Sitting) Pulse 72 Temp 98.8 °F (37.1 °C) Resp 18 SpO2 92% PICC line to right upper arm. Both lumens flushed without difficulty and positive for blood return.  
 
  []  Vancomycin  
  [x]  Invanz 1 gram/50 ml via red lumen  
  []  Cubicin  
  []  Rocephin  
 was infused over 30 minutes as ordered. Both lumens flushed with 10mL  units of heparin. Curos caps applied and stockinette placed over dressing for protection. Ms. Edwin Ramírez tolerated infusion, and had no complaints at this time. Patient armband removed and shredded. Ms. Edwin Ramírez was discharged from [place] [address] in stable condition at 1540. She is to return on 2019 at 1500 for her next appointment for Ivanz infusion. Odalys Farr RN 
January 15, 2019

## 2019-01-16 ENCOUNTER — HOSPITAL ENCOUNTER (OUTPATIENT)
Dept: INFUSION THERAPY | Age: 59
Discharge: HOME | End: 2019-01-16
Payer: MEDICAID

## 2019-01-16 VITALS
OXYGEN SATURATION: 94 % | HEART RATE: 71 BPM | RESPIRATION RATE: 18 BRPM | DIASTOLIC BLOOD PRESSURE: 97 MMHG | SYSTOLIC BLOOD PRESSURE: 188 MMHG | TEMPERATURE: 98.2 F

## 2019-01-16 PROCEDURE — 74011000258 HC RX REV CODE- 258: Performed by: INTERNAL MEDICINE

## 2019-01-16 PROCEDURE — 74011250636 HC RX REV CODE- 250/636: Performed by: INTERNAL MEDICINE

## 2019-01-16 PROCEDURE — 96365 THER/PROPH/DIAG IV INF INIT: CPT

## 2019-01-16 PROCEDURE — 74011250636 HC RX REV CODE- 250/636

## 2019-01-16 RX ORDER — SODIUM CHLORIDE 0.9 % (FLUSH) 0.9 %
10-40 SYRINGE (ML) INJECTION AS NEEDED
Status: DISCONTINUED | OUTPATIENT
Start: 2019-01-16 | End: 2019-01-20 | Stop reason: HOSPADM

## 2019-01-16 RX ORDER — HEPARIN 100 UNIT/ML
SYRINGE INTRAVENOUS
Status: COMPLETED
Start: 2019-01-16 | End: 2019-01-16

## 2019-01-16 RX ORDER — HEPARIN 100 UNIT/ML
500 SYRINGE INTRAVENOUS ONCE
Status: COMPLETED | OUTPATIENT
Start: 2019-01-16 | End: 2019-01-16

## 2019-01-16 RX ADMIN — Medication 500 UNITS: at 15:56

## 2019-01-16 RX ADMIN — HEPARIN 500 UNITS: 100 SYRINGE at 15:56

## 2019-01-16 RX ADMIN — Medication 10 ML: at 15:56

## 2019-01-16 RX ADMIN — Medication 10 ML: at 15:19

## 2019-01-16 RX ADMIN — SODIUM CHLORIDE 1 G: 900 INJECTION INTRAVENOUS at 15:24

## 2019-01-16 NOTE — PROGRESS NOTES
[facility] Progress Note Date: 2019 Name: Jim Wilde MRN: [id number] : 1960 Daily Invanz infusion Ms. Nancy Hurley was assessed and education was provided. Pt arrived ambulatory to [facility] today at 1515. Pain is 1/10. Ms. Lucio Pi vitals were reviewed. Visit Vitals BP (!) 188/97 (BP 1 Location: Left arm, BP Patient Position: Sitting) Pulse 71 Temp 98.2 °F (36.8 °C) Resp 18 SpO2 94% PICC line to right upper arm. Both lumens flushed without difficulty and positive for blood return.  
 
  []  Vancomycin  
  [x]  Invanz 1 gram/50 ml via purple lumen  
  []  Cubicin  
  []  Rocephin  
 was infused over 30 minutes as ordered. Both lumens flushed with 10mL  units of heparin. Curos caps applied and stockinette placed over dressing for protection. Ms. Nancy Hurley tolerated infusion, and had no complaints at this time. Patient armband removed and shredded. Ms. Nancy Hurley was discharged from [place] [address] in stable condition at 1600. She is to return on 2019 at 1500 for her next appointment for Ivanz infusion. Jannette Benson 
2019

## 2019-01-17 ENCOUNTER — OFFICE VISIT (OUTPATIENT)
Dept: INTERNAL MEDICINE CLINIC | Age: 59
End: 2019-01-17

## 2019-01-17 ENCOUNTER — HOSPITAL ENCOUNTER (OUTPATIENT)
Dept: INFUSION THERAPY | Age: 59
Discharge: HOME | End: 2019-01-17
Payer: MEDICAID

## 2019-01-17 VITALS
WEIGHT: 203 LBS | SYSTOLIC BLOOD PRESSURE: 150 MMHG | BODY MASS INDEX: 34.66 KG/M2 | TEMPERATURE: 99.5 F | RESPIRATION RATE: 20 BRPM | DIASTOLIC BLOOD PRESSURE: 90 MMHG | OXYGEN SATURATION: 99 % | HEART RATE: 77 BPM | HEIGHT: 64 IN

## 2019-01-17 VITALS
RESPIRATION RATE: 20 BRPM | HEART RATE: 74 BPM | DIASTOLIC BLOOD PRESSURE: 90 MMHG | SYSTOLIC BLOOD PRESSURE: 163 MMHG | TEMPERATURE: 98 F | OXYGEN SATURATION: 95 %

## 2019-01-17 DIAGNOSIS — M46.44 DISCITIS OF THORACIC REGION: Primary | ICD-10-CM

## 2019-01-17 DIAGNOSIS — T50.905A ADVERSE EFFECT OF DRUG, INITIAL ENCOUNTER: ICD-10-CM

## 2019-01-17 PROCEDURE — 74011250636 HC RX REV CODE- 250/636

## 2019-01-17 PROCEDURE — 74011000258 HC RX REV CODE- 258: Performed by: INTERNAL MEDICINE

## 2019-01-17 PROCEDURE — 74011250636 HC RX REV CODE- 250/636: Performed by: INTERNAL MEDICINE

## 2019-01-17 PROCEDURE — C1751 CATH, INF, PER/CENT/MIDLINE: HCPCS

## 2019-01-17 PROCEDURE — 96365 THER/PROPH/DIAG IV INF INIT: CPT

## 2019-01-17 RX ORDER — HEPARIN 100 UNIT/ML
500 SYRINGE INTRAVENOUS AS NEEDED
Status: DISCONTINUED | OUTPATIENT
Start: 2019-01-17 | End: 2019-01-21 | Stop reason: HOSPADM

## 2019-01-17 RX ORDER — SODIUM CHLORIDE 0.9 % (FLUSH) 0.9 %
10-40 SYRINGE (ML) INJECTION AS NEEDED
Status: DISCONTINUED | OUTPATIENT
Start: 2019-01-17 | End: 2019-01-21 | Stop reason: HOSPADM

## 2019-01-17 RX ORDER — HEPARIN 100 UNIT/ML
SYRINGE INTRAVENOUS
Status: COMPLETED
Start: 2019-01-17 | End: 2019-01-17

## 2019-01-17 RX ADMIN — SODIUM CHLORIDE 1 G: 900 INJECTION INTRAVENOUS at 15:09

## 2019-01-17 RX ADMIN — SODIUM CHLORIDE, PRESERVATIVE FREE 500 UNITS: 5 INJECTION INTRAVENOUS at 15:40

## 2019-01-17 RX ADMIN — Medication 10 ML: at 15:41

## 2019-01-17 RX ADMIN — SODIUM CHLORIDE, PRESERVATIVE FREE 500 UNITS: 5 INJECTION INTRAVENOUS at 15:41

## 2019-01-17 RX ADMIN — Medication 10 ML: at 15:40

## 2019-01-17 RX ADMIN — Medication 10 ML: at 15:09

## 2019-01-17 NOTE — PROGRESS NOTES
Coordination of Care Questionaire:   1. Have you been to the ER, urgent care clinic since your last visit? Hospitalized since your last visit? NO    2. Have you seen or consulted any other health care providers outside of the [address] [place] [address] since your last visit? Include any pap smears or colon screening. NO    Advanced Directive:   1. Do you have an Advanced Directive? NO    2. Would you like information on Advanced Directives?  NO

## 2019-01-17 NOTE — PROGRESS NOTES
[facility] Progress Note Date: 2019 Name: Colletta Kite MRN: [id number] : 1960 [facility] Ms. Matute to [facility], ambulatory at 1500. Pt was assessed and education was provided. Ms. Dashawn Deras vitals were reviewed. Visit Vitals /90 (BP 1 Location: Left arm, BP Patient Position: Sitting) Pulse 74 Temp 98 °F (36.7 °C) Resp 20 SpO2 95% Right upper arm PICC flushed easily and had brisk blood return via both ports. PICC dressing due to be changed today. Dressing changed per protocol, new stat lock placed, end caps changed and patient will be given a new stockinette to place over dressing for protection. Invanz 1G infused over 30 minutes via the purple lumen. Both lines flushed with NS and heparin per protocol. Curos caps placed on the ends of the lumens. Lines wrapped with coban. Stockinette placed over dressing for protection. Ms. Sebastian Lott tolerated infusion, and had no complaints Ms. Sebastian Lott was discharged from [place] [address] in stable condition at [phone]. She is to return on 19 at 1100 for her next antibiotic appointment. Charan Maya RN 
2019 
[id number]

## 2019-01-17 NOTE — PROGRESS NOTES
MARIA EUGENIA Frazier is a 63 y.o. female with relevant past medical depression/anxiety, history of IVDU now on methadone clinic, h/o Hep C infection s/p treatment with a pangenotypic agent (Anastasiia Octavio) around Sept- Nov, 2018, severe obesity, HIV status neg (11/13/18) here for outpatient ID follow up of thoracic discitis/osteomyelitis. The patient apparently presented with progressively worse abdominal/thoracic back pain since around Oct, 2018, but no fever, chills, malaise, night sweats. No recent trauma, falls. Last IVDU a few months ago, unclear dates. She was seen in the ED on 11/7/18 and had a CTA to rule out PE, that showed multiple pulmonary nodules, suspected discitis at T7-8, hepatosteatosis and hepatomegaly with mild splenomegaly. Multiple nonspecific periportal slight enlarged nodules. She was discharged to continue outpatient work up. She returned to the ED on 11/1/18 due to persistent pain, associated with nausea and vomiting, an MRI of her thoracic spine was done on11/11/18 that showed significant soft tissue and osseous inflammation centered at the T7-8 disc level  and pronounced circumferential prevertebral and epidural inflammation centered at this level most suggestive of discitis osteomyelitis. No peripheral enhancing collection to suggest abscess. Mild compromise of the central canal at the level of T7-8. On 11/15/18 IR performs T7-8 biopsy. BC on 11/11/18 neg x , UC neg  Disc biopsy culture from 11/12/18  And 11/15/18: Candida parapsilosis. Bone cultures from 11/15/18 also grew MRSE. This results apparently were not available at the time of discharge. Fungal and AFB cultures neg from bone. The patient was initially treated with ceftriaxone and vancomycin. She was discharged on ceftriaxone and daptomycin to complete around 12/29/18 with out patient ID follow up for further recommendations.    The patient was seen again in the ED on 11/15/18 for persistent severe pain and was recommended to continue with treatment as recommended from discharge. The patient completed daptomycin and ceftriaxone from 11/15/18 to 12/11/18 (+-4 weeks), followed by ambisome from 12/13/18 to 12/17/18. Stopped due to increased creatinine to 1.85. Followed by anidalofungin 100 mg IV daily until Sigifredo 10, 2019 (4 weeks), changed to fluconazol 1/11/19 despite interaction with Methadone, due to patient not improving on anidalofungin. MRI on 1/10/19 reports: Worsening discitis/osteomyelitis process at T7-T8, with circumferential epidural enhancement and posterior soft tissue enhancement  No focal abscess. Also developing fragmentation and compression fracture in T7,  followed by T8, with retropulsion of T7 fragment into the central canal, causing central stenosis and mild cord displacement but no significant cord compression or edema. Osteomyelitis now also involves the posterior elements of T7 and T8,  including spinous processes. Developing thoracic kyphosis. She was also started on 1/11/19 on ertapenem empirically given progression of disease. The patient persists with thoracic pain but reports significant improvement since starting fluconazol and ertapenem. Denies any systemic symptoms, including fever, chills, malaise, chest pain, shortness of breath, dizziness, headache, palpitations, leg swelling. CRP 0.4 (1/14/19) from 2.2 (12/10) from 5.6 (12/3) from 2.2 (11/26) from 4.1 (11/19)  CRP 0.5 on 12/17      She reports good tolerance of both antifungal and antibiotic, denies any GI adverse events, any allergic reactions. Does seem hot and flushed today, she tells me this is likely a hot flash, she has been having them on and off even before her infection was diagnosed. ROS  As above included in HPI.   Otherwise 11 point review of systems negative including constitutional, skin, HENT, eyes, respiratory, cardiovascular, gastrointestinal, genitourinary, musculoskeletal, endocrine, hematologic, allergy, and neurologic. Past Medical History  Past Medical History:   Diagnosis Date    Depression     Hepatitis C     Heroin use disorder, moderate, in early remission ([facility] [address]     NOW on [facility]    Ill-defined condition     Chronic back pain     No past surgical history on file. Family History  Family History   Problem Relation Age of Onset    Hypertension Mother     High Cholesterol Mother     COPD Mother     Hypertension Father        Social History  She  reports that she has been smoking cigarettes. She has been smoking about 0.25 packs per day. she has never used smokeless tobacco.   Social History     Substance and Sexual Activity   Alcohol Use No       Immunization History    There is no immunization history on file for this patient. Allergies  No Known Allergies    Medications  Current Outpatient Medications   Medication Sig    predniSONE (DELTASONE) 20 mg tablet 60 mg daily for 5 days, then 40mg daily for 5 days, then 20 mg daily for 5 days    ketorolac (TORADOL) 10 mg tablet Take 1 Tab by mouth every twelve (12) hours as needed for Pain.  fluconazole (DIFLUCAN) 200 mg tablet Take 2 Tabs by mouth daily for 28 days. FDA advises cautious prescribing of oral fluconazole in pregnancy.  sertraline (ZOLOFT) 100 mg tablet TAKE 1 & 1/2 TABLET BY MOUTH FOR A TOTAL OF 150MG DAILY    hydroCHLOROthiazide (HYDRODIURIL) 12.5 mg tablet Take 1 Tab by mouth daily.  methocarbamol (ROBAXIN) 500 mg tablet Take 1 Tab by mouth three (3) times daily.  docusate sodium (COLACE) 100 mg capsule Take 1 Cap by mouth two (2) times a day.  OTHER Daptomycin through 12/29/[facility] at [facility]- arranged by     methadone HCl (METHADONE PO) Take 85 mg by mouth daily. No current facility-administered medications for this visit.       Facility-Administered Medications Ordered in Other Visits   Medication Dose Route Frequency    sodium chloride (NS) flush 10-40 mL  10-40 mL IntraVENous PRN    sodium chloride (NS) flush 10-40 mL  10-40 mL IntraVENous PRN    [START ON 1/19/2019] ertapenem (INVANZ) 1 g in 0.9% sodium chloride (MBP/ADV) 50 mL MBP  1 g IntraVENous ONCE    [START ON 1/20/2019] ertapenem (INVANZ) 1 g in 0.9% sodium chloride (MBP/ADV) 50 mL MBP  1 g IntraVENous ONCE    sodium chloride (NS) flush 10-40 mL  10-40 mL IntraVENous PRN    heparin (porcine) pf 500 Units  500 Units InterCATHeter PRN    sodium chloride (NS) flush 10-40 mL  10-40 mL IntraVENous PRN         Visit Vitals  /90 (BP 1 Location: Left arm, BP Patient Position: Sitting)   Pulse 77   Temp 99.5 °F (37.5 °C) (Oral)   Resp 20   Ht 5' 4\" (1.626 m)   Wt 203 lb (92.1 kg)   SpO2 99%   BMI 34.84 kg/m²     Body mass index is 34.84 kg/m². Physical Exam   Constitutional: She is oriented to person, place, and time. She appears distressed (from pain, looks uncomfortable but non toxic). HENT:   Head: Normocephalic and atraumatic. Neck: Neck supple. Cardiovascular: Normal rate and regular rhythm. Pulmonary/Chest: Effort normal and breath sounds normal.   Abdominal: Soft. Musculoskeletal: She exhibits no edema. Neurological: She is alert and oriented to person, place, and time. Skin: Skin is warm. Mid back is slightly tender to touch, extending around costal area on both sides   Nursing note and vitals reviewed.         [address] Outpatient Visit on 01/14/2019   Component Date Value Ref Range Status    WBC 01/14/2019 6.1  4.5 - 13.0 K/uL Final    RBC 01/14/2019 4.17  4.10 - 5.10 M/uL Final    HGB 01/14/2019 13.1  12.0 - 16 g/dL Final    HCT 01/14/2019 39.9  36 - 48 % Final    MCV 01/14/2019 95.7  78 - 102 FL Final    MCH 01/14/2019 31.4  25.0 - 35.0 PG Final    MCHC 01/14/2019 32.8  31 - 37 g/dL Final    RDW 01/14/2019 14.5  11.5 - 14.5 % Final    PLATELET 54/92/0343 043  140 - 440 K/uL Final    NEUTROPHILS 01/14/2019 79* 40 - 70 % Final    MIXED CELLS 01/14/2019 5  0.1 - 17 % Final    LYMPHOCYTES 01/14/2019 16  14 - 44 % Final    ABS. NEUTROPHILS 01/14/2019 4.8  1.8 - 9.5 K/UL Final    ABS. MIXED CELLS 01/14/2019 0.3  0.0 - 2.3 K/uL Final    ABS. LYMPHOCYTES 01/14/2019 1.0* 1.1 - 5.9 K/UL Final    DF 01/14/2019 AUTOMATED    Final    C-Reactive protein 01/14/2019 0.4* 0 - 0.3 mg/dL Final    Sodium 01/14/2019 135* 136 - 145 mmol/L Final    Potassium 01/14/2019 4.1  3.5 - 5.5 mmol/L Final    Chloride 01/14/2019 100  100 - 108 mmol/L Final    CO2 01/14/2019 30  21 - 32 mmol/L Final    Anion gap 01/14/2019 5  3.0 - 18 mmol/L Final    Glucose 01/14/2019 138* 74 - 99 mg/dL Final    BUN 01/14/2019 19* 7.0 - 18 MG/DL Final    Creatinine 01/14/2019 0.71  0.6 - 1.3 MG/DL Final    BUN/Creatinine ratio 01/14/2019 27* 12 - 20   Final    GFR est AA 01/14/2019 >60  >60 ml/min/1.73m2 Final    GFR est non-AA 01/14/2019 >60  >60 ml/min/1.73m2 Final    Calcium 01/14/2019 9.0  8.5 - 10.1 MG/DL Final   Hospital Outpatient Visit on 01/07/2019   Component Date Value Ref Range Status    WBC 01/07/2019 6.7  4.5 - 13.0 K/uL Final    RBC 01/07/2019 4.12  4.10 - 5.10 M/uL Final    HGB 01/07/2019 12.8  12.0 - 16 g/dL Final    HCT 01/07/2019 39.9  36 - 48 % Final    MCV 01/07/2019 96.8  78 - 102 FL Final    MCH 01/07/2019 31.1  25.0 - 35.0 PG Final    MCHC 01/07/2019 32.1  31 - 37 g/dL Final    RDW 01/07/2019 15.5* 11.5 - 14.5 % Final    PLATELET 48/04/5672 616  140 - 440 K/uL Final    NEUTROPHILS 01/07/2019 64  40 - 70 % Final    MIXED CELLS 01/07/2019 6  0.1 - 17 % Final    LYMPHOCYTES 01/07/2019 30  14 - 44 % Final    ABS. NEUTROPHILS 01/07/2019 4.3  1.8 - 9.5 K/UL Final    ABS. MIXED CELLS 01/07/2019 0.4  0.0 - 2.3 K/uL Final    ABS.  LYMPHOCYTES 01/07/2019 2.0  1.1 - 5.9 K/UL Final    DF 01/07/2019 AUTOMATED    Final    Sodium 01/07/2019 137  136 - 145 mmol/L Final    Potassium 01/07/2019 4.0  3.5 - 5.5 mmol/L Final    Chloride 01/07/2019 103  100 - 108 mmol/L Final    CO2 01/07/2019 28 21 - 32 mmol/L Final    Anion gap 01/07/2019 6  3.0 - 18 mmol/L Final    Glucose 01/07/2019 87  74 - 99 mg/dL Final    BUN 01/07/2019 17  7.0 - 18 MG/DL Final    Creatinine 01/07/2019 0.77  0.6 - 1.3 MG/DL Final    BUN/Creatinine ratio 01/07/2019 22* 12 - 20   Final    GFR est AA 01/07/2019 >60  >60 ml/min/1.73m2 Final    GFR est non-AA 01/07/2019 >60  >60 ml/min/1.73m2 Final    Calcium 01/07/2019 9.0  8.5 - 10.1 MG/DL Final    Protein, total 01/07/2019 7.7  6.4 - 8.2 g/dL Final    Albumin 01/07/2019 3.6  3.4 - 5.0 g/dL Final    Globulin 01/07/2019 4.1* 2.0 - 4.0 g/dL Final    A-G Ratio 01/07/2019 0.9  0.8 - 1.7   Final    Bilirubin, total 01/07/2019 0.3  0.2 - 1.0 MG/DL Final    Bilirubin, direct 01/07/2019 0.1  0.0 - 0.2 MG/DL Final    Alk. phosphatase 01/07/2019 128* 45 - 117 U/L Final    AST (SGOT) 01/07/2019 16  15 - 37 U/L Final    ALT (SGPT) 01/07/2019 17  13 - 56 U/L Final    C-Reactive Protein, Cardiac 01/07/2019 5.89* 0.00 - 3.00 mg/L Final   Hospital Outpatient Visit on 12/31/2018   Component Date Value Ref Range Status    WBC 12/31/2018 6.8  4.5 - 13.0 K/uL Final    RBC 12/31/2018 3.97* 4.10 - 5.10 M/uL Final    HGB 12/31/2018 12.5  12.0 - 16 g/dL Final    HCT 12/31/2018 38.4  36 - 48 % Final    MCV 12/31/2018 96.7  78 - 102 FL Final    MCH 12/31/2018 31.5  25.0 - 35.0 PG Final    MCHC 12/31/2018 32.6  31 - 37 g/dL Final    RDW 12/31/2018 15.7* 11.5 - 14.5 % Final    PLATELET 44/51/8144 459  140 - 440 K/uL Final    NEUTROPHILS 12/31/2018 61  40 - 70 % Final    MIXED CELLS 12/31/2018 9  0.1 - 17 % Final    LYMPHOCYTES 12/31/2018 31  14 - 44 % Final    ABS. NEUTROPHILS 12/31/2018 4.1  1.8 - 9.5 K/UL Final    ABS. MIXED CELLS 12/31/2018 0.6  0.0 - 2.3 K/uL Final    ABS.  LYMPHOCYTES 12/31/2018 2.1  1.1 - 5.9 K/UL Final    DF 12/31/2018 AUTOMATED    Final    Sodium 12/31/2018 138  136 - 145 mmol/L Final    Potassium 12/31/2018 4.1  3.5 - 5.5 mmol/L Final    Chloride 12/31/2018 104  100 - 108 mmol/L Final    CO2 12/31/2018 29  21 - 32 mmol/L Final    Anion gap 12/31/2018 5  3.0 - 18 mmol/L Final    Glucose 12/31/2018 66* 74 - 99 mg/dL Final    BUN 12/31/2018 13  7.0 - 18 MG/DL Final    Creatinine 12/31/2018 0.72  0.6 - 1.3 MG/DL Final    BUN/Creatinine ratio 12/31/2018 18  12 - 20   Final    GFR est AA 12/31/2018 >60  >60 ml/min/1.73m2 Final    GFR est non-AA 12/31/2018 >60  >60 ml/min/1.73m2 Final    Calcium 12/31/2018 8.4* 8.5 - 10.1 MG/DL Final    Protein, total 12/31/2018 7.8  6.4 - 8.2 g/dL Final    Albumin 12/31/2018 3.6  3.4 - 5.0 g/dL Final    Globulin 12/31/2018 4.2* 2.0 - 4.0 g/dL Final    A-G Ratio 12/31/2018 0.9  0.8 - 1.7   Final    Bilirubin, total 12/31/2018 0.3  0.2 - 1.0 MG/DL Final    Bilirubin, direct 12/31/2018 <0.1  0.0 - 0.2 MG/DL Final    Alk. phosphatase 12/31/2018 131* 45 - 117 U/L Final    AST (SGOT) 12/31/2018 16  15 - 37 U/L Final    ALT (SGPT) 12/31/2018 17  13 - 56 U/L Final    C-Reactive Protein, Cardiac 12/31/2018 2.46  0.00 - 3.00 mg/L Final   Hospital Outpatient Visit on 12/24/2018   Component Date Value Ref Range Status    WBC 12/24/2018 6.9  4.5 - 13.0 K/uL Final    RBC 12/24/2018 3.91* 4.10 - 5.10 M/uL Final    HGB 12/24/2018 12.2  12.0 - 16 g/dL Final    HCT 12/24/2018 37.0  36 - 48 % Final    MCV 12/24/2018 94.6  78 - 102 FL Final    MCH 12/24/2018 31.2  25.0 - 35.0 PG Final    MCHC 12/24/2018 33.0  31 - 37 g/dL Final    RDW 12/24/2018 14.8* 11.5 - 14.5 % Final    PLATELET 83/63/3850 230  140 - 440 K/uL Final    NEUTROPHILS 12/24/2018 63  40 - 70 % Final    MIXED CELLS 12/24/2018 8  0.1 - 17 % Final    LYMPHOCYTES 12/24/2018 29  14 - 44 % Final    ABS. NEUTROPHILS 12/24/2018 4.3  1.8 - 9.5 K/UL Final    ABS. MIXED CELLS 12/24/2018 0.6  0.0 - 2.3 K/uL Final    ABS.  LYMPHOCYTES 12/24/2018 2.0  1.1 - 5.9 K/UL Final    DF 12/24/2018 AUTOMATED    Final    Sodium 12/24/2018 139  136 - 145 mmol/L Final    Potassium 12/24/2018 3.5  3.5 - 5.5 mmol/L Final    Chloride 12/24/2018 103  100 - 108 mmol/L Final    CO2 12/24/2018 30  21 - 32 mmol/L Final    Anion gap 12/24/2018 6  3.0 - 18 mmol/L Final    Glucose 12/24/2018 110* 74 - 99 mg/dL Final    BUN 12/24/2018 11  7.0 - 18 MG/DL Final    Creatinine 12/24/2018 0.64  0.6 - 1.3 MG/DL Final    BUN/Creatinine ratio 12/24/2018 17  12 - 20   Final    GFR est AA 12/24/2018 >60  >60 ml/min/1.73m2 Final    GFR est non-AA 12/24/2018 >60  >60 ml/min/1.73m2 Final    Calcium 12/24/2018 9.1  8.5 - 10.1 MG/DL Final    Bilirubin, total 12/24/2018 0.3  0.2 - 1.0 MG/DL Final    ALT (SGPT) 12/24/2018 16  13 - 56 U/L Final    AST (SGOT) 12/24/2018 18  15 - 37 U/L Final    Alk. phosphatase 12/24/2018 120* 45 - 117 U/L Final    Protein, total 12/24/2018 7.5  6.4 - 8.2 g/dL Final    Albumin 12/24/2018 3.5  3.4 - 5.0 g/dL Final    Globulin 12/24/2018 4.0  2.0 - 4.0 g/dL Final    A-G Ratio 12/24/2018 0.9  0.8 - 1.7   Final    C-Reactive protein 12/24/2018 0.8* 0 - 0.3 mg/dL Final         CT Results (most recent):  Results from [place] encounter on 11/11/18   CT ABD WO CONT    Narrative CT abdomen without IV contrast     INDICATION: CT of T7-T8 discitis planning. TECHNIQUE: Serial axial images were obtained of the abdomen without intravenous  contrast. Images are performed prone for the intended CT procedure. Decision was  then made by Dr. Leesa Solorio to perform the procedure fluoroscopically in IR. All CT scans are performed using dose optimization techniques as appropriate to  the performed exam including the following: Automated exposure control,  adjustment of mA and/or kV according to patient size, and use of iterative  reconstructive technique. COMPARISON: CTA chest 11/7/2018. MRI thoracic spine 11/11/2018. FINDINGS:    Hepatic steatosis. Mild hepatosplenomegaly. Chronic calcifications in the  pancreas without acute findings.  The kidneys, adrenal glands, gallbladder and  partially imaged bowel are within normal limits. No lymphadenopathy or free  fluid. The level of T7-T8 osteomyelitis/discitis has mild surrounding soft tissue  thickening as seen on prior MRI. Refer to prior MRI report. Impression IMPRESSION:    1. CT-guided biopsy procedure changed to fluoroscopic procedure. Biopsy planning  CT performed. 2. T7-T8 osteomyelitis/discitis redemonstrated. Refer to MR thoracic spine  report. 3. Mild hepatosplenomegaly with hepatic steatosis. 4. Evidence of chronic pancreatitis. XR Results (most recent):  Results from Hospital Encounter encounter on 11/07/18   XR CHEST PA LAT    Narrative HISTORY: Shortness of breath. Exam: Chest.    Technique: Two views of the chest were obtained. No prior studies for  comparison. FINDINGS: Mild to moderate bilateral diffuse interstitial prominence is noted  with basilar dominance without hemothorax pneumonia or pleural effusions. Heart  is enlarged. Vascular pedicle is unremarkable. Visualized bony thorax and soft  tissues are within normal limits. Impression  IMPRESSION:  1. Sequela of mild to moderate congestion. Follow-up with plain imaging. CT   All Micro Results     None              DIAGNOSIS AND PLAN  Patient Active Problem List   Diagnosis Code    Severe obesity with body mass index (BMI) of 35.0 to 39.9 with serious comorbidity (HCC) E66.01    Discitis of thoracic region M46.44     1. Discitis of thoracic region  2. Severe obesity with body mass index (BMI) of 35.0 to 39.9 with serious comorbidity (HCC)  3. Candida parapsilosis infection  4. IVDU (intravenous drug user)  5. Methadone use ([facility] [place] [address]  6. History of hepatitis C  7. 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 Patient not significantly improving with ceftriaxone and daptomycin.     The patient completed daptomycin and ceftriaxone from 11/15/18 to 12/11/18 (+-4 weeks), followed by ambisome from 12/13/18 to 12/17/18. Stopped due to increased creatinine to 1.85. Followed by anidalofungin 100 mg IV daily until Sigifredo 10, 2019 (4 weeks), changed to fluconazol 1/11/19 despite interaction with Methadone, due to patient not improving on anidalofungin. MRI on 1/10/19 reports: Worsening discitis/osteomyelitis process at T7-T8, with circumferential epidural enhancement and posterior soft tissue enhancement  No focal abscess. Also developing fragmentation and compression fracture in T7,  followed by T8, with retropulsion of T7 fragment into the central canal, causing central stenosis and mild cord displacement but no significant cord compression or edema. Osteomyelitis now also involves the posterior elements of T7 and T8,  including spinous processes. Developing thoracic kyphosis. She was also started on 1/11/19 on ertapenem empirically given progression of disease. The patient persists with thoracic pain but reports significant improvement since starting fluconazol and ertapenem. Denies any systemic symptoms, including fever, chills, malaise, chest pain, shortness of breath, dizziness, headache, palpitations, leg swelling. CRP 0.4 (1/14/19)     Plan  C/w Ertapenem for a total of 6 weeks (until 2/21/19)  C/w fluconazol 400 mg PO daily for a total of approx 6-12 months, depending on clinical progress and tolerance. Weekly EKGs on fluconazol therapy given concern for interaction with methadone. Patient aware and willing to take risk of QTc prolongation, understanding it could lead to a lethal arrhythmia. She has been recommended to discuss with methadone clinic to reduce/wean her off as much as possible from methadone. Follow-up Disposition:  Return in about 4 weeks (around 2/14/2019). Brenda Castorena MD

## 2019-01-17 NOTE — PATIENT INSTRUCTIONS
Learning About Benefits From Quitting Smoking  How does quitting smoking make you healthier? If you're thinking about quitting smoking, you may have a few reasons to be smoke-free. Your health may be one of them. · When you quit smoking, you lower your risks for cancer, lung disease, heart attack, stroke, blood vessel disease, and blindness from macular degeneration. · When you're smoke-free, you get sick less often, and you heal faster. You are less likely to get colds, flu, bronchitis, and pneumonia. · As a nonsmoker, you may find that your mood is better and you are less stressed. When and how will you feel healthier? Quitting has real health benefits that start from day 1 of being smoke-free. And the longer you stay smoke-free, the healthier you get and the better you feel. The first hours  · After just 20 minutes, your blood pressure and heart rate go down. That means there's less stress on your heart and blood vessels. · Within 12 hours, the level of carbon monoxide in your blood drops back to normal. That makes room for more oxygen. With more oxygen in your body, you may notice that you have more energy than when you smoked. After 2 weeks  · Your lungs start to work better. · Your risk of heart attack starts to drop. After 1 month  · When your lungs are clear, you cough less and breathe deeper, so it's easier to be active. · Your sense of taste and smell return. That means you can enjoy food more than you have since you started smoking. Over the years  · After 1 year, your risk of heart disease is half what it would be if you kept smoking. · After 5 years, your risk of stroke starts to shrink. Within a few years after that, it's about the same as if you'd never smoked. · After 10 years, your risk of dying from lung cancer is cut by about half. And your risk for many other types of cancer is lower too. How would quitting help others in your life?   When you quit smoking, you improve the health of everyone who now breathes in your smoke. · Their heart, lung, and cancer risks drop, much like yours. · They are sick less. For babies and small children, living smoke-free means they're less likely to have ear infections, pneumonia, and bronchitis. · If you're a woman who is or will be pregnant someday, quitting smoking means a healthier . · Children who are close to you are less likely to become adult smokers. Where can you learn more? Go to http://sameera-miles.info/. Enter 052 [id number] 72 11 in the search box to learn more about \"Learning About Benefits From Quitting Smoking. \"  Current as of: 2017  Content Version: 11.8  © 3358-7857 [employer]. Care instructions adapted under license by [employer] (which disclaims liability or warranty for this information). If you have questions about a medical condition or this instruction, always ask your healthcare professional. [place] [address] any warranty or liability for your use of this information. Stopping Smoking: Care Instructions  Your Care Instructions  Cigarette smokers crave the nicotine in cigarettes. Giving it up is much harder than simply changing a habit. Your body has to stop craving the nicotine. It is hard to quit, but you can do it. There are many tools that people use to quit smoking. You may find that combining tools works best for you. There are several steps to quitting. First you get ready to quit. Then you get support to help you. After that, you learn new skills and behaviors to become a nonsmoker. For many people, a necessary step is getting and using medicine. Your doctor will help you set up the plan that best meets your needs. You may want to attend a smoking cessation program to help you quit smoking. When you choose a program, look for one that has proven success. Ask your doctor for ideas.  You will greatly increase your chances of success if you take medicine as well as get counseling or join a cessation program.  Some of the changes you feel when you first quit tobacco are uncomfortable. Your body will miss the nicotine at first, and you may feel short-tempered and grumpy. You may have trouble sleeping or concentrating. Medicine can help you deal with these symptoms. You may struggle with changing your smoking habits and rituals. The last step is the tricky one: Be prepared for the smoking urge to continue for a time. This is a lot to deal with, but keep at it. You will feel better. Follow-up care is a key part of your treatment and safety. Be sure to make and go to all appointments, and call your doctor if you are having problems. It's also a good idea to know your test results and keep a list of the medicines you take. How can you care for yourself at home? · Ask your family, friends, and coworkers for support. You have a better chance of quitting if you have help and support. · Join a support group, such as Nicotine Anonymous, for people who are trying to quit smoking. · Consider signing up for a smoking cessation program, such as the American Lung Association's Freedom from Smoking program.  · Get text messaging support. Go to the website at www.smokefree. gov to sign up for the [facility] program.  · Set a quit date. Pick your date carefully so that it is not right in the middle of a big deadline or stressful time. Once you quit, do not even take a puff. Get rid of all ashtrays and lighters after your last cigarette. Clean your house and your clothes so that they do not smell of smoke. · Learn how to be a nonsmoker. Think about ways you can avoid those things that make you reach for a cigarette. ? Avoid situations that put you at greatest risk for smoking. For some people, it is hard to have a drink with friends without smoking. For others, they might skip a coffee break with coworkers who smoke. ? Change your daily routine.  Take a different route to work or eat a meal in a different place. · Cut down on stress. Calm yourself or release tension by doing an activity you enjoy, such as reading a book, taking a hot bath, or gardening. · Talk to your doctor or pharmacist about nicotine replacement therapy, which replaces the nicotine in your body. You still get nicotine but you do not use tobacco. Nicotine replacement products help you slowly reduce the amount of nicotine you need. These products come in several forms, many of them available over-the-counter:  ? Nicotine patches  ? Nicotine gum and lozenges  ? Nicotine inhaler  · Ask your doctor about bupropion (Wellbutrin) or varenicline (Chantix), which are prescription medicines. They do not contain nicotine. They help you by reducing withdrawal symptoms, such as stress and anxiety. · Some people find hypnosis, acupuncture, and massage helpful for ending the smoking habit. · Eat a healthy diet and get regular exercise. Having healthy habits will help your body move past its craving for nicotine. · Be prepared to keep trying. Most people are not successful the first few times they try to quit. Do not get mad at yourself if you smoke again. Make a list of things you learned and think about when you want to try again, such as next week, next month, or next year. Where can you learn more? Go to http://sameera-miles.info/. Enter Z101 in the search box to learn more about \"Stopping Smoking: Care Instructions. \"  Current as of: November 29, 2017  Content Version: 11.8  © 9040-1865 [employer]. Care instructions adapted under license by [employer] (which disclaims liability or warranty for this information). If you have questions about a medical condition or this instruction, always ask your healthcare professional. [address] any warranty or liability for your use of this information.

## 2019-01-18 ENCOUNTER — HOSPITAL ENCOUNTER (OUTPATIENT)
Dept: INFUSION THERAPY | Age: 59
Discharge: HOME | End: 2019-01-18
Payer: MEDICAID

## 2019-01-18 VITALS
OXYGEN SATURATION: 93 % | TEMPERATURE: 98.8 F | DIASTOLIC BLOOD PRESSURE: 93 MMHG | SYSTOLIC BLOOD PRESSURE: 175 MMHG | RESPIRATION RATE: 18 BRPM | HEART RATE: 73 BPM

## 2019-01-18 PROCEDURE — 74011250636 HC RX REV CODE- 250/636

## 2019-01-18 PROCEDURE — 74011250636 HC RX REV CODE- 250/636: Performed by: INTERNAL MEDICINE

## 2019-01-18 PROCEDURE — 74011000258 HC RX REV CODE- 258: Performed by: INTERNAL MEDICINE

## 2019-01-18 PROCEDURE — 96365 THER/PROPH/DIAG IV INF INIT: CPT

## 2019-01-18 RX ORDER — HEPARIN 100 UNIT/ML
500 SYRINGE INTRAVENOUS ONCE
Status: COMPLETED | OUTPATIENT
Start: 2019-01-18 | End: 2019-01-18

## 2019-01-18 RX ORDER — HEPARIN 100 UNIT/ML
SYRINGE INTRAVENOUS
Status: COMPLETED
Start: 2019-01-18 | End: 2019-01-18

## 2019-01-18 RX ORDER — SODIUM CHLORIDE 0.9 % (FLUSH) 0.9 %
10-40 SYRINGE (ML) INJECTION AS NEEDED
Status: DISCONTINUED | OUTPATIENT
Start: 2019-01-18 | End: 2019-01-22 | Stop reason: HOSPADM

## 2019-01-18 RX ADMIN — SODIUM CHLORIDE 1 G: 900 INJECTION INTRAVENOUS at 15:17

## 2019-01-18 RX ADMIN — Medication 20 ML: at 15:51

## 2019-01-18 RX ADMIN — HEPARIN 500 UNITS: 100 SYRINGE at 15:51

## 2019-01-18 RX ADMIN — Medication 10 ML: at 15:18

## 2019-01-18 RX ADMIN — SODIUM CHLORIDE, PRESERVATIVE FREE 500 UNITS: 5 INJECTION INTRAVENOUS at 15:51

## 2019-01-18 NOTE — PROGRESS NOTES
[facility] Progress Note Date: 2019 Name: Marie Lancaster MRN: [id number] : 1960 Daily Invanz infusion Ms. Jatinder Rooney was assessed and education was provided. Pt arrived [facility] to [facility] today at 1515. Ms. Jigar Cat vitals were reviewed. Visit Vitals BP (!) 175/93 (BP 1 Location: Left arm, BP Patient Position: Sitting) Pulse 73 Temp 98.8 °F (37.1 °C) Resp 18 SpO2 93% PICC line to right upper arm. Both lumens flushed without difficulty and positive for blood return.  
 
  []  Vancomycin  
  [x]  Invanz 1 gram/50 ml via purple lumen  
  []  Cubicin  
  []  Rocephin  
 was infused over 30 minutes as ordered. Both lumens flushed with 10mL  units of heparin. Curos caps applied and stockinette placed over dressing for protection. Ms. Jatinder Rooney tolerated infusion, and had no complaints at this time. Patient armband removed and shredded. Ms. Jatinder Rooney was discharged from [place] [address] in stable condition at [phone]. She is to return on 2019 at 0900 for her next appointment for Ivanz infusion. Juan Douglas RN 
2019

## 2019-01-19 ENCOUNTER — HOSPITAL ENCOUNTER (OUTPATIENT)
Dept: INFUSION THERAPY | Age: 59
Discharge: HOME | End: 2019-01-19
Payer: MEDICAID

## 2019-01-19 VITALS
DIASTOLIC BLOOD PRESSURE: 88 MMHG | RESPIRATION RATE: 18 BRPM | OXYGEN SATURATION: 96 % | TEMPERATURE: 97.5 F | HEART RATE: 73 BPM | SYSTOLIC BLOOD PRESSURE: 144 MMHG

## 2019-01-19 PROCEDURE — 74011000258 HC RX REV CODE- 258: Performed by: INTERNAL MEDICINE

## 2019-01-19 PROCEDURE — 96365 THER/PROPH/DIAG IV INF INIT: CPT

## 2019-01-19 PROCEDURE — 74011250636 HC RX REV CODE- 250/636: Performed by: INTERNAL MEDICINE

## 2019-01-19 PROCEDURE — 74011250636 HC RX REV CODE- 250/636

## 2019-01-19 RX ORDER — SODIUM CHLORIDE 0.9 % (FLUSH) 0.9 %
10-40 SYRINGE (ML) INJECTION AS NEEDED
Status: DISCONTINUED | OUTPATIENT
Start: 2019-01-19 | End: 2019-01-23 | Stop reason: HOSPADM

## 2019-01-19 RX ORDER — HEPARIN 100 UNIT/ML
500 SYRINGE INTRAVENOUS AS NEEDED
Status: DISCONTINUED | OUTPATIENT
Start: 2019-01-19 | End: 2019-01-23 | Stop reason: HOSPADM

## 2019-01-19 RX ORDER — HEPARIN 100 UNIT/ML
SYRINGE INTRAVENOUS
Status: COMPLETED
Start: 2019-01-19 | End: 2019-01-19

## 2019-01-19 RX ADMIN — SODIUM CHLORIDE, PRESERVATIVE FREE 500 UNITS: 5 INJECTION INTRAVENOUS at 09:25

## 2019-01-19 RX ADMIN — Medication 10 ML: at 08:53

## 2019-01-19 RX ADMIN — Medication 10 ML: at 09:25

## 2019-01-19 RX ADMIN — SODIUM CHLORIDE 1 G: 900 INJECTION INTRAVENOUS at 08:53

## 2019-01-19 RX ADMIN — SODIUM CHLORIDE, PRESERVATIVE FREE 500 UNITS: 5 INJECTION INTRAVENOUS at 09:26

## 2019-01-19 RX ADMIN — Medication 10 ML: at 09:26

## 2019-01-19 NOTE — PROGRESS NOTES
[facility] Progress Note Date: 2019 Name: Natalie Espinosa MRN: [id number] : 1960 Daily Invanz infusion Ms. Francisca Patel was assessed and education was provided. Pt arrived ambulatory to \[facility]\"" today at [address] [place] [address]. Ms. Lawyer Escobar vitals were reviewed. Visit Vitals /88 (BP 1 Location: Left arm, BP Patient Position: Sitting) Pulse 73 Temp 97.5 °F (36.4 °C) Resp 18 SpO2 96% PICC line to right upper arm. Both lumens flushed without difficulty and positive for blood return.  
 
  []  Vancomycin  
  [x]  Invanz 1 gram/50 ml via purple lumen  
  []  Cubicin  
  []  Rocephin  
 was infused over 30 minutes as ordered. Both lumens flushed with 10mL  units of heparin. Curos caps applied and stockinette placed over dressing for protection. Ms. Francisca Patel tolerated infusion, and had no complaints at this time. Patient armband removed and shredded. Ms. Francisca Patel was discharged from [place] [address] in stable condition at [address] She is to return on 2019 at 0900 for her next appointment for Ivanz infusion. Ciara Edmonds RN 
2019 
[address]

## 2019-01-20 ENCOUNTER — HOSPITAL ENCOUNTER (OUTPATIENT)
Dept: INFUSION THERAPY | Age: 59
Discharge: HOME | End: 2019-01-20
Payer: MEDICAID

## 2019-01-20 VITALS
HEART RATE: 79 BPM | SYSTOLIC BLOOD PRESSURE: 128 MMHG | OXYGEN SATURATION: 97 % | RESPIRATION RATE: 18 BRPM | DIASTOLIC BLOOD PRESSURE: 74 MMHG

## 2019-01-20 PROCEDURE — 74011000258 HC RX REV CODE- 258: Performed by: INTERNAL MEDICINE

## 2019-01-20 PROCEDURE — 96365 THER/PROPH/DIAG IV INF INIT: CPT

## 2019-01-20 PROCEDURE — 74011250636 HC RX REV CODE- 250/636: Performed by: INTERNAL MEDICINE

## 2019-01-20 RX ORDER — HEPARIN 100 UNIT/ML
500 SYRINGE INTRAVENOUS ONCE
Status: COMPLETED | OUTPATIENT
Start: 2019-01-20 | End: 2019-01-20

## 2019-01-20 RX ORDER — SODIUM CHLORIDE 0.9 % (FLUSH) 0.9 %
10-40 SYRINGE (ML) INJECTION AS NEEDED
Status: DISCONTINUED | OUTPATIENT
Start: 2019-01-20 | End: 2019-01-24 | Stop reason: HOSPADM

## 2019-01-20 RX ADMIN — SODIUM CHLORIDE 1 G: 900 INJECTION INTRAVENOUS at 08:52

## 2019-01-20 RX ADMIN — Medication 20 ML: at 08:52

## 2019-01-20 RX ADMIN — HEPARIN 500 UNITS: 100 SYRINGE at 09:25

## 2019-01-20 NOTE — PROGRESS NOTES
[facility] Progress Note Date: 2019 Name: Vannessa Huggins MRN: [id number] : 1960 Daily Invanz infusion Ms. Edwin Ramírez was assessed and education was provided. Pt [facility] at 0850. Ms. Félix Cain vitals were reviewed. Visit Vitals /74 (BP 1 Location: Left arm, BP Patient Position: Sitting) Pulse 79 Resp 18 SpO2 97% PICC line to right upper arm. Both lumens flushed without difficulty and positive for blood return.  
 
  []  Vancomycin  
  [x]  Invanz 1 gram/50 ml via purple lumen  
  []  Cubicin  
  []  Rocephin  
 was infused over 30 minutes as ordered. Both lumens flushed with 10mL  units of heparin. Curos caps applied and stockinette placed over dressing for protection. Ms. Edwin Ramírez tolerated infusion, and had no complaints at this time. Patient armband removed and shredded. Ms. Edwin Ramírez was discharged from [place] [address] in stable condition at 0930. She is to return on 2019 at 1500 for her next appointment for Ivanz infusion. Odalys Farr RN 
2019

## 2019-01-21 ENCOUNTER — HOSPITAL ENCOUNTER (OUTPATIENT)
Dept: INFUSION THERAPY | Age: 59
Discharge: HOME | End: 2019-01-21
Payer: MEDICAID

## 2019-01-21 VITALS
SYSTOLIC BLOOD PRESSURE: 121 MMHG | TEMPERATURE: 98.2 F | OXYGEN SATURATION: 95 % | DIASTOLIC BLOOD PRESSURE: 81 MMHG | HEART RATE: 68 BPM | RESPIRATION RATE: 18 BRPM

## 2019-01-21 LAB
ANION GAP SERPL CALC-SCNC: 7 MMOL/L (ref 3–18)
BASO+EOS+MONOS # BLD AUTO: 0.2 K/UL (ref 0–2.3)
BASO+EOS+MONOS NFR BLD AUTO: 2 % (ref 0.1–17)
BUN SERPL-MCNC: 13 MG/DL (ref 7–18)
BUN/CREAT SERPL: 16 (ref 12–20)
CALCIUM SERPL-MCNC: 9.6 MG/DL (ref 8.5–10.1)
CHLORIDE SERPL-SCNC: 96 MMOL/L (ref 100–108)
CO2 SERPL-SCNC: 32 MMOL/L (ref 21–32)
CREAT SERPL-MCNC: 0.81 MG/DL (ref 0.6–1.3)
DIFFERENTIAL METHOD BLD: ABNORMAL
ERYTHROCYTE [DISTWIDTH] IN BLOOD BY AUTOMATED COUNT: 14.2 % (ref 11.5–14.5)
GLUCOSE SERPL-MCNC: 161 MG/DL (ref 74–99)
HCT VFR BLD AUTO: 45.3 % (ref 36–48)
HGB BLD-MCNC: 14.4 G/DL (ref 12–16)
LYMPHOCYTES # BLD: 1.4 K/UL (ref 1.1–5.9)
LYMPHOCYTES NFR BLD: 14 % (ref 14–44)
MCH RBC QN AUTO: 30.7 PG (ref 25–35)
MCHC RBC AUTO-ENTMCNC: 31.8 G/DL (ref 31–37)
MCV RBC AUTO: 96.6 FL (ref 78–102)
NEUTS SEG # BLD: 8.4 K/UL (ref 1.8–9.5)
NEUTS SEG NFR BLD: 84 % (ref 40–70)
PLATELET # BLD AUTO: 275 K/UL (ref 140–440)
POTASSIUM SERPL-SCNC: 4.6 MMOL/L (ref 3.5–5.5)
RBC # BLD AUTO: 4.69 M/UL (ref 4.1–5.1)
SODIUM SERPL-SCNC: 135 MMOL/L (ref 136–145)
WBC # BLD AUTO: 10 K/UL (ref 4.5–13)

## 2019-01-21 PROCEDURE — 74011250636 HC RX REV CODE- 250/636

## 2019-01-21 PROCEDURE — 80048 BASIC METABOLIC PNL TOTAL CA: CPT

## 2019-01-21 PROCEDURE — 74011250636 HC RX REV CODE- 250/636: Performed by: INTERNAL MEDICINE

## 2019-01-21 PROCEDURE — 96365 THER/PROPH/DIAG IV INF INIT: CPT

## 2019-01-21 PROCEDURE — 74011000258 HC RX REV CODE- 258: Performed by: INTERNAL MEDICINE

## 2019-01-21 PROCEDURE — 86141 C-REACTIVE PROTEIN HS: CPT

## 2019-01-21 PROCEDURE — 36415 COLL VENOUS BLD VENIPUNCTURE: CPT

## 2019-01-21 PROCEDURE — 85025 COMPLETE CBC W/AUTO DIFF WBC: CPT

## 2019-01-21 RX ORDER — HEPARIN 100 UNIT/ML
500 SYRINGE INTRAVENOUS AS NEEDED
Status: DISCONTINUED | OUTPATIENT
Start: 2019-01-21 | End: 2019-01-25 | Stop reason: HOSPADM

## 2019-01-21 RX ORDER — SODIUM CHLORIDE 0.9 % (FLUSH) 0.9 %
10-40 SYRINGE (ML) INJECTION AS NEEDED
Status: DISCONTINUED | OUTPATIENT
Start: 2019-01-21 | End: 2019-01-25 | Stop reason: HOSPADM

## 2019-01-21 RX ORDER — HEPARIN 100 UNIT/ML
SYRINGE INTRAVENOUS
Status: COMPLETED
Start: 2019-01-21 | End: 2019-01-21

## 2019-01-21 RX ADMIN — Medication 10 ML: at 15:48

## 2019-01-21 RX ADMIN — Medication 10 ML: at 15:20

## 2019-01-21 RX ADMIN — Medication 30 ML: at 15:15

## 2019-01-21 RX ADMIN — SODIUM CHLORIDE, PRESERVATIVE FREE 500 UNITS: 5 INJECTION INTRAVENOUS at 15:49

## 2019-01-21 RX ADMIN — SODIUM CHLORIDE 1 G: 900 INJECTION INTRAVENOUS at 15:17

## 2019-01-21 NOTE — PROGRESS NOTES
[facility] Progress Note Date: 2019 Name: Gopal Wright MRN: [id number] : 1960 Invanz Infusion Ms. Matute to [facility], ambulatory at 1510. No complaints or concerns voiced Pt was assessed and education was provided. Ms. Kelsi Reveles vitals were reviewed. Visit Vitals /81 (BP 1 Location: Left arm, BP Patient Position: Sitting) Pulse 68 Temp 98.2 °F (36.8 °C) Resp 18 SpO2 95% Right upper arm PICC flushed easily and had brisk blood return via both ports. Blood sample obtained via red lumen for cbc,bmp, and crp. PICC dressing c/d/i and not due to be changed. No swelling, redness, streaking, warmth or drainage noted in arm. Pt denied c/o pain in arm. Invanz 1 gm was infused for 30 minutes as ordered via red lumen. Ms. Qing Triplett tolerated infusion, and had no complaints from infusion. Both lumens of PICC flushed with NS 10 ml and Heparin 500 units each. Picc site and drsg without change. Green end caps applied. Patient declined to have lumens wrapped with coban today. Stockinette placed over dressing for protection. Patient armband removed and shredded. Ms. Qing Triplett was discharged from [place] [address] in stable condition at 1550. She is to return on 19 for her next antibiotic appointment. Marcos Carbajal RN 
2019

## 2019-01-22 ENCOUNTER — HOSPITAL ENCOUNTER (OUTPATIENT)
Dept: INFUSION THERAPY | Age: 59
Discharge: HOME | End: 2019-01-22
Payer: MEDICAID

## 2019-01-22 VITALS
SYSTOLIC BLOOD PRESSURE: 127 MMHG | RESPIRATION RATE: 18 BRPM | TEMPERATURE: 98 F | OXYGEN SATURATION: 94 % | HEART RATE: 64 BPM | DIASTOLIC BLOOD PRESSURE: 82 MMHG

## 2019-01-22 PROCEDURE — 74011250636 HC RX REV CODE- 250/636

## 2019-01-22 PROCEDURE — 74011000258 HC RX REV CODE- 258: Performed by: INTERNAL MEDICINE

## 2019-01-22 PROCEDURE — 74011250636 HC RX REV CODE- 250/636: Performed by: INTERNAL MEDICINE

## 2019-01-22 PROCEDURE — 96365 THER/PROPH/DIAG IV INF INIT: CPT

## 2019-01-22 RX ORDER — HEPARIN 100 UNIT/ML
SYRINGE INTRAVENOUS
Status: COMPLETED
Start: 2019-01-22 | End: 2019-01-22

## 2019-01-22 RX ORDER — HEPARIN 100 UNIT/ML
500 SYRINGE INTRAVENOUS AS NEEDED
Status: DISCONTINUED | OUTPATIENT
Start: 2019-01-22 | End: 2019-01-26 | Stop reason: HOSPADM

## 2019-01-22 RX ORDER — SODIUM CHLORIDE 0.9 % (FLUSH) 0.9 %
10-40 SYRINGE (ML) INJECTION AS NEEDED
Status: DISCONTINUED | OUTPATIENT
Start: 2019-01-22 | End: 2019-01-26 | Stop reason: HOSPADM

## 2019-01-22 RX ADMIN — SODIUM CHLORIDE 1 G: 900 INJECTION INTRAVENOUS at 15:14

## 2019-01-22 RX ADMIN — SODIUM CHLORIDE, PRESERVATIVE FREE 500 UNITS: 5 INJECTION INTRAVENOUS at 15:10

## 2019-01-22 RX ADMIN — Medication 10 ML: at 15:44

## 2019-01-22 RX ADMIN — SODIUM CHLORIDE, PRESERVATIVE FREE 500 UNITS: 5 INJECTION INTRAVENOUS at 15:45

## 2019-01-22 RX ADMIN — Medication 10 ML: at 15:08

## 2019-01-22 RX ADMIN — Medication 10 ML: at 15:12

## 2019-01-22 NOTE — PROGRESS NOTES
[facility] Progress Note Date: 2019 Name: Sarah Mccray MRN: [id number] : 1960 Invanz Infusion Ms. Matute to [facility], ambulatory at 1500. No complaints or concerns voiced Pt was assessed and education was provided. Ms. Catrina Morales vitals were reviewed. Visit Vitals /82 (BP 1 Location: Left arm, BP Patient Position: Sitting) Pulse 64 Temp 98 °F (36.7 °C) Resp 18 SpO2 94% Right upper arm PICC flushed easily and had brisk blood return via both ports. PICC dressing c/d/i and not due to be changed. No swelling, redness, streaking, warmth or drainage noted in arm. Pt denied c/o pain in arm. Invanz 1 gm was infused for 30 minutes as ordered via purple lumen,followed by NS flush Ms. Tess Rees tolerated infusion, and had no complaints Both lumens heparinized per protocol, curos applied to end of lumens, lumens wrapped with coban, and stockinette applied over picc site. Picc site and drsg without change. Ms. Tess Rees was discharged from [place] [address] in stable condition at [phone]. She is to return on 19 at 3 pm for her next antibiotic appointment. Carolina Damon RN 
2019

## 2019-01-23 ENCOUNTER — HOSPITAL ENCOUNTER (OUTPATIENT)
Dept: INFUSION THERAPY | Age: 59
Discharge: HOME | End: 2019-01-23
Payer: MEDICAID

## 2019-01-23 VITALS
TEMPERATURE: 98.1 F | OXYGEN SATURATION: 90 % | SYSTOLIC BLOOD PRESSURE: 136 MMHG | DIASTOLIC BLOOD PRESSURE: 79 MMHG | RESPIRATION RATE: 18 BRPM | HEART RATE: 80 BPM

## 2019-01-23 LAB — CRP SERPL HS-MCNC: 7.41 MG/L (ref 0–3)

## 2019-01-23 PROCEDURE — 74011000258 HC RX REV CODE- 258: Performed by: INTERNAL MEDICINE

## 2019-01-23 PROCEDURE — 96365 THER/PROPH/DIAG IV INF INIT: CPT

## 2019-01-23 PROCEDURE — 74011250636 HC RX REV CODE- 250/636: Performed by: INTERNAL MEDICINE

## 2019-01-23 PROCEDURE — 74011250636 HC RX REV CODE- 250/636

## 2019-01-23 RX ORDER — SODIUM CHLORIDE 0.9 % (FLUSH) 0.9 %
10-40 SYRINGE (ML) INJECTION AS NEEDED
Status: DISCONTINUED | OUTPATIENT
Start: 2019-01-23 | End: 2019-01-27 | Stop reason: HOSPADM

## 2019-01-23 RX ORDER — HEPARIN 100 UNIT/ML
SYRINGE INTRAVENOUS
Status: COMPLETED
Start: 2019-01-23 | End: 2019-01-23

## 2019-01-23 RX ORDER — HEPARIN 100 UNIT/ML
500 SYRINGE INTRAVENOUS ONCE
Status: COMPLETED | OUTPATIENT
Start: 2019-01-23 | End: 2019-01-23

## 2019-01-23 RX ADMIN — Medication 10 ML: at 15:19

## 2019-01-23 RX ADMIN — HEPARIN 500 UNITS: 100 SYRINGE at 15:53

## 2019-01-23 RX ADMIN — SODIUM CHLORIDE, PRESERVATIVE FREE 500 UNITS: 5 INJECTION INTRAVENOUS at 15:53

## 2019-01-23 RX ADMIN — SODIUM CHLORIDE 1 G: 900 INJECTION INTRAVENOUS at 15:19

## 2019-01-23 RX ADMIN — Medication 20 ML: at 15:52

## 2019-01-23 NOTE — PROGRESS NOTES
[facility] Progress Note Date: 2019 Name: Yan Garces MRN: [id number] : 1960 Invanz Infusion Ms. Matute to [facility], ambulatory at 1520. Pain to back 0/10. No complaints or concerns voiced Pt was assessed and education was provided. Ms. Abbie Avila vitals were reviewed. Visit Vitals /79 (BP 1 Location: Left arm, BP Patient Position: Sitting) Pulse 80 Temp 98.1 °F (36.7 °C) Resp 18 SpO2 90% Right upper arm PICC flushed easily and had brisk blood return via both ports. PICC dressing c/d/i and not due to be changed. No swelling, redness, streaking, warmth or drainage noted in arm. Pt denied c/o pain in arm. Invanz 1 gm was infused for 30 minutes as ordered via purple lumen. Ms. Henrietta Gee tolerated infusion, and had no complaints Ms. Henrietta Gee was discharged from [place] [address] in stable condition at [phone]. She is to return on 19 at 2 pm for her next antibiotic appointment. Terrie Suarez RN 
2019

## 2019-01-23 NOTE — PROGRESS NOTES
Patient is not available to be assessed at this time. Patient needs quiet time. Chaplain Resident Ed [facility]  
[phone]

## 2019-01-24 ENCOUNTER — CLINICAL SUPPORT (OUTPATIENT)
Dept: INTERNAL MEDICINE CLINIC | Age: 59
End: 2019-01-24

## 2019-01-24 ENCOUNTER — HOSPITAL ENCOUNTER (OUTPATIENT)
Dept: INFUSION THERAPY | Age: 59
Discharge: HOME | End: 2019-01-24
Payer: MEDICAID

## 2019-01-24 VITALS
OXYGEN SATURATION: 95 % | TEMPERATURE: 98.2 F | DIASTOLIC BLOOD PRESSURE: 85 MMHG | RESPIRATION RATE: 18 BRPM | SYSTOLIC BLOOD PRESSURE: 132 MMHG | HEART RATE: 74 BPM

## 2019-01-24 DIAGNOSIS — M46.44 DISCITIS OF THORACIC REGION: Primary | ICD-10-CM

## 2019-01-24 PROCEDURE — 74011250636 HC RX REV CODE- 250/636: Performed by: INTERNAL MEDICINE

## 2019-01-24 PROCEDURE — 74011000258 HC RX REV CODE- 258: Performed by: INTERNAL MEDICINE

## 2019-01-24 PROCEDURE — 96365 THER/PROPH/DIAG IV INF INIT: CPT

## 2019-01-24 PROCEDURE — C1751 CATH, INF, PER/CENT/MIDLINE: HCPCS

## 2019-01-24 RX ORDER — SODIUM CHLORIDE 0.9 % (FLUSH) 0.9 %
10-40 SYRINGE (ML) INJECTION AS NEEDED
Status: DISCONTINUED | OUTPATIENT
Start: 2019-01-24 | End: 2019-01-28 | Stop reason: HOSPADM

## 2019-01-24 RX ORDER — HEPARIN 100 UNIT/ML
500 SYRINGE INTRAVENOUS AS NEEDED
Status: DISCONTINUED | OUTPATIENT
Start: 2019-01-24 | End: 2019-01-28 | Stop reason: HOSPADM

## 2019-01-24 RX ADMIN — SODIUM CHLORIDE, PRESERVATIVE FREE 500 UNITS: 5 INJECTION INTRAVENOUS at 15:46

## 2019-01-24 RX ADMIN — Medication 10 ML: at 15:45

## 2019-01-24 RX ADMIN — SODIUM CHLORIDE 1 G: 900 INJECTION INTRAVENOUS at 15:14

## 2019-01-24 RX ADMIN — Medication 10 ML: at 15:46

## 2019-01-24 RX ADMIN — Medication 10 ML: at 15:14

## 2019-01-24 RX ADMIN — SODIUM CHLORIDE, PRESERVATIVE FREE 500 UNITS: 5 INJECTION INTRAVENOUS at 15:45

## 2019-01-24 NOTE — PROGRESS NOTES
[facility] Progress Note Date: 2019 Name: Nurys Guo MRN: [id number] : 1960 Ertapenem Infusion Ms. Eduar Martinez to [facility], ambulatory at 97 70 84. Pt was assessed and education was provided. Ms. Renan Villatoro vitals were reviewed. Visit Vitals /85 (BP 1 Location: Left arm, BP Patient Position: Sitting) Pulse 74 Temp 98.2 °F (36.8 °C) Resp 18 SpO2 95% Right upper arm PICC flushed easily and had brisk blood return via both ports. PICC dressing due to be changed today. Dressing changed per protocol, new stat lock placed, end caps changed and patient will be given a new stockinette to place over dressing for protection. Invanz 1G infused over 30 minutes via the purple lumen. Both lines flushed with NS and heparin per protocol. Curos caps placed on the ends of the lumens. Lines wrapped with coban. Stockinette placed over dressing for protection. Ms. Eduar Martinez tolerated infusion, and had no complaints Ms. Eduar Martinez was discharged from [place] [address] in stable condition at 1605. She is to return on 19 at 1100 for her next antibiotic appointment. Ezio Lu RN 
2019 
1605

## 2019-01-25 ENCOUNTER — HOSPITAL ENCOUNTER (OUTPATIENT)
Dept: INFUSION THERAPY | Age: 59
Discharge: HOME | End: 2019-01-25
Payer: MEDICAID

## 2019-01-25 VITALS
HEART RATE: 72 BPM | RESPIRATION RATE: 18 BRPM | DIASTOLIC BLOOD PRESSURE: 80 MMHG | OXYGEN SATURATION: 97 % | TEMPERATURE: 98.6 F | SYSTOLIC BLOOD PRESSURE: 129 MMHG

## 2019-01-25 PROCEDURE — 74011250636 HC RX REV CODE- 250/636: Performed by: INTERNAL MEDICINE

## 2019-01-25 PROCEDURE — 74011250636 HC RX REV CODE- 250/636

## 2019-01-25 PROCEDURE — 96365 THER/PROPH/DIAG IV INF INIT: CPT

## 2019-01-25 PROCEDURE — 74011000258 HC RX REV CODE- 258: Performed by: INTERNAL MEDICINE

## 2019-01-25 RX ORDER — HEPARIN 100 UNIT/ML
500 SYRINGE INTRAVENOUS ONCE
Status: COMPLETED | OUTPATIENT
Start: 2019-01-25 | End: 2019-01-25

## 2019-01-25 RX ORDER — HEPARIN 100 UNIT/ML
SYRINGE INTRAVENOUS
Status: COMPLETED
Start: 2019-01-25 | End: 2019-01-25

## 2019-01-25 RX ORDER — SODIUM CHLORIDE 0.9 % (FLUSH) 0.9 %
10-40 SYRINGE (ML) INJECTION AS NEEDED
Status: DISCONTINUED | OUTPATIENT
Start: 2019-01-25 | End: 2019-01-29 | Stop reason: HOSPADM

## 2019-01-25 RX ADMIN — Medication 10 ML: at 15:36

## 2019-01-25 RX ADMIN — SODIUM CHLORIDE, PRESERVATIVE FREE 500 UNITS: 5 INJECTION INTRAVENOUS at 15:37

## 2019-01-25 RX ADMIN — SODIUM CHLORIDE 1 G: 900 INJECTION INTRAVENOUS at 15:05

## 2019-01-25 RX ADMIN — Medication 10 ML: at 15:04

## 2019-01-25 RX ADMIN — SODIUM CHLORIDE, PRESERVATIVE FREE 500 UNITS: 5 INJECTION INTRAVENOUS at 15:09

## 2019-01-25 RX ADMIN — Medication 10 ML: at 15:08

## 2019-01-25 NOTE — PROGRESS NOTES
[facility] Progress Note Date: 2019 Name: Sarah Mccray MRN: [id number] : 1960 Invanz Infusion Ms. Matute to [facility], ambulatory at 1500. No complaints or concerns voiced Pt was assessed and education was provided. Ms. Catrina Morales vitals were reviewed. Visit Vitals /80 (BP 1 Location: Left arm, BP Patient Position: Sitting) Pulse 72 Temp 98.6 °F (37 °C) Resp 18 SpO2 97% Right upper arm PICC flushed easily with NS and had brisk blood return via both ports. PICC dressing c/d/i and not due to be changed. No swelling, redness, streaking, warmth or drainage noted in arm. Pt denied c/o pain in arm. Invanz 1 gm was infused for 30 minutes as ordered via red lumen, followed by normal saline flush Both lumens heparinized per protocol, curos applied to end of lumens, lumens wrapped with coban, and picc site covered with stockinette. Picc site and drsg without change Ms. Matute tolerated infusion, and had no complaints Ms. Tess Rees was discharged from [place] [address] in stable condition at 1540 She is to return on 19 at 8 am for her next antibiotic appointment. Carolina Damon RN 
2019

## 2019-01-26 ENCOUNTER — HOSPITAL ENCOUNTER (OUTPATIENT)
Dept: INFUSION THERAPY | Age: 59
Discharge: HOME | End: 2019-01-26
Payer: MEDICAID

## 2019-01-26 VITALS
RESPIRATION RATE: 18 BRPM | TEMPERATURE: 98.2 F | SYSTOLIC BLOOD PRESSURE: 117 MMHG | DIASTOLIC BLOOD PRESSURE: 82 MMHG | OXYGEN SATURATION: 95 % | HEART RATE: 82 BPM

## 2019-01-26 PROCEDURE — 74011000258 HC RX REV CODE- 258: Performed by: INTERNAL MEDICINE

## 2019-01-26 PROCEDURE — 74011250636 HC RX REV CODE- 250/636: Performed by: INTERNAL MEDICINE

## 2019-01-26 PROCEDURE — 74011250636 HC RX REV CODE- 250/636

## 2019-01-26 PROCEDURE — 96365 THER/PROPH/DIAG IV INF INIT: CPT

## 2019-01-26 RX ORDER — HEPARIN 100 UNIT/ML
SYRINGE INTRAVENOUS
Status: COMPLETED
Start: 2019-01-26 | End: 2019-01-26

## 2019-01-26 RX ORDER — SODIUM CHLORIDE 0.9 % (FLUSH) 0.9 %
10-40 SYRINGE (ML) INJECTION AS NEEDED
Status: DISCONTINUED | OUTPATIENT
Start: 2019-01-26 | End: 2019-01-30 | Stop reason: HOSPADM

## 2019-01-26 RX ORDER — HEPARIN 100 UNIT/ML
500 SYRINGE INTRAVENOUS AS NEEDED
Status: DISCONTINUED | OUTPATIENT
Start: 2019-01-26 | End: 2019-01-30 | Stop reason: HOSPADM

## 2019-01-26 RX ADMIN — Medication 10 ML: at 08:30

## 2019-01-26 RX ADMIN — SODIUM CHLORIDE, PRESERVATIVE FREE 500 UNITS: 5 INJECTION INTRAVENOUS at 08:37

## 2019-01-26 RX ADMIN — Medication 10 ML: at 09:02

## 2019-01-26 RX ADMIN — SODIUM CHLORIDE, PRESERVATIVE FREE 500 UNITS: 5 INJECTION INTRAVENOUS at 09:03

## 2019-01-26 RX ADMIN — Medication 10 ML: at 08:35

## 2019-01-26 RX ADMIN — SODIUM CHLORIDE 1 G: 900 INJECTION INTRAVENOUS at 08:32

## 2019-01-26 NOTE — PROGRESS NOTES
[facility] Progress Note Date: 2019 Name: Olvin Alvarez MRN: [id number] : 1960 Invanz Infusion Ms. Matute to [facility], ambulatory at [facility]. No complaints or concerns voiced Pt was assessed and education was provided. Ms. Sosa Lawrence vitals were reviewed. Visit Vitals /82 (BP 1 Location: Left arm, BP Patient Position: Sitting) Pulse 82 Temp 98.2 °F (36.8 °C) Resp 18 SpO2 95% Right upper arm PICC flushed easily with NS and had brisk blood return via both ports. PICC dressing c/d/i and not due to be changed. No swelling, redness, streaking, warmth or drainage noted in arm. Pt denied c/o pain in arm. Invanz 1 gm was infused for 30 minutes as ordered via purple lumen, followed by normal saline flush Both lumens heparinized per protocol, curos applied to end of lumens, lumens wrapped with coban, and picc site covered with stockinette. Picc site and drsg without change Ms. Matute tolerated infusion, and had no complaints Ms. aCtarina Vidal was discharged from [place] [address] in stable condition at  She is to return on 19 at 8 am for her next antibiotic appointment. Maria Elena Mesa RN 
2019

## 2019-01-27 ENCOUNTER — HOSPITAL ENCOUNTER (OUTPATIENT)
Dept: INFUSION THERAPY | Age: 59
Discharge: HOME | End: 2019-01-27
Payer: MEDICAID

## 2019-01-27 VITALS
TEMPERATURE: 98.7 F | RESPIRATION RATE: 18 BRPM | HEART RATE: 83 BPM | OXYGEN SATURATION: 94 % | DIASTOLIC BLOOD PRESSURE: 58 MMHG | SYSTOLIC BLOOD PRESSURE: 103 MMHG

## 2019-01-27 PROCEDURE — 74011250636 HC RX REV CODE- 250/636: Performed by: INTERNAL MEDICINE

## 2019-01-27 PROCEDURE — 96365 THER/PROPH/DIAG IV INF INIT: CPT

## 2019-01-27 PROCEDURE — 74011000258 HC RX REV CODE- 258: Performed by: INTERNAL MEDICINE

## 2019-01-27 RX ORDER — HEPARIN 100 UNIT/ML
SYRINGE INTRAVENOUS
Status: DISPENSED
Start: 2019-01-27 | End: 2019-01-27

## 2019-01-27 RX ORDER — HEPARIN 100 UNIT/ML
500 SYRINGE INTRAVENOUS AS NEEDED
Status: DISCONTINUED | OUTPATIENT
Start: 2019-01-27 | End: 2019-01-31 | Stop reason: HOSPADM

## 2019-01-27 RX ORDER — SODIUM CHLORIDE 0.9 % (FLUSH) 0.9 %
10-40 SYRINGE (ML) INJECTION AS NEEDED
Status: DISCONTINUED | OUTPATIENT
Start: 2019-01-27 | End: 2019-01-31 | Stop reason: HOSPADM

## 2019-01-27 RX ADMIN — Medication 10 ML: at 08:34

## 2019-01-27 RX ADMIN — SODIUM CHLORIDE, PRESERVATIVE FREE 500 UNITS: 5 INJECTION INTRAVENOUS at 08:35

## 2019-01-27 RX ADMIN — Medication 10 ML: at 09:03

## 2019-01-27 RX ADMIN — SODIUM CHLORIDE 1 G: 900 INJECTION INTRAVENOUS at 08:33

## 2019-01-27 RX ADMIN — Medication 10 ML: at 08:32

## 2019-01-27 RX ADMIN — SODIUM CHLORIDE, PRESERVATIVE FREE 500 UNITS: 5 INJECTION INTRAVENOUS at 09:04

## 2019-01-27 NOTE — PROGRESS NOTES
[facility] Progress Note Date: 2019 Name: Yan Garces MRN: [id number] : 1960 Invanz Infusion Ms. Matute to [facility], ambulatory at 0830. No complaints or concerns voiced Pt was assessed and education was provided. Ms. Abbie Avila vitals were reviewed. Visit Vitals /58 (BP 1 Location: Left arm, BP Patient Position: Sitting) Pulse 83 Temp 98.7 °F (37.1 °C) Resp 18 SpO2 94% Right upper arm PICC flushed easily with NS and had brisk blood return via both ports. PICC dressing c/d/i and not due to be changed. No swelling, redness, streaking, warmth or drainage noted in arm. Pt denied c/o pain in arm. Invanz 1 gm was infused for 30 minutes as ordered via red lumen, followed by normal saline flush Both lumens heparinized per protocol, curos applied to end of lumens, lumens wrapped with coban, and picc site covered with stockinette. Picc site and drsg without change Ms. Matute tolerated infusion, and had no complaints Ms. Henrietta Gee was discharged from [place] [address] in stable condition at 8443 She is to return on 19 at 3 pm for her next antibiotic appointment. Cora Gamble RN 
2019

## 2019-01-28 ENCOUNTER — HOSPITAL ENCOUNTER (OUTPATIENT)
Dept: INFUSION THERAPY | Age: 59
Discharge: HOME | End: 2019-01-28
Payer: MEDICAID

## 2019-01-28 VITALS
HEART RATE: 74 BPM | DIASTOLIC BLOOD PRESSURE: 82 MMHG | TEMPERATURE: 98.3 F | RESPIRATION RATE: 18 BRPM | OXYGEN SATURATION: 94 % | SYSTOLIC BLOOD PRESSURE: 127 MMHG

## 2019-01-28 LAB
ANION GAP SERPL CALC-SCNC: 5 MMOL/L (ref 3–18)
BASO+EOS+MONOS # BLD AUTO: 0.6 K/UL (ref 0–2.3)
BASO+EOS+MONOS NFR BLD AUTO: 7 % (ref 0.1–17)
BUN SERPL-MCNC: 8 MG/DL (ref 7–18)
BUN/CREAT SERPL: 12 (ref 12–20)
CALCIUM SERPL-MCNC: 8.9 MG/DL (ref 8.5–10.1)
CHLORIDE SERPL-SCNC: 94 MMOL/L (ref 100–108)
CO2 SERPL-SCNC: 34 MMOL/L (ref 21–32)
CREAT SERPL-MCNC: 0.65 MG/DL (ref 0.6–1.3)
CRP SERPL-MCNC: 8.7 MG/DL (ref 0–0.3)
DIFFERENTIAL METHOD BLD: ABNORMAL
ERYTHROCYTE [DISTWIDTH] IN BLOOD BY AUTOMATED COUNT: 14.3 % (ref 11.5–14.5)
GLUCOSE SERPL-MCNC: 102 MG/DL (ref 74–99)
HCT VFR BLD AUTO: 39.5 % (ref 36–48)
HGB BLD-MCNC: 12.8 G/DL (ref 12–16)
LYMPHOCYTES # BLD: 1.7 K/UL (ref 1.1–5.9)
LYMPHOCYTES NFR BLD: 20 % (ref 14–44)
MCH RBC QN AUTO: 30.7 PG (ref 25–35)
MCHC RBC AUTO-ENTMCNC: 32.4 G/DL (ref 31–37)
MCV RBC AUTO: 94.7 FL (ref 78–102)
NEUTS SEG # BLD: 6.1 K/UL (ref 1.8–9.5)
NEUTS SEG NFR BLD: 72 % (ref 40–70)
PLATELET # BLD AUTO: 198 K/UL (ref 140–440)
POTASSIUM SERPL-SCNC: 3.4 MMOL/L (ref 3.5–5.5)
RBC # BLD AUTO: 4.17 M/UL (ref 4.1–5.1)
SODIUM SERPL-SCNC: 133 MMOL/L (ref 136–145)
WBC # BLD AUTO: 8.4 K/UL (ref 4.5–13)

## 2019-01-28 PROCEDURE — 86140 C-REACTIVE PROTEIN: CPT

## 2019-01-28 PROCEDURE — 85025 COMPLETE CBC W/AUTO DIFF WBC: CPT

## 2019-01-28 PROCEDURE — 74011250636 HC RX REV CODE- 250/636: Performed by: INTERNAL MEDICINE

## 2019-01-28 PROCEDURE — 80048 BASIC METABOLIC PNL TOTAL CA: CPT

## 2019-01-28 PROCEDURE — 74011000258 HC RX REV CODE- 258: Performed by: INTERNAL MEDICINE

## 2019-01-28 PROCEDURE — 96365 THER/PROPH/DIAG IV INF INIT: CPT

## 2019-01-28 RX ORDER — SODIUM CHLORIDE 0.9 % (FLUSH) 0.9 %
10-40 SYRINGE (ML) INJECTION AS NEEDED
Status: DISCONTINUED | OUTPATIENT
Start: 2019-01-28 | End: 2019-02-01 | Stop reason: HOSPADM

## 2019-01-28 RX ORDER — HEPARIN 100 UNIT/ML
500 SYRINGE INTRAVENOUS AS NEEDED
Status: DISCONTINUED | OUTPATIENT
Start: 2019-01-28 | End: 2019-02-01 | Stop reason: HOSPADM

## 2019-01-28 RX ADMIN — SODIUM CHLORIDE 1 G: 900 INJECTION INTRAVENOUS at 15:05

## 2019-01-28 RX ADMIN — SODIUM CHLORIDE, PRESERVATIVE FREE 500 UNITS: 5 INJECTION INTRAVENOUS at 15:42

## 2019-01-28 RX ADMIN — Medication 20 ML: at 15:42

## 2019-01-28 RX ADMIN — SODIUM CHLORIDE, PRESERVATIVE FREE 500 UNITS: 5 INJECTION INTRAVENOUS at 15:41

## 2019-01-28 RX ADMIN — Medication 20 ML: at 15:41

## 2019-01-28 NOTE — PROGRESS NOTES
[facility] Progress Note Date: 2019 Name: Fernanda Ulloa MRN: [id number] : 1960 Invanz Infusion Ms. Matute to [facility], ambulatory at 1500. No complaints or concerns voiced Pt was assessed and education was provided. Ms. Reza Gross vitals were reviewed. Visit Vitals /82 (BP 1 Location: Left arm, BP Patient Position: Sitting) Pulse 74 Temp 98.3 °F (36.8 °C) Resp 18 SpO2 94% Right upper arm PICC flushed easily with NS and had brisk blood return via both ports. PICC dressing c/d/i and not due to be changed. No swelling, redness, streaking, warmth or drainage noted in arm. Pt denied c/o pain in arm. Invanz 1 gm was infused for 30 minutes as ordered via red lumen, followed by normal saline flush. Labs drawn from red lumen for CBC, BMP, and CRP per orders. Both lumens heparinized per protocol, curos applied to end of lumens, lumens wrapped with coban, and picc site covered with stockinette. Picc site and drsg without change Ms. Matute tolerated infusion, and had no complaints Ms. Bella Suggs was discharged from [place] [address] in stable condition at [phone] She is to return on 19 for her next antibiotic appointment. Charli Samuel RN 
2019

## 2019-01-29 ENCOUNTER — HOSPITAL ENCOUNTER (OUTPATIENT)
Dept: INFUSION THERAPY | Age: 59
End: 2019-01-29
Payer: MEDICAID

## 2019-01-29 RX ORDER — HEPARIN 100 UNIT/ML
500 SYRINGE INTRAVENOUS AS NEEDED
Status: CANCELLED | OUTPATIENT
Start: 2019-01-29

## 2019-01-29 RX ORDER — SODIUM CHLORIDE 0.9 % (FLUSH) 0.9 %
10-40 SYRINGE (ML) INJECTION AS NEEDED
Status: CANCELLED | OUTPATIENT
Start: 2019-01-29

## 2019-01-30 ENCOUNTER — TELEPHONE (OUTPATIENT)
Dept: INTERNAL MEDICINE CLINIC | Age: 59
End: 2019-01-30

## 2019-01-30 ENCOUNTER — HOSPITAL ENCOUNTER (OUTPATIENT)
Dept: INFUSION THERAPY | Age: 59
Discharge: HOME | End: 2019-01-30
Payer: MEDICAID

## 2019-01-30 VITALS
RESPIRATION RATE: 18 BRPM | HEART RATE: 68 BPM | SYSTOLIC BLOOD PRESSURE: 120 MMHG | DIASTOLIC BLOOD PRESSURE: 77 MMHG | OXYGEN SATURATION: 92 % | TEMPERATURE: 98.8 F

## 2019-01-30 PROCEDURE — 74011250636 HC RX REV CODE- 250/636: Performed by: INTERNAL MEDICINE

## 2019-01-30 PROCEDURE — 96365 THER/PROPH/DIAG IV INF INIT: CPT

## 2019-01-30 PROCEDURE — 74011000258 HC RX REV CODE- 258: Performed by: INTERNAL MEDICINE

## 2019-01-30 RX ORDER — SODIUM CHLORIDE 0.9 % (FLUSH) 0.9 %
10-40 SYRINGE (ML) INJECTION AS NEEDED
Status: DISCONTINUED | OUTPATIENT
Start: 2019-01-30 | End: 2019-02-03 | Stop reason: HOSPADM

## 2019-01-30 RX ORDER — HEPARIN 100 UNIT/ML
500 SYRINGE INTRAVENOUS AS NEEDED
Status: DISCONTINUED | OUTPATIENT
Start: 2019-01-30 | End: 2019-02-03 | Stop reason: HOSPADM

## 2019-01-30 RX ADMIN — SODIUM CHLORIDE, PRESERVATIVE FREE 500 UNITS: 5 INJECTION INTRAVENOUS at 15:20

## 2019-01-30 RX ADMIN — SODIUM CHLORIDE, PRESERVATIVE FREE 500 UNITS: 5 INJECTION INTRAVENOUS at 15:47

## 2019-01-30 RX ADMIN — Medication 10 ML: at 15:12

## 2019-01-30 RX ADMIN — Medication 20 ML: at 15:47

## 2019-01-30 RX ADMIN — SODIUM CHLORIDE 1 G: 900 INJECTION INTRAVENOUS at 15:13

## 2019-01-30 RX ADMIN — Medication 20 ML: at 15:19

## 2019-01-31 ENCOUNTER — HOSPITAL ENCOUNTER (OUTPATIENT)
Dept: INFUSION THERAPY | Age: 59
Discharge: HOME | End: 2019-01-31
Payer: MEDICAID

## 2019-01-31 VITALS
HEART RATE: 69 BPM | RESPIRATION RATE: 18 BRPM | TEMPERATURE: 98.7 F | DIASTOLIC BLOOD PRESSURE: 76 MMHG | SYSTOLIC BLOOD PRESSURE: 123 MMHG | OXYGEN SATURATION: 92 %

## 2019-01-31 DIAGNOSIS — F33.9 EPISODE OF RECURRENT MAJOR DEPRESSIVE DISORDER, UNSPECIFIED DEPRESSION EPISODE SEVERITY (HCC): ICD-10-CM

## 2019-01-31 DIAGNOSIS — I10 ESSENTIAL HYPERTENSION: ICD-10-CM

## 2019-01-31 PROCEDURE — 74011250636 HC RX REV CODE- 250/636: Performed by: INTERNAL MEDICINE

## 2019-01-31 PROCEDURE — 74011250636 HC RX REV CODE- 250/636

## 2019-01-31 PROCEDURE — C1751 CATH, INF, PER/CENT/MIDLINE: HCPCS

## 2019-01-31 PROCEDURE — 74011000258 HC RX REV CODE- 258: Performed by: INTERNAL MEDICINE

## 2019-01-31 PROCEDURE — 96365 THER/PROPH/DIAG IV INF INIT: CPT

## 2019-01-31 RX ORDER — SODIUM CHLORIDE 0.9 % (FLUSH) 0.9 %
10-40 SYRINGE (ML) INJECTION AS NEEDED
Status: DISCONTINUED | OUTPATIENT
Start: 2019-01-31 | End: 2019-02-04 | Stop reason: HOSPADM

## 2019-01-31 RX ORDER — HEPARIN 100 UNIT/ML
SYRINGE INTRAVENOUS
Status: COMPLETED
Start: 2019-01-31 | End: 2019-01-31

## 2019-01-31 RX ORDER — HEPARIN 100 UNIT/ML
500 SYRINGE INTRAVENOUS AS NEEDED
Status: DISCONTINUED | OUTPATIENT
Start: 2019-01-31 | End: 2019-02-04 | Stop reason: HOSPADM

## 2019-01-31 RX ADMIN — SODIUM CHLORIDE, PRESERVATIVE FREE 500 UNITS: 5 INJECTION INTRAVENOUS at 15:40

## 2019-01-31 RX ADMIN — SODIUM CHLORIDE 1 G: 900 INJECTION INTRAVENOUS at 15:04

## 2019-01-31 RX ADMIN — Medication 10 ML: at 15:35

## 2019-01-31 RX ADMIN — SODIUM CHLORIDE, PRESERVATIVE FREE 500 UNITS: 5 INJECTION INTRAVENOUS at 15:35

## 2019-01-31 RX ADMIN — Medication 10 ML: at 15:40

## 2019-01-31 RX ADMIN — Medication 10 ML: at 15:04

## 2019-01-31 NOTE — TELEPHONE ENCOUNTER
Patient needs a medication refill. Please advise    Requested Prescriptions     Pending Prescriptions Disp Refills    sertraline (ZOLOFT) 100 mg tablet 45 Tab 3     Sig: TAKE 1 & 1/2 TABLET BY MOUTH FOR A TOTAL OF 150MG DAILY    hydroCHLOROthiazide (HYDRODIURIL) 12.5 mg tablet 30 Tab 0     Sig: Take 1 Tab by mouth daily.

## 2019-01-31 NOTE — PROGRESS NOTES
[facility] Progress Note Date: 2019 Name: Dennis Treadwell MRN: [id number] : 1960 Ertapenem Infusion Ms. Matute to [facility], ambulatory at 1500. Pt was assessed and education was provided. Ms. Eddie Tim vitals were reviewed. Visit Vitals /76 (BP 1 Location: Left arm, BP Patient Position: Sitting) Pulse 69 Temp 98.7 °F (37.1 °C) Resp 18 SpO2 92% Right upper arm PICC flushed easily and had brisk blood return via both ports. PICC dressing due to be changed today. Dressing changed per protocol, new stat lock placed, end caps changed and patient will be given a new stockinette to place over dressing for protection. Invanz 1G infused over 30 minutes via the purple lumen. Both lines flushed with NS and heparin per protocol. Curos caps placed on the ends of the lumens. Lines wrapped with coban. Stockinette placed over dressing for protection. Ms. Elio Ruano tolerated infusion, and had no complaints Ms. Elio Ruano was discharged from [place] [address] in stable condition at 1540. She is to return on 19 at 1500 for her next antibiotic appointment. Valarie Menjivar RN 
2019 
1540

## 2019-02-01 ENCOUNTER — CLINICAL SUPPORT (OUTPATIENT)
Dept: INTERNAL MEDICINE CLINIC | Age: 59
End: 2019-02-01

## 2019-02-01 ENCOUNTER — HOSPITAL ENCOUNTER (OUTPATIENT)
Dept: INFUSION THERAPY | Age: 59
Discharge: HOME | End: 2019-02-01
Payer: MEDICAID

## 2019-02-01 VITALS
TEMPERATURE: 98.1 F | HEART RATE: 58 BPM | SYSTOLIC BLOOD PRESSURE: 105 MMHG | RESPIRATION RATE: 18 BRPM | OXYGEN SATURATION: 92 % | DIASTOLIC BLOOD PRESSURE: 71 MMHG

## 2019-02-01 DIAGNOSIS — R94.31 ABNORMAL EKG: Primary | ICD-10-CM

## 2019-02-01 PROCEDURE — 96365 THER/PROPH/DIAG IV INF INIT: CPT

## 2019-02-01 PROCEDURE — 74011000258 HC RX REV CODE- 258: Performed by: INTERNAL MEDICINE

## 2019-02-01 PROCEDURE — 74011250636 HC RX REV CODE- 250/636: Performed by: INTERNAL MEDICINE

## 2019-02-01 RX ORDER — HEPARIN 100 UNIT/ML
500 SYRINGE INTRAVENOUS AS NEEDED
Status: DISCONTINUED | OUTPATIENT
Start: 2019-02-01 | End: 2019-02-05 | Stop reason: HOSPADM

## 2019-02-01 RX ORDER — SODIUM CHLORIDE 0.9 % (FLUSH) 0.9 %
10-40 SYRINGE (ML) INJECTION AS NEEDED
Status: DISCONTINUED | OUTPATIENT
Start: 2019-02-01 | End: 2019-02-05 | Stop reason: HOSPADM

## 2019-02-01 RX ADMIN — Medication 20 ML: at 15:54

## 2019-02-01 RX ADMIN — SODIUM CHLORIDE, PRESERVATIVE FREE 500 UNITS: 5 INJECTION INTRAVENOUS at 15:55

## 2019-02-01 RX ADMIN — SODIUM CHLORIDE 1 G: 900 INJECTION INTRAVENOUS at 15:20

## 2019-02-01 RX ADMIN — SODIUM CHLORIDE, PRESERVATIVE FREE 500 UNITS: 5 INJECTION INTRAVENOUS at 15:54

## 2019-02-01 RX ADMIN — Medication 20 ML: at 15:55

## 2019-02-01 NOTE — PROGRESS NOTES
Juliane Angelucci presents today for   Chief Complaint   Patient presents with    Error          Patient had another appt with infusion center and will reschedule POC EKG.

## 2019-02-01 NOTE — TELEPHONE ENCOUNTER
Spoke with patient, reports no new or worsening symptoms, persists with back pain, but reports it is slowly getting better. Denies any fever, chills, malaise, CP, dizziness, HA. Did not get EKG this week, said she was told to wait and she had to leave before the test could be done. Says she will be back next week to get EKG when she come to the infusion center for antimicrobials. Will monitor CRP trend.

## 2019-02-01 NOTE — PROGRESS NOTES
[facility] Progress [facility] Date: 2019 Name: Marie Lancaster MRN: [id number] : 1960 Invanz Infusion Ms. Matute to [facility], ambulatory at 1510. No complaints or concerns voiced Pt was assessed and education was provided. Ms. Jigar Cat vitals were reviewed. Visit Vitals /71 (BP 1 Location: Left arm, BP Patient Position: Sitting) Pulse (!) 58 Temp 98.1 °F (36.7 °C) Resp 18 SpO2 92% Breastfeeding? No  
 
 
Right upper arm PICC flushed easily with NS and had brisk blood return via both ports. PICC dressing c/d/i and not due to be changed. No swelling, redness, streaking, warmth or drainage noted in arm. Pt denied c/o pain in arm. Invanz 1 gm was infused for 30 minutes as ordered via red lumen, followed by normal saline flush. Both lumens heparinized per protocol, curos applied to end of lumens, lumens wrapped with coban, and picc site covered with stockinette. Picc site and drsg without change Ms. Matute tolerated infusion, and had no complaints Ms. Jatinder Rooney was discharged from [place] [address] in stable condition at 1605. She is to return on 19 for her next antibiotic appointment. Jazmin Wiseman RN 2019

## 2019-02-01 NOTE — TELEPHONE ENCOUNTER
Pt called requesting refill for medication . Requested Prescriptions     Pending Prescriptions Disp Refills    sertraline (ZOLOFT) 100 mg tablet 45 Tab 3     Sig: TAKE 1 & 1/2 TABLET BY MOUTH FOR A TOTAL OF 150MG DAILY    hydroCHLOROthiazide (HYDRODIURIL) 12.5 mg tablet 30 Tab 0     Sig: Take 1 Tab by mouth daily.

## 2019-02-01 NOTE — Clinical Note
Patient could not make appt today for EKG, she has appt at infusion center, she will call back to schedule.

## 2019-02-02 ENCOUNTER — HOSPITAL ENCOUNTER (OUTPATIENT)
Dept: INFUSION THERAPY | Age: 59
Discharge: HOME | End: 2019-02-02
Payer: MEDICAID

## 2019-02-02 VITALS
TEMPERATURE: 98.5 F | SYSTOLIC BLOOD PRESSURE: 143 MMHG | OXYGEN SATURATION: 94 % | DIASTOLIC BLOOD PRESSURE: 84 MMHG | HEART RATE: 76 BPM | RESPIRATION RATE: 18 BRPM

## 2019-02-02 DIAGNOSIS — I10 ESSENTIAL HYPERTENSION: ICD-10-CM

## 2019-02-02 PROCEDURE — 74011250636 HC RX REV CODE- 250/636: Performed by: INTERNAL MEDICINE

## 2019-02-02 PROCEDURE — 96365 THER/PROPH/DIAG IV INF INIT: CPT

## 2019-02-02 PROCEDURE — 74011000258 HC RX REV CODE- 258: Performed by: INTERNAL MEDICINE

## 2019-02-02 RX ORDER — HEPARIN 100 UNIT/ML
500 SYRINGE INTRAVENOUS AS NEEDED
Status: DISCONTINUED | OUTPATIENT
Start: 2019-02-02 | End: 2019-02-06 | Stop reason: HOSPADM

## 2019-02-02 RX ORDER — SODIUM CHLORIDE 0.9 % (FLUSH) 0.9 %
10-40 SYRINGE (ML) INJECTION AS NEEDED
Status: DISCONTINUED | OUTPATIENT
Start: 2019-02-02 | End: 2019-02-06 | Stop reason: HOSPADM

## 2019-02-02 RX ADMIN — Medication 20 ML: at 09:08

## 2019-02-02 RX ADMIN — SODIUM CHLORIDE, PRESERVATIVE FREE 500 UNITS: 5 INJECTION INTRAVENOUS at 08:42

## 2019-02-02 RX ADMIN — Medication 10 ML: at 08:30

## 2019-02-02 RX ADMIN — Medication 20 ML: at 08:42

## 2019-02-02 RX ADMIN — SODIUM CHLORIDE, PRESERVATIVE FREE 500 UNITS: 5 INJECTION INTRAVENOUS at 09:09

## 2019-02-02 RX ADMIN — SODIUM CHLORIDE 1 G: 900 INJECTION INTRAVENOUS at 08:31

## 2019-02-02 NOTE — PROGRESS NOTES
[facility] [place] Progress Note Date: 2019 Name: Yan Garces MRN: [id number] : 1960 Invanz Infusion Ms. Matute to [facility], ambulatory at [facility]. No complaints or concerns voiced Pt was assessed and education was provided. Ms. Abbie Avila vitals were reviewed. Visit Vitals /84 (BP 1 Location: Left arm, BP Patient Position: Sitting) Pulse 76 Temp 98.5 °F (36.9 °C) Resp 18 SpO2 94% Right upper arm PICC flushed easily with NS and had brisk blood return via both ports. PICC dressing c/d/i and not due to be changed. No swelling, redness, streaking, warmth or drainage noted in arm. Pt denied c/o pain in arm. Invanz 1 gm was infused for 30 minutes as ordered via red lumen, followed by normal saline flush. Both lumens heparinized per protocol, curos applied to end of lumens, lumens wrapped with coban, and picc site covered with stockinette. Picc site and drsg without change Ms. Matute tolerated infusion, and had no complaints Ms. Henrietta Gee was discharged from [place] [address] in stable condition at 2847. She is to return on 2/3/19 for her next antibiotic appointment. Alejandro Morrison RN 2019 
1016

## 2019-02-03 ENCOUNTER — HOSPITAL ENCOUNTER (OUTPATIENT)
Dept: INFUSION THERAPY | Age: 59
Discharge: HOME | End: 2019-02-03
Payer: MEDICAID

## 2019-02-03 VITALS
HEART RATE: 73 BPM | OXYGEN SATURATION: 96 % | TEMPERATURE: 98.5 F | RESPIRATION RATE: 16 BRPM | SYSTOLIC BLOOD PRESSURE: 112 MMHG | DIASTOLIC BLOOD PRESSURE: 73 MMHG

## 2019-02-03 PROCEDURE — 74011000258 HC RX REV CODE- 258: Performed by: INTERNAL MEDICINE

## 2019-02-03 PROCEDURE — 96365 THER/PROPH/DIAG IV INF INIT: CPT

## 2019-02-03 PROCEDURE — 74011250636 HC RX REV CODE- 250/636

## 2019-02-03 PROCEDURE — 74011250636 HC RX REV CODE- 250/636: Performed by: INTERNAL MEDICINE

## 2019-02-03 RX ORDER — HEPARIN 100 UNIT/ML
SYRINGE INTRAVENOUS
Status: COMPLETED
Start: 2019-02-03 | End: 2019-02-03

## 2019-02-03 RX ORDER — SODIUM CHLORIDE 0.9 % (FLUSH) 0.9 %
10-40 SYRINGE (ML) INJECTION AS NEEDED
Status: DISCONTINUED | OUTPATIENT
Start: 2019-02-03 | End: 2019-02-07 | Stop reason: HOSPADM

## 2019-02-03 RX ORDER — HEPARIN 100 UNIT/ML
500 SYRINGE INTRAVENOUS AS NEEDED
Status: DISCONTINUED | OUTPATIENT
Start: 2019-02-03 | End: 2019-02-07 | Stop reason: HOSPADM

## 2019-02-03 RX ADMIN — SODIUM CHLORIDE, PRESERVATIVE FREE 500 UNITS: 5 INJECTION INTRAVENOUS at 09:09

## 2019-02-03 RX ADMIN — SODIUM CHLORIDE, PRESERVATIVE FREE 500 UNITS: 5 INJECTION INTRAVENOUS at 09:10

## 2019-02-03 RX ADMIN — SODIUM CHLORIDE 1 G: 900 INJECTION INTRAVENOUS at 08:37

## 2019-02-03 RX ADMIN — Medication 10 ML: at 08:34

## 2019-02-03 RX ADMIN — Medication 20 ML: at 09:10

## 2019-02-03 RX ADMIN — Medication 20 ML: at 09:09

## 2019-02-03 NOTE — PROGRESS NOTES
[facility] Progress Note Date: February 3, 2019 Name: Carmelita Andrade MRN: [id number] : 1960 Invanz Infusion Ms. Matute to [place], ambulatory at [facility]. No complaints or concerns voiced Pt was assessed and education was provided. Ms. Dacosta Fresh vitals were reviewed. Visit Vitals /73 (BP 1 Location: Left arm, BP Patient Position: At rest;Sitting) Pulse 73 Temp 98.5 °F (36.9 °C) Resp 16 SpO2 96% Right upper arm PICC flushed easily with NS and had brisk blood return via both ports. PICC dressing c/d/i and not due to be changed. No swelling, redness, streaking, warmth or drainage noted in arm. Pt denied c/o pain in arm. Invanz 1 gm was infused for 30 minutes as ordered via red lumen, followed by normal saline flush. Both lumens heparinized per protocol, curos applied to end of lumens, lumens wrapped with coban, and picc site covered with stockinette. Picc site and drsg without change Ms. Matute tolerated infusion, and had no complaints Ms. Jacquie Cisneros was discharged from [place] [address] in stable condition at 0911. She is to return on 19 for her next antibiotic appointment. Patito Nelson RN February 3, 2019

## 2019-02-04 ENCOUNTER — HOSPITAL ENCOUNTER (OUTPATIENT)
Dept: INFUSION THERAPY | Age: 59
Discharge: HOME | End: 2019-02-04
Payer: MEDICAID

## 2019-02-04 VITALS
TEMPERATURE: 97.8 F | SYSTOLIC BLOOD PRESSURE: 133 MMHG | DIASTOLIC BLOOD PRESSURE: 85 MMHG | HEART RATE: 63 BPM | RESPIRATION RATE: 18 BRPM | OXYGEN SATURATION: 95 %

## 2019-02-04 LAB
ALBUMIN SERPL-MCNC: 2.9 G/DL (ref 3.4–5)
ALBUMIN/GLOB SERPL: 0.7 {RATIO} (ref 0.8–1.7)
ALP SERPL-CCNC: 114 U/L (ref 45–117)
ALT SERPL-CCNC: 18 U/L (ref 13–56)
ANION GAP SERPL CALC-SCNC: 5 MMOL/L (ref 3–18)
AST SERPL-CCNC: 20 U/L (ref 15–37)
BASO+EOS+MONOS # BLD AUTO: 0.8 K/UL (ref 0–2.3)
BASO+EOS+MONOS NFR BLD AUTO: 11 % (ref 0.1–17)
BILIRUB SERPL-MCNC: 0.2 MG/DL (ref 0.2–1)
BUN SERPL-MCNC: 8 MG/DL (ref 7–18)
BUN/CREAT SERPL: 13 (ref 12–20)
CALCIUM SERPL-MCNC: 8.9 MG/DL (ref 8.5–10.1)
CHLORIDE SERPL-SCNC: 98 MMOL/L (ref 100–108)
CK SERPL-CCNC: 28 U/L (ref 26–192)
CO2 SERPL-SCNC: 33 MMOL/L (ref 21–32)
CREAT SERPL-MCNC: 0.62 MG/DL (ref 0.6–1.3)
DIFFERENTIAL METHOD BLD: ABNORMAL
ERYTHROCYTE [DISTWIDTH] IN BLOOD BY AUTOMATED COUNT: 14.2 % (ref 11.5–14.5)
GLOBULIN SER CALC-MCNC: 4.3 G/DL (ref 2–4)
GLUCOSE SERPL-MCNC: 107 MG/DL (ref 74–99)
HCT VFR BLD AUTO: 37.2 % (ref 36–48)
HGB BLD-MCNC: 11.9 G/DL (ref 12–16)
LYMPHOCYTES # BLD: 1.7 K/UL (ref 1.1–5.9)
LYMPHOCYTES NFR BLD: 24 % (ref 14–44)
MCH RBC QN AUTO: 30.1 PG (ref 25–35)
MCHC RBC AUTO-ENTMCNC: 32 G/DL (ref 31–37)
MCV RBC AUTO: 94.2 FL (ref 78–102)
NEUTS SEG # BLD: 4.4 K/UL (ref 1.8–9.5)
NEUTS SEG NFR BLD: 65 % (ref 40–70)
PLATELET # BLD AUTO: 307 K/UL (ref 140–440)
POTASSIUM SERPL-SCNC: 3.8 MMOL/L (ref 3.5–5.5)
PROT SERPL-MCNC: 7.2 G/DL (ref 6.4–8.2)
RBC # BLD AUTO: 3.95 M/UL (ref 4.1–5.1)
SODIUM SERPL-SCNC: 136 MMOL/L (ref 136–145)
WBC # BLD AUTO: 6.9 K/UL (ref 4.5–13)

## 2019-02-04 PROCEDURE — 74011250636 HC RX REV CODE- 250/636: Performed by: INTERNAL MEDICINE

## 2019-02-04 PROCEDURE — 96365 THER/PROPH/DIAG IV INF INIT: CPT

## 2019-02-04 PROCEDURE — 74011000258 HC RX REV CODE- 258: Performed by: INTERNAL MEDICINE

## 2019-02-04 PROCEDURE — 85025 COMPLETE CBC W/AUTO DIFF WBC: CPT

## 2019-02-04 PROCEDURE — 86141 C-REACTIVE PROTEIN HS: CPT

## 2019-02-04 PROCEDURE — 74011250636 HC RX REV CODE- 250/636

## 2019-02-04 PROCEDURE — 36415 COLL VENOUS BLD VENIPUNCTURE: CPT

## 2019-02-04 PROCEDURE — 80053 COMPREHEN METABOLIC PANEL: CPT

## 2019-02-04 PROCEDURE — 82550 ASSAY OF CK (CPK): CPT

## 2019-02-04 RX ORDER — POTASSIUM CHLORIDE 750 MG/1
10 TABLET, EXTENDED RELEASE ORAL DAILY
Qty: 30 TAB | Refills: 3 | Status: SHIPPED | OUTPATIENT
Start: 2019-02-04 | End: 2021-12-09 | Stop reason: ALTCHOICE

## 2019-02-04 RX ORDER — SODIUM CHLORIDE 0.9 % (FLUSH) 0.9 %
10-40 SYRINGE (ML) INJECTION AS NEEDED
Status: DISCONTINUED | OUTPATIENT
Start: 2019-02-04 | End: 2019-02-08 | Stop reason: HOSPADM

## 2019-02-04 RX ORDER — HYDROCHLOROTHIAZIDE 12.5 MG/1
12.5 TABLET ORAL DAILY
Qty: 30 TAB | Refills: 0 | OUTPATIENT
Start: 2019-02-04

## 2019-02-04 RX ORDER — SERTRALINE HYDROCHLORIDE 100 MG/1
TABLET, FILM COATED ORAL
Qty: 45 TAB | Refills: 3 | Status: SHIPPED | OUTPATIENT
Start: 2019-02-04 | End: 2019-06-04 | Stop reason: DRUGHIGH

## 2019-02-04 RX ORDER — HEPARIN 100 UNIT/ML
SYRINGE INTRAVENOUS
Status: COMPLETED
Start: 2019-02-04 | End: 2019-02-04

## 2019-02-04 RX ORDER — HEPARIN 100 UNIT/ML
500 SYRINGE INTRAVENOUS AS NEEDED
Status: DISCONTINUED | OUTPATIENT
Start: 2019-02-04 | End: 2019-02-08 | Stop reason: HOSPADM

## 2019-02-04 RX ORDER — HYDROCHLOROTHIAZIDE 12.5 MG/1
TABLET ORAL
Qty: 30 TAB | Refills: 3 | Status: SHIPPED | OUTPATIENT
Start: 2019-02-04 | End: 2019-06-04 | Stop reason: SDUPTHER

## 2019-02-04 RX ADMIN — Medication 10 ML: at 15:45

## 2019-02-04 RX ADMIN — HEPARIN 500 UNITS: 100 SYRINGE at 15:48

## 2019-02-04 RX ADMIN — Medication 10 ML: at 15:47

## 2019-02-04 RX ADMIN — SODIUM CHLORIDE 1 G: 900 INJECTION INTRAVENOUS at 15:14

## 2019-02-04 RX ADMIN — HEPARIN 500 UNITS: 100 SYRINGE at 15:46

## 2019-02-04 RX ADMIN — Medication 30 ML: at 15:12

## 2019-02-04 NOTE — PROGRESS NOTES
[facility] Progress Note Date: 2019 Name: Jean-Pierre Gutierrez MRN: [id number] : 1960 Invanz Infusion Ms. Mendel Burnette to [facility], ambulatory at 97 70 84. Pain to back 10. Took pain medication today Pt was assessed and education was provided. Ms. Sylvain Zuniga vitals were reviewed. Visit Vitals /85 (BP 1 Location: Left arm, BP Patient Position: Sitting) Pulse 63 Temp 97.8 °F (36.6 °C) Resp 18 SpO2 95% Right upper arm PICC flushed easily and had brisk blood return via both ports. Blood sample obtained via red lumen for cbc,cmp,cpk and crp. PICC dressing c/d/i and not due to be changed. No swelling, redness, streaking, warmth or drainage noted in arm. Pt denied c/o pain in arm. Invanz 1 gm was infused for 30 minutes as ordered via red lumen. Ms. Mendel Burnette tolerated infusion, and had no complaints from infusion. Both lumens of PICC flushed with NS 10 ml and Heparin 500 units each. Picc site and drsg without change. Green end caps applied. Lumens wrapped with coban. Stockinette placed over dressing for protection. Patient armband removed and shredded. Ms. Mendel Burnette was discharged from [place] [address] in stable condition at 1550. She is to return on 19 for her next antibiotic appointment. Rebecca Calero RN 2019

## 2019-02-04 NOTE — TELEPHONE ENCOUNTER
Call to patient. Patient verified her name, , and address. Informed her that I would refill her HCTZ and would add some potassium at 10 meq daily due to her potassium being slightly low. She denies chest pains or shortness of breath. Discussed foods high in potassium with patient. She also requested that her zoloft be refilled.    [facility]

## 2019-02-05 ENCOUNTER — HOSPITAL ENCOUNTER (OUTPATIENT)
Dept: INFUSION THERAPY | Age: 59
Discharge: HOME | End: 2019-02-05
Payer: MEDICAID

## 2019-02-05 ENCOUNTER — CLINICAL SUPPORT (OUTPATIENT)
Dept: INTERNAL MEDICINE CLINIC | Age: 59
End: 2019-02-05

## 2019-02-05 VITALS
OXYGEN SATURATION: 94 % | HEART RATE: 65 BPM | TEMPERATURE: 98 F | DIASTOLIC BLOOD PRESSURE: 81 MMHG | RESPIRATION RATE: 18 BRPM | SYSTOLIC BLOOD PRESSURE: 131 MMHG

## 2019-02-05 DIAGNOSIS — F11.90 METHADONE USE: ICD-10-CM

## 2019-02-05 DIAGNOSIS — F19.90 IVDU (INTRAVENOUS DRUG USER): ICD-10-CM

## 2019-02-05 DIAGNOSIS — Z79.899 MEDICATION MANAGEMENT: Primary | ICD-10-CM

## 2019-02-05 DIAGNOSIS — M46.44 DISCITIS OF THORACIC REGION: Primary | ICD-10-CM

## 2019-02-05 DIAGNOSIS — B37.9 CANDIDA PARAPSILOSIS INFECTION: ICD-10-CM

## 2019-02-05 LAB — CRP SERPL HS-MCNC: 98.66 MG/L (ref 0–3)

## 2019-02-05 PROCEDURE — 74011250636 HC RX REV CODE- 250/636: Performed by: INTERNAL MEDICINE

## 2019-02-05 PROCEDURE — 74011000258 HC RX REV CODE- 258: Performed by: INTERNAL MEDICINE

## 2019-02-05 PROCEDURE — 96365 THER/PROPH/DIAG IV INF INIT: CPT

## 2019-02-05 RX ORDER — HEPARIN 100 UNIT/ML
500 SYRINGE INTRAVENOUS AS NEEDED
Status: DISCONTINUED | OUTPATIENT
Start: 2019-02-05 | End: 2019-02-09 | Stop reason: HOSPADM

## 2019-02-05 RX ORDER — SODIUM CHLORIDE 0.9 % (FLUSH) 0.9 %
10-40 SYRINGE (ML) INJECTION AS NEEDED
Status: DISCONTINUED | OUTPATIENT
Start: 2019-02-05 | End: 2019-02-09 | Stop reason: HOSPADM

## 2019-02-05 RX ADMIN — SODIUM CHLORIDE 1 G: 900 INJECTION INTRAVENOUS at 15:16

## 2019-02-05 RX ADMIN — Medication 10 ML: at 15:16

## 2019-02-05 RX ADMIN — Medication 10 ML: at 15:23

## 2019-02-05 RX ADMIN — SODIUM CHLORIDE, PRESERVATIVE FREE 500 UNITS: 5 INJECTION INTRAVENOUS at 15:23

## 2019-02-05 RX ADMIN — Medication 20 ML: at 15:54

## 2019-02-05 RX ADMIN — SODIUM CHLORIDE, PRESERVATIVE FREE 500 UNITS: 5 INJECTION INTRAVENOUS at 15:54

## 2019-02-05 NOTE — PROGRESS NOTES
[facility] Date: 2019 Name: Javid Stephens MRN: [id number] : 1960 Invanz Infusion Ms. Matute to [facility], ambulatory at 1500. Pain to back 3/10. Took pain medication today Pt was assessed and education was provided. Ms. Flor Albert vitals were reviewed. Visit Vitals /81 (BP 1 Location: Left arm, BP Patient Position: Sitting) Pulse 65 Temp 98 °F (36.7 °C) Resp 18 SpO2 94% Right upper arm PICC flushed easily and had brisk blood return via both ports. PICC dressing c/d/i and not due to be changed. No swelling, redness, streaking, warmth or drainage noted in arm. Pt denied c/o pain in arm. Invanz 1 gm was infused for 30 minutes as ordered via purple lumen. Ms. Rajat Gamez tolerated infusion, and had no complaints from infusion. Both lumens of PICC flushed with NS 20 ml and Heparin 500 units each. Picc site and drsg without change. Green end caps applied. Lumens wrapped with coban. Stockinette placed over dressing for protection. Patient armband removed and shredded. Ms. Rajat Gamez was discharged from [place] [address] in stable condition at [phone]. She is to return on 19 for her next antibiotic appointment. Nikita Degroot RN 2019

## 2019-02-05 NOTE — PROGRESS NOTES
Discussed lab with patient, will bring in today for evaluation and EKG. Discussed repeat imaging to assess status of infection.  She will come in around 3:30 PM

## 2019-02-05 NOTE — PROGRESS NOTES
Patient with h/o IVDU, found to have discitis and  osteomyelitis s/p bone biopsy on 11/13 and 11/15 with Candida Parapsilosis and MRSE. MRI on 1/10/19 reports: Worsening discitis/osteomyelitis process at T7-T8, with circumferential epidural enhancement and posterior soft tissue enhancement  No focal abscess.  Also developing fragmentation and compression fracture in T7,  followed by T8, with retropulsion of T7 fragment into the central canal, causing central stenosis and mild cord displacement but no significant cord compression or edema. Osteomyelitis now also involves the posterior elements of T7 and T8,  including spinous processes. Developing thoracic kyphosis. She was also started on 1/11/19 on ertapenem empirically given progression of disease. CRP trending up on last labs from 2/4/19 and 1/28/19. Plan  Repeat MRI of thoracic spine to assess status of infection  BC x 2, patient denies any recent IVDU  PICC line has good apperance today  Add daptomycin 6mg /kg and c/w ertapenem for another 4 weeks from 2/6/19 to 3/6/19 to broaden spectrum for concern of reinfection? ?  C/w fluconazol 400 mg daily  Patient verbalizes understanding and agreement with plan  EKG done today reviewed, QTc 443 from 432 (1/24/19) from 374 (1/17/19), continue with weekly EKGs, if QTC continues to prolong beyond 450, may need to stop fluconazol.     Rodney Carrera MD

## 2019-02-06 ENCOUNTER — HOSPITAL ENCOUNTER (OUTPATIENT)
Dept: INFUSION THERAPY | Age: 59
Discharge: HOME | End: 2019-02-06
Payer: MEDICAID

## 2019-02-06 VITALS
HEART RATE: 59 BPM | DIASTOLIC BLOOD PRESSURE: 89 MMHG | SYSTOLIC BLOOD PRESSURE: 157 MMHG | OXYGEN SATURATION: 94 % | TEMPERATURE: 98.5 F | RESPIRATION RATE: 18 BRPM

## 2019-02-06 PROCEDURE — 96375 TX/PRO/DX INJ NEW DRUG ADDON: CPT

## 2019-02-06 PROCEDURE — 96365 THER/PROPH/DIAG IV INF INIT: CPT

## 2019-02-06 PROCEDURE — 36415 COLL VENOUS BLD VENIPUNCTURE: CPT

## 2019-02-06 PROCEDURE — 74011000258 HC RX REV CODE- 258: Performed by: INTERNAL MEDICINE

## 2019-02-06 PROCEDURE — 74011250636 HC RX REV CODE- 250/636

## 2019-02-06 PROCEDURE — 74011250636 HC RX REV CODE- 250/636: Performed by: INTERNAL MEDICINE

## 2019-02-06 PROCEDURE — 74011000250 HC RX REV CODE- 250: Performed by: INTERNAL MEDICINE

## 2019-02-06 RX ORDER — HEPARIN 100 UNIT/ML
500 SYRINGE INTRAVENOUS ONCE
Status: COMPLETED | OUTPATIENT
Start: 2019-02-06 | End: 2019-02-06

## 2019-02-06 RX ORDER — HEPARIN 100 UNIT/ML
SYRINGE INTRAVENOUS
Status: COMPLETED
Start: 2019-02-06 | End: 2019-02-06

## 2019-02-06 RX ORDER — SODIUM CHLORIDE 0.9 % (FLUSH) 0.9 %
10-40 SYRINGE (ML) INJECTION AS NEEDED
Status: DISCONTINUED | OUTPATIENT
Start: 2019-02-06 | End: 2019-02-10 | Stop reason: HOSPADM

## 2019-02-06 RX ADMIN — Medication 20 ML: at 16:17

## 2019-02-06 RX ADMIN — DAPTOMYCIN 550 MG: 500 INJECTION, POWDER, LYOPHILIZED, FOR SOLUTION INTRAVENOUS at 16:16

## 2019-02-06 RX ADMIN — HEPARIN 500 UNITS: 100 SYRINGE at 16:17

## 2019-02-06 RX ADMIN — SODIUM CHLORIDE 1 G: 900 INJECTION INTRAVENOUS at 15:45

## 2019-02-06 NOTE — PROGRESS NOTES
[facility] Progress Note Date: 2019 Name: Brooke Perez MRN: [id number] : 1960 Invanz Infusion/Dapto/Blood cultures Ms. Matute to [facility], ambulatory at 1500. Pain to back 3/10. Took pain medication today. New order received to add Dapto to patients treatment as well as draw blood cultures from PICC line and a peripheral site. Pt was assessed and education was provided. Ms. Dinesh Tabor vitals were reviewed. Visit Vitals /89 (BP 1 Location: Left arm, BP Patient Position: Sitting) Pulse (!) 59 Temp 98.5 °F (36.9 °C) Resp 18 SpO2 94% Peripheral blood cultures drawn from the left forearm with a butterfly needle x 1 attempt. Right upper arm PICC flushed easily and had brisk blood return via both ports. Blood cultures drawn from the purple and the red lumen. PICC dressing c/d/i and not due to be changed. No swelling, redness, streaking, warmth or drainage noted in arm. Pt denied c/o pain in arm. Invanz 1 gm was infused for 30 minutes as ordered via purple lumen. Dapto 550 mg given slow IVP over 2 minutes. Ms. Nikolai Mast tolerated infusion, and had no complaints from infusion. Both lumens of PICC flushed with NS 20 ml and Heparin 500 units each. Picc site and drsg without change. Green end caps applied. Lumens wrapped with coban. Stockinette placed over dressing for protection. Patient armband removed and shredded. Ms. Nikolai Mast was discharged from [place] [address] in stable condition at 1620. She is to return on 19 for her next antibiotic appointment. Adam Nuñez RN 2019

## 2019-02-07 ENCOUNTER — HOSPITAL ENCOUNTER (OUTPATIENT)
Dept: INFUSION THERAPY | Age: 59
Discharge: HOME | End: 2019-02-07
Payer: MEDICAID

## 2019-02-07 VITALS
OXYGEN SATURATION: 94 % | DIASTOLIC BLOOD PRESSURE: 75 MMHG | TEMPERATURE: 98.8 F | SYSTOLIC BLOOD PRESSURE: 125 MMHG | RESPIRATION RATE: 18 BRPM | HEART RATE: 74 BPM

## 2019-02-07 PROCEDURE — 74011000258 HC RX REV CODE- 258: Performed by: INTERNAL MEDICINE

## 2019-02-07 PROCEDURE — 74011250636 HC RX REV CODE- 250/636: Performed by: INTERNAL MEDICINE

## 2019-02-07 PROCEDURE — 77030020847 HC STATLOK BARD -A

## 2019-02-07 PROCEDURE — 96365 THER/PROPH/DIAG IV INF INIT: CPT

## 2019-02-07 PROCEDURE — 96375 TX/PRO/DX INJ NEW DRUG ADDON: CPT

## 2019-02-07 PROCEDURE — 74011000250 HC RX REV CODE- 250: Performed by: INTERNAL MEDICINE

## 2019-02-07 RX ORDER — SODIUM CHLORIDE 0.9 % (FLUSH) 0.9 %
10-40 SYRINGE (ML) INJECTION AS NEEDED
Status: DISCONTINUED | OUTPATIENT
Start: 2019-02-07 | End: 2019-02-11 | Stop reason: HOSPADM

## 2019-02-07 RX ORDER — HEPARIN 100 UNIT/ML
500 SYRINGE INTRAVENOUS ONCE
Status: DISPENSED | OUTPATIENT
Start: 2019-02-07 | End: 2019-02-08

## 2019-02-07 RX ADMIN — Medication 10 ML: at 15:43

## 2019-02-07 RX ADMIN — Medication 30 ML: at 15:09

## 2019-02-07 RX ADMIN — DAPTOMYCIN 550 MG: 500 INJECTION, POWDER, LYOPHILIZED, FOR SOLUTION INTRAVENOUS at 15:08

## 2019-02-07 RX ADMIN — SODIUM CHLORIDE 1 G: 900 INJECTION INTRAVENOUS at 15:07

## 2019-02-07 NOTE — PROGRESS NOTES
[facility]: 2019 Name: Marie Lancaster MRN: [id number] : 1960 Invanz Infusion/Dapto/PICC care Ms. Jatinder Rooney to [facility], ambulatory at 97 70 84. Pain to back 3/10. Took pain medication today. Pt was assessed and education was provided. Ms. Jigar Cat vitals were reviewed. Visit Vitals /75 (BP 1 Location: Left arm, BP Patient Position: Sitting) Pulse 74 Temp 98.8 °F (37.1 °C) Resp 18 SpO2 94% Right upper arm PICC flushed easily and had brisk blood return via both ports. Invanz 1 gm was infused for 30 minutes as ordered via purple lumen. Dapto 550 mg given slow IVP over 2 minutes. PICC dressing changed per protocol. Ms. Jatinder Rooney tolerated infusion, and had no complaints from infusion. Both lumens of PICC flushed with NS 20 ml and Heparin 500 units each. Picc site and drsg without change. Green end caps applied. Lumens wrapped with coban. Stockinette placed over dressing for protection. Patient armband removed and shredded. Ms. Jatinder Rooney was discharged from [place] [address] in stable condition at [phone]. She is to return on 19 for her next antibiotic appointment. Juan Douglas RN 2019

## 2019-02-08 ENCOUNTER — HOSPITAL ENCOUNTER (OUTPATIENT)
Dept: INFUSION THERAPY | Age: 59
Discharge: HOME | End: 2019-02-08
Payer: MEDICAID

## 2019-02-08 VITALS
RESPIRATION RATE: 18 BRPM | HEART RATE: 73 BPM | OXYGEN SATURATION: 95 % | SYSTOLIC BLOOD PRESSURE: 129 MMHG | DIASTOLIC BLOOD PRESSURE: 71 MMHG | TEMPERATURE: 98.1 F

## 2019-02-08 PROCEDURE — 96375 TX/PRO/DX INJ NEW DRUG ADDON: CPT

## 2019-02-08 PROCEDURE — 74011000258 HC RX REV CODE- 258: Performed by: INTERNAL MEDICINE

## 2019-02-08 PROCEDURE — 96365 THER/PROPH/DIAG IV INF INIT: CPT

## 2019-02-08 PROCEDURE — 74011250636 HC RX REV CODE- 250/636

## 2019-02-08 PROCEDURE — 74011250636 HC RX REV CODE- 250/636: Performed by: INTERNAL MEDICINE

## 2019-02-08 PROCEDURE — 74011000250 HC RX REV CODE- 250: Performed by: INTERNAL MEDICINE

## 2019-02-08 RX ORDER — SODIUM CHLORIDE 900 MG/100ML
INJECTION INTRAVENOUS
Status: DISPENSED
Start: 2019-02-08 | End: 2019-02-09

## 2019-02-08 RX ORDER — HEPARIN 100 UNIT/ML
SYRINGE INTRAVENOUS
Status: COMPLETED
Start: 2019-02-08 | End: 2019-02-08

## 2019-02-08 RX ORDER — HEPARIN 100 UNIT/ML
500 SYRINGE INTRAVENOUS ONCE
Status: ACTIVE | OUTPATIENT
Start: 2019-02-08 | End: 2019-02-09

## 2019-02-08 RX ORDER — SODIUM CHLORIDE 0.9 % (FLUSH) 0.9 %
10-40 SYRINGE (ML) INJECTION AS NEEDED
Status: DISCONTINUED | OUTPATIENT
Start: 2019-02-08 | End: 2019-02-12 | Stop reason: HOSPADM

## 2019-02-08 RX ADMIN — SODIUM CHLORIDE, PRESERVATIVE FREE 500 UNITS: 5 INJECTION INTRAVENOUS at 15:44

## 2019-02-08 RX ADMIN — SODIUM CHLORIDE 1 G: 900 INJECTION INTRAVENOUS at 15:10

## 2019-02-08 RX ADMIN — Medication 10 ML: at 15:44

## 2019-02-08 RX ADMIN — DAPTOMYCIN 550 MG: 500 INJECTION, POWDER, LYOPHILIZED, FOR SOLUTION INTRAVENOUS at 15:12

## 2019-02-08 RX ADMIN — Medication 30 ML: at 15:12

## 2019-02-08 NOTE — PROGRESS NOTES
[facility]: [facility] Name: Nurys Guo MRN: [id number] : 1960 Invanz Infusion/Dapto Ms. Eduar Martinez to [facility], ambulatory at 97 70 84. Pt was assessed and education was provided. Ms. Renan Villatoro vitals were reviewed. Visit Vitals /71 (BP 1 Location: Left arm, BP Patient Position: Sitting) Pulse 73 Temp 98.1 °F (36.7 °C) Resp 18 SpO2 95% Right upper arm PICC flushed easily and had brisk blood return via both ports. Invanz 1 gm was infused for 30 minutes as ordered via purple lumen. Dapto 550 mg given slow IVP over 2 minutes. PICC dressing CDI and not due to be changed. Ms. Eduar Martinez tolerated infusion, and had no complaints from infusion. Both lumens of PICC flushed with NS 20 ml and Heparin 500 units each. Picc site and drsg without change. Green end caps applied. Lumens wrapped with coban. Stockinette placed over dressing for protection. Patient armband removed and shredded. Ms. Eduar Martinez was discharged from [place] [address] in stable condition at [phone]. She is to return on 19 for her next antibiotic appointment. Yolanda Chaidez RN 2019

## 2019-02-09 ENCOUNTER — HOSPITAL ENCOUNTER (OUTPATIENT)
Dept: INFUSION THERAPY | Age: 59
Discharge: HOME | End: 2019-02-09
Payer: MEDICAID

## 2019-02-09 VITALS
HEART RATE: 65 BPM | DIASTOLIC BLOOD PRESSURE: 81 MMHG | SYSTOLIC BLOOD PRESSURE: 129 MMHG | TEMPERATURE: 98.6 F | RESPIRATION RATE: 18 BRPM | OXYGEN SATURATION: 93 %

## 2019-02-09 PROCEDURE — 96375 TX/PRO/DX INJ NEW DRUG ADDON: CPT

## 2019-02-09 PROCEDURE — 96365 THER/PROPH/DIAG IV INF INIT: CPT

## 2019-02-09 PROCEDURE — 74011000258 HC RX REV CODE- 258: Performed by: INTERNAL MEDICINE

## 2019-02-09 PROCEDURE — 74011250636 HC RX REV CODE- 250/636: Performed by: INTERNAL MEDICINE

## 2019-02-09 PROCEDURE — 74011000250 HC RX REV CODE- 250: Performed by: INTERNAL MEDICINE

## 2019-02-09 RX ORDER — HEPARIN 100 UNIT/ML
SYRINGE INTRAVENOUS
Status: DISPENSED
Start: 2019-02-09 | End: 2019-02-09

## 2019-02-09 RX ORDER — SODIUM CHLORIDE 0.9 % (FLUSH) 0.9 %
10-40 SYRINGE (ML) INJECTION AS NEEDED
Status: DISCONTINUED | OUTPATIENT
Start: 2019-02-09 | End: 2019-02-13 | Stop reason: HOSPADM

## 2019-02-09 RX ORDER — HEPARIN 100 UNIT/ML
500 SYRINGE INTRAVENOUS ONCE
Status: COMPLETED | OUTPATIENT
Start: 2019-02-09 | End: 2019-02-09

## 2019-02-09 RX ADMIN — SODIUM CHLORIDE, PRESERVATIVE FREE 500 UNITS: 5 INJECTION INTRAVENOUS at 08:59

## 2019-02-09 RX ADMIN — DAPTOMYCIN 550 MG: 500 INJECTION, POWDER, LYOPHILIZED, FOR SOLUTION INTRAVENOUS at 08:58

## 2019-02-09 RX ADMIN — Medication 10 ML: at 08:59

## 2019-02-09 RX ADMIN — SODIUM CHLORIDE 1 G: 900 INJECTION INTRAVENOUS at 08:58

## 2019-02-09 NOTE — PROGRESS NOTES
[facility] Progress Note Date: 2019 Name: Natalie Espinosa MRN: [id number] : 1960 Invanz Infusion/Dapto Ms. Francisca Patel to [facility], ambulatory at 3706. Pt was assessed and education was provided. Ms. Lawyer Escobar vitals were reviewed. Visit Vitals /81 (BP 1 Location: Left arm, BP Patient Position: Sitting) Pulse 65 Temp 98.6 °F (37 °C) Resp 18 SpO2 93% Right upper arm PICC flushed easily and had brisk blood return via both ports. Invanz 1 gm was infused for 30 minutes as ordered via purple lumen. Dapto 550 mg given slow IVP over 2 minutes. PICC dressing CDI and not due to be changed. Ms. Francisca Patel tolerated infusion, and had no complaints from infusion. Both lumens of PICC flushed with NS 20 ml and Heparin 500 units each. Picc site and drsg without change. Green end caps applied. Lumens wrapped with coban. Stockinette placed over dressing for protection. Patient armband removed and shredded. Ms. Francisca Patel was discharged from [place] [address] in stable condition at [address] [place] [address]. She is to return on 2/10/19 for her next antibiotic appointment. Juice Echevarria RN 2019

## 2019-02-10 ENCOUNTER — HOSPITAL ENCOUNTER (OUTPATIENT)
Dept: INFUSION THERAPY | Age: 59
Discharge: HOME | End: 2019-02-10
Payer: MEDICAID

## 2019-02-10 VITALS
HEART RATE: 76 BPM | OXYGEN SATURATION: 97 % | DIASTOLIC BLOOD PRESSURE: 83 MMHG | SYSTOLIC BLOOD PRESSURE: 146 MMHG | TEMPERATURE: 98.3 F | RESPIRATION RATE: 18 BRPM

## 2019-02-10 PROCEDURE — 74011250636 HC RX REV CODE- 250/636

## 2019-02-10 PROCEDURE — 96375 TX/PRO/DX INJ NEW DRUG ADDON: CPT

## 2019-02-10 PROCEDURE — 74011250636 HC RX REV CODE- 250/636: Performed by: INTERNAL MEDICINE

## 2019-02-10 PROCEDURE — 74011000258 HC RX REV CODE- 258: Performed by: INTERNAL MEDICINE

## 2019-02-10 PROCEDURE — 96365 THER/PROPH/DIAG IV INF INIT: CPT

## 2019-02-10 PROCEDURE — 74011000250 HC RX REV CODE- 250: Performed by: INTERNAL MEDICINE

## 2019-02-10 RX ORDER — HEPARIN 100 UNIT/ML
SYRINGE INTRAVENOUS
Status: COMPLETED
Start: 2019-02-10 | End: 2019-02-10

## 2019-02-10 RX ORDER — HEPARIN 100 UNIT/ML
500 SYRINGE INTRAVENOUS AS NEEDED
Status: DISCONTINUED | OUTPATIENT
Start: 2019-02-10 | End: 2019-02-14 | Stop reason: HOSPADM

## 2019-02-10 RX ORDER — SODIUM CHLORIDE 0.9 % (FLUSH) 0.9 %
10-40 SYRINGE (ML) INJECTION AS NEEDED
Status: DISCONTINUED | OUTPATIENT
Start: 2019-02-10 | End: 2019-02-14 | Stop reason: HOSPADM

## 2019-02-10 RX ADMIN — SODIUM CHLORIDE 1 G: 900 INJECTION INTRAVENOUS at 08:55

## 2019-02-10 RX ADMIN — SODIUM CHLORIDE, PRESERVATIVE FREE 500 UNITS: 5 INJECTION INTRAVENOUS at 09:08

## 2019-02-10 RX ADMIN — Medication 10 ML: at 09:08

## 2019-02-10 RX ADMIN — SODIUM CHLORIDE, PRESERVATIVE FREE 500 UNITS: 5 INJECTION INTRAVENOUS at 09:46

## 2019-02-10 RX ADMIN — Medication 10 ML: at 08:55

## 2019-02-10 RX ADMIN — Medication 20 ML: at 09:46

## 2019-02-10 RX ADMIN — Medication 10 ML: at 08:56

## 2019-02-10 RX ADMIN — DAPTOMYCIN 550 MG: 500 INJECTION, POWDER, LYOPHILIZED, FOR SOLUTION INTRAVENOUS at 08:55

## 2019-02-10 NOTE — PROGRESS NOTES
[facility] Date: February 10, 2019 Name: Gail Valencia MRN: [id number] : 1960 Invanz Infusion/Dapto Ms. Adalgisa Gong to [facility], ambulatory at 6558. Pt was assessed and education was provided. Ms. Yojana Pedersen vitals were reviewed. Visit Vitals /83 (BP 1 Location: Left arm, BP Patient Position: Sitting) Pulse 76 Temp 98.3 °F (36.8 °C) Resp 18 SpO2 97% Right upper arm PICC flushed easily and had brisk blood return via both ports. Invanz 1 gm was infused for 30 minutes as ordered via purple lumen. Dapto 550 mg given slow IVP over 2 minutes. PICC dressing CDI and not due to be changed. Ms. Adalgisa Gong tolerated infusion, and had no complaints from infusion. Both lumens of PICC flushed with NS 20 ml and Heparin 500 units each. Picc site and drsg without change. Green end caps applied. Lumens wrapped with coban. Stockinette placed over dressing for protection. Patient armband removed and shredded. Ms. Adalgisa Gong was discharged from [place] [address] in stable condition at 10 [address] Reed. She is to return on  for her next antibiotic appointment. Guevara Lewis RN February 10, 2019 
 
[address]

## 2019-02-11 ENCOUNTER — HOSPITAL ENCOUNTER (OUTPATIENT)
Dept: INFUSION THERAPY | Age: 59
Discharge: HOME | End: 2019-02-11
Payer: MEDICAID

## 2019-02-11 VITALS
TEMPERATURE: 98.6 F | RESPIRATION RATE: 18 BRPM | HEART RATE: 74 BPM | OXYGEN SATURATION: 93 % | DIASTOLIC BLOOD PRESSURE: 80 MMHG | SYSTOLIC BLOOD PRESSURE: 126 MMHG

## 2019-02-11 LAB
ALBUMIN SERPL-MCNC: 3.1 G/DL (ref 3.4–5)
ALBUMIN/GLOB SERPL: 0.6 {RATIO} (ref 0.8–1.7)
ALP SERPL-CCNC: 138 U/L (ref 45–117)
ALT SERPL-CCNC: 25 U/L (ref 13–56)
ANION GAP SERPL CALC-SCNC: 6 MMOL/L (ref 3–18)
AST SERPL-CCNC: 28 U/L (ref 15–37)
BASO+EOS+MONOS # BLD AUTO: 0.7 K/UL (ref 0–2.3)
BASO+EOS+MONOS NFR BLD AUTO: 10 % (ref 0.1–17)
BILIRUB SERPL-MCNC: 0.1 MG/DL (ref 0.2–1)
BUN SERPL-MCNC: 10 MG/DL (ref 7–18)
BUN/CREAT SERPL: 14 (ref 12–20)
CALCIUM SERPL-MCNC: 9.1 MG/DL (ref 8.5–10.1)
CHLORIDE SERPL-SCNC: 97 MMOL/L (ref 100–108)
CK SERPL-CCNC: 28 U/L (ref 26–192)
CO2 SERPL-SCNC: 32 MMOL/L (ref 21–32)
CREAT SERPL-MCNC: 0.7 MG/DL (ref 0.6–1.3)
CRP SERPL-MCNC: 2.1 MG/DL (ref 0–0.3)
DIFFERENTIAL METHOD BLD: NORMAL
ERYTHROCYTE [DISTWIDTH] IN BLOOD BY AUTOMATED COUNT: 14 % (ref 11.5–14.5)
GLOBULIN SER CALC-MCNC: 4.8 G/DL (ref 2–4)
GLUCOSE SERPL-MCNC: 77 MG/DL (ref 74–99)
HCT VFR BLD AUTO: 39.2 % (ref 36–48)
HGB BLD-MCNC: 12.8 G/DL (ref 12–16)
LYMPHOCYTES # BLD: 2.1 K/UL (ref 1.1–5.9)
LYMPHOCYTES NFR BLD: 30 % (ref 14–44)
MCH RBC QN AUTO: 30.3 PG (ref 25–35)
MCHC RBC AUTO-ENTMCNC: 32.7 G/DL (ref 31–37)
MCV RBC AUTO: 92.7 FL (ref 78–102)
NEUTS SEG # BLD: 4.3 K/UL (ref 1.8–9.5)
NEUTS SEG NFR BLD: 59 % (ref 40–70)
PLATELET # BLD AUTO: 382 K/UL (ref 140–440)
POTASSIUM SERPL-SCNC: 4.4 MMOL/L (ref 3.5–5.5)
PROT SERPL-MCNC: 7.9 G/DL (ref 6.4–8.2)
RBC # BLD AUTO: 4.23 M/UL (ref 4.1–5.1)
SODIUM SERPL-SCNC: 135 MMOL/L (ref 136–145)
WBC # BLD AUTO: 7.1 K/UL (ref 4.5–13)

## 2019-02-11 PROCEDURE — 80053 COMPREHEN METABOLIC PANEL: CPT

## 2019-02-11 PROCEDURE — 74011250636 HC RX REV CODE- 250/636: Performed by: INTERNAL MEDICINE

## 2019-02-11 PROCEDURE — 96375 TX/PRO/DX INJ NEW DRUG ADDON: CPT

## 2019-02-11 PROCEDURE — 85025 COMPLETE CBC W/AUTO DIFF WBC: CPT

## 2019-02-11 PROCEDURE — 96365 THER/PROPH/DIAG IV INF INIT: CPT

## 2019-02-11 PROCEDURE — 82550 ASSAY OF CK (CPK): CPT

## 2019-02-11 PROCEDURE — 74011000250 HC RX REV CODE- 250: Performed by: INTERNAL MEDICINE

## 2019-02-11 PROCEDURE — 74011000258 HC RX REV CODE- 258: Performed by: INTERNAL MEDICINE

## 2019-02-11 PROCEDURE — 86140 C-REACTIVE PROTEIN: CPT

## 2019-02-11 RX ORDER — SODIUM CHLORIDE 0.9 % (FLUSH) 0.9 %
10-40 SYRINGE (ML) INJECTION AS NEEDED
Status: DISCONTINUED | OUTPATIENT
Start: 2019-02-11 | End: 2019-02-15 | Stop reason: HOSPADM

## 2019-02-11 RX ORDER — HEPARIN 100 UNIT/ML
500 SYRINGE INTRAVENOUS ONCE
Status: DISPENSED | OUTPATIENT
Start: 2019-02-11 | End: 2019-02-12

## 2019-02-11 RX ADMIN — DAPTOMYCIN 550 MG: 500 INJECTION, POWDER, LYOPHILIZED, FOR SOLUTION INTRAVENOUS at 15:07

## 2019-02-11 RX ADMIN — Medication 10 ML: at 15:08

## 2019-02-11 RX ADMIN — SODIUM CHLORIDE 1 G: 900 INJECTION INTRAVENOUS at 15:07

## 2019-02-11 NOTE — PROGRESS NOTES
[facility] Progress Note Date: 2019 Name: Juli Mike MRN: [id number] : 1960 Invanz Infusion/Dapto Ms. Yenny Mckee to [facility], ambulatory at 97 70 84. Pt was assessed and education was provided. Ms. Nagi Ayala vitals were reviewed. Visit Vitals /80 (BP 1 Location: Left arm, BP Patient Position: Sitting) Pulse 74 Temp 98.6 °F (37 °C) Resp 18 SpO2 93% Right upper arm PICC flushed easily and had brisk blood return via both ports. Labs drawn off for CBC, CMP , CRP and CK per order. Invanz 1 gm was infused for 30 minutes as ordered via purple lumen. Dapto 550 mg given slow IVP over 2 minutes. PICC dressing CDI and not due to be changed. Ms. Yenny Mckee tolerated infusion, and had no complaints from infusion. Both lumens of PICC flushed with NS 20 ml and Heparin 500 units each. Picc site and drsg without change. Green end caps applied. Lumens wrapped with coban. Stockinette placed over dressing for protection. Patient armband removed and shredded. Ms. Yenny Mckee was discharged from [place] [address] in stable condition at 1550. She is to return on 19 for her next antibiotic appointment. Ricardo Harry RN 2019

## 2019-02-12 ENCOUNTER — HOSPITAL ENCOUNTER (OUTPATIENT)
Dept: INFUSION THERAPY | Age: 59
Discharge: HOME | End: 2019-02-12
Payer: MEDICAID

## 2019-02-12 VITALS
OXYGEN SATURATION: 96 % | HEART RATE: 66 BPM | RESPIRATION RATE: 18 BRPM | TEMPERATURE: 98.6 F | DIASTOLIC BLOOD PRESSURE: 77 MMHG | SYSTOLIC BLOOD PRESSURE: 116 MMHG

## 2019-02-12 PROCEDURE — 96365 THER/PROPH/DIAG IV INF INIT: CPT

## 2019-02-12 PROCEDURE — 96375 TX/PRO/DX INJ NEW DRUG ADDON: CPT

## 2019-02-12 PROCEDURE — 74011250636 HC RX REV CODE- 250/636: Performed by: INTERNAL MEDICINE

## 2019-02-12 PROCEDURE — 74011000250 HC RX REV CODE- 250: Performed by: INTERNAL MEDICINE

## 2019-02-12 PROCEDURE — 74011000258 HC RX REV CODE- 258: Performed by: INTERNAL MEDICINE

## 2019-02-12 PROCEDURE — 96374 THER/PROPH/DIAG INJ IV PUSH: CPT

## 2019-02-12 RX ORDER — SODIUM CHLORIDE 0.9 % (FLUSH) 0.9 %
10-40 SYRINGE (ML) INJECTION AS NEEDED
Status: DISCONTINUED | OUTPATIENT
Start: 2019-02-12 | End: 2019-02-16 | Stop reason: HOSPADM

## 2019-02-12 RX ORDER — HEPARIN 100 UNIT/ML
500 SYRINGE INTRAVENOUS AS NEEDED
Status: DISCONTINUED | OUTPATIENT
Start: 2019-02-12 | End: 2019-02-16 | Stop reason: HOSPADM

## 2019-02-12 RX ADMIN — Medication 500 UNITS: at 15:59

## 2019-02-12 RX ADMIN — Medication 20 ML: at 16:00

## 2019-02-12 RX ADMIN — Medication 20 ML: at 15:21

## 2019-02-12 RX ADMIN — Medication 10 ML: at 15:30

## 2019-02-12 RX ADMIN — SODIUM CHLORIDE 1 G: 900 INJECTION INTRAVENOUS at 15:25

## 2019-02-12 RX ADMIN — DAPTOMYCIN 550 MG: 500 INJECTION, POWDER, LYOPHILIZED, FOR SOLUTION INTRAVENOUS at 15:25

## 2019-02-13 ENCOUNTER — HOSPITAL ENCOUNTER (OUTPATIENT)
Dept: INFUSION THERAPY | Age: 59
Discharge: HOME | End: 2019-02-13
Payer: MEDICAID

## 2019-02-13 VITALS
HEART RATE: 78 BPM | DIASTOLIC BLOOD PRESSURE: 69 MMHG | OXYGEN SATURATION: 92 % | SYSTOLIC BLOOD PRESSURE: 113 MMHG | TEMPERATURE: 98.4 F | RESPIRATION RATE: 18 BRPM

## 2019-02-13 PROCEDURE — 96365 THER/PROPH/DIAG IV INF INIT: CPT

## 2019-02-13 PROCEDURE — 74011250636 HC RX REV CODE- 250/636: Performed by: INTERNAL MEDICINE

## 2019-02-13 PROCEDURE — 74011000258 HC RX REV CODE- 258: Performed by: INTERNAL MEDICINE

## 2019-02-13 PROCEDURE — 96375 TX/PRO/DX INJ NEW DRUG ADDON: CPT

## 2019-02-13 PROCEDURE — 74011000250 HC RX REV CODE- 250: Performed by: INTERNAL MEDICINE

## 2019-02-13 RX ORDER — HEPARIN 100 UNIT/ML
500 SYRINGE INTRAVENOUS ONCE
Status: COMPLETED | OUTPATIENT
Start: 2019-02-13 | End: 2019-02-13

## 2019-02-13 RX ORDER — SODIUM CHLORIDE 0.9 % (FLUSH) 0.9 %
10-40 SYRINGE (ML) INJECTION AS NEEDED
Status: DISCONTINUED | OUTPATIENT
Start: 2019-02-13 | End: 2019-02-17 | Stop reason: HOSPADM

## 2019-02-13 RX ORDER — HEPARIN 100 UNIT/ML
SYRINGE INTRAVENOUS
Status: DISPENSED
Start: 2019-02-13 | End: 2019-02-14

## 2019-02-13 RX ADMIN — Medication 30 ML: at 15:30

## 2019-02-13 RX ADMIN — SODIUM CHLORIDE 1 G: 900 INJECTION INTRAVENOUS at 15:30

## 2019-02-13 RX ADMIN — SODIUM CHLORIDE, PRESERVATIVE FREE 500 UNITS: 5 INJECTION INTRAVENOUS at 15:37

## 2019-02-13 RX ADMIN — DAPTOMYCIN 550 MG: 500 INJECTION, POWDER, LYOPHILIZED, FOR SOLUTION INTRAVENOUS at 15:30

## 2019-02-13 NOTE — PROGRESS NOTES
[facility]: [facility] Name: Fabrice [place] MRN: [id number] : 1960 Invanz Infusion/Dapto Ms. Sherlyn Gao to [facility], ambulatory at [phone]. Pt was assessed and education was provided. Ms. Cordell Carias vitals were reviewed. Visit Vitals /69 (BP 1 Location: Left arm, BP Patient Position: Sitting) Pulse 78 Temp 98.4 °F (36.9 °C) Resp 18 SpO2 92% Right upper arm PICC flushed easily and had brisk blood return via both ports. Invanz 1 gm was infused for 30 minutes as ordered via purple lumen. Dapto 550 mg given slow IVP over 2 minutes. PICC dressing CDI and not due to be changed. Ms. Sherlyn Gao tolerated infusion, and had no complaints from infusion. Both lumens of PICC flushed with NS 20 ml and Heparin 500 units each. Picc site and drsg without change. Green end caps applied. Lumens wrapped with coban. Stockinette placed over dressing for protection. Patient armband removed and shredded. Ms. Sherlyn Gao was discharged from [place] [address] in stable condition at [phone]. She is to return on 19 for her next antibiotic appointment. Christa Mccauley RN 2019

## 2019-02-14 ENCOUNTER — HOSPITAL ENCOUNTER (OUTPATIENT)
Dept: INFUSION THERAPY | Age: 59
Discharge: HOME | End: 2019-02-14
Payer: MEDICAID

## 2019-02-14 ENCOUNTER — CLINICAL SUPPORT (OUTPATIENT)
Dept: INTERNAL MEDICINE CLINIC | Age: 59
End: 2019-02-14

## 2019-02-14 VITALS
OXYGEN SATURATION: 93 % | SYSTOLIC BLOOD PRESSURE: 121 MMHG | HEART RATE: 73 BPM | RESPIRATION RATE: 19 BRPM | TEMPERATURE: 97.9 F | DIASTOLIC BLOOD PRESSURE: 79 MMHG

## 2019-02-14 DIAGNOSIS — Z79.899 MEDICATION MANAGEMENT: Primary | ICD-10-CM

## 2019-02-14 PROCEDURE — 77030020847 HC STATLOK BARD -A

## 2019-02-14 PROCEDURE — 74011250636 HC RX REV CODE- 250/636: Performed by: INTERNAL MEDICINE

## 2019-02-14 PROCEDURE — 74011000250 HC RX REV CODE- 250: Performed by: INTERNAL MEDICINE

## 2019-02-14 PROCEDURE — 74011000258 HC RX REV CODE- 258: Performed by: INTERNAL MEDICINE

## 2019-02-14 PROCEDURE — 96375 TX/PRO/DX INJ NEW DRUG ADDON: CPT

## 2019-02-14 PROCEDURE — 96365 THER/PROPH/DIAG IV INF INIT: CPT

## 2019-02-14 RX ORDER — SODIUM CHLORIDE 0.9 % (FLUSH) 0.9 %
10-40 SYRINGE (ML) INJECTION AS NEEDED
Status: DISCONTINUED | OUTPATIENT
Start: 2019-02-14 | End: 2019-02-18 | Stop reason: HOSPADM

## 2019-02-14 RX ORDER — HEPARIN 100 UNIT/ML
500 SYRINGE INTRAVENOUS AS NEEDED
Status: DISCONTINUED | OUTPATIENT
Start: 2019-02-14 | End: 2019-02-18 | Stop reason: HOSPADM

## 2019-02-14 RX ADMIN — SODIUM CHLORIDE, PRESERVATIVE FREE 500 UNITS: 5 INJECTION INTRAVENOUS at 16:10

## 2019-02-14 RX ADMIN — Medication 10 ML: at 15:45

## 2019-02-14 RX ADMIN — SODIUM CHLORIDE, PRESERVATIVE FREE 500 UNITS: 5 INJECTION INTRAVENOUS at 15:45

## 2019-02-14 RX ADMIN — DAPTOMYCIN 550 MG: 500 INJECTION, POWDER, LYOPHILIZED, FOR SOLUTION INTRAVENOUS at 15:09

## 2019-02-14 RX ADMIN — SODIUM CHLORIDE 1 G: 900 INJECTION INTRAVENOUS at 15:09

## 2019-02-14 RX ADMIN — Medication 30 ML: at 15:20

## 2019-02-14 RX ADMIN — Medication 20 ML: at 16:10

## 2019-02-14 NOTE — PROGRESS NOTES
[facility]: [facility] Name: Dennis Treadwell MRN: [id number] : 1960 Invanz Infusion/Dapto/PICC care Ms. Matute to [address] [place] [address], ambulatory at 1500. Pain to back 3/10. Took pain medication today. Pt was assessed and education was provided. Ms. Eddie Tim vitals were reviewed. Visit Vitals /79 (BP 1 Location: Left arm, BP Patient Position: Sitting) Pulse 73 Temp 97.9 °F (36.6 °C) Resp 19 SpO2 93% Right upper arm PICC flushed easily and had brisk blood return via both ports. Invanz 1 gm was infused for 30 minutes as ordered via purple lumen. Dapto 550 mg given slow IVP over 2 minutes. PICC dressing changed per protocol. Ms. Elio Ruano tolerated infusion, and had no complaints from infusion. Both lumens of PICC flushed with NS 20 ml and Heparin 500 units each. Picc site and drsg without change. Green end caps applied. Lumens wrapped with coban. Stockinette placed over dressing for protection. Patient armband removed and shredded. Ms. Elio Ruano was discharged from [place] [address] in stable condition at 1610. She is to return on 2/15/19 for her next antibiotic appointment. Valarie Menjivar RN 2019 
1610

## 2019-02-15 ENCOUNTER — TELEPHONE (OUTPATIENT)
Dept: INTERNAL MEDICINE CLINIC | Age: 59
End: 2019-02-15

## 2019-02-15 ENCOUNTER — HOSPITAL ENCOUNTER (OUTPATIENT)
Dept: INFUSION THERAPY | Age: 59
Discharge: HOME | End: 2019-02-15
Payer: MEDICAID

## 2019-02-15 ENCOUNTER — HOSPITAL ENCOUNTER (OUTPATIENT)
Age: 59
Discharge: HOME | End: 2019-02-15
Attending: INTERNAL MEDICINE

## 2019-02-15 VITALS
SYSTOLIC BLOOD PRESSURE: 130 MMHG | DIASTOLIC BLOOD PRESSURE: 83 MMHG | HEART RATE: 69 BPM | OXYGEN SATURATION: 95 % | RESPIRATION RATE: 18 BRPM | TEMPERATURE: 98.3 F

## 2019-02-15 DIAGNOSIS — F19.90 IVDU (INTRAVENOUS DRUG USER): ICD-10-CM

## 2019-02-15 DIAGNOSIS — B37.9 CANDIDA PARAPSILOSIS INFECTION: ICD-10-CM

## 2019-02-15 DIAGNOSIS — M46.44 DISCITIS OF THORACIC REGION: ICD-10-CM

## 2019-02-15 PROCEDURE — 74011000250 HC RX REV CODE- 250: Performed by: INTERNAL MEDICINE

## 2019-02-15 PROCEDURE — 74011000258 HC RX REV CODE- 258: Performed by: INTERNAL MEDICINE

## 2019-02-15 PROCEDURE — 74011250636 HC RX REV CODE- 250/636: Performed by: INTERNAL MEDICINE

## 2019-02-15 PROCEDURE — 96375 TX/PRO/DX INJ NEW DRUG ADDON: CPT

## 2019-02-15 PROCEDURE — 96365 THER/PROPH/DIAG IV INF INIT: CPT

## 2019-02-15 RX ORDER — HEPARIN 100 UNIT/ML
500 SYRINGE INTRAVENOUS ONCE
Status: COMPLETED | OUTPATIENT
Start: 2019-02-15 | End: 2019-02-15

## 2019-02-15 RX ORDER — HEPARIN 100 UNIT/ML
SYRINGE INTRAVENOUS
Status: DISPENSED
Start: 2019-02-15 | End: 2019-02-16

## 2019-02-15 RX ORDER — SODIUM CHLORIDE 0.9 % (FLUSH) 0.9 %
10-40 SYRINGE (ML) INJECTION AS NEEDED
Status: DISCONTINUED | OUTPATIENT
Start: 2019-02-15 | End: 2019-02-19 | Stop reason: HOSPADM

## 2019-02-15 RX ADMIN — DAPTOMYCIN 550 MG: 500 INJECTION, POWDER, LYOPHILIZED, FOR SOLUTION INTRAVENOUS at 15:37

## 2019-02-15 RX ADMIN — SODIUM CHLORIDE 1 G: 900 INJECTION INTRAVENOUS at 15:28

## 2019-02-15 RX ADMIN — SODIUM CHLORIDE, PRESERVATIVE FREE 500 UNITS: 5 INJECTION INTRAVENOUS at 16:02

## 2019-02-15 RX ADMIN — Medication 10 ML: at 16:02

## 2019-02-15 RX ADMIN — Medication 30 ML: at 15:27

## 2019-02-15 NOTE — TELEPHONE ENCOUNTER
Patient have a MRI schedule today but it cannot be done due to  back pain. Please call the patient [phone] for advice.

## 2019-02-15 NOTE — PROGRESS NOTES
[facility] Progress Note Date: February 15, 2019 Name: Janette Yi MRN: [id number] : 1960 Invanz Infusion/Dapto Ms. Jimmy Law to [place], ambulatory at [phone]. Pt was assessed and education was provided. Ms. Vic Flood vitals were reviewed. Visit Vitals /83 (BP 1 Location: Left arm, BP Patient Position: Sitting) Pulse 69 Temp 98.3 °F (36.8 °C) Resp 18 SpO2 95% Right upper arm PICC flushed easily and had brisk blood return via both ports. Invanz 1 gm was infused for 30 minutes as ordered via purple lumen. Dapto 550 mg given slow IVP over 2 minutes. PICC dressing CDI and not due to be changed. Ms. Jimmy Law tolerated infusion, and had no complaints from infusion. Both lumens of PICC flushed with NS 20 ml and Heparin 500 units each. Picc site and drsg without change. Green end caps applied. Lumens wrapped with coban. Stockinette placed over dressing for protection. Patient armband removed and shredded. Ms. Jimmy Law was discharged from [place] [address] in stable condition at [phone]. She is to return on 19 for her next antibiotic appointment. Talon Xiong RN February 15, 2019

## 2019-02-16 ENCOUNTER — HOSPITAL ENCOUNTER (OUTPATIENT)
Dept: INFUSION THERAPY | Age: 59
Discharge: HOME | End: 2019-02-16
Payer: MEDICAID

## 2019-02-16 VITALS
TEMPERATURE: 98.4 F | RESPIRATION RATE: 18 BRPM | OXYGEN SATURATION: 93 % | HEART RATE: 70 BPM | DIASTOLIC BLOOD PRESSURE: 73 MMHG | SYSTOLIC BLOOD PRESSURE: 107 MMHG

## 2019-02-16 PROCEDURE — 74011000258 HC RX REV CODE- 258: Performed by: INTERNAL MEDICINE

## 2019-02-16 PROCEDURE — 74011250636 HC RX REV CODE- 250/636: Performed by: INTERNAL MEDICINE

## 2019-02-16 PROCEDURE — 96375 TX/PRO/DX INJ NEW DRUG ADDON: CPT

## 2019-02-16 PROCEDURE — 74011000250 HC RX REV CODE- 250: Performed by: INTERNAL MEDICINE

## 2019-02-16 PROCEDURE — 96365 THER/PROPH/DIAG IV INF INIT: CPT

## 2019-02-16 RX ORDER — HEPARIN 100 UNIT/ML
500 SYRINGE INTRAVENOUS AS NEEDED
Status: DISCONTINUED | OUTPATIENT
Start: 2019-02-16 | End: 2019-02-20 | Stop reason: HOSPADM

## 2019-02-16 RX ORDER — SODIUM CHLORIDE 0.9 % (FLUSH) 0.9 %
10-40 SYRINGE (ML) INJECTION AS NEEDED
Status: DISCONTINUED | OUTPATIENT
Start: 2019-02-16 | End: 2019-02-20 | Stop reason: HOSPADM

## 2019-02-16 RX ORDER — HEPARIN 100 UNIT/ML
SYRINGE INTRAVENOUS
Status: DISPENSED
Start: 2019-02-16 | End: 2019-02-16

## 2019-02-16 RX ADMIN — Medication 10 ML: at 10:59

## 2019-02-16 RX ADMIN — SODIUM CHLORIDE 1 G: 900 INJECTION INTRAVENOUS at 10:29

## 2019-02-16 RX ADMIN — Medication 10 ML: at 10:28

## 2019-02-16 RX ADMIN — Medication 10 ML: at 10:40

## 2019-02-16 RX ADMIN — Medication 10 ML: at 10:46

## 2019-02-16 RX ADMIN — SODIUM CHLORIDE, PRESERVATIVE FREE 500 UNITS: 5 INJECTION INTRAVENOUS at 10:47

## 2019-02-16 RX ADMIN — SODIUM CHLORIDE, PRESERVATIVE FREE 500 UNITS: 5 INJECTION INTRAVENOUS at 11:00

## 2019-02-16 RX ADMIN — DAPTOMYCIN 550 MG: 500 INJECTION, POWDER, LYOPHILIZED, FOR SOLUTION INTRAVENOUS at 10:42

## 2019-02-16 NOTE — PROGRESS NOTES
[facility] Date: 2019 Name: Natalie Espinosa MRN: [id number] : 1960 Invanz/dapto Infusion Ms. Matute to [facility], ambulatory at 1020. Pain to back 2/10. Took pain medication today Pt was assessed and education was provided. Ms. Lawyer Escobar vitals were reviewed. Visit Vitals /73 (BP 1 Location: Left arm, BP Patient Position: Sitting) Pulse 70 Temp 98.4 °F (36.9 °C) Resp 18 SpO2 93% Right upper arm PICC flushed easily and had brisk blood return via both ports. PICC dressing c/d/i and not due to be changed. No swelling, redness, streaking, warmth or drainage noted in arm. Pt denied c/o pain in arm. Invanz 1 gm was infused for 30 minutes as ordered via red lumen,followed by normal salne flush. Daptomycin 550 mg given IVP over 2 minutes via purple lumen, followed by normal saline flush. Ms. Francisca Patel tolerated infusion, and had no complaints from infusion. Both lumens of PICC flushed with Heparin 500 units each. Picc site and drsg without change. Green end caps applied. Lumens wrapped with coban. Stockinette placed over dressing for protection. Patient armband removed and shredded. Ms. Francisca Patel was discharged from [place] [address] in stable condition at 1100. She is to return on  at 1000 for her next antibiotic appointment. Gracy Gutierrez RN 2019

## 2019-02-17 ENCOUNTER — HOSPITAL ENCOUNTER (OUTPATIENT)
Dept: INFUSION THERAPY | Age: 59
Discharge: HOME | End: 2019-02-17
Payer: MEDICAID

## 2019-02-17 VITALS
HEART RATE: 71 BPM | RESPIRATION RATE: 18 BRPM | SYSTOLIC BLOOD PRESSURE: 138 MMHG | OXYGEN SATURATION: 92 % | TEMPERATURE: 98.4 F | DIASTOLIC BLOOD PRESSURE: 86 MMHG

## 2019-02-17 PROCEDURE — 74011000258 HC RX REV CODE- 258: Performed by: INTERNAL MEDICINE

## 2019-02-17 PROCEDURE — 74011250636 HC RX REV CODE- 250/636: Performed by: INTERNAL MEDICINE

## 2019-02-17 PROCEDURE — 96375 TX/PRO/DX INJ NEW DRUG ADDON: CPT

## 2019-02-17 PROCEDURE — 74011000250 HC RX REV CODE- 250: Performed by: INTERNAL MEDICINE

## 2019-02-17 PROCEDURE — 96365 THER/PROPH/DIAG IV INF INIT: CPT

## 2019-02-17 RX ORDER — HEPARIN 100 UNIT/ML
500 SYRINGE INTRAVENOUS AS NEEDED
Status: DISCONTINUED | OUTPATIENT
Start: 2019-02-17 | End: 2019-02-21 | Stop reason: HOSPADM

## 2019-02-17 RX ORDER — HEPARIN 100 UNIT/ML
SYRINGE INTRAVENOUS
Status: DISPENSED
Start: 2019-02-17 | End: 2019-02-17

## 2019-02-17 RX ORDER — SODIUM CHLORIDE 0.9 % (FLUSH) 0.9 %
10-40 SYRINGE (ML) INJECTION AS NEEDED
Status: DISCONTINUED | OUTPATIENT
Start: 2019-02-17 | End: 2019-02-21 | Stop reason: HOSPADM

## 2019-02-17 RX ADMIN — SODIUM CHLORIDE, PRESERVATIVE FREE 500 UNITS: 5 INJECTION INTRAVENOUS at 10:43

## 2019-02-17 RX ADMIN — SODIUM CHLORIDE 1 G: 900 INJECTION INTRAVENOUS at 10:10

## 2019-02-17 RX ADMIN — Medication 10 ML: at 10:42

## 2019-02-17 RX ADMIN — Medication 10 ML: at 10:09

## 2019-02-17 RX ADMIN — SODIUM CHLORIDE, PRESERVATIVE FREE 500 UNITS: 5 INJECTION INTRAVENOUS at 10:19

## 2019-02-17 RX ADMIN — Medication 10 ML: at 10:14

## 2019-02-17 RX ADMIN — Medication 10 ML: at 10:18

## 2019-02-17 RX ADMIN — DAPTOMYCIN 550 MG: 500 INJECTION, POWDER, LYOPHILIZED, FOR SOLUTION INTRAVENOUS at 10:15

## 2019-02-17 NOTE — PROGRESS NOTES
[facility] Progress Note Date: 2019 Name: Nurys Guo MRN: [id number] : 1960 Invanz/dapto Infusion Ms. Matute to [facility], ambulatory at 1020. Pain to back 3/10. Took pain medication today Pt was assessed and education was provided. Upper back with scattered reddened areas. Patient has an appointment with her doctor tomorrow, and was instructed to address same with her, or go to ER if it worsens today. Ms. Renan Villatoro vitals were reviewed. Visit Vitals /86 (BP 1 Location: Left arm, BP Patient Position: Sitting) Pulse 71 Temp 98.4 °F (36.9 °C) Resp 18 SpO2 92% Right upper arm PICC flushed easily and had brisk blood return via both ports. PICC dressing c/d/i and not due to be changed. No swelling, redness, streaking, warmth or drainage noted in arm. Pt denied c/o pain in arm. Invanz 1 gm was infused for 30 minutes as ordered via red lumen,followed by normal salne flush. Daptomycin 550 mg given IVP over 2 minutes via purple lumen, followed by normal saline flush. Ms. Eduar Martinez tolerated infusion, and had no complaints from infusion. Both lumens of PICC flushed with Heparin 500 units each. Picc site and drsg without change. Green end caps applied. Lumens wrapped with coban. Stockinette placed over dressing for protection. Patient armband removed and shredded. Ms. Eduar Martinez was discharged from [place] [address] in stable condition at 1045. She is to return on  at 3 pm for her next antibiotic appointment. Niki Alberto RN 2019

## 2019-02-18 ENCOUNTER — OFFICE VISIT (OUTPATIENT)
Dept: INTERNAL MEDICINE CLINIC | Age: 59
End: 2019-02-18

## 2019-02-18 ENCOUNTER — HOSPITAL ENCOUNTER (OUTPATIENT)
Dept: INFUSION THERAPY | Age: 59
Discharge: HOME | End: 2019-02-18
Payer: MEDICAID

## 2019-02-18 VITALS
TEMPERATURE: 98.1 F | HEIGHT: 64 IN | RESPIRATION RATE: 16 BRPM | OXYGEN SATURATION: 96 % | WEIGHT: 199 LBS | DIASTOLIC BLOOD PRESSURE: 76 MMHG | BODY MASS INDEX: 33.97 KG/M2 | SYSTOLIC BLOOD PRESSURE: 128 MMHG | HEART RATE: 74 BPM

## 2019-02-18 VITALS
SYSTOLIC BLOOD PRESSURE: 136 MMHG | OXYGEN SATURATION: 93 % | TEMPERATURE: 98.1 F | DIASTOLIC BLOOD PRESSURE: 66 MMHG | RESPIRATION RATE: 18 BRPM | HEART RATE: 69 BPM

## 2019-02-18 DIAGNOSIS — B37.9 CANDIDA PARAPSILOSIS INFECTION: ICD-10-CM

## 2019-02-18 DIAGNOSIS — M46.44 DISCITIS OF THORACIC REGION: Primary | ICD-10-CM

## 2019-02-18 DIAGNOSIS — F11.90 METHADONE USE: ICD-10-CM

## 2019-02-18 DIAGNOSIS — M54.6 BILATERAL THORACIC BACK PAIN, UNSPECIFIED CHRONICITY: ICD-10-CM

## 2019-02-18 DIAGNOSIS — F19.90 IVDU (INTRAVENOUS DRUG USER): ICD-10-CM

## 2019-02-18 LAB
ALBUMIN SERPL-MCNC: 3.2 G/DL (ref 3.4–5)
ALBUMIN/GLOB SERPL: 0.7 {RATIO} (ref 0.8–1.7)
ALP SERPL-CCNC: 138 U/L (ref 45–117)
ALT SERPL-CCNC: 29 U/L (ref 13–56)
ANION GAP SERPL CALC-SCNC: 7 MMOL/L (ref 3–18)
AST SERPL-CCNC: 35 U/L (ref 15–37)
BASO+EOS+MONOS # BLD AUTO: 0.4 K/UL (ref 0–2.3)
BASO+EOS+MONOS NFR BLD AUTO: 6 % (ref 0.1–17)
BILIRUB DIRECT SERPL-MCNC: <0.1 MG/DL (ref 0–0.2)
BILIRUB SERPL-MCNC: 0.2 MG/DL (ref 0.2–1)
BUN SERPL-MCNC: 13 MG/DL (ref 7–18)
BUN/CREAT SERPL: 18 (ref 12–20)
CALCIUM SERPL-MCNC: 9.3 MG/DL (ref 8.5–10.1)
CHLORIDE SERPL-SCNC: 98 MMOL/L (ref 100–108)
CK SERPL-CCNC: 50 U/L (ref 26–192)
CO2 SERPL-SCNC: 31 MMOL/L (ref 21–32)
CREAT SERPL-MCNC: 0.73 MG/DL (ref 0.6–1.3)
DIFFERENTIAL METHOD BLD: NORMAL
ERYTHROCYTE [DISTWIDTH] IN BLOOD BY AUTOMATED COUNT: 14 % (ref 11.5–14.5)
GLOBULIN SER CALC-MCNC: 4.9 G/DL (ref 2–4)
GLUCOSE SERPL-MCNC: 124 MG/DL (ref 74–99)
HCT VFR BLD AUTO: 40.2 % (ref 36–48)
HGB BLD-MCNC: 13 G/DL (ref 12–16)
LYMPHOCYTES # BLD: 2 K/UL (ref 1.1–5.9)
LYMPHOCYTES NFR BLD: 26 % (ref 14–44)
MCH RBC QN AUTO: 29.7 PG (ref 25–35)
MCHC RBC AUTO-ENTMCNC: 32.3 G/DL (ref 31–37)
MCV RBC AUTO: 92 FL (ref 78–102)
NEUTS SEG # BLD: 5.3 K/UL (ref 1.8–9.5)
NEUTS SEG NFR BLD: 68 % (ref 40–70)
PLATELET # BLD AUTO: 310 K/UL (ref 140–440)
POTASSIUM SERPL-SCNC: 4.5 MMOL/L (ref 3.5–5.5)
PROT SERPL-MCNC: 8.1 G/DL (ref 6.4–8.2)
RBC # BLD AUTO: 4.37 M/UL (ref 4.1–5.1)
SODIUM SERPL-SCNC: 136 MMOL/L (ref 136–145)
WBC # BLD AUTO: 7.7 K/UL (ref 4.5–13)

## 2019-02-18 PROCEDURE — 74011000250 HC RX REV CODE- 250: Performed by: INTERNAL MEDICINE

## 2019-02-18 PROCEDURE — 86140 C-REACTIVE PROTEIN: CPT

## 2019-02-18 PROCEDURE — 80048 BASIC METABOLIC PNL TOTAL CA: CPT

## 2019-02-18 PROCEDURE — 82550 ASSAY OF CK (CPK): CPT

## 2019-02-18 PROCEDURE — 74011250636 HC RX REV CODE- 250/636

## 2019-02-18 PROCEDURE — 74011000258 HC RX REV CODE- 258: Performed by: INTERNAL MEDICINE

## 2019-02-18 PROCEDURE — 85025 COMPLETE CBC W/AUTO DIFF WBC: CPT

## 2019-02-18 PROCEDURE — 74011250636 HC RX REV CODE- 250/636: Performed by: INTERNAL MEDICINE

## 2019-02-18 PROCEDURE — 96365 THER/PROPH/DIAG IV INF INIT: CPT

## 2019-02-18 PROCEDURE — 80076 HEPATIC FUNCTION PANEL: CPT

## 2019-02-18 PROCEDURE — 96375 TX/PRO/DX INJ NEW DRUG ADDON: CPT

## 2019-02-18 RX ORDER — HEPARIN 100 UNIT/ML
500 SYRINGE INTRAVENOUS AS NEEDED
Status: DISCONTINUED | OUTPATIENT
Start: 2019-02-18 | End: 2019-02-22 | Stop reason: HOSPADM

## 2019-02-18 RX ORDER — SODIUM CHLORIDE 0.9 % (FLUSH) 0.9 %
10-40 SYRINGE (ML) INJECTION AS NEEDED
Status: DISCONTINUED | OUTPATIENT
Start: 2019-02-18 | End: 2019-02-22 | Stop reason: HOSPADM

## 2019-02-18 RX ORDER — HEPARIN 100 UNIT/ML
SYRINGE INTRAVENOUS
Status: COMPLETED
Start: 2019-02-18 | End: 2019-02-18

## 2019-02-18 RX ADMIN — SODIUM CHLORIDE, PRESERVATIVE FREE 500 UNITS: 5 INJECTION INTRAVENOUS at 15:52

## 2019-02-18 RX ADMIN — DAPTOMYCIN 550 MG: 500 INJECTION, POWDER, LYOPHILIZED, FOR SOLUTION INTRAVENOUS at 15:18

## 2019-02-18 RX ADMIN — Medication 20 ML: at 15:52

## 2019-02-18 RX ADMIN — SODIUM CHLORIDE, PRESERVATIVE FREE 500 UNITS: 5 INJECTION INTRAVENOUS at 15:53

## 2019-02-18 RX ADMIN — Medication 30 ML: at 15:18

## 2019-02-18 RX ADMIN — SODIUM CHLORIDE 1 G: 900 INJECTION INTRAVENOUS at 15:17

## 2019-02-18 NOTE — PROGRESS NOTES
[facility] Progress Note Date: 2019 Name: Michael Adorno MRN: [id number] : 1960 Invanz Infusion/Dapto Ms. Emma Wright to [facility], ambulatory at [facility]. Pt was assessed and education was provided. Ms. Álvaro Beltran vitals were reviewed. Visit Vitals /66 (BP 1 Location: Left arm, BP Patient Position: Sitting) Pulse 69 Temp 98.1 °F (36.7 °C) Resp 18 SpO2 93% Right upper arm PICC flushed easily and had brisk blood return via both ports. Labs drawn off for CBC, CMP , CRP and CK per order. Recent Results (from the past 12 hour(s)) CBC WITH 3 PART DIFF Collection Time: 19  3:25 PM  
Result Value Ref Range WBC 7.7 4.5 - 13.0 K/uL  
 RBC 4.37 4.10 - 5.10 M/uL  
 HGB 13.0 12.0 - 16 g/dL HCT 40.2 36 - 48 % MCV 92.0 78 - 102 FL  
 MCH 29.7 25.0 - 35.0 PG  
 MCHC 32.3 31 - 37 g/dL  
 RDW 14.0 11.5 - 14.5 % PLATELET 925 814 - 868 K/uL NEUTROPHILS 68 40 - 70 % MIXED CELLS 6 0.1 - 17 % LYMPHOCYTES 26 14 - 44 % ABS. NEUTROPHILS 5.3 1.8 - 9.5 K/UL  
 ABS. MIXED CELLS 0.4 0.0 - 2.3 K/uL  
 ABS. LYMPHOCYTES 2.0 1.1 - 5.9 K/UL  
 DF AUTOMATED Invanz 1 gm was infused for 30 minutes as ordered via red lumen. Dapto 550 mg given slow IVP over 2 minutes via purple lumen. PICC dressing CDI and not due to be changed until 19. Ms. Emma Wright tolerated infusion, and had no complaints from infusion. Both lumens of PICC flushed with NS 20 ml and Heparin 500 units each. Picc site and drsg without change. Green end caps applied. Lumens wrapped with coban. Stockinette placed over dressing for protection. Patient armband removed and shredded. Ms. Emma Wright was discharged from [place] [address] in stable condition at [phone]. She is to return on 19 for her next antibiotic appointment. Jamar Rose RN 2019 
[id number]

## 2019-02-18 NOTE — PROGRESS NOTES
ROOM # 12 Kelly Mcdaniels presents today for Chief Complaint Patient presents with  Back Pain  Follow-up Kelly Mcdaniels preferred language for health care discussion is english/other. Depression Screening: 
3 most recent [facility] Screens 2/18/2019 1/7/2019 11/29/2018 10/1/2018 8/17/2018 7/13/2018 Little interest or pleasure in doing things Not at all Not at all Not at all Nearly every day Several days More than half the days Feeling down, depressed, irritable, or hopeless More than half the days Not at all Not at all Several days Several days Nearly every day Total Score PHQ 2 2 0 0 4 2 5 Trouble falling or staying asleep, or sleeping too much - - - Several days - Not at all Feeling tired or having little energy - - - Nearly every day - More than half the days Poor appetite, weight loss, or overeating - - - Several days - More than half the days Feeling bad about yourself - or that you are a failure or have let yourself or your family down - - - Nearly every day - Nearly every day Trouble concentrating on things such as school, work, reading, or watching TV - - - Nearly every day - Not at all Moving or speaking so slowly that other people could have noticed; or the opposite being so fidgety that others notice - - - Not at all - Not at all Thoughts of being better off dead, or hurting yourself in some way - - - Not at all - Several days PHQ 9 Score - - - 15 - 13 How difficult have these problems made it for you to do your work, take care of your home and get along with others - - - Somewhat difficult - Very difficult Learning Assessment: 
Learning Assessment 1/7/2019 7/13/2018 PRIMARY LEARNER Patient Patient HIGHEST LEVEL OF EDUCATION - PRIMARY LEARNER  - GRADUATED HIGH SCHOOL OR GED  
BARRIERS PRIMARY LEARNER - NONE  
CO-LEARNER CAREGIVER - No  
PRIMARY LANGUAGE ENGLISH ENGLISH  
LEARNER PREFERENCE PRIMARY DEMONSTRATION VIDEOS  
ANSWERED BY patient patient RELATIONSHIP SELF SELF Abuse Screening: 
Abuse Screening Questionnaire 1/7/2019 7/13/2018 Do you ever feel afraid of your partner? Curvin Lace Are you in a relationship with someone who physically or mentally threatens you? Curvin Lace Is it safe for you to go home? Abena Snow Fall Risk No flowsheet data found. Visit Vitals /76 (BP 1 Location: Left arm, BP Patient Position: Sitting) Pulse 74 Temp 98.1 °F (36.7 °C) (Oral) Resp 16 Ht 5' 4\" (1.626 m) Wt 199 lb (90.3 kg) SpO2 96% BMI 34.16 kg/m² Health Maintenance reviewed and discussed per provider. Yes Suly Rhoades is due for Health Maintenance Due Topic Date Due  Pneumococcal 19-64 Medium Risk (1 of 1 - PPSV23) 10/29/1980  
 DTaP/Tdap/Td series (1 - Tdap) 10/29/1982  PAP AKA CERVICAL CYTOLOGY  10/29/1982  Shingrix Vaccine Age 50> (1 of 2) 10/29/2011  
 FOBT Q 1 YEAR AGE 50-75  10/29/2011 Please order/place referral if appropriate. Advance Directive: 1. Do you have an advance directive in place? Patient Reply: No 
 
2. If not, would you like material regarding how to put one in place? Patient Reply: No 
 
Coordination of Care: 1. Have you been to the ER, urgent care clinic since your last visit? Hospitalized since your last visit?  No

## 2019-02-18 NOTE — PROGRESS NOTES
MARIA EUGENIA Smith is a 63 y.o. female with relevant past medical depression/anxiety, history of IVDU now on methadone clinic, h/o Hep C infection s/p treatment with a pangenotypic agent (Tacy Muster) around Sept- Nov, 2018, severe obesity, HIV status neg (11/13/18) here for outpatient ID follow up of thoracic discitis/osteomyelitis. The patient apparently presented with progressively worse abdominal/thoracic back pain since around Oct, 2018, but no fever, chills, malaise, night sweats. No recent trauma, falls. Last IVDU a few months ago, unclear dates. She was seen in the ED on 11/7/18 and had a CTA to rule out PE, that showed multiple pulmonary nodules, suspected discitis at T7-8, hepatosteatosis and hepatomegaly with mild splenomegaly. Multiple nonspecific periportal slight enlarged nodules. She was discharged to continue outpatient work up. She returned to the ED on 11/1/18 due to persistent pain, associated with nausea and vomiting, an MRI of her thoracic spine was done on11/11/18 that showed significant soft tissue and osseous inflammation centered at the T7-8 disc level 
and pronounced circumferential prevertebral and epidural inflammation centered at this level most suggestive of discitis osteomyelitis. No peripheral enhancing collection to suggest abscess. Mild compromise of the central canal at the level of T7-8. On 11/15/18 IR performs T7-8 biopsy. BC on 11/11/18 neg x , UC neg Disc biopsy culture from 11/12/18  And 11/15/18: Candida parapsilosis. Bone cultures from 11/15/18 also grew MRSE. This results apparently were not available at the time of discharge. Fungal and AFB cultures neg from bone. The patient was initially treated with ceftriaxone and vancomycin. She was discharged on ceftriaxone and daptomycin to complete around 12/29/18 with out patient ID follow up for further recommendations.   
The patient was seen again in the ED on 11/15/18 for persistent severe pain and was recommended to continue with treatment as recommended from discharge. The patient completed daptomycin and ceftriaxone from 11/15/18 to 12/11/18 (+-4 weeks), followed by ambisome from 12/13/18 to 12/17/18. Stopped due to increased creatinine to 1.85. Followed by anidalofungin 100 mg IV daily until Sigifredo 10, 2019 (4 weeks), changed to fluconazol 1/11/19 despite interaction with Methadone, due to patient not improving on anidalofungin. MRI on 1/10/19 reports: Worsening discitis/osteomyelitis process at T7-T8, with circumferential epidural enhancement and posterior soft tissue enhancement  No focal abscess. Also developing fragmentation and compression fracture in T7, 
followed by T8, with retropulsion of T7 fragment into the central canal, causing central stenosis and mild cord displacement but no significant cord compression or edema. Osteomyelitis now also involves the posterior elements of T7 and T8, 
including spinous processes. Developing thoracic kyphosis. Started on 1/11/19 on ertapenem empirically given progression of disease. Patient was clinically improving, CRP was 0.4 (1/14/19), however, CRP increased to 8.7 (1/28/19) and hsCRP 98.66 (2/5/19) and patient also reported worsening back pain, so a new MRI of her back was ordered 2/5/19, and daptomycin was added empirically to ertapenem and fluconazol on 2/6/19to continue with both antibiotics for 4 weeks. The patient said she cancelled MRI as she did not think she could tolerate lying flat on her back for the study. Since starting daptomycin her CRP has come down on 2/11/19 to 2.1. Her R arm PICC lines looks fine on exam, patient denies any local pain, swelling, drainage, bleeding, rashes. She denies any new symptoms other than back pain around her thoracic spine with some ruptured capillaries. Denies any pruritus. Denies any recent IVDU.   
Reports compliance with all infusions and fluconazol EKGs have been stable, no QTc prolongation with high dose fluconazol and methadone interaction. ROS As above included in HPI. Otherwise 11 point review of systems negative including constitutional, skin, HENT, eyes, respiratory, cardiovascular, gastrointestinal, genitourinary, musculoskeletal, endocrine, hematologic, allergy, and neurologic. Past Medical History Past Medical History:  
Diagnosis Date  Depression  Hepatitis C   
 Heroin use disorder, moderate, in early remission ([address] NOW on [facility]  Ill-defined condition Chronic back pain History reviewed. No pertinent surgical history. Family History Family History Problem Relation Age of Onset  Hypertension Mother  High Cholesterol Mother  COPD Mother  Hypertension Father Social History She  reports that she has been smoking cigarettes. She has been smoking about 0.25 packs per day. she has never used smokeless tobacco.  
Social History Substance and Sexual Activity Alcohol Use No  
 
 
Immunization History There is no immunization history on file for this patient. Allergies No Known Allergies Medications Current Outpatient Medications Medication Sig  sertraline (ZOLOFT) 100 mg tablet TAKE 1 & 1/2 TABLET BY MOUTH FOR A TOTAL OF 150MG DAILY  hydroCHLOROthiazide (HYDRODIURIL) 12.5 mg tablet TAKE ONE TABLET BY MOUTH DAILY  potassium chloride (KLOR-CON) 10 mEq tablet Take 1 Tab by mouth daily.  OTHER Daptomycin through 12/29/18 at [facility]- arranged by   methadone HCl (METHADONE PO) Take 85 mg by mouth daily.  docusate sodium (COLACE) 100 mg capsule Take 1 Cap by mouth two (2) times a day. No current facility-administered medications for this visit. Facility-Administered Medications Ordered in Other Visits Medication Dose Route Frequency  ertapenem (INVANZ) 1 g in 0.9% sodium chloride (MBP/ADV) 50 mL MBP  1 g IntraVENous ONCE  
  [START ON 2/25/2019] DAPTOmycin (CUBICIN) 550 mg in water, bacteriostatic (WATER) 11 mL IV syringe RF formulation  550 mg IntraVENous ONCE  
 heparin (porcine) pf 500 Units  500 Units InterCATHeter PRN  
 sodium chloride (NS) flush 10-40 mL  10-40 mL IntraVENous PRN  
 sodium chloride (NS) flush 10-40 mL  10-40 mL IntraVENous PRN  
 heparin (porcine) pf 500 Units  500 Units InterCATHeter PRN  
 sodium chloride (NS) flush 10-40 mL  10-40 mL IntraVENous PRN  
 heparin (porcine) pf 500 Units  500 Units InterCATHeter PRN  
 sodium chloride (NS) flush 10-40 mL  10-40 mL IntraVENous PRN  
 [START ON 2/22/2019] DAPTOmycin (CUBICIN) 550 mg in water, bacteriostatic (WATER) 11 mL IV syringe RF formulation  550 mg IntraVENous ONCE  
 [START ON 2/19/2019] DAPTOmycin (CUBICIN) 550 mg in water, bacteriostatic (WATER) 11 mL IV syringe RF formulation  550 mg IntraVENous ONCE  
 [START ON 2/21/2019] DAPTOmycin (CUBICIN) 550 mg in water, bacteriostatic (WATER) 11 mL IV syringe RF formulation  550 mg IntraVENous ONCE  
 DAPTOmycin (CUBICIN) 550 mg in water, bacteriostatic (WATER) 11 mL IV syringe RF formulation  550 mg IntraVENous ONCE  
 [START ON 2/20/2019] DAPTOmycin (CUBICIN) 550 mg in water, bacteriostatic (WATER) 11 mL IV syringe RF formulation  550 mg IntraVENous ONCE Visit Vitals /76 (BP 1 Location: Left arm, BP Patient Position: Sitting) Pulse 74 Temp 98.1 °F (36.7 °C) (Oral) Resp 16 Ht 5' 4\" (1.626 m) Wt 199 lb (90.3 kg) LMP  (LMP Unknown) SpO2 96% BMI 34.16 kg/m² Body mass index is 34.16 kg/m². Physical Exam  
Constitutional: She is oriented to person, place, and time. She appears distressed (from pain, looks uncomfortable but non toxic). HENT:  
Head: Normocephalic and atraumatic. Neck: Neck supple. Cardiovascular: Normal rate and regular rhythm. Pulmonary/Chest: Effort normal and breath sounds normal.  
Abdominal: Soft. Musculoskeletal: She exhibits no edema. Neurological: She is alert and oriented to person, place, and time. Skin: Skin is warm. Mid back is slightly tender to touch, extending around costal area on both sides Nursing note and vitals reviewed. REVIEW OF DATA Labs Hospital Outpatient Visit on 02/11/2019 Component Date Value Ref Range Status  WBC 02/11/2019 7.1  4.5 - 13.0 K/uL Final  
 RBC 02/11/2019 4.23  4. 10 - 5.10 M/uL Final  
 HGB 02/11/2019 12.8  12.0 - 16 g/dL Final  
 HCT 02/11/2019 39.2  36 - 48 % Final  
 MCV 02/11/2019 92.7  78 - 102 FL Final  
 MCH 02/11/2019 30.3  25.0 - 35.0 PG Final  
 MCHC 02/11/2019 32.7  31 - 37 g/dL Final  
 RDW 02/11/2019 14.0  11.5 - 14.5 % Final  
 PLATELET 56/51/6565 035  140 - 440 K/uL Final  
 NEUTROPHILS 02/11/2019 59  40 - 70 % Final  
 MIXED CELLS 02/11/2019 10  0.1 - 17 % Final  
 LYMPHOCYTES 02/11/2019 30  14 - 44 % Final  
 ABS. NEUTROPHILS 02/11/2019 4.3  1.8 - 9.5 K/UL Final  
 ABS. MIXED CELLS 02/11/2019 0.7  0.0 - 2.3 K/uL Final  
 ABS. LYMPHOCYTES 02/11/2019 2.1  1.1 - 5.9 K/UL Final  
 DF 02/11/2019 AUTOMATED    Final  
 Sodium 02/11/2019 135* 136 - 145 mmol/L Final  
 Potassium 02/11/2019 4.4  3.5 - 5.5 mmol/L Final  
 Chloride 02/11/2019 97* 100 - 108 mmol/L Final  
 CO2 02/11/2019 32  21 - 32 mmol/L Final  
 Anion gap 02/11/2019 6  3.0 - 18 mmol/L Final  
 Glucose 02/11/2019 77  74 - 99 mg/dL Final  
 BUN 02/11/2019 10  7.0 - 18 MG/DL Final  
 Creatinine 02/11/2019 0.70  0.6 - 1.3 MG/DL Final  
 BUN/Creatinine ratio 02/11/2019 14  12 - 20   Final  
 GFR est AA 02/11/2019 >60  >60 ml/min/1.73m2 Final  
 GFR est non-AA 02/11/2019 >60  >60 ml/min/1.73m2 Final  
 Calcium 02/11/2019 9.1  8.5 - 10.1 MG/DL Final  
 Bilirubin, total 02/11/2019 0.1* 0.2 - 1.0 MG/DL Final  
 ALT (SGPT) 02/11/2019 25  13 - 56 U/L Final  
 AST (SGOT) 02/11/2019 28  15 - 37 U/L Final  
 Alk.  phosphatase 02/11/2019 138* 45 - 117 U/L Final  
  Protein, total 02/11/2019 7.9  6.4 - 8.2 g/dL Final  
 Albumin 02/11/2019 3.1* 3.4 - 5.0 g/dL Final  
 Globulin 02/11/2019 4.8* 2.0 - 4.0 g/dL Final  
 A-G Ratio 02/11/2019 0.6* 0.8 - 1.7   Final  
 C-Reactive protein 02/11/2019 2.1* 0 - 0.3 mg/dL Final  
 CK 02/11/2019 28  26 - 192 U/L Final  
Hospital Outpatient Visit on 02/04/2019 Component Date Value Ref Range Status  WBC 02/04/2019 6.9  4.5 - 13.0 K/uL Final  
 RBC 02/04/2019 3.95* 4.10 - 5.10 M/uL Final  
 HGB 02/04/2019 11.9* 12.0 - 16 g/dL Final  
 HCT 02/04/2019 37.2  36 - 48 % Final  
 MCV 02/04/2019 94.2  78 - 102 FL Final  
 MCH 02/04/2019 30.1  25.0 - 35.0 PG Final  
 MCHC 02/04/2019 32.0  31 - 37 g/dL Final  
 RDW 02/04/2019 14.2  11.5 - 14.5 % Final  
 PLATELET 68/29/1611 864  140 - 440 K/uL Final  
 NEUTROPHILS 02/04/2019 65  40 - 70 % Final  
 MIXED CELLS 02/04/2019 11  0.1 - 17 % Final  
 LYMPHOCYTES 02/04/2019 24  14 - 44 % Final  
 ABS. NEUTROPHILS 02/04/2019 4.4  1.8 - 9.5 K/UL Final  
 ABS. MIXED CELLS 02/04/2019 0.8  0.0 - 2.3 K/uL Final  
 ABS.  LYMPHOCYTES 02/04/2019 1.7  1.1 - 5.9 K/UL Final  
 DF 02/04/2019 AUTOMATED    Final  
 Sodium 02/04/2019 136  136 - 145 mmol/L Final  
 Potassium 02/04/2019 3.8  3.5 - 5.5 mmol/L Final  
 Chloride 02/04/2019 98* 100 - 108 mmol/L Final  
 CO2 02/04/2019 33* 21 - 32 mmol/L Final  
 Anion gap 02/04/2019 5  3.0 - 18 mmol/L Final  
 Glucose 02/04/2019 107* 74 - 99 mg/dL Final  
 BUN 02/04/2019 8  7.0 - 18 MG/DL Final  
 Creatinine 02/04/2019 0.62  0.6 - 1.3 MG/DL Final  
 BUN/Creatinine ratio 02/04/2019 13  12 - 20   Final  
 GFR est AA 02/04/2019 >60  >60 ml/min/1.73m2 Final  
 GFR est non-AA 02/04/2019 >60  >60 ml/min/1.73m2 Final  
 Calcium 02/04/2019 8.9  8.5 - 10.1 MG/DL Final  
 Bilirubin, total 02/04/2019 0.2  0.2 - 1.0 MG/DL Final  
 ALT (SGPT) 02/04/2019 18  13 - 56 U/L Final  
 AST (SGOT) 02/04/2019 20  15 - 37 U/L Final  
  Alk. phosphatase 02/04/2019 114  45 - 117 U/L Final  
 Protein, total 02/04/2019 7.2  6.4 - 8.2 g/dL Final  
 Albumin 02/04/2019 2.9* 3.4 - 5.0 g/dL Final  
 Globulin 02/04/2019 4.3* 2.0 - 4.0 g/dL Final  
 A-G Ratio 02/04/2019 0.7* 0.8 - 1.7   Final  
 C-Reactive Protein, Cardiac 02/04/2019 98.66* 0.00 - 3.00 mg/L Final  
 CK 02/04/2019 28  26 - 192 U/L Final  
Hospital Outpatient Visit on 01/28/2019 Component Date Value Ref Range Status  WBC 01/28/2019 8.4  4.5 - 13.0 K/uL Final  
 RBC 01/28/2019 4.17  4.10 - 5.10 M/uL Final  
 HGB 01/28/2019 12.8  12.0 - 16 g/dL Final  
 HCT 01/28/2019 39.5  36 - 48 % Final  
 MCV 01/28/2019 94.7  78 - 102 FL Final  
 MCH 01/28/2019 30.7  25.0 - 35.0 PG Final  
 MCHC 01/28/2019 32.4  31 - 37 g/dL Final  
 RDW 01/28/2019 14.3  11.5 - 14.5 % Final  
 PLATELET 39/29/6695 655  140 - 440 K/uL Final  
 NEUTROPHILS 01/28/2019 72* 40 - 70 % Final  
 MIXED CELLS 01/28/2019 7  0.1 - 17 % Final  
 LYMPHOCYTES 01/28/2019 20  14 - 44 % Final  
 ABS. NEUTROPHILS 01/28/2019 6.1  1.8 - 9.5 K/UL Final  
 ABS. MIXED CELLS 01/28/2019 0.6  0.0 - 2.3 K/uL Final  
 ABS. LYMPHOCYTES 01/28/2019 1.7  1.1 - 5.9 K/UL Final  
 DF 01/28/2019 AUTOMATED    Final  
 Sodium 01/28/2019 133* 136 - 145 mmol/L Final  
 Potassium 01/28/2019 3.4* 3.5 - 5.5 mmol/L Final  
 Chloride 01/28/2019 94* 100 - 108 mmol/L Final  
 CO2 01/28/2019 34* 21 - 32 mmol/L Final  
 Anion gap 01/28/2019 5  3.0 - 18 mmol/L Final  
 Glucose 01/28/2019 102* 74 - 99 mg/dL Final  
 BUN 01/28/2019 8  7.0 - 18 MG/DL Final  
 Creatinine 01/28/2019 0.65  0.6 - 1.3 MG/DL Final  
 BUN/Creatinine ratio 01/28/2019 12  12 - 20   Final  
 GFR est AA 01/28/2019 >60  >60 ml/min/1.73m2 Final  
 GFR est non-AA 01/28/2019 >60  >60 ml/min/1.73m2 Final  
 Calcium 01/28/2019 8.9  8.5 - 10.1 MG/DL Final  
 C-Reactive protein 01/28/2019 8.7* 0 - 0.3 mg/dL Final  
Hospital Outpatient Visit on 01/21/2019 Component Date Value Ref Range Status  WBC 01/21/2019 10.0  4.5 - 13.0 K/uL Final  
 RBC 01/21/2019 4.69  4.10 - 5.10 M/uL Final  
 HGB 01/21/2019 14.4  12.0 - 16 g/dL Final  
 HCT 01/21/2019 45.3  36 - 48 % Final  
 MCV 01/21/2019 96.6  78 - 102 FL Final  
 MCH 01/21/2019 30.7  25.0 - 35.0 PG Final  
 MCHC 01/21/2019 31.8  31 - 37 g/dL Final  
 RDW 01/21/2019 14.2  11.5 - 14.5 % Final  
 PLATELET 27/69/5070 405  140 - 440 K/uL Final  
 NEUTROPHILS 01/21/2019 84* 40 - 70 % Final  
 MIXED CELLS 01/21/2019 2  0.1 - 17 % Final  
 LYMPHOCYTES 01/21/2019 14  14 - 44 % Final  
 ABS. NEUTROPHILS 01/21/2019 8.4  1.8 - 9.5 K/UL Final  
 ABS. MIXED CELLS 01/21/2019 0.2  0.0 - 2.3 K/uL Final  
 ABS. LYMPHOCYTES 01/21/2019 1.4  1.1 - 5.9 K/UL Final  
 DF 01/21/2019 AUTOMATED    Final  
 Sodium 01/21/2019 135* 136 - 145 mmol/L Final  
 Potassium 01/21/2019 4.6  3.5 - 5.5 mmol/L Final  
 Chloride 01/21/2019 96* 100 - 108 mmol/L Final  
 CO2 01/21/2019 32  21 - 32 mmol/L Final  
 Anion gap 01/21/2019 7  3.0 - 18 mmol/L Final  
 Glucose 01/21/2019 161* 74 - 99 mg/dL Final  
 BUN 01/21/2019 13  7.0 - 18 MG/DL Final  
 Creatinine 01/21/2019 0.81  0.6 - 1.3 MG/DL Final  
 BUN/Creatinine ratio 01/21/2019 16  12 - 20   Final  
 GFR est AA 01/21/2019 >60  >60 ml/min/1.73m2 Final  
 GFR est non-AA 01/21/2019 >60  >60 ml/min/1.73m2 Final  
 Calcium 01/21/2019 9.6  8.5 - 10.1 MG/DL Final  
 C-Reactive Protein, Cardiac 01/21/2019 7.41* 0.00 - 3.00 mg/L Final  
 
 
 
CT Results (most recent): 
Results from [place] encounter on 11/11/18 CT ABD WO CONT Narrative CT abdomen without IV contrast  
 
INDICATION: CT of T7-T8 discitis planning. TECHNIQUE: Serial axial images were obtained of the abdomen without intravenous 
contrast. Images are performed prone for the intended CT procedure. Decision was 
then made by Dr. Abhi Ramos to perform the procedure fluoroscopically in IR. All CT scans are performed using dose optimization techniques as appropriate to 
the performed exam including the following: Automated exposure control, 
adjustment of mA and/or kV according to patient size, and use of iterative 
reconstructive technique. COMPARISON: CTA chest 11/7/2018. MRI thoracic spine 11/11/2018. FINDINGS: 
 
Hepatic steatosis. Mild hepatosplenomegaly. Chronic calcifications in the 
pancreas without acute findings. The kidneys, adrenal glands, gallbladder and 
partially imaged bowel are within normal limits. No lymphadenopathy or free 
fluid. The level of T7-T8 osteomyelitis/discitis has mild surrounding soft tissue 
thickening as seen on prior MRI. Refer to prior MRI report. Impression IMPRESSION: 
 
1. CT-guided biopsy procedure changed to fluoroscopic procedure. Biopsy planning CT performed. 2. T7-T8 osteomyelitis/discitis redemonstrated. Refer to MR thoracic spine 
report. 3. Mild hepatosplenomegaly with hepatic steatosis. 4. Evidence of chronic pancreatitis. XR Results (most recent): 
Results from Hospital Encounter encounter on 11/07/18 XR CHEST PA LAT Narrative HISTORY: Shortness of breath. Exam: Chest. 
 
Technique: Two views of the chest were obtained. No prior studies for 
comparison. FINDINGS: Mild to moderate bilateral diffuse interstitial prominence is noted 
with basilar dominance without hemothorax pneumonia or pleural effusions. Heart 
is enlarged. Vascular pedicle is unremarkable. Visualized bony thorax and soft 
tissues are within normal limits. Impression  IMPRESSION: 
1. Sequela of mild to moderate congestion. Follow-up with plain imaging. CT All Micro Results None DIAGNOSIS AND PLAN Patient Active Problem List  
Diagnosis Code  Severe obesity with body mass index (BMI) of 35.0 to 39.9 with serious comorbidity (HCC) E66.01  
 Discitis of thoracic region M46.44  
 
1. Discitis of thoracic region 2. Severe obesity with body mass index (BMI) of 35.0 to 39.9 with serious comorbidity ([facility] [address] 3. Candida parapsilosis infection 4. IVDU (intravenous drug user) 5. Methadone use ([facility] [address] 6. History of hepatitis C 
7. Pulmonary nodules Indolent clinical picture in a patient with h/o IVDU, found to have discitis osteomyelitis s/p bone biopsy on 11/13 and 11/15 with Candida Parapsilosis and MRSE. Patient not significantly improving with ceftriaxone and daptomycin. The patient completed daptomycin and ceftriaxone from 11/15/18 to 12/11/18 (+-4 weeks), followed by ambisome from 12/13/18 to 12/17/18. Stopped due to increased creatinine to 1.85. Followed by anidalofungin 100 mg IV daily until Sigifredo 10, 2019 (4 weeks), changed to fluconazol 1/11/19 despite interaction with Methadone, due to patient not improving on anidalofungin. MRI on 1/10/19 reports: Worsening discitis/osteomyelitis process at T7-T8, with circumferential epidural enhancement and posterior soft tissue enhancement  No focal abscess. Also developing fragmentation and compression fracture in T7, 
followed by T8, with retropulsion of T7 fragment into the central canal, causing central stenosis and mild cord displacement but no significant cord compression or edema. Osteomyelitis now also involves the posterior elements of T7 and T8, 
including spinous processes. Developing thoracic kyphosis. Started on 1/11/19 on ertapenem empirically given progression of disease. Patient was clinically improving, CRP was 0.4 (1/14/19), however, CRP increased to 8.7 (1/28/19) and hsCRP 98.66 (2/5/19) and patient also reported worsening back pain, so a new MRI of her back was ordered 2/5/19, and daptomycin was added empirically to ertapenem and fluconazol on 2/6/19 to continue with both antibiotics for 4 weeks. The patient said she cancelled MRI as she did not think she could tolerate lying flat on her back for the study. Since starting daptomycin her CRP has come down on 2/11/19 to 2.1. Her R arm PICC lines looks fine on exam, patient denies any local pain, swelling, drainage, bleeding, rashes. She denies any new symptoms other than back pain around her thoracic spine with some ruptured capillaries. Denies any pruritus. Denies any recent IVDU. Reports compliance with all infusions and fluconazol EKGs have been stable, no QTc prolongation with high dose fluconazol and methadone interaction. Plan C/w Ertapenem (6 weeks will be on 2/21/19, however extended until 3/6/19 a total of 8 weeks) C/w Daptomycin for 4 weeks started 2/6/19 until 3/6/19- depending on clinical progress and tolerance. C/w fluconazol 400 mg PO daily for a total of approx 6-12 months, depending on clinical progress and tolerance. Started 1/11/19- July 2019 min) with weekly EKGs. Will hold off on MRI for now, however if worsening clinical picture and CRP we will attempt study again, perhaps under anesthesia so patient can tolerate study. Follow-up Disposition: 
Return in about 2 weeks (around 3/4/2019). Brenda Chaidez MD

## 2019-02-19 ENCOUNTER — HOSPITAL ENCOUNTER (OUTPATIENT)
Dept: INFUSION THERAPY | Age: 59
Discharge: HOME | End: 2019-02-19
Payer: MEDICAID

## 2019-02-19 LAB — CRP SERPL-MCNC: 2.4 MG/DL (ref 0–0.3)

## 2019-02-19 RX ORDER — SODIUM CHLORIDE 0.9 % (FLUSH) 0.9 %
10-40 SYRINGE (ML) INJECTION AS NEEDED
Status: CANCELLED | OUTPATIENT
Start: 2019-02-19

## 2019-02-19 RX ORDER — HEPARIN 100 UNIT/ML
500 SYRINGE INTRAVENOUS AS NEEDED
Status: CANCELLED | OUTPATIENT
Start: 2019-02-19

## 2019-02-20 ENCOUNTER — HOSPITAL ENCOUNTER (OUTPATIENT)
Dept: INFUSION THERAPY | Age: 59
Discharge: HOME | End: 2019-02-20
Payer: MEDICAID

## 2019-02-20 VITALS
SYSTOLIC BLOOD PRESSURE: 119 MMHG | HEART RATE: 70 BPM | TEMPERATURE: 97.9 F | OXYGEN SATURATION: 92 % | RESPIRATION RATE: 18 BRPM | DIASTOLIC BLOOD PRESSURE: 78 MMHG

## 2019-02-20 PROCEDURE — 74011000250 HC RX REV CODE- 250: Performed by: INTERNAL MEDICINE

## 2019-02-20 PROCEDURE — 74011250636 HC RX REV CODE- 250/636: Performed by: INTERNAL MEDICINE

## 2019-02-20 PROCEDURE — 74011000258 HC RX REV CODE- 258: Performed by: INTERNAL MEDICINE

## 2019-02-20 PROCEDURE — 96365 THER/PROPH/DIAG IV INF INIT: CPT

## 2019-02-20 PROCEDURE — 96375 TX/PRO/DX INJ NEW DRUG ADDON: CPT

## 2019-02-20 RX ORDER — SODIUM CHLORIDE 0.9 % (FLUSH) 0.9 %
10-40 SYRINGE (ML) INJECTION AS NEEDED
Status: DISCONTINUED | OUTPATIENT
Start: 2019-02-20 | End: 2019-02-24 | Stop reason: HOSPADM

## 2019-02-20 RX ORDER — HEPARIN 100 UNIT/ML
500 SYRINGE INTRAVENOUS AS NEEDED
Status: DISCONTINUED | OUTPATIENT
Start: 2019-02-20 | End: 2019-02-24 | Stop reason: HOSPADM

## 2019-02-20 RX ADMIN — SODIUM CHLORIDE, PRESERVATIVE FREE 500 UNITS: 5 INJECTION INTRAVENOUS at 15:47

## 2019-02-20 RX ADMIN — SODIUM CHLORIDE 1 G: 900 INJECTION INTRAVENOUS at 15:16

## 2019-02-20 RX ADMIN — SODIUM CHLORIDE, PRESERVATIVE FREE 500 UNITS: 5 INJECTION INTRAVENOUS at 15:27

## 2019-02-20 RX ADMIN — Medication 20 ML: at 15:47

## 2019-02-20 RX ADMIN — Medication 20 ML: at 15:27

## 2019-02-20 RX ADMIN — Medication 10 ML: at 15:15

## 2019-02-20 RX ADMIN — DAPTOMYCIN 550 MG: 500 INJECTION, POWDER, LYOPHILIZED, FOR SOLUTION INTRAVENOUS at 15:24

## 2019-02-20 RX ADMIN — Medication 10 ML: at 15:24

## 2019-02-20 NOTE — PROGRESS NOTES
[facility] Progress Note Date: 2019 Name: Melvina Suero MRN: [id number] : 1960 Invanz Infusion/Dapto Ms. Matute to [facility], ambulatory at 1500. Pt was assessed and education was provided. Ms. Roman Logan vitals were reviewed. Visit Vitals /78 (BP 1 Location: Left arm, BP Patient Position: Sitting) Pulse 70 Temp 97.9 °F (36.6 °C) Resp 18 SpO2 92% Right upper arm PICC flushed easily and had brisk blood return via both ports. Invanz 1 gm was infused for 30 minutes as ordered via purple lumen. Dapto 550 mg given slow IVP over 2 minutes via red lumen. PICC dressing CDI and not due to be changed until 19. Ms. Peyton Joshi tolerated infusion, and had no complaints from infusion. Both lumens of PICC flushed with NS 20 ml and Heparin 500 units each. Picc site and drsg without change. Green end caps applied. Lumens wrapped with coban. Stockinette placed over dressing for protection. Patient armband removed and shredded. Ms. Peyton Joshi was discharged from [place] [address] in stable condition at 1550. She is to return on 19 for her next antibiotic appointment. India Carrillo RN 2019

## 2019-02-21 ENCOUNTER — HOSPITAL ENCOUNTER (OUTPATIENT)
Dept: INFUSION THERAPY | Age: 59
Discharge: HOME | End: 2019-02-21
Payer: MEDICAID

## 2019-02-21 ENCOUNTER — CLINICAL SUPPORT (OUTPATIENT)
Dept: INTERNAL MEDICINE CLINIC | Age: 59
End: 2019-02-21

## 2019-02-21 VITALS
OXYGEN SATURATION: 95 % | HEART RATE: 69 BPM | DIASTOLIC BLOOD PRESSURE: 78 MMHG | RESPIRATION RATE: 18 BRPM | TEMPERATURE: 98.6 F | SYSTOLIC BLOOD PRESSURE: 123 MMHG

## 2019-02-21 DIAGNOSIS — Z79.899 MEDICATION MANAGEMENT: Primary | ICD-10-CM

## 2019-02-21 PROCEDURE — 96375 TX/PRO/DX INJ NEW DRUG ADDON: CPT

## 2019-02-21 PROCEDURE — 74011000250 HC RX REV CODE- 250: Performed by: INTERNAL MEDICINE

## 2019-02-21 PROCEDURE — 96365 THER/PROPH/DIAG IV INF INIT: CPT

## 2019-02-21 PROCEDURE — 74011250636 HC RX REV CODE- 250/636: Performed by: INTERNAL MEDICINE

## 2019-02-21 PROCEDURE — 77030020847 HC STATLOK BARD -A

## 2019-02-21 PROCEDURE — 74011000258 HC RX REV CODE- 258: Performed by: INTERNAL MEDICINE

## 2019-02-21 RX ORDER — SODIUM CHLORIDE 0.9 % (FLUSH) 0.9 %
10-40 SYRINGE (ML) INJECTION AS NEEDED
Status: DISCONTINUED | OUTPATIENT
Start: 2019-02-21 | End: 2019-02-25 | Stop reason: HOSPADM

## 2019-02-21 RX ORDER — HEPARIN 100 UNIT/ML
500 SYRINGE INTRAVENOUS AS NEEDED
Status: DISCONTINUED | OUTPATIENT
Start: 2019-02-21 | End: 2019-02-25 | Stop reason: HOSPADM

## 2019-02-21 RX ADMIN — DAPTOMYCIN 550 MG: 500 INJECTION, POWDER, LYOPHILIZED, FOR SOLUTION INTRAVENOUS at 15:40

## 2019-02-21 RX ADMIN — Medication 10 ML: at 15:39

## 2019-02-21 RX ADMIN — SODIUM CHLORIDE, PRESERVATIVE FREE 500 UNITS: 5 INJECTION INTRAVENOUS at 15:43

## 2019-02-21 RX ADMIN — Medication 20 ML: at 16:13

## 2019-02-21 RX ADMIN — SODIUM CHLORIDE, PRESERVATIVE FREE 500 UNITS: 5 INJECTION INTRAVENOUS at 16:13

## 2019-02-21 RX ADMIN — SODIUM CHLORIDE 1 G: 900 INJECTION INTRAVENOUS at 15:38

## 2019-02-21 RX ADMIN — Medication 20 ML: at 15:43

## 2019-02-21 RX ADMIN — Medication 10 ML: at 15:38

## 2019-02-21 NOTE — PROGRESS NOTES
\[facility]\"" Progress Note Date: 2019 Name: Nikhil Garber MRN: [id number] : 1960 Invanz Infusion/Dapto MsZoran Matute to [place], ambulatory at 1520. Pt was assessed and education was provided. Ms. Joanne Zamarripa vitals were reviewed. Visit Vitals /78 (BP 1 Location: Left arm, BP Patient Position: Sitting) Pulse 69 Temp 98.6 °F (37 °C) Resp 18 SpO2 95% Right upper arm PICC flushed easily and had brisk blood return via both ports. Invanz 1 gm was infused for 30 minutes as ordered via purple lumen. Dapto 550 mg given slow IVP over 2 minutes via red lumen. PICC dressing CDI and changed today using aseptic techique per protocol. Microclave caps replaced, and new elastic netting placed over picc site. Ms. Nazanin Simmons tolerated infusion, and had no complaints from infusion. Both lumens of PICC flushed with NS 20 ml and Heparin 500 units each. Green end caps applied. Lumens wrapped with coban. New stockinette placed over dressing for protection. Patient armband removed and shredded. Ms. Nazanin Simmons was discharged from [place] [address] in stable condition at 1615. She is to return on 19 for her next antibiotic appointment. Andre Lin RN 2019

## 2019-02-22 ENCOUNTER — HOSPITAL ENCOUNTER (OUTPATIENT)
Dept: INFUSION THERAPY | Age: 59
Discharge: HOME | End: 2019-02-22
Payer: MEDICAID

## 2019-02-22 VITALS
OXYGEN SATURATION: 94 % | TEMPERATURE: 98.6 F | DIASTOLIC BLOOD PRESSURE: 79 MMHG | SYSTOLIC BLOOD PRESSURE: 132 MMHG | HEART RATE: 72 BPM | RESPIRATION RATE: 18 BRPM

## 2019-02-22 PROCEDURE — 74011250636 HC RX REV CODE- 250/636: Performed by: INTERNAL MEDICINE

## 2019-02-22 PROCEDURE — 74011250636 HC RX REV CODE- 250/636

## 2019-02-22 PROCEDURE — 96365 THER/PROPH/DIAG IV INF INIT: CPT

## 2019-02-22 PROCEDURE — 74011000258 HC RX REV CODE- 258: Performed by: INTERNAL MEDICINE

## 2019-02-22 PROCEDURE — 96375 TX/PRO/DX INJ NEW DRUG ADDON: CPT

## 2019-02-22 PROCEDURE — 74011000250 HC RX REV CODE- 250: Performed by: INTERNAL MEDICINE

## 2019-02-22 RX ORDER — SODIUM CHLORIDE 0.9 % (FLUSH) 0.9 %
10-40 SYRINGE (ML) INJECTION AS NEEDED
Status: DISCONTINUED | OUTPATIENT
Start: 2019-02-22 | End: 2019-02-26 | Stop reason: HOSPADM

## 2019-02-22 RX ORDER — HEPARIN 100 UNIT/ML
SYRINGE INTRAVENOUS
Status: COMPLETED
Start: 2019-02-22 | End: 2019-02-22

## 2019-02-22 RX ORDER — HEPARIN 100 UNIT/ML
500 SYRINGE INTRAVENOUS AS NEEDED
Status: DISCONTINUED | OUTPATIENT
Start: 2019-02-22 | End: 2019-02-26 | Stop reason: HOSPADM

## 2019-02-22 RX ADMIN — SODIUM CHLORIDE, PRESERVATIVE FREE 500 UNITS: 5 INJECTION INTRAVENOUS at 15:40

## 2019-02-22 RX ADMIN — SODIUM CHLORIDE, PRESERVATIVE FREE 500 UNITS: 5 INJECTION INTRAVENOUS at 15:41

## 2019-02-22 RX ADMIN — DAPTOMYCIN 550 MG: 500 INJECTION, POWDER, LYOPHILIZED, FOR SOLUTION INTRAVENOUS at 15:10

## 2019-02-22 RX ADMIN — Medication 10 ML: at 15:15

## 2019-02-22 RX ADMIN — Medication 10 ML: at 15:10

## 2019-02-22 RX ADMIN — SODIUM CHLORIDE 1 G: 900 INJECTION INTRAVENOUS at 15:10

## 2019-02-22 RX ADMIN — Medication 30 ML: at 15:40

## 2019-02-22 NOTE — PROGRESS NOTES
[facility] Progress Note Date: 2019 Name: Jean-Pierre Gutierrez MRN: [id number] : 1960 Invanz Infusion/Dapto MsZoran Matute to [facility], ambulatory at 1500. Pt was assessed and education was provided. Ms. Sylvain Zuniga vitals were reviewed. Visit Vitals /79 (BP 1 Location: Left arm, BP Patient Position: Sitting) Pulse 72 Temp 98.6 °F (37 °C) Resp 18 SpO2 94% Right upper arm PICC flushed easily and had brisk blood return via both ports. Invanz 1 gm was infused for 30 minutes as ordered via purple lumen. Dapto 550 mg given slow IVP over 2 minutes via red lumen. PICC dressing CDI and not due to be changed until 19. Ms. Mendel Burnette tolerated infusion, and had no complaints from infusion. Both lumens of PICC flushed with NS 20 ml and Heparin 500 units each. Picc site and drsg without change. Green end caps applied. Lumens wrapped with coban. Stockinette placed over dressing for protection. Patient armband removed and shredded. Ms. Mendel Burnette was discharged from [place] [address] in stable condition at [phone]. She is to return on 19 for her next antibiotic appointment. Sita Villarreal RN 2019 
[id number]

## 2019-02-23 ENCOUNTER — HOSPITAL ENCOUNTER (OUTPATIENT)
Dept: INFUSION THERAPY | Age: 59
Discharge: HOME | End: 2019-02-23
Payer: MEDICAID

## 2019-02-23 VITALS
SYSTOLIC BLOOD PRESSURE: 117 MMHG | TEMPERATURE: 98.4 F | HEART RATE: 75 BPM | DIASTOLIC BLOOD PRESSURE: 78 MMHG | RESPIRATION RATE: 18 BRPM | OXYGEN SATURATION: 93 %

## 2019-02-23 PROCEDURE — 74011000258 HC RX REV CODE- 258: Performed by: INTERNAL MEDICINE

## 2019-02-23 PROCEDURE — 74011000250 HC RX REV CODE- 250: Performed by: INTERNAL MEDICINE

## 2019-02-23 PROCEDURE — 96375 TX/PRO/DX INJ NEW DRUG ADDON: CPT

## 2019-02-23 PROCEDURE — 74011250636 HC RX REV CODE- 250/636: Performed by: INTERNAL MEDICINE

## 2019-02-23 PROCEDURE — 96365 THER/PROPH/DIAG IV INF INIT: CPT

## 2019-02-23 RX ORDER — HEPARIN 100 UNIT/ML
500 SYRINGE INTRAVENOUS ONCE
Status: COMPLETED | OUTPATIENT
Start: 2019-02-23 | End: 2019-02-23

## 2019-02-23 RX ORDER — SODIUM CHLORIDE 0.9 % (FLUSH) 0.9 %
10-40 SYRINGE (ML) INJECTION EVERY 8 HOURS
Status: DISCONTINUED | OUTPATIENT
Start: 2019-02-23 | End: 2019-02-27 | Stop reason: HOSPADM

## 2019-02-23 RX ORDER — SODIUM CHLORIDE 900 MG/100ML
INJECTION INTRAVENOUS
Status: DISPENSED
Start: 2019-02-23 | End: 2019-02-23

## 2019-02-23 RX ADMIN — Medication 10 ML: at 09:32

## 2019-02-23 RX ADMIN — Medication 10 ML: at 09:31

## 2019-02-23 RX ADMIN — SODIUM CHLORIDE, PRESERVATIVE FREE 500 UNITS: 5 INJECTION INTRAVENOUS at 09:31

## 2019-02-23 RX ADMIN — DAPTOMYCIN 550 MG: 500 INJECTION, POWDER, LYOPHILIZED, FOR SOLUTION INTRAVENOUS at 09:27

## 2019-02-23 RX ADMIN — SODIUM CHLORIDE 1 G: 900 INJECTION INTRAVENOUS at 08:57

## 2019-02-23 RX ADMIN — Medication 10 ML: at 08:33

## 2019-02-23 RX ADMIN — Medication 10 ML: at 09:26

## 2019-02-23 RX ADMIN — SODIUM CHLORIDE, PRESERVATIVE FREE 500 UNITS: 5 INJECTION INTRAVENOUS at 09:32

## 2019-02-23 RX ADMIN — Medication 10 ML: at 09:27

## 2019-02-23 NOTE — PROGRESS NOTES
[facility] Progress Note Date: 2019 Name: Natacha Patel MRN: [id number] : 1960 Invanz Infusion/Dapto Ms. Matute to [place], ambulatory at [facility]. Pt was assessed and education was provided. Ms. Donny Morel vitals were reviewed. Visit Vitals /78 (BP 1 Location: Left arm, BP Patient Position: Sitting) Pulse 75 Temp 98.4 °F (36.9 °C) Resp 18 SpO2 93% Right upper arm PICC flushed easily and had brisk blood return via both ports. Invanz 1 gm was infused for 30 minutes as ordered via purple lumen. Dapto 550 mg given slow IVP over 2 minutes via red lumen. PICC dressing CDI and not due to be changed until 19. Ms. Sydnie Owusu tolerated infusion, and had no complaints from infusion. Both lumens of PICC flushed with NS 10 ml and Heparin 500 units each. Picc site and drsg without change. Green end caps applied. Lumens wrapped with coban. Stockinette placed over dressing for protection. Patient armband removed and shredded. Ms. Sydnie Owusu was discharged from [place] [address] in stable condition at [address] [place] [address]. She is to return on 19 for her next antibiotic appointment. Kim Zaman RN 2019

## 2019-02-24 ENCOUNTER — HOSPITAL ENCOUNTER (OUTPATIENT)
Dept: INFUSION THERAPY | Age: 59
Discharge: HOME | End: 2019-02-24
Payer: MEDICAID

## 2019-02-24 VITALS
HEART RATE: 75 BPM | SYSTOLIC BLOOD PRESSURE: 123 MMHG | OXYGEN SATURATION: 93 % | TEMPERATURE: 98.2 F | RESPIRATION RATE: 18 BRPM | DIASTOLIC BLOOD PRESSURE: 81 MMHG

## 2019-02-24 PROCEDURE — 74011250636 HC RX REV CODE- 250/636

## 2019-02-24 PROCEDURE — 74011000258 HC RX REV CODE- 258: Performed by: INTERNAL MEDICINE

## 2019-02-24 PROCEDURE — 74011250636 HC RX REV CODE- 250/636: Performed by: INTERNAL MEDICINE

## 2019-02-24 PROCEDURE — 96365 THER/PROPH/DIAG IV INF INIT: CPT

## 2019-02-24 PROCEDURE — 74011000250 HC RX REV CODE- 250: Performed by: INTERNAL MEDICINE

## 2019-02-24 PROCEDURE — 96375 TX/PRO/DX INJ NEW DRUG ADDON: CPT

## 2019-02-24 RX ORDER — SODIUM CHLORIDE 0.9 % (FLUSH) 0.9 %
10-40 SYRINGE (ML) INJECTION AS NEEDED
Status: DISCONTINUED | OUTPATIENT
Start: 2019-02-24 | End: 2019-02-28 | Stop reason: HOSPADM

## 2019-02-24 RX ORDER — HEPARIN 100 UNIT/ML
500 SYRINGE INTRAVENOUS AS NEEDED
Status: DISCONTINUED | OUTPATIENT
Start: 2019-02-24 | End: 2019-02-28 | Stop reason: HOSPADM

## 2019-02-24 RX ORDER — HEPARIN 100 UNIT/ML
SYRINGE INTRAVENOUS
Status: COMPLETED
Start: 2019-02-24 | End: 2019-02-24

## 2019-02-24 RX ADMIN — DAPTOMYCIN 550 MG: 500 INJECTION, POWDER, LYOPHILIZED, FOR SOLUTION INTRAVENOUS at 08:40

## 2019-02-24 RX ADMIN — Medication 10 ML: at 08:34

## 2019-02-24 RX ADMIN — Medication 10 ML: at 08:43

## 2019-02-24 RX ADMIN — SODIUM CHLORIDE, PRESERVATIVE FREE 500 UNITS: 5 INJECTION INTRAVENOUS at 08:45

## 2019-02-24 RX ADMIN — Medication 10 ML: at 08:39

## 2019-02-24 RX ADMIN — SODIUM CHLORIDE, PRESERVATIVE FREE 500 UNITS: 5 INJECTION INTRAVENOUS at 09:08

## 2019-02-24 RX ADMIN — Medication 10 ML: at 09:08

## 2019-02-24 RX ADMIN — SODIUM CHLORIDE 1 G: 900 INJECTION INTRAVENOUS at 08:34

## 2019-02-24 NOTE — PROGRESS NOTES
[facility] Progress Note Date: 2019 Name: Melvina Suero MRN: [id number] : 1960 Invanz Infusion/Dapto Ms. Matute to [facility], ambulatory at [facility]. Pt was assessed and education was provided. Ms. Roman Logan vitals were reviewed. Visit Vitals /81 (BP 1 Location: Left arm, BP Patient Position: Sitting) Pulse 75 Temp 98.2 °F (36.8 °C) Resp 18 SpO2 93% Right upper arm PICC flushed easily and had brisk blood return via both ports. Invanz 1 gm was infused for 30 minutes as ordered via purple lumen. Dapto 550 mg given slow IVP over 2 minutes via red lumen. PICC dressing CDI and not due to be changed until 19. Ms. Peyton Joshi tolerated infusion, and had no complaints from infusion. Both lumens of PICC flushed with NS 10 ml and Heparin 500 units each. Picc site and drsg without change. Green end caps applied. Lumens wrapped with coban. Stockinette placed over dressing for protection. Patient armband removed and shredded. Ms. Peyton Joshi was discharged from [place] [address] in stable condition at [address] [place] [address]. She is to return on 19 for her next antibiotic appointment. India Carrillo RN 2019

## 2019-02-25 ENCOUNTER — HOSPITAL ENCOUNTER (OUTPATIENT)
Dept: INFUSION THERAPY | Age: 59
Discharge: HOME | End: 2019-02-25
Payer: MEDICAID

## 2019-02-25 VITALS
RESPIRATION RATE: 18 BRPM | HEART RATE: 74 BPM | DIASTOLIC BLOOD PRESSURE: 80 MMHG | OXYGEN SATURATION: 93 % | TEMPERATURE: 98.2 F | SYSTOLIC BLOOD PRESSURE: 126 MMHG

## 2019-02-25 LAB
ALBUMIN SERPL-MCNC: 3.2 G/DL (ref 3.4–5)
ALBUMIN/GLOB SERPL: 0.6 {RATIO} (ref 0.8–1.7)
ALP SERPL-CCNC: 143 U/L (ref 45–117)
ALT SERPL-CCNC: 29 U/L (ref 13–56)
ANION GAP SERPL CALC-SCNC: 9 MMOL/L (ref 3–18)
AST SERPL-CCNC: 29 U/L (ref 15–37)
BASO+EOS+MONOS # BLD AUTO: 0.6 K/UL (ref 0–2.3)
BASO+EOS+MONOS NFR BLD AUTO: 8 % (ref 0.1–17)
BILIRUB SERPL-MCNC: 0.2 MG/DL (ref 0.2–1)
BUN SERPL-MCNC: 9 MG/DL (ref 7–18)
BUN/CREAT SERPL: 11 (ref 12–20)
CALCIUM SERPL-MCNC: 9.3 MG/DL (ref 8.5–10.1)
CHLORIDE SERPL-SCNC: 98 MMOL/L (ref 100–108)
CK SERPL-CCNC: 35 U/L (ref 26–192)
CO2 SERPL-SCNC: 29 MMOL/L (ref 21–32)
CREAT SERPL-MCNC: 0.84 MG/DL (ref 0.6–1.3)
CRP SERPL-MCNC: 2.2 MG/DL (ref 0–0.3)
DIFFERENTIAL METHOD BLD: NORMAL
ERYTHROCYTE [DISTWIDTH] IN BLOOD BY AUTOMATED COUNT: 14.3 % (ref 11.5–14.5)
GLOBULIN SER CALC-MCNC: 5.3 G/DL (ref 2–4)
GLUCOSE SERPL-MCNC: 112 MG/DL (ref 74–99)
HCT VFR BLD AUTO: 39.2 % (ref 36–48)
HGB BLD-MCNC: 12.7 G/DL (ref 12–16)
LYMPHOCYTES # BLD: 2.2 K/UL (ref 1.1–5.9)
LYMPHOCYTES NFR BLD: 29 % (ref 14–44)
MCH RBC QN AUTO: 29.7 PG (ref 25–35)
MCHC RBC AUTO-ENTMCNC: 32.4 G/DL (ref 31–37)
MCV RBC AUTO: 91.6 FL (ref 78–102)
NEUTS SEG # BLD: 4.8 K/UL (ref 1.8–9.5)
NEUTS SEG NFR BLD: 63 % (ref 40–70)
PLATELET # BLD AUTO: 273 K/UL (ref 140–440)
POTASSIUM SERPL-SCNC: 3.4 MMOL/L (ref 3.5–5.5)
PROT SERPL-MCNC: 8.5 G/DL (ref 6.4–8.2)
RBC # BLD AUTO: 4.28 M/UL (ref 4.1–5.1)
SODIUM SERPL-SCNC: 136 MMOL/L (ref 136–145)
WBC # BLD AUTO: 7.6 K/UL (ref 4.5–13)

## 2019-02-25 PROCEDURE — 82550 ASSAY OF CK (CPK): CPT

## 2019-02-25 PROCEDURE — 80053 COMPREHEN METABOLIC PANEL: CPT

## 2019-02-25 PROCEDURE — 96375 TX/PRO/DX INJ NEW DRUG ADDON: CPT

## 2019-02-25 PROCEDURE — 85025 COMPLETE CBC W/AUTO DIFF WBC: CPT

## 2019-02-25 PROCEDURE — 74011000258 HC RX REV CODE- 258: Performed by: INTERNAL MEDICINE

## 2019-02-25 PROCEDURE — 74011250636 HC RX REV CODE- 250/636: Performed by: INTERNAL MEDICINE

## 2019-02-25 PROCEDURE — 96365 THER/PROPH/DIAG IV INF INIT: CPT

## 2019-02-25 PROCEDURE — 74011000250 HC RX REV CODE- 250: Performed by: INTERNAL MEDICINE

## 2019-02-25 PROCEDURE — 86140 C-REACTIVE PROTEIN: CPT

## 2019-02-25 RX ORDER — SODIUM CHLORIDE 0.9 % (FLUSH) 0.9 %
10-40 SYRINGE (ML) INJECTION AS NEEDED
Status: DISCONTINUED | OUTPATIENT
Start: 2019-02-25 | End: 2019-03-01 | Stop reason: HOSPADM

## 2019-02-25 RX ORDER — HEPARIN 100 UNIT/ML
500 SYRINGE INTRAVENOUS AS NEEDED
Status: DISCONTINUED | OUTPATIENT
Start: 2019-02-25 | End: 2019-03-01 | Stop reason: HOSPADM

## 2019-02-25 RX ADMIN — Medication 10 ML: at 15:15

## 2019-02-25 RX ADMIN — SODIUM CHLORIDE 1 G: 900 INJECTION INTRAVENOUS at 15:13

## 2019-02-25 RX ADMIN — Medication 10 ML: at 15:46

## 2019-02-25 RX ADMIN — SODIUM CHLORIDE, PRESERVATIVE FREE 500 UNITS: 5 INJECTION INTRAVENOUS at 15:17

## 2019-02-25 RX ADMIN — SODIUM CHLORIDE, PRESERVATIVE FREE 500 UNITS: 5 INJECTION INTRAVENOUS at 15:47

## 2019-02-25 RX ADMIN — Medication 10 ML: at 15:14

## 2019-02-25 RX ADMIN — DAPTOMYCIN 550 MG: 500 INJECTION, POWDER, LYOPHILIZED, FOR SOLUTION INTRAVENOUS at 15:14

## 2019-02-25 NOTE — PROGRESS NOTES
\[facility]\"" Progress Note Date: 2019 Name: Michael Adorno MRN: [id number] : 1960 Invanz Infusion/Dapto Ms. Matute to [facility], ambulatory at 1500. Pt was assessed and education was provided. Ms. Álvaro Beltran vitals were reviewed. Visit Vitals /80 (BP 1 Location: Left arm, BP Patient Position: Sitting) Pulse 74 Temp 98.2 °F (36.8 °C) Resp 18 SpO2 93% Right upper arm PICC flushed easily and had brisk blood return via both ports. Labs drawn from red lumen for CBC w/diff, CMP, CRP, and CK per orders. Invanz 1 gm was infused for 30 minutes as ordered via purple lumen. Dapto 550 mg given slow IVP over 2 minutes via red lumen. PICC dressing CDI and not due to be changed until 19. Ms. Emma Wright tolerated infusion, and had no complaints from infusion. Both lumens of PICC flushed with NS 10 ml and Heparin 500 units each. Picc site and drsg without change. Green end caps applied. Lumens wrapped with coban. Stockinette placed over dressing for protection. Patient armband removed and shredded. Ms. Emma Wright was discharged from [place] [address] in stable condition at [address] [place] [address]. She is to return on 19 for her next antibiotic appointment. Dee Woody RN 2019

## 2019-02-26 ENCOUNTER — HOSPITAL ENCOUNTER (OUTPATIENT)
Dept: INFUSION THERAPY | Age: 59
Discharge: HOME | End: 2019-02-26
Payer: MEDICAID

## 2019-02-26 VITALS
RESPIRATION RATE: 18 BRPM | SYSTOLIC BLOOD PRESSURE: 143 MMHG | TEMPERATURE: 98.4 F | HEART RATE: 69 BPM | OXYGEN SATURATION: 91 % | DIASTOLIC BLOOD PRESSURE: 85 MMHG

## 2019-02-26 PROCEDURE — 74011000250 HC RX REV CODE- 250: Performed by: INTERNAL MEDICINE

## 2019-02-26 PROCEDURE — 74011250636 HC RX REV CODE- 250/636: Performed by: INTERNAL MEDICINE

## 2019-02-26 PROCEDURE — 74011000258 HC RX REV CODE- 258: Performed by: INTERNAL MEDICINE

## 2019-02-26 PROCEDURE — 96375 TX/PRO/DX INJ NEW DRUG ADDON: CPT

## 2019-02-26 PROCEDURE — 96365 THER/PROPH/DIAG IV INF INIT: CPT

## 2019-02-26 PROCEDURE — 74011250636 HC RX REV CODE- 250/636

## 2019-02-26 RX ORDER — HEPARIN 100 UNIT/ML
SYRINGE INTRAVENOUS
Status: COMPLETED
Start: 2019-02-26 | End: 2019-02-26

## 2019-02-26 RX ORDER — HEPARIN 100 UNIT/ML
500 SYRINGE INTRAVENOUS ONCE
Status: COMPLETED | OUTPATIENT
Start: 2019-02-26 | End: 2019-02-26

## 2019-02-26 RX ORDER — SODIUM CHLORIDE 0.9 % (FLUSH) 0.9 %
10-40 SYRINGE (ML) INJECTION AS NEEDED
Status: DISCONTINUED | OUTPATIENT
Start: 2019-02-26 | End: 2019-03-02 | Stop reason: HOSPADM

## 2019-02-26 RX ADMIN — Medication 10 ML: at 15:44

## 2019-02-26 RX ADMIN — SODIUM CHLORIDE 1 G: 900 INJECTION INTRAVENOUS at 15:12

## 2019-02-26 RX ADMIN — SODIUM CHLORIDE, PRESERVATIVE FREE 500 UNITS: 5 INJECTION INTRAVENOUS at 15:44

## 2019-02-26 RX ADMIN — SODIUM CHLORIDE, PRESERVATIVE FREE 500 UNITS: 5 INJECTION INTRAVENOUS at 15:12

## 2019-02-26 RX ADMIN — DAPTOMYCIN 550 MG: 500 INJECTION, POWDER, LYOPHILIZED, FOR SOLUTION INTRAVENOUS at 15:11

## 2019-02-26 RX ADMIN — Medication 30 ML: at 15:11

## 2019-02-27 ENCOUNTER — HOSPITAL ENCOUNTER (OUTPATIENT)
Dept: INFUSION THERAPY | Age: 59
Discharge: HOME | End: 2019-02-27
Payer: MEDICAID

## 2019-02-27 VITALS
HEART RATE: 69 BPM | RESPIRATION RATE: 18 BRPM | OXYGEN SATURATION: 93 % | SYSTOLIC BLOOD PRESSURE: 124 MMHG | TEMPERATURE: 98.2 F | DIASTOLIC BLOOD PRESSURE: 83 MMHG

## 2019-02-27 PROCEDURE — 74011250636 HC RX REV CODE- 250/636

## 2019-02-27 PROCEDURE — 74011000258 HC RX REV CODE- 258: Performed by: INTERNAL MEDICINE

## 2019-02-27 PROCEDURE — 96375 TX/PRO/DX INJ NEW DRUG ADDON: CPT

## 2019-02-27 PROCEDURE — 96365 THER/PROPH/DIAG IV INF INIT: CPT

## 2019-02-27 PROCEDURE — 74011000250 HC RX REV CODE- 250: Performed by: INTERNAL MEDICINE

## 2019-02-27 PROCEDURE — 74011250636 HC RX REV CODE- 250/636: Performed by: INTERNAL MEDICINE

## 2019-02-27 RX ORDER — SODIUM CHLORIDE 0.9 % (FLUSH) 0.9 %
10-40 SYRINGE (ML) INJECTION AS NEEDED
Status: DISCONTINUED | OUTPATIENT
Start: 2019-02-27 | End: 2019-03-03 | Stop reason: HOSPADM

## 2019-02-27 RX ORDER — HEPARIN 100 UNIT/ML
500 SYRINGE INTRAVENOUS AS NEEDED
Status: DISCONTINUED | OUTPATIENT
Start: 2019-02-27 | End: 2019-03-03 | Stop reason: HOSPADM

## 2019-02-27 RX ORDER — HEPARIN 100 UNIT/ML
SYRINGE INTRAVENOUS
Status: COMPLETED
Start: 2019-02-27 | End: 2019-02-27

## 2019-02-27 RX ADMIN — SODIUM CHLORIDE, PRESERVATIVE FREE 500 UNITS: 5 INJECTION INTRAVENOUS at 15:19

## 2019-02-27 RX ADMIN — Medication 10 ML: at 15:18

## 2019-02-27 RX ADMIN — DAPTOMYCIN 550 MG: 500 INJECTION, POWDER, LYOPHILIZED, FOR SOLUTION INTRAVENOUS at 15:15

## 2019-02-27 RX ADMIN — Medication 10 ML: at 15:41

## 2019-02-27 RX ADMIN — SODIUM CHLORIDE 1 G: 900 INJECTION INTRAVENOUS at 15:12

## 2019-02-27 RX ADMIN — HEPARIN 500 UNITS: 100 SYRINGE at 15:41

## 2019-02-27 RX ADMIN — Medication 10 ML: at 15:14

## 2019-02-27 RX ADMIN — Medication 10 ML: at 15:10

## 2019-02-27 NOTE — PROGRESS NOTES
[facility] Progress Note Date: 2019 Name: Michael Adorno MRN: [id number] : 1960 Invanz Infusion/Dapto Ms. Matute to [facility], ambulatory at 1500. Pt was assessed and education was provided. Ms. Álvaro Beltran vitals were reviewed. Visit Vitals /83 (BP 1 Location: Left arm, BP Patient Position: Sitting) Pulse 69 Temp 98.2 °F (36.8 °C) Resp 18 SpO2 93% Right upper arm PICC flushed easily and had brisk blood return via both ports. Invanz 1 gm was infused for 30 minutes as ordered via purple lumen. Dapto 550 mg given slow IVP over 2 minutes via red lumen. PICC dressing CDI and not due to be changed until 19. Ms. Emma Wright tolerated infusion, and had no complaints from infusion. Both lumens of PICC flushed with NS 10 ml and Heparin 500 units each. Picc site and drsg without change. Green end caps applied. Lumens wrapped with coban. Stockinette placed over dressing for protection. Patient armband removed and shredded. Ms. Emma Wright was discharged from [place] [address] in stable condition at 1540. She is to return on 19 for her next antibiotic appointment. Sabrina Hemphill RN 2019

## 2019-02-28 ENCOUNTER — HOSPITAL ENCOUNTER (OUTPATIENT)
Dept: INFUSION THERAPY | Age: 59
Discharge: HOME | End: 2019-02-28
Payer: MEDICAID

## 2019-02-28 VITALS
DIASTOLIC BLOOD PRESSURE: 79 MMHG | SYSTOLIC BLOOD PRESSURE: 127 MMHG | RESPIRATION RATE: 18 BRPM | HEART RATE: 77 BPM | TEMPERATURE: 98.1 F | OXYGEN SATURATION: 93 %

## 2019-02-28 PROCEDURE — 96365 THER/PROPH/DIAG IV INF INIT: CPT

## 2019-02-28 PROCEDURE — 74011000258 HC RX REV CODE- 258: Performed by: INTERNAL MEDICINE

## 2019-02-28 PROCEDURE — 74011000250 HC RX REV CODE- 250: Performed by: INTERNAL MEDICINE

## 2019-02-28 PROCEDURE — 74011250636 HC RX REV CODE- 250/636: Performed by: INTERNAL MEDICINE

## 2019-02-28 PROCEDURE — 77030020847 HC STATLOK BARD -A

## 2019-02-28 PROCEDURE — 96375 TX/PRO/DX INJ NEW DRUG ADDON: CPT

## 2019-02-28 RX ORDER — SODIUM CHLORIDE 0.9 % (FLUSH) 0.9 %
10-40 SYRINGE (ML) INJECTION AS NEEDED
Status: DISCONTINUED | OUTPATIENT
Start: 2019-02-28 | End: 2019-03-04 | Stop reason: HOSPADM

## 2019-02-28 RX ORDER — HEPARIN 100 UNIT/ML
500 SYRINGE INTRAVENOUS AS NEEDED
Status: DISCONTINUED | OUTPATIENT
Start: 2019-02-28 | End: 2019-03-04 | Stop reason: HOSPADM

## 2019-02-28 RX ADMIN — DAPTOMYCIN 550 MG: 500 INJECTION, POWDER, LYOPHILIZED, FOR SOLUTION INTRAVENOUS at 15:46

## 2019-02-28 RX ADMIN — Medication 20 ML: at 16:17

## 2019-02-28 RX ADMIN — Medication 30 ML: at 15:46

## 2019-02-28 RX ADMIN — SODIUM CHLORIDE, PRESERVATIVE FREE 500 UNITS: 5 INJECTION INTRAVENOUS at 16:17

## 2019-02-28 RX ADMIN — SODIUM CHLORIDE, PRESERVATIVE FREE 500 UNITS: 5 INJECTION INTRAVENOUS at 16:18

## 2019-02-28 RX ADMIN — SODIUM CHLORIDE 1 G: 900 INJECTION INTRAVENOUS at 15:42

## 2019-02-28 NOTE — PROGRESS NOTES
[facility] Progress Note Date: 2019 Name: Juli Mike MRN: [id number] : 1960 Invanz Infusion/Dapto Ms. Yenny Mckee to [facility], ambulatory at . Pt was assessed and education was provided. Ms. Nagi Ayala vitals were reviewed. Visit Vitals /79 (BP 1 Location: Left arm, BP Patient Position: Sitting) Pulse 77 Temp 98.1 °F (36.7 °C) Resp 18 SpO2 93% Right upper arm PICC flushed easily and had brisk blood return via both ports. Invanz 1 gm was infused for 30 minutes as ordered via purple lumen. Dapto 550 mg given slow IVP over 2 minutes via red lumen. PICC dressing CDI and changed today using aseptic techique per protocol. Microclave caps replaced, and new elastic netting placed over picc site. Ms. Yenny Mckee tolerated infusion, and had no complaints from infusion. Both lumens of PICC flushed with NS 20 ml and Heparin 500 units each. Green end caps applied. Lumens wrapped with coban. New stockinette placed over dressing for protection. Patient armband removed and shredded. Ms. Yenny Mckee was discharged from [place] [address] in stable condition at 1630. She is to return on 3/1/19 for her next antibiotic appointment. [place], RN 2019

## 2019-03-01 ENCOUNTER — HOSPITAL ENCOUNTER (OUTPATIENT)
Dept: INFUSION THERAPY | Age: 59
Discharge: HOME | End: 2019-03-01
Payer: MEDICAID

## 2019-03-01 RX ORDER — SODIUM CHLORIDE 0.9 % (FLUSH) 0.9 %
10-40 SYRINGE (ML) INJECTION AS NEEDED
Status: DISCONTINUED | OUTPATIENT
Start: 2019-03-01 | End: 2019-04-01 | Stop reason: HOSPADM

## 2019-03-01 RX ORDER — HEPARIN 100 UNIT/ML
500 SYRINGE INTRAVENOUS ONCE
Status: DISCONTINUED | OUTPATIENT
Start: 2019-03-01 | End: 2019-03-02 | Stop reason: HOSPADM

## 2019-03-02 ENCOUNTER — HOSPITAL ENCOUNTER (OUTPATIENT)
Dept: INFUSION THERAPY | Age: 59
Discharge: HOME | End: 2019-03-02
Payer: MEDICAID

## 2019-03-02 VITALS
HEART RATE: 74 BPM | DIASTOLIC BLOOD PRESSURE: 80 MMHG | OXYGEN SATURATION: 93 % | RESPIRATION RATE: 18 BRPM | SYSTOLIC BLOOD PRESSURE: 127 MMHG

## 2019-03-02 PROCEDURE — 74011250636 HC RX REV CODE- 250/636: Performed by: INTERNAL MEDICINE

## 2019-03-02 PROCEDURE — 74011000258 HC RX REV CODE- 258: Performed by: INTERNAL MEDICINE

## 2019-03-02 PROCEDURE — 96365 THER/PROPH/DIAG IV INF INIT: CPT

## 2019-03-02 PROCEDURE — 96375 TX/PRO/DX INJ NEW DRUG ADDON: CPT

## 2019-03-02 PROCEDURE — 74011000250 HC RX REV CODE- 250: Performed by: INTERNAL MEDICINE

## 2019-03-02 PROCEDURE — 74011250636 HC RX REV CODE- 250/636

## 2019-03-02 RX ORDER — HEPARIN 100 UNIT/ML
500 SYRINGE INTRAVENOUS AS NEEDED
Status: DISCONTINUED | OUTPATIENT
Start: 2019-03-02 | End: 2019-03-06 | Stop reason: HOSPADM

## 2019-03-02 RX ORDER — SODIUM CHLORIDE 0.9 % (FLUSH) 0.9 %
10-40 SYRINGE (ML) INJECTION AS NEEDED
Status: DISCONTINUED | OUTPATIENT
Start: 2019-03-02 | End: 2019-03-06 | Stop reason: HOSPADM

## 2019-03-02 RX ORDER — HEPARIN 100 UNIT/ML
SYRINGE INTRAVENOUS
Status: COMPLETED
Start: 2019-03-02 | End: 2019-03-02

## 2019-03-02 RX ADMIN — Medication 10 ML: at 10:02

## 2019-03-02 RX ADMIN — Medication 10 ML: at 10:00

## 2019-03-02 RX ADMIN — DAPTOMYCIN 550 MG: 500 INJECTION, POWDER, LYOPHILIZED, FOR SOLUTION INTRAVENOUS at 09:28

## 2019-03-02 RX ADMIN — SODIUM CHLORIDE 1 G: 900 INJECTION INTRAVENOUS at 09:28

## 2019-03-02 RX ADMIN — SODIUM CHLORIDE, PRESERVATIVE FREE 500 UNITS: 5 INJECTION INTRAVENOUS at 10:01

## 2019-03-02 RX ADMIN — Medication 40 ML: at 09:28

## 2019-03-02 RX ADMIN — SODIUM CHLORIDE, PRESERVATIVE FREE 500 UNITS: 5 INJECTION INTRAVENOUS at 10:02

## 2019-03-02 NOTE — PROGRESS NOTES
[facility] Progress Note Date: 2019 Name: Olvin Alvarez MRN: [id number] : 1960 Invanz Infusion/Dapto Ms. Catarina Vidal to [facility], ambulatory at 1272. Pt was assessed and education was provided. Ms. Sosa Lawrence vitals were reviewed. Visit Vitals /80 (BP 1 Location: Left arm, BP Patient Position: Sitting) Pulse 74 Resp 18 SpO2 93% Breastfeeding? No  
  
  
Right upper arm PICC flushed easily and had brisk blood return via both ports. Invanz 1 gm was infused for 30 minutes as ordered via purple lumen. Dapto 550 mg given slow IVP over 2 minutes via red lumen. PICC dressing CDI. Ms. Catarina Vidal tolerated infusion, and had no complaints from infusion. Both lumens of PICC flushed with NS 10 ml and Heparin 500 units each. Picc site and drsg without change. Green end caps applied. Lumens wrapped with coban. Stockinette placed over dressing for protection. Patient armband removed and shredded. Ms. Catarina Vidal was discharged from [place] [address] in stable condition at 1010. She is to return on 3/3/19 for her next antibiotic appointment. Elena Sales RN 
2019 
1010

## 2019-03-03 ENCOUNTER — HOSPITAL ENCOUNTER (OUTPATIENT)
Dept: INFUSION THERAPY | Age: 59
Discharge: HOME | End: 2019-03-03
Payer: MEDICAID

## 2019-03-03 VITALS
SYSTOLIC BLOOD PRESSURE: 122 MMHG | TEMPERATURE: 99.1 F | RESPIRATION RATE: 18 BRPM | OXYGEN SATURATION: 93 % | HEART RATE: 78 BPM | DIASTOLIC BLOOD PRESSURE: 77 MMHG

## 2019-03-03 PROCEDURE — 74011000258 HC RX REV CODE- 258: Performed by: INTERNAL MEDICINE

## 2019-03-03 PROCEDURE — 74011000250 HC RX REV CODE- 250: Performed by: INTERNAL MEDICINE

## 2019-03-03 PROCEDURE — 96365 THER/PROPH/DIAG IV INF INIT: CPT

## 2019-03-03 PROCEDURE — 74011250636 HC RX REV CODE- 250/636: Performed by: INTERNAL MEDICINE

## 2019-03-03 PROCEDURE — 96375 TX/PRO/DX INJ NEW DRUG ADDON: CPT

## 2019-03-03 RX ORDER — HEPARIN 100 UNIT/ML
500 SYRINGE INTRAVENOUS ONCE
Status: ACTIVE | OUTPATIENT
Start: 2019-03-03 | End: 2019-03-03

## 2019-03-03 RX ORDER — HEPARIN 100 UNIT/ML
SYRINGE INTRAVENOUS
Status: DISPENSED
Start: 2019-03-03 | End: 2019-03-03

## 2019-03-03 RX ORDER — SODIUM CHLORIDE 0.9 % (FLUSH) 0.9 %
10-40 SYRINGE (ML) INJECTION AS NEEDED
Status: DISCONTINUED | OUTPATIENT
Start: 2019-03-03 | End: 2019-03-07 | Stop reason: HOSPADM

## 2019-03-03 RX ADMIN — DAPTOMYCIN 550 MG: 500 INJECTION, POWDER, LYOPHILIZED, FOR SOLUTION INTRAVENOUS at 09:30

## 2019-03-03 RX ADMIN — SODIUM CHLORIDE 1 G: 900 INJECTION INTRAVENOUS at 09:30

## 2019-03-03 RX ADMIN — Medication 40 ML: at 09:29

## 2019-03-03 NOTE — PROGRESS NOTES
[facility] Progress Note Date: March 3, 2019 Name: Jim Wilde MRN: [id number] : 1960 Invanz Infusion/Dapto Ms. Nancy Hurley to [place], ambulatory at 3743. Pt was assessed and education was provided. Ms. Lucio Pi vitals were reviewed. Visit Vitals /77 (BP 1 Location: Left arm, BP Patient Position: Sitting) Pulse 78 Temp 99.1 °F (37.3 °C) Resp 18 SpO2 93% Right upper arm PICC flushed easily and had brisk blood return via both ports. Invanz 1 gm was infused for 30 minutes as ordered via purple lumen. Dapto 550 mg given slow IVP over 2 minutes via red lumen. PICC dressing CDI. Ms. Nancy Hurley tolerated infusion, and had no complaints from infusion. Both lumens of PICC flushed with NS 10 ml and Heparin 500 units each. Picc site and drsg without change. Green end caps applied. Lumens wrapped with coban. Stockinette placed over dressing for protection. Patient armband removed and shredded. Ms. Nancy Hurley was discharged from [place] [address] in stable condition at 1005. She is to return on 3/4/19 for her next antibiotic appointment. Mario Goetz RN 
March 3, 2019

## 2019-03-04 ENCOUNTER — OFFICE VISIT (OUTPATIENT)
Dept: INTERNAL MEDICINE CLINIC | Age: 59
End: 2019-03-04

## 2019-03-04 ENCOUNTER — HOSPITAL ENCOUNTER (OUTPATIENT)
Dept: INFUSION THERAPY | Age: 59
Discharge: HOME | End: 2019-03-04
Payer: MEDICAID

## 2019-03-04 VITALS
DIASTOLIC BLOOD PRESSURE: 81 MMHG | RESPIRATION RATE: 18 BRPM | TEMPERATURE: 98.5 F | HEART RATE: 82 BPM | OXYGEN SATURATION: 93 % | SYSTOLIC BLOOD PRESSURE: 117 MMHG

## 2019-03-04 VITALS
BODY MASS INDEX: 33.97 KG/M2 | SYSTOLIC BLOOD PRESSURE: 128 MMHG | RESPIRATION RATE: 19 BRPM | OXYGEN SATURATION: 96 % | HEART RATE: 77 BPM | TEMPERATURE: 97.8 F | WEIGHT: 199 LBS | DIASTOLIC BLOOD PRESSURE: 76 MMHG | HEIGHT: 64 IN

## 2019-03-04 DIAGNOSIS — M46.44 DISCITIS OF THORACIC REGION: Primary | ICD-10-CM

## 2019-03-04 DIAGNOSIS — M86.9 OSTEOMYELITIS, UNSPECIFIED SITE, UNSPECIFIED TYPE (HCC): ICD-10-CM

## 2019-03-04 DIAGNOSIS — S22.000A COMPRESSION FRACTURE OF BODY OF THORACIC VERTEBRA (HCC): ICD-10-CM

## 2019-03-04 DIAGNOSIS — M54.10 BACK PAIN WITH RADICULOPATHY: ICD-10-CM

## 2019-03-04 LAB
ALBUMIN SERPL-MCNC: 3.3 G/DL (ref 3.4–5)
ALBUMIN/GLOB SERPL: 0.7 {RATIO} (ref 0.8–1.7)
ALP SERPL-CCNC: 136 U/L (ref 45–117)
ALT SERPL-CCNC: 32 U/L (ref 13–56)
ANION GAP SERPL CALC-SCNC: 5 MMOL/L (ref 3–18)
AST SERPL-CCNC: 30 U/L (ref 15–37)
BASO+EOS+MONOS # BLD AUTO: 0.8 K/UL (ref 0–2.3)
BASO+EOS+MONOS NFR BLD AUTO: 10 % (ref 0.1–17)
BILIRUB SERPL-MCNC: 0.2 MG/DL (ref 0.2–1)
BUN SERPL-MCNC: 11 MG/DL (ref 7–18)
BUN/CREAT SERPL: 14 (ref 12–20)
CALCIUM SERPL-MCNC: 8.9 MG/DL (ref 8.5–10.1)
CHLORIDE SERPL-SCNC: 99 MMOL/L (ref 100–108)
CK SERPL-CCNC: 30 U/L (ref 26–192)
CO2 SERPL-SCNC: 31 MMOL/L (ref 21–32)
CREAT SERPL-MCNC: 0.77 MG/DL (ref 0.6–1.3)
DIFFERENTIAL METHOD BLD: NORMAL
ERYTHROCYTE [DISTWIDTH] IN BLOOD BY AUTOMATED COUNT: 14.5 % (ref 11.5–14.5)
GLOBULIN SER CALC-MCNC: 5 G/DL (ref 2–4)
GLUCOSE SERPL-MCNC: 85 MG/DL (ref 74–99)
HCT VFR BLD AUTO: 40.2 % (ref 36–48)
HGB BLD-MCNC: 12.8 G/DL (ref 12–16)
LYMPHOCYTES # BLD: 1.9 K/UL (ref 1.1–5.9)
LYMPHOCYTES NFR BLD: 23 % (ref 14–44)
MCH RBC QN AUTO: 28.8 PG (ref 25–35)
MCHC RBC AUTO-ENTMCNC: 31.8 G/DL (ref 31–37)
MCV RBC AUTO: 90.5 FL (ref 78–102)
NEUTS SEG # BLD: 5.7 K/UL (ref 1.8–9.5)
NEUTS SEG NFR BLD: 67 % (ref 40–70)
PLATELET # BLD AUTO: 290 K/UL (ref 140–440)
POTASSIUM SERPL-SCNC: 4.3 MMOL/L (ref 3.5–5.5)
PROT SERPL-MCNC: 8.3 G/DL (ref 6.4–8.2)
RBC # BLD AUTO: 4.44 M/UL (ref 4.1–5.1)
SODIUM SERPL-SCNC: 135 MMOL/L (ref 136–145)
WBC # BLD AUTO: 8.4 K/UL (ref 4.5–13)

## 2019-03-04 PROCEDURE — 74011250636 HC RX REV CODE- 250/636: Performed by: INTERNAL MEDICINE

## 2019-03-04 PROCEDURE — 96365 THER/PROPH/DIAG IV INF INIT: CPT

## 2019-03-04 PROCEDURE — 74011250636 HC RX REV CODE- 250/636

## 2019-03-04 PROCEDURE — 80053 COMPREHEN METABOLIC PANEL: CPT

## 2019-03-04 PROCEDURE — 36415 COLL VENOUS BLD VENIPUNCTURE: CPT

## 2019-03-04 PROCEDURE — 85025 COMPLETE CBC W/AUTO DIFF WBC: CPT

## 2019-03-04 PROCEDURE — 96375 TX/PRO/DX INJ NEW DRUG ADDON: CPT

## 2019-03-04 PROCEDURE — 74011000258 HC RX REV CODE- 258: Performed by: INTERNAL MEDICINE

## 2019-03-04 PROCEDURE — 74011000250 HC RX REV CODE- 250: Performed by: INTERNAL MEDICINE

## 2019-03-04 PROCEDURE — 82550 ASSAY OF CK (CPK): CPT

## 2019-03-04 PROCEDURE — 86141 C-REACTIVE PROTEIN HS: CPT

## 2019-03-04 RX ORDER — SODIUM CHLORIDE 0.9 % (FLUSH) 0.9 %
10-40 SYRINGE (ML) INJECTION AS NEEDED
Status: DISCONTINUED | OUTPATIENT
Start: 2019-03-04 | End: 2019-03-08 | Stop reason: HOSPADM

## 2019-03-04 RX ORDER — GABAPENTIN 100 MG/1
100 CAPSULE ORAL 3 TIMES DAILY
Qty: 90 CAP | Refills: 0 | Status: SHIPPED | OUTPATIENT
Start: 2019-03-04 | End: 2019-04-09 | Stop reason: SDUPTHER

## 2019-03-04 RX ORDER — KETOROLAC TROMETHAMINE 10 MG/1
10 TABLET, FILM COATED ORAL
Qty: 20 TAB | Refills: 0 | Status: SHIPPED | OUTPATIENT
Start: 2019-03-04 | End: 2020-02-04 | Stop reason: ALTCHOICE

## 2019-03-04 RX ORDER — HEPARIN 100 UNIT/ML
SYRINGE INTRAVENOUS
Status: COMPLETED
Start: 2019-03-04 | End: 2019-03-04

## 2019-03-04 RX ORDER — HEPARIN 100 UNIT/ML
500 SYRINGE INTRAVENOUS AS NEEDED
Status: DISCONTINUED | OUTPATIENT
Start: 2019-03-04 | End: 2019-03-08 | Stop reason: HOSPADM

## 2019-03-04 RX ADMIN — Medication 10 ML: at 16:03

## 2019-03-04 RX ADMIN — Medication 10 ML: at 15:59

## 2019-03-04 RX ADMIN — DAPTOMYCIN 550 MG: 500 INJECTION, POWDER, LYOPHILIZED, FOR SOLUTION INTRAVENOUS at 16:00

## 2019-03-04 RX ADMIN — Medication 10 ML: at 15:43

## 2019-03-04 RX ADMIN — SODIUM CHLORIDE 1 G: 900 INJECTION INTRAVENOUS at 15:45

## 2019-03-04 RX ADMIN — SODIUM CHLORIDE, PRESERVATIVE FREE 500 UNITS: 5 INJECTION INTRAVENOUS at 16:17

## 2019-03-04 RX ADMIN — Medication 10 ML: at 16:15

## 2019-03-04 RX ADMIN — SODIUM CHLORIDE, PRESERVATIVE FREE 500 UNITS: 5 INJECTION INTRAVENOUS at 16:05

## 2019-03-04 NOTE — PROGRESS NOTES
[facility] Progress Note Date: 2019 Name: Keeley Lares MRN: [id number] : 1960 Invanz Infusion/daptomcin IVP Ms. Nadeem Abraham to [address] [place] [address], ambulatory at [phone] 21. No complaints or concerns voiced Pt was assessed and education was provided. Ms. Davon Mclaughlin vitals were reviewed. Visit Vitals /81 (BP 1 Location: Left arm, BP Patient Position: Sitting) Pulse 82 Temp 98.5 °F (36.9 °C) Resp 18 SpO2 93% Right upper arm PICC flushed easily and had brisk blood return via both ports. Blood sample obtained via red lumen for cbc,cmp,cpk and crp. PICC dressing c/d/i and not due to be changed. No swelling, redness, streaking, warmth or drainage noted in arm. Pt denied c/o pain in arm. Invanz 1 gm was infused for 30 minutes as ordered via red lumen. Daptomycin 550 mg given IVP over 2 minutes. Ms. Nadeem Abraham tolerated infusion, and had no complaints. Karen Messing Both lumens of PICC flushed with NS 10 ml and Heparin 500 units each. Picc site and drsg without change. Green end caps applied. Lumens wrapped with coban. Stockinette placed over dressing for protection. Patient armband removed and shredded. Ms. Nadeem Abraham was discharged from [place] [address] in stable condition at 1620. She is to return on 3/5/19 at  or her next antibiotic appointment. Claudean Fordyce, RN 
2019

## 2019-03-05 ENCOUNTER — HOSPITAL ENCOUNTER (OUTPATIENT)
Dept: INFUSION THERAPY | Age: 59
Discharge: HOME | End: 2019-03-05
Payer: MEDICAID

## 2019-03-05 ENCOUNTER — TELEPHONE (OUTPATIENT)
Dept: INTERNAL MEDICINE CLINIC | Age: 59
End: 2019-03-05

## 2019-03-05 VITALS
DIASTOLIC BLOOD PRESSURE: 87 MMHG | TEMPERATURE: 98.2 F | RESPIRATION RATE: 18 BRPM | OXYGEN SATURATION: 95 % | HEART RATE: 98 BPM | SYSTOLIC BLOOD PRESSURE: 129 MMHG

## 2019-03-05 PROCEDURE — 74011250636 HC RX REV CODE- 250/636: Performed by: INTERNAL MEDICINE

## 2019-03-05 PROCEDURE — 74011000250 HC RX REV CODE- 250: Performed by: INTERNAL MEDICINE

## 2019-03-05 PROCEDURE — 96375 TX/PRO/DX INJ NEW DRUG ADDON: CPT

## 2019-03-05 PROCEDURE — 96365 THER/PROPH/DIAG IV INF INIT: CPT

## 2019-03-05 PROCEDURE — 74011000258 HC RX REV CODE- 258: Performed by: INTERNAL MEDICINE

## 2019-03-05 RX ORDER — HEPARIN 100 UNIT/ML
SYRINGE INTRAVENOUS
Status: DISPENSED
Start: 2019-03-05 | End: 2019-03-06

## 2019-03-05 RX ORDER — HEPARIN 100 UNIT/ML
500 SYRINGE INTRAVENOUS AS NEEDED
Status: DISCONTINUED | OUTPATIENT
Start: 2019-03-05 | End: 2019-03-09 | Stop reason: HOSPADM

## 2019-03-05 RX ORDER — SODIUM CHLORIDE 0.9 % (FLUSH) 0.9 %
10-40 SYRINGE (ML) INJECTION AS NEEDED
Status: DISCONTINUED | OUTPATIENT
Start: 2019-03-05 | End: 2019-03-09 | Stop reason: HOSPADM

## 2019-03-05 RX ADMIN — SODIUM CHLORIDE, PRESERVATIVE FREE 500 UNITS: 5 INJECTION INTRAVENOUS at 15:39

## 2019-03-05 RX ADMIN — Medication 10 ML: at 15:34

## 2019-03-05 RX ADMIN — Medication 10 ML: at 15:54

## 2019-03-05 RX ADMIN — Medication 10 ML: at 16:26

## 2019-03-05 RX ADMIN — Medication 10 ML: at 15:38

## 2019-03-05 RX ADMIN — SODIUM CHLORIDE 1 G: 900 INJECTION INTRAVENOUS at 15:55

## 2019-03-05 RX ADMIN — DAPTOMYCIN 550 MG: 500 INJECTION, POWDER, LYOPHILIZED, FOR SOLUTION INTRAVENOUS at 15:35

## 2019-03-05 RX ADMIN — SODIUM CHLORIDE, PRESERVATIVE FREE 500 UNITS: 5 INJECTION INTRAVENOUS at 16:27

## 2019-03-05 NOTE — TELEPHONE ENCOUNTER
PA for ketorolac tablet was denied due to patient needs to do a trial of ibuprofen,meloxicam, naproxen, naproxen sodium otc or naproxen EC (RX)

## 2019-03-05 NOTE — PROGRESS NOTES
[facility] Progress Note    Date: 2019    Name: Olvin Alvarez    MRN: [id number]         : 1960    [facility] Infusion/daptomcin IVP    Ms. Catarina Vidal to [facility], ambulatory at 0. No complaints or concerns voiced     Pt was assessed and education was provided. Ms. Sosa Lawrence vitals were reviewed. Visit Vitals  /87 (BP 1 Location: Left arm, BP Patient Position: Sitting)   Pulse 98   Temp 98.2 °F (36.8 °C)   Resp 18   SpO2 95%       Right upper arm PICC flushed easily and had brisk blood return via both ports. PICC dressing c/d/i and not due to be changed. No swelling, redness, streaking, warmth or drainage noted in arm. Pt denied c/o pain in arm. Invanz 1 gm was infused for 30 minutes as ordered via red lumen. Daptomycin 550 mg given IVP over 2 minutes via purple lumen. Ms. Catraina Vidal tolerated infusion, and had no complaints. .    Both lumens of PICC flushed with NS 10 ml and Heparin 500 units each. Picc site and drsg without change. Green end caps applied. Lumens wrapped with coban. Stockinette placed over dressing for protection. Patient armband removed and shredded. Ms. Catarina Vidal was discharged from [place] [address] in stable condition at 1630. She is to return on 3/6/19 at 0 or her next antibiotic appointment.     Maria Elena Mesa RN  2019

## 2019-03-06 ENCOUNTER — HOSPITAL ENCOUNTER (OUTPATIENT)
Dept: INFUSION THERAPY | Age: 59
Discharge: HOME | End: 2019-03-06
Payer: MEDICAID

## 2019-03-06 VITALS
DIASTOLIC BLOOD PRESSURE: 82 MMHG | OXYGEN SATURATION: 92 % | RESPIRATION RATE: 18 BRPM | HEART RATE: 65 BPM | TEMPERATURE: 98.5 F | SYSTOLIC BLOOD PRESSURE: 121 MMHG

## 2019-03-06 LAB — CRP SERPL HS-MCNC: 33.73 MG/L (ref 0–3)

## 2019-03-06 PROCEDURE — 74011000250 HC RX REV CODE- 250: Performed by: INTERNAL MEDICINE

## 2019-03-06 PROCEDURE — 99212 OFFICE O/P EST SF 10 MIN: CPT

## 2019-03-06 PROCEDURE — 96375 TX/PRO/DX INJ NEW DRUG ADDON: CPT

## 2019-03-06 PROCEDURE — 74011250636 HC RX REV CODE- 250/636: Performed by: INTERNAL MEDICINE

## 2019-03-06 PROCEDURE — 74011000258 HC RX REV CODE- 258: Performed by: INTERNAL MEDICINE

## 2019-03-06 PROCEDURE — 96365 THER/PROPH/DIAG IV INF INIT: CPT

## 2019-03-06 RX ORDER — SODIUM CHLORIDE 0.9 % (FLUSH) 0.9 %
10-40 SYRINGE (ML) INJECTION AS NEEDED
Status: DISCONTINUED | OUTPATIENT
Start: 2019-03-06 | End: 2019-03-10 | Stop reason: HOSPADM

## 2019-03-06 RX ORDER — HEPARIN 100 UNIT/ML
500 SYRINGE INTRAVENOUS ONCE
Status: ACTIVE | OUTPATIENT
Start: 2019-03-06 | End: 2019-03-07

## 2019-03-06 RX ORDER — HEPARIN 100 UNIT/ML
SYRINGE INTRAVENOUS
Status: DISCONTINUED
Start: 2019-03-06 | End: 2019-03-06 | Stop reason: WASHOUT

## 2019-03-06 RX ADMIN — SODIUM CHLORIDE 1 G: 900 INJECTION INTRAVENOUS at 15:33

## 2019-03-06 RX ADMIN — DAPTOMYCIN 550 MG: 500 INJECTION, POWDER, LYOPHILIZED, FOR SOLUTION INTRAVENOUS at 15:38

## 2019-03-06 NOTE — PROGRESS NOTES
[facility]    Date: 2019    Name: Sita Bustillos    MRN: [id number]         : 1960    [employer] Infusion/daptomcin IVP    Ms. Tonny Rios to [facility], ambulatory at . No complaints or concerns voiced. Pt was assessed and education was provided. Ms. Emigdio Nogueira vitals were reviewed. Visit Vitals  /80 (BP 1 Location: Left arm, BP Patient Position: Sitting)   Pulse 69   Temp 98.5 °F (36.9 °C)   Resp 18   SpO2 95%       Right upper arm PICC flushed easily and had brisk blood return via both ports. PICC dressing CDI. No swelling, redness, streaking, warmth or drainage noted in arm. Pt denied c/o pain in arm. Invanz 1gm was infused for 30 minutes as ordered via purple lumen. Daptomycin 550mg given IVP over 2 minutes via red lumen. Ms. Tonny Rios tolerated infusion, and had no complaints. .    Both lumens of PICC flushed with NS 10 ml. PICC removed; pressure applied; gauze and transparent dressing applied; observed for 30 minutes. Patient armband removed and shredded. Ms. Tonny Rios was discharged from [place] [address] in stable condition at [phone]. She is to f/u with Dr. Nii Bueno 3/19/19 at .       Valeriano Valadez RN  2019

## 2019-03-06 NOTE — TELEPHONE ENCOUNTER
Could you please let Ms. Sonja Donato know, to try naproxen instead. It is over the counter, she can take 500 mg before the MRI.  Thanks

## 2019-03-13 NOTE — PROGRESS NOTES
MARIA EUGENIA Grier is a 63 y.o. female with relevant past medical depression/anxiety, history of IVDU now on methadone clinic, h/o Hep C infection s/p treatment with a pangenotypic agent (Tereso Yordan) around Sept- Nov, 2018, severe obesity, HIV status neg (11/13/18) here for outpatient ID follow up of thoracic discitis/osteomyelitis. The patient apparently presented with progressively worse abdominal/thoracic back pain since around Oct, 2018, but no fever, chills, malaise, night sweats. No recent trauma, falls. Last IVDU a few months ago, unclear dates. She was seen in the ED on 11/7/18 and had a CTA to rule out PE, that showed multiple pulmonary nodules, suspected discitis at T7-8, hepatosteatosis and hepatomegaly with mild splenomegaly. Multiple nonspecific periportal slight enlarged nodules. She was discharged to continue outpatient work up. She returned to the ED on 11/1/18 due to persistent pain, associated with nausea and vomiting, an MRI of her thoracic spine was done on11/11/18 that showed significant soft tissue and osseous inflammation centered at the T7-8 disc level  and pronounced circumferential prevertebral and epidural inflammation centered at this level most suggestive of discitis osteomyelitis. No peripheral enhancing collection to suggest abscess. Mild compromise of the central canal at the level of T7-8. On 11/15/18 IR performs T7-8 biopsy. BC on 11/11/18 neg x , UC neg  Disc biopsy culture from 11/12/18  And 11/15/18: Candida parapsilosis. Bone cultures from 11/15/18 also grew MRSE. This results apparently were not available at the time of discharge. Fungal and AFB cultures neg from bone. The patient was initially treated with ceftriaxone and vancomycin. She was discharged on ceftriaxone and daptomycin to complete around 12/29/18 with out patient ID follow up for further recommendations.    The patient was seen again in the ED on 11/15/18 for persistent severe pain and was recommended to continue with treatment as recommended from discharge. The patient completed daptomycin and ceftriaxone from 11/15/18 to 12/11/18 (+-4 weeks), followed by ambisome from 12/13/18 to 12/17/18. Stopped due to increased creatinine to 1.85. Followed by anidalofungin 100 mg IV daily until Sigifredo 10, 2019 (4 weeks), changed to fluconazol 1/11/19 despite interaction with Methadone, due to patient not improving on anidalofungin. MRI on 1/10/19 reports: Worsening discitis/osteomyelitis process at T7-T8, with circumferential epidural enhancement and posterior soft tissue enhancement  No focal abscess. Also developing fragmentation and compression fracture in T7,  followed by T8, with retropulsion of T7 fragment into the central canal, causing central stenosis and mild cord displacement but no significant cord compression or edema. Osteomyelitis now also involves the posterior elements of T7 and T8,  including spinous processes. Developing thoracic kyphosis. Started on 1/11/19 on ertapenem empirically given progression of disease. Patient was clinically improving, CRP was 0.4 (1/14/19), however, CRP increased to 8.7 (1/28/19) and hsCRP 98.66 (2/5/19) and patient also reported worsening back pain, so a new MRI of her back was ordered 2/5/19, and daptomycin was added empirically to ertapenem and fluconazol on 2/6/19 to continue with both antibiotics for 4 weeks. The patient cancelled the MRI as she did not think she could tolerate lying flat on her back for the study. Since starting daptomycin her CRP has come down on 2/11/19 to 2.1- 2.2 on 2/25/19. Her R arm PICC lines looks fine on exam, patient denies any local pain, swelling, drainage, bleeding, rashes. She denies any new symptoms other than back pain around her thoracic spine with some ruptured capillaries. Denies any pruritus. Denies any recent IVDU.    Reports compliance with all infusions and fluconazol EKGs have been stable, no QTc prolongation with high dose fluconazol and methadone interaction. Explains her pain is mostly on the on both sides from her back through her chest wall. ROS  As above included in HPI. Otherwise 11 point review of systems negative including constitutional, skin, HENT, eyes, respiratory, cardiovascular, gastrointestinal, genitourinary, musculoskeletal, endocrine, hematologic, allergy, and neurologic. Past Medical History  Past Medical History:   Diagnosis Date    Depression     Hepatitis C     Heroin use disorder, moderate, in early remission ([address]     NOW on [facility]    Ill-defined condition     Chronic back pain     History reviewed. No pertinent surgical history. Family History  Family History   Problem Relation Age of Onset    Hypertension Mother     High Cholesterol Mother     COPD Mother     Hypertension Father        Social History  She  reports that she has been smoking cigarettes. She has been smoking about 0.25 packs per day. she has never used smokeless tobacco.   Social History     Substance and Sexual Activity   Alcohol Use No       Immunization History    There is no immunization history on file for this patient. Allergies  No Known Allergies    Medications  Current Outpatient Medications   Medication Sig    ketorolac (TORADOL) 10 mg tablet Take 1 Tab by mouth two (2) times daily as needed for Pain.  gabapentin (NEURONTIN) 100 mg capsule Take 1 Cap by mouth three (3) times daily.  sertraline (ZOLOFT) 100 mg tablet TAKE 1 & 1/2 TABLET BY MOUTH FOR A TOTAL OF 150MG DAILY    hydroCHLOROthiazide (HYDRODIURIL) 12.5 mg tablet TAKE ONE TABLET BY MOUTH DAILY    potassium chloride (KLOR-CON) 10 mEq tablet Take 1 Tab by mouth daily.  docusate sodium (COLACE) 100 mg capsule Take 1 Cap by mouth two (2) times a day.  OTHER Daptomycin through 12/29/18 at [facility]- arranged by     methadone HCl (METHADONE PO) Take 85 mg by mouth daily.      No current facility-administered medications for this visit. Facility-Administered Medications Ordered in Other Visits   Medication Dose Route Frequency    sodium chloride (NS) flush 10-40 mL  10-40 mL IntraVENous PRN         Visit Vitals  /76 (BP 1 Location: Left arm, BP Patient Position: Sitting)   Pulse 77   Temp 97.8 °F (36.6 °C) (Oral)   Resp 19   Ht 5' 4\" (1.626 m)   Wt 199 lb (90.3 kg)   LMP  (LMP Unknown)   SpO2 96%   BMI 34.16 kg/m²     Body mass index is 34.16 kg/m². Physical Exam   Constitutional: She is oriented to person, place, and time. She appears distressed (from pain, looks uncomfortable but non toxic). HENT:   Head: Normocephalic and atraumatic. Neck: Neck supple. Cardiovascular: Normal rate and regular rhythm. Pulmonary/Chest: Effort normal and breath sounds normal.   Abdominal: Soft. Musculoskeletal: She exhibits no edema. Neurological: She is alert and oriented to person, place, and time. Skin: Skin is warm. Mid back is slightly tender to touch, extending around costal area on both sides   Nursing note and vitals reviewed. [address] Outpatient Visit on 02/25/2019   Component Date Value Ref Range Status    WBC 02/25/2019 7.6  4.5 - 13.0 K/uL Final    RBC 02/25/2019 4.28  4.10 - 5.10 M/uL Final    HGB 02/25/2019 12.7  12.0 - 16 g/dL Final    HCT 02/25/2019 39.2  36 - 48 % Final    MCV 02/25/2019 91.6  78 - 102 FL Final    MCH 02/25/2019 29.7  25.0 - 35.0 PG Final    MCHC 02/25/2019 32.4  31 - 37 g/dL Final    RDW 02/25/2019 14.3  11.5 - 14.5 % Final    PLATELET 62/05/6015 649  140 - 440 K/uL Final    NEUTROPHILS 02/25/2019 63  40 - 70 % Final    MIXED CELLS 02/25/2019 8  0.1 - 17 % Final    LYMPHOCYTES 02/25/2019 29  14 - 44 % Final    ABS. NEUTROPHILS 02/25/2019 4.8  1.8 - 9.5 K/UL Final    ABS. MIXED CELLS 02/25/2019 0.6  0.0 - 2.3 K/uL Final    ABS.  LYMPHOCYTES 02/25/2019 2.2  1.1 - 5.9 K/UL Final    DF 02/25/2019 AUTOMATED    Final    Sodium 02/25/2019 136  136 - 145 mmol/L Final    Potassium 02/25/2019 3.4* 3.5 - 5.5 mmol/L Final    Chloride 02/25/2019 98* 100 - 108 mmol/L Final    CO2 02/25/2019 29  21 - 32 mmol/L Final    Anion gap 02/25/2019 9  3.0 - 18 mmol/L Final    Glucose 02/25/2019 112* 74 - 99 mg/dL Final    BUN 02/25/2019 9  7.0 - 18 MG/DL Final    Creatinine 02/25/2019 0.84  0.6 - 1.3 MG/DL Final    BUN/Creatinine ratio 02/25/2019 11* 12 - 20   Final    GFR est AA 02/25/2019 >60  >60 ml/min/1.73m2 Final    GFR est non-AA 02/25/2019 >60  >60 ml/min/1.73m2 Final    Calcium 02/25/2019 9.3  8.5 - 10.1 MG/DL Final    Bilirubin, total 02/25/2019 0.2  0.2 - 1.0 MG/DL Final    ALT (SGPT) 02/25/2019 29  13 - 56 U/L Final    AST (SGOT) 02/25/2019 29  15 - 37 U/L Final    Alk. phosphatase 02/25/2019 143* 45 - 117 U/L Final    Protein, total 02/25/2019 8.5* 6.4 - 8.2 g/dL Final    Albumin 02/25/2019 3.2* 3.4 - 5.0 g/dL Final    Globulin 02/25/2019 5.3* 2.0 - 4.0 g/dL Final    A-G Ratio 02/25/2019 0.6* 0.8 - 1.7   Final    C-Reactive protein 02/25/2019 2.2* 0 - 0.3 mg/dL Final    CK 02/25/2019 35  26 - 192 U/L Final   Hospital Outpatient Visit on 02/18/2019   Component Date Value Ref Range Status    WBC 02/18/2019 7.7  4.5 - 13.0 K/uL Final    RBC 02/18/2019 4.37  4.10 - 5.10 M/uL Final    HGB 02/18/2019 13.0  12.0 - 16 g/dL Final    HCT 02/18/2019 40.2  36 - 48 % Final    MCV 02/18/2019 92.0  78 - 102 FL Final    MCH 02/18/2019 29.7  25.0 - 35.0 PG Final    MCHC 02/18/2019 32.3  31 - 37 g/dL Final    RDW 02/18/2019 14.0  11.5 - 14.5 % Final    PLATELET 07/90/8858 927  140 - 440 K/uL Final    NEUTROPHILS 02/18/2019 68  40 - 70 % Final    MIXED CELLS 02/18/2019 6  0.1 - 17 % Final    LYMPHOCYTES 02/18/2019 26  14 - 44 % Final    ABS. NEUTROPHILS 02/18/2019 5.3  1.8 - 9.5 K/UL Final    ABS. MIXED CELLS 02/18/2019 0.4  0.0 - 2.3 K/uL Final    ABS.  LYMPHOCYTES 02/18/2019 2.0  1.1 - 5.9 K/UL Final    DF 02/18/2019 AUTOMATED    Final    Sodium 02/18/2019 136  136 - 145 mmol/L Final    Potassium 02/18/2019 4.5  3.5 - 5.5 mmol/L Final    Chloride 02/18/2019 98* 100 - 108 mmol/L Final    CO2 02/18/2019 31  21 - 32 mmol/L Final    Anion gap 02/18/2019 7  3.0 - 18 mmol/L Final    Glucose 02/18/2019 124* 74 - 99 mg/dL Final    BUN 02/18/2019 13  7.0 - 18 MG/DL Final    Creatinine 02/18/2019 0.73  0.6 - 1.3 MG/DL Final    BUN/Creatinine ratio 02/18/2019 18  12 - 20   Final    GFR est AA 02/18/2019 >60  >60 ml/min/1.73m2 Final    GFR est non-AA 02/18/2019 >60  >60 ml/min/1.73m2 Final    Calcium 02/18/2019 9.3  8.5 - 10.1 MG/DL Final    Protein, total 02/18/2019 8.1  6.4 - 8.2 g/dL Final    Albumin 02/18/2019 3.2* 3.4 - 5.0 g/dL Final    Globulin 02/18/2019 4.9* 2.0 - 4.0 g/dL Final    A-G Ratio 02/18/2019 0.7* 0.8 - 1.7   Final    Bilirubin, total 02/18/2019 0.2  0.2 - 1.0 MG/DL Final    Bilirubin, direct 02/18/2019 <0.1  0.0 - 0.2 MG/DL Final    Alk. phosphatase 02/18/2019 138* 45 - 117 U/L Final    AST (SGOT) 02/18/2019 35  15 - 37 U/L Final    ALT (SGPT) 02/18/2019 29  13 - 56 U/L Final    C-Reactive protein 02/18/2019 2.4* 0 - 0.3 mg/dL Final    CK 02/18/2019 50  26 - 192 U/L Final   Hospital Outpatient Visit on 02/11/2019   Component Date Value Ref Range Status    WBC 02/11/2019 7.1  4.5 - 13.0 K/uL Final    RBC 02/11/2019 4.23  4. 10 - 5.10 M/uL Final    HGB 02/11/2019 12.8  12.0 - 16 g/dL Final    HCT 02/11/2019 39.2  36 - 48 % Final    MCV 02/11/2019 92.7  78 - 102 FL Final    MCH 02/11/2019 30.3  25.0 - 35.0 PG Final    MCHC 02/11/2019 32.7  31 - 37 g/dL Final    RDW 02/11/2019 14.0  11.5 - 14.5 % Final    PLATELET 53/34/3643 276  140 - 440 K/uL Final    NEUTROPHILS 02/11/2019 59  40 - 70 % Final    MIXED CELLS 02/11/2019 10  0.1 - 17 % Final    LYMPHOCYTES 02/11/2019 30  14 - 44 % Final    ABS. NEUTROPHILS 02/11/2019 4.3  1.8 - 9.5 K/UL Final    ABS.  MIXED CELLS 02/11/2019 0.7  0.0 - 2.3 K/uL Final    ABS. LYMPHOCYTES 02/11/2019 2.1  1.1 - 5.9 K/UL Final    DF 02/11/2019 AUTOMATED    Final    Sodium 02/11/2019 135* 136 - 145 mmol/L Final    Potassium 02/11/2019 4.4  3.5 - 5.5 mmol/L Final    Chloride 02/11/2019 97* 100 - 108 mmol/L Final    CO2 02/11/2019 32  21 - 32 mmol/L Final    Anion gap 02/11/2019 6  3.0 - 18 mmol/L Final    Glucose 02/11/2019 77  74 - 99 mg/dL Final    BUN 02/11/2019 10  7.0 - 18 MG/DL Final    Creatinine 02/11/2019 0.70  0.6 - 1.3 MG/DL Final    BUN/Creatinine ratio 02/11/2019 14  12 - 20   Final    GFR est AA 02/11/2019 >60  >60 ml/min/1.73m2 Final    GFR est non-AA 02/11/2019 >60  >60 ml/min/1.73m2 Final    Calcium 02/11/2019 9.1  8.5 - 10.1 MG/DL Final    Bilirubin, total 02/11/2019 0.1* 0.2 - 1.0 MG/DL Final    ALT (SGPT) 02/11/2019 25  13 - 56 U/L Final    AST (SGOT) 02/11/2019 28  15 - 37 U/L Final    Alk. phosphatase 02/11/2019 138* 45 - 117 U/L Final    Protein, total 02/11/2019 7.9  6.4 - 8.2 g/dL Final    Albumin 02/11/2019 3.1* 3.4 - 5.0 g/dL Final    Globulin 02/11/2019 4.8* 2.0 - 4.0 g/dL Final    A-G Ratio 02/11/2019 0.6* 0.8 - 1.7   Final    C-Reactive protein 02/11/2019 2.1* 0 - 0.3 mg/dL Final    CK 02/11/2019 28  26 - 192 U/L Final         CT Results (most recent):  Results from [place] encounter on 11/11/18   CT ABD WO CONT    Narrative CT abdomen without IV contrast     INDICATION: CT of T7-T8 discitis planning. TECHNIQUE: Serial axial images were obtained of the abdomen without intravenous  contrast. Images are performed prone for the intended CT procedure. Decision was  then made by Dr. Lissette Sabillon to perform the procedure fluoroscopically in IR. All CT scans are performed using dose optimization techniques as appropriate to  the performed exam including the following: Automated exposure control,  adjustment of mA and/or kV according to patient size, and use of iterative  reconstructive technique.      COMPARISON: CTA chest 11/7/2018. MRI thoracic spine 11/11/2018. FINDINGS:    Hepatic steatosis. Mild hepatosplenomegaly. Chronic calcifications in the  pancreas without acute findings. The kidneys, adrenal glands, gallbladder and  partially imaged bowel are within normal limits. No lymphadenopathy or free  fluid. The level of T7-T8 osteomyelitis/discitis has mild surrounding soft tissue  thickening as seen on prior MRI. Refer to prior MRI report. Impression IMPRESSION:    1. CT-guided biopsy procedure changed to fluoroscopic procedure. Biopsy planning  CT performed. 2. T7-T8 osteomyelitis/discitis redemonstrated. Refer to MR thoracic spine  report. 3. Mild hepatosplenomegaly with hepatic steatosis. 4. Evidence of chronic pancreatitis. XR Results (most recent):  Results from Hospital Encounter encounter on 11/07/18   XR CHEST PA LAT    Narrative HISTORY: Shortness of breath. Exam: Chest.    Technique: Two views of the chest were obtained. No prior studies for  comparison. FINDINGS: Mild to moderate bilateral diffuse interstitial prominence is noted  with basilar dominance without hemothorax pneumonia or pleural effusions. Heart  is enlarged. Vascular pedicle is unremarkable. Visualized bony thorax and soft  tissues are within normal limits. Impression  IMPRESSION:  1. Sequela of mild to moderate congestion. Follow-up with plain imaging. CT   All Micro Results     None              DIAGNOSIS AND PLAN  Patient Active Problem List   Diagnosis Code    Severe obesity with body mass index (BMI) of 35.0 to 39.9 with serious comorbidity (HCC) E66.01    Discitis of thoracic region M46.44     1. Discitis of thoracic region  2. Severe obesity with body mass index (BMI) of 35.0 to 39.9 with serious comorbidity (HCC)  3. Candida parapsilosis infection  4. IVDU (intravenous drug user)  5. Methadone use ([facility] [address]  6. History of hepatitis C  7.  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 Patient not significantly improving with ceftriaxone and daptomycin. The patient completed daptomycin and ceftriaxone from 11/15/18 to 12/11/18 (+-4 weeks), followed by ambisome from 12/13/18 to 12/17/18. Stopped due to increased creatinine to 1.85. Followed by anidalofungin 100 mg IV daily until Sigifredo 10, 2019 (4 weeks), changed to fluconazol 1/11/19 despite interaction with Methadone, due to patient not improving on anidalofungin. MRI on 1/10/19 reports: Worsening discitis/osteomyelitis process at T7-T8, with circumferential epidural enhancement and posterior soft tissue enhancement  No focal abscess. Also developing fragmentation and compression fracture in T7,  followed by T8, with retropulsion of T7 fragment into the central canal, causing central stenosis and mild cord displacement but no significant cord compression or edema. Osteomyelitis now also involves the posterior elements of T7 and T8,  including spinous processes. Developing thoracic kyphosis. Started on 1/11/19 on ertapenem empirically given progression of disease. Patient was clinically improving, CRP was 0.4 (1/14/19), however, CRP increased to 8.7 (1/28/19) and hsCRP 98.66 (2/5/19) and patient also reported worsening back pain, so a new MRI of her back was ordered 2/5/19, and daptomycin was added empirically to ertapenem and fluconazol on 2/6/19 to continue with both antibiotics for 4 weeks. The patient said she cancelled MRI as she did not think she could tolerate lying flat on her back for the study. Since starting daptomycin her CRP has come down on 2/11/19 to 2.1. Her R arm PICC lines looks fine on exam, patient denies any local pain, swelling, drainage, bleeding, rashes. She denies any new symptoms other than back pain around her thoracic spine with some ruptured capillaries. Denies any pruritus.  Denies any recent IVDU. Reports compliance with all infusions and fluconazol EKGs have been stable, no QTc prolongation with high dose fluconazol and methadone interaction. Plan  Complete Ertapenem on 3/6/19 for a total of 8 weeks. C/w Daptomycin for 4 weeks started 2/6/19 until 3/6/19   C/w fluconazol 400 mg PO daily for a total of approx 6-12 months, depending on clinical progress and tolerance. Started 1/11/19- July 2019 min) with bi-weekly EKGs. Follow up with spine surgery for chronic pain, likely from residual damage from discitis in back. Would benefit from a new MRI to assess status. Patient will consider study with adequate medication prior to study. We agreed on trial of gabapentin for what I suspect is possibly thoracic radiculopathy, but follow up with spine surgery and PCP for long term pain management. Follow-up Disposition: Not on File     Maria A. Gena Boeck, MD

## 2019-03-26 ENCOUNTER — HOSPITAL ENCOUNTER (OUTPATIENT)
Age: 59
Discharge: HOME | End: 2019-03-26
Attending: INTERNAL MEDICINE
Payer: MEDICAID

## 2019-03-26 PROCEDURE — 72157 MRI CHEST SPINE W/O & W/DYE: CPT

## 2019-03-26 PROCEDURE — A9575 INJ GADOTERATE MEGLUMI 0.1ML: HCPCS | Performed by: INTERNAL MEDICINE

## 2019-03-26 PROCEDURE — 74011636320 HC RX REV CODE- 636/320: Performed by: INTERNAL MEDICINE

## 2019-03-26 RX ADMIN — GADOTERATE MEGLUMINE 20 ML: 376.9 INJECTION INTRAVENOUS at 08:00

## 2019-04-09 ENCOUNTER — OFFICE VISIT (OUTPATIENT)
Dept: INTERNAL MEDICINE CLINIC | Age: 59
End: 2019-04-09

## 2019-04-09 ENCOUNTER — TELEPHONE (OUTPATIENT)
Dept: INTERNAL MEDICINE CLINIC | Age: 59
End: 2019-04-09

## 2019-04-09 VITALS
DIASTOLIC BLOOD PRESSURE: 84 MMHG | SYSTOLIC BLOOD PRESSURE: 138 MMHG | HEIGHT: 64 IN | OXYGEN SATURATION: 97 % | WEIGHT: 209.6 LBS | TEMPERATURE: 98.1 F | RESPIRATION RATE: 16 BRPM | HEART RATE: 95 BPM | BODY MASS INDEX: 35.78 KG/M2

## 2019-04-09 DIAGNOSIS — M54.10 BACK PAIN WITH RADICULOPATHY: ICD-10-CM

## 2019-04-09 DIAGNOSIS — B37.9 CANDIDA PARAPSILOSIS INFECTION: ICD-10-CM

## 2019-04-09 DIAGNOSIS — M86.9 OSTEOMYELITIS, UNSPECIFIED SITE, UNSPECIFIED TYPE (HCC): Primary | ICD-10-CM

## 2019-04-09 RX ORDER — FLUCONAZOLE 200 MG/1
400 TABLET ORAL DAILY
Qty: 56 TAB | Refills: 2 | Status: SHIPPED | OUTPATIENT
Start: 2019-04-09 | End: 2019-05-07

## 2019-04-09 RX ORDER — GABAPENTIN 100 MG/1
300 CAPSULE ORAL 3 TIMES DAILY
Qty: 90 CAP | Refills: 3 | Status: SHIPPED | OUTPATIENT
Start: 2019-04-09 | End: 2020-02-04 | Stop reason: ALTCHOICE

## 2019-04-09 NOTE — TELEPHONE ENCOUNTER
Spoke with pharmacist, they wanted to clarify the increase in gabapentin, it went from 100mg TID to 300mg TID but the patient has had increased back pain d/t abscess   Pharmacist is aware

## 2019-04-09 NOTE — PROGRESS NOTES
MARIA EUGENIA Lopez is a 63 y.o. female with relevant past medical depression/anxiety, history of IVDU now on methadone clinic, h/o Hep C infection s/p treatment with a pangenotypic agent (Bettejane Grief) around Sept- Nov, 2018, severe obesity, HIV status neg (11/13/18) here for outpatient ID follow up of thoracic discitis/osteomyelitis. The patient apparently presented with progressively worse abdominal/thoracic back pain since around Oct, 2018, but no fever, chills, malaise, night sweats. No recent trauma, falls. Last IVDU a few months ago, unclear dates. She was seen in the ED on 11/7/18 and had a CTA to rule out PE, that showed multiple pulmonary nodules, suspected discitis at T7-8, hepatosteatosis and hepatomegaly with mild splenomegaly. Multiple nonspecific periportal slight enlarged nodules. She was discharged to continue outpatient work up. She returned to the ED on 11/1/18 due to persistent pain, associated with nausea and vomiting, an MRI of her thoracic spine was done on11/11/18 that showed significant soft tissue and osseous inflammation centered at the T7-8 disc level  and pronounced circumferential prevertebral and epidural inflammation centered at this level most suggestive of discitis osteomyelitis. No peripheral enhancing collection to suggest abscess. Mild compromise of the central canal at the level of T7-8. On 11/15/18 IR performs T7-8 biopsy. BC on 11/11/18 neg x , UC neg  Disc biopsy culture from 11/12/18  And 11/15/18: Candida parapsilosis. Bone cultures from 11/15/18 also grew MRSE. This results apparently were not available at the time of discharge. Fungal and AFB cultures neg from bone. The patient was initially treated with ceftriaxone and vancomycin. She was discharged on ceftriaxone and daptomycin to complete around 12/29/18 with out patient ID follow up for further recommendations.    The patient was seen again in the ED on 11/15/18 for persistent severe pain and was recommended to continue with treatment as recommended from discharge. The patient completed daptomycin and ceftriaxone from 11/15/18 to 12/11/18 (+-4 weeks), followed by ambisome from 12/13/18 to 12/17/18. Stopped due to increased creatinine to 1.85. Followed by anidalofungin 100 mg IV daily until Sigifredo 10, 2019 (4 weeks), changed to fluconazol 1/11/19 despite interaction with Methadone, due to patient not improving on anidalofungin. MRI on 1/10/19 reports: Worsening discitis/osteomyelitis process at T7-T8, with circumferential epidural enhancement and posterior soft tissue enhancement  No focal abscess. Also developing fragmentation and compression fracture in T7,  followed by T8, with retropulsion of T7 fragment into the central canal, causing central stenosis and mild cord displacement but no significant cord compression or edema. Osteomyelitis now also involves the posterior elements of T7 and T8,  including spinous processes. Developing thoracic kyphosis. Started on 1/11/19 on ertapenem empirically given progression of disease. Patient was clinically improving, CRP was 0.4 (1/14/19), however, CRP increased to 8.7 (1/28/19) and hsCRP 98.66 (2/5/19) and patient also reported worsening back pain, so a new MRI of her back was ordered 2/5/19, and daptomycin was added empirically to ertapenem and fluconazol on 2/6/19 to continue with both antibiotics for 4 weeks. The patient cancelled the MRI as she did not think she could tolerate lying flat on her back for the study. Since starting daptomycin her CRP has come down on 2/11/19 to 2.1- 2.2 on 2/25/19. She denies any new symptoms other than back pain around her thoracic spine with some ruptured capillaries. Denies any pruritus. Denies any recent IVDU. Reports compliance with fluconazol EKGs have been stable, no QTc prolongation with high dose fluconazol and methadone interaction.   Explains her pain is mostly on the on both sides from her back through her chest wall. MRI thoracic spine done on 3/26/19 showed:  1. Interval decrease in extent of soft tissue inflammation centered around T7-8  discitis osteomyelitis with decrease epidural inflammation. No new fluid  collection. However interval increase of destructive osseous changes involving  T7 and T8 vertebral bodies and extension of inflammation to the inferior left T6  vertebral body without destruction. Interval increase of junctional kyphosis at  T7-8 with moderate cord compression at this level. No myelopathy. Also  associated compromise of the neural foramina.     2. Otherwise similar mild degenerative changes in the thoracic spine. No new  distant site of inflammation. ROS  As above included in HPI. Otherwise 11 point review of systems negative including constitutional, skin, HENT, eyes, respiratory, cardiovascular, gastrointestinal, genitourinary, musculoskeletal, endocrine, hematologic, allergy, and neurologic. Past Medical History  Past Medical History:   Diagnosis Date    Depression     Hepatitis C     Heroin use disorder, moderate, in early remission ([facility] [address]     NOW on [facility]    Ill-defined condition     Chronic back pain     History reviewed. No pertinent surgical history. Family History  Family History   Problem Relation Age of Onset    Hypertension Mother     High Cholesterol Mother     COPD Mother     Hypertension Father        Social History  She  reports that she has been smoking cigarettes. She has been smoking about 0.25 packs per day. She has never used smokeless tobacco.   Social History     Substance and Sexual Activity   Alcohol Use No       Immunization History    There is no immunization history on file for this patient. Allergies  No Known Allergies    Medications  Current Outpatient Medications   Medication Sig    gabapentin (NEURONTIN) 100 mg capsule Take 3 Caps by mouth three (3) times daily.  Indications: Neuropathic Pain    fluconazole (DIFLUCAN) 200 mg tablet Take 2 Tabs by mouth daily for 28 days. FDA advises cautious prescribing of oral fluconazole in pregnancy.  ketorolac (TORADOL) 10 mg tablet Take 1 Tab by mouth two (2) times daily as needed for Pain.  sertraline (ZOLOFT) 100 mg tablet TAKE 1 & 1/2 TABLET BY MOUTH FOR A TOTAL OF 150MG DAILY    hydroCHLOROthiazide (HYDRODIURIL) 12.5 mg tablet TAKE ONE TABLET BY MOUTH DAILY    potassium chloride (KLOR-CON) 10 mEq tablet Take 1 Tab by mouth daily.  docusate sodium (COLACE) 100 mg capsule Take 1 Cap by mouth two (2) times a day.  methadone HCl (METHADONE PO) Take 85 mg by mouth daily.  OTHER Daptomycin [facility] 12/29/18 at [facility]- arranged by      No current facility-administered medications for this visit. Visit Vitals  /84 (BP 1 Location: Right arm, BP Patient Position: Sitting)   Pulse 95   Temp 98.1 °F (36.7 °C) (Oral)   Resp 16   Ht 5' 4\" (1.626 m)   Wt 209 lb 9.6 oz (95.1 kg)   SpO2 97%   BMI 35.98 kg/m²     Body mass index is 35.98 kg/m². Physical Exam   Constitutional: She is oriented to person, place, and time. No distress (from pain, looks uncomfortable but non toxic). HENT:   Head: Normocephalic and atraumatic. Neck: Neck supple. Cardiovascular: Normal rate and regular rhythm. Pulmonary/Chest: Effort normal and breath sounds normal.   Abdominal: Soft. Musculoskeletal: She exhibits no edema. Neurological: She is alert and oriented to person, place, and time. Skin: Skin is warm. Mid back is slightly tender to touch, extending around costal area on both sides   Nursing note and vitals reviewed. REVIEW OF DATA    Labs  No visits with results within 1 Month(s) from this visit.    Latest known visit with results is:   Hospital Outpatient Visit on 03/04/2019   Component Date Value Ref Range Status    WBC 03/04/2019 8.4  4.5 - 13.0 K/uL Final    RBC 03/04/2019 4.44  4.10 - 5.10 M/uL Final    HGB 03/04/2019 12.8  12.0 - 16 g/dL Final    HCT 03/04/2019 40.2  36 - 48 % Final    MCV 03/04/2019 90.5  78 - 102 FL Final    MCH 03/04/2019 28.8  25.0 - 35.0 PG Final    MCHC 03/04/2019 31.8  31 - 37 g/dL Final    RDW 03/04/2019 14.5  11.5 - 14.5 % Final    PLATELET 09/95/9229 736  140 - 440 K/uL Final    NEUTROPHILS 03/04/2019 67  40 - 70 % Final    MIXED CELLS 03/04/2019 10  0.1 - 17 % Final    LYMPHOCYTES 03/04/2019 23  14 - 44 % Final    ABS. NEUTROPHILS 03/04/2019 5.7  1.8 - 9.5 K/UL Final    ABS. MIXED CELLS 03/04/2019 0.8  0.0 - 2.3 K/uL Final    ABS. LYMPHOCYTES 03/04/2019 1.9  1.1 - 5.9 K/UL Final    DF 03/04/2019 AUTOMATED    Final    Sodium 03/04/2019 135* 136 - 145 mmol/L Final    Potassium 03/04/2019 4.3  3.5 - 5.5 mmol/L Final    Chloride 03/04/2019 99* 100 - 108 mmol/L Final    CO2 03/04/2019 31  21 - 32 mmol/L Final    Anion gap 03/04/2019 5  3.0 - 18 mmol/L Final    Glucose 03/04/2019 85  74 - 99 mg/dL Final    BUN 03/04/2019 11  7.0 - 18 MG/DL Final    Creatinine 03/04/2019 0.77  0.6 - 1.3 MG/DL Final    BUN/Creatinine ratio 03/04/2019 14  12 - 20   Final    GFR est AA 03/04/2019 >60  >60 ml/min/1.73m2 Final    GFR est non-AA 03/04/2019 >60  >60 ml/min/1.73m2 Final    Calcium 03/04/2019 8.9  8.5 - 10.1 MG/DL Final    Bilirubin, total 03/04/2019 0.2  0.2 - 1.0 MG/DL Final    ALT (SGPT) 03/04/2019 32  13 - 56 U/L Final    AST (SGOT) 03/04/2019 30  15 - 37 U/L Final    Alk.  phosphatase 03/04/2019 136* 45 - 117 U/L Final    Protein, total 03/04/2019 8.3* 6.4 - 8.2 g/dL Final    Albumin 03/04/2019 3.3* 3.4 - 5.0 g/dL Final    Globulin 03/04/2019 5.0* 2.0 - 4.0 g/dL Final    A-G Ratio 03/04/2019 0.7* 0.8 - 1.7   Final    C-Reactive Protein, Cardiac 03/04/2019 33.73* 0.00 - 3.00 mg/L Final    CK 03/04/2019 30  26 - 192 U/L Final         CT Results (most recent):  Results from Hospital Encounter encounter on 11/11/18   CT ABD WO CONT    Narrative CT abdomen without IV contrast     INDICATION: CT of T7-T8 discitis planning. TECHNIQUE: Serial axial images were obtained of the abdomen without intravenous  contrast. Images are performed prone for the intended CT procedure. Decision was  then made by Dr. Joni Guillaume to perform the procedure fluoroscopically in IR. All CT scans are performed using dose optimization techniques as appropriate to  the performed exam including the following: Automated exposure control,  adjustment of mA and/or kV according to patient size, and use of iterative  reconstructive technique. COMPARISON: CTA chest 11/7/2018. MRI thoracic spine 11/11/2018. FINDINGS:    Hepatic steatosis. Mild hepatosplenomegaly. Chronic calcifications in the  pancreas without acute findings. The kidneys, adrenal glands, gallbladder and  partially imaged bowel are within normal limits. No lymphadenopathy or free  fluid. The level of T7-T8 osteomyelitis/discitis has mild surrounding soft tissue  thickening as seen on prior MRI. Refer to prior MRI report. Impression IMPRESSION:    1. CT-guided biopsy procedure changed to fluoroscopic procedure. Biopsy planning  CT performed. 2. T7-T8 osteomyelitis/discitis redemonstrated. Refer to MR thoracic spine  report. 3. Mild hepatosplenomegaly with hepatic steatosis. 4. Evidence of chronic pancreatitis. XR Results (most recent):  Results from Hospital Encounter encounter on 11/07/18   XR CHEST PA LAT    Narrative HISTORY: Shortness of breath. Exam: Chest.    Technique: Two views of the chest were obtained. No prior studies for  comparison. FINDINGS: Mild to moderate bilateral diffuse interstitial prominence is noted  with basilar dominance without hemothorax pneumonia or pleural effusions. Heart  is enlarged. Vascular pedicle is unremarkable. Visualized bony thorax and soft  tissues are within normal limits. Impression  IMPRESSION:  1. Sequela of mild to moderate congestion. Follow-up with plain imaging.       CT   All Micro Results     None DIAGNOSIS AND PLAN  Patient Active Problem List   Diagnosis Code    Severe obesity with body mass index (BMI) of 35.0 to 39.9 with serious comorbidity (HCC) E66.01    Discitis of thoracic region M46.44     1. Discitis of thoracic region  2. Severe obesity with body mass index (BMI) of 35.0 to 39.9 with serious comorbidity (HCC)  3. Candida parapsilosis infection  4. IVDU (intravenous drug user)  5. Methadone use ([facility] [address]  6. History of hepatitis C  7. 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 Patient not significantly improving with ceftriaxone and daptomycin. The patient completed daptomycin and ceftriaxone from 11/15/18 to 12/11/18 (+-4 weeks), followed by ambisome from 12/13/18 to 12/17/18. Stopped due to increased creatinine to 1.85. Followed by anidalofungin 100 mg IV daily until Sigifredo 10, 2019 (4 weeks), changed to fluconazol 1/11/19 despite interaction with Methadone, due to patient not improving on anidalofungin. MRI on 1/10/19 reports: Worsening discitis/osteomyelitis process at T7-T8, with circumferential epidural enhancement and posterior soft tissue enhancement  No focal abscess. Also developing fragmentation and compression fracture in T7,  followed by T8, with retropulsion of T7 fragment into the central canal, causing central stenosis and mild cord displacement but no significant cord compression or edema. Osteomyelitis now also involves the posterior elements of T7 and T8,  including spinous processes. Developing thoracic kyphosis. Started on 1/11/19 on ertapenem empirically given progression of disease.   Patient was clinically improving, CRP was 0.4 (1/14/19), however, CRP increased to 8.7 (1/28/19) and hsCRP 98.66 (2/5/19) and patient also reported worsening back pain, so a new MRI of her back was ordered 2/5/19, and daptomycin was added empirically to ertapenem and fluconazol on 2/6/19 to continue with both antibiotics for 4 weeks. Completed Ertapenem on 3/6/19 for a total of 8 weeks. Daptomycin for 4 weeks started 2/6/19 until 3/6/19. MRI thoracic spine done on 3/26/19 showed:  1. Interval decrease in extent of soft tissue inflammation centered around T7-8  discitis osteomyelitis with decrease epidural inflammation. No new fluid  collection. However interval increase of destructive osseous changes involving  T7 and T8 vertebral bodies and extension of inflammation to the inferior left T6  vertebral body without destruction. Interval increase of junctional kyphosis at  T7-8 with moderate cord compression at this level. No myelopathy. Also  associated compromise of the neural foramina. Plan  C/w fluconazol 400 mg PO daily for a total of approx 6-12 months, depending on clinical progress and tolerance. Started 1/11/19- July 2019 min with bi-weekly EKGs. Follow up with spine surgery for chronic pain, likely from residual damage from discitis in back. C/w gabapentin, will increase dose, patient did report relief with this medication. Follow-up and Dispositions    · Return in about 1 month (around 5/7/2019). Brenda Briggs MD

## 2019-04-10 LAB
ALBUMIN SERPL-MCNC: 4.1 G/DL (ref 3.5–5.5)
ALBUMIN/GLOB SERPL: 1 {RATIO} (ref 1.2–2.2)
ALP SERPL-CCNC: 117 IU/L (ref 39–117)
ALT SERPL-CCNC: 32 IU/L (ref 0–32)
AST SERPL-CCNC: 30 IU/L (ref 0–40)
BASOPHILS # BLD AUTO: 0 X10E3/UL (ref 0–0.2)
BASOPHILS NFR BLD AUTO: 0 %
BILIRUB SERPL-MCNC: <0.2 MG/DL (ref 0–1.2)
BUN SERPL-MCNC: 11 MG/DL (ref 6–24)
BUN/CREAT SERPL: 15 (ref 9–23)
CALCIUM SERPL-MCNC: 9.8 MG/DL (ref 8.7–10.2)
CHLORIDE SERPL-SCNC: 97 MMOL/L (ref 96–106)
CO2 SERPL-SCNC: 21 MMOL/L (ref 20–29)
CREAT SERPL-MCNC: 0.73 MG/DL (ref 0.57–1)
CRP SERPL-MCNC: 17.6 MG/L (ref 0–4.9)
EOSINOPHIL # BLD AUTO: 0.2 X10E3/UL (ref 0–0.4)
EOSINOPHIL NFR BLD AUTO: 3 %
ERYTHROCYTE [DISTWIDTH] IN BLOOD BY AUTOMATED COUNT: 16.9 % (ref 12.3–15.4)
ERYTHROCYTE [SEDIMENTATION RATE] IN BLOOD BY WESTERGREN METHOD: 117 MM/HR (ref 0–40)
GLOBULIN SER CALC-MCNC: 4.2 G/DL (ref 1.5–4.5)
GLUCOSE SERPL-MCNC: 121 MG/DL (ref 65–99)
HCT VFR BLD AUTO: 41.2 % (ref 34–46.6)
HGB BLD-MCNC: 13.3 G/DL (ref 11.1–15.9)
IMM GRANULOCYTES # BLD AUTO: 0 X10E3/UL (ref 0–0.1)
IMM GRANULOCYTES NFR BLD AUTO: 0 %
LYMPHOCYTES # BLD AUTO: 2.3 X10E3/UL (ref 0.7–3.1)
LYMPHOCYTES NFR BLD AUTO: 26 %
MCH RBC QN AUTO: 28.7 PG (ref 26.6–33)
MCHC RBC AUTO-ENTMCNC: 32.3 G/DL (ref 31.5–35.7)
MCV RBC AUTO: 89 FL (ref 79–97)
MONOCYTES # BLD AUTO: 0.7 X10E3/UL (ref 0.1–0.9)
MONOCYTES NFR BLD AUTO: 8 %
NEUTROPHILS # BLD AUTO: 5.4 X10E3/UL (ref 1.4–7)
NEUTROPHILS NFR BLD AUTO: 63 %
PLATELET # BLD AUTO: 265 X10E3/UL (ref 150–379)
POTASSIUM SERPL-SCNC: 4.2 MMOL/L (ref 3.5–5.2)
PROT SERPL-MCNC: 8.3 G/DL (ref 6–8.5)
RBC # BLD AUTO: 4.63 X10E6/UL (ref 3.77–5.28)
SODIUM SERPL-SCNC: 136 MMOL/L (ref 134–144)
WBC # BLD AUTO: 8.6 X10E3/UL (ref 3.4–10.8)

## 2019-04-30 ENCOUNTER — OFFICE VISIT (OUTPATIENT)
Dept: ORTHOPEDIC SURGERY | Age: 59
End: 2019-04-30

## 2019-04-30 VITALS
TEMPERATURE: 98.6 F | HEIGHT: 64 IN | RESPIRATION RATE: 18 BRPM | DIASTOLIC BLOOD PRESSURE: 85 MMHG | OXYGEN SATURATION: 94 % | BODY MASS INDEX: 36.7 KG/M2 | HEART RATE: 82 BPM | SYSTOLIC BLOOD PRESSURE: 143 MMHG | WEIGHT: 215 LBS

## 2019-04-30 DIAGNOSIS — M46.24 OSTEOMYELITIS OF THORACIC SPINE (HCC): Primary | ICD-10-CM

## 2019-04-30 DIAGNOSIS — M46.44 THORACIC DISCITIS: ICD-10-CM

## 2019-04-30 NOTE — PROGRESS NOTES
[address]  [address], [address]  [place], [address] [place] [address] Street  Phone: [phone]  Fax: [phone]  INITIAL CONSULTATION  Patient: Sharlene Shirley                MRN: [id number]       SSN: [id number]  YOB: 1960        AGE: 63 y.o. SEX: female  There is no height or weight on file to calculate BMI. PCP: Jayy Hollis NP  04/30/19    No chief complaint on file. HISTORY OF PRESENT ILLNESS, RADIOGRAPHS, and PLAN:         HISTORY OF PRESENT ILLNESS:   Ms. Sterling Pastor is seen today at the request of Daniel Michele, and NP Dmitry Holder. Ms. Sterling Pastor is a 64-year-old female with a history of hepatitis C who is morbidly obese, HIV negative, who had a thoracic discitis with osteomyelitis, disc biopsy positive for candida and also MRSE treated with antibiotics. This evidently started in October, began treatment in November, has had steady improvement in her sed rate and c-reactive protein which has improved to a 2 with a low sed rate. The patient's pain is down to a 3. It is in her mid to upper thoracic spine around T7-8, the area of her discitis. She has no neurology. She has arthritic knees. She has normal strength, sensation and reflexes. She denies fever, chills or night sweats. She is afebrile. The patient had a previous IV drug use history which we assume is related to her discitis. Her MRI demonstrates almost total dissolution of T7 kyphotic attitude T7-T8, residual retropulsion of what is left of T7 into the canal causing some degree of stenosis but no cord compression. No inflammatory mass, no evidence of epidural abscess or paraspinal abscess. ASSESSMENT/PLAN:   We have a patient who had a multi organism spinal infection T7-T8 that appears to being successfully treated with multi antibiotic regimen by infectious disease. Her pain is dramatically improved to a 3/10.   She has chronic thoracic back pain with 3/10 now without neurology. The discitis appears to have collapsed to a stable kyphotic attitude with almost total loss of T7 with the healing complex of T6 to T8. There is no evidence of instability. There is some relative stenosis but no cord compression. There is relative kyphosis. I discussed the matter at length with the patient. Surgery for her would be an extensive operation requiring a one or two level corpectomy with multilevel thoracic fusion in an attempt to fuse the segment in hopes of relieving what is a 3/10 pain. She has no neurology, no clear evidence of significant ongoing infection, no instability and in the end such an extensive surgery is as likely to leave her with a 3/10 pain or more than her current situation if we allow it to continue to heal and autofuse. My recommendation would be to continue antibiotics as indicated by infectious disease, monitor her situation, accept that she will always have ongoing residual pain. I do not see anything surgical on her presentation at this time. The patient has polyarticular arthritis. She has a history of drug abuse and is not a good candidate for narcotic pain management. Use of neuropathics is reasonable. I would avoid surgery on her at this time. cc: SHANI Ott, ID            Thoracic XR Next Visit      Past Medical History:   Diagnosis Date    Depression     Hepatitis C     Heroin use disorder, moderate, in early remission ([facility] [address]     [facility]    Ill-defined condition     Chronic back pain       Family History   Problem Relation Age of Onset    Hypertension Mother     High Cholesterol Mother     COPD Mother     Hypertension Father        Current Outpatient Medications   Medication Sig Dispense Refill    gabapentin (NEURONTIN) 100 mg capsule Take 3 Caps by mouth three (3) times daily. Indications: Neuropathic Pain 90 Cap 3    fluconazole (DIFLUCAN) 200 mg tablet Take 2 Tabs by mouth daily for 28 days.  FDA advises cautious prescribing of oral fluconazole in pregnancy. 56 Tab 2    ketorolac (TORADOL) 10 mg tablet Take 1 Tab by mouth two (2) times daily as needed for Pain. 20 Tab 0    sertraline (ZOLOFT) 100 mg tablet TAKE 1 & 1/2 TABLET BY MOUTH FOR A TOTAL OF 150MG DAILY 45 Tab 3    hydroCHLOROthiazide (HYDRODIURIL) 12.5 mg tablet TAKE ONE TABLET BY MOUTH DAILY 30 Tab 3    potassium chloride (KLOR-CON) 10 mEq tablet Take 1 Tab by mouth daily. 30 Tab 3    docusate sodium (COLACE) 100 mg capsule Take 1 Cap by mouth two (2) times a day. 60 Cap 0    OTHER Daptomycin through 12/29/18 at [facility]- arranged by  1 Each 0    methadone HCl (METHADONE PO) Take 85 mg by mouth daily. No Known Allergies    No past surgical history on file.     Past Medical History:   Diagnosis Date    Depression     Hepatitis C     Heroin use disorder, moderate, in early remission ([facility] [address]     [facility]    Ill-defined condition     Chronic back pain       Social History     Socioeconomic History    Marital status: SINGLE     Spouse name: Not on file    Number of children: Not on file    Years of education: Not on file    Highest education level: Not on file   Occupational History    Not on file   Social Needs    Financial resource strain: Not on file    Food insecurity:     Worry: Not on file     Inability: Not on file    Transportation needs:     Medical: Not on file     Non-medical: Not on file   Tobacco Use    Smoking status: Current Every Day Smoker     Packs/day: 0.25     Types: Cigarettes    Smokeless tobacco: Never Used   Substance and Sexual Activity    Alcohol use: No    Drug use: No     Comment: on methadone matinance program    Sexual activity: Never   Lifestyle    Physical activity:     Days per week: Not on file     Minutes per session: Not on file    Stress: Not on file   Relationships    Social connections:     Talks on phone: Not on file     Gets together: Not on file     Attends Hoahaoism service: Not on file     Active member of club or organization: Not on file     Attends meetings of clubs or organizations: Not on file     Relationship status: Not on file    Intimate partner violence:     Fear of current or ex partner: Not on file     Emotionally abused: Not on file     Physically abused: Not on file     Forced sexual activity: Not on file   Other Topics Concern    Not on file   Social History Narrative    Not on file           REVIEW OF SYSTEMS:   CONSTITUTIONAL SYMPTOMS:  Negative. EYES:  Negative. EARS, NOSE, THROAT AND MOUTH:  Negative. CARDIOVASCULAR:  Negative. RESPIRATORY:  Negative. GENITOURINARY: Per HPI. GASTROINTESTINAL:  Per HPI. INTEGUMENTARY (SKIN AND/OR BREAST):  Negative. MUSCULOSKELETAL: Per HPI.   ENDOCRINE/RHEUMATOLOGIC:  Negative. NEUROLOGICAL:  Per HPI. HEMATOLOGIC/LYMPHATIC:  Negative. ALLERGIC/IMMUNOLOGIC:  Negative. PSYCHIATRIC:  Negative. PHYSICAL EXAMINATION:   There were no vitals taken for this visit. PAIN SCALE: /10    CONSTITUTIONAL: The patient is in no apparent distress and is alert and oriented x 3. HEENT: Normocephalic. Hearing grossly intact. NECK: Supple and symmetric. no tenderness, or masses were felt. RESPIRATORY: No labored breathing. CARDIOVASCULAR: The carotid pulses were normal. Peripheral pulses were 2+. CHEST: Normal AP diameter and normal contour without any kyphoscoliosis. LYMPHATIC: No lymphadenopathy was appreciated in the neck, axillae or groin. SKIN:  Negative for scars, rashes, lesions, or ulcers on the right upper, right lower, left upper, left lower and trunk. NEUROLOGICAL: Alert and oriented x 3. Ambulation without assistive device. FWB. EXTREMITIES:  See musculoskeletal.  MUSCULOSKELETAL:   Head and Neck:  Negative for misalignment, asymmetry, crepitation, defects, tenderness masses or effusions.  Left Upper Extremity: Inspection, percussion and palpation performed.   Camachos sign is negative.  Right Upper Extremity: Inspection, percussion and palpation performed. Camachos sign is negative.  Spine, Ribs and Pelvis: Constant burning sensation in thoracic spine. Inspection, percussion and palpation performed. Negative for misalignment, asymmetry, crepitation, defects, tenderness masses or effusions.  Left Lower Extremity: Inflamed knee. Inspection, percussion and palpation performed. Negative straight leg raise.  Right Lower Extremity: Inspection, percussion and palpation performed. Negative straight leg raise. SPINE EXAM:     Cervical spine: Neck is midline. Normal muscle tone. No focal atrophy is noted. Lumbar spine: No rash, ecchymosis, or gross obliquity. No focal atrophy is noted. ASSESSMENT    ICD-10-CM ICD-9-CM    1. Osteomyelitis of thoracic spine (HCC) M46.24 730.28    2. Thoracic discitis M46.44 722.92        Written by Keli Herndon, as dictated by Maicol Lovelace MD.    I, Dr. Maicol Lovelace MD, confirm that all documentation is accurate.

## 2019-05-07 ENCOUNTER — OFFICE VISIT (OUTPATIENT)
Dept: INTERNAL MEDICINE CLINIC | Age: 59
End: 2019-05-07

## 2019-05-07 VITALS
OXYGEN SATURATION: 96 % | DIASTOLIC BLOOD PRESSURE: 82 MMHG | TEMPERATURE: 97.9 F | HEART RATE: 91 BPM | BODY MASS INDEX: 36.62 KG/M2 | RESPIRATION RATE: 16 BRPM | HEIGHT: 64 IN | WEIGHT: 214.5 LBS | SYSTOLIC BLOOD PRESSURE: 126 MMHG

## 2019-05-07 DIAGNOSIS — B37.9 CANDIDA PARAPSILOSIS INFECTION: ICD-10-CM

## 2019-05-07 DIAGNOSIS — M86.9 OSTEOMYELITIS, UNSPECIFIED SITE, UNSPECIFIED TYPE (HCC): Primary | ICD-10-CM

## 2019-05-07 NOTE — LETTER
5/7/2019 2:46 PM 
 
Ms. Eryn Richmond [address] [facility] [id number] To Whom It May Concern, 
 
Ms. Dilan Hall has been my patient in the infectious disease clinic for over 6 months. She has mentioned to me that there are different drug screening options at your clinic [facility]. Given alleged difficulty with urination given medical history to collect a urine sample, Ms. Dilan Hall would be a better candidate for the oral mucosa screening test at this time. In case of any questions feel free to contact me for more details.   
 
 
Sincerely, 
 
 
Santy Santoyo MD

## 2019-05-07 NOTE — PROGRESS NOTES
MARIA EUGENIA Rosado is a 63 y.o. female with relevant past medical depression/anxiety, history of IVDU now on methadone clinic, h/o Hep C infection s/p treatment with a pangenotypic agent (Gweneth Ciales) around Sept- Nov, 2018, severe obesity, HIV status neg (11/13/18) here for outpatient ID follow up of thoracic discitis/osteomyelitis. The patient apparently presented with progressively worse abdominal/thoracic back pain since around Oct, 2018, but no fever, chills, malaise, night sweats. No recent trauma, falls. Last IVDU a few months ago, unclear dates. She was seen in the ED on 11/7/18 and had a CTA to rule out PE, that showed multiple pulmonary nodules, suspected discitis at T7-8, hepatosteatosis and hepatomegaly with mild splenomegaly. Multiple nonspecific periportal slight enlarged nodules. She was discharged to continue outpatient work up. She returned to the ED on 11/1/18 due to persistent pain, associated with nausea and vomiting, an MRI of her thoracic spine was done on11/11/18 that showed significant soft tissue and osseous inflammation centered at the T7-8 disc level  and pronounced circumferential prevertebral and epidural inflammation centered at this level most suggestive of discitis osteomyelitis. No peripheral enhancing collection to suggest abscess. Mild compromise of the central canal at the level of T7-8. On 11/15/18 IR performs T7-8 biopsy. BC on 11/11/18 neg x , UC neg  Disc biopsy culture from 11/12/18  And 11/15/18: Candida parapsilosis. Bone cultures from 11/15/18 also grew MRSE. This results apparently were not available at the time of discharge. Fungal and AFB cultures neg from bone. The patient was initially treated with ceftriaxone and vancomycin. She was discharged on ceftriaxone and daptomycin to complete around 12/29/18 with out patient ID follow up for further recommendations.    The patient was seen again in the ED on 11/15/18 for persistent severe pain and was recommended to continue with treatment as recommended from discharge. The patient completed daptomycin and ceftriaxone from 11/15/18 to 12/11/18 (+-4 weeks), followed by ambisome from 12/13/18 to 12/17/18. Stopped due to increased creatinine to 1.85. Followed by anidalofungin 100 mg IV daily until Sigifredo 10, 2019 (4 weeks), changed to fluconazol 1/11/19 despite interaction with Methadone, due to patient not improving on anidalofungin. MRI on 1/10/19 reports: Worsening discitis/osteomyelitis process at T7-T8, with circumferential epidural enhancement and posterior soft tissue enhancement  No focal abscess. Also developing fragmentation and compression fracture in T7,  followed by T8, with retropulsion of T7 fragment into the central canal, causing central stenosis and mild cord displacement but no significant cord compression or edema. Osteomyelitis now also involves the posterior elements of T7 and T8,  including spinous processes. Developing thoracic kyphosis. Started on 1/11/19 on ertapenem empirically given progression of disease. Patient was clinically improving, CRP was 0.4 (1/14/19), however, CRP increased to 8.7 (1/28/19) and hsCRP 98.66 (2/5/19) and patient also reported worsening back pain, so a new MRI of her back was ordered 2/5/19, and daptomycin was added empirically to ertapenem and fluconazol on 2/6/19 to continue with both antibiotics for 4 weeks. The patient cancelled the MRI as she did not think she could tolerate lying flat on her back for the study. Since starting daptomycin her CRP has come down on 2/11/19 to 2.1- 2.2 on 2/25/19. She denies any new symptoms other than back pain around her thoracic spine with some ruptured capillaries. Denies any pruritus. Denies any recent IVDU. Reports compliance with fluconazol EKGs have been stable, no QTc prolongation with high dose fluconazol and methadone interaction.   Explains her pain is mostly on the on both sides from her back through her chest wall. Has improved over time. MRI thoracic spine done on 3/26/19 showed:  1. Interval decrease in extent of soft tissue inflammation centered around T7-8  discitis osteomyelitis with decrease epidural inflammation. No new fluid  collection. However interval increase of destructive osseous changes involving  T7 and T8 vertebral bodies and extension of inflammation to the inferior left T6  vertebral body without destruction. Interval increase of junctional kyphosis at  T7-8 with moderate cord compression at this level. No myelopathy. Also  associated compromise of the neural foramina. 2. Otherwise similar mild degenerative changes in the thoracic spine. No new  distant site of inflammation. Dr. Gualberto Shin follow up appreciated from visit on 4/30/19, when she reported pain on 3/10. After evaluating patient, labs and recent imaging he did not recommend further interventions at this point and to continue antimicrobial management per ID. ROS  As above included in HPI. Otherwise 11 point review of systems negative including constitutional, skin, HENT, eyes, respiratory, cardiovascular, gastrointestinal, genitourinary, musculoskeletal, endocrine, hematologic, allergy, and neurologic. Past Medical History  Past Medical History:   Diagnosis Date    Depression     Hepatitis C     Heroin use disorder, moderate, in early remission ([address]     NOW on [facility]    Ill-defined condition     Chronic back pain     No past surgical history on file. Family History  Family History   Problem Relation Age of Onset    Hypertension Mother     High Cholesterol Mother     COPD Mother     Hypertension Father        Social History  She  reports that she has been smoking cigarettes. She has been smoking about 0.25 packs per day.  She has never used smokeless tobacco.   Social History     Substance and Sexual Activity   Alcohol Use No       Immunization History    There is no immunization history on file for this patient. Allergies  No Known Allergies    Medications  Current Outpatient Medications   Medication Sig    gabapentin (NEURONTIN) 100 mg capsule Take 3 Caps by mouth three (3) times daily. Indications: Neuropathic Pain    fluconazole (DIFLUCAN) 200 mg tablet Take 2 Tabs by mouth daily for 28 days. FDA advises cautious prescribing of oral fluconazole in pregnancy.  sertraline (ZOLOFT) 100 mg tablet TAKE 1 & 1/2 TABLET BY MOUTH FOR A TOTAL OF 150MG DAILY    hydroCHLOROthiazide (HYDRODIURIL) 12.5 mg tablet TAKE ONE TABLET BY MOUTH DAILY    potassium chloride (KLOR-CON) 10 mEq tablet Take 1 Tab by mouth daily.  docusate sodium (COLACE) 100 mg capsule Take 1 Cap by mouth two (2) times a day.  methadone HCl (METHADONE PO) Take 85 mg by mouth daily.  ketorolac (TORADOL) 10 mg tablet Take 1 Tab by mouth two (2) times daily as needed for Pain.  OTHER Daptomycin through 12/29/[facility] at [facility]- arranged by      No current facility-administered medications for this visit. Visit Vitals  /82 (BP 1 Location: Left arm, BP Patient Position: Sitting)   Pulse 91   Temp 97.9 °F (36.6 °C) (Oral)   Resp 16   Ht 5' 4\" (1.626 m)   Wt 214 lb 8 oz (97.3 kg)   SpO2 96%   BMI 36.82 kg/m²     Body mass index is 36.82 kg/m². Physical Exam   Constitutional: She is oriented to person, place, and time. No distress (from pain, looks uncomfortable but non toxic). HENT:   Head: Normocephalic and atraumatic. Neck: Neck supple. Cardiovascular: Normal rate and regular rhythm. Pulmonary/Chest: Effort normal and breath sounds normal.   Abdominal: Soft. Musculoskeletal: She exhibits no edema. Neurological: She is alert and oriented to person, place, and time. Skin: Skin is warm. Mid back is slightly tender to touch, extending around costal area on both sides   Nursing note and vitals reviewed.         REVIEW OF DATA    Labs  Office Visit on 04/09/2019   Component Date Value Ref Range Status    C-Reactive Protein, Qt 04/09/2019 17.6* 0.0 - 4.9 mg/L Final    Glucose 04/09/2019 121* 65 - 99 mg/dL Final    BUN 04/09/2019 11  6 - 24 mg/dL Final    Creatinine 04/09/2019 0.73  0.57 - 1.00 mg/dL Final    GFR est non-AA 04/09/2019 92  >59 mL/min/1.73 Final    GFR est AA 04/09/2019 106  >59 mL/min/1.73 Final    BUN/Creatinine ratio 04/09/2019 15  9 - 23 Final    Sodium 04/09/2019 136  134 - 144 mmol/L Final    Potassium 04/09/2019 4.2  3.5 - 5.2 mmol/L Final    Chloride 04/09/2019 97  96 - 106 mmol/L Final    CO2 04/09/2019 21  20 - 29 mmol/L Final    Calcium 04/09/2019 9.8  8.7 - 10.2 mg/dL Final    Protein, total 04/09/2019 8.3  6.0 - 8.5 g/dL Final    Albumin 04/09/2019 4.1  3.5 - 5.5 g/dL Final    GLOBULIN, TOTAL 04/09/2019 4.2  1.5 - 4.5 g/dL Final    A-G Ratio 04/09/2019 1.0* 1.2 - 2.2 Final    Bilirubin, total 04/09/2019 <0.2  0.0 - 1.2 mg/dL Final    Alk. phosphatase 04/09/2019 117  39 - 117 IU/L Final    AST (SGOT) 04/09/2019 30  0 - 40 IU/L Final    ALT (SGPT) 04/09/2019 32  0 - 32 IU/L Final    WBC 04/09/2019 8.6  3.4 - 10.8 x10E3/uL Final    RBC 04/09/2019 4.63  3.77 - 5.28 x10E6/uL Final    HGB 04/09/2019 13.3  11.1 - 15.9 g/dL Final    HCT 04/09/2019 41.2  34.0 - 46.6 % Final    MCV 04/09/2019 89  79 - 97 fL Final    MCH 04/09/2019 28.7  26.6 - 33.0 pg Final    MCHC 04/09/2019 32.3  31.5 - 35.7 g/dL Final    RDW 04/09/2019 16.9* 12.3 - 15.4 % Final    PLATELET 34/88/4914 603  150 - 379 x10E3/uL Final    NEUTROPHILS 04/09/2019 63  Not Estab. % Final    Lymphocytes 04/09/2019 26  Not Estab. % Final    MONOCYTES 04/09/2019 8  Not Estab. % Final    EOSINOPHILS 04/09/2019 3  Not Estab. % Final    BASOPHILS 04/09/2019 0  Not Estab. % Final    ABS. NEUTROPHILS 04/09/2019 5.4  1.4 - 7.0 x10E3/uL Final    Abs Lymphocytes 04/09/2019 2.3  0.7 - 3.1 x10E3/uL Final    ABS. MONOCYTES 04/09/2019 0.7  0.1 - 0.9 x10E3/uL Final    ABS.  EOSINOPHILS 04/09/2019 0.2  0.0 - 0.4 x10E3/uL Final    ABS. BASOPHILS 04/09/2019 0.0  0.0 - 0.2 x10E3/uL Final    IMMATURE GRANULOCYTES 04/09/2019 0  Not Estab. % Final    ABS. IMM. GRANS. 04/09/2019 0.0  0.0 - 0.1 x10E3/uL Final    Sed rate (ESR) 04/09/2019 117* 0 - 40 mm/hr Final         CT Results (most recent):  Results from [place] encounter on 11/11/18   CT ABD WO CONT    Narrative CT abdomen without IV contrast     INDICATION: CT of T7-T8 discitis planning. TECHNIQUE: Serial axial images were obtained of the abdomen without intravenous  contrast. Images are performed prone for the intended CT procedure. Decision was  then made by Dr. Mamie Patel to perform the procedure fluoroscopically in IR. All CT scans are performed using dose optimization techniques as appropriate to  the performed exam including the following: Automated exposure control,  adjustment of mA and/or kV according to patient size, and use of iterative  reconstructive technique. COMPARISON: CTA chest 11/7/2018. MRI thoracic spine 11/11/2018. FINDINGS:    Hepatic steatosis. Mild hepatosplenomegaly. Chronic calcifications in the  pancreas without acute findings. The kidneys, adrenal glands, gallbladder and  partially imaged bowel are within normal limits. No lymphadenopathy or free  fluid. The level of T7-T8 osteomyelitis/discitis has mild surrounding soft tissue  thickening as seen on prior MRI. Refer to prior MRI report. Impression IMPRESSION:    1. CT-guided biopsy procedure changed to fluoroscopic procedure. Biopsy planning  CT performed. 2. T7-T8 osteomyelitis/discitis redemonstrated. Refer to MR thoracic spine  report. 3. Mild hepatosplenomegaly with hepatic steatosis. 4. Evidence of chronic pancreatitis. XR Results (most recent):  Results from Hospital Encounter encounter on 11/07/18   XR CHEST PA LAT    Narrative HISTORY: Shortness of breath. Exam: Chest.    Technique: Two views of the chest were obtained.  No prior studies for  comparison. FINDINGS: Mild to moderate bilateral diffuse interstitial prominence is noted  with basilar dominance without hemothorax pneumonia or pleural effusions. Heart  is enlarged. Vascular pedicle is unremarkable. Visualized bony thorax and soft  tissues are within normal limits. Impression  IMPRESSION:  1. Sequela of mild to moderate congestion. Follow-up with plain imaging. CT   All Micro Results     None              DIAGNOSIS AND PLAN  Patient Active Problem List   Diagnosis Code    Severe obesity with body mass index (BMI) of 35.0 to 39.9 with serious comorbidity (HCC) E66.01    Discitis of thoracic region M46.44     1. Discitis of thoracic region  2. Severe obesity with body mass index (BMI) of 35.0 to 39.9 with serious comorbidity (HCC)  3. Candida parapsilosis infection  4. IVDU (intravenous drug user)  5. [facility]adone use ([facility]  6. History of hepatitis C  7. 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 Patient not significantly improving with ceftriaxone and daptomycin. The patient completed daptomycin and ceftriaxone from 11/15/18 to 12/11/18 (+-4 weeks), followed by ambisome from 12/13/18 to 12/17/18. Stopped due to increased creatinine to 1.85. Followed by anidalofungin 100 mg IV daily until Sigifredo 10, 2019 (4 weeks), changed to fluconazol 1/11/19 despite interaction with Methadone, due to patient not improving on anidalofungin. MRI on 1/10/19 reports: Worsening discitis/osteomyelitis process at T7-T8, with circumferential epidural enhancement and posterior soft tissue enhancement  No focal abscess. Also developing fragmentation and compression fracture in T7,  followed by T8, with retropulsion of T7 fragment into the central canal, causing central stenosis and mild cord displacement but no significant cord compression or edema. Osteomyelitis now also involves the posterior elements of T7 and T8,  including spinous processes. Developing thoracic kyphosis. Started on 1/11/19 on ertapenem empirically given progression of disease. Patient was clinically improving, CRP was 0.4 (1/14/19), however, CRP increased to 8.7 (1/28/19) and hsCRP 98.66 (2/5/19) and patient also reported worsening back pain, so a new MRI of her back was ordered 2/5/19, and daptomycin was added empirically to ertapenem and fluconazol on 2/6/19 to continue with both antibiotics for 4 weeks. Completed Ertapenem on 3/6/19 for a total of 8 weeks. Daptomycin for 4 weeks started 2/6/19 until 3/6/19. MRI thoracic spine done on 3/26/19 showed:  1. Interval decrease in extent of soft tissue inflammation centered around T7-8  discitis osteomyelitis with decrease epidural inflammation. No new fluid  collection. However interval increase of destructive osseous changes involving  T7 and T8 vertebral bodies and extension of inflammation to the inferior left T6  vertebral body without destruction. Interval increase of junctional kyphosis at  T7-8 with moderate cord compression at this level. No myelopathy. Also  associated compromise of the neural foramina. Plan  C/w fluconazol 400 mg PO daily for a total of approx 6-12 months, depending on clinical progress and tolerance. Started 1/11/19- July 2019 min with Monthly EKGs. Will repeat CRP today, increased on last visit, even though patient has remained clinically stable with improvement of symptoms. If persistently elevated may need repeat imaging and further work up. Repeat EKG, CBC and CMP today, on fluconazol therapy and methadone therapy. Patient given requested note from methadone clinic to allow mouth swab test for drug screening instead of urinary sample, as it is difficult for her given her back pain, to collect urine samples in the presence of chaperone.    Referred to outside infectious disease specialist for continuity of care after I leave, given that I will no longer be in this practice after the end of June 2019. Follow-up and Dispositions    · Return in about 1 month (around 6/4/2019). Brenda King MD

## 2019-05-08 LAB
ALBUMIN SERPL-MCNC: 4.3 G/DL (ref 3.5–5.5)
ALBUMIN/GLOB SERPL: 1.3 {RATIO} (ref 1.2–2.2)
ALP SERPL-CCNC: 114 IU/L (ref 39–117)
ALT SERPL-CCNC: 24 IU/L (ref 0–32)
AST SERPL-CCNC: 30 IU/L (ref 0–40)
BASOPHILS # BLD AUTO: 0 X10E3/UL (ref 0–0.2)
BASOPHILS NFR BLD AUTO: 0 %
BILIRUB SERPL-MCNC: 0.3 MG/DL (ref 0–1.2)
BUN SERPL-MCNC: 11 MG/DL (ref 6–24)
BUN/CREAT SERPL: 14 (ref 9–23)
CALCIUM SERPL-MCNC: 9.5 MG/DL (ref 8.7–10.2)
CHLORIDE SERPL-SCNC: 94 MMOL/L (ref 96–106)
CO2 SERPL-SCNC: 27 MMOL/L (ref 20–29)
CREAT SERPL-MCNC: 0.77 MG/DL (ref 0.57–1)
CRP SERPL-MCNC: 21.4 MG/L (ref 0–4.9)
EOSINOPHIL # BLD AUTO: 0.2 X10E3/UL (ref 0–0.4)
EOSINOPHIL NFR BLD AUTO: 3 %
ERYTHROCYTE [DISTWIDTH] IN BLOOD BY AUTOMATED COUNT: 18 % (ref 12.3–15.4)
GLOBULIN SER CALC-MCNC: 3.3 G/DL (ref 1.5–4.5)
GLUCOSE SERPL-MCNC: 137 MG/DL (ref 65–99)
HCT VFR BLD AUTO: 39.9 % (ref 34–46.6)
HGB BLD-MCNC: 13.1 G/DL (ref 11.1–15.9)
IMM GRANULOCYTES # BLD AUTO: 0 X10E3/UL (ref 0–0.1)
IMM GRANULOCYTES NFR BLD AUTO: 0 %
LYMPHOCYTES # BLD AUTO: 1.7 X10E3/UL (ref 0.7–3.1)
LYMPHOCYTES NFR BLD AUTO: 20 %
MCH RBC QN AUTO: 29.2 PG (ref 26.6–33)
MCHC RBC AUTO-ENTMCNC: 32.8 G/DL (ref 31.5–35.7)
MCV RBC AUTO: 89 FL (ref 79–97)
MONOCYTES # BLD AUTO: 0.5 X10E3/UL (ref 0.1–0.9)
MONOCYTES NFR BLD AUTO: 6 %
NEUTROPHILS # BLD AUTO: 5.7 X10E3/UL (ref 1.4–7)
NEUTROPHILS NFR BLD AUTO: 71 %
PLATELET # BLD AUTO: 310 X10E3/UL (ref 150–379)
POTASSIUM SERPL-SCNC: 3.9 MMOL/L (ref 3.5–5.2)
PROT SERPL-MCNC: 7.6 G/DL (ref 6–8.5)
RBC # BLD AUTO: 4.48 X10E6/UL (ref 3.77–5.28)
SODIUM SERPL-SCNC: 135 MMOL/L (ref 134–144)
WBC # BLD AUTO: 8.2 X10E3/UL (ref 3.4–10.8)

## 2019-05-13 ENCOUNTER — TELEPHONE (OUTPATIENT)
Dept: INTERNAL MEDICINE CLINIC | Age: 59
End: 2019-05-13

## 2019-05-13 DIAGNOSIS — M54.10 BACK PAIN WITH RADICULOPATHY: ICD-10-CM

## 2019-05-13 DIAGNOSIS — F19.90 IVDU (INTRAVENOUS DRUG USER): ICD-10-CM

## 2019-05-13 DIAGNOSIS — M86.9 OSTEOMYELITIS, UNSPECIFIED SITE, UNSPECIFIED TYPE (HCC): ICD-10-CM

## 2019-05-13 DIAGNOSIS — B37.9 CANDIDA PARAPSILOSIS INFECTION: Primary | ICD-10-CM

## 2019-05-13 DIAGNOSIS — M46.44 DISCITIS OF THORACIC REGION: ICD-10-CM

## 2019-05-13 NOTE — TELEPHONE ENCOUNTER
Pt calling asking to speak to Lidya Sloan. Says she needs to come to office for ekg tomorrow. Wants to come in at 5 tomorrow but wants to discuss wth rhoda.

## 2019-05-13 NOTE — PROGRESS NOTES
Increasing CRP. The patient also correlates increasing pain in her thoracic back for the past couple of days since last visit, denies any fevers, chills, malaise, CP, SOB, dizziness, HA, urinary frequency, dysuria, joint pain or swelling of other areas. Recommended to repeat Thoracic MRI at this time to assess status of fungal osteomyelitis on fluconazol therapy. Will obtain baseline echo, rule out IE, given indolent infection with candida parapsilosis.      Jami Johnson MD

## 2019-05-14 ENCOUNTER — CLINICAL SUPPORT (OUTPATIENT)
Dept: INTERNAL MEDICINE CLINIC | Age: 59
End: 2019-05-14

## 2019-05-14 ENCOUNTER — TELEPHONE (OUTPATIENT)
Dept: INTERNAL MEDICINE CLINIC | Age: 59
End: 2019-05-14

## 2019-05-14 DIAGNOSIS — Z79.899 MEDICATION MANAGEMENT: Primary | ICD-10-CM

## 2019-05-21 ENCOUNTER — HOSPITAL ENCOUNTER (OUTPATIENT)
Age: 59
Discharge: HOME | End: 2019-05-21
Attending: INTERNAL MEDICINE
Payer: MEDICAID

## 2019-05-21 ENCOUNTER — TELEPHONE (OUTPATIENT)
Dept: INTERNAL MEDICINE CLINIC | Age: 59
End: 2019-05-21

## 2019-05-21 ENCOUNTER — HOSPITAL ENCOUNTER (OUTPATIENT)
Dept: NON INVASIVE DIAGNOSTICS | Age: 59
Discharge: HOME | End: 2019-05-21
Attending: INTERNAL MEDICINE
Payer: MEDICAID

## 2019-05-21 VITALS
SYSTOLIC BLOOD PRESSURE: 126 MMHG | HEIGHT: 64 IN | DIASTOLIC BLOOD PRESSURE: 82 MMHG | BODY MASS INDEX: 36.54 KG/M2 | WEIGHT: 214 LBS

## 2019-05-21 DIAGNOSIS — B37.9 CANDIDA PARAPSILOSIS INFECTION: ICD-10-CM

## 2019-05-21 DIAGNOSIS — R79.82 ELEVATED C-REACTIVE PROTEIN (CRP): Primary | ICD-10-CM

## 2019-05-21 DIAGNOSIS — M46.44 DISCITIS OF THORACIC REGION: ICD-10-CM

## 2019-05-21 DIAGNOSIS — F19.90 IVDU (INTRAVENOUS DRUG USER): ICD-10-CM

## 2019-05-21 DIAGNOSIS — M86.9 OSTEOMYELITIS, UNSPECIFIED SITE, UNSPECIFIED TYPE (HCC): ICD-10-CM

## 2019-05-21 DIAGNOSIS — M54.10 BACK PAIN WITH RADICULOPATHY: ICD-10-CM

## 2019-05-21 PROCEDURE — 74011250636 HC RX REV CODE- 250/636: Performed by: INTERNAL MEDICINE

## 2019-05-21 PROCEDURE — 72157 MRI CHEST SPINE W/O & W/DYE: CPT

## 2019-05-21 PROCEDURE — A9575 INJ GADOTERATE MEGLUMI 0.1ML: HCPCS | Performed by: INTERNAL MEDICINE

## 2019-05-21 PROCEDURE — C8929 TTE W OR WO FOL WCON,DOPPLER: HCPCS

## 2019-05-21 PROCEDURE — 74011636320 HC RX REV CODE- 636/320: Performed by: INTERNAL MEDICINE

## 2019-05-21 RX ADMIN — GADOTERATE MEGLUMINE 20 ML: 376.9 INJECTION INTRAVENOUS at 07:00

## 2019-05-21 RX ADMIN — PERFLUTREN 2 ML: 6.52 INJECTION, SUSPENSION INTRAVENOUS at 08:43

## 2019-05-21 NOTE — TELEPHONE ENCOUNTER
Spoke with patient, she had her MRI today and her echocardiogram done. She is a little anxious over the MRI results, she said her pain is worsening.  I told her we would call once they are finalized (still in process right now)

## 2019-05-22 ENCOUNTER — HOSPITAL ENCOUNTER (OUTPATIENT)
Dept: LAB | Age: 59
Discharge: HOME | End: 2019-05-22

## 2019-05-22 LAB
ECHO AO ROOT DIAM: 3.68 CM
ECHO AV AREA PEAK VELOCITY: 3.4 CM2
ECHO AV AREA VTI: 3.8 CM2
ECHO AV AREA/BSA PEAK VELOCITY: 1.6 CM2/M2
ECHO AV AREA/BSA VTI: 1.8 CM2/M2
ECHO AV MEAN GRADIENT: 3.2 MMHG
ECHO AV PEAK GRADIENT: 5.9 MMHG
ECHO AV PEAK VELOCITY: 121.21 CM/S
ECHO AV VTI: 20.62 CM
ECHO LV E' LATERAL VELOCITY: 10.39 CM/S
ECHO LV E' SEPTAL VELOCITY: 9.62 CM/S
ECHO LV INTERNAL DIMENSION DIASTOLIC: 4.57 CM (ref 3.9–5.3)
ECHO LV INTERNAL DIMENSION SYSTOLIC: 2.81 CM
ECHO LV IVSD: 1.21 CM (ref 0.6–0.9)
ECHO LV MASS 2D: 256.3 G (ref 67–162)
ECHO LV MASS INDEX 2D: 127.3 G/M2 (ref 43–95)
ECHO LV POSTERIOR WALL DIASTOLIC: 1.3 CM (ref 0.6–0.9)
ECHO LVOT DIAM: 2.24 CM
ECHO LVOT PEAK GRADIENT: 4.4 MMHG
ECHO LVOT PEAK VELOCITY: 105.37 CM/S
ECHO LVOT VTI: 20.11 CM
ECHO MV A VELOCITY: 84.12 CM/S
ECHO MV E DECELERATION TIME (DT): 243.5 MS
ECHO MV E VELOCITY: 70.19 CM/S
ECHO MV E/A RATIO: 0.83
ECHO MV E/E' LATERAL: 6.76
ECHO MV E/E' RATIO (AVERAGED): 7.03
ECHO MV E/E' SEPTAL: 7.3
ECHO PVEIN A DURATION: 96.4 MS
ECHO PVEIN A VELOCITY: 26.17 CM/S
ECHO TV REGURGITANT MAX VELOCITY: 281.41 CM/S
ECHO TV REGURGITANT PEAK GRADIENT: 31.7 MMHG
XX-LABCORP SPECIMEN COL,LCBCF: NORMAL

## 2019-05-22 PROCEDURE — 99001 SPECIMEN HANDLING PT-LAB: CPT

## 2019-05-23 LAB
ALBUMIN SERPL-MCNC: 4 G/DL (ref 3.5–5.5)
ALBUMIN/GLOB SERPL: 1 {RATIO} (ref 1.2–2.2)
ALP SERPL-CCNC: 126 IU/L (ref 39–117)
ALT SERPL-CCNC: 24 IU/L (ref 0–32)
AST SERPL-CCNC: 28 IU/L (ref 0–40)
BASOPHILS # BLD AUTO: 0 X10E3/UL (ref 0–0.2)
BASOPHILS NFR BLD AUTO: 0 %
BILIRUB SERPL-MCNC: <0.2 MG/DL (ref 0–1.2)
BUN SERPL-MCNC: 9 MG/DL (ref 6–24)
BUN/CREAT SERPL: 13 (ref 9–23)
CALCIUM SERPL-MCNC: 9.9 MG/DL (ref 8.7–10.2)
CHLORIDE SERPL-SCNC: 98 MMOL/L (ref 96–106)
CO2 SERPL-SCNC: 22 MMOL/L (ref 20–29)
CREAT SERPL-MCNC: 0.72 MG/DL (ref 0.57–1)
EOSINOPHIL # BLD AUTO: 0.2 X10E3/UL (ref 0–0.4)
EOSINOPHIL NFR BLD AUTO: 3 %
ERYTHROCYTE [DISTWIDTH] IN BLOOD BY AUTOMATED COUNT: 17.6 % (ref 12.3–15.4)
ERYTHROCYTE [SEDIMENTATION RATE] IN BLOOD BY WESTERGREN METHOD: NORMAL MM/HR
GLOBULIN SER CALC-MCNC: 3.9 G/DL (ref 1.5–4.5)
GLUCOSE SERPL-MCNC: 105 MG/DL (ref 65–99)
HCT VFR BLD AUTO: 38.2 % (ref 34–46.6)
HGB BLD-MCNC: 12.7 G/DL (ref 11.1–15.9)
IMM GRANULOCYTES # BLD AUTO: 0 X10E3/UL (ref 0–0.1)
IMM GRANULOCYTES NFR BLD AUTO: 0 %
LACTATE SERPL-MCNC: 9.6 MG/DL (ref 4.8–25.7)
LYMPHOCYTES # BLD AUTO: 1.8 X10E3/UL (ref 0.7–3.1)
LYMPHOCYTES NFR BLD AUTO: 26 %
MCH RBC QN AUTO: 29.3 PG (ref 26.6–33)
MCHC RBC AUTO-ENTMCNC: 33.2 G/DL (ref 31.5–35.7)
MCV RBC AUTO: 88 FL (ref 79–97)
MONOCYTES # BLD AUTO: 0.5 X10E3/UL (ref 0.1–0.9)
MONOCYTES NFR BLD AUTO: 7 %
NEUTROPHILS # BLD AUTO: 4.6 X10E3/UL (ref 1.4–7)
NEUTROPHILS NFR BLD AUTO: 64 %
PLATELET # BLD AUTO: 288 X10E3/UL (ref 150–450)
POTASSIUM SERPL-SCNC: 4.3 MMOL/L (ref 3.5–5.2)
PROCALCITONIN SERPL-MCNC: 0.03 NG/ML (ref 0–0.08)
PROT SERPL-MCNC: 7.9 G/DL (ref 6–8.5)
RBC # BLD AUTO: 4.34 X10E6/UL (ref 3.77–5.28)
SODIUM SERPL-SCNC: 137 MMOL/L (ref 134–144)
WBC # BLD AUTO: 7.2 X10E3/UL (ref 3.4–10.8)

## 2019-06-04 ENCOUNTER — OFFICE VISIT (OUTPATIENT)
Dept: INTERNAL MEDICINE CLINIC | Age: 59
End: 2019-06-04

## 2019-06-04 ENCOUNTER — OFFICE VISIT (OUTPATIENT)
Dept: FAMILY MEDICINE CLINIC | Age: 59
End: 2019-06-04

## 2019-06-04 VITALS
SYSTOLIC BLOOD PRESSURE: 108 MMHG | RESPIRATION RATE: 18 BRPM | TEMPERATURE: 98.1 F | HEIGHT: 64 IN | BODY MASS INDEX: 37.32 KG/M2 | DIASTOLIC BLOOD PRESSURE: 68 MMHG | WEIGHT: 218.6 LBS | HEART RATE: 88 BPM

## 2019-06-04 VITALS
HEART RATE: 78 BPM | SYSTOLIC BLOOD PRESSURE: 124 MMHG | BODY MASS INDEX: 37.59 KG/M2 | RESPIRATION RATE: 18 BRPM | HEIGHT: 64 IN | OXYGEN SATURATION: 95 % | DIASTOLIC BLOOD PRESSURE: 70 MMHG | WEIGHT: 220.2 LBS | TEMPERATURE: 98.1 F

## 2019-06-04 DIAGNOSIS — F19.90 IVDU (INTRAVENOUS DRUG USER): ICD-10-CM

## 2019-06-04 DIAGNOSIS — B37.9 CANDIDA PARAPSILOSIS INFECTION: ICD-10-CM

## 2019-06-04 DIAGNOSIS — F33.9 EPISODE OF RECURRENT MAJOR DEPRESSIVE DISORDER, UNSPECIFIED DEPRESSION EPISODE SEVERITY (HCC): ICD-10-CM

## 2019-06-04 DIAGNOSIS — M46.44 DISCITIS OF THORACIC REGION: ICD-10-CM

## 2019-06-04 DIAGNOSIS — M86.9 OSTEOMYELITIS, UNSPECIFIED SITE, UNSPECIFIED TYPE (HCC): ICD-10-CM

## 2019-06-04 DIAGNOSIS — F11.90 METHADONE USE: ICD-10-CM

## 2019-06-04 DIAGNOSIS — Z86.19 HISTORY OF HEPATITIS C: ICD-10-CM

## 2019-06-04 DIAGNOSIS — L95.9 VASCULITIS OF SKIN: ICD-10-CM

## 2019-06-04 DIAGNOSIS — G89.29 CHRONIC BILATERAL THORACIC BACK PAIN: ICD-10-CM

## 2019-06-04 DIAGNOSIS — S22.000A COMPRESSION FRACTURE OF BODY OF THORACIC VERTEBRA (HCC): ICD-10-CM

## 2019-06-04 DIAGNOSIS — F33.9 EPISODE OF RECURRENT MAJOR DEPRESSIVE DISORDER, UNSPECIFIED DEPRESSION EPISODE SEVERITY (HCC): Primary | ICD-10-CM

## 2019-06-04 DIAGNOSIS — M54.10 BACK PAIN WITH RADICULOPATHY: ICD-10-CM

## 2019-06-04 DIAGNOSIS — M54.6 CHRONIC BILATERAL THORACIC BACK PAIN: ICD-10-CM

## 2019-06-04 DIAGNOSIS — I10 ESSENTIAL HYPERTENSION: ICD-10-CM

## 2019-06-04 DIAGNOSIS — R21 RASH/SKIN ERUPTION: ICD-10-CM

## 2019-06-04 DIAGNOSIS — M86.9 OSTEOMYELITIS, UNSPECIFIED SITE, UNSPECIFIED TYPE (HCC): Primary | ICD-10-CM

## 2019-06-04 RX ORDER — HYDROCHLOROTHIAZIDE 12.5 MG/1
TABLET ORAL
Qty: 30 TAB | Refills: 12 | Status: SHIPPED | OUTPATIENT
Start: 2019-06-04 | End: 2020-06-15

## 2019-06-04 RX ORDER — SERTRALINE HYDROCHLORIDE 100 MG/1
TABLET, FILM COATED ORAL
Qty: 60 TAB | Refills: 6 | Status: SHIPPED | OUTPATIENT
Start: 2019-06-04 | End: 2020-01-15

## 2019-06-04 RX ORDER — FLUCONAZOLE 200 MG/1
TABLET ORAL
COMMUNITY
Start: 2019-05-08 | End: 2019-06-04 | Stop reason: ALTCHOICE

## 2019-06-04 RX ORDER — SERTRALINE HYDROCHLORIDE 100 MG/1
TABLET, FILM COATED ORAL
Qty: 45 TAB | Refills: 6 | Status: CANCELLED | OUTPATIENT
Start: 2019-06-04

## 2019-06-04 NOTE — PROGRESS NOTES
HPI  Annie Ramirze is a 63 y.o. female  Chief Complaint   Patient presents with    Hypertension     follow up    Back Pain    Depression    Medication Refill     Reports she is eating more as she feels she is still a little depressed with everything that is going on with her back. Denies desire to hurt or harm self or others. Alan suicidal ideations. Denies being hurt or harmed. Denies illegal substance use. She reports she is going to the [facility] clinic and she is going to group therapy four times a week. Back pain is 5/10 today and she reports it feels like a ripping pain in her back. Reports she is due to see Dr. Tata Hussein today. Reports she has a rash on her lower extremities bilaterally. Reports the rash has been present since she started on the medications for her back in November. She denies that the rash itches. Past Medical History  Past Medical History:   Diagnosis Date    Depression     Hepatitis C     Heroin use disorder, moderate, in early remission ([address]     NOW on [facility]    Ill-defined condition     Chronic back pain       Surgical History  No past surgical history on file. Medications  Current Outpatient Medications   Medication Sig Dispense Refill    fluconazole (DIFLUCAN) 200 mg tablet       hydroCHLOROthiazide (HYDRODIURIL) 12.5 mg tablet TAKE ONE TABLET BY MOUTH DAILY 30 Tab 12    sertraline (ZOLOFT) 100 mg tablet Take two tablets by mouth daily. 60 Tab 6    gabapentin (NEURONTIN) 100 mg capsule Take 3 Caps by mouth three (3) times daily. Indications: Neuropathic Pain 90 Cap 3    potassium chloride (KLOR-CON) 10 mEq tablet Take 1 Tab by mouth daily. 30 Tab 3    docusate sodium (COLACE) 100 mg capsule Take 1 Cap by mouth two (2) times a day. 60 Cap 0    methadone HCl (METHADONE PO) Take 85 mg by mouth daily.  ketorolac (TORADOL) 10 mg tablet Take 1 Tab by mouth two (2) times daily as needed for Pain.  20 Tab 0    OTHER Daptomycin through [facility]/29/18 at [facility]- arranged by  1 Each 0       Allergies  No Known Allergies    Family History  Family History   Problem Relation Age of Onset    Hypertension Mother     High Cholesterol Mother     COPD Mother     Hypertension Father        Social History  Social History     Socioeconomic History    Marital status: SINGLE     Spouse name: Not on file    Number of children: Not on file    Years of education: Not on file    Highest education level: Not on file   Occupational History    Not on file   Social Needs    Financial resource strain: Not on file    Food insecurity:     Worry: Not on file     Inability: Not on file    Transportation needs:     Medical: Not on file     Non-medical: Not on file   Tobacco Use    Smoking status: Current Every Day Smoker     Packs/day: 0.25     Types: Cigarettes    Smokeless tobacco: Never Used   Substance and Sexual Activity    Alcohol use: No    Drug use: No     Comment: on methadone matinance program    Sexual activity: Never   Lifestyle    Physical activity:     Days per week: Not on file     Minutes per session: Not on file    Stress: Not on file   Relationships    Social connections:     Talks on phone: Not on file     Gets together: Not on file     Attends Zoroastrian service: Not on file     Active member of club or organization: Not on file     Attends meetings of clubs or organizations: Not on file     Relationship status: Not on file    Intimate partner violence:     Fear of current or ex partner: Not on file     Emotionally abused: Not on file     Physically abused: Not on file     Forced sexual activity: Not on file   Other Topics Concern    Not on file   Social History Narrative    Not on file     3 most recent [address] 6/4/2019   Little interest or pleasure in doing things More than half the days   Feeling down, depressed, irritable, or hopeless More than half the days   Total Score PHQ 2 4   Trouble falling or staying asleep, or sleeping too much Nearly every day   Feeling tired or having little energy Nearly every day   Poor appetite, weight loss, or overeating Nearly every day   Feeling bad about yourself - or that you are a failure or have let yourself or your family down Not at all   Trouble concentrating on things such as school, work, reading, or watching TV Not at all   Moving or speaking so slowly that other people could have noticed; or the opposite being so fidgety that others notice Not at all   Thoughts of being better off dead, or hurting yourself in some way Not at all   PHQ 9 Score 13   How difficult have these problems made it for you to do your work, take care of your home and get along with others Somewhat difficult     Problem List  Patient Active Problem List   Diagnosis Code    Severe obesity with body mass index (BMI) of 35.0 to 39.9 with serious comorbidity ([address] E66.01    Discitis of thoracic region M46.44       Review of Systems  Review of Systems   Respiratory: Negative for shortness of breath. Cardiovascular: Negative for chest pain and leg swelling. Musculoskeletal: Positive for back pain. Neurological: Negative for dizziness. Psychiatric/Behavioral: Positive for depression. Negative for suicidal ideas. Vital Signs  Vitals:    06/04/19 0749   BP: 108/68   Pulse: 88   Resp: 18   Temp: 98.1 °F (36.7 °C)   TempSrc: Oral   Weight: 218 lb 9.6 oz (99.2 kg)   Height: 5' 4\" (1.626 m)   PainSc:   5   PainLoc: Back       Physical Exam  Physical Exam   Constitutional: She is oriented to person, place, and time. HENT:   Mouth/Throat: Oropharynx is clear and moist.   Eyes: Pupils are equal, round, and reactive to light. Cardiovascular: Normal rate, regular rhythm and normal heart sounds. Pulmonary/Chest: Effort normal and breath sounds normal. No respiratory distress. Musculoskeletal: She exhibits edema (bilateral lower extremities +1 pitting left with a trace in right).    Neurological: She is alert and oriented to person, place, and time. Skin: Rash noted. Macular rash on lower extremities from the knees down to her feet. Vitals reviewed. Diagnostics  Orders Placed This Encounter    METABOLIC PANEL, COMPREHENSIVE     Standing Status:   Future     Standing Expiration Date:   6/4/2020    REFERRAL TO DERMATOLOGY     Referral Priority:   Routine     Referral Type:   Consultation     Referral Reason:   Specialty Services Required     Number of Visits Requested:   1    fluconazole (DIFLUCAN) 200 mg tablet    hydroCHLOROthiazide (HYDRODIURIL) 12.5 mg tablet     Sig: TAKE ONE TABLET BY MOUTH DAILY     Dispense:  30 Tab     Refill:  12    sertraline (ZOLOFT) 100 mg tablet     Sig: Take two tablets by mouth daily. Dispense:  60 Tab     Refill:  6       Results  Results for orders placed or performed during the hospital encounter of 05/22/19   [facility] SPECIMEN COL   Result Value Ref Range    [facility] SPEC[facility]. Specimens collected/sent to [facility]. Please direct inquiries to ([phone]). Assessment and Plan  Diagnoses and all orders for this visit:    1. Episode of recurrent major depressive disorder, unspecified depression episode severity (HCC)  -     sertraline (ZOLOFT) 100 mg tablet; Take two tablets by mouth daily.  -     METABOLIC PANEL, COMPREHENSIVE; Future    2. Essential hypertension  -     hydroCHLOROthiazide (HYDRODIURIL) 12.5 mg tablet; TAKE ONE TABLET BY MOUTH DAILY  -     METABOLIC PANEL, COMPREHENSIVE; Future    3. Rash/skin eruption  -     REFERRAL TO DERMATOLOGY    4. Methadone use ([address]    5. Chronic bilateral thoracic back pain    6. Discitis of thoracic region      Follow up with Dr. Tata Hussein at scheduled appointment today. Discussed the importance of monitoring her liver enzymes with the increase in Zoloft. She is to go for a CMP in one month. She is in agreement. Elevate lower extremities as tolerated. After care summary printed and reviewed with patient.  Plan reviewed with patient. Questions answered. Patient verbalized understanding of plan and is in agreement with plan. Patient to follow up in two months or earlier if symptoms worsen. Follow-up and Dispositions    · Return in about 2 months (around 8/4/2019), or if symptoms worsen or fail to improve.        Benji [place], FNP-C

## 2019-06-04 NOTE — PROGRESS NOTES
1. Have you been to the ER, urgent care clinic or hospitalized since your last visit? NO.     2. Have you seen or consulted any other health care providers outside of the [address] [place] [address] since your last visit (Include any pap smears or colon screening)? YES      Do you have an Advanced Directive? NO    Would you like information on Advanced Directives?  NO

## 2019-06-04 NOTE — PROGRESS NOTES
Arvid Abts presents today for   Chief Complaint   Patient presents with    Hypertension     follow up    Back Pain    Depression    Medication Refill       Arvid Abts preferred language for health care discussion is english/other. Is someone accompanying this pt? no    Is the patient using any DME equipment during [address]? no    Depression Screening:  3 most recent PHQ Screens 6/4/2019   Little interest or pleasure in doing things More than half the days   Feeling down, depressed, irritable, or hopeless More than half the days   Total Score PHQ 2 4   Trouble falling or staying asleep, or sleeping too much Nearly every day   Feeling tired or having little energy Nearly every day   Poor appetite, weight loss, or overeating Nearly every day   Feeling bad about yourself - or that you are a failure or have let yourself or your family down Not at all   Trouble concentrating on things such as school, work, reading, or watching TV Not at all   Moving or speaking so slowly that other people could have noticed; or the opposite being so fidgety that others notice Not at all   Thoughts of being better off dead, or hurting yourself in some way Not at all   PHQ 9 Score 13   How difficult have these problems made it for you to do your work, take care of your home and get along with others Somewhat difficult       Learning Assessment:  Learning Assessment 6/4/2019   PRIMARY LEARNER Patient   HIGHEST LEVEL OF EDUCATION - PRIMARY LEARNER  GRADUATED HIGH SCHOOL OR GED   BARRIERS PRIMARY LEARNER NONE   CO-LEARNER CAREGIVER No   PRIMARY LANGUAGE ENGLISH   LEARNER PREFERENCE PRIMARY LISTENING   ANSWERED BY self   RELATIONSHIP SELF       Abuse Screening:  Abuse Screening Questionnaire 6/4/2019   Do you ever feel afraid of your partner? N   Are you in a relationship with someone who physically or mentally threatens you? N   Is it safe for you to go home?  Y       Health Maintenance Due   Topic Date Due    Pneumococcal 0-64 years (1 of 1 - PPSV23) [birth year]/1967    DTaP/Tdap/Td series (1 - Tdap) 10/29/1982    PAP AKA CERVICAL CYTOLOGY  10/29/1982    Shingrix Vaccine Age 50> (1 of 2) 10/29/2011    FOBT Q 1 YEAR AGE 50-75  10/29/2011   . Health Maintenance reviewed and discussed and ordered per Provider. Hoang Gandara is updated on all     Coordination of Care:  1. Have you been to the ER, urgent care clinic since your last visit? Hospitalized since your last visit? no    2. Have you seen or consulted any other health care providers outside of the [address] [place] [address] since your last visit? Include any pap smears or colon screening. no      Advance Directive:  1. Do you have an advance directive in place? Patient Reply:no    2. If not, would you like material regarding how to put one in place?  Patient Reply: no

## 2019-06-04 NOTE — PROGRESS NOTES
MARIA EUGENIA Wade is a 63 y.o. 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 The patient apparently presented with progressively worse abdominal/thoracic back pain since around Oct, 2018, but no fever, chills, malaise, night sweats. No recent trauma, falls. Last IVDU a few months ago, unclear dates. She was seen in the ED on 11/7/18 and had a CTA to rule out PE, that showed multiple pulmonary nodules, suspected discitis at T7-8, hepatosteatosis and hepatomegaly with mild splenomegaly. Multiple nonspecific periportal slight enlarged nodules. She was discharged to continue outpatient work up. She returned to the ED on 11/1/18 due to persistent pain, associated with nausea and vomiting, an MRI of her thoracic spine was done on11/11/18 that showed significant soft tissue and osseous inflammation centered at the T7-8 disc level  and pronounced circumferential prevertebral and epidural inflammation centered at this level most suggestive of discitis osteomyelitis. No peripheral enhancing collection to suggest abscess. Mild compromise of the central canal at the level of T7-8. On 11/15/18 IR performs T7-8 biopsy. BC on 11/11/18 neg x , UC neg  Disc biopsy culture from 11/12/18  And 11/15/18: Candida parapsilosis. Bone cultures from 11/15/18 also grew MRSE. This results apparently were not available at the time of discharge. Fungal and AFB cultures neg from bone. The patient was initially treated with ceftriaxone and vancomycin. She was discharged on ceftriaxone and daptomycin to complete around 12/29/18 with out patient ID follow up for further recommendations.    The patient was seen again in the ED on 11/15/18 for persistent severe pain and was recommended to continue with treatment as recommended from discharge. The patient completed daptomycin and ceftriaxone from 11/15/18 to 12/11/18 (+-4 weeks), followed by ambisome from 12/13/18 to 12/17/18. Stopped due to increased creatinine to 1.85. Followed by anidalofungin 100 mg IV daily until Sigifredo 10, 2019 (4 weeks), changed to fluconazol 1/11/19 despite interaction with Methadone, due to patient not improving on anidalofungin. MRI on 1/10/19 reports: Worsening discitis/osteomyelitis process at T7-T8, with circumferential epidural enhancement and posterior soft tissue enhancement  No focal abscess. Also developing fragmentation and compression fracture in T7,  followed by T8, with retropulsion of T7 fragment into the central canal, causing central stenosis and mild cord displacement but no significant cord compression or edema. Osteomyelitis now also involves the posterior elements of T7 and T8,  including spinous processes. Developing thoracic kyphosis. Started on 1/11/19 on ertapenem empirically given progression of disease. Patient was clinically improving, CRP was 0.4 (1/14/19), however, CRP increased to 8.7 (1/28/19) and hsCRP 98.66 (2/5/19) and patient also reported worsening back pain, so a new MRI of her back was ordered 2/5/19, and daptomycin was added empirically to ertapenem and fluconazol on 2/6/19 to continue with both antibiotics for 4 weeks. Completed Ertapenem on 3/6/19 for a total of 8 weeks. Daptomycin for 4 weeks started 2/6/19 until 3/6/19. MRI thoracic spine done on 3/26/19 showed:  1. Interval decrease in extent of soft tissue inflammation centered around T7-8 discitis osteomyelitis with decrease epidural inflammation. No new fluidcollection. However interval increase of destructive osseous changes involving T7 and T8 vertebral bodies and extension of inflammation to the inferior left T6  vertebral body without destruction.  Interval increase of junctional kyphosis at T7-8 with moderate cord compression at this level. No myelopathy. Also associated compromise of the neural foramina. The patient was told to follow with Dr. Tanner Rea- spine surgery about chronic residual pain, follow up appreciated from visit on 4/30/19, when she reported pain on 3/10. After evaluating patient, labs and recent imaging he did not recommend further interventions at this point and to continue antimicrobial management per ID. The patient had been doing well with minimal pain reported of about 2/10-3/10, until around early May 2019, she said her pain started to increase, no other symptoms such as fever, chills, malaise, fatigue, nausea, vomiting, diarrhea, chest pain, palpitations, dizziness. She reported worsening pain at the level of her thoracic back on paraverebral area, radiated into her chest on both sides, shooting like. She has been having a rash on her scapular area, that looks like telangiectasias, non tender or pruritic. Today she reports for the past few weeks, worsening rash in legs, macular, red, she is going to see a dermatologist as she is concerned about a \"drug rash\", she denies any insect bites, injuries, any new detergents, skin products. She has been under a lot of stress at home, taking care of her mother. Reports compliance with fluconazol EKGs have been stable, no QTc prolongation with high dose fluconazol and methadone interaction. Because we noticed her CRP up to 17.6 (4/10/19) from 2.2 (2/25/19), and more recently CRP 21.4 (5/7), we ordered a baseline echo- done 5/21/19 with no evidence of IE, mild LVH EF 60%, mild TR, mild aortic root dilation 3.7 cm. And we ordered a new MRI of her thoracic spine done 5/21/19 that reported: More edema in T6, T9, posterior elements, with similar severe compression deformity of T8 and destruction of T7 as described. Kyphosis and retropulsion of T7-T8 with central stenosis  but no cord compression, stable.  No developing abscess. No abnormal cord lesion or enhancement. Labs from 5/21/19 Lactic acid 9.6 (WNR), procalcitonin 0.03 (WNR), Cr 0.72, LFTs WNR, WBC: 7.2, stable H/H, normal platelets. ROS  As above included in HPI. Otherwise 11 point review of systems negative including constitutional, skin, HENT, eyes, respiratory, cardiovascular, gastrointestinal, genitourinary, musculoskeletal, endocrine, hematologic, allergy, and neurologic. Past Medical History  Past Medical History:   Diagnosis Date    Depression     Hepatitis C     Heroin use disorder, moderate, in early remission ([facility] [address]     [facility] on [facility]    Ill-defined condition     Chronic back pain     History reviewed. No pertinent surgical history. Family History  Family History   Problem Relation Age of Onset    Hypertension Mother     High Cholesterol Mother     COPD Mother     Hypertension Father        Social History  She  reports that she has been smoking cigarettes. She has been smoking about 0.25 packs per day. She has never used smokeless tobacco.   Social History     Substance and Sexual Activity   Alcohol Use No       Immunization History    There is no immunization history on file for this patient. Allergies  No Known Allergies    Medications  Current Outpatient Medications   Medication Sig    hydroCHLOROthiazide (HYDRODIURIL) 12.5 mg tablet TAKE ONE TABLET BY MOUTH DAILY    sertraline (ZOLOFT) 100 mg tablet Take two tablets by mouth daily.  gabapentin (NEURONTIN) 100 mg capsule Take 3 Caps by mouth three (3) times daily. Indications: Neuropathic Pain    potassium chloride (KLOR-CON) 10 mEq tablet Take 1 Tab by mouth daily.  docusate sodium (COLACE) 100 mg capsule Take 1 Cap by mouth two (2) times a day.  methadone HCl (METHADONE PO) Take 85 mg by mouth daily.  ketorolac (TORADOL) 10 mg tablet Take 1 Tab by mouth two (2) times daily as needed for Pain.      No current facility-administered medications for this visit. Visit Vitals  /70 (BP 1 Location: Left arm, BP Patient Position: Sitting)   Pulse 78   Temp 98.1 °F (36.7 °C)   Resp 18   Ht 5' 4\" (1.626 m)   Wt 220 lb 3.2 oz (99.9 kg)   SpO2 95%   BMI 37.80 kg/m²     Body mass index is 37.8 kg/m². Physical Exam   Constitutional: She is oriented to person, place, and time. No distress (from pain, looks uncomfortable but non toxic). HENT:   Head: Normocephalic and atraumatic. Neck: Neck supple. Cardiovascular: Normal rate and regular rhythm. Pulmonary/Chest: Effort normal and breath sounds normal.   Abdominal: Soft. Musculoskeletal: She exhibits no edema. Neurological: She is alert and oriented to person, place, and time. Skin: Skin is warm. Telangiectasia around scapular area, unchanged, mild local edema and erythema, but no local tenderness or warmth. Legs with red reticular macular rash, seemingly raised and superficial, non tender, no local warmth. Nursing note and vitals reviewed. [address] Outpatient Visit on 05/22/2019   Component Date Value Ref Range Status    [facility] SPEC[facility]. 05/22/2019 Specimens collected/sent to [facility] [employer]. Please direct inquiries to ([phone]).     Final   Hospital Outpatient Visit on 05/21/2019   Component Date Value Ref Range Status    LV E' Lateral Velocity 05/21/2019 10.39  cm/s Final    LV E' Septal Velocity 05/21/2019 9.62  cm/s Final    Ao Root D 05/21/2019 3.68  cm Final    Aortic Valve Systolic Peak Velocity 81/91/0038 121.21  cm/s Final    AoV VTI 05/21/2019 20.62  cm Final    Aortic Valve Area by Continuity of* 05/21/2019 3.4  cm2 Final    Aortic Valve Area by Continuity of* 05/21/2019 3.8  cm2 Final    AoV PG 05/21/2019 5.9  mmHg Final    LVIDd 05/21/2019 4.57  3.9 - 5.3 cm Final    LVPWd 05/21/2019 1.30* 0.6 - 0.9 cm Final    LVIDs 05/21/2019 2.81  cm Final    IVSd 05/21/2019 1.21* 0.6 - 0.9 cm Final    LVOT d 05/21/2019 2.24  cm Final    LVOT Peak Velocity 05/21/2019 105.37  cm/s Final    LVOT Peak Gradient 05/21/2019 4.4  mmHg Final    LVOT VTI 05/21/2019 20.11  cm Final    MV A Darnell 05/21/2019 84.12  cm/s Final    MV E Darnell 05/21/2019 70.19  cm/s Final    MV E/A 05/21/2019 0.83   Final    Aortic Valve Systolic Mean Gradient 31/44/3164 3.2  mmHg Final    LV Mass AL 05/21/2019 256.3* 67 - 162 g Final    LV Mass AL Index 05/21/2019 127.3* 43 - 95 g/m2 Final    E/E' lateral 05/21/2019 6.76   Final    E/E' septal 05/21/2019 7.30   Final    E/E' ratio (averaged) 05/21/2019 7.03   Final    Mitral Valve E Wave Deceleration T* 05/21/2019 243.5  ms Final    Triscuspid Valve Regurgitation Pea* 05/21/2019 31.7  mmHg Final    Pulmonary Vein \"A\" Wave Velocity 05/21/2019 26.17  cm/s Final    P Vein A Dur 05/21/2019 96.4  ms Final    TR Max Velocity 05/21/2019 281.41  cm/s Final    KOLTON/BSA Pk Darnell 05/21/2019 1.6  cm2/m2 Final    KOLTON/BSA VTI 05/21/2019 1.8  cm2/m2 Final   Orders Only on 05/21/2019   Component Date Value Ref Range Status    WBC 05/21/2019 7.2  3.4 - 10.8 x10E3/uL Final    RBC 05/21/2019 4.34  3.77 - 5.28 x10E6/uL Final    HGB 05/21/2019 12.7  11.1 - 15.9 g/dL Final    HCT 05/21/2019 38.2  34.0 - 46.6 % Final    MCV 05/21/2019 88  79 - 97 fL Final    MCH 05/21/2019 29.3  26.6 - 33.0 pg Final    MCHC 05/21/2019 33.2  31.5 - 35.7 g/dL Final    RDW 05/21/2019 17.6* 12.3 - 15.4 % Final    PLATELET 41/08/2570 412  150 - 450 x10E3/uL Final    NEUTROPHILS 05/21/2019 64  Not Estab. % Final    Lymphocytes 05/21/2019 26  Not Estab. % Final    MONOCYTES 05/21/2019 7  Not Estab. % Final    EOSINOPHILS 05/21/2019 3  Not Estab. % Final    BASOPHILS 05/21/2019 0  Not Estab. % Final    ABS. NEUTROPHILS 05/21/2019 4.6  1.4 - 7.0 x10E3/uL Final    Abs Lymphocytes 05/21/2019 1.8  0.7 - 3.1 x10E3/uL Final    ABS. MONOCYTES 05/21/2019 0.5  0.1 - 0.9 x10E3/uL Final    ABS. EOSINOPHILS 05/21/2019 0.2  0.0 - 0.4 x10E3/uL Final    ABS.  BASOPHILS 05/21/2019 0.0  0.0 - 0.2 x10E3/uL Final    IMMATURE GRANULOCYTES 05/21/2019 0  Not Estab. % Final    ABS. IMM. GRANS. 05/21/2019 0.0  0.0 - 0.1 x10E3/uL Final    Glucose 05/21/2019 105* 65 - 99 mg/dL Final    BUN 05/21/2019 9  6 - 24 mg/dL Final    Creatinine 05/21/2019 0.72  0.57 - 1.00 mg/dL Final    GFR est non-AA 05/21/2019 93  >59 mL/min/1.73 Final    GFR est AA 05/21/2019 108  >59 mL/min/1.73 Final    BUN/Creatinine ratio 05/21/2019 13  9 - 23 Final    Sodium 05/21/2019 137  134 - 144 mmol/L Final    Potassium 05/21/2019 4.3  3.5 - 5.2 mmol/L Final    Chloride 05/21/2019 98  96 - 106 mmol/L Final    CO2 05/21/2019 22  20 - 29 mmol/L Final    Calcium 05/21/2019 9.9  8.7 - 10.2 mg/dL Final    Protein, total 05/21/2019 7.9  6.0 - 8.5 g/dL Final    Albumin 05/21/2019 4.0  3.5 - 5.5 g/dL Final    GLOBULIN, TOTAL 05/21/2019 3.9  1.5 - 4.5 g/dL Final    A-G Ratio 05/21/2019 1.0* 1.2 - 2.2 Final    Bilirubin, total 05/21/2019 <0.2  0.0 - 1.2 mg/dL Final    Alk.  phosphatase 05/21/2019 126* 39 - 117 IU/L Final    AST (SGOT) 05/21/2019 28  0 - 40 IU/L Final    ALT (SGPT) 05/21/2019 24  0 - 32 IU/L Final    PROCALCITONIN 05/21/2019 0.03  0.00 - 0.08 ng/mL Final    Sed rate (ESR) 05/21/2019 CANCELED  mm/hr Final-Edited    Lactic Acid, Plasma 05/21/2019 9.6  4.8 - 25.7 mg/dL Final   Office Visit on 05/07/2019   Component Date Value Ref Range Status    Glucose 05/07/2019 137* 65 - 99 mg/dL Final    BUN 05/07/2019 11  6 - 24 mg/dL Final    Creatinine 05/07/2019 0.77  0.57 - 1.00 mg/dL Final    GFR est non-AA 05/07/2019 86  >59 mL/min/1.73 Final    GFR est AA 05/07/2019 99  >59 mL/min/1.73 Final    BUN/Creatinine ratio 05/07/2019 14  9 - 23 Final    Sodium 05/07/2019 135  134 - 144 mmol/L Final    Potassium 05/07/2019 3.9  3.5 - 5.2 mmol/L Final    Chloride 05/07/2019 94* 96 - 106 mmol/L Final    CO2 05/07/2019 27  20 - 29 mmol/L Final    Calcium 05/07/2019 9.5  8.7 - 10.2 mg/dL Final    Protein, total 05/07/2019 7.6  6.0 - 8.5 g/dL Final    Albumin 05/07/2019 4.3  3.5 - 5.5 g/dL Final    GLOBULIN, TOTAL 05/07/2019 3.3  1.5 - 4.5 g/dL Final    A-G Ratio 05/07/2019 1.3  1.2 - 2.2 Final    Bilirubin, total 05/07/2019 0.3  0.0 - 1.2 mg/dL Final    Alk. phosphatase 05/07/2019 114  39 - 117 IU/L Final    AST (SGOT) 05/07/2019 30  0 - 40 IU/L Final    ALT (SGPT) 05/07/2019 24  0 - 32 IU/L Final    WBC 05/07/2019 8.2  3.4 - 10.8 x10E3/uL Final    RBC 05/07/2019 4.48  3.77 - 5.28 x10E6/uL Final    HGB 05/07/2019 13.1  11.1 - 15.9 g/dL Final    HCT 05/07/2019 39.9  34.0 - 46.6 % Final    MCV 05/07/2019 89  79 - 97 fL Final    MCH 05/07/2019 29.2  26.6 - 33.0 pg Final    MCHC 05/07/2019 32.8  31.5 - 35.7 g/dL Final    RDW 05/07/2019 18.0* 12.3 - 15.4 % Final    PLATELET 55/09/8239 713  150 - 379 x10E3/uL Final    NEUTROPHILS 05/07/2019 71  Not Estab. % Final    Lymphocytes 05/07/2019 20  Not Estab. % Final    MONOCYTES 05/07/2019 6  Not Estab. % Final    EOSINOPHILS 05/07/2019 3  Not Estab. % Final    BASOPHILS 05/07/2019 0  Not Estab. % Final    ABS. NEUTROPHILS 05/07/2019 5.7  1.4 - 7.0 x10E3/uL Final    Abs Lymphocytes 05/07/2019 1.7  0.7 - 3.1 x10E3/uL Final    ABS. MONOCYTES 05/07/2019 0.5  0.1 - 0.9 x10E3/uL Final    ABS. EOSINOPHILS 05/07/2019 0.2  0.0 - 0.4 x10E3/uL Final    ABS. BASOPHILS 05/07/2019 0.0  0.0 - 0.2 x10E3/uL Final    IMMATURE GRANULOCYTES 05/07/2019 0  Not Estab. % Final    ABS. IMM. GRANS. 05/07/2019 0.0  0.0 - 0.1 x10E3/uL Final    C-Reactive Protein, Qt 05/07/2019 21.4* 0.0 - 4.9 mg/L Final         CT Results (most recent):  Results from [place] encounter on 11/11/18   CT ABD WO CONT    Narrative CT abdomen without IV contrast     INDICATION: CT of T7-T8 discitis planning. TECHNIQUE: Serial axial images were obtained of the abdomen without intravenous  contrast. Images are performed prone for the intended CT procedure.  Decision was  then made by Dr. Burnell Collet to perform the procedure fluoroscopically in IR. All CT scans are performed using dose optimization techniques as appropriate to  the performed exam including the following: Automated exposure control,  adjustment of mA and/or kV according to patient size, and use of iterative  reconstructive technique. COMPARISON: CTA chest 11/7/2018. MRI thoracic spine 11/11/2018. FINDINGS:    Hepatic steatosis. Mild hepatosplenomegaly. Chronic calcifications in the  pancreas without acute findings. The kidneys, adrenal glands, gallbladder and  partially imaged bowel are within normal limits. No lymphadenopathy or free  fluid. The level of T7-T8 osteomyelitis/discitis has mild surrounding soft tissue  thickening as seen on prior MRI. Refer to prior MRI report. Impression IMPRESSION:    1. CT-guided biopsy procedure changed to fluoroscopic procedure. Biopsy planning  CT performed. 2. T7-T8 osteomyelitis/discitis redemonstrated. Refer to MR thoracic spine  report. 3. Mild hepatosplenomegaly with hepatic steatosis. 4. Evidence of chronic pancreatitis. XR Results (most recent):  Results from Hospital Encounter encounter on 11/07/18   XR CHEST PA LAT    Narrative HISTORY: Shortness of breath. Exam: Chest.    Technique: Two views of the chest were obtained. No prior studies for  comparison. FINDINGS: Mild to moderate bilateral diffuse interstitial prominence is noted  with basilar dominance without hemothorax pneumonia or pleural effusions. Heart  is enlarged. Vascular pedicle is unremarkable. Visualized bony thorax and soft  tissues are within normal limits. Impression  IMPRESSION:  1. Sequela of mild to moderate congestion. Follow-up with plain imaging.       CT   All Micro Results     None              DIAGNOSIS AND PLAN  Patient Active Problem List   Diagnosis Code    Severe obesity with body mass index (BMI) of 35.0 to 39.9 with serious comorbidity (HCC) E66.01    Discitis of thoracic region M46.44     1. Discitis of thoracic region  2. Severe obesity with body mass index (BMI) of 35.0 to 39.9 with serious comorbidity (HCC)  3. Candida parapsilosis infection  4. IVDU (intravenous drug user)  5. Methadone use ([facility] [address]  6. History of hepatitis C  7. 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 Treated with multiple courses of antibiotics and antifungals (see HPI for details), and currently on fluconazol. Patient with worsening pain for about a month now on thoracic back on sides, not mid pain, telangiectasias on scapular area, macular web rash in legs. Thoracic MRI from 5/21/19 suggestive of more edema at the level of T6-T9, but no abscess, cord lesion or enhancement, similar severe compression  deformity of T8 and destruction of T7. Labs from 5/21/19, normal lactic acid, procalcitonin, WBC, renal and liver function. CRP elevated to 21.4 on 5/7/19. Reports no IVDU recently  Reports compliance with fluconazol, has completed 6 months of antifungal therapy. Plan  Unclear if worsening pain and edema, inflammatory marker is elevated due to infection or an inflammatory process. Rash suggestive of ?vasculitis. Will send vasculitis work up. Depending on findings refer to rheum  Awaiting evaluation by new ID- as I will be leaving practice in a few weeks. Plan to stop fluconazol now has completed 6 months of antifungal therapy. Plan to evaluate clinically closely, will talk to patient in one week. In case of clinical deterioration recommend to seek immediate care. Will check fluconazol levels today. Plan to refer to IR for bone biopsy on site of thoracic discitis/osteomyelitis to rule out active infection. Follow up with derm about rash for second opinion. Will trend CRP.      Follow-up and Dispositions    · Return if symptoms worsen or fail to improve. Brenda Kulkarni MD

## 2019-06-07 ENCOUNTER — TELEPHONE (OUTPATIENT)
Dept: INTERNAL MEDICINE CLINIC | Age: 59
End: 2019-06-07

## 2019-06-07 DIAGNOSIS — B37.9 CANDIDA PARAPSILOSIS INFECTION: ICD-10-CM

## 2019-06-07 DIAGNOSIS — M86.9 OSTEOMYELITIS, UNSPECIFIED SITE, UNSPECIFIED TYPE (HCC): Primary | ICD-10-CM

## 2019-06-07 LAB
FLUCONAZOLE SERPL-MCNC: 12.1 UG/ML
SPECIMEN STATUS REPORT, ROLRST: NORMAL

## 2019-06-07 NOTE — TELEPHONE ENCOUNTER
Pt calling, asking to speak to My or Dr. Stacy Cat. Says she was told at visit she needs to have a biopsy on 6/18/2019 but she hasn't heard anything else about it. Please call her and advise.

## 2019-06-07 NOTE — TELEPHONE ENCOUNTER
I spoke with Calvin Garrett,  with IR. They said they needed an order for a bone bx, order placed. She will contact the pt to schedule, I called the pt to let her know.  She verbalized understanding

## 2019-06-10 LAB
ALBUMIN SERPL ELPH-MCNC: 3.2 G/DL (ref 2.9–4.4)
ALBUMIN SERPL-MCNC: 3.7 G/DL (ref 3.5–5.5)
ALBUMIN/GLOB SERPL: 0.7 {RATIO} (ref 0.7–1.7)
ALBUMIN/GLOB SERPL: 0.9 {RATIO} (ref 1.2–2.2)
ALP SERPL-CCNC: 113 IU/L (ref 39–117)
ALPHA1 GLOB SERPL ELPH-MCNC: 0.3 G/DL (ref 0–0.4)
ALPHA2 GLOB SERPL ELPH-MCNC: 1.1 G/DL (ref 0.4–1)
ALT SERPL-CCNC: 28 IU/L (ref 0–32)
ANA SER QL: NEGATIVE
AST SERPL-CCNC: 36 IU/L (ref 0–40)
B-GLOBULIN SERPL ELPH-MCNC: 1.3 G/DL (ref 0.7–1.3)
BILIRUB SERPL-MCNC: <0.2 MG/DL (ref 0–1.2)
BUN SERPL-MCNC: 9 MG/DL (ref 6–24)
BUN/CREAT SERPL: 13 (ref 9–23)
C-ANCA TITR SER IF: NORMAL TITER
C3 SERPL-MCNC: 186 MG/DL (ref 82–167)
C4 SERPL-MCNC: 12 MG/DL (ref 14–44)
CALCIUM SERPL-MCNC: 9.2 MG/DL (ref 8.7–10.2)
CH50 SERPL-ACNC: NORMAL U/ML
CHLORIDE SERPL-SCNC: 95 MMOL/L (ref 96–106)
CO2 SERPL-SCNC: 26 MMOL/L (ref 20–29)
CREAT SERPL-MCNC: 0.72 MG/DL (ref 0.57–1)
CRP SERPL-MCNC: 16.2 MG/L (ref 0–4.9)
CRYOFIB PLAS QL: NORMAL
ERYTHROCYTE [SEDIMENTATION RATE] IN BLOOD BY WESTERGREN METHOD: 91 MM/HR (ref 0–40)
GAMMA GLOB SERPL ELPH-MCNC: 1.8 G/DL (ref 0.4–1.8)
GLOBULIN SER CALC-MCNC: 4.1 G/DL (ref 1.5–4.5)
GLOBULIN SER CALC-MCNC: 4.6 G/DL (ref 2.2–3.9)
GLUCOSE SERPL-MCNC: 175 MG/DL (ref 65–99)
HBV CORE AB SERPL QL IA: NEGATIVE
HBV DNA SERPL NAA+PROBE-ACNC: NORMAL IU/ML
HBV DNA SERPL NAA+PROBE-LOG IU: NORMAL LOG10 IU/ML
HBV E AB SERPL QL IA: NEGATIVE
HBV E AG SERPL QL IA: NEGATIVE
HBV SURFACE AB SER QL: REACTIVE
HBV SURFACE AG SERPL QL IA: NEGATIVE
HCV RNA SERPL NAA+PROBE-ACNC: NORMAL IU/ML
HIV 1+2 AB+HIV1 P24 AG SERPL QL IA: NON REACTIVE
M PROTEIN SERPL ELPH-MCNC: ABNORMAL G/DL
MYELOPEROXIDASE AB SER IA-ACNC: <9 U/ML (ref 0–9)
P-ANCA ATYPICAL TITR SER IF: NORMAL TITER
P-ANCA TITR SER IF: NORMAL TITER
PLEASE NOTE, 011150: ABNORMAL
POTASSIUM SERPL-SCNC: 3.9 MMOL/L (ref 3.5–5.2)
PROT SERPL-MCNC: 7.8 G/DL (ref 6–8.5)
PROTEINASE3 AB SER IA-ACNC: <3.5 U/ML (ref 0–3.5)
REF LAB TEST REF RANGE: NORMAL
RHEUMATOID FACT SERPL-ACNC: 25.7 IU/ML (ref 0–13.9)
RPR SER QL: NON REACTIVE
SODIUM SERPL-SCNC: 136 MMOL/L (ref 134–144)
T PALLIDUM AB SER QL IA: NEGATIVE

## 2019-06-11 LAB
ALBUMIN SERPL ELPH-MCNC: NORMAL G/DL
ALBUMIN SERPL-MCNC: NORMAL G/DL
ALP SERPL-CCNC: NORMAL U/L
ALPHA1 GLOB SERPL ELPH-MCNC: NORMAL G/DL
ALPHA2 GLOB SERPL ELPH-MCNC: NORMAL G/DL
ALT SERPL-CCNC: NORMAL U/L
ANA SER QL: NORMAL
AST SERPL-CCNC: NORMAL U/L
B-GLOBULIN SERPL ELPH-MCNC: NORMAL G/DL
BILIRUB SERPL-MCNC: NORMAL MG/DL
BUN SERPL-MCNC: NORMAL MG/DL
C-ANCA TITR SER IF: NORMAL {TITER}
CALCIUM SERPL-MCNC: NORMAL MG/DL
CH50 SERPL-ACNC: 44 U/ML
CHLORIDE SERPL-SCNC: NORMAL MMOL/L
CO2 SERPL-SCNC: NORMAL MMOL/L
CREAT SERPL-MCNC: NORMAL MG/DL
CRP SERPL-MCNC: NORMAL MG/L
CRYOFIB PLAS QL: NORMAL
ERYTHROCYTE [SEDIMENTATION RATE] IN BLOOD BY WESTERGREN METHOD: NORMAL MM/HR
GAMMA GLOB SERPL ELPH-MCNC: NORMAL G/DL
GLUCOSE SERPL-MCNC: NORMAL MG/DL
HBV CORE AB SERPL QL IA: NORMAL
HBV DNA SERPL NAA+PROBE-ACNC: NORMAL IU/ML
HBV E AB SERPL QL IA: NORMAL
HBV E AG SERPL QL IA: NORMAL
HBV SURFACE AG SERPL QL IA: NORMAL
HCV RNA SERPL NAA+PROBE-ACNC: NORMAL IU/ML
HIV 1+2 AB+HIV1 P24 AG SERPL QL IA: NORMAL
MYELOPEROXIDASE AB SER IA-ACNC: NORMAL U/ML
P-ANCA ATYPICAL TITR SER IF: NORMAL {TITER}
P-ANCA TITR SER IF: NORMAL {TITER}
POTASSIUM SERPL-SCNC: NORMAL MMOL/L
PROT SERPL-MCNC: NORMAL G/DL
PROTEINASE3 AB SER IA-ACNC: NORMAL
SODIUM SERPL-SCNC: NORMAL MMOL/L
T PALLIDUM AB SER QL IA: NORMAL

## 2019-06-12 RX ORDER — SODIUM CHLORIDE 9 MG/ML
20 INJECTION, SOLUTION INTRAVENOUS CONTINUOUS
Status: CANCELLED | OUTPATIENT
Start: 2019-06-12

## 2019-06-14 ENCOUNTER — HOSPITAL ENCOUNTER (OUTPATIENT)
Dept: CT IMAGING | Age: 59
Discharge: HOME | End: 2019-06-14
Attending: RADIOLOGY | Admitting: RADIOLOGY

## 2019-06-14 DIAGNOSIS — B37.9 CANDIDA PARAPSILOSIS INFECTION: ICD-10-CM

## 2019-06-14 DIAGNOSIS — M86.9 OSTEOMYELITIS, UNSPECIFIED SITE, UNSPECIFIED TYPE (HCC): ICD-10-CM

## 2019-06-17 DIAGNOSIS — Z86.19 HISTORY OF HEPATITIS C: ICD-10-CM

## 2019-06-17 DIAGNOSIS — M54.6 CHRONIC BILATERAL THORACIC BACK PAIN: ICD-10-CM

## 2019-06-17 DIAGNOSIS — Z87.39 HISTORY OF OSTEOMYELITIS: ICD-10-CM

## 2019-06-17 DIAGNOSIS — R76.8 ELEVATED RHEUMATOID FACTOR: ICD-10-CM

## 2019-06-17 DIAGNOSIS — S22.000A COMPRESSION FRACTURE OF BODY OF THORACIC VERTEBRA (HCC): ICD-10-CM

## 2019-06-17 DIAGNOSIS — B37.9 CANDIDA PARAPSILOSIS INFECTION: ICD-10-CM

## 2019-06-17 DIAGNOSIS — R79.82 ELEVATED C-REACTIVE PROTEIN (CRP): Primary | ICD-10-CM

## 2019-06-17 DIAGNOSIS — M46.44 DISCITIS OF THORACIC REGION: ICD-10-CM

## 2019-06-17 DIAGNOSIS — L95.9 VASCULITIS OF SKIN: ICD-10-CM

## 2019-06-17 DIAGNOSIS — G89.29 CHRONIC BILATERAL THORACIC BACK PAIN: ICD-10-CM

## 2019-06-17 DIAGNOSIS — F19.90 IVDU (INTRAVENOUS DRUG USER): ICD-10-CM

## 2019-06-17 NOTE — PROGRESS NOTES
63 y.o. female with relevant past medical depression/anxiety, history of IVDU on methadone clinic, h/o Hep C infection s/p treatment with a pangenotypic agent ([employer]) around Sept- Nov, 2018, severe obesity, HIV status neg, history of  thoracic discitis/osteomyelitis history secondary to candida parapsilosis. Indolent clinical picture in a patient with h/o IVDU, found to have discitis osteomyelitis s/p bone biopsy on 11/13 and 11/15 with Candida Parapsilosis and MRSE. Treated with multiple courses of antibiotics and antifungals (see HPI for details), and currently on fluconazol. Patient with worsening pain for about a month now on thoracic back on sides, not mid pain, telangiectasias on scapular area, macular web rash in legs. Thoracic MRI from 5/21/19 suggestive of more edema at the level of T6-T9, but no abscess, cord lesion or enhancement, similar severe compression  deformity of T8 and destruction of T7. Labs from 5/21/19, normal lactic acid, procalcitonin, WBC, renal and liver function. CRP elevated to 21.4 on 5/7/19. Reports no IVDU recently  Reported compliance with fluconazol, has completed 6 months of antifungal therapy (June 2019). Has telangiectasia in back and macular purpuric rash in legs concerning for ?vasculitis. Work up done on 6/4/19 revealed:  Elevated CRP, ESR, Rheumatoid factor and complement (C3, C4, CH50). Neg HIV, ANCA panel, GIORGI, RPR, neg HBV serolory. HCV RNA cancelled as no sample received. Fluconazol levels 12.5 therapeutic. Plan  Elevated inflammatory markers most likely secondary to inflammatory reaction, ?autoimmune disorder. Results discussed with patient, referred to rheumatology for evaluation and treatment recommendations. Agreed to continue with plan for IR guided bone biopsy on site of thoracic discitis/osteomyelitis to rule persistent infection.  Has been off fluconazol for about 2 weeks with clinical stability, plan for procedure per patient this Friday 6/21/19, if negative may continue off fluconazol. Asked to send results to her PCP and if positive to refer to ID (done before) for continued care/recommendations, as I will be leaving the practice at the end of this week. Patient aware and verbalizes gratitude for the care given and agreement with plan.         Willis Hui MD

## 2019-06-20 RX ORDER — SODIUM CHLORIDE 9 MG/ML
20 INJECTION, SOLUTION INTRAVENOUS CONTINUOUS
Status: CANCELLED | OUTPATIENT
Start: 2019-06-20

## 2019-06-21 ENCOUNTER — HOSPITAL ENCOUNTER (OUTPATIENT)
Dept: CT IMAGING | Age: 59
Discharge: HOME | End: 2019-06-21
Attending: RADIOLOGY | Admitting: RADIOLOGY
Payer: MEDICAID

## 2019-06-21 VITALS
BODY MASS INDEX: 38.8 KG/M2 | DIASTOLIC BLOOD PRESSURE: 77 MMHG | HEIGHT: 63 IN | WEIGHT: 219 LBS | SYSTOLIC BLOOD PRESSURE: 135 MMHG | RESPIRATION RATE: 18 BRPM | HEART RATE: 72 BPM | TEMPERATURE: 97.5 F | OXYGEN SATURATION: 95 %

## 2019-06-21 LAB
AMPHET UR QL SCN: NEGATIVE
BARBITURATES UR QL SCN: NEGATIVE
BENZODIAZ UR QL: NEGATIVE
CANNABINOIDS UR QL SCN: NEGATIVE
COCAINE UR QL SCN: NEGATIVE
HCG UR QL: NEGATIVE
HDSCOM,HDSCOM: ABNORMAL
METHADONE UR QL: POSITIVE
OPIATES UR QL: NEGATIVE
PCP UR QL: NEGATIVE

## 2019-06-21 PROCEDURE — 85730 THROMBOPLASTIN TIME PARTIAL: CPT

## 2019-06-21 PROCEDURE — 87116 MYCOBACTERIA CULTURE: CPT

## 2019-06-21 PROCEDURE — 85027 COMPLETE CBC AUTOMATED: CPT

## 2019-06-21 PROCEDURE — 85610 PROTHROMBIN TIME: CPT

## 2019-06-21 PROCEDURE — 88311 DECALCIFY TISSUE: CPT

## 2019-06-21 PROCEDURE — 74011250636 HC RX REV CODE- 250/636: Performed by: NURSE PRACTITIONER

## 2019-06-21 PROCEDURE — 88304 TISSUE EXAM BY PATHOLOGIST: CPT

## 2019-06-21 PROCEDURE — 88307 TISSUE EXAM BY PATHOLOGIST: CPT

## 2019-06-21 PROCEDURE — 74011250636 HC RX REV CODE- 250/636: Performed by: STUDENT IN AN ORGANIZED HEALTH CARE EDUCATION/TRAINING PROGRAM

## 2019-06-21 PROCEDURE — 80048 BASIC METABOLIC PNL TOTAL CA: CPT

## 2019-06-21 PROCEDURE — 20220 BONE BIOPSY TROCAR/NDL SUPFC: CPT

## 2019-06-21 PROCEDURE — 87070 CULTURE OTHR SPECIMN AEROBIC: CPT

## 2019-06-21 PROCEDURE — 80307 DRUG TEST PRSMV CHEM ANLYZR: CPT

## 2019-06-21 PROCEDURE — 81025 URINE PREGNANCY TEST: CPT

## 2019-06-21 PROCEDURE — 87102 FUNGUS ISOLATION CULTURE: CPT

## 2019-06-21 RX ORDER — MIDAZOLAM HYDROCHLORIDE 1 MG/ML
1 INJECTION, SOLUTION INTRAMUSCULAR; INTRAVENOUS
Status: DISCONTINUED | OUTPATIENT
Start: 2019-06-21 | End: 2019-06-21 | Stop reason: HOSPADM

## 2019-06-21 RX ORDER — SODIUM CHLORIDE 9 MG/ML
20 INJECTION, SOLUTION INTRAVENOUS CONTINUOUS
Status: DISCONTINUED | OUTPATIENT
Start: 2019-06-21 | End: 2019-06-21 | Stop reason: HOSPADM

## 2019-06-21 RX ORDER — FENTANYL CITRATE 50 UG/ML
12.5-5 INJECTION, SOLUTION INTRAMUSCULAR; INTRAVENOUS
Status: DISCONTINUED | OUTPATIENT
Start: 2019-06-21 | End: 2019-06-21 | Stop reason: HOSPADM

## 2019-06-21 RX ADMIN — MIDAZOLAM HYDROCHLORIDE 1 MG: 2 INJECTION, SOLUTION INTRAMUSCULAR; INTRAVENOUS at 12:15

## 2019-06-21 RX ADMIN — FENTANYL CITRATE 50 MCG: 50 INJECTION INTRAMUSCULAR; INTRAVENOUS at 12:20

## 2019-06-21 RX ADMIN — MIDAZOLAM HYDROCHLORIDE 1 MG: 2 INJECTION, SOLUTION INTRAMUSCULAR; INTRAVENOUS at 12:20

## 2019-06-21 RX ADMIN — FENTANYL CITRATE 50 MCG: 50 INJECTION INTRAMUSCULAR; INTRAVENOUS at 12:15

## 2019-06-21 RX ADMIN — SODIUM CHLORIDE 20 ML/HR: 900 INJECTION, SOLUTION INTRAVENOUS at 09:49

## 2019-06-21 NOTE — OP NOTES
Interventional Radiology Brief Procedure Note    Interventional Radiologist: Marcy Klein MD    Pre-operative Diagnosis: T6-7 suspected osteomyelitis    Post-operative Diagnosis: Same as pre-operative diagnosis    Procedure(s) Performed:  T6-7 disc biopsy    Anesthesia:  Local and Moderate Sedation    Findings: Four 18 G cores, one 11 G core.  Sent for multiple labs    Complications: None    Estimated Blood Loss:  minimal    Tubes and Drains: None    Specimens: Yes    Condition: Good    Disposition: Back to nursing unit    Marcy Klein MD  6/21/2019

## 2019-06-21 NOTE — H&P
OUTPATIENT HISTORY AND PHYSICAL      Today 6/21/2019     Indication/Symptoms:   Brooke Perez is a 63 y.o. 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 Patient has been NPO since midnight and takes no blood thinning medications. Current Meds:    Prior to Admission medications    Medication Sig Start Date End Date Taking? Authorizing Provider   hydroCHLOROthiazide (HYDRODIURIL) 12.5 mg tablet TAKE ONE TABLET BY MOUTH DAILY 6/4/19  Yes Giovanni YATES NP   sertraline (ZOLOFT) 100 mg tablet Take two tablets by mouth daily. 6/4/19  Yes Giovanni YATES NP   gabapentin (NEURONTIN) 100 mg capsule Take 3 Caps by mouth three (3) times daily. Indications: Neuropathic Pain 4/9/19  Yes Osbaldo Chao MD   ketorolac (TORADOL) 10 mg tablet Take 1 Tab by mouth two (2) times daily as needed for Pain. 3/4/19  Yes Tanika Miranda MD   methadone HCl (METHADONE PO) Take 85 mg by mouth daily. Yes Provider, Historical   potassium chloride (KLOR-CON) 10 mEq tablet Take 1 Tab by mouth daily. 2/4/19   Giovanni YATES NP   docusate sodium (COLACE) 100 mg capsule Take 1 Cap by mouth two (2) times a day. 11/15/18   Sofiya Chappell MD       Allergies:    No Known Allergies    Comorbid Conditions:    Past Medical History:   Diagnosis Date    Depression     Hepatitis C     Heroin use disorder, moderate, in early remission ([facility] [address]     NOW on [facility]    Ill-defined condition     Chronic back pain        History reviewed. No pertinent surgical history.   Data:    Visit Vitals  /85 (BP 1 Location: Right arm, BP Patient Position: Prone)   Pulse 79   Temp 98.7 °F (37.1 °C)   Resp 20   Ht 5' 3\" (1.6 m)   Wt 99.3 kg (219 lb)   SpO2 98%   BMI 38.79 kg/m²   :  Recent Labs     06/21/19  0934        Recent Labs     06/21/19  0934   INR 1.0   APTT 31.7 The H & P and/or progress notes and any available imaging were reviewed. The risks, indications and possible alternatives to the procedure, including doing nothing, were discussed and informed consent was obtained. Physical Exam:      Mental status:   Alert and oriented. Examination specific to the procedure proposed to be performed and any co morbid conditions:   Mallampati classification 2 ,  ASA 2   Heart:   Regular rate. Lungs:   Normal respiratory effort. Symmetrical rise and fall of chest    The patient is an appropriate candidate to undergo the planned procedure and sedation.     [facility]

## 2019-06-21 NOTE — DISCHARGE INSTRUCTIONS
Patient Education        Bone Biopsy: What to Expect at Home  Your Recovery  A bone biopsy is a procedure in which a small sample of bone is taken from the body and looked at under a microscope for cancer, infection, or other bone disorders. There are two types of bone biopsies: closed (or needle) biopsy and open biopsy. In either case, be sure to have someone drive you home afterwards. If you had a closed or needle biopsy, a needle was inserted through the skin and into the bone. You may go home shortly after the procedure. If you got a sedative, you may need to stay longer. The biopsy site may be sore and tender for up to a week. If you had an open biopsy, a cut (incision) was made through the skin to expose an area of the bone. You may need to stay in the hospital overnight. The biopsy site may be sore and tender for up to a week. This care sheet gives you a general idea about how long it will take for you to recover. But each person recovers at a different pace. Follow the steps below to get better as quickly as possible. How can you care for yourself at home? Activity    · Rest when you feel tired.     · You can do your normal activities when it feels okay to do so.     · Be active. Walking is a good choice.     · Your doctor may have you avoid certain activities for a while based on where the biopsy was done and whether it was open or closed. Diet    · You can eat your normal diet. If your stomach is upset, try bland, low-fat foods like plain rice, broiled chicken, toast, and yogurt.     · If your bowel movements are not regular right after the procedure, try to avoid constipation and straining. Drink plenty of water. Your doctor may suggest fiber, a stool softener, or a mild laxative. Medicines    · Be safe with medicines. Read and follow all instructions on the label. ? If the doctor gave you a prescription medicine for pain, take it as prescribed.   ? If you are not taking a prescription pain medicine, ask your doctor if you can take an over-the-counter medicine.     · If you take blood thinners, such as warfarin (Coumadin), clopidogrel (Plavix), or aspirin, be sure to talk to your doctor. He or she will tell you if and when to start taking those medicines again. Make sure that you understand exactly what your doctor wants you to do.     · Your doctor will tell you if and when you can restart your medicines. He or she will also give you instructions about taking any new medicines. Incision care    · You will have a dressing over the biopsy site. A dressing helps the site heal and protects it. Your doctor will tell you how to take care of this.     · If you have strips of tape on the biopsy site, leave the tape on for a week or until it falls off.     · If you had stitches, your doctor will tell you when to come back to have them removed.     · Change the bandage every day.     · Wash the biopsy site daily with warm, soapy water, and pat it dry. Don't use hydrogen peroxide or alcohol. They can slow healing.     · You may shower 24 to 48 hours after the procedure. Pat the biopsy site dry. Don't swim or take a bath for the first 2 weeks, or until your doctor tells you it is okay. Follow-up care is a key part of your treatment and safety. Be sure to make and go to all appointments, and call your doctor if you are having problems. It's also a good idea to know your test results and keep a list of the medicines you take. When should you call for help? Call 911 anytime you think you may need emergency care. For example, call if:    · You passed out (lost consciousness).    Call your doctor now or seek immediate medical care if:    · You are bleeding through your dressing. A small amount of blood is normal.     · You have symptoms of infection, such as:  ? Increased pain, swelling, warmth, or redness. ? Red streaks leading from the biopsy site. ? Pus draining from the site.   ? A fever.    Watch closely for changes in your health, and be sure to contact your doctor if:    · Your biopsy site comes open.     · You are not getting better as expected. Where can you learn more? Go to http://sameera-mlies.info/. Enter V308 in the search box to learn more about \"Bone Biopsy: What to Expect at Home. \"  Current as of: June 25, 2018  Content Version: 11.9  © 0994-9632 [employer]. Care instructions adapted under license by [employer] (which disclaims liability or warranty for this information). If you have questions about a medical condition or this instruction, always ask your healthcare professional. [address] any warranty or liability for your use of this information.

## 2019-06-25 ENCOUNTER — TELEPHONE (OUTPATIENT)
Dept: FAMILY MEDICINE CLINIC | Age: 59
End: 2019-06-25

## 2019-07-02 ENCOUNTER — HOSPITAL ENCOUNTER (OUTPATIENT)
Age: 59
Discharge: HOME | End: 2019-07-02
Attending: INTERNAL MEDICINE
Payer: MEDICAID

## 2019-07-02 DIAGNOSIS — B18.2 HEPATITIS C, CHRONIC (HCC): ICD-10-CM

## 2019-07-02 DIAGNOSIS — E66.9 OBESITY: ICD-10-CM

## 2019-07-02 DIAGNOSIS — R93.5 ABNORMAL US (ULTRASOUND) OF ABDOMEN: ICD-10-CM

## 2019-07-02 DIAGNOSIS — K76.0 NAFL (NONALCOHOLIC FATTY LIVER): ICD-10-CM

## 2019-07-02 LAB — CREAT UR-MCNC: 0.6 MG/DL (ref 0.6–1.3)

## 2019-07-02 PROCEDURE — A9577 INJ MULTIHANCE: HCPCS | Performed by: INTERNAL MEDICINE

## 2019-07-02 PROCEDURE — 82565 ASSAY OF CREATININE: CPT

## 2019-07-02 PROCEDURE — 74011250636 HC RX REV CODE- 250/636: Performed by: INTERNAL MEDICINE

## 2019-07-02 PROCEDURE — 74183 MRI ABD W/O CNTR FLWD CNTR: CPT

## 2019-07-02 RX ADMIN — GADOBENATE DIMEGLUMINE 20 ML: 529 INJECTION, SOLUTION INTRAVENOUS at 07:00

## 2019-07-11 ENCOUNTER — OFFICE VISIT (OUTPATIENT)
Dept: FAMILY MEDICINE CLINIC | Age: 59
End: 2019-07-11

## 2019-07-11 VITALS
HEIGHT: 63 IN | HEART RATE: 86 BPM | WEIGHT: 218.6 LBS | RESPIRATION RATE: 18 BRPM | TEMPERATURE: 98.8 F | BODY MASS INDEX: 38.73 KG/M2 | SYSTOLIC BLOOD PRESSURE: 130 MMHG | DIASTOLIC BLOOD PRESSURE: 84 MMHG

## 2019-07-11 DIAGNOSIS — J02.9 PHARYNGITIS, UNSPECIFIED ETIOLOGY: ICD-10-CM

## 2019-07-11 DIAGNOSIS — M46.44 DISCITIS OF THORACIC REGION: ICD-10-CM

## 2019-07-11 DIAGNOSIS — J02.9 SORE THROAT: ICD-10-CM

## 2019-07-11 DIAGNOSIS — J02.9 SORE THROAT: Primary | ICD-10-CM

## 2019-07-11 DIAGNOSIS — M54.6 CHRONIC BILATERAL THORACIC BACK PAIN: ICD-10-CM

## 2019-07-11 DIAGNOSIS — G89.29 CHRONIC BILATERAL THORACIC BACK PAIN: ICD-10-CM

## 2019-07-11 LAB
S PYO AG THROAT QL: NEGATIVE
VALID INTERNAL CONTROL?: YES

## 2019-07-11 RX ORDER — FLUCONAZOLE 150 MG/1
TABLET ORAL
Qty: 2 TAB | Refills: 0 | Status: SHIPPED | OUTPATIENT
Start: 2019-07-11 | End: 2019-07-18 | Stop reason: ALTCHOICE

## 2019-07-11 RX ORDER — AMOXICILLIN AND CLAVULANATE POTASSIUM 875; 125 MG/1; MG/1
1 TABLET, FILM COATED ORAL EVERY 12 HOURS
Qty: 20 TAB | Refills: 0 | Status: SHIPPED | OUTPATIENT
Start: 2019-07-11 | End: 2019-07-21

## 2019-07-11 NOTE — PROGRESS NOTES
Boyd Grey presents today for   Chief Complaint   Patient presents with    Sore Throat     Patient stated that she been having a sore throat x 3 days. Patient stated that her pain is 3/10. Boyd Grey preferred language for health care discussion is english/other. Is someone accompanying this pt? no    Is the patient using any DME equipment during [address]? no    Depression Screening:  3 most recent PHQ Screens 6/4/2019   Little interest or pleasure in doing things More than half the days   Feeling down, depressed, irritable, or hopeless More than half the days   Total Score PHQ 2 4   Trouble falling or staying asleep, or sleeping too much Nearly every day   Feeling tired or having little energy Nearly every day   Poor appetite, weight loss, or overeating Nearly every day   Feeling bad about yourself - or that you are a failure or have let yourself or your family down Not at all   Trouble concentrating on things such as school, work, reading, or watching TV Not at all   Moving or speaking so slowly that other people could have noticed; or the opposite being so fidgety that others notice Not at all   Thoughts of being better off dead, or hurting yourself in some way Not at all   PHQ 9 Score 13   How difficult have these problems made it for you to do your work, take care of your home and get along with others Somewhat difficult       Learning Assessment:  Learning Assessment 6/4/2019   PRIMARY LEARNER Patient   HIGHEST LEVEL OF EDUCATION - PRIMARY LEARNER  GRADUATED HIGH SCHOOL OR GED   BARRIERS PRIMARY LEARNER NONE   CO-LEARNER CAREGIVER No   PRIMARY LANGUAGE ENGLISH   LEARNER PREFERENCE PRIMARY LISTENING   ANSWERED BY self   RELATIONSHIP SELF       Abuse Screening:  Abuse Screening Questionnaire 6/4/2019   Do you ever feel afraid of your partner? N   Are you in a relationship with someone who physically or mentally threatens you? N   Is it safe for you to go home?  Y       Health Maintenance Due   Topic Date Due    PAP AKA CERVICAL CYTOLOGY  10/29/1982    Shingrix Vaccine Age 50> (1 of 2) 10/29/2011    FOBT Q 1 YEAR AGE 50-75  10/29/2011   . Health Maintenance reviewed and discussed and ordered per Provider. Edna Light is updated on all     Coordination of Care:  1. Have you been to the ER, urgent care clinic since your last visit? Hospitalized since your last visit? no    2. Have you seen or consulted any other health care providers outside of the [address] [place] [address] since your last visit? Include any pap smears or colon screening. no      Advance Directive:  1. Do you have an advance directive in place? Patient Reply:no    2. If not, would you like material regarding how to put one in place?  Patient Reply: no

## 2019-07-11 NOTE — PROGRESS NOTES
MARIA EUGENIA Schneider is a 63 y.o. female  Chief Complaint   Patient presents with    Sore Throat     Patient stated that she been having a sore throat x 3 days. Patient stated that her pain is 3/10. Reports sore throat for 3 days. She reports it was initially itchy and scratchy but now the pain is worse. She reports she has thick white spots on her throat. She denies chest pain or shortness of breath. She denies fevers or chills. She denies cough, sputum production, ear pain, and sinus congestion. Patient denies ill contacts. She does admit to some fatigue and reports feeling bad. Throat pain is 3/10 but states this gets worse as the day progresses. She is gargling with warm salt water. Back pain - she still does not have the results of her culture or biopsy as she was told that her PCP would give her these results since her ID doctor left. Past Medical History  Past Medical History:   Diagnosis Date    Depression     Hepatitis C     Heroin use disorder, moderate, in early remission ([facility] [address]     NOW on [facility]    Ill-defined condition     Chronic back pain       Surgical History  No past surgical history on file. Medications  Current Outpatient Medications   Medication Sig Dispense Refill    amoxicillin-clavulanate (AUGMENTIN) 875-125 mg per tablet Take 1 Tab by mouth every twelve (12) hours for 10 days. 20 Tab 0    fluconazole (DIFLUCAN) 150 mg tablet Take one tablet now and one tablet in four days 2 Tab 0    hydroCHLOROthiazide (HYDRODIURIL) 12.5 mg tablet TAKE ONE TABLET BY MOUTH DAILY 30 Tab 12    sertraline (ZOLOFT) 100 mg tablet Take two tablets by mouth daily. 60 Tab 6    gabapentin (NEURONTIN) 100 mg capsule Take 3 Caps by mouth three (3) times daily. Indications: Neuropathic Pain 90 Cap 3    ketorolac (TORADOL) 10 mg tablet Take 1 Tab by mouth two (2) times daily as needed for Pain. 20 Tab 0    potassium chloride (KLOR-CON) 10 mEq tablet Take 1 Tab by mouth daily.  27 Tab 3    docusate sodium (COLACE) 100 mg capsule Take 1 Cap by mouth two (2) times a day. 60 Cap 0    methadone HCl (METHADONE PO) Take 85 mg by mouth daily.          Allergies  No Known Allergies    Family History  Family History   Problem Relation Age of Onset    Hypertension Mother     High Cholesterol Mother     COPD Mother     Hypertension Father        Social History  Social History     Socioeconomic History    Marital status: SINGLE     Spouse name: Not on file    Number of children: Not on file    Years of education: Not on file    Highest education level: Not on file   Occupational History    Not on file   Social Needs    Financial resource strain: Not on file    Food insecurity:     Worry: Not on file     Inability: Not on file    Transportation needs:     Medical: Not on file     Non-medical: Not on file   Tobacco Use    Smoking status: Current Every Day Smoker     Packs/day: 0.25     Types: Cigarettes    Smokeless tobacco: Never Used   Substance and Sexual Activity    Alcohol use: No    Drug use: No     Comment: on methadone matinance program    Sexual activity: Never   Lifestyle    Physical activity:     Days per week: Not on file     Minutes per session: Not on file    Stress: Not on file   Relationships    Social connections:     Talks on phone: Not on file     Gets together: Not on file     Attends [employer] service: Not on file     Active member of club or organization: Not on file     Attends meetings of clubs or organizations: Not on file     Relationship status: Not on file    Intimate partner violence:     Fear of current or ex partner: Not on file     Emotionally abused: Not on file     Physically abused: Not on file     Forced sexual activity: Not on file   Other Topics Concern    Not on file   Social History Narrative    Not on file       Problem List  Patient Active Problem List   Diagnosis Code    Severe obesity with body mass index (BMI) of 35.0 to 39.9 with serious comorbidity ([facility]) E66.01    Discitis of thoracic region M46.44       Review of Systems  Review of Systems   Constitutional: Positive for malaise/fatigue. Negative for chills and fever. HENT: Positive for sore throat. Negative for congestion, ear pain and sinus pain. Respiratory: Negative for cough and sputum production. Cardiovascular: Negative for chest pain. Musculoskeletal: Positive for back pain (chronic). Vital Signs  Vitals:    07/11/19 0744   BP: 130/84   Pulse: 86   Resp: 18   Temp: 98.8 °F (37.1 °C)   TempSrc: Oral   Weight: 218 lb 9.6 oz (99.2 kg)   Height: 5' 3\" (1.6 m)   PainSc:   3   PainLoc: Throat       Physical Exam  Physical Exam   Constitutional: She is oriented to person, place, and time. She appears ill. HENT:   Right Ear: Tympanic membrane normal.   Left Ear: Tympanic membrane normal.   Nose: Nose normal. Right sinus exhibits no maxillary sinus tenderness and no frontal sinus tenderness. Left sinus exhibits no maxillary sinus tenderness and no frontal sinus tenderness. Mouth/Throat: Oropharyngeal exudate (white, thick), posterior oropharyngeal edema and posterior oropharyngeal erythema present. Eyes: Pupils are equal, round, and reactive to light. Cardiovascular: Normal rate, regular rhythm and normal heart sounds. Pulmonary/Chest: Effort normal and breath sounds normal.   Neurological: She is alert and oriented to person, place, and time. Psychiatric: She has a normal mood and affect. Her behavior is normal.   Vitals reviewed.       Diagnostics  Orders Placed This Encounter    THROAT CULTURE     Standing Status:   Future     Standing Expiration Date:   7/11/2020    REFERRAL TO INFECTIOUS DISEASE     Referral Priority:   Routine     Referral Type:   Consultation     Referral Reason:   Specialty Services Required     Number of Visits Requested:   1    AMB POC RAPID STREP A    amoxicillin-clavulanate (AUGMENTIN) 875-125 mg per tablet     Sig: Take 1 Tab by mouth every twelve (12) hours for 10 days. Dispense:  20 Tab     Refill:  0    fluconazole (DIFLUCAN) 150 mg tablet     Sig: Take one tablet now and one tablet in four days     Dispense:  2 Tab     Refill:  0       Results  Results for orders placed or performed in visit on 07/11/19   AMB POC RAPID STREP A   Result Value Ref Range    VALID INTERNAL CONTROL POC Yes     Group A Strep Ag Negative Negative       Assessment and Plan  Diagnoses and all orders for this visit:    1. Sore throat  -     AMB POC RAPID STREP A  -     THROAT CULTURE; Future    2. Pharyngitis, unspecified etiology  -     THROAT CULTURE; Future  -     amoxicillin-clavulanate (AUGMENTIN) 875-125 mg per tablet; Take 1 Tab by mouth every twelve (12) hours for 10 days. -     fluconazole (DIFLUCAN) 150 mg tablet; Take one tablet now and one tablet in four days    3. Chronic bilateral thoracic back pain  -     REFERRAL TO INFECTIOUS DISEASE    4. Discitis of thoracic region  -     REFERRAL TO INFECTIOUS DISEASE    Results from 6/21/19 reviewed with patient. Patient is aware that these test were not ordered by me. She states no one has called her with the results of these documents. She wants to know who her new ID provider will be. Discussed with patient in detail that I would need to refer her to another ID provider. She is aware that some results are still preliminary results and I recommend an ID provider to aide in interpretation of the results and ensure further treatment is not needed. Medication, side effects, possible allergic reactions and warnings reviewed with patient. Patient verbalized understanding. Warning of diflucan and methadone discussed with patient in detail. After care summary printed and reviewed with patient. Plan reviewed with patient. Questions answered. Patient verbalized understanding of plan and is in agreement with plan. Patient to follow up in two weeks or earlier if symptoms worsen.    Follow-up and Dispositions · Return in about 2 weeks (around 7/25/2019), or if symptoms worsen or fail to improve.        Bessy Stacy, ARIELLAP-C

## 2019-07-12 LAB
ALBUMIN SERPL-MCNC: 3.7 G/DL (ref 3.5–5.5)
ALBUMIN/GLOB SERPL: 0.9 {RATIO} (ref 1.2–2.2)
ALP SERPL-CCNC: 172 IU/L (ref 39–117)
ALT SERPL-CCNC: 40 IU/L (ref 0–32)
AST SERPL-CCNC: 39 IU/L (ref 0–40)
BILIRUB SERPL-MCNC: 0.3 MG/DL (ref 0–1.2)
BUN SERPL-MCNC: 12 MG/DL (ref 6–24)
BUN/CREAT SERPL: 15 (ref 9–23)
CALCIUM SERPL-MCNC: 9.4 MG/DL (ref 8.7–10.2)
CHLORIDE SERPL-SCNC: 87 MMOL/L (ref 96–106)
CO2 SERPL-SCNC: 26 MMOL/L (ref 20–29)
CREAT SERPL-MCNC: 0.82 MG/DL (ref 0.57–1)
GLOBULIN SER CALC-MCNC: 3.9 G/DL (ref 1.5–4.5)
GLUCOSE SERPL-MCNC: 465 MG/DL (ref 65–99)
POTASSIUM SERPL-SCNC: 4.1 MMOL/L (ref 3.5–5.2)
PROT SERPL-MCNC: 7.6 G/DL (ref 6–8.5)
SODIUM SERPL-SCNC: 128 MMOL/L (ref 134–144)

## 2019-07-14 LAB — S PYO THROAT QL CULT: NEGATIVE

## 2019-07-15 ENCOUNTER — TELEPHONE (OUTPATIENT)
Dept: FAMILY MEDICINE CLINIC | Age: 59
End: 2019-07-15

## 2019-07-15 NOTE — TELEPHONE ENCOUNTER
Call to patient to discuss labs. She verified her name, , and her address. Discussed her labs and risk concerning her glucose and sodium with her in detail. Patient states she will make an appointment.  Omar Sloan

## 2019-07-18 ENCOUNTER — OFFICE VISIT (OUTPATIENT)
Dept: FAMILY MEDICINE CLINIC | Age: 59
End: 2019-07-18

## 2019-07-18 VITALS
OXYGEN SATURATION: 95 % | RESPIRATION RATE: 20 BRPM | HEART RATE: 76 BPM | WEIGHT: 213.4 LBS | BODY MASS INDEX: 37.81 KG/M2 | SYSTOLIC BLOOD PRESSURE: 132 MMHG | DIASTOLIC BLOOD PRESSURE: 81 MMHG | TEMPERATURE: 98.1 F | HEIGHT: 63 IN

## 2019-07-18 DIAGNOSIS — M54.9 CHRONIC BACK PAIN, UNSPECIFIED BACK LOCATION, UNSPECIFIED BACK PAIN LATERALITY: ICD-10-CM

## 2019-07-18 DIAGNOSIS — E87.1 HYPONATREMIA: ICD-10-CM

## 2019-07-18 DIAGNOSIS — R73.9 BLOOD GLUCOSE ELEVATED: ICD-10-CM

## 2019-07-18 DIAGNOSIS — R73.9 BLOOD GLUCOSE ELEVATED: Primary | ICD-10-CM

## 2019-07-18 DIAGNOSIS — G89.29 CHRONIC BACK PAIN, UNSPECIFIED BACK LOCATION, UNSPECIFIED BACK PAIN LATERALITY: ICD-10-CM

## 2019-07-18 DIAGNOSIS — E11.8 CONTROLLED TYPE 2 DIABETES MELLITUS WITH COMPLICATION, WITHOUT LONG-TERM CURRENT USE OF INSULIN (HCC): ICD-10-CM

## 2019-07-18 LAB — HBA1C MFR BLD HPLC: 11.5 %

## 2019-07-18 RX ORDER — PEN NEEDLE, DIABETIC 30 GX3/16"
NEEDLE, DISPOSABLE MISCELLANEOUS
Qty: 1 PACKAGE | Refills: 11 | Status: SHIPPED | OUTPATIENT
Start: 2019-07-18 | End: 2019-07-25 | Stop reason: ALTCHOICE

## 2019-07-18 RX ORDER — LANCETS
EACH MISCELLANEOUS
Qty: 1 EACH | Refills: 11 | Status: SHIPPED | OUTPATIENT
Start: 2019-07-18 | End: 2019-07-25 | Stop reason: ALTCHOICE

## 2019-07-18 RX ORDER — INSULIN GLARGINE 100 [IU]/ML
INJECTION, SOLUTION SUBCUTANEOUS
Qty: 4 PEN | Refills: 0 | Status: SHIPPED | OUTPATIENT
Start: 2019-07-18 | End: 2019-07-18 | Stop reason: SDUPTHER

## 2019-07-18 RX ORDER — METFORMIN HYDROCHLORIDE 500 MG/1
TABLET, EXTENDED RELEASE ORAL
Qty: 60 TAB | Refills: 0 | Status: SHIPPED | OUTPATIENT
Start: 2019-07-18 | End: 2019-09-05 | Stop reason: SDUPTHER

## 2019-07-18 RX ORDER — INSULIN PUMP SYRINGE, 3 ML
EACH MISCELLANEOUS
Qty: 1 KIT | Refills: 0 | Status: SHIPPED | OUTPATIENT
Start: 2019-07-18 | End: 2019-07-25 | Stop reason: ALTCHOICE

## 2019-07-18 RX ORDER — INSULIN GLARGINE 100 [IU]/ML
INJECTION, SOLUTION SUBCUTANEOUS
Qty: 5 PEN | Refills: 0 | Status: SHIPPED | OUTPATIENT
Start: 2019-07-18 | End: 2021-03-01 | Stop reason: ALTCHOICE

## 2019-07-18 NOTE — PROGRESS NOTES
HPI  Nelia Dumas is a 63 y.o. female  Chief Complaint   Patient presents with    Sore Throat     f/u, states she feels better    Medication Problem     states she may need medication adjustments   She reports her throat is much better. She denies cough, sore throat, fevers, chills, nausea, vomiting, or ear pain. She states she is still taking her antibiotic. Reports she is eating very little as she is trying to loose weight. She would like to discuss her lab results. Back pain is chronic and she is taking ibuprofen 600 mg for back pain twice a day for back pain. Back pain is sharp, achy, and dull. Back pain is constant. She states she has to constantly reposition herself. Past Medical History  Past Medical History:   Diagnosis Date    Depression     Hepatitis C     Heroin use disorder, moderate, in early remission ([facility] [address]     NOW on [facility]    Ill-defined condition     Chronic back pain       Surgical History  No past surgical history on file. Medications  Current Outpatient Medications   Medication Sig Dispense Refill    dulaglutide (TRULICITY) 2.06 VN/8.5 mL sub-q pen 0.5 mL by SubCUTAneous route every seven (7) days. 4 Pen 0    metFORMIN ER (GLUCOPHAGE XR) 500 mg tablet Take one tablet with largest meal once a day for one week. On second week take one tablet twice a day with a meal. 60 Tab 0    Blood-Glucose Meter monitoring kit Check blood glucose twice a day. Please give meter insurance will cover. 1 Kit 0    lancets misc Check blood glucose twice a day. 1 Each 11    glucose blood VI test strips (BLOOD GLUCOSE TEST) strip Please provide strips insurance will cover.  100 Strip 12    insulin glargine (LANTUS,BASAGLAR) 100 unit/mL (3 mL) inpn 10 units daily subcu 5 Pen 0    Insulin Needles, Disposable, 31 gauge x 5/16\" ndle Pen needles for Lantus - Box of 100 1 Package 11    amoxicillin-clavulanate (AUGMENTIN) 875-125 mg per tablet Take 1 Tab by mouth every twelve (12) hours for 10 days. 20 Tab 0    hydroCHLOROthiazide (HYDRODIURIL) 12.5 mg tablet TAKE ONE TABLET BY MOUTH DAILY 30 Tab 12    sertraline (ZOLOFT) 100 mg tablet Take two tablets by mouth daily. 60 Tab 6    gabapentin (NEURONTIN) 100 mg capsule Take 3 Caps by mouth three (3) times daily. Indications: Neuropathic Pain 90 Cap 3    methadone HCl (METHADONE PO) Take 85 mg by mouth daily.  ketorolac (TORADOL) 10 mg tablet Take 1 Tab by mouth two (2) times daily as needed for Pain. 20 Tab 0    potassium chloride (KLOR-CON) 10 mEq tablet Take 1 Tab by mouth daily. 30 Tab 3    docusate sodium (COLACE) 100 mg capsule Take 1 Cap by mouth two (2) times a day.  61 Cap 0       Allergies  No Known Allergies    Family History  Family History   Problem Relation Age of Onset    Hypertension Mother     High Cholesterol Mother     COPD Mother     Hypertension Father        Social History  Social History     Socioeconomic History    Marital status: SINGLE     Spouse name: Not on file    Number of children: Not on file    Years of education: Not on file    Highest education level: Not on file   Occupational History    Not on file   Social Needs    Financial resource strain: Not on file    Food insecurity:     Worry: Not on file     Inability: Not on file    Transportation needs:     Medical: Not on file     Non-medical: Not on file   Tobacco Use    Smoking status: Current Every Day Smoker     Packs/day: 0.25     Types: Cigarettes    Smokeless tobacco: Never Used   Substance and Sexual Activity    Alcohol use: No    Drug use: No     Comment: on methadone matinance program    Sexual activity: Never   Lifestyle    Physical activity:     Days per week: Not on file     Minutes per session: Not on file    Stress: Not on file   Relationships    Social connections:     Talks on phone: Not on file     Gets together: Not on file     Attends [employer] service: Not on file     Active member of club or organization: Not on file Attends meetings of clubs or organizations: Not on file     Relationship status: Not on file    Intimate partner violence:     Fear of current or ex partner: Not on file     Emotionally abused: Not on file     Physically abused: Not on file     Forced sexual activity: Not on file   Other Topics Concern    Not on file   Social History Narrative    Not on file       Problem List  Patient Active Problem List   Diagnosis Code    Severe obesity with body mass index (BMI) of 35.0 to 39.9 [facility] E66.01    Discitis of thoracic region M46.44       Review of Systems  Review of Systems   Constitutional: Negative for chills and fever. Respiratory: Negative for shortness of breath. Cardiovascular: Negative for chest pain. Gastrointestinal: Negative for abdominal pain, nausea and vomiting. Genitourinary: Negative for dysuria, frequency, hematuria and urgency. Musculoskeletal: Positive for back pain. Negative for falls. Endo/Heme/Allergies: Positive for polydipsia. Vital Signs  Vitals:    07/18/19 1041   BP: 132/81   Pulse: 76   Resp: 20   Temp: 98.1 °F (36.7 °C)   TempSrc: Oral   SpO2: 95%   Weight: 213 lb 6.4 oz (96.8 kg)   Height: 5' 3\" (1.6 m)   PainSc:   4   PainLoc: Back       Physical Exam  Physical Exam   Constitutional: She is oriented to person, place, and time. HENT:   Mouth/Throat: Oropharynx is clear and moist.   Eyes: Pupils are equal, round, and reactive to light. Cardiovascular: Normal rate and regular rhythm. Pulmonary/Chest: Effort normal and breath sounds normal.   Neurological: She is alert and oriented to person, place, and time. Skin: Skin is warm and dry. Psychiatric: Her speech is normal. Judgment normal. Cognition and memory are normal.   tearful   Vitals reviewed.       Diagnostics  Orders Placed This Encounter    SODIUM     Standing Status:   Future     Number of Occurrences:   1     Standing Expiration Date:   7/18/2020    MICROALBUMIN, UR, RAND W/ MICROALB/CREAT RATIO     Standing Status:   Future     Number of Occurrences:   1     Standing Expiration Date:   2020    MICROALBUMIN, UR, RAND    SODIUM    AMB POC HEMOGLOBIN A1C    dulaglutide (TRULICITY) 6.60 BU/5.7 mL sub-q pen     Si.5 mL by SubCUTAneous route every seven (7) days. Dispense:  4 Pen     Refill:  0    metFORMIN ER (GLUCOPHAGE XR) 500 mg tablet     Sig: Take one tablet with largest meal once a day for one week. On second week take one tablet twice a day with a meal.     Dispense:  60 Tab     Refill:  0    DISCONTD: insulin glargine (LANTUS,BASAGLAR) 100 unit/mL (3 mL) inpn     Sig: 10 units daily subcu     Dispense:  4 Pen     Refill:  0    Blood-Glucose Meter monitoring kit     Sig: Check blood glucose twice a day. Please give meter insurance will cover. Dispense:  1 Kit     Refill:  0    lancets misc     Sig: Check blood glucose twice a day. Dispense:  1 Each     Refill:  11    glucose blood VI test strips (BLOOD GLUCOSE TEST) strip     Sig: Please provide strips insurance will cover. Dispense:  100 Strip     Refill:  12    insulin glargine (LANTUS,BASAGLAR) 100 unit/mL (3 mL) inpn     Sig: 10 units daily subcu     Dispense:  5 Pen     Refill:  0    Insulin Needles, Disposable, 31 gauge x 5/16\" ndle     Sig: Pen needles for Lantus - Box of 100     Dispense:  1 Package     Refill:  11       Results  Results for orders placed or performed in visit on 19   MICROALBUMIN, UR, RAND   Result Value Ref Range    Creatinine, urine 65.4 Not Estab. mg/dL    Microalbumin, urine 15.3 Not Estab. ug/mL    Microalb/Creat ratio (ug/mg creat.) 23.4 0.0 - 30.0 mg/g creat   SODIUM   Result Value Ref Range    Sodium 133 (L) 134 - 144 mmol/L   AMB POC HEMOGLOBIN A1C   Result Value Ref Range    Hemoglobin A1c (POC) 11.5 %       Assessment and Plan  Diagnoses and all orders for this visit:    1.  Blood glucose elevated  -     AMB POC HEMOGLOBIN A1C  -     dulaglutide (TRULICITY) 1.58 XE/6.0 mL sub-q pen; 0.5 mL by SubCUTAneous route every seven (7) days. -     metFORMIN ER (GLUCOPHAGE XR) 500 mg tablet; Take one tablet with largest meal once a day for one week. On second week take one tablet twice a day with a meal.  -     MICROALBUMIN, UR, RAND W/ MICROALB/CREAT RATIO; Future  -     Blood-Glucose Meter monitoring kit; Check blood glucose twice a day. Please give meter insurance will cover. -     lancets misc; Check blood glucose twice a day. -     glucose blood VI test strips (BLOOD GLUCOSE TEST) strip; Please provide strips insurance will cover. -     insulin glargine (LANTUS,BASAGLAR) 100 unit/mL (3 mL) inpn; 10 units daily subcu  -     Insulin Needles, Disposable, 31 gauge x 5/16\" ndle; Pen needles for Lantus - Box of 100    2. Hyponatremia  -     SODIUM; Future  -     SODIUM    3. Controlled type 2 diabetes mellitus with complication, without long-term current use of insulin (HCC)  -     dulaglutide (TRULICITY) 0.50 GD/2.3 mL sub-q pen; 0.5 mL by SubCUTAneous route every seven (7) days. -     metFORMIN ER (GLUCOPHAGE XR) 500 mg tablet; Take one tablet with largest meal once a day for one week. On second week take one tablet twice a day with a meal.  -     MICROALBUMIN, UR, RAND W/ MICROALB/CREAT RATIO; Future  -     Blood-Glucose Meter monitoring kit; Check blood glucose twice a day. Please give meter insurance will cover. -     lancets misc; Check blood glucose twice a day. -     glucose blood VI test strips (BLOOD GLUCOSE TEST) strip; Please provide strips insurance will cover. -     insulin glargine (LANTUS,BASAGLAR) 100 unit/mL (3 mL) inpn; 10 units daily subcu  -     Insulin Needles, Disposable, 31 gauge x 5/16\" ndle; Pen needles for Lantus - Box of 100  -     MICROALBUMIN, UR, RAND    4. Chronic back pain, unspecified back location, unspecified back pain laterality      Detailed discussion with patient regarding lab results. Discussed diabetes in detail. Discussed insulin and other medication options. Discussed the risks of diabetes if left untreated and uncontrolled. Discussed side effects of prescribed medication. Medication, side effects, possible allergic reactions and warnings reviewed with patient. Patient verbalized understanding. Discussed diet and exercise with patient in detail. More than 50% of 90 minute visit spent counseling and coordinating care with patient face to face on diabetes, sodium, medication side effects, diet, exercise, endocrinology, and all of her lab results. She is requesting to go to the endocrinologist her mothers sees. Althea Dumont)  After care summary printed and reviewed with patient. Plan reviewed with patient. Questions answered. Patient verbalized understanding of plan and is in agreement with plan. Patient to follow up in one week or earlier if symptoms worsen. We will further discussed the importance of a dietitian and endocrinology at her next visit along with assessing her blood glucose monitor and her comfort with insulin administration. Follow-up and Dispositions    · Return in about 1 week (around 7/25/2019), or if symptoms worsen or fail to improve.        MILADYS Arnold

## 2019-07-18 NOTE — PROGRESS NOTES
Amari Quintana presents today for   Chief Complaint   Patient presents with    Sore Throat     f/u, states she feels better    Medication Problem     states she may need medication adjustments       Is someone accompanying this pt? No    Is the patient using any DME equipment during OV? No    Depression Screening:  3 most recent PHQ Screens 6/4/2019   Little interest or pleasure in doing things More than half the days   Feeling down, depressed, irritable, or hopeless More than half the days   Total Score PHQ 2 4   Trouble falling or staying asleep, or sleeping too much Nearly every day   Feeling tired or having little energy Nearly every day   Poor appetite, weight loss, or overeating Nearly every day   Feeling bad about yourself - or that you are a failure or have let yourself or your family down Not at all   Trouble concentrating on things such as school, work, reading, or watching TV Not at all   Moving or speaking so slowly that other people could have noticed; or the opposite being so fidgety that others notice Not at all   Thoughts of being better off dead, or hurting yourself in some way Not at all   PHQ 9 Score 13   How difficult have these problems made it for you to do your work, take care of your home and get along with others Somewhat difficult       Learning Assessment:  Learning Assessment 6/4/2019   PRIMARY LEARNER Patient   HIGHEST LEVEL OF EDUCATION - PRIMARY LEARNER  GRADUATED HIGH SCHOOL OR GED   BARRIERS PRIMARY LEARNER NONE   CO-LEARNER CAREGIVER No   PRIMARY LANGUAGE ENGLISH   LEARNER PREFERENCE PRIMARY LISTENING   ANSWERED BY self   RELATIONSHIP SELF       Abuse Screening:  Abuse Screening Questionnaire 6/4/2019   Do you ever feel afraid of your partner? N   Are you in a relationship with someone who physically or mentally threatens you? N   Is it safe for you to go home?  Y         Health Maintenance Due   Topic Date Due    PAP AKA CERVICAL CYTOLOGY  10/29/1982    Shingrix Vaccine Age 50> (1 of 2) 10/29/2011    FOBT Q 1 YEAR AGE 50-75  10/29/2011   . Health Maintenance reviewed and discussed and ordered per Provider. Coordination of Care  1. Have you been to the ER, urgent care clinic since your last visit? Hospitalized since your last visit? No    2. Have you seen or consulted any other health care providers outside of the [address] [place] [address] since your last visit? Include any pap smears or colon screening. No        Advance Directive:  1. Do you have an advance directive in place? Patient Reply:No    2. If not, would you like material regarding how to put one in place?  Patient Reply: No

## 2019-07-18 NOTE — PATIENT INSTRUCTIONS
Dulaglutide (By injection)   Dulaglutide (doo-la-GLOO-tide)  Treats type 2 diabetes. Brand Name(s): Trulicity   There may be other brand names for this medicine. When This Medicine Should Not Be Used: This medicine is not right for everyone. Do not use it if you had an allergic reaction to dulaglutide, you have multiple endocrine neoplasia syndrome type 2 (MEN 2), or you or anyone in your family has had medullary thyroid cancer. How to Use This Medicine:   Injectable  · Your doctor will prescribe your exact dose. This medicine is usually given once a week, on the same day of the week. It is given as a shot under the skin of your stomach, thighs, or upper arms. · You may be taught how to give your medicine at home. Make sure you understand all instructions before giving yourself an injection. Do not use more medicine or use it more often than your doctor tells you to. · If you use insulin in addition to this medicine, do not mix them in the same syringe. You may give the shots in the same area (such as your stomach), but do not give the shots right next to each other. · If the medicine in the pen or prefilled syringe has changed color, looks cloudy, or has particles in it, do not use it. · You will be shown the body areas where this shot can be given. Use a different body area each time you give yourself a shot. Keep track of where you give each shot to make sure you rotate body areas. · Use a new needle and syringe each time you inject your medicine. · This medicine should come with a Medication Guide. Ask your pharmacist for a copy if you do not have one. · Missed dose: Use a missed dose as soon as possible if there are at least 3 days before your next dose is due. If your next dose is due in less than 3 days, then skip the missed dose. · Store your medicine pens or prefilled syringes in the refrigerator and keep them in the original carton. Protect the pens from light.  You may also store the pens at room temperature for up to 14 days. Do not freeze the medicine, and do not use the medicine if it has been frozen. Drugs and Foods to Avoid:      Ask your doctor or pharmacist before using any other medicine, including over-the-counter medicines, vitamins, and herbal products. Warnings While Using This Medicine:   · Tell your doctor if you are pregnant or breastfeeding, or if you have kidney disease or a history of pancreas problems. Tell your doctor if you have severe digestion problems (such as gastroparesis) or stomach or bowel problems. · This medicine may cause the following problems:  ¨ Increased risk of thyroid tumor  ¨ Pancreatitis  ¨ Low blood sugar (more likely if you also take insulin or other diabetes medicine)  · Your doctor will do lab tests at regular visits to check on the effects of this medicine. Keep all appointments. · Keep all medicine out of the reach of children. Never share your medicine with anyone. Do not share needles or pens because you can spread an infection. Possible Side Effects While Using This Medicine:   Call your doctor right away if you notice any of these side effects:  · Allergic reaction: Itching or hives, swelling in your face or hands, swelling or tingling in your mouth or throat, chest tightness, trouble breathing  · A lump or swelling in your neck, trouble breathing or swallowing  · Change in how much or how often you urinate  · Shaking, trembling, sweating, fast or pounding heartbeat, lightheadedness, hunger, confusion  · Sudden and severe stomach pain, nausea, vomiting, fever, and lightheadedness  If you notice these less serious side effects, talk with your doctor:   · Diarrhea  · Redness, itching, swelling, or any changes in your skin where the shot was given  If you notice other side effects that you think are caused by this medicine, tell your doctor. Call your doctor for medical advice about side effects.  You may report side effects to FDA at 1-800-FDA-1088  © 2017 [facility] Information is for End User's use only and may not be sold, redistributed or otherwise used for commercial purposes. The above information is an  only. It is not intended as medical advice for individual conditions or treatments. Talk to your doctor, nurse or pharmacist before following any medical regimen to see if it is safe and effective for you. Dulaglutide (Trulicity) - (By injection)   Why this medicine is used:   Treats type 2 diabetes. Contact a nurse or doctor right away if you have:  · A lump or swelling in your neck, trouble breathing or swallowing  · Fast or pounding heartbeat  · Change in how much or how often you urinate  · Shaking, trembling, sweating, lightheadedness, confusion  · Stomach pain, nausea, vomiting, fever     Common side effects:  · Diarrhea  © 2017 [facility] Information is for End User's use only and may not be sold, redistributed or otherwise used for commercial purposes. Metformin (By mouth)   Metformin Hydrochloride (met-FOR-min kerrie-droe-KLOR-claire)  Treats type 2 diabetes. Brand Name(s): DM2, Fortamet, Glucophage, Glucophage XR, Glumetza, Riomet   There may be other brand names for this medicine. When This Medicine Should Not Be Used: This medicine is not right for everyone. Do not use if you had an allergic reaction to metformin. How to Use This Medicine:   Liquid, Tablet, Long Acting Tablet  · Take your medicine as directed. Your dose may need to be changed several times to find what works best for you. · It is best to take this medicine with food or milk. · Swallow the extended-release tablet whole. Do not crush, break, or chew it. Tell your doctor if you have trouble swallowing the tablets whole. · Measure the oral liquid medicine with a marked measuring spoon, oral syringe, or medicine cup. · Read and follow the patient instructions that come with this medicine.  Talk to your doctor or pharmacist if you have any questions. · Missed dose: Take a dose as soon as you remember. If it is almost time for your next dose, wait until then and take a regular dose. Do not take extra medicine to make up for a missed dose. · Store the medicine in a closed container at room temperature, away from heat, moisture, and direct light. Drugs and Foods to Avoid:   Ask your doctor or pharmacist before using any other medicine, including over-the-counter medicines, vitamins, and herbal products. · Some medicines can affect how metformin works. Tell your doctor if you are using any of the following:  ¨ Acetazolamide, dichlorphenamide, dolutegravir, isoniazid, nicotinic acid, phenytoin, ranolazine, topiramate, vandetanib, zonisamide  ¨ Birth control pills  ¨ Blood pressure medicine  ¨ Diuretic (water pill)  ¨ Phenothiazine medicine  ¨ Steroid medicine  ¨ Thyroid medicine  · Do not drink alcohol while you are using this medicine. Warnings While Using This Medicine:   · Tell your doctor if you are pregnant or breastfeeding, or if you have kidney disease, liver disease, heart or blood vessel disease, heart failure, blood circulation problems, anemia, metabolic acidosis, an adrenal gland or pituitary gland disorder, vitamin B12 deficiency, or had a heart attack. Tell your doctor if you drink alcohol. · Too much of this medicine can cause a rare, but serious condition called lactic acidosis. · Part of the extended-release tablet may pass in your stool. This is normal.  · Make sure any doctor or dentist who treats you knows that you are using this medicine. You may need to stop using this medicine before you have surgery, an x-ray, CT scan, or other medical test.  · Your doctor will do lab tests at regular visits to check on the effects of this medicine. Keep all appointments. · Keep all medicine out of the reach of children. Never share your medicine with anyone.   Possible Side Effects While Using This Medicine: Call your doctor right away if you notice any of these side effects:  · Allergic reaction: Itching or hives, swelling in your face or hands, swelling or tingling in your mouth or throat, chest tightness, trouble breathing  · Confusion, fast heartbeat, increased hunger, shakiness  · Fever or chills  · Stomach pain, nausea, vomiting, muscle pain or cramping  · Trouble breathing, slow heartbeat, lightheadedness, dizziness  · Unusual tiredness or weakness  If you notice these less serious side effects, talk with your doctor:   · Diarrhea, gas  If you notice other side effects that you think are caused by this medicine, tell your doctor. Call your doctor for medical advice about side effects. You may report side effects to FDA at 3-074-PWX-7535  © 2017 [facility] Information is for End User's use only and may not be sold, redistributed or otherwise used for commercial purposes. The above information is an  only. It is not intended as medical advice for individual conditions or treatments. Talk to your doctor, nurse or pharmacist before following any medical regimen to see if it is safe and effective for you. Insulin Glargine (By injection)   Insulin Glargine, Recombinant (IN-brown-felicita DAMIAN-fabrice, mignon-KOM-bi-diana)  Treats diabetes. Brand Name(s): Basaglar KwikPen, Lantus, Lantus Novaplus, Lantus SoloStar, Lantus SoloStar Novaplus, Toujeo   There may be other brand names for this medicine. When This Medicine Should Not Be Used: This medicine is not right for everyone. Do not use it if you had an allergic reaction to insulin glargine. How to Use This Medicine:   Injectable  · Your healthcare provider will work with you to personalize your dose and treatment based on your insulin needs and lifestyle. You will be taught how to give yourself the injections. Make sure you understand all instructions. Ask the doctor, nurse, or pharmacist if you have questions.   · If you use insulin once a day, it is best to use it at about the same time every day. · Always double-check both the concentration (strength) of your insulin and your dose. Concentration and dose are not the same. The dose is how many units of insulin you will use. The concentration tells how many units of insulin are in each milliliter (mL), such as 100 units/mL (U-100), but this does not mean you will use 100 units at a time. · Read and follow the patient instructions that come with this medicine. Talk to your doctor or pharmacist if you have any questions. · This medicine should look clear before you use it. Do not shake the vial. Do not mix this medicine with any other insulin or with water. · You will be shown the body areas where this shot can be given. Use a different body area each time you give yourself a shot. Keep track of where you give each shot to make sure you rotate body areas. · Use a new needle and syringe each time you inject your medicine. If you use a syringe, use only the kind that is made for insulin injections. Some insulin must be given with a specific type of syringe or needle. Ask your pharmacist if you are not sure which one to use. · Always check the label before use, to make sure you have the correct type of insulin. Do not change the brand, type, or concentration unless your doctor tells you to. If you use a pump or other device, make sure the insulin is made for that device. · Unopened medicine: Store the vials, cartridges, and SoloStar® pens in the refrigerator. Protect from light. Do not freeze. · Opened medicine:   ¨ Vials: Store in the refrigerator or at room temperature in a cool place, away from sunlight and heat. Use within 28 days. ¨ Cartridge or [employer]: Store at room temperature, away from direct heat and light. Do not refrigerate. Throw away any opened cartridge or Lantus® SoloStar® pen after 28 days. Throw away any opened [employer] after 42 days.   · Throw away used needles in a hard, closed container that the needles cannot poke through. Keep this container away from children and pets. Drugs and Foods to Avoid:   Ask your doctor or pharmacist before using any other medicine, including over-the-counter medicines, vitamins, and herbal products. · Some medicines can change the amount of insulin you need to use and make it harder for you to control your diabetes. Tell your doctor about all other medicines that you are using. · Do not drink alcohol while you are using this medicine. Warnings While Using This Medicine:   · Tell your doctor if you are pregnant or breastfeeding, or if you have kidney disease, liver disease, heart disease, or heart failure. · This medicine may cause the following problems:  ¨ Low blood sugar or low potassium levels in the blood  ¨ Fluid retention or heart failure (when used with thiazolidinedione [TZD] medicine)  · Never share insulin pens, needles, or cartridges with anyone. Sharing these can pass hepatitis viruses, HIV, or other illnesses from one person to another. · Keep all medicine out of the reach of children. Never share your medicine with anyone. Possible Side Effects While Using This Medicine:   Call your doctor right away if you notice any of these side effects:  · Allergic reaction: Itching or hives, swelling in your face or hands, swelling or tingling in your mouth or throat, chest tightness, trouble breathing  · Dry mouth, increased thirst, muscle cramps, nausea or vomiting, uneven heartbeat  · Rapid weight gain, swelling in your hands, ankles, or feet, trouble breathing, tiredness  · Shaking, trembling, sweating, fast or pounding heartbeat, lightheadedness, hunger, confusion  If you notice these less serious side effects, talk with your doctor:   · Redness, pain, itching, swelling, or any skin changes where the shot was given  If you notice other side effects that you think are caused by this medicine, tell your doctor.    Call your doctor for medical advice about side effects. You may report side effects to FDA at 6-464-OMU-0631  © 2017 [facility] Information is for End User's use only and may not be sold, redistributed or otherwise used for commercial purposes. The above information is an  only. It is not intended as medical advice for individual conditions or treatments. Talk to your doctor, nurse or pharmacist before following any medical regimen to see if it is safe and effective for you. Insulin Glargine (Lantus, Lantus SoloStar, Toujeo) - (By injection)   Why this medicine is used:   Treats diabetes. Contact a nurse or doctor right away if you have:  · Fast, pounding, or uneven heartbeat  · Lightheadedness, confusion  · Shaking, trembling, sweating  · Dry mouth, increased thirst or hunger, muscle cramps, nausea or vomiting     Common side effects:  · Redness, itching, swelling, changes in your skin where the shot is given  © 2017 [address] is for End User's use only and may not be sold, redistributed or otherwise used for commercial purposes.

## 2019-07-19 LAB
ALBUMIN/CREAT UR: 23.4 MG/G CREAT (ref 0–30)
CREAT UR-MCNC: 65.4 MG/DL
MICROALBUMIN UR-MCNC: 15.3 UG/ML
SODIUM SERPL-SCNC: 133 MMOL/L (ref 134–144)

## 2019-07-25 ENCOUNTER — OFFICE VISIT (OUTPATIENT)
Dept: FAMILY MEDICINE CLINIC | Age: 59
End: 2019-07-25

## 2019-07-25 VITALS
RESPIRATION RATE: 18 BRPM | HEIGHT: 63 IN | BODY MASS INDEX: 37.56 KG/M2 | WEIGHT: 212 LBS | SYSTOLIC BLOOD PRESSURE: 122 MMHG | DIASTOLIC BLOOD PRESSURE: 81 MMHG | TEMPERATURE: 98.2 F | HEART RATE: 69 BPM

## 2019-07-25 DIAGNOSIS — E11.8 CONTROLLED TYPE 2 DIABETES MELLITUS WITH COMPLICATION, WITHOUT LONG-TERM CURRENT USE OF INSULIN (HCC): ICD-10-CM

## 2019-07-25 DIAGNOSIS — I10 ESSENTIAL HYPERTENSION: ICD-10-CM

## 2019-07-25 DIAGNOSIS — K59.00 CONSTIPATION, UNSPECIFIED CONSTIPATION TYPE: ICD-10-CM

## 2019-07-25 DIAGNOSIS — E11.8 CONTROLLED TYPE 2 DIABETES MELLITUS WITH COMPLICATION, WITHOUT LONG-TERM CURRENT USE OF INSULIN (HCC): Primary | ICD-10-CM

## 2019-07-25 RX ORDER — BLOOD-GLUCOSE METER
EACH MISCELLANEOUS
Refills: 0 | COMMUNITY
Start: 2019-07-18

## 2019-07-25 RX ORDER — ATORVASTATIN CALCIUM 10 MG/1
10 TABLET, FILM COATED ORAL DAILY
Qty: 30 TAB | Refills: 1 | Status: SHIPPED | OUTPATIENT
Start: 2019-07-25 | End: 2019-09-26 | Stop reason: SDUPTHER

## 2019-07-25 RX ORDER — FLUCONAZOLE 150 MG/1
TABLET ORAL
Refills: 0 | COMMUNITY
Start: 2019-07-11 | End: 2020-02-04 | Stop reason: ALTCHOICE

## 2019-07-25 RX ORDER — LANCETS 33 GAUGE
EACH MISCELLANEOUS
Refills: 11 | COMMUNITY
Start: 2019-07-18 | End: 2019-10-02 | Stop reason: SDUPTHER

## 2019-07-25 RX ORDER — PEN NEEDLE, DIABETIC 31 GX5/16"
NEEDLE, DISPOSABLE MISCELLANEOUS
Refills: 11 | COMMUNITY
Start: 2019-07-18 | End: 2020-12-21

## 2019-07-25 NOTE — PROGRESS NOTES
Kasia Zamora presents today for   Chief Complaint   Patient presents with    Diabetes     follow up    High Blood Sugar    Other     Hyponatremia       Kasia Zamora preferred language for health care discussion is english/other. Is someone accompanying this pt? no    Is the patient using any DME equipment during [address]? no    Depression Screening:  3 most recent PHQ Screens 6/4/2019   Little interest or pleasure in doing things More than half the days   Feeling down, depressed, irritable, or hopeless More than half the days   Total Score PHQ 2 4   Trouble falling or staying asleep, or sleeping too much Nearly every day   Feeling tired or having little energy Nearly every day   Poor appetite, weight loss, or overeating Nearly every day   Feeling bad about yourself - or that you are a failure or have let yourself or your family down Not at all   Trouble concentrating on things such as school, work, reading, or watching TV Not at all   Moving or speaking so slowly that other people could have noticed; or the opposite being so fidgety that others notice Not at all   Thoughts of being better off dead, or hurting yourself in some way Not at all   PHQ 9 Score 13   How difficult have these problems made it for you to do your work, take care of your home and get along with others Somewhat difficult       Learning Assessment:  Learning Assessment 6/4/2019   PRIMARY LEARNER Patient   HIGHEST LEVEL OF EDUCATION - PRIMARY LEARNER  GRADUATED HIGH SCHOOL OR GED   BARRIERS PRIMARY LEARNER NONE   CO-LEARNER CAREGIVER No   PRIMARY LANGUAGE ENGLISH   LEARNER PREFERENCE PRIMARY LISTENING   ANSWERED BY self   RELATIONSHIP SELF       Abuse Screening:  Abuse Screening Questionnaire 6/4/2019   Do you ever feel afraid of your partner? N   Are you in a relationship with someone who physically or mentally threatens you? N   Is it safe for you to go home?  Y       Health Maintenance Due   Topic Date Due    FOOT EXAM Q1  10/29/1971   Avinash No EYE EXAM RETINAL OR DILATED  10/29/1971    PAP AKA CERVICAL CYTOLOGY  10/29/1982    Shingrix Vaccine Age 50> (1 of 2) 10/29/2011    FOBT Q 1 YEAR AGE 50-75  10/29/2011    LIPID PANEL Q1  07/13/2019   . Health Maintenance reviewed and discussed and ordered per Provider. Paige Diego is updated on all     Coordination of Care:  1. Have you been to the ER, urgent care clinic since your last visit? Hospitalized since your last visit? no    2. Have you seen or consulted any other health care providers outside of the [address] [place] [address] since your last visit? Include any pap smears or colon screening. no      Advance Directive:  1. Do you have an advance directive in place? Patient Reply:no    2. If not, would you like material regarding how to put one in place?  Patient Reply: no

## 2019-07-25 NOTE — PATIENT INSTRUCTIONS
Atorvastatin (By mouth)   Atorvastatin (a-tor-va-STAT-in)  Treats high cholesterol and triglyceride levels. Reduces the risk of angina, stroke, heart attack, or certain heart and blood vessel problems. This medicine is a statin. Brand Name(s): Lipitor   There may be other brand names for this medicine. When This Medicine Should Not Be Used: This medicine is not right for everyone. Do not use it if you had an allergic reaction to atorvastatin, if you have active liver disease, or if you are pregnant or breastfeeding. How to Use This Medicine:   Tablet  · Take your medicine as directed. Your dose may need to be changed several times to find what works best for you. · Take this medicine at the same time each day. · Swallow the tablet whole. Do not break it. · Missed dose: Take a dose as soon as you remember. If it is less than 12 hours until your next dose, skip the missed dose and take the next dose at the regular time. Do not take 2 doses of this medicine within 12 hours. · Read and follow the patient instructions that come with this medicine. Talk to your doctor or pharmacist if you have any questions. · Store the medicine in a closed container at room temperature, away from heat, moisture, and direct light. Drugs and Foods to Avoid:   Ask your doctor or pharmacist before using any other medicine, including over-the-counter medicines, vitamins, and herbal products. · Some medicines can affect how atorvastatin works. Tell your doctor if you also use birth control pills, boceprevir, cimetidine, colchicine, cyclosporine, digoxin, niacin, rifampin, spironolactone, telaprevir, medicine to treat an infection, or medicine to treat HIV/AIDS. Warnings While Using This Medicine:   · It is not safe to take this medicine during pregnancy. It could harm an unborn baby. Tell your doctor right away if you become pregnant.   · Tell your doctor if you have kidney disease, diabetes, muscle pain or weakness, thyroid problems, have recently had a stroke or TIA (transient ischemic attack), or have a history of liver disease. Tell your doctor if you drink grapefruit juice or drink alcohol regularly. · This medicine can cause muscle problems, which can lead to kidney problems. · Tell any doctor or dentist who treats you that you use this medicine. You may need to stop using it if you have surgery, have an injury, or develop serious health problems. · Your doctor will do lab tests at regular visits to check on the effects of this medicine. Keep all appointments. · Keep all medicine out of the reach of children. Never share your medicine with anyone. Possible Side Effects While Using This Medicine:   Call your doctor right away if you notice any of these side effects:  · Allergic reaction: Itching or hives, swelling in your face or hands, swelling or tingling in your mouth or throat, chest tightness, trouble breathing  · Blistering, peeling, red skin rash  · Change in how much or how often you urinate  · Dark urine or pale stools, nausea, vomiting, loss of appetite, stomach pain, yellow skin or eyes  · Fever  · Muscle pain, tenderness, or weakness  · Unusual tiredness  If you notice these less serious side effects, talk with your doctor:   · Diarrhea  · Joint pain  If you notice other side effects that you think are caused by this medicine, tell your doctor. Call your doctor for medical advice about side effects. You may report side effects to FDA at 7-891-FDA-7031  © 2017 [facility] Information is for End User's use only and may not be sold, redistributed or otherwise used for commercial purposes. The above information is an  only. It is not intended as medical advice for individual conditions or treatments. Talk to your doctor, nurse or pharmacist before following any medical regimen to see if it is safe and effective for you.

## 2019-07-25 NOTE — PROGRESS NOTES
MARIA EUGENIA  Gennaro Solorio is a 63 y.o. female  Chief Complaint   Patient presents with    Diabetes     follow up    High Blood Sugar    Other     Hyponatremia   Patient reports she is feeling much better. Patient presents to follow-up on new type 2 diabetes mellitus diagnosis. She is checking her BG BID averaging in the 200-300's (7/20 - 304, 297; 7/21 - 280, 239). She reports being compliant with her insulin and metformin but was unable to obtain the Trulicity due to need for prior authorization. She denies any vision changes, chest pain, shortness of breath, or lower extremity edema. She reports she her sweating has decreased. Her last eye exam and pap were in 2005, she has never had a colonoscopy but sees GI for her hepatitis C management and they are scheduling her colonoscopy. She has never had a foot exam. She reports constipation with no abdominal pain and her last bowel movement being 2 days ago. She is receiving an Endoscopy with ultrasound on 8/16 to view her liver. Past Medical History  Past Medical History:   Diagnosis Date    Depression     Hepatitis C     Heroin use disorder, moderate, in early remission ([facility] [address]     NOW on [facility] [address]    Ill-defined condition     Chronic back pain       Surgical History  No past surgical history on file. Medications  Current Outpatient Medications   Medication Sig Dispense Refill    TRUE METRIX GLUCOSE METER misc   0    TRUEPLUS LANCETS 33 gauge misc   11    BD ULTRA-FINE MINI PEN NEEDLE 31 gauge x 3/16\" ndle   11    atorvastatin (LIPITOR) 10 mg tablet Take 1 Tab by mouth daily. 30 Tab 1    dulaglutide (TRULICITY) 4.09 DO/3.1 mL sub-q pen 0.5 mL by SubCUTAneous route every seven (7) days. 4 Pen 0    metFORMIN ER (GLUCOPHAGE XR) 500 mg tablet Take one tablet with largest meal once a day for one week.  On second week take one tablet twice a day with a meal. 60 Tab 0    glucose blood VI test strips (BLOOD GLUCOSE TEST) strip Please provide strips insurance will cover. 100 Strip 12    insulin glargine (LANTUS,BASAGLAR) 100 unit/mL (3 mL) inpn 10 units daily subcu 5 Pen 0    hydroCHLOROthiazide (HYDRODIURIL) 12.5 mg tablet TAKE ONE TABLET BY MOUTH DAILY 30 Tab 12    sertraline (ZOLOFT) 100 mg tablet Take two tablets by mouth daily. 60 Tab 6    gabapentin (NEURONTIN) 100 mg capsule Take 3 Caps by mouth three (3) times daily. Indications: Neuropathic Pain 90 Cap 3    methadone HCl (METHADONE PO) Take 85 mg by mouth daily.  fluconazole (DIFLUCAN) 150 mg tablet   0    ketorolac (TORADOL) 10 mg tablet Take 1 Tab by mouth two (2) times daily as needed for Pain. 20 Tab 0    potassium chloride (KLOR-CON) 10 mEq tablet Take 1 Tab by mouth daily. 30 Tab 3    docusate sodium (COLACE) 100 mg capsule Take 1 Cap by mouth two (2) times a day.  61 Cap 0       Allergies  No Known Allergies    Family History  Family History   Problem Relation Age of Onset    Hypertension Mother     High Cholesterol Mother     COPD Mother     Hypertension Father        Social History  Social History     Socioeconomic History    Marital status: SINGLE     Spouse name: Not on file    Number of children: Not on file    Years of education: Not on file    Highest education level: Not on file   Occupational History    Not on file   Social Needs    Financial resource strain: Not on file    Food insecurity:     Worry: Not on file     Inability: Not on file    Transportation needs:     Medical: Not on file     Non-medical: Not on file   Tobacco Use    Smoking status: Current Every Day Smoker     Packs/day: 0.25     Types: Cigarettes    Smokeless tobacco: Never Used   Substance and Sexual Activity    Alcohol use: No    Drug use: No     Comment: on methadone matinance program    Sexual activity: Never   Lifestyle    Physical activity:     Days per week: Not on file     Minutes per session: Not on file    Stress: Not on file   Relationships    Social connections:     Talks on phone: Not on file     Gets together: Not on file     Attends Restorationist service: Not on file     Active member of club or organization: Not on file     Attends meetings of clubs or organizations: Not on file     Relationship status: Not on file    Intimate partner violence:     Fear of current or ex partner: Not on file     Emotionally abused: Not on file     Physically abused: Not on file     Forced sexual activity: Not on file   Other Topics Concern    Not on file   Social History Narrative    Not on file       Problem List  Patient Active Problem List   Diagnosis Code    Severe obesity with body mass index (BMI) of 35.0 to 39.9 with serious comorbidity ([facility] [address] E66.01    Discitis of thoracic region M46.44       Review of Systems  Review of Systems   Constitutional: Positive for diaphoresis. Eyes: Negative for blurred vision and double vision. Respiratory: Negative for shortness of breath. Cardiovascular: Negative for chest pain, palpitations and leg swelling. Gastrointestinal: Positive for constipation. Negative for abdominal pain. Skin: Negative for itching and rash. Neurological: Negative for dizziness. Vital Signs  Vitals:    07/25/19 0751   BP: 122/81   Pulse: 69   Resp: 18   Temp: 98.2 °F (36.8 °C)   TempSrc: Oral   Weight: 212 lb (96.2 kg)   Height: 5' 3\" (1.6 m)   PainSc:   0 - No pain       Physical Exam  Physical Exam   Constitutional: She is oriented to person, place, and time. No distress. HENT:   Mouth/Throat: Oropharynx is clear and moist.   Eyes: Pupils are equal, round, and reactive to light. Cardiovascular: Normal rate, regular rhythm, normal heart sounds and intact distal pulses. Pulmonary/Chest: Effort normal and breath sounds normal. No respiratory distress. Abdominal: Soft. Bowel sounds are normal. She exhibits no distension. There is no tenderness. Musculoskeletal: She exhibits no edema. Neurological: She is alert and oriented to person, place, and time.  No sensory deficit. Skin: Skin is warm and dry. Psychiatric: She has a normal mood and affect. Her behavior is normal.   Vitals reviewed. Diagnostics  Orders Placed This Encounter    LIPID PANEL     Standing Status:   Future     Number of Occurrences:   1     Standing Expiration Date:   7/25/2020    REFERRAL TO OPHTHALMOLOGY     Referral Priority:   Routine     Referral Type:   Consultation     Referral Reason:   Specialty Services Required     Number of Visits Requested:   1    fluconazole (DIFLUCAN) 150 mg tablet     Refill:  0    TRUE METRIX GLUCOSE METER misc     Refill:  0    TRUEPLUS LANCETS 33 gauge misc     Refill:  11    BD ULTRA-FINE MINI PEN NEEDLE 31 gauge x 3/16\" ndle     Refill:  11    atorvastatin (LIPITOR) 10 mg tablet     Sig: Take 1 Tab by mouth daily. Dispense:  30 Tab     Refill:  1       Results  Results for orders placed or performed in visit on 07/18/19   MICROALBUMIN, UR, RAND   Result Value Ref Range    Creatinine, urine 65.4 Not Estab. mg/dL    Microalbumin, urine 15.3 Not Estab. ug/mL    Microalb/Creat ratio (ug/mg creat.) 23.4 0.0 - 30.0 mg/g creat   SODIUM   Result Value Ref Range    Sodium 133 (L) 134 - 144 mmol/L   AMB POC HEMOGLOBIN A1C   Result Value Ref Range    Hemoglobin A1c (POC) 11.5 %     Assessment and Plan  Diagnoses and all orders for this visit:    1. Controlled type 2 diabetes mellitus with complication, without long-term current use of insulin ([address]  -     LIPID PANEL; Future  -     atorvastatin (LIPITOR) 10 mg tablet; Take 1 Tab by mouth daily.  -     REFERRAL TO OPHTHALMOLOGY    2. Essential hypertension  -     LIPID PANEL; Future  -     atorvastatin (LIPITOR) 10 mg tablet; Take 1 Tab by mouth daily. 3. Constipation, unspecified constipation type    OTC Miralax for constipation. Nursing assisting with prior auth while patient is in the office. After care summary printed and reviewed with patient. Plan reviewed with patient. Questions answered. Patient verbalized understanding of plan and is in agreement with plan. Patient to follow up in one month or earlier if symptoms worsen. Follow-up and Dispositions    · Return in about 1 month (around 8/25/2019), or if symptoms worsen or fail to improve, for diabetes, hypertension, lab results.        Matheus Gutierres, ARIELLAP-C

## 2019-08-03 LAB
ALBUMIN/CREAT UR: <6.9 MG/G CREAT (ref 0–30)
CHOLEST SERPL-MCNC: 133 MG/DL (ref 100–199)
CREAT UR-MCNC: 43.3 MG/DL
HDLC SERPL-MCNC: 27 MG/DL
INTERPRETATION, 910389: NORMAL
LDLC SERPL CALC-MCNC: 70 MG/DL (ref 0–99)
Lab: NORMAL
MICROALBUMIN UR-MCNC: <3 UG/ML
SODIUM SERPL-SCNC: 137 MMOL/L (ref 134–144)
TRIGL SERPL-MCNC: 181 MG/DL (ref 0–149)
VLDLC SERPL CALC-MCNC: 36 MG/DL (ref 5–40)

## 2019-08-04 NOTE — PROGRESS NOTES
MARIA EUGENIA Stewart is a 63 y.o. female Past Medical History Past Medical History:  
Diagnosis Date  Depression  Hepatitis C   
 Heroin use disorder, moderate, in early remission ([address] NOW on [facility]  Ill-defined condition Chronic back pain Surgical History No past surgical history on file. Medications Current Outpatient Medications Medication Sig Dispense Refill  fluconazole (DIFLUCAN) 150 mg tablet   0  
 TRUE METRIX GLUCOSE METER misc   0  
 TRUEPLUS LANCETS 33 gauge misc   11  
 BD ULTRA-FINE MINI PEN NEEDLE 31 gauge x 3/16\" ndle   11  
 atorvastatin (LIPITOR) 10 mg tablet Take 1 Tab by mouth daily. 30 Tab 1  
 dulaglutide (TRULICITY) 4.57 JU/8.8 mL sub-q pen 0.5 mL by SubCUTAneous route every seven (7) days. 4 Pen 0  
 metFORMIN ER (GLUCOPHAGE XR) 500 mg tablet Take one tablet with largest meal once a day for one week. On second week take one tablet twice a day with a meal. 60 Tab 0  
 glucose blood VI test strips (BLOOD GLUCOSE TEST) strip Please provide strips insurance will cover. 100 Strip 12  
 insulin glargine (LANTUS,BASAGLAR) 100 unit/mL (3 mL) inpn 10 units daily subcu 5 Pen 0  
 hydroCHLOROthiazide (HYDRODIURIL) 12.5 mg tablet TAKE ONE TABLET BY MOUTH DAILY 30 Tab 12  
 sertraline (ZOLOFT) 100 mg tablet Take two tablets by mouth daily. 60 Tab 6  
 gabapentin (NEURONTIN) 100 mg capsule Take 3 Caps by mouth three (3) times daily. Indications: Neuropathic Pain 90 Cap 3  
 ketorolac (TORADOL) 10 mg tablet Take 1 Tab by mouth two (2) times daily as needed for Pain. 20 Tab 0  
 potassium chloride (KLOR-CON) 10 mEq tablet Take 1 Tab by mouth daily. 30 Tab 3  
 docusate sodium (COLACE) 100 mg capsule Take 1 Cap by mouth two (2) times a day. 60 Cap 0  
 methadone HCl (METHADONE PO) Take 85 mg by mouth daily. Allergies No Known Allergies Family History Family History Problem Relation Age of Onset  Hypertension Mother  High Cholesterol Mother  COPD Mother  Hypertension Father Social History Social History Socioeconomic History  Marital status: SINGLE Spouse name: Not on file  Number of children: Not on file  Years of education: Not on file  Highest education level: Not on file Occupational History  Not on file Social Needs  Financial resource strain: Not on file  Food insecurity:  
  Worry: Not on file Inability: Not on file  Transportation needs:  
  Medical: Not on file Non-medical: Not on file Tobacco Use  Smoking status: Current Every Day Smoker Packs/day: 0.25 Types: Cigarettes  Smokeless tobacco: Never Used Substance and Sexual Activity  Alcohol use: No  
 Drug use: No  
  Comment: on methadone matinance program  
 Sexual activity: Never Lifestyle  Physical activity:  
  Days per week: Not on file Minutes per session: Not on file  Stress: Not on file Relationships  Social connections:  
  Talks on phone: Not on file Gets together: Not on file Attends Mosque service: Not on file Active member of club or organization: Not on file Attends meetings of clubs or organizations: Not on file Relationship status: Not on file  Intimate partner violence:  
  Fear of current or ex partner: Not on file Emotionally abused: Not on file Physically abused: Not on file Forced sexual activity: Not on file Other Topics Concern  Not on file Social History Narrative  Not on file Problem List 
Patient Active Problem List  
Diagnosis Code  Severe obesity with body mass index (BMI) of 35.0 to 39.9 with serious comorbidity (HCC) E66.01  
 Discitis of thoracic region M46.44 Review of Systems ROS Vital Signs There were no vitals filed for this visit. Physical Exam 
Physical Exam 
 
Diagnostics No orders of the defined types were placed in this encounter. Results Results for orders placed or performed in visit on 08/02/19 LIPID PANEL Result Value Ref Range Cholesterol, total 133 100 - 199 mg/dL Triglyceride 181 (H) 0 - 149 mg/dL HDL Cholesterol 27 (L) >39 mg/dL VLDL, calculated 36 5 - 40 mg/dL LDL, calculated 70 0 - 99 mg/dL MICROALBUMIN, UR, RAND Result Value Ref Range Creatinine, urine 43.3 Not Estab. mg/dL Microalbumin, urine <3.0 Not Estab. ug/mL Microalb/Creat ratio (ug/mg creat.) <6.9 0.0 - 30.0 mg/g creat SODIUM Result Value Ref Range Sodium 137 134 - 144 mmol/L  
CVD REPORT Result Value Ref Range INTERPRETATION Note DIABETES PATIENT EDUCATION Result Value Ref Range PDF Image Not applicable Assessment and Plan {There are no diagnoses linked to this encounter. (Refresh or delete this SmartLink)} After care summary printed and reviewed with patient. Plan reviewed with patient. Questions answered. Patient verbalized understanding of plan and is in agreement with plan. Patient to follow up in one week or earlier if symptoms worsen.   
 
Yohana Lopez, FNP-C

## 2019-08-05 ENCOUNTER — OFFICE VISIT (OUTPATIENT)
Dept: FAMILY MEDICINE CLINIC | Age: 59
End: 2019-08-05

## 2019-09-05 ENCOUNTER — OFFICE VISIT (OUTPATIENT)
Dept: FAMILY MEDICINE CLINIC | Age: 59
End: 2019-09-05

## 2019-09-05 VITALS
DIASTOLIC BLOOD PRESSURE: 81 MMHG | TEMPERATURE: 97.3 F | RESPIRATION RATE: 18 BRPM | HEIGHT: 63 IN | HEART RATE: 70 BPM | BODY MASS INDEX: 38.2 KG/M2 | SYSTOLIC BLOOD PRESSURE: 131 MMHG | WEIGHT: 215.6 LBS

## 2019-09-05 DIAGNOSIS — I10 ESSENTIAL HYPERTENSION: Primary | ICD-10-CM

## 2019-09-05 DIAGNOSIS — Z12.11 COLON CANCER SCREENING: ICD-10-CM

## 2019-09-05 DIAGNOSIS — R73.9 BLOOD GLUCOSE ELEVATED: ICD-10-CM

## 2019-09-05 DIAGNOSIS — E11.8 CONTROLLED TYPE 2 DIABETES MELLITUS WITH COMPLICATION, WITHOUT LONG-TERM CURRENT USE OF INSULIN (HCC): ICD-10-CM

## 2019-09-05 RX ORDER — METFORMIN HYDROCHLORIDE 500 MG/1
TABLET, EXTENDED RELEASE ORAL
Qty: 60 TAB | Refills: 2 | Status: SHIPPED | OUTPATIENT
Start: 2019-09-05 | End: 2020-02-04 | Stop reason: ALTCHOICE

## 2019-09-05 NOTE — PROGRESS NOTES
MARIA EUGENIA Stewart is a 63 y.o. female  Chief Complaint   Patient presents with    Diabetes     follow up    Hypertension    Labs     completed 8/2/19    Medication Refill     Reports her glucose was 120 this morning. She reports she is feeling much better since she started her medications. She reports she is out of her metformin and she is requesting a refill. She states she is seeing endocrinology and she has only had one visit. She denies fatigue, nausea, vomiting, chest pain and or shortness of breath. She is taking her blood pressure medications with no issues. She denies dizziness or swelling in her lower extremities. She did have her labs. She still has some chronic knee and back pain and she reports some bruising over the areas of the back pain. She reports she is scheduled to see orthopedics. Past Medical History  Past Medical History:   Diagnosis Date    Depression     Hepatitis C     Heroin use disorder, moderate, in early remission ([facility] [address]     NOW on [facility]    Ill-defined condition     Chronic back pain       Surgical History  History reviewed. No pertinent surgical history. Medications  Current Outpatient Medications   Medication Sig Dispense Refill    metFORMIN ER (GLUCOPHAGE XR) 500 mg tablet Take one tablet with largest meal once a day for one week. On second week take one tablet twice a day with a meal. 60 Tab 2    TRUE METRIX GLUCOSE METER misc   0    TRUEPLUS LANCETS 33 gauge misc   11    BD ULTRA-FINE MINI PEN NEEDLE 31 gauge x 3/16\" ndle   11    atorvastatin (LIPITOR) 10 mg tablet Take 1 Tab by mouth daily. 30 Tab 1    dulaglutide (TRULICITY) 0.94 TD/2.7 mL sub-q pen 0.5 mL by SubCUTAneous route every seven (7) days. 4 Pen 0    glucose blood VI test strips (BLOOD GLUCOSE TEST) strip Please provide strips insurance will cover.  100 Strip 12    insulin glargine (LANTUS,BASAGLAR) 100 unit/mL (3 mL) inpn 10 units daily subcu 5 Pen 0    hydroCHLOROthiazide (HYDRODIURIL) 12.5 mg tablet TAKE ONE TABLET BY MOUTH DAILY 30 Tab 12    sertraline (ZOLOFT) 100 mg tablet Take two tablets by mouth daily. 60 Tab 6    gabapentin (NEURONTIN) 100 mg capsule Take 3 Caps by mouth three (3) times daily. Indications: Neuropathic Pain 90 Cap 3    methadone HCl (METHADONE PO) Take 85 mg by mouth daily.  fluconazole (DIFLUCAN) 150 mg tablet   0    ketorolac (TORADOL) 10 mg tablet Take 1 Tab by mouth two (2) times daily as needed for Pain. 20 Tab 0    potassium chloride (KLOR-CON) 10 mEq tablet Take 1 Tab by mouth daily. 30 Tab 3    docusate sodium (COLACE) 100 mg capsule Take 1 Cap by mouth two (2) times a day.  61 Cap 0       Allergies  No Known Allergies    Family History  Family History   Problem Relation Age of Onset    Hypertension Mother     High Cholesterol Mother     COPD Mother     Hypertension Father        Social History  Social History     Socioeconomic History    Marital status: SINGLE     Spouse name: Not on file    Number of children: Not on file    Years of education: Not on file    Highest education level: Not on file   Occupational History    Not on file   Social Needs    Financial resource strain: Not on file    Food insecurity:     Worry: Not on file     Inability: Not on file    Transportation needs:     Medical: Not on file     Non-medical: Not on file   Tobacco Use    Smoking status: Current Every Day Smoker     Packs/day: 0.25     Types: Cigarettes    Smokeless tobacco: Never Used   Substance and Sexual Activity    Alcohol use: No    Drug use: No     Comment: on methadone matinance program    Sexual activity: Never   Lifestyle    Physical activity:     Days per week: Not on file     Minutes per session: Not on file    Stress: Not on file   Relationships    Social connections:     Talks on phone: Not on file     Gets together: Not on file     Attends [employer] service: Not on file     Active member of club or organization: Not on file Attends meetings of clubs or organizations: Not on file     Relationship status: Not on file    Intimate partner violence:     Fear of current or ex partner: Not on file     Emotionally abused: Not on file     Physically abused: Not on file     Forced sexual activity: Not on file   Other Topics Concern    Not on file   Social History Narrative    Not on file       Problem List  Patient Active Problem List   Diagnosis Code    Severe obesity with body mass index (BMI) of 35.0 to 39.9 with serious comorbidity ([facility] E66.01    Discitis of thoracic region M46.44       Review of Systems  Review of Systems   Constitutional: Negative for chills and fever. Eyes: Negative for blurred vision. Respiratory: Negative for shortness of breath. Cardiovascular: Negative for chest pain. Gastrointestinal: Negative for abdominal pain, nausea and vomiting. Genitourinary: Negative. Musculoskeletal: Positive for back pain (chronic) and joint pain. Neurological: Negative for dizziness. Endo/Heme/Allergies: Negative for polydipsia. Vital Signs  Vitals:    09/05/19 0749 09/05/19 0755   BP: 141/86 131/81   Pulse: 73 70   Resp: 18    Temp: 97.3 °F (36.3 °C)    TempSrc: Oral    Weight: 215 lb 9.6 oz (97.8 kg)    Height: 5' 3\" (1.6 m)    PainSc:   0 - No pain        Physical Exam  Physical Exam   Constitutional: She is oriented to person, place, and time. HENT:   Mouth/Throat: Oropharynx is clear and moist.   Eyes: Pupils are equal, round, and reactive to light. Cardiovascular: Normal rate and regular rhythm. Pulmonary/Chest: Effort normal and breath sounds normal. No respiratory distress. Abdominal: Soft. Bowel sounds are normal.   Neurological: She is alert and oriented to person, place, and time. Skin: Skin is warm and dry. Petechiae (back) noted. Psychiatric: She has a normal mood and affect. Her behavior is normal.   Vitals reviewed.       Diagnostics  Orders Placed This Encounter    OCCULT BLOOD IMMUNOASSAY,DIAGNOSTIC     Standing Status:   Future     Standing Expiration Date:   9/5/2020    metFORMIN ER (GLUCOPHAGE XR) 500 mg tablet     Sig: Take one tablet with largest meal once a day for one week. On second week take one tablet twice a day with a meal.     Dispense:  60 Tab     Refill:  2       Results  Results for orders placed or performed in visit on 08/02/19   LIPID PANEL   Result Value Ref Range    Cholesterol, total 133 100 - 199 mg/dL    Triglyceride 181 (H) 0 - 149 mg/dL    HDL Cholesterol 27 (L) >39 mg/dL    VLDL, calculated 36 5 - 40 mg/dL    LDL, calculated 70 0 - 99 mg/dL   MICROALBUMIN, UR, RAND   Result Value Ref Range    Creatinine, urine 43.3 Not Estab. mg/dL    Microalbumin, urine <3.0 Not Estab. ug/mL    Microalb/Creat ratio (ug/mg creat.) <6.9 0.0 - 30.0 mg/g creat   SODIUM   Result Value Ref Range    Sodium 137 134 - 144 mmol/L   CVD REPORT   Result Value Ref Range    INTERPRETATION Note    DIABETES PATIENT EDUCATION   Result Value Ref Range    PDF Image Not applicable        Assessment and Plan  Diagnoses and all orders for this visit:    1. Essential hypertension    2. Blood glucose elevated  -     metFORMIN ER (GLUCOPHAGE XR) 500 mg tablet; Take one tablet with largest meal once a day for one week. On second week take one tablet twice a day with a meal.    3. Controlled type 2 diabetes mellitus with complication, without long-term current use of insulin (HCC)  -     metFORMIN ER (GLUCOPHAGE XR) 500 mg tablet; Take one tablet with largest meal once a day for one week. On second week take one tablet twice a day with a meal.    4. Colon cancer screening  -     OCCULT BLOOD IMMUNOASSAY,DIAGNOSTIC; Future      Lab results reviewed. Patient has declined a flu shot. Discussed risk of flu and infections with diabetes. Patient states she will think about getting this vaccine. After care summary printed and reviewed with patient. Plan reviewed with patient. Questions answered.  Patient verbalized understanding of plan and is in agreement with plan. Patient to follow up in three months or earlier if symptoms worsen. Follow-up and Dispositions    · Return in about 3 months (around 12/5/2019), or if symptoms worsen or fail to improve.        Silvana Perez, FNP-C

## 2019-09-05 NOTE — PATIENT INSTRUCTIONS
High Blood Pressure: Care Instructions  Overview    It's normal for blood pressure to go up and down throughout the day. But if it stays up, you have high blood pressure. Another name for high blood pressure is hypertension. Despite what a lot of people think, high blood pressure usually doesn't cause headaches or make you feel dizzy or lightheaded. It usually has no symptoms. But it does increase your risk of stroke, heart attack, and other problems. You and your doctor will talk about your risks of these problems based on your blood pressure. Your doctor will give you a goal for your blood pressure. Your goal will be based on your health and your age. Lifestyle changes, such as eating healthy and being active, are always important to help lower blood pressure. You might also take medicine to reach your blood pressure goal.  Follow-up care is a key part of your treatment and safety. Be sure to make and go to all appointments, and call your doctor if you are having problems. It's also a good idea to know your test results and keep a list of the medicines you take. How can you care for yourself at home? Medical treatment  · If you stop taking your medicine, your blood pressure will go back up. You may take one or more types of medicine to lower your blood pressure. Be safe with medicines. Take your medicine exactly as prescribed. Call your doctor if you think you are having a problem with your medicine. · Talk to your doctor before you start taking aspirin every day. Aspirin can help certain people lower their risk of a heart attack or stroke. But taking aspirin isn't right for everyone, because it can cause serious bleeding. · See your doctor regularly. You may need to see the doctor more often at first or until your blood pressure comes down. · If you are taking blood pressure medicine, talk to your doctor before you take decongestants or anti-inflammatory medicine, such as ibuprofen.  Some of these medicines can raise blood pressure. · Learn how to check your blood pressure at home. Lifestyle changes  · Stay at a healthy weight. This is especially important if you put on weight around the waist. Losing even 10 pounds can help you lower your blood pressure. · If your doctor recommends it, get more exercise. Walking is a good choice. Bit by bit, increase the amount you walk every day. Try for at least 30 minutes on most days of the week. You also may want to swim, bike, or do other activities. · Avoid or limit alcohol. Talk to your doctor about whether you can drink any alcohol. · Try to limit how much sodium you eat to less than 2,300 milligrams (mg) a day. Your doctor may ask you to try to eat less than 1,500 mg a day. · Eat plenty of fruits (such as bananas and oranges), vegetables, legumes, whole grains, and low-fat dairy products. · Lower the amount of saturated fat in your diet. Saturated fat is found in animal products such as milk, cheese, and meat. Limiting these foods may help you lose weight and also lower your risk for heart disease. · Do not smoke. Smoking increases your risk for heart attack and stroke. If you need help quitting, talk to your doctor about stop-smoking programs and medicines. These can increase your chances of quitting for good. When should you call for help? Call 911 anytime you think you may need emergency care. This may mean having symptoms that suggest that your blood pressure is causing a serious heart or blood vessel problem. Your blood pressure may be over 180/120.   For example, call 911 if:    · You have symptoms of a heart attack. These may include:  ? Chest pain or pressure, or a strange feeling in the chest.  ? Sweating. ? Shortness of breath. ? Nausea or vomiting. ? Pain, pressure, or a strange feeling in the back, neck, jaw, or upper belly or in one or both shoulders or arms. ? Lightheadedness or sudden weakness.   ? A fast or irregular heartbeat.     · You have symptoms of a stroke. These may include:  ? Sudden numbness, tingling, weakness, or loss of movement in your face, arm, or leg, especially on only one side of your body. ? Sudden vision changes. ? Sudden trouble speaking. ? Sudden confusion or trouble understanding simple statements. ? Sudden problems with walking or balance. ? A sudden, severe headache that is different from past headaches.     · You have severe back or belly pain.    Do not wait until your blood pressure comes down on its own. Get help right away.   Call your doctor now or seek immediate care if:    · Your blood pressure is much higher than normal (such as 180/120 or higher), but you don't have symptoms.     · You think high blood pressure is causing symptoms, such as:  ? Severe headache.  ? Blurry vision.    Watch closely for changes in your health, and be sure to contact your doctor if:    · Your blood pressure measures higher than your doctor recommends at least 2 times. That means the top number is higher or the bottom number is higher, or both.     · You think you may be having side effects from your blood pressure medicine. Where can you learn more? Go to http://sameera-miles.info/. Enter K198 in the search box to learn more about \"High Blood Pressure: Care Instructions. \"  Current as of: July 22, 2018  Content Version: 12.1  © 3596-1762 [employer]. Care instructions adapted under license by [employer] (which disclaims liability or warranty for this information). If you have questions about a medical condition or this instruction, always ask your healthcare professional. [place] [address] any warranty or liability for your use of this information.

## 2019-09-05 NOTE — PROGRESS NOTES
Kat Gallagher presents today for   Chief Complaint   Patient presents with    Diabetes     follow up    Hypertension    Labs     completed 8/2/19    Medication Refill       Kat Gallagher preferred language for health care discussion is english/other. Is someone accompanying this pt? no    Is the patient using any DME equipment during [address]? no    Depression Screening:  3 most recent PHQ Screens 6/4/2019   Little interest or pleasure in doing things More than half the days   Feeling down, depressed, irritable, or hopeless More than half the days   Total Score PHQ 2 4   Trouble falling or staying asleep, or sleeping too much Nearly every day   Feeling tired or having little energy Nearly every day   Poor appetite, weight loss, or overeating Nearly every day   Feeling bad about yourself - or that you are a failure or have let yourself or your family down Not at all   Trouble concentrating on things such as school, work, reading, or watching TV Not at all   Moving or speaking so slowly that other people could have noticed; or the opposite being so fidgety that others notice Not at all   Thoughts of being better off dead, or hurting yourself in some way Not at all   PHQ 9 Score 13   How difficult have these problems made it for you to do your work, take care of your home and get along with others Somewhat difficult       Learning Assessment:  Learning Assessment 6/4/2019   PRIMARY LEARNER Patient   HIGHEST LEVEL OF EDUCATION - PRIMARY LEARNER  GRADUATED HIGH SCHOOL OR GED   BARRIERS PRIMARY LEARNER NONE   CO-LEARNER CAREGIVER No   PRIMARY LANGUAGE ENGLISH   LEARNER PREFERENCE PRIMARY LISTENING   ANSWERED BY self   RELATIONSHIP SELF       Abuse Screening:  Abuse Screening Questionnaire 6/4/2019   Do you ever feel afraid of your partner? N   Are you in a relationship with someone who physically or mentally threatens you? N   Is it safe for you to go home?  Y       Generalized Anxiety  No flowsheet data found.      Health Maintenance Due   Topic Date Due    FOOT EXAM Q1  10/29/1971    PAP AKA CERVICAL CYTOLOGY  10/29/1982    Shingrix Vaccine Age 50> (1 of 2) 10/29/2011    FOBT Q 1 YEAR AGE 50-75  10/29/2011   . Health Maintenance reviewed and discussed and ordered per Provider. Anna Wilde is updated on all     Coordination of Care:  1. Have you been to the ER, urgent care clinic since your last visit? Hospitalized since your last visit? no    2. Have you seen or consulted any other health care providers outside of the [address] [place] [address] since your last visit? Include any pap smears or colon screening. no      Advance Directive:  1. Do you have an advance directive in place? Patient Reply:no    2. If not, would you like material regarding how to put one in place?  Patient Reply: no

## 2019-09-13 ENCOUNTER — HOSPITAL ENCOUNTER (OUTPATIENT)
Dept: MAMMOGRAPHY | Age: 59
Discharge: HOME | End: 2019-09-13
Attending: NURSE PRACTITIONER
Payer: MEDICAID

## 2019-09-13 DIAGNOSIS — Z12.39 SCREENING FOR BREAST CANCER: ICD-10-CM

## 2019-09-13 PROCEDURE — 77067 SCR MAMMO BI INCL CAD: CPT

## 2019-09-19 LAB
ACID FAST STN SPEC: NEGATIVE
ANION GAP SERPL CALC-SCNC: 4 MMOL/L (ref 3–18)
APTT PPP: 31.7 SEC (ref 23–36.4)
BACTERIA SPEC CULT: NORMAL
BACTERIA SPEC CULT: NORMAL
BUN SERPL-MCNC: 12 MG/DL (ref 7–18)
BUN/CREAT SERPL: 13 (ref 12–20)
CALCIUM SERPL-MCNC: 9.9 MG/DL (ref 8.5–10.1)
CHLORIDE SERPL-SCNC: 95 MMOL/L (ref 100–108)
CO2 SERPL-SCNC: 33 MMOL/L (ref 21–32)
CREAT SERPL-MCNC: 0.9 MG/DL (ref 0.6–1.3)
ERYTHROCYTE [DISTWIDTH] IN BLOOD BY AUTOMATED COUNT: 14.8 % (ref 11.6–14.5)
FUNGUS SMEAR,FNGSMR: NORMAL
GLUCOSE SERPL-MCNC: 324 MG/DL (ref 74–99)
GRAM STN SPEC: NORMAL
GRAM STN SPEC: NORMAL
HCT VFR BLD AUTO: 39.2 % (ref 35–45)
HGB BLD-MCNC: 13.4 G/DL (ref 12–16)
INR PPP: 1 (ref 0.8–1.2)
MCH RBC QN AUTO: 29.9 PG (ref 24–34)
MCHC RBC AUTO-ENTMCNC: 34.2 G/DL (ref 31–37)
MCV RBC AUTO: 87.5 FL (ref 74–97)
MYCOBACTERIUM SPEC QL CULT: NEGATIVE
PLATELET # BLD AUTO: 229 K/UL (ref 135–420)
PMV BLD AUTO: 10.1 FL (ref 9.2–11.8)
POTASSIUM SERPL-SCNC: 3.6 MMOL/L (ref 3.5–5.5)
PROTHROMBIN TIME: 13 SEC (ref 11.5–15.2)
RBC # BLD AUTO: 4.48 M/UL (ref 4.2–5.3)
SERVICE CMNT-IMP: NORMAL
SERVICE CMNT-IMP: NORMAL
SODIUM SERPL-SCNC: 132 MMOL/L (ref 136–145)
SPECIMEN PREPARATION: NORMAL
SPECIMEN SOURCE: NORMAL
WBC # BLD AUTO: 8 K/UL (ref 4.6–13.2)

## 2019-09-30 DIAGNOSIS — E11.8 CONTROLLED TYPE 2 DIABETES MELLITUS WITH COMPLICATION, WITHOUT LONG-TERM CURRENT USE OF INSULIN (HCC): ICD-10-CM

## 2019-09-30 DIAGNOSIS — I10 ESSENTIAL HYPERTENSION: ICD-10-CM

## 2019-10-01 RX ORDER — ATORVASTATIN CALCIUM 10 MG/1
TABLET, FILM COATED ORAL
Qty: 90 TAB | Refills: 3 | Status: SHIPPED | OUTPATIENT
Start: 2019-10-01 | End: 2019-10-02 | Stop reason: SDUPTHER

## 2019-10-02 DIAGNOSIS — I10 ESSENTIAL HYPERTENSION: ICD-10-CM

## 2019-10-02 DIAGNOSIS — E11.8 CONTROLLED TYPE 2 DIABETES MELLITUS WITH COMPLICATION, WITHOUT LONG-TERM CURRENT USE OF INSULIN (HCC): ICD-10-CM

## 2019-10-02 DIAGNOSIS — R73.9 BLOOD GLUCOSE ELEVATED: ICD-10-CM

## 2019-10-05 RX ORDER — LANCETS 33 GAUGE
EACH MISCELLANEOUS
Qty: 100 LANCET | Refills: 11 | Status: SHIPPED | OUTPATIENT
Start: 2019-10-05 | End: 2021-06-23

## 2019-10-05 RX ORDER — ATORVASTATIN CALCIUM 10 MG/1
TABLET, FILM COATED ORAL
Qty: 90 TAB | Refills: 3 | Status: SHIPPED | OUTPATIENT
Start: 2019-10-05 | End: 2020-10-11

## 2019-10-15 ENCOUNTER — OFFICE VISIT (OUTPATIENT)
Dept: FAMILY MEDICINE CLINIC | Age: 59
End: 2019-10-15

## 2019-10-15 VITALS
OXYGEN SATURATION: 95 % | HEIGHT: 63 IN | HEART RATE: 75 BPM | DIASTOLIC BLOOD PRESSURE: 82 MMHG | SYSTOLIC BLOOD PRESSURE: 120 MMHG | RESPIRATION RATE: 20 BRPM | TEMPERATURE: 98.4 F | BODY MASS INDEX: 38.98 KG/M2 | WEIGHT: 220 LBS

## 2019-10-15 DIAGNOSIS — R31.9 URINARY TRACT INFECTION WITH HEMATURIA, SITE UNSPECIFIED: Primary | ICD-10-CM

## 2019-10-15 DIAGNOSIS — R31.9 URINARY TRACT INFECTION WITH HEMATURIA, SITE UNSPECIFIED: ICD-10-CM

## 2019-10-15 DIAGNOSIS — N39.0 URINARY TRACT INFECTION WITH HEMATURIA, SITE UNSPECIFIED: ICD-10-CM

## 2019-10-15 DIAGNOSIS — N39.0 URINARY TRACT INFECTION WITH HEMATURIA, SITE UNSPECIFIED: Primary | ICD-10-CM

## 2019-10-15 LAB
BILIRUB UR QL STRIP: NEGATIVE
GLUCOSE UR-MCNC: NEGATIVE MG/DL
KETONES P FAST UR STRIP-MCNC: NEGATIVE MG/DL
PH UR STRIP: 6 [PH] (ref 4.6–8)
PROT UR QL STRIP: NEGATIVE
SP GR UR STRIP: 1.01 (ref 1–1.03)
UA UROBILINOGEN AMB POC: NORMAL (ref 0.2–1)
URINALYSIS CLARITY POC: NORMAL
URINALYSIS COLOR POC: YELLOW
URINE BLOOD POC: NORMAL
URINE LEUKOCYTES POC: NORMAL
URINE NITRITES POC: NEGATIVE

## 2019-10-15 RX ORDER — CEPHALEXIN 500 MG/1
500 CAPSULE ORAL 2 TIMES DAILY
Qty: 14 CAP | Refills: 0 | Status: SHIPPED | OUTPATIENT
Start: 2019-10-15 | End: 2019-10-22

## 2019-10-15 NOTE — PROGRESS NOTES
Pedro Newman presents today for   Chief Complaint   Patient presents with    Urinary Pain     dysuria for about a week. States she has taken AZO for 3 days       Is someone accompanying this pt? No    Is the patient using any DME equipment during OV? No    Depression Screening:  3 most recent PHQ Screens 6/4/2019   Little interest or pleasure in doing things More than half the days   Feeling down, depressed, irritable, or hopeless More than half the days   Total Score PHQ 2 4   Trouble falling or staying asleep, or sleeping too much Nearly every day   Feeling tired or having little energy Nearly every day   Poor appetite, weight loss, or overeating Nearly every day   Feeling bad about yourself - or that you are a failure or have let yourself or your family down Not at all   Trouble concentrating on things such as school, work, reading, or watching TV Not at all   Moving or speaking so slowly that other people could have noticed; or the opposite being so fidgety that others notice Not at all   Thoughts of being better off dead, or hurting yourself in some way Not at all   PHQ 9 Score 13   How difficult have these problems made it for you to do your work, take care of your home and get along with others Somewhat difficult       Learning Assessment:  Learning Assessment 6/4/2019   PRIMARY LEARNER Patient   HIGHEST LEVEL OF EDUCATION - PRIMARY LEARNER  GRADUATED HIGH SCHOOL OR GED   BARRIERS PRIMARY LEARNER NONE   CO-LEARNER CAREGIVER No   PRIMARY LANGUAGE ENGLISH   LEARNER PREFERENCE PRIMARY LISTENING   ANSWERED BY self   RELATIONSHIP SELF       Abuse Screening:  Abuse Screening Questionnaire 6/4/2019   Do you ever feel afraid of your partner? N   Are you in a relationship with someone who physically or mentally threatens you? N   Is it safe for you to go home?  Y         Health Maintenance Due   Topic Date Due    FOOT EXAM Q1  10/29/1971    PAP AKA CERVICAL CYTOLOGY  10/29/1982    Shingrix Vaccine Age 50> (1 of 2) 10/29/2011    FOBT Q 1 YEAR AGE 50-75  10/29/2011    Influenza Age 9 to Adult  08/01/2019   . Health Maintenance reviewed and discussed and ordered per Provider. Smitha Mcmullen is updated on all     Coordination of Care  1. Have you been to the ER, urgent care clinic since your last visit? Hospitalized since your last visit? No    2. Have you seen or consulted any other health care providers outside of the [address] [place] [address] since your last visit? Include any pap smears or colon screening. No        Advance Directive:  1. Do you have an advance directive in place? Patient Reply:No    2. If not, would you like material regarding how to put one in place?  Patient Reply: No

## 2019-10-15 NOTE — PROGRESS NOTES
HPI  Carlos Toledo is a 63 y.o. female  Chief Complaint   Patient presents with    Urinary Pain     dysuria for about a week. States she has taken AZO for 3 days   Reports urgency and frequency with urination for one week. Tried Azo OTC a few days ago that helped some but did not get rid of the symptoms. Denies burning or pain with urination. Does report bilateral flank pain worse on the right described as a dull ache overall and stabbing with palpitation. Pain is dull constantly at 3/10 and 7/10 with palpation. Denies changes in bowel habits. Past Medical History  Past Medical History:   Diagnosis Date    Depression     Hepatitis C     Heroin use disorder, moderate, in early remission ([address]     NOW on [facility]    Ill-defined condition     Chronic back pain       Surgical History  No past surgical history on file. Medications  Current Outpatient Medications   Medication Sig Dispense Refill    cephALEXin (KEFLEX) 500 mg capsule Take 1 Cap by mouth two (2) times a day for 7 days. 14 Cap 0    atorvastatin (LIPITOR) 10 mg tablet TAKE ONE TABLET BY MOUTH DAILY 90 Tab 3    TRUEPLUS LANCETS 33 gauge misc Twice daily 100 Lancet 11    glucose blood VI test strips (BLOOD GLUCOSE TEST) strip Please provide strips insurance will cover. 100 Strip 12    metFORMIN ER (GLUCOPHAGE XR) 500 mg tablet Take one tablet with largest meal once a day for one week. On second week take one tablet twice a day with a meal. 60 Tab 2    fluconazole (DIFLUCAN) 150 mg tablet   0    TRUE METRIX GLUCOSE METER misc   0    BD ULTRA-FINE MINI PEN NEEDLE 31 gauge x 3/16\" ndle   11    dulaglutide (TRULICITY) 4.04 FA/3.3 mL sub-q pen 0.5 mL by SubCUTAneous route every seven (7) days.  4 Pen 0    insulin glargine (LANTUS,BASAGLAR) 100 unit/mL (3 mL) inpn 10 units daily subcu 5 Pen 0    hydroCHLOROthiazide (HYDRODIURIL) 12.5 mg tablet TAKE ONE TABLET BY MOUTH DAILY 30 Tab 12    sertraline (ZOLOFT) 100 mg tablet Take two tablets by mouth daily. 60 Tab 6    gabapentin (NEURONTIN) 100 mg capsule Take 3 Caps by mouth three (3) times daily. Indications: Neuropathic Pain 90 Cap 3    ketorolac (TORADOL) 10 mg tablet Take 1 Tab by mouth two (2) times daily as needed for Pain. 20 Tab 0    potassium chloride (KLOR-CON) 10 mEq tablet Take 1 Tab by mouth daily. 30 Tab 3    docusate sodium (COLACE) 100 mg capsule Take 1 Cap by mouth two (2) times a day. 60 Cap 0    methadone HCl (METHADONE PO) Take 85 mg by mouth daily.          Allergies  No Known Allergies    Family History  Family History   Problem Relation Age of Onset    Hypertension Mother     High Cholesterol Mother     COPD Mother     Hypertension Father        Social History  Social History     Socioeconomic History    Marital status: SINGLE     Spouse name: Not on file    Number of children: Not on file    Years of education: Not on file    Highest education level: Not on file   Occupational History    Not on file   Social Needs    Financial resource strain: Not on file    Food insecurity:     Worry: Not on file     Inability: Not on file    Transportation needs:     Medical: Not on file     Non-medical: Not on file   Tobacco Use    Smoking status: Current Every Day Smoker     Packs/day: 0.25     Types: Cigarettes    Smokeless tobacco: Never Used   Substance and Sexual Activity    Alcohol use: No    Drug use: No     Comment: on methadone matinance program    Sexual activity: Never   Lifestyle    Physical activity:     Days per week: Not on file     Minutes per session: Not on file    Stress: Not on file   Relationships    Social connections:     Talks on phone: Not on file     Gets together: Not on file     Attends [employer] service: Not on file     Active member of club or organization: Not on file     Attends meetings of clubs or organizations: Not on file     Relationship status: Not on file    Intimate partner violence:     Fear of current or ex partner: Not on file     Emotionally abused: Not on file     Physically abused: Not on file     Forced sexual activity: Not on file   Other Topics Concern    Not on file   Social History Narrative    Not on file       Problem List  Patient Active Problem List   Diagnosis Code    Severe obesity with body mass index (BMI) of 35.0 to 39.9 with serious comorbidity ([facility] E66.01    Discitis of thoracic region M46.44       Review of Systems  Review of Systems   Constitutional: Negative for chills and fever. Respiratory: Negative for shortness of breath. Cardiovascular: Negative for chest pain. Gastrointestinal: Negative for abdominal pain, blood in stool, constipation, diarrhea, nausea and vomiting. Genitourinary: Positive for flank pain, frequency and urgency. Negative for dysuria and hematuria. Vital Signs  Vitals:    10/15/19 0913   BP: 120/82   Pulse: 75   Resp: 20   Temp: 98.4 °F (36.9 °C)   SpO2: 95%   Weight: 220 lb (99.8 kg)   Height: 5' 3\" (1.6 m)   PainSc:   3   PainLoc: Back       Physical Exam  Physical Exam   Constitutional: She is oriented to person, place, and time. HENT:   Nose: Nose normal.   Mouth/Throat: Oropharynx is clear and moist.   Eyes: Pupils are equal, round, and reactive to light. Cardiovascular: Normal rate, regular rhythm and normal heart sounds. Pulmonary/Chest: Effort normal and breath sounds normal. No respiratory distress. Abdominal: Soft. Bowel sounds are normal. There is tenderness. There is CVA tenderness (right). Neurological: She is alert and oriented to person, place, and time. Psychiatric: She has a normal mood and affect. Her behavior is normal. Judgment and thought content normal.   Vitals reviewed.       Diagnostics  Orders Placed This Encounter    CULTURE, URINE     Standing Status:   Future     Standing Expiration Date:   10/15/2020    AMB POC URINALYSIS DIP STICK AUTO W/ MICRO    cephALEXin (KEFLEX) 500 mg capsule     Sig: Take 1 Cap by mouth two (2) times a day for 7 days. Dispense:  14 Cap     Refill:  0       Results  Results for orders placed or performed in visit on 08/02/19   LIPID PANEL   Result Value Ref Range    Cholesterol, total 133 100 - 199 mg/dL    Triglyceride 181 (H) 0 - 149 mg/dL    HDL Cholesterol 27 (L) >39 mg/dL    VLDL, calculated 36 5 - 40 mg/dL    LDL, calculated 70 0 - 99 mg/dL   MICROALBUMIN, UR, RAND   Result Value Ref Range    Creatinine, urine 43.3 Not Estab. mg/dL    Microalbumin, urine <3.0 Not Estab. ug/mL    Microalb/Creat ratio (ug/mg creat.) <6.9 0.0 - 30.0 mg/g creat   SODIUM   Result Value Ref Range    Sodium 137 134 - 144 mmol/L   CVD REPORT   Result Value Ref Range    INTERPRETATION Note    DIABETES PATIENT EDUCATION   Result Value Ref Range    PDF Image Not applicable      Assessment and Plan  Diagnoses and all orders for this visit:    1. Urinary tract infection with hematuria, site unspecified  -     cephALEXin (KEFLEX) 500 mg capsule; Take 1 Cap by mouth two (2) times a day for 7 days. -     CULTURE, URINE; Future  -     AMB POC URINALYSIS DIP STICK AUTO W/ MICRO      Medication, side effects, possible allergic reactions and warnings reviewed with patient. Patient verbalized understanding. Discussed probiotics, good handwashing, and good vaginal care. After care summary printed and reviewed with patient. Plan reviewed with patient. Questions answered. Patient verbalized understanding of plan and is in agreement with plan. Patient to follow up in 10 days or earlier if symptoms worsen. Follow-up and Dispositions    · Return in about 10 days (around 10/25/2019), or if symptoms worsen or fail to improve, for UTI.        MILADYS Maria

## 2019-10-19 LAB — BACTERIA UR CULT: ABNORMAL

## 2019-10-22 NOTE — PROGRESS NOTES
Spoke with lab regarding organism in urine. They state this is normally skin dave. This should be susceptible to penicillin or a Beta-lactam. Keflex was ordered.  Omar Sloan

## 2019-10-25 ENCOUNTER — HOSPITAL ENCOUNTER (EMERGENCY)
Age: 59
Discharge: HOME | End: 2019-10-25
Attending: EMERGENCY MEDICINE
Payer: MEDICAID

## 2019-10-25 ENCOUNTER — APPOINTMENT (OUTPATIENT)
Dept: GENERAL RADIOLOGY | Age: 59
End: 2019-10-25
Attending: EMERGENCY MEDICINE
Payer: MEDICAID

## 2019-10-25 VITALS
HEART RATE: 81 BPM | SYSTOLIC BLOOD PRESSURE: 125 MMHG | WEIGHT: 210 LBS | RESPIRATION RATE: 18 BRPM | TEMPERATURE: 97.9 F | HEIGHT: 62 IN | BODY MASS INDEX: 38.64 KG/M2 | OXYGEN SATURATION: 94 % | DIASTOLIC BLOOD PRESSURE: 73 MMHG

## 2019-10-25 DIAGNOSIS — L03.116 CELLULITIS OF LEFT LOWER EXTREMITY: Primary | ICD-10-CM

## 2019-10-25 LAB
ALBUMIN SERPL-MCNC: 2.8 G/DL (ref 3.4–5)
ALBUMIN/GLOB SERPL: 0.5 {RATIO} (ref 0.8–1.7)
ALP SERPL-CCNC: 136 U/L (ref 45–117)
ALT SERPL-CCNC: 26 U/L (ref 13–56)
ANION GAP SERPL CALC-SCNC: 6 MMOL/L (ref 3–18)
AST SERPL-CCNC: 23 U/L (ref 10–38)
BASOPHILS # BLD: 0 K/UL (ref 0–0.1)
BASOPHILS NFR BLD: 0 % (ref 0–2)
BILIRUB SERPL-MCNC: 0.2 MG/DL (ref 0.2–1)
BUN SERPL-MCNC: 7 MG/DL (ref 7–18)
BUN/CREAT SERPL: 10 (ref 12–20)
CALCIUM SERPL-MCNC: 8.8 MG/DL (ref 8.5–10.1)
CHLORIDE SERPL-SCNC: 98 MMOL/L (ref 100–111)
CO2 SERPL-SCNC: 29 MMOL/L (ref 21–32)
CREAT SERPL-MCNC: 0.71 MG/DL (ref 0.6–1.3)
DIFFERENTIAL METHOD BLD: ABNORMAL
EOSINOPHIL # BLD: 0.2 K/UL (ref 0–0.4)
EOSINOPHIL NFR BLD: 2 % (ref 0–5)
ERYTHROCYTE [DISTWIDTH] IN BLOOD BY AUTOMATED COUNT: 16.8 % (ref 11.6–14.5)
GLOBULIN SER CALC-MCNC: 5.3 G/DL (ref 2–4)
GLUCOSE SERPL-MCNC: 244 MG/DL (ref 74–99)
HCT VFR BLD AUTO: 41.8 % (ref 35–45)
HGB BLD-MCNC: 12.9 G/DL (ref 12–16)
LYMPHOCYTES # BLD: 1.4 K/UL (ref 0.9–3.6)
LYMPHOCYTES NFR BLD: 19 % (ref 21–52)
MCH RBC QN AUTO: 26.3 PG (ref 24–34)
MCHC RBC AUTO-ENTMCNC: 30.9 G/DL (ref 31–37)
MCV RBC AUTO: 85.3 FL (ref 74–97)
MONOCYTES # BLD: 0.5 K/UL (ref 0.05–1.2)
MONOCYTES NFR BLD: 7 % (ref 3–10)
NEUTS SEG # BLD: 5.2 K/UL (ref 1.8–8)
NEUTS SEG NFR BLD: 72 % (ref 40–73)
PLATELET # BLD AUTO: 244 K/UL (ref 135–420)
PMV BLD AUTO: 9.6 FL (ref 9.2–11.8)
POTASSIUM SERPL-SCNC: 3.5 MMOL/L (ref 3.5–5.5)
PROT SERPL-MCNC: 8.1 G/DL (ref 6.4–8.2)
RBC # BLD AUTO: 4.9 M/UL (ref 4.2–5.3)
SODIUM SERPL-SCNC: 133 MMOL/L (ref 136–145)
WBC # BLD AUTO: 7.2 K/UL (ref 4.6–13.2)

## 2019-10-25 PROCEDURE — 74011250637 HC RX REV CODE- 250/637: Performed by: EMERGENCY MEDICINE

## 2019-10-25 PROCEDURE — 80053 COMPREHEN METABOLIC PANEL: CPT

## 2019-10-25 PROCEDURE — 73590 X-RAY EXAM OF LOWER LEG: CPT

## 2019-10-25 PROCEDURE — 85025 COMPLETE CBC W/AUTO DIFF WBC: CPT

## 2019-10-25 PROCEDURE — 99282 EMERGENCY DEPT VISIT SF MDM: CPT

## 2019-10-25 RX ORDER — DOXYCYCLINE 100 MG/1
100 CAPSULE ORAL 2 TIMES DAILY
Qty: 20 CAP | Refills: 0 | Status: SHIPPED | OUTPATIENT
Start: 2019-10-25 | End: 2019-11-04

## 2019-10-25 RX ORDER — DOXYCYCLINE 100 MG/1
100 CAPSULE ORAL
Status: COMPLETED | OUTPATIENT
Start: 2019-10-25 | End: 2019-10-25

## 2019-10-25 RX ADMIN — DOXYCYCLINE HYCLATE 100 MG: 100 CAPSULE ORAL at 10:33

## 2019-10-25 NOTE — ED PROVIDER NOTES
EMERGENCY DEPARTMENT HISTORY AND PHYSICAL EXAM    9:48 AM  Date: 10/25/2019  Patient Name: Teresa Echevarria    History of Presenting Illness     Chief Complaint   Patient presents with    Leg Pain    Skin Problem        History Provided By: Patient    HPI: Teresa Echevarria is a 63 y.o. female with history of diabetes. Patient is presenting with left lower extremity pain and redness that she noted yesterday. No history of fever or chills. No history of trauma. No history of prior PEs or DVTs. She had some skin cancer cells frozen last week and was using a lotion on her skin that she thought might have triggered it. She did not have any lesions on that leg but only on both arms. Location:  Severity:  Timing/course: Onset/Duration:     PCP: Vickie Garcia NP    Past History     Past Medical History:  Past Medical History:   Diagnosis Date    Depression     Hepatitis C     Heroin use disorder, moderate, in early remission ([facility] [address]     NOW on [facility]    Ill-defined condition     Chronic back pain       Past Surgical History:  History reviewed. No pertinent surgical history. Family History:  Family History   Problem Relation Age of Onset    Hypertension Mother     High Cholesterol Mother     COPD Mother     Hypertension Father        Social History:  Social History     Tobacco Use    Smoking status: Current Every Day Smoker     Packs/day: 0.25     Types: Cigarettes    Smokeless tobacco: Never Used   Substance Use Topics    Alcohol use: No    Drug use: No     Comment: on methadone matinance program       Allergies:  No Known Allergies    Review of Systems   Review of Systems   Musculoskeletal: Positive for myalgias. Skin: Positive for rash. All other systems reviewed and are negative.        Physical Exam     Patient Vitals for the past 12 hrs:   Temp Pulse Resp BP SpO2   10/25/19 0943 97.9 °F (36.6 °C) 92 20 (!) 183/92 93 %       Physical Exam   Constitutional: She is oriented to person, place, and time. She appears well-developed and well-nourished. HENT:   Head: Normocephalic and atraumatic. Eyes: Conjunctivae and EOM are normal.   Neck: Normal range of motion. Neck supple. Cardiovascular: Normal rate. Pulmonary/Chest: Effort normal. No respiratory distress. Musculoskeletal: Normal range of motion. She exhibits no edema or deformity. Neurological: She is alert and oriented to person, place, and time. Skin: Skin is warm and dry. Psychiatric: She has a normal mood and affect. Her behavior is normal.       Diagnostic Study Results     Labs -  No results found for this or any previous visit (from the past 12 hour(s)). Radiologic Studies -   No results found. Medical Decision Making     ED Course: Progress Notes, Reevaluation, and Consults:    9:48 AM Initial assessment performed. The patients presenting problems have been discussed, and they/their family are in agreement with the care plan formulated and outlined with them. I have encouraged them to ask questions as they arise throughout their visit. Provider Notes (Medical Decision Making): 64-year-old female history of diabetes presenting with left lower extremity cellulitis. There is redness and warmth on exam and no swelling so less likely to be an abscess or DVT. She was quite tender so we will get an x-ray to make sure she does not have osteo-specially that she had history of spinal osteo-in the past.  Screening labs and p.o. antibiotics. Patient is very well-appearing on exam and the area of cellulitis is small. Procedures:     Critical Care Time:     Vital Signs-Reviewed the patient's vital signs. Reviewed pt's pulse ox reading. EKG: Interpreted by the EP.    Time Interpreted:    Rate:    Rhythm:    Interpretation:   Comparison:     Records Reviewed: Nursing Notes (Time of Review: 9:48 AM)  -I am the first provider for this patient.  -I reviewed the vital signs, available nursing notes, past medical history, past surgical history, family history and social history. Current Outpatient Medications   Medication Sig Dispense Refill    atorvastatin (LIPITOR) 10 mg tablet TAKE ONE TABLET BY MOUTH DAILY 90 Tab 3    TRUEPLUS LANCETS 33 gauge misc Twice daily 100 Lancet 11    glucose blood VI test strips (BLOOD GLUCOSE TEST) strip Please provide strips insurance will cover. 100 Strip 12    metFORMIN ER (GLUCOPHAGE XR) 500 mg tablet Take one tablet with largest meal once a day for one week. On second week take one tablet twice a day with a meal. 60 Tab 2    fluconazole (DIFLUCAN) 150 mg tablet   0    TRUE METRIX GLUCOSE METER misc   0    BD ULTRA-FINE MINI PEN NEEDLE 31 gauge x 3/16\" ndle   11    dulaglutide (TRULICITY) 3.92 WD/1.6 mL sub-q pen 0.5 mL by SubCUTAneous route every seven (7) days. 4 Pen 0    insulin glargine (LANTUS,BASAGLAR) 100 unit/mL (3 mL) inpn 10 units daily subcu 5 Pen 0    hydroCHLOROthiazide (HYDRODIURIL) 12.5 mg tablet TAKE ONE TABLET BY MOUTH DAILY 30 Tab 12    sertraline (ZOLOFT) 100 mg tablet Take two tablets by mouth daily. 60 Tab 6    gabapentin (NEURONTIN) 100 mg capsule Take 3 Caps by mouth three (3) times daily. Indications: Neuropathic Pain 90 Cap 3    ketorolac (TORADOL) 10 mg tablet Take 1 Tab by mouth two (2) times daily as needed for Pain. 20 Tab 0    potassium chloride (KLOR-CON) 10 mEq tablet Take 1 Tab by mouth daily. 30 Tab 3    docusate sodium (COLACE) 100 mg capsule Take 1 Cap by mouth two (2) times a day. 60 Cap 0    methadone HCl (METHADONE PO) Take 85 mg by mouth daily. Clinical Impression     Clinical Impression: No diagnosis found. Disposition: dc      DISCHARGE NOTE:     Pt has been reexamined. Patient has no new complaints, changes, or physical findings. Care plan outlined and precautions discussed. Results of labs and imaging were reviewed with the patient.  All medications were reviewed with the patient; will d/c home with Doxy. All of pt's questions and concerns were addressed. Patient was instructed and agrees to follow up with PCP, as well as to return to the ED upon further deterioration. Patient is ready to go home. This note was dictated utilizing voice recognition software which may lead to typographical errors. I apologize in advance if the situation occurs. If questions arise please do not hesitate to contact me or call our department.     Kathy Leon MD  9:48 AM

## 2019-10-25 NOTE — ED NOTES
Lora Strange is a 63 y.o. female that was discharged in stable condition. The patients diagnosis, condition and treatment were explained to  patient and aftercare instructions were given. The patient verbalized understanding. Patient armband removed and shredded.

## 2019-10-25 NOTE — DISCHARGE INSTRUCTIONS

## 2020-02-04 ENCOUNTER — OFFICE VISIT (OUTPATIENT)
Dept: FAMILY MEDICINE CLINIC | Age: 60
End: 2020-02-04

## 2020-02-04 VITALS
DIASTOLIC BLOOD PRESSURE: 73 MMHG | HEART RATE: 74 BPM | RESPIRATION RATE: 20 BRPM | HEIGHT: 62 IN | TEMPERATURE: 98.8 F | SYSTOLIC BLOOD PRESSURE: 108 MMHG | WEIGHT: 223.2 LBS | OXYGEN SATURATION: 97 % | BODY MASS INDEX: 41.07 KG/M2

## 2020-02-04 DIAGNOSIS — G89.29 OTHER CHRONIC PAIN: ICD-10-CM

## 2020-02-04 DIAGNOSIS — R63.5 ABNORMAL WEIGHT GAIN: Primary | ICD-10-CM

## 2020-02-04 DIAGNOSIS — K13.70 ORAL MUCOSAL LESION: ICD-10-CM

## 2020-02-04 RX ORDER — METFORMIN HYDROCHLORIDE EXTENDED-RELEASE TABLETS 1000 MG/1
500 TABLET, FILM COATED, EXTENDED RELEASE ORAL
COMMUNITY
Start: 2019-12-26 | End: 2022-03-30 | Stop reason: ALTCHOICE

## 2020-02-04 NOTE — PROGRESS NOTES
Annie Ramirez presents today for   Chief Complaint   Patient presents with    Weight Gain     patient states although she has 'cut back on eating', she has still gained weight    Yeast Infection     patient think she may have an yeast infection in the corners of her mouth       Annie Ramirez preferred language for health care discussion is english/other. Is someone accompanying this pt? no    Is the patient using any DME equipment during [address]? no    Depression Screening:  3 most recent PHQ Screens 2/4/2020   Little interest or pleasure in doing things Not at all   Feeling down, depressed, irritable, or hopeless Not at all   Total Score PHQ 2 0   Trouble falling or staying asleep, or sleeping too much -   Feeling tired or having little energy -   Poor appetite, weight loss, or overeating -   Feeling bad about yourself - or that you are a failure or have let yourself or your family down -   Trouble concentrating on things such as school, work, reading, or watching TV -   Moving or speaking so slowly that other people could have noticed; or the opposite being so fidgety that others notice -   Thoughts of being better off dead, or hurting yourself in some way -   PHQ 9 Score -   How difficult have these problems made it for you to do your work, take care of your home and get along with others -       Learning Assessment:  Learning Assessment 2/4/2020   PRIMARY LEARNER Patient   HIGHEST LEVEL OF EDUCATION - PRIMARY LEARNER  GRADUATED HIGH SCHOOL OR GED   BARRIERS PRIMARY LEARNER NONE   CO-LEARNER CAREGIVER No   PRIMARY LANGUAGE ENGLISH    NEED No   LEARNER PREFERENCE PRIMARY DEMONSTRATION   ANSWERED BY patient   RELATIONSHIP SELF       Abuse Screening:  Abuse Screening Questionnaire 2/4/2020   Do you ever feel afraid of your partner? N   Are you in a relationship with someone who physically or mentally threatens you? N   Is it safe for you to go home?  Y       Generalized Anxiety  No flowsheet data found.      Health Maintenance Due   Topic Date Due    FOOT EXAM Q1  10/29/1971    PAP AKA CERVICAL CYTOLOGY  10/29/1982    Shingrix Vaccine Age 50> (1 of 2) 10/29/2011    FOBT Q 1 YEAR AGE 50-75  10/29/2011    Influenza Age 9 to Adult  08/01/2019    HEMOGLOBIN A1C Q6M  01/18/2020   . Health Maintenance reviewed and discussed and ordered per Provider. Coordination of Care:  1. Have you been to the ER, urgent care clinic since your last visit? Hospitalized since your last visit? no    2. Have you seen or consulted any other health care providers outside of the [address] [place] [address] since your last visit? Include any pap smears or colon screening. no      Advance Directive:  1. Do you have an advance directive in place? Patient Reply:no    2. If not, would you like material regarding how to put one in place?  Patient Reply: no

## 2020-02-04 NOTE — PROGRESS NOTES
MARIA EUGENIA  Jeancarlos Emmanuel is a 63 y.o. female  Chief Complaint   Patient presents with    Weight Gain     patient states although she has 'cut back on eating', she has still gained weight    Yeast Infection     patient think she may have an yeast infection in the corners of her mouth     Patient presents to clinic today for evaluation of weight gain. Patient states that she is unsure of why she is gaining weight. Patient states that she has been watching her diet at home and eating healthier. Patient also states the she has been monitoring her blood sugar levels closely and that her levels have been in the 130's. She does follow with endocrinology and they have made recent adjustments to her medications as she admits her A1C was high but cannot recall the number. Patient states that she has not been able to exercise due to bilateral knee pain and that she also takes care of her mother at home so her time is stretched thin. Patient also reports oral lesions to the side of her mouth externally. She states this appeared after receiving dentures. Denies sore throat and she denies any issues swallowing. Patient denies any chest pain, fever, chills, nausea, vomiting or diarrhea. Patient states she takes OTC miralax but denies complications with this. Patient also reports SOB but states this has been occurring since last year after dx of osteomyelitis. Reports she has chronic abdominal pain from pancreatitis. Past Medical History  Past Medical History:   Diagnosis Date    Depression     Hepatitis C     Heroin use disorder, moderate, in early remission ([facility] [address]     NOW on [facility]    Ill-defined condition     Chronic back pain       Surgical History  No past surgical history on file. Medications  Current Outpatient Medications   Medication Sig Dispense Refill    nystatin (MYCOSTATIN) 100,000 unit/gram ointment Apply  to affected area two (2) times a day.  15 g 0    metFORMIN ER 1,000 mg tr24       sertraline (ZOLOFT) 100 mg tablet TAKE TWO TABLETS BY MOUTH ONCE DAILY 60 Tab 5    atorvastatin (LIPITOR) 10 mg tablet TAKE ONE TABLET BY MOUTH DAILY 90 Tab 3    TRUEPLUS LANCETS 33 gauge misc Twice daily 100 Lancet 11    glucose blood VI test strips (BLOOD GLUCOSE TEST) strip Please provide strips insurance will cover. 100 Strip 12    TRUE METRIX GLUCOSE METER misc   0    BD ULTRA-FINE MINI PEN NEEDLE 31 gauge x 3/16\" ndle   11    insulin glargine (LANTUS,BASAGLAR) 100 unit/mL (3 mL) inpn 10 units daily subcu (Patient taking differently: 12 Units daily. 10 units daily subcu) 5 Pen 0    hydroCHLOROthiazide (HYDRODIURIL) 12.5 mg tablet TAKE ONE TABLET BY MOUTH DAILY 30 Tab 12    methadone HCl (METHADONE PO) Take 85 mg by mouth daily.  potassium chloride (KLOR-CON) 10 mEq tablet Take 1 Tab by mouth daily.  27 Tab 3       Allergies  No Known Allergies    Family History  Family History   Problem Relation Age of Onset    Hypertension Mother     High Cholesterol Mother     COPD Mother     Hypertension Father        Social History  Social History     Socioeconomic History    Marital status: SINGLE     Spouse name: Not on file    Number of children: Not on file    Years of education: Not on file    Highest education level: Not on file   Occupational History    Not on file   Social Needs    Financial resource strain: Not on file    Food insecurity:     Worry: Not on file     Inability: Not on file    Transportation needs:     Medical: Not on file     Non-medical: Not on file   Tobacco Use    Smoking status: Current Every Day Smoker     Packs/day: 0.25     Types: Cigarettes    Smokeless tobacco: Never Used   Substance and Sexual Activity    Alcohol use: No    Drug use: No     Comment: on methadone matinance program    Sexual activity: Never   Lifestyle    Physical activity:     Days per week: Not on file     Minutes per session: Not on file    Stress: Not on file   Relationships    Social connections: Talks on phone: Not on file     Gets together: Not on file     Attends [employer] service: Not on file     Active member of club or organization: Not on file     Attends meetings of clubs or organizations: Not on file     Relationship status: Not on file    Intimate partner violence:     Fear of current or ex partner: Not on file     Emotionally abused: Not on file     Physically abused: Not on file     Forced sexual activity: Not on file   Other Topics Concern    Not on file   Social History Narrative    Not on file       Problem List  Patient Active Problem List   Diagnosis Code    Severe obesity with body mass index (BMI) of 35.0 to 39.9 with serious comorbidity ([facility] [address] E66.01    Discitis of thoracic region M46.44       Review of Systems  Review of Systems   Constitutional: Negative for chills and fever. HENT: Negative for sore throat. Respiratory: Positive for shortness of breath (States has had since last year after dx of osteomyelitis. ). Negative for cough. Cardiovascular: Negative for chest pain and palpitations. Gastrointestinal: Positive for abdominal pain. Musculoskeletal: Positive for joint pain. Skin: Positive for rash (Oral lesions external mouth bilaterally). Vital Signs  Vitals:    02/04/20 1312   BP: 108/73   Pulse: 74   Resp: 20   Temp: 98.8 °F (37.1 °C)   TempSrc: Oral   SpO2: 97%   Weight: 223 lb 3.2 oz (101.2 kg)   Height: 5' 2\" (1.575 m)   PainSc:   0 - No pain       Physical Exam  Physical Exam  Constitutional:       Appearance: Normal appearance. HENT:      Mouth/Throat:      Mouth: Mucous membranes are moist.   Eyes:      Extraocular Movements: Extraocular movements intact. Pupils: Pupils are equal, round, and reactive to light. Neck:      Musculoskeletal: Normal range of motion. Cardiovascular:      Rate and Rhythm: Normal rate and regular rhythm. Pulses: Normal pulses. Heart sounds: Normal heart sounds.    Pulmonary:      Effort: Pulmonary effort is normal.      Breath sounds: Normal breath sounds. Abdominal:      General: Bowel sounds are normal.      Palpations: There is no mass. Tenderness: There is abdominal tenderness (Pt. reports is chronic) in the epigastric area. There is no guarding or rebound. Hernia: No hernia is present. Musculoskeletal: Normal range of motion. Skin:     Findings: Rash (Oral fissures bilaterally. No bledding or drainage noted) present. Neurological:      Mental Status: She is alert and oriented to person, place, and time. Diagnostics  Orders Placed This Encounter    TSH 3RD GENERATION     Standing Status:   Future     Number of Occurrences:   1     Standing Expiration Date:   2/4/2021    T4, FREE    METABOLIC PANEL, COMPREHENSIVE     Standing Status:   Future     Number of Occurrences:   1     Standing Expiration Date:   2/4/2021    CBC WITH AUTOMATED DIFF     Standing Status:   Future     Number of Occurrences:   1     Standing Expiration Date:   2/4/2021    LIPID PANEL     Standing Status:   Future     Number of Occurrences:   1     Standing Expiration Date:   2/4/2021    CVD REPORT    metFORMIN ER 1,000 mg tr24    nystatin (MYCOSTATIN) 100,000 unit/gram ointment     Sig: Apply  to affected area two (2) times a day.      Dispense:  15 g     Refill:  0       Results  Results for orders placed or performed in visit on 02/04/20   T4, FREE   Result Value Ref Range    T4, Free 1.03 0.82 - 1.77 ng/dL   LIPID PANEL   Result Value Ref Range    Cholesterol, total 123 100 - 199 mg/dL    Triglyceride 213 (H) 0 - 149 mg/dL    HDL Cholesterol 24 (L) >39 mg/dL    VLDL, calculated 43 (H) 5 - 40 mg/dL    LDL, calculated 56 0 - 99 mg/dL   CBC WITH AUTOMATED DIFF   Result Value Ref Range    WBC 10.2 3.4 - 10.8 x10E3/uL    RBC 4.90 3.77 - 5.28 x10E6/uL    HGB 13.1 11.1 - 15.9 g/dL    HCT 40.6 34.0 - 46.6 %    MCV 83 79 - 97 fL    MCH 26.7 26.6 - 33.0 pg    MCHC 32.3 31.5 - 35.7 g/dL    RDW 17.2 (H) 11.7 - 15.4 % PLATELET 689 739 - 934 x10E3/uL    NEUTROPHILS 73 Not Estab. %    Lymphocytes 17 Not Estab. %    MONOCYTES 6 Not Estab. %    EOSINOPHILS 3 Not Estab. %    BASOPHILS 1 Not Estab. %    ABS. NEUTROPHILS 7.5 (H) 1.4 - 7.0 x10E3/uL    Abs Lymphocytes 1.7 0.7 - 3.1 x10E3/uL    ABS. MONOCYTES 0.6 0.1 - 0.9 x10E3/uL    ABS. EOSINOPHILS 0.3 0.0 - 0.4 x10E3/uL    ABS. BASOPHILS 0.1 0.0 - 0.2 x10E3/uL    IMMATURE GRANULOCYTES 0 Not Estab. %    ABS. IMM. GRANS. 0.0 0.0 - 0.1 F87O2/BB   METABOLIC PANEL, COMPREHENSIVE   Result Value Ref Range    Glucose 77 65 - 99 mg/dL    BUN 7 6 - 24 mg/dL    Creatinine 0.67 0.57 - 1.00 mg/dL    GFR est non-AA 97 >59 mL/min/1.73    GFR est  >59 mL/min/1.73    BUN/Creatinine ratio 10 9 - 23    Sodium 135 134 - 144 mmol/L    Potassium 4.0 3.5 - 5.2 mmol/L    Chloride 95 (L) 96 - 106 mmol/L    CO2 24 20 - 29 mmol/L    Calcium 9.3 8.7 - 10.2 mg/dL    Protein, total 7.9 6.0 - 8.5 g/dL    Albumin 3.9 3.8 - 4.9 g/dL    GLOBULIN, TOTAL 4.0 1.5 - 4.5 g/dL    A-G Ratio 1.0 (L) 1.2 - 2.2    Bilirubin, total 0.2 0.0 - 1.2 mg/dL    Alk. phosphatase 118 (H) 39 - 117 IU/L    AST (SGOT) 59 (H) 0 - 40 IU/L    ALT (SGPT) 37 (H) 0 - 32 IU/L   TSH 3RD GENERATION   Result Value Ref Range    TSH 3.880 0.450 - 4.500 uIU/mL   CVD REPORT   Result Value Ref Range    INTERPRETATION Note      Assessment and Plan  Diagnoses and all orders for this visit:    1. Abnormal weight gain  -     TSH 3RD GENERATION; Future  -     T4, FREE  -     METABOLIC PANEL, COMPREHENSIVE; Future  -     CBC WITH AUTOMATED DIFF; Future  -     LIPID PANEL; Future    2. BMI 40.0-44.9, adult (HCC)  -     TSH 3RD GENERATION; Future  -     T4, FREE  -     METABOLIC PANEL, COMPREHENSIVE; Future  -     CBC WITH AUTOMATED DIFF; Future  -     LIPID PANEL; Future    3. Oral mucosal lesion  -     TSH 3RD GENERATION; Future  -     T4, FREE  -     METABOLIC PANEL, COMPREHENSIVE; Future  -     CBC WITH AUTOMATED DIFF;  Future  -     nystatin (MYCOSTATIN) 100,000 unit/gram ointment; Apply  to affected area two (2) times a day. 4. Other chronic pain    Other orders  -     CVD REPORT      Go for fasting labs. After care summary printed and reviewed with patient. Plan reviewed with patient. Questions answered. Patient verbalized understanding of plan and is in agreement with plan. Patient to follow up in one week or earlier if symptoms worsen. Follow-up and Dispositions    · Return in about 3 months (around 5/4/2020), or if symptoms worsen or fail to improve, for diabetes, oral lesions, .        Kendy Mejia, SONIA-C

## 2020-02-05 LAB
ALBUMIN SERPL-MCNC: 3.9 G/DL (ref 3.8–4.9)
ALBUMIN/GLOB SERPL: 1 {RATIO} (ref 1.2–2.2)
ALP SERPL-CCNC: 118 IU/L (ref 39–117)
ALT SERPL-CCNC: 37 IU/L (ref 0–32)
AST SERPL-CCNC: 59 IU/L (ref 0–40)
BASOPHILS # BLD AUTO: 0.1 X10E3/UL (ref 0–0.2)
BASOPHILS NFR BLD AUTO: 1 %
BILIRUB SERPL-MCNC: 0.2 MG/DL (ref 0–1.2)
BUN SERPL-MCNC: 7 MG/DL (ref 6–24)
BUN/CREAT SERPL: 10 (ref 9–23)
CALCIUM SERPL-MCNC: 9.3 MG/DL (ref 8.7–10.2)
CHLORIDE SERPL-SCNC: 95 MMOL/L (ref 96–106)
CHOLEST SERPL-MCNC: 123 MG/DL (ref 100–199)
CO2 SERPL-SCNC: 24 MMOL/L (ref 20–29)
CREAT SERPL-MCNC: 0.67 MG/DL (ref 0.57–1)
EOSINOPHIL # BLD AUTO: 0.3 X10E3/UL (ref 0–0.4)
EOSINOPHIL NFR BLD AUTO: 3 %
ERYTHROCYTE [DISTWIDTH] IN BLOOD BY AUTOMATED COUNT: 17.2 % (ref 11.7–15.4)
GLOBULIN SER CALC-MCNC: 4 G/DL (ref 1.5–4.5)
GLUCOSE SERPL-MCNC: 77 MG/DL (ref 65–99)
HCT VFR BLD AUTO: 40.6 % (ref 34–46.6)
HDLC SERPL-MCNC: 24 MG/DL
HGB BLD-MCNC: 13.1 G/DL (ref 11.1–15.9)
IMM GRANULOCYTES # BLD AUTO: 0 X10E3/UL (ref 0–0.1)
IMM GRANULOCYTES NFR BLD AUTO: 0 %
INTERPRETATION, 910389: NORMAL
LDLC SERPL CALC-MCNC: 56 MG/DL (ref 0–99)
LYMPHOCYTES # BLD AUTO: 1.7 X10E3/UL (ref 0.7–3.1)
LYMPHOCYTES NFR BLD AUTO: 17 %
MCH RBC QN AUTO: 26.7 PG (ref 26.6–33)
MCHC RBC AUTO-ENTMCNC: 32.3 G/DL (ref 31.5–35.7)
MCV RBC AUTO: 83 FL (ref 79–97)
MONOCYTES # BLD AUTO: 0.6 X10E3/UL (ref 0.1–0.9)
MONOCYTES NFR BLD AUTO: 6 %
NEUTROPHILS # BLD AUTO: 7.5 X10E3/UL (ref 1.4–7)
NEUTROPHILS NFR BLD AUTO: 73 %
PLATELET # BLD AUTO: 289 X10E3/UL (ref 150–450)
POTASSIUM SERPL-SCNC: 4 MMOL/L (ref 3.5–5.2)
PROT SERPL-MCNC: 7.9 G/DL (ref 6–8.5)
RBC # BLD AUTO: 4.9 X10E6/UL (ref 3.77–5.28)
SODIUM SERPL-SCNC: 135 MMOL/L (ref 134–144)
T4 FREE SERPL-MCNC: 1.03 NG/DL (ref 0.82–1.77)
TRIGL SERPL-MCNC: 213 MG/DL (ref 0–149)
TSH SERPL DL<=0.005 MIU/L-ACNC: 3.88 UIU/ML (ref 0.45–4.5)
VLDLC SERPL CALC-MCNC: 43 MG/DL (ref 5–40)
WBC # BLD AUTO: 10.2 X10E3/UL (ref 3.4–10.8)

## 2020-02-05 RX ORDER — NYSTATIN 100000 U/G
OINTMENT TOPICAL 2 TIMES DAILY
Qty: 15 G | Refills: 0 | Status: SHIPPED | OUTPATIENT
Start: 2020-02-05 | End: 2021-05-06

## 2020-03-18 ENCOUNTER — OFFICE VISIT (OUTPATIENT)
Dept: FAMILY MEDICINE CLINIC | Age: 60
End: 2020-03-18

## 2020-03-18 VITALS
TEMPERATURE: 98.7 F | HEIGHT: 62 IN | RESPIRATION RATE: 18 BRPM | HEART RATE: 73 BPM | DIASTOLIC BLOOD PRESSURE: 83 MMHG | BODY MASS INDEX: 40.08 KG/M2 | SYSTOLIC BLOOD PRESSURE: 145 MMHG | WEIGHT: 217.8 LBS

## 2020-03-18 DIAGNOSIS — R82.90 ABNORMAL URINE ODOR: ICD-10-CM

## 2020-03-18 DIAGNOSIS — M75.41 IMPINGEMENT SYNDROME OF RIGHT SHOULDER: Primary | ICD-10-CM

## 2020-03-18 LAB
BILIRUB UR QL STRIP: NEGATIVE
GLUCOSE UR-MCNC: NEGATIVE MG/DL
KETONES P FAST UR STRIP-MCNC: NEGATIVE MG/DL
PH UR STRIP: 6.5 [PH] (ref 4.6–8)
PROT UR QL STRIP: NEGATIVE
SP GR UR STRIP: 1.01 (ref 1–1.03)
UA UROBILINOGEN AMB POC: NORMAL (ref 0.2–1)
URINALYSIS CLARITY POC: CLEAR
URINALYSIS COLOR POC: YELLOW
URINE BLOOD POC: NEGATIVE
URINE LEUKOCYTES POC: NEGATIVE
URINE NITRITES POC: NEGATIVE

## 2020-03-18 RX ORDER — NAPROXEN 500 MG/1
500 TABLET ORAL 2 TIMES DAILY WITH MEALS
Qty: 60 TAB | Refills: 1 | Status: SHIPPED | OUTPATIENT
Start: 2020-03-18 | End: 2021-05-06

## 2020-03-18 NOTE — PROGRESS NOTES
Zulma Warner presents today for   Chief Complaint   Patient presents with    Flank Pain     Patient stated that she been having lower back pain, oder urine, and dark color urine x 2 weeks. Zulma Warner preferred language for health care discussion is english/other. Is someone accompanying this pt? no    Is the patient using any DME equipment during [address]? no    Depression Screening:  3 most recent PHQ Screens 2/4/2020   Little interest or pleasure in doing things Not at all   Feeling down, depressed, irritable, or hopeless Not at all   Total Score PHQ 2 0   Trouble falling or staying asleep, or sleeping too much -   Feeling tired or having little energy -   Poor appetite, weight loss, or overeating -   Feeling bad about yourself - or that you are a failure or have let yourself or your family down -   Trouble concentrating on things such as school, work, reading, or watching TV -   Moving or speaking so slowly that other people could have noticed; or the opposite being so fidgety that others notice -   Thoughts of being better off dead, or hurting yourself in some way -   PHQ 9 Score -   How difficult have these problems made it for you to do your work, take care of your home and get along with others -       Learning Assessment:  Learning Assessment 2/4/2020   PRIMARY LEARNER Patient   HIGHEST LEVEL OF EDUCATION - PRIMARY LEARNER  GRADUATED HIGH SCHOOL OR GED   BARRIERS PRIMARY LEARNER NONE   CO-LEARNER CAREGIVER No   PRIMARY LANGUAGE ENGLISH    NEED No   LEARNER PREFERENCE PRIMARY DEMONSTRATION   ANSWERED BY patient   RELATIONSHIP SELF       Abuse Screening:  Abuse Screening Questionnaire 2/4/2020   Do you ever feel afraid of your partner? N   Are you in a relationship with someone who physically or mentally threatens you? N   Is it safe for you to go home? Y       Generalized Anxiety  No flowsheet data found.       Health Maintenance Due   Topic Date Due    Foot Exam Q1  10/29/1971    PAP AKA CERVICAL CYTOLOGY  10/29/1982    Shingrix Vaccine Age 50> (1 of 2) 10/29/2011    FOBT Q1Y Age 54-65  10/29/2011    Influenza Age 5 to Adult  08/01/2019    A1C test (Diabetic or Prediabetic)  10/18/2019   . Health Maintenance reviewed and discussed and ordered per Provider. Mirna Brito is updated on all     Coordination of Care:  1. Have you been to the ER, urgent care clinic since your last visit? Hospitalized since your last visit? no    2. Have you seen or consulted any other health care providers outside of the [address] [place] [address] since your last visit? Include any pap smears or colon screening. no      Advance Directive:  1. Do you have an advance directive in place? Patient Reply:no    2. If not, would you like material regarding how to put one in place?  Patient Reply: no

## 2020-03-18 NOTE — PROGRESS NOTES
Chief Complaint   Patient presents with    Flank Pain     Patient stated that she been having lower back pain, oder urine, and dark color urine x 2 weeks. HISTORY OF PRESENT ILLNESS  Vivian Palmer is a 63 y.o. female. HPI  Pt here for eval of low back pain x 2 wks. She reports that her urine has been dark. She feels there was an odor to the urine this morning. She does not feel there has been any increase in the frequency or urgency of urination. She has not seen blood in her urine. Pt reports that in Nov 2018, she was admitted to the hospital for osteomyelitis of T7/8. She also ultimately had compression fractures of  She is worried about possible recurrence due to the pain she is experiencing. Review of Systems   Constitutional: Negative. HENT: Negative. Respiratory: Negative. Cardiovascular: Negative. Genitourinary: Positive for flank pain. Negative for frequency, hematuria and urgency. Musculoskeletal: Positive for back pain. All other systems reviewed and are negative. Physical Exam  Vitals signs and nursing note reviewed. Constitutional:       General: She is not in acute distress. Appearance: Normal appearance. She is not ill-appearing. HENT:      Head: Normocephalic and atraumatic. Right Ear: External ear normal.      Left Ear: External ear normal.      Nose: Nose normal.   Eyes:      Extraocular Movements: Extraocular movements intact. Conjunctiva/sclera: Conjunctivae normal.   Neck:      Musculoskeletal: Normal range of motion. No neck rigidity. Cardiovascular:      Rate and Rhythm: Normal rate and regular rhythm. Heart sounds: No murmur. No friction rub. No gallop. Pulmonary:      Effort: Pulmonary effort is normal.      Breath sounds: Normal breath sounds. No wheezing, rhonchi or rales. Musculoskeletal:      Right shoulder: She exhibits decreased range of motion, tenderness and decreased strength.       Left shoulder: Normal. Comments: R shoulder: no post/inf instability, + Neer, + Storey, pain with ext rotation   Skin:     General: Skin is warm and dry. Neurological:      Mental Status: She is alert and oriented to person, place, and time. Coordination: Coordination normal.   Psychiatric:         Mood and Affect: Mood normal.         Behavior: Behavior normal.         Thought Content: Thought content normal.         Judgment: Judgment normal.          Recent Results (from the past 12 hour(s))   AMB POC URINALYSIS DIP STICK AUTO W/O MICRO    Collection Time: 03/18/20  9:54 AM   Result Value Ref Range    Color (UA POC) Yellow     Clarity (UA POC) Clear     Glucose (UA POC) Negative Negative    Bilirubin (UA POC) Negative Negative    Ketones (UA POC) Negative Negative    Specific gravity (UA POC) 1.015 1.001 - 1.035    Blood (UA POC) Negative Negative    pH (UA POC) 6.5 4.6 - 8.0    Protein (UA POC) Negative Negative    Urobilinogen (UA POC) 0.2 mg/dL 0.2 - 1    Nitrites (UA POC) Negative Negative    Leukocyte esterase (UA POC) Negative Negative         ASSESSMENT and PLAN  Diagnoses and all orders for this visit:    1. Impingement syndrome of right shoulder  -     REFERRAL TO ORTHOPEDICS  -     naproxen (NAPROSYN) 500 mg tablet; Take 1 Tab by mouth two (2) times daily (with meals). 2. Abnormal urine odor  -     CULTURE, URINE; Future  -     AMB POC URINALYSIS DIP STICK AUTO W/O MICRO      Follow-up and Dispositions    · Return if symptoms worsen or fail to improve.

## 2020-03-21 LAB — BACTERIA UR CULT: ABNORMAL

## 2020-03-24 ENCOUNTER — TELEPHONE (OUTPATIENT)
Dept: FAMILY MEDICINE CLINIC | Age: 60
End: 2020-03-24

## 2020-03-24 ENCOUNTER — OFFICE VISIT (OUTPATIENT)
Dept: FAMILY MEDICINE CLINIC | Age: 60
End: 2020-03-24

## 2020-03-24 VITALS
HEIGHT: 62 IN | OXYGEN SATURATION: 94 % | SYSTOLIC BLOOD PRESSURE: 117 MMHG | RESPIRATION RATE: 20 BRPM | BODY MASS INDEX: 41 KG/M2 | TEMPERATURE: 98 F | HEART RATE: 76 BPM | WEIGHT: 222.8 LBS | DIASTOLIC BLOOD PRESSURE: 74 MMHG

## 2020-03-24 DIAGNOSIS — M54.6 CHRONIC BILATERAL THORACIC BACK PAIN: Primary | ICD-10-CM

## 2020-03-24 DIAGNOSIS — F41.9 ANXIETY: ICD-10-CM

## 2020-03-24 DIAGNOSIS — G89.29 OTHER CHRONIC PAIN: ICD-10-CM

## 2020-03-24 DIAGNOSIS — G89.29 CHRONIC BILATERAL THORACIC BACK PAIN: Primary | ICD-10-CM

## 2020-03-24 DIAGNOSIS — F33.9 EPISODE OF RECURRENT MAJOR DEPRESSIVE DISORDER, UNSPECIFIED DEPRESSION EPISODE SEVERITY (HCC): ICD-10-CM

## 2020-03-24 DIAGNOSIS — Z51.81 MEDICATION MONITORING ENCOUNTER: ICD-10-CM

## 2020-03-24 NOTE — PATIENT INSTRUCTIONS
Body Mass Index: Care Instructions Your Care Instructions Body mass index (BMI) can help you see if your weight is raising your risk for health problems. It uses a formula to compare how much you weigh with how tall you are. · A BMI lower than 18.5 is considered underweight. · A BMI between 18.5 and 24.9 is considered healthy. · A BMI between 25 and 29.9 is considered overweight. A BMI of 30 or higher is considered obese. If your BMI is in the normal range, it means that you have a lower risk for weight-related health problems. If your BMI is in the overweight or obese range, you may be at increased risk for weight-related health problems, such as high blood pressure, heart disease, stroke, arthritis or joint pain, and diabetes. If your BMI is in the underweight range, you may be at increased risk for health problems such as fatigue, lower protection (immunity) against illness, muscle loss, bone loss, hair loss, and hormone problems. BMI is just one measure of your risk for weight-related health problems. You may be at higher risk for health problems if you are not active, you eat an unhealthy diet, or you drink too much alcohol or use tobacco products. Follow-up care is a key part of your treatment and safety. Be sure to make and go to all appointments, and call your doctor if you are having problems. It's also a good idea to know your test results and keep a list of the medicines you take. How can you care for yourself at home? · Practice healthy eating habits. This includes eating plenty of fruits, vegetables, whole grains, lean protein, and low-fat dairy. · If your doctor recommends it, get more exercise. Walking is a good choice. Bit by bit, increase the amount you walk every day. Try for at least 30 minutes on most days of the week. · Do not smoke. Smoking can increase your risk for health problems.  If you need help quitting, talk to your doctor about stop-smoking programs and medicines. These can increase your chances of quitting for good. · Limit alcohol to 2 drinks a day for men and 1 drink a day for women. Too much alcohol can cause health problems. If you have a BMI higher than 25 · Your doctor may do other tests to check your risk for weight-related health problems. This may include measuring the distance around your waist. A waist measurement of more than 40 inches in men or 35 inches in women can increase the risk of weight-related health problems. · Talk with your doctor about steps you can take to stay healthy or improve your health. You may need to make lifestyle changes to lose weight and stay healthy, such as changing your diet and getting regular exercise. If you have a BMI lower than 18.5 · Your doctor may do other tests to check your risk for health problems. · Talk with your doctor about steps you can take to stay healthy or improve your health. You may need to make lifestyle changes to gain or maintain weight and stay healthy, such as getting more healthy foods in your diet and doing exercises to build muscle. Where can you learn more? Go to http://sameera-miles.info/. Enter S176 in the search box to learn more about \"Body Mass Index: Care Instructions. \" Current as of: October 13, 2016 Content Version: 11.4 © 6451-2210 [employer]. Care instructions adapted under license by [employer] (which disclaims liability or warranty for this information). If you have questions about a medical condition or this instruction, always ask your healthcare professional. [address] any warranty or liability for your use of this information.

## 2020-03-24 NOTE — TELEPHONE ENCOUNTER
Spoke with patient. She has verified her name, , and her address. I informed patient that I have spoken with the pharm D and I have sent her a formal referral to review patients chart and provide recommendations on tapering since she was on a high dose of the zoloft. I also informed her that Pharm D recommended we complete a referral to psychiatry. Patient is in agreement with referral for medication adjustments.  MILADYS Jacobo

## 2020-03-24 NOTE — PROGRESS NOTES
Donminerva Nurjoseph presents today for   Chief Complaint   Patient presents with    Back Pain     'whole back', dx with osteomyelitis 2 years ago       Mirna Brito preferred language for health care discussion is english/other. Is someone accompanying this pt? no    Is the patient using any DME equipment during [address]? no    Depression Screening:  3 most recent PHQ Screens 2/4/2020   Little interest or pleasure in doing things Not at all   Feeling down, depressed, irritable, or hopeless Not at all   Total Score PHQ 2 0   Trouble falling or staying asleep, or sleeping too much -   Feeling tired or having little energy -   Poor appetite, weight loss, or overeating -   Feeling bad about yourself - or that you are a failure or have let yourself or your family down -   Trouble concentrating on things such as school, work, reading, or watching TV -   Moving or speaking so slowly that other people could have noticed; or the opposite being so fidgety that others notice -   Thoughts of being better off dead, or hurting yourself in some way -   PHQ 9 Score -   How difficult have these problems made it for you to do your work, take care of your home and get along with others -       Learning Assessment:  Learning Assessment 2/4/2020   PRIMARY LEARNER Patient   HIGHEST LEVEL OF EDUCATION - PRIMARY LEARNER  GRADUATED HIGH SCHOOL OR GED   BARRIERS PRIMARY LEARNER NONE   CO-LEARNER CAREGIVER No   PRIMARY LANGUAGE ENGLISH    NEED No   LEARNER PREFERENCE PRIMARY DEMONSTRATION   ANSWERED BY patient   RELATIONSHIP SELF       Abuse Screening:  Abuse Screening Questionnaire 2/4/2020   Do you ever feel afraid of your partner? N   Are you in a relationship with someone who physically or mentally threatens you? N   Is it safe for you to go home? Y       Generalized Anxiety  No flowsheet data found.       Health Maintenance Due   Topic Date Due    Foot Exam Q1  10/29/1971    PAP AKA CERVICAL CYTOLOGY  10/29/1982    Shingrix Vaccine Age 50> (1 of 2) 10/29/2011    FOBT Q1Y Age 54-65  10/29/2011    Influenza Age 5 to Adult  08/01/2019    A1C test (Diabetic or Prediabetic)  10/18/2019   . Health Maintenance reviewed and discussed and ordered per Provider. Coordination of Care:  1. Have you been to the ER, urgent care clinic since your last visit? Hospitalized since your last visit? no    2. Have you seen or consulted any other health care providers outside of the [address] [place] [address] since your last visit? Include any pap smears or colon screening.  no

## 2020-03-24 NOTE — PROGRESS NOTES
HPI  Brady Strauss is a 63 y.o. female  Chief Complaint   Patient presents with    Back Pain     'whole back', dx with osteomyelitis 2 years ago     She reports that her back pain has returned and she is requesting a referral to see Dr. Gamaliel Caban. She is not interested in pain medication due to her history of drug abuse but states her back pain is increasing making walking and sitting difficult. She states she does take naproxen but she only takes that when the pain gets really bad. She states that the pain is deep in her back. She states that she is starting to see some bruising on her back again like she did previously when the pain was really bad and she was then diagnosed with osteomyelitis. Back pain today is 3 out of 10 and achy. She states she know she needs to loose weight and feels this may be impacting her back pain as well. She denies any fevers and or chills. She denies any issues with her bowel or bladder. She is requesting an additional medication as she she does not think that her depression medication is as effective as it should be. She reports that it does help control her depression but she questions if is controlling her anxiety as she feels more edgy. She is interested in cymbalta. Past Medical History  Past Medical History:   Diagnosis Date    Depression     Hepatitis C     Heroin use disorder, moderate, in early remission ([facility] [address]     NOW on [facility]    Ill-defined condition     Chronic back pain       Surgical History  No past surgical history on file. Medications  Current Outpatient Medications   Medication Sig Dispense Refill    naproxen (NAPROSYN) 500 mg tablet Take 1 Tab by mouth two (2) times daily (with meals). 60 Tab 1    glucose blood VI test strips (True Metrix Pro Test Strip) strip Test BG twice a day. Dx:  E11.9 200 Strip 3    nystatin (MYCOSTATIN) 100,000 unit/gram ointment Apply  to affected area two (2) times a day.  15 g 0    metFORMIN ER 1,000 mg tr24       sertraline (ZOLOFT) 100 mg tablet TAKE TWO TABLETS BY MOUTH ONCE DAILY 60 Tab 5    atorvastatin (LIPITOR) 10 mg tablet TAKE ONE TABLET BY MOUTH DAILY 90 Tab 3    TRUEPLUS LANCETS 33 gauge misc Twice daily 100 Lancet 11    TRUE METRIX GLUCOSE METER misc   0    BD ULTRA-FINE MINI PEN NEEDLE 31 gauge x 3/16\" ndle   11    insulin glargine (LANTUS,BASAGLAR) 100 unit/mL (3 mL) inpn 10 units daily subcu (Patient taking differently: 12 Units daily. 10 units daily subcu) 5 Pen 0    hydroCHLOROthiazide (HYDRODIURIL) 12.5 mg tablet TAKE ONE TABLET BY MOUTH DAILY 30 Tab 12    potassium chloride (KLOR-CON) 10 mEq tablet Take 1 Tab by mouth daily. 30 Tab 3    methadone HCl (METHADONE PO) Take 85 mg by mouth daily.          Allergies  No Known Allergies    Family History  Family History   Problem Relation Age of Onset    Hypertension Mother     High Cholesterol Mother     COPD Mother     Hypertension Father        Social History  Social History     Socioeconomic History    Marital status: SINGLE     Spouse name: Not on file    Number of children: Not on file    Years of education: Not on file    Highest education level: Not on file   Occupational History    Not on file   Social Needs    Financial resource strain: Not on file    Food insecurity     Worry: Not on file     Inability: Not on file    Transportation needs     Medical: Not on file     Non-medical: Not on file   Tobacco Use    Smoking status: Current Every Day Smoker     Packs/day: 0.25     Types: Cigarettes    Smokeless tobacco: Never Used   Substance and Sexual Activity    Alcohol use: No    Drug use: No     Comment: on methadone matinance program    Sexual activity: Never   Lifestyle    Physical activity     Days per week: Not on file     Minutes per session: Not on file    Stress: Not on file   Relationships    Social connections     Talks on phone: Not on file     Gets together: Not on file     Attends Evangelical service: Not on file     Active member of club or organization: Not on file     Attends meetings of clubs or organizations: Not on file     Relationship status: Not on file    Intimate partner violence     Fear of current or ex partner: Not on file     Emotionally abused: Not on file     Physically abused: Not on file     Forced sexual activity: Not on file   Other Topics Concern    Not on file   Social History Narrative    Not on file       Problem List  Patient Active Problem List   Diagnosis Code    Severe obesity with body mass index (BMI) of 35.0 to 39.9 [facility] E66.01    Discitis of thoracic region M46.44       Review of Systems  Review of Systems   Constitutional: Negative for chills and fever. Respiratory: Negative for sputum production. Cardiovascular: Negative for chest pain. Gastrointestinal: Negative for blood in stool. Genitourinary: Negative. Musculoskeletal: Positive for back pain. Negative for falls. Psychiatric/Behavioral: Positive for depression. The patient is nervous/anxious. Vital Signs  Vitals:    03/24/20 0833   BP: 117/74   Pulse: 76   Resp: 20   Temp: 98 °F (36.7 °C)   TempSrc: Oral   SpO2: 94%   Weight: 222 lb 12.8 oz (101.1 kg)   Height: 5' 2\" (1.575 m)   PainSc:   3   PainLoc: Back       Physical Exam  Physical Exam  Cardiovascular:      Rate and Rhythm: Normal rate and regular rhythm. Pulmonary:      Effort: Pulmonary effort is normal.      Breath sounds: Normal breath sounds. Musculoskeletal:         General: Tenderness (Point tenderness to thoracic area bilateral) present. No swelling. Neurological:      Mental Status: She is oriented to person, place, and time. Gait: Gait abnormal.   Psychiatric:         Mood and Affect: Mood normal.         Behavior: Behavior normal.         Thought Content:  Thought content normal.         Judgment: Judgment normal.         Diagnostics  Orders Placed This Encounter    REFERRAL TO ORTHOPEDICS     Referral Priority:   Routine     Referral Type:   Consultation     Referral Reason:   Specialty Services Required     Referred to Provider:   Rafiq Fraga MD     Number of Visits Requested:   1    REFERRAL TO PHARMACIST     Referral Priority:   Routine     Referral Type:   Consultation     Referral Reason:   Specialty Services Required     Referred to Provider:   Precious Espinal [facility]     Number of Visits Requested:   1    REFERRAL TO PSYCHIATRY     Referral Priority:   Routine     Referral Type:   Behavioral Health     Referral Reason:   Specialty Services Required     Requested Specialty:   Psychiatry     Number of Visits Requested:   1       Results  Results for orders placed or performed in visit on 03/18/20   CULTURE, URINE   Result Value Ref Range    Urine Culture, Routine (A)      Streptococcus mitis/oralis group  10,000-25,000 colony forming units per mL     AMB POC URINALYSIS DIP STICK AUTO W/O MICRO   Result Value Ref Range    Color (UA POC) Yellow     Clarity (UA POC) Clear     Glucose (UA POC) Negative Negative    Bilirubin (UA POC) Negative Negative    Ketones (UA POC) Negative Negative    Specific gravity (UA POC) 1.015 1.001 - 1.035    Blood (UA POC) Negative Negative    pH (UA POC) 6.5 4.6 - 8.0    Protein (UA POC) Negative Negative    Urobilinogen (UA POC) 0.2 mg/dL 0.2 - 1    Nitrites (UA POC) Negative Negative    Leukocyte esterase (UA POC) Negative Negative       Assessment and Plan  Diagnoses and all orders for this visit:    1. Chronic bilateral thoracic back pain  -     REFERRAL TO ORTHOPEDICS    2. BMI 40.0-44.9, adult (HCC)    3. Other chronic pain    4. Episode of recurrent major depressive disorder, unspecified depression episode severity ([facility] [address]  -     REFERRAL TO PHARMACIST  -     REFERRAL TO PSYCHIATRY    5.  Anxiety  -     REFERRAL TO PHARMACIST  -     REFERRAL TO PSYCHIATRY    6. Medication monitoring encounter  -     REFERRAL TO PHARMACIST  -     REFERRAL TO PSYCHIATRY    Discussed the need to contact the pharmacy to assist with tapering of this dose of Zoloft and to see if Cymbalta would be an appropriate dose. After care summary printed and reviewed with patient. Plan reviewed with patient. Questions answered. Patient verbalized understanding of plan and is in agreement with plan. Patient to follow up in one month or earlier if symptoms worsen. Follow-up and Dispositions    · Return in about 1 month (around 4/24/2020), or if symptoms worsen or fail to improve, for Anxiety, Depression, Medication monitoring, pain. MILADYS Graham  Discussed the patient's BMI with her. The BMI follow up plan is as follows:     dietary management education, guidance, and counseling  encourage exercise  monitor weight  prescribed dietary intake    An After Visit Summary was printed and given to the patient.     MILADYS Graham

## 2020-04-22 ENCOUNTER — TELEPHONE (OUTPATIENT)
Dept: FAMILY MEDICINE CLINIC | Age: 60
End: 2020-04-22

## 2020-04-22 DIAGNOSIS — R74.8 ELEVATED LIVER ENZYMES: Primary | ICD-10-CM

## 2020-04-22 NOTE — TELEPHONE ENCOUNTER
Call to patient. She verified her name, , and her address. She has been to [facility] and they are managing and weaning her off of the Zoloft. They took her down to 150 mg of the Zoloft and added 5 mg of Abilify. She reports she has a follow up appointment with them tomorrow and she is hoping for additional medication adjustments as she does not think the Abilify is helping. She does not that she also has an appointment for orthopedics as well.  Omar Sloan

## 2020-04-22 NOTE — PROGRESS NOTES
Please call this patient. Inform her that I reviewed her last labs. Her cholesterol remains elevated. Please avoid fried greasy foods (butters and oils). Please try to stick to a lean protein diet with fresh fruits and vegetables. She also had some elevated liver enzymes (AST, ALT, and alk phosphatase). I would like for her to have these labs repeated. I have placed these orders. We may need to make some adjustments to your cholesterol medications.

## 2020-04-22 NOTE — TELEPHONE ENCOUNTER
Call to patient to discuss tapering of Zoloft. Message left with no information given with request she return call to office.  Omar Sloan

## 2020-05-11 ENCOUNTER — HOSPITAL ENCOUNTER (OUTPATIENT)
Dept: LAB | Age: 60
Discharge: HOME | End: 2020-05-11

## 2020-05-11 LAB — XX-LABCORP SPECIMEN COL,LCBCF: NORMAL

## 2020-05-11 PROCEDURE — 99001 SPECIMEN HANDLING PT-LAB: CPT

## 2020-05-12 LAB
ALBUMIN SERPL-MCNC: 3.8 G/DL (ref 3.8–4.9)
ALBUMIN/GLOB SERPL: 0.9 {RATIO} (ref 1.2–2.2)
ALP SERPL-CCNC: 116 IU/L (ref 39–117)
ALT SERPL-CCNC: 25 IU/L (ref 0–32)
AST SERPL-CCNC: 36 IU/L (ref 0–40)
BILIRUB SERPL-MCNC: <0.2 MG/DL (ref 0–1.2)
BUN SERPL-MCNC: 10 MG/DL (ref 6–24)
BUN/CREAT SERPL: 14 (ref 9–23)
CALCIUM SERPL-MCNC: 9.4 MG/DL (ref 8.7–10.2)
CHLORIDE SERPL-SCNC: 96 MMOL/L (ref 96–106)
CO2 SERPL-SCNC: 26 MMOL/L (ref 20–29)
CREAT SERPL-MCNC: 0.73 MG/DL (ref 0.57–1)
GLOBULIN SER CALC-MCNC: 4.1 G/DL (ref 1.5–4.5)
GLUCOSE SERPL-MCNC: 139 MG/DL (ref 65–99)
POTASSIUM SERPL-SCNC: 3.8 MMOL/L (ref 3.5–5.2)
PROT SERPL-MCNC: 7.9 G/DL (ref 6–8.5)
SODIUM SERPL-SCNC: 137 MMOL/L (ref 134–144)

## 2020-05-27 ENCOUNTER — OFFICE VISIT (OUTPATIENT)
Dept: FAMILY MEDICINE CLINIC | Age: 60
End: 2020-05-27

## 2020-05-27 VITALS
HEART RATE: 86 BPM | TEMPERATURE: 98.1 F | SYSTOLIC BLOOD PRESSURE: 135 MMHG | BODY MASS INDEX: 41.18 KG/M2 | RESPIRATION RATE: 16 BRPM | WEIGHT: 223.8 LBS | HEIGHT: 62 IN | DIASTOLIC BLOOD PRESSURE: 64 MMHG

## 2020-05-27 DIAGNOSIS — E11.8 CONTROLLED TYPE 2 DIABETES MELLITUS WITH COMPLICATION, WITHOUT LONG-TERM CURRENT USE OF INSULIN (HCC): ICD-10-CM

## 2020-05-27 DIAGNOSIS — I10 ESSENTIAL HYPERTENSION: ICD-10-CM

## 2020-05-27 DIAGNOSIS — G89.29 CHRONIC BACK PAIN, UNSPECIFIED BACK LOCATION, UNSPECIFIED BACK PAIN LATERALITY: ICD-10-CM

## 2020-05-27 DIAGNOSIS — F33.0 DEPRESSION, MAJOR, RECURRENT, MILD (HCC): Primary | ICD-10-CM

## 2020-05-27 DIAGNOSIS — M54.9 CHRONIC BACK PAIN, UNSPECIFIED BACK LOCATION, UNSPECIFIED BACK PAIN LATERALITY: ICD-10-CM

## 2020-05-27 DIAGNOSIS — R21 RASH AND NONSPECIFIC SKIN ERUPTION: ICD-10-CM

## 2020-05-27 DIAGNOSIS — Z71.2 ENCOUNTER TO DISCUSS TEST RESULTS: ICD-10-CM

## 2020-05-27 RX ORDER — LAMOTRIGINE 25 MG/1
100 TABLET ORAL DAILY
COMMUNITY
Start: 2020-05-21 | End: 2021-03-01 | Stop reason: ALTCHOICE

## 2020-05-27 RX ORDER — GUAIFENESIN 100 MG/5ML
81 LIQUID (ML) ORAL DAILY
COMMUNITY

## 2020-05-27 NOTE — PROGRESS NOTES
Cecy Lovett presents today for   Chief Complaint   Patient presents with    Anxiety     OTTO  0    Depression     PHQ  0    Pain (Chronic)       Cecy Lovett preferred language for health care discussion is english/other. Is someone accompanying this pt? no    Is the patient using any DME equipment during [address]? no    Depression Screening:  3 most recent PHQ Screens 5/27/2020   Little interest or pleasure in doing things Not at all   Feeling down, depressed, irritable, or hopeless Not at all   Total Score PHQ 2 0   Trouble falling or staying asleep, or sleeping too much -   Feeling tired or having little energy -   Poor appetite, weight loss, or overeating -   Feeling bad about yourself - or that you are a failure or have let yourself or your family down -   Trouble concentrating on things such as school, work, reading, or watching TV -   Moving or speaking so slowly that other people could have noticed; or the opposite being so fidgety that others notice -   Thoughts of being better off dead, or hurting yourself in some way -   PHQ 9 Score -   How difficult have these problems made it for you to do your work, take care of your home and get along with others -       Learning Assessment:  Learning Assessment 2/4/2020   PRIMARY LEARNER Patient   HIGHEST LEVEL OF EDUCATION - PRIMARY LEARNER  GRADUATED HIGH SCHOOL OR GED   BARRIERS PRIMARY LEARNER NONE   CO-LEARNER CAREGIVER No   PRIMARY LANGUAGE ENGLISH    NEED No   LEARNER PREFERENCE PRIMARY DEMONSTRATION   ANSWERED BY patient   RELATIONSHIP SELF       Abuse Screening:  Abuse Screening Questionnaire 2/4/2020   Do you ever feel afraid of your partner? N   Are you in a relationship with someone who physically or mentally threatens you? N   Is it safe for you to go home? Y       Generalized Anxiety  OTTO 2/7 5/27/2020   Feeling nervous, anxious or on edge?  0   Not being able to stop or control worrying? 0   OTTO-2 Subtotal 0         Health Maintenance Due Topic Date Due    Foot Exam Q1  10/29/1971    PAP AKA CERVICAL CYTOLOGY  10/29/1982    Shingrix Vaccine Age 50> (1 of 2) 10/29/2011    FOBT Q1Y Age 50-75  10/29/2011    A1C test (Diabetic or Prediabetic)  10/18/2019   . Health Maintenance reviewed and discussed and ordered per Provider. Brent Molina is updated on all     Coordination of Care:  1. Have you been to the ER, urgent care clinic since your last visit? Hospitalized since your last visit? no    2. Have you seen or consulted any other health care providers outside of the [address] [place] [address] since your last visit? Include any pap smears or colon screening.  no      Advance Directive:  Discussed 2/4/20

## 2020-05-27 NOTE — PROGRESS NOTES
MARIA EUGENIA  Levy Swan is a 63 y.o. female  Chief Complaint   Patient presents with    Anxiety     OTTO  0    Depression     PHQ  0    Pain (Chronic)     Endocrinology appointment on Friday. She states her blood glucose is usually around 130 and this is the goal endocrinology has set for her. She denies any hypo or hyperglycemia. She does admit to sweating but states this is normal for her as she is always hot and this has nothing to do with her blood glucose. Liver ultrasound by liver specialist on Friday. She denies any abdominal pain, nausea, or vomiting. She is following dermatology for rash on legs and they have recently biopsied these areas. She denies that rash itches. She reports the sun makes the rash worse. June 1st she is seeing spine specialist with Dr. Matt Seth office. She reports she has ongoing chronic back pain. Reports she lives with the pain when it is minimal at 3-4 or less so she is fine today. She states she is following with psychiatry and they have placed her on a mood stabilizer. She reports they continue to wean her off the Zoloft. She reports taking her blood pressure medications as prescribed. Denies any swelling in her lower extremities. Past Medical History  Past Medical History:   Diagnosis Date    Depression     Hepatitis C     Heroin use disorder, moderate, in early remission ([facility] [address]     NOW on [facility]    Ill-defined condition     Chronic back pain       Surgical History  No past surgical history on file. Medications  Current Outpatient Medications   Medication Sig Dispense Refill    lamoTRIgine (LaMICtal) 25 mg tablet       aspirin 81 mg chewable tablet Take 81 mg by mouth daily.  naproxen (NAPROSYN) 500 mg tablet Take 1 Tab by mouth two (2) times daily (with meals). 60 Tab 1    glucose blood VI test strips (True Metrix Pro Test Strip) strip Test BG twice a day.   Dx:  E11.9 200 Strip 3    nystatin (MYCOSTATIN) 100,000 unit/gram ointment Apply  to affected area two (2) times a day. 15 g 0    metFORMIN ER 1,000 mg tr24       sertraline (ZOLOFT) 100 mg tablet TAKE TWO TABLETS BY MOUTH ONCE DAILY (Patient taking differently: 150 mg. TAKE TWO TABLETS BY MOUTH ONCE DAILY) 60 Tab 5    atorvastatin (LIPITOR) 10 mg tablet TAKE ONE TABLET BY MOUTH DAILY 90 Tab 3    TRUEPLUS LANCETS 33 gauge misc Twice daily 100 Lancet 11    TRUE METRIX GLUCOSE METER misc   0    BD ULTRA-FINE MINI PEN NEEDLE 31 gauge x 3/16\" ndle   11    insulin glargine (LANTUS,BASAGLAR) 100 unit/mL (3 mL) inpn 10 units daily subcu (Patient taking differently: 10 Units daily. 10 units daily subcu) 5 Pen 0    hydroCHLOROthiazide (HYDRODIURIL) 12.5 mg tablet TAKE ONE TABLET BY MOUTH DAILY 30 Tab 12    potassium chloride (KLOR-CON) 10 mEq tablet Take 1 Tab by mouth daily. 30 Tab 3    methadone HCl (METHADONE PO) Take 85 mg by mouth daily.          Allergies  No Known Allergies    Family History  Family History   Problem Relation Age of Onset    Hypertension Mother     High Cholesterol Mother     COPD Mother     Hypertension Father        Social History  Social History     Socioeconomic History    Marital status: SINGLE     Spouse name: Not on file    Number of children: Not on file    Years of education: Not on file    Highest education level: Not on file   Occupational History    Not on file   Social Needs    Financial resource strain: Not on file    Food insecurity     Worry: Not on file     Inability: Not on file    Transportation needs     Medical: Not on file     Non-medical: Not on file   Tobacco Use    Smoking status: Current Every Day Smoker     Packs/day: 0.25     Types: Cigarettes    Smokeless tobacco: Never Used   Substance and Sexual Activity    Alcohol use: No    Drug use: No     Comment: on methadone matinance program    Sexual activity: Never   Lifestyle    Physical activity     Days per week: Not on file     Minutes per session: Not on file    Stress: Not on file   Relationships    Social connections     Talks on phone: Not on file     Gets together: Not on file     Attends Congregation service: Not on file     Active member of club or organization: Not on file     Attends meetings of clubs or organizations: Not on file     Relationship status: Not on file    Intimate partner violence     Fear of current or ex partner: Not on file     Emotionally abused: Not on file     Physically abused: Not on file     Forced sexual activity: Not on file   Other Topics Concern    Not on file   Social History Narrative    Not on file       Problem List  Patient Active Problem List   Diagnosis Code    Severe obesity with body mass index (BMI) of 35.0 to 39.9 with serious comorbidity ([address] E66.01    Discitis of thoracic region M46.44    Depression, major, recurrent, mild ([address] F33.0       Review of Systems  Review of Systems   Constitutional: Positive for diaphoresis. Negative for fever. Respiratory: Negative for shortness of breath. Cardiovascular: Negative for chest pain and leg swelling. Gastrointestinal: Negative for abdominal pain, nausea and vomiting. Musculoskeletal: Positive for back pain. Skin: Positive for rash. Negative for itching. Neurological: Negative for dizziness. Vital Signs  Vitals:    05/27/20 0826   BP: 135/64   Pulse: 86   Resp: 16   Temp: 98.1 °F (36.7 °C)   TempSrc: Oral   Weight: 223 lb 12.8 oz (101.5 kg)   Height: 5' 2\" (1.575 m)   PainSc:   0 - No pain       Physical Exam  Physical Exam  Vitals signs reviewed. Constitutional:       Appearance: She is obese. HENT:      Mouth/Throat:      Mouth: Mucous membranes are moist.   Eyes:      Pupils: Pupils are equal, round, and reactive to light. Cardiovascular:      Rate and Rhythm: Normal rate and regular rhythm. Pulses: Normal pulses. Heart sounds: Normal heart sounds. Pulmonary:      Effort: Pulmonary effort is normal.      Breath sounds: Normal breath sounds.    Abdominal: General: Bowel sounds are normal.      Palpations: Abdomen is soft. Musculoskeletal: Normal range of motion. Right lower leg: No edema. Left lower leg: Edema (+1 nonpitting left ankle) present. Skin:     General: Skin is warm and dry. Findings: Rash (dark pauples and plaques on lower extremities) present. Neurological:      Mental Status: She is alert and oriented to person, place, and time. Psychiatric:         Mood and Affect: Mood normal.         Behavior: Behavior normal.         Diagnostics  Orders Placed This Encounter    lamoTRIgine (LaMICtal) 25 mg tablet    aspirin 81 mg chewable tablet     Sig: Take 81 mg by mouth daily. Results  Results for orders placed or performed during the hospital encounter of 05/11/20   [facility] SPECIMEN COL   Result Value Ref Range    [facility]. Specimens collected/sent to [employer]. Please direct inquiries to ([phone]). Assessment and Plan  Diagnoses and all orders for this visit:    1. Depression, major, recurrent, mild ([address]    2. Controlled type 2 diabetes mellitus with complication, without long-term current use of insulin ([address]    3. Essential hypertension    4. Chronic back pain, unspecified back location, unspecified back pain laterality    5. Encounter to discuss test results    6. Rash and nonspecific skin eruption      Labs reviewed. Continue to follow up with endocrinology. Follow up with dermatology. Follow up with psychology and psychiatry. Take medications as prescribed. After care summary printed and reviewed with patient. Plan reviewed with patient. Questions answered. Patient verbalized understanding of plan and is in agreement with plan. Patient to follow up in one week or earlier if symptoms worsen.      MILADYS Santiago

## 2020-05-29 ENCOUNTER — HOSPITAL ENCOUNTER (OUTPATIENT)
Dept: ULTRASOUND IMAGING | Age: 60
Discharge: HOME | End: 2020-05-29
Attending: INTERNAL MEDICINE
Payer: MEDICAID

## 2020-05-29 DIAGNOSIS — B18.2 CHRONIC HEPATITIS C WITH HEPATIC COMA (HCC): ICD-10-CM

## 2020-05-29 DIAGNOSIS — K76.0 FATTY METAMORPHOSIS OF LIVER: ICD-10-CM

## 2020-05-29 DIAGNOSIS — R93.89 ULTRASOUND SCAN ABNORMAL: ICD-10-CM

## 2020-05-29 DIAGNOSIS — Z12.11 SPECIAL SCREENING FOR MALIGNANT NEOPLASMS, COLON: ICD-10-CM

## 2020-05-29 DIAGNOSIS — K86.1 CHRONIC PANCREATITIS (HCC): ICD-10-CM

## 2020-05-29 DIAGNOSIS — R94.5 NONSPECIFIC ABNORMAL RESULTS OF LIVER FUNCTION STUDY: ICD-10-CM

## 2020-05-29 DIAGNOSIS — K83.8 BILIARY SLUDGE: ICD-10-CM

## 2020-05-29 DIAGNOSIS — E66.9 OBESITY, UNSPECIFIED: ICD-10-CM

## 2020-05-29 PROCEDURE — 76981 USE PARENCHYMA: CPT

## 2020-06-01 ENCOUNTER — OFFICE VISIT (OUTPATIENT)
Dept: ORTHOPEDIC SURGERY | Age: 60
End: 2020-06-01

## 2020-06-01 VITALS
HEIGHT: 61 IN | DIASTOLIC BLOOD PRESSURE: 79 MMHG | SYSTOLIC BLOOD PRESSURE: 154 MMHG | HEART RATE: 84 BPM | BODY MASS INDEX: 42.48 KG/M2 | WEIGHT: 225 LBS

## 2020-06-01 DIAGNOSIS — M46.44 THORACIC DISCITIS: ICD-10-CM

## 2020-06-01 DIAGNOSIS — M54.59 MECHANICAL LOW BACK PAIN: ICD-10-CM

## 2020-06-01 DIAGNOSIS — M46.24 OSTEOMYELITIS OF THORACIC SPINE (HCC): ICD-10-CM

## 2020-06-01 DIAGNOSIS — M54.6 THORACIC SPINE PAIN: ICD-10-CM

## 2020-06-01 DIAGNOSIS — B37.9 CANDIDA INFECTION: ICD-10-CM

## 2020-06-01 DIAGNOSIS — S22.060S COMPRESSION FRACTURE OF T8 VERTEBRA, SEQUELA: ICD-10-CM

## 2020-06-01 DIAGNOSIS — M79.18 MYOFASCIAL PAIN: ICD-10-CM

## 2020-06-01 DIAGNOSIS — M54.14 THORACIC RADICULOPATHY: Primary | ICD-10-CM

## 2020-06-01 RX ORDER — LOSARTAN POTASSIUM 25 MG/1
25 TABLET ORAL DAILY
COMMUNITY
Start: 2020-03-31

## 2020-06-01 NOTE — PATIENT INSTRUCTIONS
Low Back Pain: Exercises Introduction Here are some examples of exercises for you to try. The exercises may be suggested for a condition or for rehabilitation. Start each exercise slowly. Ease off the exercises if you start to have pain. You will be told when to start these exercises and which ones will work best for you. How to do the exercises Press-up 1. Lie on your stomach, supporting your body with your forearms. 2. Press your elbows down into the floor to raise your upper back. As you do this, relax your stomach muscles and allow your back to arch without using your back muscles. As your press up, do not let your hips or pelvis come off the floor. 3. Hold for 15 to 30 seconds, then relax. 4. Repeat 2 to 4 times. Alternate arm and leg (bird dog) exercise Do this exercise slowly. Try to keep your body straight at all times, and do not let one hip drop lower than the other. 1. Start on the floor, on your hands and knees. 2. Tighten your belly muscles. 3. Raise one leg off the floor, and hold it straight out behind you. Be careful not to let your hip drop down, because that will twist your trunk. 4. Hold for about 6 seconds, then lower your leg and switch to the other leg. 5. Repeat 8 to 12 times on each leg. 6. Over time, work up to holding for 10 to 30 seconds each time. 7. If you feel stable and secure with your leg raised, try raising the opposite arm straight out in front of you at the same time. Knee-to-chest exercise 1. Lie on your back with your knees bent and your feet flat on the floor. 2. Bring one knee to your chest, keeping the other foot flat on the floor (or keeping the other leg straight, whichever feels better on your lower back). 3. Keep your lower back pressed to the floor. Hold for at least 15 to 30 seconds. 4. Relax, and lower the knee to the starting position. 5. Repeat with the other leg. Repeat 2 to 4 times with each leg. 6. To get more stretch, put your other leg flat on the floor while pulling your knee to your chest.   
Curl-ups 1. Lie on the floor on your back with your knees bent at a 90-degree angle. Your feet should be flat on the floor, about 12 inches from your buttocks. 2. Cross your arms over your chest. If this bothers your neck, try putting your hands behind your neck (not your head), with your elbows spread apart. 3. Slowly tighten your belly muscles and raise your shoulder blades off the floor. 4. Keep your head in line with your body, and do not press your chin to your chest. 
5. Hold this position for 1 or 2 seconds, then slowly lower yourself back down to the floor. 6. Repeat 8 to 12 times. Pelvic tilt exercise 1. Lie on your back with your knees bent. 2. \"Brace\" your stomach. This means to tighten your muscles by pulling in and imagining your belly button moving toward your spine. You should feel like your back is pressing to the floor and your hips and pelvis are rocking back. 3. Hold for about 6 seconds while you breathe smoothly. 4. Repeat 8 to 12 times. Heel dig bridging 1. Lie on your back with both knees bent and your ankles bent so that only your heels are digging into the floor. Your knees should be bent about 90 degrees. 2. Then push your heels into the floor, squeeze your buttocks, and lift your hips off the floor until your shoulders, hips, and knees are all in a straight line. 3. Hold for about 6 seconds as you continue to breathe normally, and then slowly lower your hips back down to the floor and rest for up to 10 seconds. 4. Do 8 to 12 repetitions. Hamstring stretch in doorway 1. Lie on your back in a doorway, with one leg through the open door. 2. Slide your leg up the wall to straighten your knee. You should feel a gentle stretch down the back of your leg. 3. Hold the stretch for at least 15 to 30 seconds.  Do not arch your back, point your toes, or bend either knee. Keep one heel touching the floor and the other heel touching the wall. 4. Repeat with your other leg. 5. Do 2 to 4 times for each leg. Hip flexor stretch 1. Kneel on the floor with one knee bent and one leg behind you. Place your forward knee over your foot. Keep your other knee touching the floor. 2. Slowly push your hips forward until you feel a stretch in the upper thigh of your rear leg. 3. Hold the stretch for at least 15 to 30 seconds. Repeat with your other leg. 4. Do 2 to 4 times on each side. Wall sit 1. Stand with your back 10 to 12 inches away from a wall. 2. Lean into the wall until your back is flat against it. 3. Slowly slide down until your knees are slightly bent, pressing your lower back into the wall. 4. Hold for about 6 seconds, then slide back up the wall. 5. Repeat 8 to 12 times. Follow-up care is a key part of your treatment and safety. Be sure to make and go to all appointments, and call your doctor if you are having problems. It's also a good idea to know your test results and keep a list of the medicines you take. Where can you learn more? Go to http://sameera-miles.info/ Enter A336 in the search box to learn more about \"Low Back Pain: Exercises. \" Current as of: March 2, 2020               Content Version: 12.5 © 2006-2020 [employer]. Care instructions adapted under license by [employer] (which disclaims liability or warranty for this information). If you have questions about a medical condition or this instruction, always ask your healthcare professional. [place] [address] any warranty or liability for your use of this information. Healthy Upper Back: Exercises Introduction Here are some examples of exercises for your upper back. Start each exercise slowly. Ease off the exercise if you start to have pain. Your doctor or physical therapist will tell you when you can start these exercises and which ones will work best for you. How to do the exercises Lower neck and upper back stretch 1. Stretch your arms out in front of your body. Clasp one hand on top of your other hand. 2. Gently reach out so that you feel your shoulder blades stretching away from each other. 3. Gently bend your head forward. 4. Hold for 15 to 30 seconds. 5. Repeat 2 to 4 times. Midback stretch If you have knee pain, do not do this exercise. 1. Kneel on the floor, and sit back on your ankles. 2. Lean forward, place your hands on the floor, and stretch your arms out in front of you. Rest your head between your arms. 3. Gently push your chest toward the floor, reaching as far in front of you as possible. 4. Hold for 15 to 30 seconds. 5. Repeat 2 to 4 times. Shoulder rolls 1. Sit comfortably with your feet shoulder-width apart. You can also do this exercise while standing. 2. Roll your shoulders up, then back, and then down in a smooth, circular motion. 3. Repeat 2 to 4 times. Wall push-up 1. Stand against a wall with your feet about 12 to 24 inches back from the wall. If you feel any pain when you do this exercise, stand closer to the wall. 2. Place your hands on the wall slightly wider apart than your shoulders, and lean forward. 3. Gently lean your body toward the wall. Then push back to your starting position. Keep the motion smooth and controlled. 4. Repeat 8 to 12 times. Resisted shoulder blade squeeze For this exercise, you will need elastic exercise material, such as surgical tubing or Thera-Band. 1. Sit or stand, holding the band in both hands in front of you. Keep your elbows close to your sides, bent at a 90-degree angle. Your palms should face up. 2. Squeeze your shoulder blades together, and move your arms to the outside, stretching the band.  Be sure to keep your elbows at your sides while you do this. 3. Relax. 4. Repeat 8 to 12 times. Resisted rows For this exercise, you will need elastic exercise material, such as surgical tubing or Thera-Band. 1. Put the band around a solid object, such as a bedpost, at about waist level. Hold one end of the band in each hand. 2. With your elbows at your sides and bent to 90 degrees, pull the band back to move your shoulder blades toward each other. Return to the starting position. 3. Repeat 8 to 12 times. Follow-up care is a key part of your treatment and safety. Be sure to make and go to all appointments, and call your doctor if you are having problems. It's also a good idea to know your test results and keep a list of the medicines you take. Where can you learn more? Go to http://sameera-miles.info/ Enter H007 in the search box to learn more about \"Healthy Upper Back: Exercises. \" Current as of: March 2, 2020               Content Version: 12.5 © 2006-2020 [employer]. Care instructions adapted under license by [employer] (which disclaims liability or warranty for this information). If you have questions about a medical condition or this instruction, always ask your healthcare professional. [address] any warranty or liability for your use of this information.

## 2020-06-01 NOTE — PROGRESS NOTES
[facility], [address]  [place], [address] [place] [address] Street  Phone: [phone]  Fax: [phone]        Lurdes Boyd  : 1960  PCP: Gil Peres NP  2020    FOLLOW-UP PATIENT      HISTORY OF PRESENT ILLNESS  Gilmar Cota is a 63 y.o. female c/o chronic mid and low back pain. She also c/o \"pins and needles\" in her abdomen and the lateral aspect of the upper BLE. She had a thoracic discitis with osteomyelitis, disc biopsy positive for candida and MRSE that were treated with antibiotics. She was seen by Dr. Ron Beebe 19, and he noted that if she opted for surgery, it would be an extensive operation. She localizes the majority of her pain around T5, but notes she also has pain in the lower back. She has difficulty lying flat due to pain. She is not interested in surgery. She is not interested in pain medications given her history of drug abuse. Thoracic spine MRI dated 19 reviewed. Per report, More edema in T6, T9, posterior elements, with similar severe compression deformity of T8 and destruction of T7 as described. Kyphosis and retropulsion of T7-T8 with central stenosis  but no cord compression, stable. No developing abscess. No abnormal cord lesion or enhancement. Treatments patient has tried:  Physical therapy:Pending  Doing HEP: Unknown  Non-opioid medications: Yes  Spinal injections: No  Spinal surgery- No.   Last Thoracic MRI 2019: Severe compression deformity of T8 and destruction of T7. Kyphosis and retropulsion of T7-T8 with central stenosis but no cord compression, stable. PmHx: Hepatitis C, prior thoracic discitis with osteomyelitis, previous IV drug abuse, DM.    ASSESSMENT  This is a 63year-old female with history of thoracic discitis with osteomyelitis who presents for axial mid to low back pain. Her MRI reveals a severe compression deformity of T8 and destruction of T7 with a resultant focal kyphosis.  Her pain is likely myofascial in nature related to altered biomechanics and relative deconditioning after osteomyelitis. We discussed options of: referral to Interventional radiology ([facility]) for kyphoplasty vs thoracic JENELLE vs PT     PLAN  1. Referral to PT [facility])    Pt will f/u in 8 weeks or sooner if needed. Diagnoses and all orders for this visit:    1. Thoracic radiculopathy    2. Thoracic spine pain  -     REFERRAL TO PHYSICAL THERAPY    3. Compression fracture of T8 vertebra, sequela  -     REFERRAL TO PHYSICAL THERAPY    4. Myofascial pain  -     REFERRAL TO PHYSICAL THERAPY    5. Mechanical low back pain  -     REFERRAL TO PHYSICAL THERAPY    6. Osteomyelitis of thoracic spine (HCC)    7. Thoracic discitis    8. Candida infection             CHIEF COMPLAINT  Barrie Larson is seen today in consultation at the request of Hamilton Sanders NP for complaints of chronic mid-back pain. PAST MEDICAL HISTORY   Past Medical History:   Diagnosis Date    Depression     Hepatitis C     Heroin use disorder, moderate, in early remission ([facility] [address]     NOW on [facility]    Ill-defined condition     Chronic back pain       History reviewed. No pertinent surgical history. MEDICATIONS    Current Outpatient Medications   Medication Sig Dispense Refill    losartan (COZAAR) 25 mg tablet       lamoTRIgine (LaMICtal) 25 mg tablet       aspirin 81 mg chewable tablet Take 81 mg by mouth daily.  naproxen (NAPROSYN) 500 mg tablet Take 1 Tab by mouth two (2) times daily (with meals). 60 Tab 1    glucose blood VI test strips (True Metrix Pro Test Strip) strip Test BG twice a day. Dx:  E11.9 200 Strip 3    nystatin (MYCOSTATIN) 100,000 unit/gram ointment Apply  to affected area two (2) times a day. 15 g 0    metFORMIN ER 1,000 mg tr24       sertraline (ZOLOFT) 100 mg tablet TAKE TWO TABLETS BY MOUTH ONCE DAILY (Patient taking differently: 150 mg.  TAKE TWO TABLETS BY MOUTH ONCE DAILY) 60 Tab 5    atorvastatin (LIPITOR) 10 mg tablet TAKE ONE TABLET BY MOUTH DAILY 90 Tab 3    TRUEPLUS LANCETS 33 gauge misc Twice daily 100 Lancet 11    TRUE METRIX GLUCOSE METER misc   0    BD ULTRA-FINE MINI PEN NEEDLE 31 gauge x 3/16\" ndle   11    insulin glargine (LANTUS,BASAGLAR) 100 unit/mL (3 mL) inpn 10 units daily subcu (Patient taking differently: 10 Units daily. 10 units daily subcu) 5 Pen 0    hydroCHLOROthiazide (HYDRODIURIL) 12.5 mg tablet TAKE ONE TABLET BY MOUTH DAILY 30 Tab 12    potassium chloride (KLOR-CON) 10 mEq tablet Take 1 Tab by mouth daily. 30 Tab 3    methadone HCl (METHADONE PO) Take 85 mg by mouth daily. ALLERGIES  No Known Allergies       SOCIAL HISTORY    Social History     Socioeconomic History    Marital status: SINGLE     Spouse name: Not on file    Number of children: Not on file    Years of education: Not on file    Highest education level: Not on file   Tobacco Use    Smoking status: Current Every Day Smoker     Packs/day: 0.25     Types: Cigarettes    Smokeless tobacco: Never Used   Substance and Sexual Activity    Alcohol use: No    Drug use: No     Comment: on methadone matinance program    Sexual activity: Never       FAMILY HISTORY  Family History   Problem Relation Age of Onset    Hypertension Mother     High Cholesterol Mother     COPD Mother     Hypertension Father          REVIEW OF SYSTEMS  Review of Systems   Musculoskeletal: Positive for back pain.          PHYSICAL EXAMINATION  Visit Vitals  /79   Pulse 84   Ht 5' 1\" (1.549 m)   Wt 225 lb (102.1 kg)   BMI 42.51 kg/m²         Pain Assessment  6/1/2020   Location of Pain Back   Pain Location Comment -   Location Modifiers -   Severity of Pain 3   Quality of Pain Aching   Quality of Pain Comment -   Duration of Pain Persistent   Frequency of Pain Constant   Aggravating Factors -   Limiting Behavior Some   Relieving Factors Nothing   Relieving Factors Comment -   Result of Injury No Constitutional:  Well developed, well nourished, in no acute distress. Psychiatric: Affect and mood are appropriate. HEENT: Normocephalic, atraumatic. Extraocular movements intact. Integumentary: No rashes or abrasions noted on exposed areas. Cardiovascular: Regular rate and rhythm. Pulmonary: Clear to auscultation bilaterally. SPINE/MUSCULOSKELETAL EXAM    Thoracic Spine:  Tenderness to palpation of thoracic paraspinals  Thoracic kyphosis     Lumbar spine:  No rash, ecchymosis, or gross obliquity. No fasciculations. No focal atrophy is noted. No pain with hip ROM. Full range of motion. Tenderness to palpation of QLs and lumbar paraspinals. No tenderness to palpation at the sciatic notch. SI joints non-tender. Trochanters non tender. Sensation in the bilateral legs grossly intact to light touch. MOTOR:      Biceps  Triceps Deltoids Wrist Ext Wrist Flex Hand Intrin   Right 5/5 5/5 5/5 5/5 5/5 5/5   Left 5/5 5/5 5/5 5/5 5/5 5/5             Hip Flex  Quads Hamstrings Ankle DF EHL Ankle PF   Right 5/5 5/5 5/5 5/5 5/5 5/5   Left 5/5 5/5 5/5 5/5 5/5 5/5     DTRs are 1+ biceps, triceps, brachioradialis, patella, and Achilles. Negative Straight Leg raise. Squat not tested. No difficulty with tandem gait. Ambulation without assistive device. FWB. RADIOGRAPHS  Thoracic MRI images taken on 5/21/19 personally reviewed with patient:  Similar severe destruction of T7 and at least 75% compression deformity in T8 as  previously seen. Thoracic kyphosis with retropulsion of T7 and T8, stable. Mild  cord contact but no significant cord compression or significant central stenosis  at T7-T8 level. Paraspinous soft tissue inflammation, slightly more intense but  similar distribution. Mild edema with post contrast enhancement along the left  side of T8-T9 disc. Slightly more edema along the left side of T9 vertebral  body.  Slightly more intense edema in the inferior endplate of T6. Edema in the  posterior element of T6, T7, T8 and T9 noted, slightly more intense. No epidural  collection. No discrete fluid collection or abscess. Inflammation of the  associated foramina, grossly stable.     No additional or new abnormalities in the thoracic spine otherwise. No  developing focal disc herniation or central stenosis. Spinal cord is otherwise  unremarkable. No additional foraminal stenosis.     IMPRESSION     More edema in T6, T9, posterior elements, with similar severe compression  deformity of T8 and destruction of T7 as described. Kyphosis and retropulsion of  T7-T8 with central stenosis  but no cord compression, stable. No developing  abscess. No abnormal cord lesion or enhancement.  reviewed    Ms. Fanny Dubon has a reminder for a \"due or due soon\" health maintenance. I have asked that she contact her primary care provider for follow-up on this health maintenance. 35 minutes of face-to-face contact were spent with the patient during today's visit extensively discussing symptoms and treatment plan. All questions were answered. More than half of this visit today was spent on counseling. Written by Daniel Mayse, as dictated by Dr. Sofi Arriaza. I, Dr. Sofi Arriaza, confirm that all documentation is accurate.

## 2020-06-09 ENCOUNTER — HOSPITAL ENCOUNTER (OUTPATIENT)
Dept: PHYSICAL THERAPY | Age: 60
Discharge: HOME | End: 2020-06-09
Payer: MEDICAID

## 2020-06-09 PROCEDURE — 97162 PT EVAL MOD COMPLEX 30 MIN: CPT

## 2020-06-09 NOTE — PROGRESS NOTES
PT DAILY TREATMENT NOTE 10-18    Patient Name: Cecy Lovett  Date:2020  : 1960  [x]  Patient  Verified  Payor: [facility] MEDICAID / Plan: KATHERYN BELTRAN [facility] / Product Type: Managed Care Medicaid /    In time:1030  Out time:11:10  Total Treatment Time (min): 40  Visit #: 1 of 4-8      Treatment Area: Low back pain [M54.5]  Pain in thoracic spine [M54.6]    SUBJECTIVE  Pain Level (0-10 scale): 4  Any medication changes, allergies to medications, adverse drug reactions, diagnosis change, or new procedure performed?: [x] No    [] Yes (see summary sheet for update)  Subjective functional status/changes:   [] No changes reported       OBJECTIVE    Modality rationale:    Min Type Additional Details    [] Estim:  []Unatt       []IFC  []Premod                        []Other:  []w/ice   []w/heat  Position:  Location:    [] Estim: []Att    []TENS instruct  []NMES                    []Other:  []w/US   []w/ice   []w/heat  Position:  Location:    []  Traction: [] Cervical       []Lumbar                       [] Prone          []Supine                       []Intermittent   []Continuous Lbs:  [] before manual  [] after manual    []  Ultrasound: []Continuous   [] Pulsed                           []1MHz   []3MHz W/cm2:  Location:    []  Iontophoresis with dexamethasone         Location: [] Take home patch   [] In clinic    []  Ice     []  heat  []  Ice massage  []  Laser   []  Anodyne Position:  Location:    []  Laser with stim  []  Other:  Position:  Location:    []  Vasopneumatic Device Pressure:       [] lo [] med [] hi   Temperature: [] lo [] med [] hi   [] Skin assessment post-treatment:  []intact []redness- no adverse reaction    []redness  adverse reaction:       10 min Therapeutic Exercise:  [] See flow sheet : HEP   Rationale: increase ROM to improve the patients ability to perform ADL            With   [] TE   [] TA   [] neuro   [] other: Patient Education: [x] Review HEP    [] Progressed/Changed HEP based on:   [] positioning   [] body mechanics   [] transfers   [] heat/ice application    [] other:      Other Objective/Functional Measures:       Pain Level (0-10 scale) post treatment: 4    ASSESSMENT/Changes in Function:      Patient will continue to benefit from skilled PT services to modify and progress therapeutic interventions, address functional mobility deficits, address ROM deficits, address strength deficits, analyze and address soft tissue restrictions, analyze and cue movement patterns, analyze and modify body mechanics/ergonomics and assess and modify postural abnormalities to attain remaining goals. []  See Plan of Care  []  See progress note/recertification  []  See Discharge Summary         Progress towards goals / Updated goals:  Short Term Goals: To be accomplished in 1 weeks:   1. Pt will be I and compliant with HEP. IE- pt issued HEP  Long Term Goals: To be accomplished in 4 weeks:   1. Improve FOTO score to 37 for improved ability for daily activities. IE- 30   2. The pt will report no difficulty with walking 1 block. IE- limited a lot   3. The pt will demonstrate 4/5 glute max MMT to improve ability for performing daily activities. IE- 3+/5   4. The pt will report max pain level of 3/10 for improved ability to daily tasks.     IE- 10/10 max pain    PLAN  []  Upgrade activities as tolerated     [x]  Continue plan of care  []  Update interventions per flow sheet       []  Discharge due to:_  []  Other:_      Bianka Brannon PT 6/9/2020  10:11 AM    Future Appointments   Date Time Provider Jennifer Heller   6/9/2020 10:30 AM Ken Pinon, PT [id number]   8/3/2020 10:00 AM Harry Thompson  [address]

## 2020-06-09 NOTE — PROGRESS NOTES
[facility]  [address]  [place], [address]  [phone] fax    Plan of Care/ Statement of Necessity for Physical Therapy Services    Patient name: Nancy Wheeler Start of Care: 2020   Referral source: Lenon Goodpasture, MD : 1960    Medical Diagnosis: Low back pain [M54.5]  Pain in thoracic spine [M54.6]  Payor: [facility] MEDICAID / [facility] / Product Type: Managed Care Medicaid /  Onset Date:2019    Treatment Diagnosis: t/s, LBP   Prior Hospitalization: see medical history Provider#: [id number]   Medications: Verified on Patient summary List    Comorbidities: depression, DM, OA, HTN, visual impairment, + tobacco use, heroin, cocaine, hepatitis C, alcohol use   Prior Level of Function: functionally I with ADL     The Plan of Care and following information is based on the information from the initial evaluation. Assessment/ graham information: 62 y/o female presents with c/o mid to LBP that started around last November. She reports no mechanism of injury but states she was diagnosed with osteomylitis. The pt reports most of the pain is in the right lumbar region. The pt demonstrates limited trunk AROM in all planes of motion with c/o pain in extension, B SB and B rotation. She demonstrates poor core engagement, limited hip strength, limited flexibility of B LEs and tenderness to palpation along the t/s paraspinals and the right lumbar paraspinals and QL. The pt will benefit from PT to address the aforementioned impairments. Evaluation Complexity History HIGH Complexity :3+ comorbidities / personal factors will impact the outcome/ POC ; Examination MEDIUM Complexity : 3 Standardized tests and measures addressing body structure, function, activity limitation and / or participation in recreation  ;Presentation MEDIUM Complexity : Evolving with changing characteristics  ; Clinical Decision Making MEDIUM Complexity : FOTO score of 26-74  Overall Complexity Rating: MEDIUM  Problem List: pain affecting function, decrease ROM, decrease strength, decrease ADL/ functional abilitiies, decrease activity tolerance and decrease flexibility/ joint mobility   Treatment Plan may include any combination of the following: Therapeutic exercise, Therapeutic activities, Neuromuscular re-education, Physical agent/modality, Manual therapy, Patient education and Self Care training  Patient / Family readiness to learn indicated by: asking questions and trying to perform skills  Persons(s) to be included in education: patient (P)  Barriers to Learning/Limitations: None  Patient Goal (s): To get better mobilty  Patient Self Reported Health Status: fair  Rehabilitation Potential: good    Short Term Goals: To be accomplished in 1 weeks:   1. Pt will be I and compliant with HEP. IE- pt issued HEP  Long Term Goals: To be accomplished in 4 weeks:   1. Improve FOTO score to 37 for improved ability for daily activities. IE- 30   2. The pt will report no difficulty with walking 1 block. IE- limited a lot   3. The pt will demonstrate 4/5 glute max MMT to improve ability for performing daily activities. IE- 3+/5   4. The pt will report max pain level of 3/10 for improved ability to daily tasks. IE- 10/10 max pain  Frequency / Duration: Patient to be seen 1-2 times per week for 4 weeks. Patient/ Caregiver education and instruction: Diagnosis, prognosis, self care, activity modification and exercises   [x]  Plan of care has been reviewed with STEFANY Antonio, PT 6/9/2020 10:12 AM    ________________________________________________________________________    I certify that the above Therapy Services are being furnished while the patient is under my care. I agree with the treatment plan and certify that this therapy is necessary.     Physician's Signature:____________Date:_________TIME:________    ** Signature, Date and Time must be completed for valid certification **    Please sign and return to [facility] [address]  [address]  [place], [address]  [phone] fax

## 2020-06-14 DIAGNOSIS — I10 ESSENTIAL HYPERTENSION: ICD-10-CM

## 2020-06-15 RX ORDER — HYDROCHLOROTHIAZIDE 12.5 MG/1
TABLET ORAL
Qty: 30 TAB | Refills: 7 | Status: SHIPPED | OUTPATIENT
Start: 2020-06-15 | End: 2020-12-14

## 2020-06-26 ENCOUNTER — TELEPHONE (OUTPATIENT)
Dept: FAMILY MEDICINE CLINIC | Age: 60
End: 2020-06-26

## 2020-06-26 DIAGNOSIS — K13.70 ORAL MUCOSAL LESION: ICD-10-CM

## 2020-06-26 DIAGNOSIS — B37.0 ORAL CANDIDIASIS: Primary | ICD-10-CM

## 2020-06-26 RX ORDER — NYSTATIN 100000 U/G
OINTMENT TOPICAL 2 TIMES DAILY
Qty: 15 G | Refills: 0 | Status: CANCELLED | OUTPATIENT
Start: 2020-06-26

## 2020-06-26 RX ORDER — CLOTRIMAZOLE 10 MG/1
10 LOZENGE ORAL; TOPICAL
Qty: 50 TAB | Refills: 0 | Status: SHIPPED | OUTPATIENT
Start: 2020-06-26 | End: 2020-09-25 | Stop reason: SDUPTHER

## 2020-06-26 NOTE — TELEPHONE ENCOUNTER
Alice Pitts has thrush, which she has had before. It is on the back of her tongue(diabetic) Would like something called in please. She also has some yeast under her breast, but she has some nystatin cream she can use on that.

## 2020-06-26 NOTE — TELEPHONE ENCOUNTER
Clotrimazole denied for prior auth. Obtained prior auth based on possible interactions of other preferred meds with methodone.         Pharmacy and patient notified

## 2020-07-02 ENCOUNTER — HOSPITAL ENCOUNTER (OUTPATIENT)
Dept: PHYSICAL THERAPY | Age: 60
Discharge: HOME | End: 2020-07-02
Payer: MEDICAID

## 2020-07-02 PROCEDURE — 97110 THERAPEUTIC EXERCISES: CPT

## 2020-07-02 PROCEDURE — 97530 THERAPEUTIC ACTIVITIES: CPT

## 2020-07-02 PROCEDURE — 97112 NEUROMUSCULAR REEDUCATION: CPT

## 2020-07-02 NOTE — PROGRESS NOTES
PT DAILY TREATMENT NOTE 10-18    Patient Name: Maurilio Houston  Date:2020  : 1960  [x]  Patient  Verified  Payor: [facility] MEDICAID / Plan: KATHERYN BELTRAN [facility] / Product Type: Managed Care Medicaid /    In time:1245  Out time:127  Total Treatment Time (min): 42  Visit #: 2 of 8    Medicare/BCBS Only   Total Timed Codes (min):  42 1:1 Treatment Time:  42       Treatment Area: Low back pain [M54.5]  Pain in thoracic spine [M54.6]    SUBJECTIVE  Pain Level (0-10 scale): 2  Any medication changes, allergies to medications, adverse drug reactions, diagnosis change, or new procedure performed?: [x] No    [] Yes (see summary sheet for update)  Subjective functional status/changes:   [] No changes reported  Pt reports HEP is being completed. She reports she is feeling pretty good today as she has not done much. OBJECTIVE    17 min Therapeutic Exercise:  [x] See flow sheet :   Rationale: increase ROM and increase strength to improve the patients ability to complete functional activities. 10 min Therapeutic Activity:  [x]  See flow sheet :   Rationale: increase ROM, increase strength, improve coordination, increase proprioception and patient education  to improve the patients ability to complete household chores. 15 min Neuromuscular Re-education:  [x]  See flow sheet :   Rationale: increase strength, improve coordination and increase proprioception  to improve the patients ability to complete functional task with correct body mechanics        With   [x] TE   [x] TA   [x] neuro   [] other: Patient Education: [x] Review HEP    [] Progressed/Changed HEP based on:   [] positioning   [] body mechanics   [] transfers   [] heat/ice application    [] other:      Other Objective/Functional Measures:      Pain Level (0-10 scale) post treatment: \"sore\"    ASSESSMENT/Changes in Function: Session focused on gentle mobility and strengthening exercises.  Added core stability exercises to improve body mechanics to improve patient's abilities to complete household chores. Pt presented with difficulty with core activation; however improved with each reps. Verbal cues and demonstration required to use correct body mechanics. Encouraged pt to complete HEP daily to help to continue to progress PT interventions to improve patient's ability to complete functional and community task independently. At the conclusion of the session, pt reported soreness from activity. Patient will continue to benefit from skilled PT services to modify and progress therapeutic interventions, address functional mobility deficits, address ROM deficits, address strength deficits, analyze and address soft tissue restrictions, analyze and cue movement patterns, analyze and modify body mechanics/ergonomics, assess and modify postural abnormalities and address imbalance/dizziness to attain remaining goals. [x]  See Plan of Care  []  See progress note/recertification  []  See Discharge Summary         Progress towards goals / Updated goals:  Short Term Goals: To be accomplished in 1 weeks:               1. Pt will be I and compliant with HEP. IE- pt issued HEP   CURRENT: completing 7/2  Long Term Goals: To be accomplished in 4 weeks:               1. Improve FOTO score to 37 for improved ability for daily activities. IE- 30               2. The pt will report no difficulty with walking 1 block. IE- limited a lot               3. The pt will demonstrate 4/5 glute max MMT to improve ability a t will report max pain level of 3/10 for improved ability to daily tasks.                 IE- 10/10 max pain   CURRENT: 2/10 7/2    PLAN  []  Upgrade activities as tolerated     [x]  Continue plan of care  []  Update interventions per flow sheet       []  Discharge due to:_  []  Other:_      Siomara Dobbins 7/2/2020  12:48 PM    Future Appointments   Date Time Provider Jennifer Heller   8/3/2020 10:00 AM Kandi Ray,  [address]

## 2020-07-08 ENCOUNTER — HOSPITAL ENCOUNTER (OUTPATIENT)
Dept: PHYSICAL THERAPY | Age: 60
Discharge: HOME | End: 2020-07-08
Payer: MEDICAID

## 2020-07-08 PROCEDURE — 97530 THERAPEUTIC ACTIVITIES: CPT

## 2020-07-08 PROCEDURE — 97110 THERAPEUTIC EXERCISES: CPT

## 2020-07-08 PROCEDURE — 97112 NEUROMUSCULAR REEDUCATION: CPT

## 2020-07-10 ENCOUNTER — HOSPITAL ENCOUNTER (OUTPATIENT)
Dept: PHYSICAL THERAPY | Age: 60
Discharge: HOME | End: 2020-07-10
Payer: MEDICAID

## 2020-07-10 PROCEDURE — 97014 ELECTRIC STIMULATION THERAPY: CPT

## 2020-07-10 PROCEDURE — 97112 NEUROMUSCULAR REEDUCATION: CPT

## 2020-07-10 PROCEDURE — 97110 THERAPEUTIC EXERCISES: CPT

## 2020-07-10 PROCEDURE — 97530 THERAPEUTIC ACTIVITIES: CPT

## 2020-07-10 NOTE — PROGRESS NOTES
PT DAILY TREATMENT NOTE 10-18    Patient Name: Aspen Hernandez  Date:7/10/2020  : 1960  [x]  Patient  Verified  Payor: [facility] MEDICAID / [facility] Product Type: Managed Care Medicaid /    In time:858  Out time:958  Total Treatment Time (min): 60  Visit #: 4 of 8    Medicare/BCBS Only   Total Timed Codes (min):  45 1:1 Treatment Time:  45       Treatment Area: Low back pain [M54.5]  Pain in thoracic spine [M54.6]    SUBJECTIVE  Pain Level (0-10 scale): 2  Any medication changes, allergies to medications, adverse drug reactions, diagnosis change, or new procedure performed?: [x] No    [] Yes (see summary sheet for update)  Subjective functional status/changes:   [] No changes reported  Feeling better today. Trying to stretch more at home.      OBJECTIVE    Modality rationale: decrease inflammation, decrease pain and increase tissue extensibility to improve the patients ability to alleviate mm spasms and pain    Min Type Additional Details   15 [x] Estim:  [x]Unatt       []IFC  []Premod                        [x]Other: HV [x]w/ice   []w/heat  Position:prone  Location:right l/S parapsinals    [] Estim: []Att    []TENS instruct  []NMES                    []Other:  []w/US   []w/ice   []w/heat  Position:  Location:    []  Traction: [] Cervical       []Lumbar                       [] Prone          []Supine                       []Intermittent   []Continuous Lbs:  [] before manual  [] after manual    []  Ultrasound: []Continuous   [] Pulsed                           []1MHz   []3MHz W/cm2:  Location:    []  Iontophoresis with dexamethasone         Location: [] Take home patch   [] In clinic    []  Ice     []  heat  []  Ice massage  []  Laser   []  Anodyne Position:  Location:    []  Laser with stim  []  Other:  Position:  Location:    []  Vasopneumatic Device Pressure:       [] lo [] med [] hi   Temperature: [] lo [] med [] hi   [] Skin assessment post-treatment:  []intact []redness- no adverse reaction    []redness  adverse reaction:       15 min Therapeutic Exercise:  [] See flow sheet :   Rationale: increase ROM and increase strength to improve the patients ability to perform daily activities      15 min Neuromuscular Re-education:  []  See flow sheet :   Rationale: increase strength, improve coordination, improve balance and increase proprioception  to improve the patients ability to recruit TA and glutes for daily activities   15 min Therapeutic Activity:  []  See flow sheet :   Rationale: increase ROM and increase strength  to improve the patients ability to perform ADLs        With   [] TE   [] TA   [] neuro   [] other: Patient Education: [x] Review HEP    [] Progressed/Changed HEP based on:   [] positioning   [] body mechanics   [] transfers   [] heat/ice application    [] other:      Other Objective/Functional Measures:      Pain Level (0-10 scale) post treatment: 6    ASSESSMENT/Changes in Function: Held exercises per flow sheet. Patient experienced an increase in pain and mm spasms while performing supine TA marches. Had to stop treatment due to intensity of pain. Patient was able to tolerate lying prone while HV was performed. Encouraged patient to stretch and keep moving over the weekend. Patient will continue to benefit from skilled PT services to modify and progress therapeutic interventions, address functional mobility deficits, address ROM deficits, address strength deficits, analyze and address soft tissue restrictions, analyze and cue movement patterns and assess and modify postural abnormalities to attain remaining goals. []  See Plan of Care  []  See progress note/recertification  []  See Discharge Summary         Progress towards goals / Updated goals:  Short Term Goals: To be accomplished in 1 weeks:               1. Pt will be I and compliant with HEP.                 CQ- pt issued HEP              CURRENT: goal met 7/10/20  Long Term Goals: To be accomplished in 4 weeks:               1. Improve FOTO score to 37 for improved ability for daily activities.              JX- 27               2. The pt will report no difficulty with walking 1 block.                IE- limited a lot               3.  The pt will demonstrate 4/5 glute max MMT to improve ability a t will report max pain level of 3/10 for improved ability to daily tasks.                IE- 10/10 max pain              CURRENT: 2/10 7/2    PLAN  []  Upgrade activities as tolerated     [x]  Continue plan of care  []  Update interventions per flow sheet       []  Discharge due to:_  []  Other:_      Johnny Contreras, MPT, CMTPT 7/10/2020  9:00 AM    Future Appointments   Date Time Provider Jennifer Heller   7/14/2020 11:15 AM Gillian Rivas, PT [facility]   7/16/2020 10:30 AM [facility]   7/21/2020  9:45 AM [facility]   7/24/2020  9:45 AM [facility]   8/24/2020 11:30 AM Lisa Lou  [address]

## 2020-07-14 ENCOUNTER — HOSPITAL ENCOUNTER (OUTPATIENT)
Dept: PHYSICAL THERAPY | Age: 60
Discharge: HOME | End: 2020-07-14
Payer: MEDICAID

## 2020-07-14 PROCEDURE — 97112 NEUROMUSCULAR REEDUCATION: CPT

## 2020-07-14 PROCEDURE — 97110 THERAPEUTIC EXERCISES: CPT

## 2020-07-14 PROCEDURE — 97014 ELECTRIC STIMULATION THERAPY: CPT

## 2020-07-14 NOTE — PROGRESS NOTES
PT DAILY TREATMENT NOTE 10-18    Patient Name: Elias Melendez  Date:2020  : 1960  [x]  Patient  Verified  Payor: [facility] MEDICAID / [facility] BELTRAN [facility] / Product Type: Managed Care Medicaid /    In YJSO:[id number]  Out time:1200  Total Treatment Time (min): 46  Visit #: 5 of 8    Medicare/BCBS Only   Total Timed Codes (min):  36 1:1 Treatment Time:  36       Treatment Area: Low back pain [M54.5]  Pain in thoracic spine [M54.6]    SUBJECTIVE  Pain Level (0-10 scale): 2  Any medication changes, allergies to medications, adverse drug reactions, diagnosis change, or new procedure performed?: [x] No    [] Yes (see summary sheet for update)  Subjective functional status/changes:   [] No changes reported  I took it easy over the weekend. It didn't spasm like it did in here. I did a lot of stretching too.      OBJECTIVE    Modality rationale: decrease pain to improve the patients ability to tolerate post exercise soreness   Min Type Additional Details   10 [x] Estim:  []Unatt       []IFC  []Premod                        [x]Other: HV [x]w/ice   []w/heat  Position:prone  Location:L/S right paraspinals    [] Estim: []Att    []TENS instruct  []NMES                    []Other:  []w/US   []w/ice   []w/heat  Position:  Location:    []  Traction: [] Cervical       []Lumbar                       [] Prone          []Supine                       []Intermittent   []Continuous Lbs:  [] before manual  [] after manual    []  Ultrasound: []Continuous   [] Pulsed                           []1MHz   []3MHz W/cm2:  Location:    []  Iontophoresis with dexamethasone         Location: [] Take home patch   [] In clinic    []  Ice     []  heat  []  Ice massage  []  Laser   []  Anodyne Position:  Location:    []  Laser with stim  []  Other:  Position:  Location:    []  Vasopneumatic Device Pressure:       [] lo [] med [] hi   Temperature: [] lo [] med [] hi   [] Skin assessment post-treatment:  []intact []redness- no adverse reaction    []redness  adverse reaction:       20 min Therapeutic Exercise:  [] See flow sheet :   Rationale: increase ROM and increase strength to improve the patients ability to perform daily activities      16 min Neuromuscular Re-education:  []  See flow sheet :   Rationale: increase strength and improve coordination  to improve the patients ability to recruit TA and glutes for daily activities         With   [] TE   [] TA   [] neuro   [] other: Patient Education: [x] Review HEP    [] Progressed/Changed HEP based on:   [] positioning   [] body mechanics   [] transfers   [] heat/ice application    [] other:      Other Objective/Functional Measures:      Pain Level (0-10 scale) post treatment: 1    ASSESSMENT/Changes in Function: Held PPT with LE marching today. Able to perform PPT and PPT with ball squeeze without L/S compensation or spasm. Patient will continue to benefit from skilled PT services to modify and progress therapeutic interventions, address functional mobility deficits, address strength deficits, analyze and address soft tissue restrictions, analyze and cue movement patterns and assess and modify postural abnormalities to attain remaining goals. []  See Plan of Care  []  See progress note/recertification  []  See Discharge Summary         Progress towards goals / Updated goals:  Short Term Goals: To be accomplished in 1 weeks:               1. Pt will be I and compliant with HEP.              HX- pt issued HEP              CURRENT: goal met 7/10/20  Long Term Goals: To be accomplished in 4 weeks:               1. Improve FOTO score to 37 for improved ability for daily activities.              MZ- 27               2. The pt will report no difficulty with walking 1 block.                IE- limited a lot   Current: \"getting easier\" per patient 7/14/20               3.  The pt will demonstrate 4/5 glute max MMT to improve ability a t will report max pain level of 3/10 for improved ability to daily tasks.                 4. The pt will report max pain level of 3/10 for improved ability to daily tasks.                 IE- 10/10 max pain   Current:  9/10 7/14/2020  PLAN  []  Upgrade activities as tolerated     [x]  Continue plan of care  []  Update interventions per flow sheet       []  Discharge due to:_  []  Other:_      Radha Loera, MPT, CMTPT 7/14/2020  9:22 AM    Future Appointments   Date Time Provider Jennifer Heller   7/14/2020 11:15 AM Diaz Amador, PT [facility]   7/16/2020 10:30 AM [facility]   7/21/2020  9:45 AM [facility]   7/24/2020  9:45 AM [facility]ador[facility]   8/24/2020 11:30 AM Rochelle Alejandra  [address]

## 2020-07-16 ENCOUNTER — HOSPITAL ENCOUNTER (OUTPATIENT)
Dept: PHYSICAL THERAPY | Age: 60
Discharge: HOME | End: 2020-07-16
Payer: MEDICAID

## 2020-07-16 PROCEDURE — 97110 THERAPEUTIC EXERCISES: CPT

## 2020-07-16 PROCEDURE — 97530 THERAPEUTIC ACTIVITIES: CPT

## 2020-07-16 PROCEDURE — 97012 MECHANICAL TRACTION THERAPY: CPT

## 2020-07-16 PROCEDURE — 97112 NEUROMUSCULAR REEDUCATION: CPT

## 2020-07-16 NOTE — PROGRESS NOTES
PT DAILY TREATMENT NOTE 10-18    Patient Name: Parris Click  Date:2020  : 1960  [x]  Patient  Verified  Payor: [facility] MEDICAID / Plan: KATHERYN BELTRAN [facility] CCCP / Product Type: Managed Care Medicaid /    In time:1023  Out time:1126  Total Treatment Time (min): 63  Visit #: 6 of 8    Medicare/BCBS Only   Total Timed Codes (min):  53 1:1 Treatment Time:  53       Treatment Area: Low back pain [M54.5]  Pain in thoracic spine [M54.6]    SUBJECTIVE  Pain Level (0-10 scale): 2  Any medication changes, allergies to medications, adverse drug reactions, diagnosis change, or new procedure performed?: [x] No    [] Yes (see summary sheet for update)  Subjective functional status/changes:   [] No changes reported  I felt pretty good when I left here the other day. Then I went to [place] and my back started acting up. I had to sit and put my fist into the muscle to make it feel better.      OBJECTIVE    Modality rationale: decrease pain to improve the patients ability to tolerate post exercise soreness   Min Type Additional Details    [] Estim:  []Unatt       []IFC  []Premod                        []Other:  []w/ice   []w/heat  Position:  Location ;    [] Estim: []Att    []TENS instruct  []NMES                    []Other:  []w/US   []w/ice   []w/heat  Position:  Location:   10 [x]  Traction: [] Cervical       [x]Lumbar                       [x] Prone          []Supine                       [x]Intermittent   []Continuous Lbs:85/70  [] before manual  [] after manual    []  Ultrasound: []Continuous   [] Pulsed                           []1MHz   []3MHz W/cm2:  Location:    []  Iontophoresis with dexamethasone         Location: [] Take home patch   [] In clinic    []  Ice     []  heat  []  Ice massage  []  Laser   []  Anodyne Position:  Location:    []  Laser with stim  []  Other:  Position:  Location:    []  Vasopneumatic Device Pressure:       [] lo [] med [] hi   Temperature: [] lo [] med [] hi   [] Skin assessment post-treatment:  []intact []redness- no adverse reaction    []redness  adverse reaction:       20 min Therapeutic Exercise:  [] See flow sheet :   Rationale: increase ROM and increase strength to improve the patients ability to perform daily activities      23 min Neuromuscular Re-education:  []  See flow sheet :   Rationale: increase strength, improve coordination, improve balance and increase proprioception  to improve the patients ability to recruit TA and glutes for daily activities   10 min Therapeutic Activity:  []  See flow sheet :   Rationale: increase ROM and increase strength  to improve the patients ability to perform static standing ADLs with minimal pain        With   [x] TE   [x] TA   [] neuro   [] other: Patient Education: [x] Review HEP    [x] Progressed/Changed HEP based on:   [] positioning   [] body mechanics   [] transfers   [] heat/ice application    [] other:      Other Objective/Functional Measures:      Pain Level (0-10 scale) post treatment: 2    ASSESSMENT/Changes in Function: 13 point gain in FOTO score indicating improvement with functional mobility and a reduction in pain. Patient continue to compensate through L/S paraspinals when trying to recruit TA. Patient would benefit from continuing with PT to further progress with strength and stability. Patient will continue to benefit from skilled PT services to modify and progress therapeutic interventions, address functional mobility deficits, address strength deficits, analyze and address soft tissue restrictions and analyze and cue movement patterns to attain remaining goals. []  See Plan of Care  []  See progress note/recertification  []  See Discharge Summary         Progress towards goals / Updated goals:  Short Term Goals: To be accomplished in 1 weeks:               1. Pt will be I and compliant with HEP.                 UB- pt issued HEP              CURRENT: goal met  Long Term Goals: To be accomplished in 4 weeks:               1. Improve FOTO score to 37 for improved ability for daily activities.              GG- 30   Current: 43- goal met                2. The pt will report no difficulty with walking 1 block.                IE- limited a lot              Current: \"getting easier\" per patient               3. The pt will demonstrate 4/5 glute max MMT to improve ability a t will report max pain level of 3/10 for improved ability to daily tasks. IE: 3+/5   Current: 3+/5                 4.  The pt will report max pain level of 3/10 for improved ability to daily tasks.                IE- 10/10 max pain              Current:  9/10     PLAN  []  Upgrade activities as tolerated     [x]  Continue plan of care  []  Update interventions per flow sheet       []  Discharge due to:_  []  Other:_      Edwar Garza, YOVANA, CMTPT 7/16/2020  10:03 AM    Future Appointments   Date Time Provider Jennifer Hernandezi   7/16/2020 10:30 AM [facility]   7/21/2020  9:45 AM [facility]   7/24/2020  9:45 AM [facility]   8/24/2020 11:30 AM Rodrick Michael MD [facility]

## 2020-07-16 NOTE — PROGRESS NOTES
[facility]  [address]  [place], [address]  [phone] fax    Physical Therapy Progress Note  Patient name: Jr Clement Start of Care: 20   Referral source: Brad Mathur MD : 1960   Medical/Treatment Diagnosis: Low back pain [M54.5]  Pain in thoracic spine [M54.6]  Payor: [facility] MEDICAID / Plan: [facility] CCCP / Product Type: Managed Care Medicaid /  Onset Date:2019     Prior Hospitalization: see medical history Provider#: [id number]   Medications: Verified on Patient Summary List    Comorbidities: depression, DM, OA, HTN, visual impairment, + tobacco use, heroin, cocaine, hepatitis C, alcohol use   Prior Level of Function: functionally I with ADL  Visits from Start of Care: 6    Missed Visits: 0      Key Functional Changes: FOTO [address] [facility] be accomplished in 1 weeks:               1. Pt will be I and compliant with HEP.              MC- pt issued HEP              CURRENT: goal met  [address] [facility]. be accomplished in 4 weeks:               1. Improve FOTO score to 37 for improved ability for daily activities.              MF- 30              Current: 43- goal met                2. The pt will report no difficulty with walking 1 block.                IE- limited a lot              Current: \"getting easier\" per patient               3. The pt will demonstrate 4/5 glute max MMT to improve ability a t will report max pain level of 3/10 for improved ability to daily tasks. IE: 3+/5              Current: 3+/5                 4. The pt will report max pain level of 3/10 for improved ability to daily tasks.                IE- 10/10 max pain              Current:  9/10      Updated Goals: to be achieved in 4 weeks: 1. The pt will report no difficulty with walking 1 block   PN: \"getting easier\" per patient   2. The pt will demonstrate 4/5 glute max MMT to improve ability a t will report max pain level of 3/10 for improved ability to daily tasks. PN: 3+/5    3. The pt will report max pain level of 3/10 for improved ability to daily tasks.           PN:  9/10    ASSESSMENT/RECOMMENDATIONS:  [x]Continue therapy per initial plan/protocol at a frequency of  2 x per week for 4 weeks  []Continue therapy with the following recommended changes:_____________________      _____________________________________________________________________  []Discontinue therapy progressing towards or have reached established goals  []Discontinue therapy due to lack of appreciable progress towards goals  []Discontinue therapy due to lack of attendance or compliance  []Await Physician's recommendations/decisions regarding therapy  []Other:________________________________________________________________    Thank you for this referral.    Trino Dutta, PT 7/16/2020 10:55 AM  NOTE TO PHYSICIAN:  [address] TO [facility]: [phone]  If you are unable to process this request in 24 hours please contact our office: [phone] I have read the above report and request that my patient continue as recommended. ? I have read the above report and request that my patient continue therapy with the following changes/special instructions:__________________________________________________________  ? I have read the above report and request that my patient be discharged from therapy.     Physicians signature: ______________________________Date: ______Time:______

## 2020-07-21 ENCOUNTER — APPOINTMENT (OUTPATIENT)
Dept: PHYSICAL THERAPY | Age: 60
End: 2020-07-21
Payer: MEDICAID

## 2020-07-24 ENCOUNTER — HOSPITAL ENCOUNTER (OUTPATIENT)
Dept: PHYSICAL THERAPY | Age: 60
Discharge: HOME | End: 2020-07-24
Payer: MEDICAID

## 2020-07-24 PROCEDURE — 97012 MECHANICAL TRACTION THERAPY: CPT

## 2020-07-24 PROCEDURE — 97110 THERAPEUTIC EXERCISES: CPT

## 2020-07-24 PROCEDURE — 97112 NEUROMUSCULAR REEDUCATION: CPT

## 2020-07-24 PROCEDURE — 97530 THERAPEUTIC ACTIVITIES: CPT

## 2020-07-24 NOTE — PROGRESS NOTES
PT DAILY TREATMENT NOTE 10-18    Patient Name: Smitha Mcmullen  Date:2020  : 1960  [x]  Patient  Verified  Payor: [facility] MEDICAID / [facility] Product Type: Managed Care Medicaid /    In time:946  Out time:1034  Total Treatment Time (min): 48  Visit #: 1 of 8  Treatment Area: Low back pain [M54.5]  Pain in thoracic spine [M54.6]    SUBJECTIVE  Pain Level (0-10 scale): 0  Any medication changes, allergies to medications, adverse drug reactions, diagnosis change, or new procedure performed?: [x] No    [] Yes (see summary sheet for update)  Subjective functional status/changes:   [] No changes reported  My back is feeling pretty good. Haven't had that sharp spasm pain in a couple of weeks.     OBJECTIVE    Modality rationale: decrease pain to improve the patients ability to tolerate post exercise soreness   Min Type Additional Details    [] Estim:  []Unatt       []IFC  []Premod                        []Other:  []w/ice   []w/heat  Position:  Location:    [] Estim: []Att    []TENS instruct  []NMES                    []Other:  []w/US   []w/ice   []w/heat  Position:  Location:   10 [x]  Traction: [] Cervical       [x]Lumbar                       [x] Prone          []Supine                       [x]Intermittent   []Continuous Lbs:90/80  [] before manual  [] after manual    []  Ultrasound: []Continuous   [] Pulsed                           []1MHz   []3MHz W/cm2:  Location:    []  Iontophoresis with dexamethasone         Location: [] Take home patch   [] In clinic    []  Ice     []  heat  []  Ice massage  []  Laser   []  Anodyne Position:  Location:    []  Laser with stim  []  Other:  Position:  Location:    []  Vasopneumatic Device Pressure:       [] lo [] med [] hi   Temperature: [] lo [] med [] hi   [] Skin assessment post-treatment:  []intact []redness- no adverse reaction    []redness  adverse reaction:       20 min Therapeutic Exercise:  [] See flow sheet :   Rationale: increase ROM and increase strength to improve the patients ability to perform daily activities      10 min Neuromuscular Re-education:  []  See flow sheet :   Rationale: increase ROM, increase strength, improve coordination, improve balance and increase proprioception  to improve the patients ability to recruit TA and glutes for daily activities   8 min Therapeutic Activity:  []  See flow sheet :   Rationale: increase ROM and increase strength  to improve the patients ability to perform daily activities       With   [] TE   [] TA   [] neuro   [] other: Patient Education: [x] Review HEP    [] Progressed/Changed HEP based on:   [] positioning   [] body mechanics   [] transfers   [] heat/ice application    [] other:      Other Objective/Functional Measures: increased traction to 90/80     Pain Level (0-10 scale) post treatment: 0    ASSESSMENT/Changes in Function: Increase ease and better form and recruitment with PPT today. No c/o right sided L/S pain with any exercises. Biggest compliant presently is B knee pain. Patient will continue to benefit from skilled PT services to modify and progress therapeutic interventions, address functional mobility deficits, address ROM deficits, address strength deficits, analyze and address soft tissue restrictions, analyze and cue movement patterns and assess and modify postural abnormalities to attain remaining goals. []  See Plan of Care  []  See progress note/recertification  []  See Discharge Summary         Progress towards goals / Updated goals: 1. The pt will report no difficulty with walking 1 block              PN: \"getting easier\" per patient              2.The pt will demonstrate 4/5 glute max MMT to improve ability a t will report max pain level of 3/10 for improved ability to daily tasks.                       PN: 3+/5               3.The pt will report max pain level of 3/10 for improved ability to daily tasks.                PN:  9/10   Current: 3/10 over the last 2 weeks- progressing 7/24/20     PLAN  []  Upgrade activities as tolerated     [x]  Continue plan of care  []  Update interventions per flow sheet       []  Discharge due to:_  []  Other:_      Hilarie Riedel, MPT, CMTPT 7/24/2020  8:51 AM    Future Appointments   Date Time Provider Jennifer Heller   7/24/2020  9:45 AM Jw Dove PT [facility]   8/24/2020 11:30 AM Toño Betancur  [address]

## 2020-08-10 ENCOUNTER — APPOINTMENT (OUTPATIENT)
Dept: PHYSICAL THERAPY | Age: 60
End: 2020-08-10

## 2020-08-12 ENCOUNTER — APPOINTMENT (OUTPATIENT)
Dept: PHYSICAL THERAPY | Age: 60
End: 2020-08-12

## 2020-08-24 ENCOUNTER — OFFICE VISIT (OUTPATIENT)
Dept: ORTHOPEDIC SURGERY | Age: 60
End: 2020-08-24

## 2020-08-24 VITALS
TEMPERATURE: 98.4 F | HEIGHT: 61 IN | SYSTOLIC BLOOD PRESSURE: 122 MMHG | BODY MASS INDEX: 42.67 KG/M2 | OXYGEN SATURATION: 95 % | RESPIRATION RATE: 17 BRPM | DIASTOLIC BLOOD PRESSURE: 72 MMHG | WEIGHT: 226 LBS | HEART RATE: 78 BPM

## 2020-08-24 DIAGNOSIS — M54.14 THORACIC RADICULOPATHY: ICD-10-CM

## 2020-08-24 DIAGNOSIS — M54.6 THORACIC SPINE PAIN: Primary | ICD-10-CM

## 2020-08-24 DIAGNOSIS — M54.59 MECHANICAL LOW BACK PAIN: ICD-10-CM

## 2020-08-24 DIAGNOSIS — M46.44 THORACIC DISCITIS: ICD-10-CM

## 2020-08-24 DIAGNOSIS — M79.18 MYOFASCIAL PAIN: ICD-10-CM

## 2020-08-24 DIAGNOSIS — M46.24 OSTEOMYELITIS OF THORACIC SPINE (HCC): ICD-10-CM

## 2020-08-24 DIAGNOSIS — B37.9 CANDIDA INFECTION: ICD-10-CM

## 2020-08-24 DIAGNOSIS — S22.060S COMPRESSION FRACTURE OF T8 VERTEBRA, SEQUELA: ICD-10-CM

## 2020-08-24 NOTE — PROGRESS NOTES
Teresa Echevarria presents today for   Chief Complaint   Patient presents with    Back Pain       Is someone accompanying this pt? no    Is the patient using any DME equipment during OV? no    Depression Screening:  3 most recent PHQ Screens 5/27/2020   Little interest or pleasure in doing things Not at all   Feeling down, depressed, irritable, or hopeless Not at all   Total Score PHQ 2 0   Trouble falling or staying asleep, or sleeping too much -   Feeling tired or having little energy -   Poor appetite, weight loss, or overeating -   Feeling bad about yourself - or that you are a failure or have let yourself or your family down -   Trouble concentrating on things such as school, work, reading, or watching TV -   Moving or speaking so slowly that other people could have noticed; or the opposite being so fidgety that others notice -   Thoughts of being better off dead, or hurting yourself in some way -   PHQ 9 Score -   How difficult have these problems made it for you to do your work, take care of your home and get along with others -       Learning Assessment:  Learning Assessment 2/4/2020   PRIMARY LEARNER Patient   HIGHEST LEVEL OF EDUCATION - PRIMARY LEARNER  GRADUATED HIGH SCHOOL OR GED   BARRIERS PRIMARY LEARNER NONE   CO-LEARNER CAREGIVER No   PRIMARY LANGUAGE ENGLISH    NEED No   LEARNER PREFERENCE PRIMARY DEMONSTRATION   ANSWERED BY patient   RELATIONSHIP SELF       Abuse Screening:  Abuse Screening Questionnaire 2/4/2020   Do you ever feel afraid of your partner? N   Are you in a relationship with someone who physically or mentally threatens you? N   Is it safe for you to go home? Y       Fall Risk  Fall Risk Assessment, last 12 mths 6/4/2019   Able to walk? Yes   Fall in past 12 months? No       Coordination of Care:  1. Have you been to the ER, urgent care clinic since your last visit? no  Hospitalized since your last visit? no    2.  Have you seen or consulted any other health care providers outside of the [address] [place] [address] since your last visit? no Include any pap smears or colon screening.  no

## 2020-08-25 LAB — HBA1C MFR BLD HPLC: 7.2 %

## 2020-08-26 NOTE — PROGRESS NOTES
[facility]  [address]  [place], [address]  [phone] fax    Physical Therapy Discharge Summary  Patient name: Maurilio Houston Start of Care: 20   Referral source: Lorne Salamanca MD : 1960   Medical/Treatment Diagnosis: Low back pain [M54.5]  Pain in thoracic spine [M54.6]  Payor: [facility] MEDICAID / [facility] Product Type: Managed Care Medicaid /  Onset Date:2019     Prior Hospitalization: see medical history Provider#: [id number]   Medications: Verified on Patient Summary List    Comorbidities: depression, DM, OA, HTN, visual impairment, + tobacco use, heroin, cocaine, hepatitis C, alcohol use  Visits from Start of Care: 8    Missed Visits: 3  Reporting Period : 20 to 20      Summary of Care:     1. The pt will report no difficulty with walking 1 block              PN: \"getting easier\" per patient              2. The pt will demonstrate 4/5 glute max MMT to improve ability a t will report max pain level of 3/10 for improved ability to daily tasks.                       PN: 3+/5               3. The pt will report max pain level of 3/10 for improved ability to daily tasks.                PN:  9/10              Current: 3/10 over the last 2 weeks- progressing   Patient has not returned to PT since 20; unable to reassess goals; unplanned D/C.     ASSESSMENT/RECOMMENDATIONS:  [x]Discontinue therapy: []Patient has reached or is progressing toward set goals      [x]Patient is non-compliant or has abdicated      []Due to lack of appreciable progress towards set Ul. Lucy Estes, PT 2020 1:43 PM

## 2020-09-24 ENCOUNTER — TELEPHONE (OUTPATIENT)
Dept: FAMILY MEDICINE CLINIC | Age: 60
End: 2020-09-24

## 2020-09-25 DIAGNOSIS — B37.0 ORAL CANDIDIASIS: ICD-10-CM

## 2020-09-25 RX ORDER — CLOTRIMAZOLE 10 MG/1
10 LOZENGE ORAL; TOPICAL
Qty: 50 TAB | Refills: 0 | Status: SHIPPED | OUTPATIENT
Start: 2020-09-25 | End: 2021-01-07 | Stop reason: SDUPTHER

## 2020-11-14 NOTE — PROGRESS NOTES
[address]  [address], [address]  [place], [address] [place] [address] Street  Phone: [phone]  Fax: [phone]        [facility]  : 1960  PCP: Edie Santos NP  2020    PROGRESS NOTE      HISTORY OF PRESENT ILLNESS  Alecia Hill is a 63 y.o. female who was seen 2020 with c/o chronic mid and low back pain. She also c/o \"pins and needles\" in her abdomen and the lateral aspect of the upper BLE. She had a thoracic discitis with osteomyelitis, disc biopsy positive for candida and MRSE that were treated with antibiotics. She was seen initially by Dr. Perlie Krabbe 19, and he noted that if she opted for surgery, it would be an extensive operation. She localized the majority of her pain around T5, but noted she also had pain in the lower back. She had difficulty lying flat due to pain. She was not interested in surgery or pain medications given her history of drug abuse. Thoracic spine MRI dated 19 reviewed. Per report, More edema in T6, T9, posterior elements, with similar severe compression deformity of T8 and destruction of T7 as described. Kyphosis and retropulsion of T7-T8 with central stenosis  but no cord compression, stable. No developing abscess. No abnormal cord lesion or enhancement. Alecia Hill comes in to the office today for f/u. She has attended PT (2020-2020; [address] [address]) with benefit - she will continue her PT next week. She notes that her endurance is not quite where she wants to be. However, she experienced a severe muscle spasm when she worked too hard one day. She maintains a daily HEP. Pain Score: 2/10. Treatments patient has tried:  Physical therapy: Yes ()  Doing HEP: Yes  Non-opioid medications: Yes  Spinal injections: No  Spinal surgery- No.   Last Thoracic MRI 2019: Severe compression deformity of T8 and destruction of T7.  Kyphosis and retropulsion of T7-T8 with central stenosis but no cord compression, stable.       PmHx: Hepatitis C, prior thoracic discitis with osteomyelitis, previous IV drug abuse, DM.    ASSESSMENT  This is a 63year-old female with history of thoracic discitis with osteomyelitis who presents for axial mid to low back pain. Her MRI reveals a severe compression deformity of T8 and destruction of T7 with a resultant focal kyphosis. Her pain is likely myofascial in nature related to altered biomechanics and relative deconditioning after osteomyelitis. PLAN  1. Continue PT. 2. Thoroughly discussed weight loss. Pt will f/u in 6-8 weeks or sooner as needed. Diagnoses and all orders for this visit:    1. Thoracic spine pain    2. Compression fracture of T8 vertebra, sequela    3. Myofascial pain    4. Mechanical low back pain    5. Osteomyelitis of thoracic spine (HCC)    6. Thoracic discitis    7. Candida infection    8. Thoracic radiculopathy       PAST MEDICAL HISTORY   Past Medical History:   Diagnosis Date    Depression     Hepatitis C     Heroin use disorder, moderate, in early remission ([facility] [address]     NOW on [facility]    Ill-defined condition     Chronic back pain       No past surgical history on file. [facility] MEDICATIONS    Current Outpatient Medications   Medication Sig Dispense Refill    hydroCHLOROthiazide (HYDRODIURIL) 12.5 mg tablet TAKE ONE TABLET BY MOUTH DAILY 30 Tab 7    losartan (COZAAR) 25 mg tablet Take 25 mg by mouth daily.  lamoTRIgine (LaMICtal) 25 mg tablet Take 100 mg by mouth daily.  aspirin 81 mg chewable tablet Take 81 mg by mouth daily.  naproxen (NAPROSYN) 500 mg tablet Take 1 Tab by mouth two (2) times daily (with meals). (Patient taking differently: Take 500 mg by mouth two (2) times daily as needed.) 60 Tab 1    glucose blood VI test strips (True Metrix Pro Test Strip) strip Test BG twice a day. Dx:  E11.9 200 Strip 3    metFORMIN ER 1,000 mg tr24 Take 1 Tab by mouth two (2) times daily (with meals).       sertraline (ZOLOFT) 100 mg tablet TAKE TWO TABLETS BY MOUTH ONCE DAILY (Patient taking differently: Take 150 mg by mouth daily. TAKE TWO TABLETS BY MOUTH ONCE DAILY) 60 Tab 5    atorvastatin (LIPITOR) 10 mg tablet TAKE ONE TABLET BY MOUTH DAILY 90 Tab 3    TRUEPLUS LANCETS 33 gauge misc Twice daily 100 Lancet 11    TRUE METRIX GLUCOSE METER misc   0    BD ULTRA-FINE MINI PEN NEEDLE 31 gauge x 3/16\" ndle   11    insulin glargine (LANTUS,BASAGLAR) 100 unit/mL (3 mL) inpn 10 units daily subcu (Patient taking differently: 10 Units daily. 10 units daily subcu) 5 Pen 0    potassium chloride (KLOR-CON) 10 mEq tablet Take 1 Tab by mouth daily. 30 Tab 3    methadone HCl (METHADONE PO) Take 85 mg by mouth daily.  nystatin (MYCOSTATIN) 100,000 unit/gram ointment Apply  to affected area two (2) times a day. 15 g 0        ALLERGIES  No Known Allergies       SOCIAL HISTORY    Social History     Socioeconomic History    Marital status: SINGLE     Spouse name: Not on file    Number of children: Not on file    Years of education: Not on file    Highest education level: Not on file   Tobacco Use    Smoking status: Current Every Day Smoker     Packs/day: 0.25     Types: Cigarettes    Smokeless tobacco: Never Used   Substance and Sexual Activity    Alcohol use: No    Drug use: No     Comment: on methadone matinance program    Sexual activity: Never       FAMILY HISTORY  Family History   Problem Relation Age of Onset    Hypertension Mother     High Cholesterol Mother     COPD Mother     Hypertension Father          REVIEW OF SYSTEMS  Review of Systems   Musculoskeletal: Positive for back pain.           PHYSICAL EXAMINATION  Visit Vitals  /72 (BP 1 Location: Right arm, BP Patient Position: Sitting)   Pulse 78   Temp 98.4 °F (36.9 °C) (Oral)   Resp 17   Ht 5' 1\" (1.549 m)   Wt 226 lb (102.5 kg)   SpO2 95% Comment: RA   BMI 42.70 kg/m²       Pain Assessment  8/24/2020   Location of Pain Back   Pain Location Comment -   Location Modifiers -   Severity of Pain 2   Quality of Pain Sharp   Quality of Pain Comment -   Duration of Pain Persistent   Frequency of Pain Constant   Aggravating Factors Other (Comment); Walking   Aggravating Factors Comment housework   Limiting Behavior Yes   Relieving Factors Other (Comment)   Relieving Factors Comment sitting down   Result of Injury -           Constitutional:  Well developed, well nourished, in no acute distress. Psychiatric: Affect and mood are appropriate. Integumentary: No rashes or abrasions noted on exposed areas. SPINE/MUSCULOSKELETAL EXAM    Thoracic Spine:  Tenderness to palpation of thoracic paraspinals  Thoracic kyphosis      Lumbar spine:  No rash, ecchymosis, or gross obliquity. No fasciculations. No focal atrophy is noted. No pain with hip ROM. Full range of motion. Tenderness to palpation of QLs and lumbar paraspinals. No tenderness to palpation at the sciatic notch. SI joints non-tender. Trochanters non tender. Sensation in the bilateral legs grossly intact to light touch. MOTOR:      Biceps  Triceps Deltoids Wrist Ext Wrist Flex Hand Intrin   Right 5/5 5/5 5/5 5/5 5/5 5/5   Left 5/5 5/5 5/5 5/5 5/5 5/5             Hip Flex  Quads Hamstrings Ankle DF EHL Ankle PF   Right 5/5 5/5 5/5 5/5 5/5 5/5   Left 5/5 5/5 5/5 5/5 5/5 5/5     DTRs are 1+ biceps, triceps, brachioradialis, patella, and Achilles.     Negative Straight Leg raise. Squat not tested. No difficulty with tandem gait.      Ambulation without assistive device. FWB.       RADIOGRAPHS  Thoracic MRI images taken on 5/21/19 personally reviewed with patient:  Similar severe destruction of T7 and at least 75% compression deformity in T8 as  previously seen. Thoracic kyphosis with retropulsion of T7 and T8, stable. Mild  cord contact but no significant cord compression or significant central stenosis  at T7-T8 level.  Paraspinous soft tissue inflammation, slightly more intense but  similar distribution. Mild edema with post contrast enhancement along the left  side of T8-T9 disc. Slightly more edema along the left side of T9 vertebral  body. Slightly more intense edema in the inferior endplate of T6. Edema in the  posterior element of T6, T7, T8 and T9 noted, slightly more intense. No epidural  collection. No discrete fluid collection or abscess. Inflammation of the  associated foramina, grossly stable.     No additional or new abnormalities in the thoracic spine otherwise. No  developing focal disc herniation or central stenosis. Spinal cord is otherwise  unremarkable. No additional foraminal stenosis.     IMPRESSION     More edema in T6, T9, posterior elements, with similar severe compression  deformity of T8 and destruction of T7 as described. Kyphosis and retropulsion of T7-T8 with central stenosis  but no cord compression, stable. No developing  abscess. No abnormal cord lesion or enhancement. 20 minutes of face-to-face contact were spent with the patient during today's visit extensively discussing symptoms and treatment plan. All questions were answered. More than half of this visit today was spent on counseling.      Written by Aguilar Sharma as dictated by Jeb Bal MD no discharge, no irritation, no pain, no redness, and no visual changes.

## 2020-12-12 DIAGNOSIS — I10 ESSENTIAL HYPERTENSION: ICD-10-CM

## 2020-12-14 RX ORDER — HYDROCHLOROTHIAZIDE 12.5 MG/1
TABLET ORAL
Qty: 90 TAB | Refills: 6 | Status: SHIPPED | OUTPATIENT
Start: 2020-12-14 | End: 2022-03-08

## 2020-12-21 RX ORDER — PEN NEEDLE, DIABETIC 31 GX5/16"
NEEDLE, DISPOSABLE MISCELLANEOUS
Qty: 100 PEN NEEDLE | Refills: 9 | Status: SHIPPED | OUTPATIENT
Start: 2020-12-21

## 2021-01-06 PROBLEM — Z79.4 TYPE 2 DIABETES MELLITUS WITH HYPERGLYCEMIA, WITH LONG-TERM CURRENT USE OF INSULIN (HCC): Status: ACTIVE | Noted: 2021-01-06

## 2021-01-06 PROBLEM — E78.5 DYSLIPIDEMIA ASSOCIATED WITH TYPE 2 DIABETES MELLITUS (HCC): Status: ACTIVE | Noted: 2021-01-06

## 2021-01-06 PROBLEM — E11.69 DYSLIPIDEMIA ASSOCIATED WITH TYPE 2 DIABETES MELLITUS (HCC): Status: ACTIVE | Noted: 2021-01-06

## 2021-01-06 PROBLEM — E11.65 TYPE 2 DIABETES MELLITUS WITH HYPERGLYCEMIA, WITH LONG-TERM CURRENT USE OF INSULIN (HCC): Status: ACTIVE | Noted: 2021-01-06

## 2021-01-07 DIAGNOSIS — E11.8 CONTROLLED TYPE 2 DIABETES MELLITUS WITH COMPLICATION, WITHOUT LONG-TERM CURRENT USE OF INSULIN (HCC): ICD-10-CM

## 2021-01-07 DIAGNOSIS — B37.0 ORAL CANDIDIASIS: ICD-10-CM

## 2021-02-02 DIAGNOSIS — B37.0 ORAL CANDIDIASIS: Primary | ICD-10-CM

## 2021-02-02 RX ORDER — NYSTATIN 100000 [USP'U]/ML
1 SUSPENSION ORAL 4 TIMES DAILY
Qty: 180 ML | Refills: 0 | Status: SHIPPED | OUTPATIENT
Start: 2021-02-02 | End: 2021-05-06

## 2021-02-03 ENCOUNTER — HOSPITAL ENCOUNTER (OUTPATIENT)
Dept: LAB | Age: 61
Discharge: HOME | End: 2021-02-03

## 2021-02-03 LAB — XX-LABCORP SPECIMEN COL,LCBCF: NORMAL

## 2021-02-03 PROCEDURE — 99001 SPECIMEN HANDLING PT-LAB: CPT

## 2021-02-04 LAB
ALBUMIN SERPL-MCNC: 3.7 G/DL (ref 3.8–4.9)
ALBUMIN/GLOB SERPL: 1 {RATIO} (ref 1.2–2.2)
ALP SERPL-CCNC: 110 IU/L (ref 39–117)
ALT SERPL-CCNC: 29 IU/L (ref 0–32)
AST SERPL-CCNC: 37 IU/L (ref 0–40)
BILIRUB SERPL-MCNC: <0.2 MG/DL (ref 0–1.2)
BUN SERPL-MCNC: 9 MG/DL (ref 6–24)
BUN/CREAT SERPL: 13 (ref 9–23)
CALCIUM SERPL-MCNC: 9.4 MG/DL (ref 8.7–10.2)
CHLORIDE SERPL-SCNC: 97 MMOL/L (ref 96–106)
CHOLEST SERPL-MCNC: 105 MG/DL (ref 100–199)
CO2 SERPL-SCNC: 27 MMOL/L (ref 20–29)
CREAT SERPL-MCNC: 0.71 MG/DL (ref 0.57–1)
GLOBULIN SER CALC-MCNC: 3.6 G/DL (ref 1.5–4.5)
GLUCOSE SERPL-MCNC: 114 MG/DL (ref 65–99)
HDLC SERPL-MCNC: 23 MG/DL
HIV 1+2 AB+HIV1 P24 AG SERPL QL IA: NON REACTIVE
INTERPRETATION, 910389: NORMAL
LDLC SERPL CALC-MCNC: 50 MG/DL (ref 0–99)
POTASSIUM SERPL-SCNC: 3.9 MMOL/L (ref 3.5–5.2)
PROT SERPL-MCNC: 7.3 G/DL (ref 6–8.5)
SODIUM SERPL-SCNC: 137 MMOL/L (ref 134–144)
TRIGL SERPL-MCNC: 196 MG/DL (ref 0–149)
VLDLC SERPL CALC-MCNC: 32 MG/DL (ref 5–40)

## 2021-02-11 ENCOUNTER — TELEPHONE (OUTPATIENT)
Dept: FAMILY MEDICINE CLINIC | Age: 61
End: 2021-02-11

## 2021-02-11 NOTE — TELEPHONE ENCOUNTER
Patient called and need you to write a letter stating that her osteomyelitis started before December 2020. She is filing for disability. Please advise

## 2021-03-01 ENCOUNTER — VIRTUAL VISIT (OUTPATIENT)
Dept: FAMILY MEDICINE CLINIC | Age: 61
End: 2021-03-01
Payer: MEDICAID

## 2021-03-01 DIAGNOSIS — M54.9 CHRONIC BACK PAIN, UNSPECIFIED BACK LOCATION, UNSPECIFIED BACK PAIN LATERALITY: Primary | ICD-10-CM

## 2021-03-01 DIAGNOSIS — G89.29 CHRONIC BACK PAIN, UNSPECIFIED BACK LOCATION, UNSPECIFIED BACK PAIN LATERALITY: Primary | ICD-10-CM

## 2021-03-01 DIAGNOSIS — Z02.89 ENCOUNTER FOR COMPLETION OF FORM WITH PATIENT: ICD-10-CM

## 2021-03-01 PROCEDURE — 99212 OFFICE O/P EST SF 10 MIN: CPT | Performed by: NURSE PRACTITIONER

## 2021-03-01 RX ORDER — LAMOTRIGINE 100 MG/1
100 TABLET ORAL DAILY
COMMUNITY
Start: 2021-02-26

## 2021-03-01 NOTE — PROGRESS NOTES
Gilmar Cota is a 62 y.o. female, evaluated via audio-only technology on 3/1/2021 for Other (patient states her  is requesting a statement stating when was the first time she came in to address her back issue, applying for disability.)  She reports the disability  needs a date regarding when her back pain initially started. She reports continual back pain. She does admit to alcides dyspnea on exertion. She reports she is limited with her activity due to her chronic back pain. Assessment & Plan:   Diagnoses and all orders for this visit:    1. Chronic back pain, unspecified back location, unspecified back pain laterality    2. Encounter for completion of form with patient    Chart review completed. Letter completed. 12  Subjective:       Prior to Admission medications    Medication Sig Start Date End Date Taking? Authorizing Provider   lamoTRIgine (LaMICtal) 100 mg tablet  2/26/21  Yes Provider, Historical   nystatin (MYCOSTATIN) 100,000 unit/mL suspension Take 5 mL by mouth four (4) times daily. swish and spit 2/2/21  Yes Doreen Engel NP   NovoLOG Mix 70-30FlexPen U-100 100 unit/mL (70-30) inpn 6 Units two (2) times a day. 12/2/20  Yes Provider, Historical   BD Ultra-Fine Mini Pen Needle 31 gauge x 3/16\" ndle USE AS DIRECTED WITH LANTUS 12/21/20  Yes Doreen Soriano NP   hydroCHLOROthiazide (HYDRODIURIL) 12.5 mg tablet TAKE ONE TABLET BY MOUTH DAILY 12/14/20  Yes Bealeksandra YATES NP   atorvastatin (LIPITOR) 10 mg tablet TAKE ONE TABLET BY MOUTH DAILY 10/11/20  Yes Enrike YATES NP   losartan (COZAAR) 25 mg tablet Take 25 mg by mouth daily. 3/31/20  Yes Provider, Historical   aspirin 81 mg chewable tablet Take 81 mg by mouth daily. Yes Provider, Historical   glucose blood VI test strips (True Metrix Pro Test Strip) strip Test BG twice a day. Dx:  E11.9 3/12/20  Yes Enrike YATES NP   nystatin (MYCOSTATIN) 100,000 unit/gram ointment Apply  to affected area two (2) times a day. 2/5/20  Yes Geeta YATES NP   metFORMIN ER 1,000 mg tr24 Take 1 Tab by mouth two (2) times daily (with meals). 12/26/19  Yes Provider, Historical   sertraline (ZOLOFT) 100 mg tablet TAKE TWO TABLETS BY MOUTH ONCE DAILY  Patient taking differently: Take 150 mg by mouth daily. TAKE TWO TABLETS BY MOUTH ONCE DAILY 1/15/20  Yes Geeta YATES NP   TRUEPLUS LANCETS 33 gauge misc Twice daily 10/5/19  Yes Geeta YATES NP   TRUE METRIX GLUCOSE METER misc  7/18/19  Yes Provider, Historical   potassium chloride (KLOR-CON) 10 mEq tablet Take 1 Tab by mouth daily. 2/4/19  Yes Geeta YATES NP   methadone HCl (METHADONE PO) Take 85 mg by mouth daily. Yes Provider, Historical   lamoTRIgine (LaMICtal) 25 mg tablet Take 100 mg by mouth daily. 5/21/20   Provider, Historical   naproxen (NAPROSYN) 500 mg tablet Take 1 Tab by mouth two (2) times daily (with meals). Patient taking differently: Take 500 mg by mouth two (2) times daily as needed. 3/18/20   Duy Knox MD   insulin glargine (LANTUS,BASAGLAR) 100 unit/mL (3 mL) inpn 10 units daily subcu  Patient taking differently: 10 Units daily.  10 units daily subcu 7/18/19   Geeta YATES NP     Patient Active Problem List   Diagnosis Code    Severe obesity with body mass index (BMI) of 35.0 to 39.9 with serious comorbidity ([facility]) E66.01    Discitis of thoracic region M46.44    Depression, major, recurrent, mild ([address] F33.0    Type 2 diabetes mellitus with hyperglycemia, with long-term current use of insulin (HCC) E11.65, Z79.4    Dyslipidemia associated with type 2 diabetes mellitus ([address] E11.69, E78.5     Patient Active Problem List    Diagnosis Date Noted    Type 2 diabetes mellitus with hyperglycemia, with long-term current use of insulin ([address] 01/06/2021    Dyslipidemia associated with type 2 diabetes mellitus ([address] 01/06/2021    Depression, major, recurrent, mild ([address] 05/27/2020    Discitis of thoracic region 11/11/2018    Severe obesity with body mass index (BMI) of 35.0 to 39.9 with serious comorbidity ([facility] [address] 07/13/2018     Current Outpatient Medications   Medication Sig Dispense Refill    lamoTRIgine (LaMICtal) 100 mg tablet       nystatin (MYCOSTATIN) 100,000 unit/mL suspension Take 5 mL by mouth four (4) times daily. swish and spit 180 mL 0    NovoLOG Mix 70-30FlexPen U-100 100 unit/mL (70-30) inpn 6 Units two (2) times a day.  BD Ultra-Fine Mini Pen Needle 31 gauge x 3/16\" ndle USE AS DIRECTED WITH LANTUS 100 Pen Needle 9    hydroCHLOROthiazide (HYDRODIURIL) 12.5 mg tablet TAKE ONE TABLET BY MOUTH DAILY 90 Tab 6    atorvastatin (LIPITOR) 10 mg tablet TAKE ONE TABLET BY MOUTH DAILY 30 Tab 5    losartan (COZAAR) 25 mg tablet Take 25 mg by mouth daily.  aspirin 81 mg chewable tablet Take 81 mg by mouth daily.  glucose blood VI test strips (True Metrix Pro Test Strip) strip Test BG twice a day. Dx:  E11.9 200 Strip 3    nystatin (MYCOSTATIN) 100,000 unit/gram ointment Apply  to affected area two (2) times a day. 15 g 0    metFORMIN ER 1,000 mg tr24 Take 1 Tab by mouth two (2) times daily (with meals).  sertraline (ZOLOFT) 100 mg tablet TAKE TWO TABLETS BY MOUTH ONCE DAILY (Patient taking differently: Take 150 mg by mouth daily. TAKE TWO TABLETS BY MOUTH ONCE DAILY) 60 Tab 5    TRUEPLUS LANCETS 33 gauge misc Twice daily 100 Lancet 11    TRUE METRIX GLUCOSE METER misc   0    potassium chloride (KLOR-CON) 10 mEq tablet Take 1 Tab by mouth daily. 30 Tab 3    methadone HCl (METHADONE PO) Take 85 mg by mouth daily.  lamoTRIgine (LaMICtal) 25 mg tablet Take 100 mg by mouth daily.  naproxen (NAPROSYN) 500 mg tablet Take 1 Tab by mouth two (2) times daily (with meals). (Patient taking differently: Take 500 mg by mouth two (2) times daily as needed.) 60 Tab 1    insulin glargine (LANTUS,BASAGLAR) 100 unit/mL (3 mL) inpn 10 units daily subcu (Patient taking differently: 10 Units daily.  10 units daily subcu) 5 Pen 0 No Known Allergies  Past Medical History:   Diagnosis Date    Depression     Hepatitis C     Heroin use disorder, moderate, in early remission ([facility] [address]     NOW on [facility]    Ill-defined condition     Chronic back pain     No past surgical history on file. Family History   Problem Relation Age of Onset    Hypertension Mother     High Cholesterol Mother     COPD Mother     Hypertension Father        Review of Systems   Respiratory: Negative for shortness of breath. Cardiovascular: Negative for chest pain. Gastrointestinal: Negative for blood in stool, constipation, diarrhea, nausea and vomiting. Genitourinary: Negative. Musculoskeletal: Positive for back pain. Negative for falls. No flowsheet data found. Servando Ramey, who was evaluated through a patient-initiated, synchronous (real-time) audio only encounter, and/or her healthcare decision maker, is aware that it is a billable service, with coverage as determined by her insurance carrier. She provided verbal consent to proceed: Yes. She has not had a related appointment within my department in the past 7 days or scheduled within the next 24 hours.       Total Time: minutes: 5-10 minutes    Paul Manning NP

## 2021-03-01 NOTE — PROGRESS NOTES
Marielos Oates presents today for   Chief Complaint   Patient presents with   Sae Baptiste Other     patient states her  is requesting a statement stating when was the first time she came in to address her back issue, applying for disability. Marielos Oates preferred language for health care discussion is english/other. Is someone accompanying this pt? no    Is the patient using any DME equipment during [address]? no    Depression Screening:  3 most recent PHQ Screens 3/1/2021   Little interest or pleasure in doing things Not at all   Feeling down, depressed, irritable, or hopeless Not at all   Total Score PHQ 2 0   Trouble falling or staying asleep, or sleeping too much -   Feeling tired or having little energy -   Poor appetite, weight loss, or overeating -   Feeling bad about yourself - or that you are a failure or have let yourself or your family down -   Trouble concentrating on things such as school, work, reading, or watching TV -   Moving or speaking so slowly that other people could have noticed; or the opposite being so fidgety that others notice -   Thoughts of being better off dead, or hurting yourself in some way -   PHQ 9 Score -   How difficult have these problems made it for you to do your work, take care of your home and get along with others -       Learning Assessment:  Learning Assessment 1/7/2021   PRIMARY LEARNER Patient   HIGHEST LEVEL OF EDUCATION - PRIMARY LEARNER  GRADUATED HIGH SCHOOL OR GED   BARRIERS PRIMARY LEARNER NONE   CO-LEARNER CAREGIVER No   PRIMARY LANGUAGE ENGLISH    NEED No   LEARNER PREFERENCE PRIMARY DEMONSTRATION   ANSWERED BY patient   RELATIONSHIP SELF       Abuse Screening:  Abuse Screening Questionnaire 1/7/2021   Do you ever feel afraid of your partner? N   Are you in a relationship with someone who physically or mentally threatens you? N   Is it safe for you to go home?  Y       Generalized Anxiety  OTTO 2/7 5/27/2020   Feeling nervous, anxious or on edge? 0 Not being able to stop or control worrying? 0   OTTO-2 Subtotal 0         Health Maintenance Due   Topic Date Due    Pneumococcal 0-64 years (1 of 1 - PPSV23) 10/29/1967    Foot Exam Q1  10/29/1971    COVID-19 Vaccine (1 of 2) 10/29/1977    DTaP/Tdap/Td series (1 - Tdap) 10/29/1982    PAP AKA CERVICAL CYTOLOGY  10/29/1982    Shingrix Vaccine Age 50> (1 of 2) 10/29/2011    MICROALBUMIN Q1  08/02/2020    Flu Vaccine (1) 09/01/2020   . Health Maintenance reviewed and discussed and ordered per Provider. Coordination of Care:  1. Have you been to the ER, urgent care clinic since your last visit? Hospitalized since your last visit? no    2. Have you seen or consulted any other health care providers outside of the [address] [place] [address] since your last visit? Include any pap smears or colon screening.  no      Advance Directive:  Discussed 1/7/21

## 2021-03-01 NOTE — LETTER
NOTIFICATION  
 
3/1/2021 11:30 AM 
 
Ms. Stephanie Read [address] [facility] [id number] To Whom It May Concern: 
 
Stephanie Read is currently under the care of Elma Scott. She has been seen in this office for back pain since November 2018 with her initial ER visit on 10/18/2018. She has had multiple issues with her back including discitis, radiculopathy, osteomyelitis, and candida infection. She has also seen multiple providers including orthopedics and infectious disease for this chronic problem. If there are questions or concerns please have the patient contact our office.  
 
 
 
Sincerely, 
 
 
Mackenzie Mi NP

## 2021-03-08 ENCOUNTER — TELEPHONE (OUTPATIENT)
Dept: FAMILY MEDICINE CLINIC | Age: 61
End: 2021-03-08

## 2021-03-08 NOTE — TELEPHONE ENCOUNTER
Patient called and said that she has a rash under her right breast and would like to know if you can give her prescription for nystatin the powder.  Please advise

## 2021-03-09 DIAGNOSIS — R21 RASH AND NONSPECIFIC SKIN ERUPTION: Primary | ICD-10-CM

## 2021-03-09 LAB — HBA1C MFR BLD HPLC: 8.3 %

## 2021-03-09 RX ORDER — NYSTATIN 100000 [USP'U]/G
POWDER TOPICAL 3 TIMES DAILY
Qty: 60 G | Refills: 0 | Status: SHIPPED
Start: 2021-03-09 | End: 2021-12-09

## 2021-03-10 NOTE — PROGRESS NOTES
Call to patient. She verified her name, , and address. She reports she has a rash under her right breast area. She has used nystatin in the past for the rash and she is requesting a refill.  MOLLY Jacobo, FNP-C

## 2021-03-23 ENCOUNTER — OFFICE VISIT (OUTPATIENT)
Dept: ORTHOPEDIC SURGERY | Age: 61
End: 2021-03-23
Payer: MEDICAID

## 2021-03-23 VITALS
WEIGHT: 221.4 LBS | OXYGEN SATURATION: 94 % | SYSTOLIC BLOOD PRESSURE: 116 MMHG | TEMPERATURE: 97.5 F | DIASTOLIC BLOOD PRESSURE: 73 MMHG | HEART RATE: 74 BPM | BODY MASS INDEX: 41.83 KG/M2

## 2021-03-23 DIAGNOSIS — M46.24 OSTEOMYELITIS OF THORACIC SPINE (HCC): ICD-10-CM

## 2021-03-23 DIAGNOSIS — G89.29 CHRONIC PRIMARY MUSCULOSKELETAL PAIN: Primary | ICD-10-CM

## 2021-03-23 DIAGNOSIS — S22.060S COMPRESSION FRACTURE OF T8 VERTEBRA, SEQUELA: ICD-10-CM

## 2021-03-23 DIAGNOSIS — M79.18 CHRONIC PRIMARY MUSCULOSKELETAL PAIN: Primary | ICD-10-CM

## 2021-03-23 DIAGNOSIS — M46.44 THORACIC DISCITIS: ICD-10-CM

## 2021-03-23 DIAGNOSIS — M54.59 MECHANICAL LOW BACK PAIN: ICD-10-CM

## 2021-03-23 DIAGNOSIS — M79.18 MYOFASCIAL PAIN: ICD-10-CM

## 2021-03-23 DIAGNOSIS — B37.9 CANDIDA INFECTION: ICD-10-CM

## 2021-03-23 DIAGNOSIS — M54.6 THORACIC SPINE PAIN: ICD-10-CM

## 2021-03-23 DIAGNOSIS — M79.18 CERVICAL MYOFASCIAL PAIN SYNDROME: ICD-10-CM

## 2021-03-23 PROCEDURE — 99213 OFFICE O/P EST LOW 20 MIN: CPT | Performed by: PHYSICAL MEDICINE & REHABILITATION

## 2021-03-23 NOTE — PROGRESS NOTES
[address]  [address], [address]  [facility], [address]  Phone: [phone]  Fax: [phone]        Alvin Youssef  : 1960  PCP: Dee Vera NP  3/23/2021    PROGRESS NOTE      HISTORY OF PRESENT ILLNESS  Dale Hopper is a 62 y.o. female who was seen 2020 with c/o chronic mid and low back pain. She also c/o \"pins and needles\" in her abdomen and the lateral aspect of the upper BLE. She had a thoracic discitis with osteomyelitis, disc biopsy positive for candida and MRSE that were treated with antibiotics. She was seen initially by Dr. Tami Esposito 19, and he noted that if she opted for surgery, it would be an extensive operation. She localized the majority of her pain around T5, but noted she also had pain in the lower back. She had difficulty lying flat due to pain. She was not interested in surgery or pain medications given her history of drug abuse. Thoracic spine MRI dated 19 reviewed. Per report, More edema in T6, T9, posterior elements, with similar severe compression deformity of T8 and destruction of T7 as described. Kyphosis and retropulsion of T7-T8 with central stenosis  but no cord compression, stable. No developing abscess. No abnormal cord lesion or enhancement. She has attended PT (2020-2020; [facility]) with benefit - she will continue her PT next week. She notes that her endurance is not quite where she wants to be. However, she experienced a severe muscle spasm when she worked too hard one day. She maintains a daily HEP. Dale Hopper comes in to the office today for f/u. She continues to have persistent chronic mid and low back pain. Pt notes that she has applied for SSI as she previously worked a physically demanding job. She notes that now it takes her all day to clean one room of her house because she has to rest often due to pain. She has to sleep in a chair due to pain/discomfort.  She notes that she had to discontinue PT because it exacerbated her pain. Pain Score: 3/10. Treatments patient has tried:  Physical therapy: Yes (2020)  Doing HEP: Yes  Non-opioid medications: Yes  Spinal injections: No  Spinal surgery- No.   Last Thoracic MRI 2019: Severe compression deformity of T8 and destruction of T7. Kyphosis and retropulsion of T7-T8 with central stenosis but no cord compression, stable.       PmHx: Hepatitis C, prior thoracic discitis with osteomyelitis, previous IV drug abuse, DM; takes methadone. ASSESSMENT  This is a 63year-old female with history of thoracic discitis with osteomyelitis who presents for axial neck, mid, and low back pain. Her MRI reveals a severe compression deformity of T8 and destruction of T7 with a resultant focal kyphosis. Her pain is likely myofascial in nature related to altered biomechanics and relative deconditioning after osteomyelitis.     PLAN  1. Referral to pain management  2. Maintain HEP. Pt will f/u PRN. Diagnoses and all orders for this visit:    1. Chronic primary musculoskeletal pain  -     REFERRAL TO PAIN MANAGEMENT    2. Mechanical low back pain  -     REFERRAL TO PAIN MANAGEMENT    3. Myofascial pain  -     REFERRAL TO PAIN MANAGEMENT    4. Thoracic spine pain  -     REFERRAL TO PAIN MANAGEMENT    5. Compression fracture of T8 vertebra, sequela  -     REFERRAL TO PAIN MANAGEMENT    6. Osteomyelitis of thoracic spine (HCC)  -     REFERRAL TO PAIN MANAGEMENT    7. Thoracic discitis  -     REFERRAL TO PAIN MANAGEMENT    8. Candida infection  -     REFERRAL TO PAIN MANAGEMENT    9. Cervical myofascial pain syndrome  -     REFERRAL TO PAIN MANAGEMENT         PAST MEDICAL HISTORY   Past Medical History:   Diagnosis Date    Depression     Hepatitis C     Heroin use disorder, moderate, in early remission ([facility] [address]     NOW on [facility]    Ill-defined condition     Chronic back pain       No past surgical history on file. [employer]       MEDICATIONS   Current Outpatient Medications   Medication Sig Dispense Refill   • nystatin (MYCOSTATIN) powder Apply  to affected area three (3) times daily. 60 g 0   • lamoTRIgine (LaMICtal) 100 mg tablet      • nystatin (MYCOSTATIN) 100,000 unit/mL suspension Take 5 mL by mouth four (4) times daily. swish and spit 180 mL 0   • NovoLOG Mix 70-30FlexPen U-100 100 unit/mL (70-30) inpn 6 Units two (2) times a day.     • BD Ultra-Fine Mini Pen Needle 31 gauge x 3/16\" ndle USE AS DIRECTED WITH LANTUS 100 Pen Needle 9   • hydroCHLOROthiazide (HYDRODIURIL) 12.5 mg tablet TAKE ONE TABLET BY MOUTH DAILY 90 Tab 6   • atorvastatin (LIPITOR) 10 mg tablet TAKE ONE TABLET BY MOUTH DAILY 30 Tab 5   • losartan (COZAAR) 25 mg tablet Take 25 mg by mouth daily.     • aspirin 81 mg chewable tablet Take 81 mg by mouth daily.     • glucose blood VI test strips (True Metrix Pro Test Strip) strip Test BG twice a day.  Dx:  E11.9 200 Strip 3   • nystatin (MYCOSTATIN) 100,000 unit/gram ointment Apply  to affected area two (2) times a day. 15 g 0   • metFORMIN ER 1,000 mg tr24 Take 1 Tab by mouth two (2) times daily (with meals).     • sertraline (ZOLOFT) 100 mg tablet TAKE TWO TABLETS BY MOUTH ONCE DAILY (Patient taking differently: Take 150 mg by mouth daily. TAKE TWO TABLETS BY MOUTH ONCE DAILY) 60 Tab 5   • TRUEPLUS LANCETS 33 gauge misc Twice daily 100 Lancet 11   • TRUE METRIX GLUCOSE METER misc   0   • potassium chloride (KLOR-CON) 10 mEq tablet Take 1 Tab by mouth daily. 30 Tab 3   • methadone HCl (METHADONE PO) Take 85 mg by mouth daily.     • naproxen (NAPROSYN) 500 mg tablet Take 1 Tab by mouth two (2) times daily (with meals). (Patient taking differently: Take 500 mg by mouth two (2) times daily as needed.) 60 Tab 1        ALLERGIES  No Known Allergies       SOCIAL HISTORY    Social History     Socioeconomic History   • Marital status: SINGLE     Spouse name: Not on file   • Number of children: Not on file   • Years of education: Not on file   Highest education level: Not on file   Tobacco Use    Smoking status: Current Every Day Smoker     Packs/day: 0.25     Types: Cigarettes    Smokeless tobacco: Never Used   Substance and Sexual Activity    Alcohol use: No    Drug use: No     Comment: on methadone matinance program    Sexual activity: Never       FAMILY HISTORY  Family History   Problem Relation Age of Onset    Hypertension Mother     High Cholesterol Mother     COPD Mother     Hypertension Father          REVIEW OF SYSTEMS  Review of Systems   Constitutional: Negative for chills, fever and weight loss. Respiratory: Negative for shortness of breath. Cardiovascular: Negative for chest pain. Gastrointestinal: Negative for constipation. Negative for fecal incontinence    Genitourinary: Negative for dysuria. Negative for urinary incontinence   Musculoskeletal: Positive for back pain, myalgias and neck pain. Skin: Negative for rash. Neurological: Negative for dizziness, tingling, tremors, focal weakness and headaches. Endo/Heme/Allergies: Does not bruise/bleed easily. Psychiatric/Behavioral: The patient does not have insomnia. PHYSICAL EXAMINATION  Visit Vitals  /73 (BP 1 Location: Left arm, BP Patient Position: Sitting)   Pulse 74   Temp 97.5 °F (36.4 °C) (Skin)   Wt 221 lb 6.4 oz (100.4 kg)   SpO2 94%   BMI 41.83 kg/m²       Pain Assessment  3/23/2021   Location of Pain Back   Pain Location Comment -   Location Modifiers -   Severity of Pain 3   Quality of Pain Burning; Other (Comment)   Quality of Pain Comment stabbing   Duration of Pain Persistent   Frequency of Pain Constant   Aggravating Factors Standing   Aggravating Factors Comment laying down   Limiting Behavior -   Relieving Factors Other (Comment)   Relieving Factors Comment sitting   Result of Injury -           Constitutional:  Well developed, well nourished, in no acute distress. Psychiatric: Affect and mood are appropriate. Integumentary: No rashes or abrasions noted on exposed areas. SPINE/MUSCULOSKELETAL EXAM       Thoracic Spine:  Tenderness to palpation of thoracic paraspinals  Thoracic kyphosis      Lumbar spine:  No rash, ecchymosis, or gross obliquity. No fasciculations. No focal atrophy is noted. No pain with hip ROM. Full range of motion. Tenderness to palpation of QLs and lumbar paraspinals. No tenderness to palpation at the sciatic notch. SI joints non-tender. Trochanters non tender.     Sensation in the bilateral legs grossly intact to light touch. Updates 3/23/21:  Diffuse tenderness to palpation of musculature from neck to low back, R>L    MOTOR:      Biceps  Triceps Deltoids Wrist Ext Wrist Flex Hand Intrin   Right 5/5 5/5 5/5 5/5 5/5 5/5   Left 5/5 5/5 5/5 5/5 5/5 5/5             Hip Flex  Quads Hamstrings Ankle DF EHL Ankle PF   Right 5/5 5/5 5/5 5/5 5/5 5/5   Left 5/5 5/5 5/5 5/5 5/5 5/5        DTRs are 1+ biceps, triceps, brachioradialis, patella, and Achilles.     Negative Straight Leg raise. Squat not tested. No difficulty with tandem gait.      Ambulation without assistive device. FWB.       RADIOGRAPHS  Thoracic MRI images taken on 5/21/19 personally reviewed with patient:  Similar severe destruction of T7 and at least 75% compression deformity in T8 as  previously seen. Thoracic kyphosis with retropulsion of T7 and T8, stable. Mild  cord contact but no significant cord compression or significant central stenosis  at T7-T8 level. Paraspinous soft tissue inflammation, slightly more intense but  similar distribution. Mild edema with post contrast enhancement along the left  side of T8-T9 disc. Slightly more edema along the left side of T9 vertebral  body. Slightly more intense edema in the inferior endplate of T6. Edema in the  posterior element of T6, T7, T8 and T9 noted, slightly more intense. No epidural  collection. No discrete fluid collection or abscess.  Inflammation of the  associated foramina, grossly stable.     No additional or new abnormalities in the thoracic spine otherwise. No  developing focal disc herniation or central stenosis. Spinal cord is otherwise  unremarkable. No additional foraminal stenosis.     IMPRESSION     More edema in T6, T9, posterior elements, with similar severe compression  deformity of T8 and destruction of T7 as described. Kyphosis and retropulsion of T7-T8 with central stenosis  but no cord compression, stable. No developing  abscess. No abnormal cord lesion or enhancement. 16 minutes of face-to-face contact were spent with the patient during today's visit extensively discussing symptoms and treatment plan. All questions were answered. More than half of this visit today was spent on counseling.      Written by Lisa Sheehan as dictated by Cecil Simms MD

## 2021-03-26 ENCOUNTER — TELEPHONE (OUTPATIENT)
Dept: ORTHOPEDIC SURGERY | Age: 61
End: 2021-03-26

## 2021-03-26 NOTE — TELEPHONE ENCOUNTER
Patient is applying for disability. She is requesting a letter stating there is nothing more our provider can do for the patient because all options have been exhausted other than referral to pain management. She says the letter should contain medical documentation  reflecting diagnoses and the fact that she cannot perform tasks due to her ailments.       Patient [phone]

## 2021-03-26 NOTE — TELEPHONE ENCOUNTER
Patient called stating she was referred to pain management and she called their office today but they told her they haven't received her referral. I informed her we faxed it to them yesterday 03/25/21 and I gave her the fax number we sent it to. She's asking if we can send it again and then call her to let her know when that's done at [phone].

## 2021-03-26 NOTE — TELEPHONE ENCOUNTER
MD agrees and will sign the letter for the patient to . Per Md he doesn't have the supporting fact (pertaining to the spine) that would deem her disable. Patient notified. Letter printed and placed at the front for .

## 2021-03-26 NOTE — LETTER
3/26/2021 10:43 AM 
 
Ms. Aron Melendez [address] [facility] [address] [facility] To Whom It May Concern: 
 
Aron Melendez was under the care of [address] [place] [address]. She has the following diagnosis: 1. Chronic primary musculoskeletal pain 2. Mechanical low back pain 3. Myofascial pain 4. Thoracic spine pain 5. Compression fracture of T8 vertebra, sequela 6. Osteomyelitis of thoracic spine (HCC) 7. Thoracic discitis 8. Candida infection 9. Cervical myofascial pain syndrome She is being referred to pain management and has been advised to continue a Home Exercise Program. 
 
 
Sincerely, 
 
 
Marcelo Parrish MD

## 2021-03-29 NOTE — TELEPHONE ENCOUNTER
I spoke with Ms. Alicia Bradford and she is not comfortable travel too far outside of her area. Per her request, records faxed to [facility]. See referral for further details.

## 2021-04-14 DIAGNOSIS — E11.8 CONTROLLED TYPE 2 DIABETES MELLITUS WITH COMPLICATION, WITHOUT LONG-TERM CURRENT USE OF INSULIN (HCC): ICD-10-CM

## 2021-04-14 DIAGNOSIS — I10 ESSENTIAL HYPERTENSION: ICD-10-CM

## 2021-04-14 RX ORDER — ATORVASTATIN CALCIUM 10 MG/1
TABLET, FILM COATED ORAL
Qty: 90 TAB | Refills: 4 | Status: SHIPPED | OUTPATIENT
Start: 2021-04-14

## 2021-04-29 ENCOUNTER — TRANSCRIBE ORDER (OUTPATIENT)
Dept: SCHEDULING | Age: 61
End: 2021-04-29

## 2021-04-29 DIAGNOSIS — Z12.31 ENCOUNTER FOR SCREENING MAMMOGRAM FOR MALIGNANT NEOPLASM OF BREAST: Primary | ICD-10-CM

## 2021-04-30 ENCOUNTER — HOSPITAL ENCOUNTER (OUTPATIENT)
Dept: MAMMOGRAPHY | Age: 61
Discharge: HOME | End: 2021-04-30
Attending: NURSE PRACTITIONER
Payer: MEDICAID

## 2021-04-30 DIAGNOSIS — Z12.31 ENCOUNTER FOR SCREENING MAMMOGRAM FOR MALIGNANT NEOPLASM OF BREAST: ICD-10-CM

## 2021-04-30 PROCEDURE — 77067 SCR MAMMO BI INCL CAD: CPT

## 2021-05-06 ENCOUNTER — APPOINTMENT (OUTPATIENT)
Dept: GENERAL RADIOLOGY | Age: 61
End: 2021-05-06
Attending: EMERGENCY MEDICINE
Payer: MEDICAID

## 2021-05-06 ENCOUNTER — HOSPITAL ENCOUNTER (EMERGENCY)
Age: 61
Discharge: HOME | End: 2021-05-06
Attending: EMERGENCY MEDICINE
Payer: MEDICAID

## 2021-05-06 VITALS
DIASTOLIC BLOOD PRESSURE: 74 MMHG | HEIGHT: 63 IN | SYSTOLIC BLOOD PRESSURE: 142 MMHG | RESPIRATION RATE: 18 BRPM | OXYGEN SATURATION: 96 % | BODY MASS INDEX: 39.87 KG/M2 | WEIGHT: 225 LBS | HEART RATE: 74 BPM | TEMPERATURE: 98.2 F

## 2021-05-06 DIAGNOSIS — M19.039 JOINT INFLAMMATION OF WRIST: ICD-10-CM

## 2021-05-06 DIAGNOSIS — G56.02 CARPAL TUNNEL SYNDROME OF LEFT WRIST: Primary | ICD-10-CM

## 2021-05-06 PROCEDURE — 73110 X-RAY EXAM OF WRIST: CPT

## 2021-05-06 PROCEDURE — 99283 EMERGENCY DEPT VISIT LOW MDM: CPT

## 2021-05-06 PROCEDURE — 74011250637 HC RX REV CODE- 250/637: Performed by: EMERGENCY MEDICINE

## 2021-05-06 RX ORDER — NAPROXEN 250 MG/1
500 TABLET ORAL ONCE
Status: COMPLETED | OUTPATIENT
Start: 2021-05-06 | End: 2021-05-06

## 2021-05-06 RX ORDER — PREDNISONE 20 MG/1
60 TABLET ORAL DAILY
Qty: 15 TAB | Refills: 0 | Status: SHIPPED | OUTPATIENT
Start: 2021-05-06 | End: 2021-05-11

## 2021-05-06 RX ORDER — NAPROXEN 500 MG/1
500 TABLET ORAL 2 TIMES DAILY WITH MEALS
Qty: 6 TAB | Refills: 0 | Status: SHIPPED | OUTPATIENT
Start: 2021-05-06 | End: 2021-05-09

## 2021-05-06 RX ADMIN — NAPROXEN 500 MG: 250 TABLET ORAL at 10:39

## 2021-05-06 NOTE — ED PROVIDER NOTES
EMERGENCY DEPARTMENT HISTORY AND PHYSICAL EXAM    10:26 AM  Date: 5/6/2021  Patient Name: Dina Guerra    History of Presenting Illness       History Provided By:     HPI: Dina Guerra is a 62 y.o. female with medical history of diabetes hypertension, previous heroin use on methadone presents with left wrist pain that started 2 days ago. Patient noticed that the left wrist is swollen, denies any injury. Denies any fever or chills. Pain is constant worse with movement no associated symptoms or modifying factors. States that had a history of carpal tunnel. Social history smoker     PCP: Dee Dee Snow NP    Past History     Past Medical History:  Past Medical History:   Diagnosis Date    Depression     Hepatitis C     Heroin use disorder, moderate, in early remission ([facility] [address]     NOW on [facility]    Ill-defined condition     Chronic back pain       Past Surgical History:  No past surgical history on file. Family History:  Family History   Problem Relation Age of Onset    Hypertension Mother     High Cholesterol Mother     COPD Mother     Hypertension Father        Social History:  Social History     Tobacco Use    Smoking status: Current Every Day Smoker     Packs/day: 0.25     Types: Cigarettes    Smokeless tobacco: Never Used   Substance Use Topics    Alcohol use: No    Drug use: No     Comment: on methadone matinance program       Allergies:  No Known Allergies    Review of Systems   Review of Systems   Constitutional: Negative for activity change, appetite change and chills. HENT: Negative for congestion, ear discharge, ear pain and sore throat. Eyes: Negative for photophobia and pain. Respiratory: Negative for cough and choking. Cardiovascular: Negative for palpitations and leg swelling. Gastrointestinal: Negative for anal bleeding and rectal pain. Endocrine: Negative for polydipsia and polyuria. Genitourinary: Negative for genital sores and urgency. Musculoskeletal: Positive for joint swelling. Negative for arthralgias and myalgias. Neurological: Negative for dizziness, seizures and speech difficulty. Psychiatric/Behavioral: Negative for hallucinations, self-injury and suicidal ideas. Physical Exam     Patient Vitals for the past 12 hrs:   Temp Pulse Resp BP SpO2   05/06/21 1003 98.2 °F (36.8 °C) 74 18 (!) 142/74 96 %       Physical Exam  Vitals signs and nursing note reviewed. Constitutional:       Appearance: She is well-developed. HENT:      Head: Normocephalic and atraumatic. Eyes:      General:         Right eye: No discharge. Left eye: No discharge. Neck:      Musculoskeletal: Normal range of motion and neck supple. Cardiovascular:      Rate and Rhythm: Normal rate and regular rhythm. Heart sounds: Normal heart sounds. No murmur. Pulmonary:      [place]: Pulmonary effort is normal. No respiratory distress. Breath sounds: Normal breath sounds. No stridor. No wheezing or rales. Chest:      Chest wall: No tenderness. Abdominal:      General: Bowel sounds are normal. There is no distension. Palpations: Abdomen is soft. Tenderness: There is no abdominal tenderness. There is no guarding or rebound. Musculoskeletal: Normal range of motion. Comments: Left wrist swelling. Full range of movement. Neurovascularly intact  Tinel's positive   Skin:     General: Skin is warm and dry. Neurological:      Mental Status: She is alert and oriented to person, place, and time. Diagnostic Study Results     Labs -  No results found for this or any previous visit (from the past 12 hour(s)). Radiologic Studies -   No results found. Medical Decision Making     ED Course: Progress Notes, Reevaluation, and Consults:    10:26 AM Initial assessment performed. The patients presenting problems have been discussed, and they/their family are in agreement with the care plan formulated and outlined with them. I have encouraged them to ask questions as they arise throughout their visit. Provider Notes (Medical Decision Making):   Patient presents with left wrist pain   Tinel's positive  Neurovascular intact  Suspect carpal tunnel/inflammation around the wrist joint  Do not suspect septic joint  Xray As interpreted by me soft tissue swelling  Patient will be given pain medication and steroids  Patient to follow-up with orthopedics and PMD            Vital Signs-Reviewed the patient's vital signs. Reviewed pt's pulse ox reading. Records Reviewed: old medical records  -I am the first provider for this patient.  -I reviewed the vital signs, available nursing notes, past medical history, past surgical history, family history and social history. Current Outpatient Medications   Medication Sig Dispense Refill    atorvastatin (LIPITOR) 10 mg tablet TAKE ONE TABLET BY MOUTH DAILY 90 Tab 4    nystatin (MYCOSTATIN) powder Apply  to affected area three (3) times daily. 60 g 0    lamoTRIgine (LaMICtal) 100 mg tablet 100 mg daily.  NovoLOG Mix 70-30FlexPen U-100 100 unit/mL (70-30) inpn 8 Units two (2) times a day.  hydroCHLOROthiazide (HYDRODIURIL) 12.5 mg tablet TAKE ONE TABLET BY MOUTH DAILY 90 Tab 6    losartan (COZAAR) 25 mg tablet Take 25 mg by mouth daily.  aspirin 81 mg chewable tablet Take 81 mg by mouth daily.  metFORMIN ER 1,000 mg tr24 Take 1 Tab by mouth two (2) times daily (with meals).  sertraline (ZOLOFT) 100 mg tablet TAKE TWO TABLETS BY MOUTH ONCE DAILY (Patient taking differently: Take 150 mg by mouth daily. TAKE TWO TABLETS BY MOUTH ONCE DAILY) 60 Tab 5    potassium chloride (KLOR-CON) 10 mEq tablet Take 1 Tab by mouth daily. 30 Tab 3    methadone HCl (METHADONE PO) Take 85 mg by mouth daily.       BD Ultra-Fine Mini Pen Needle 31 gauge x 3/16\" ndle USE AS DIRECTED WITH LANTUS 100 Pen Needle 9    glucose blood VI test strips (True Metrix Pro Test Strip) strip Test BG twice a day. Dx:  E11.9 200 Strip 3    TRUEPLUS LANCETS 33 gauge misc Twice daily 100 Lancet 11    TRUE METRIX GLUCOSE METER misc   0        Clinical Impression     Clinical Impression: No diagnosis found. Disposition: This note was dictated utilizing voice recognition software which may lead to typographical errors. I apologize in advance if the situation occurs. If questions arise please do not hesitate to contact me or call our department.     Mindi Valero MD  10:26 AM

## 2021-05-06 NOTE — ED TRIAGE NOTES
Patient c/o swelling to left wrist that \"just popped up\" two days ago. Denies any injury. Patient noted to have rash to bilateral hands. She states rash is typical for her since having osteomyelitis.

## 2021-05-27 NOTE — TELEPHONE ENCOUNTER
Patient called for [facility] and said that Pain Management states they have not received the referral from [facility]. Patient is asking that the referral be faxed to Elie Moffett at Pain Management  Tel. [phone]  Fax [phone]    Patient tel. [phone].

## 2021-06-22 ENCOUNTER — HOSPITAL ENCOUNTER (EMERGENCY)
Age: 61
Discharge: HOME | End: 2021-06-22
Attending: EMERGENCY MEDICINE
Payer: MEDICAID

## 2021-06-22 VITALS
BODY MASS INDEX: 38.46 KG/M2 | TEMPERATURE: 98.3 F | DIASTOLIC BLOOD PRESSURE: 77 MMHG | RESPIRATION RATE: 16 BRPM | WEIGHT: 209 LBS | OXYGEN SATURATION: 94 % | SYSTOLIC BLOOD PRESSURE: 129 MMHG | HEART RATE: 69 BPM | HEIGHT: 62 IN

## 2021-06-22 DIAGNOSIS — R73.9 HYPERGLYCEMIA: Primary | ICD-10-CM

## 2021-06-22 LAB
ANION GAP SERPL CALC-SCNC: 5 MMOL/L (ref 3–18)
BASOPHILS # BLD: 0 K/UL (ref 0–0.1)
BASOPHILS NFR BLD: 1 % (ref 0–2)
BUN SERPL-MCNC: 8 MG/DL (ref 7–18)
BUN/CREAT SERPL: 10 (ref 12–20)
CALCIUM SERPL-MCNC: 8.9 MG/DL (ref 8.5–10.1)
CHLORIDE SERPL-SCNC: 98 MMOL/L (ref 100–111)
CO2 SERPL-SCNC: 32 MMOL/L (ref 21–32)
CREAT SERPL-MCNC: 0.77 MG/DL (ref 0.6–1.3)
DIFFERENTIAL METHOD BLD: ABNORMAL
EOSINOPHIL # BLD: 0.2 K/UL (ref 0–0.4)
EOSINOPHIL NFR BLD: 3 % (ref 0–5)
ERYTHROCYTE [DISTWIDTH] IN BLOOD BY AUTOMATED COUNT: 15.7 % (ref 11.6–14.5)
GLUCOSE BLD STRIP.AUTO-MCNC: 139 MG/DL (ref 70–110)
GLUCOSE BLD STRIP.AUTO-MCNC: 351 MG/DL (ref 70–110)
GLUCOSE SERPL-MCNC: 326 MG/DL (ref 74–99)
HCT VFR BLD AUTO: 41.1 % (ref 35–45)
HGB BLD-MCNC: 13.1 G/DL (ref 12–16)
LYMPHOCYTES # BLD: 1.6 K/UL (ref 0.9–3.6)
LYMPHOCYTES NFR BLD: 26 % (ref 21–52)
MCH RBC QN AUTO: 27.9 PG (ref 24–34)
MCHC RBC AUTO-ENTMCNC: 31.9 G/DL (ref 31–37)
MCV RBC AUTO: 87.6 FL (ref 74–97)
MONOCYTES # BLD: 0.5 K/UL (ref 0.05–1.2)
MONOCYTES NFR BLD: 8 % (ref 3–10)
NEUTS SEG # BLD: 3.9 K/UL (ref 1.8–8)
NEUTS SEG NFR BLD: 62 % (ref 40–73)
PLATELET # BLD AUTO: 169 K/UL (ref 135–420)
PMV BLD AUTO: 11.5 FL (ref 9.2–11.8)
POTASSIUM SERPL-SCNC: 3.6 MMOL/L (ref 3.5–5.5)
RBC # BLD AUTO: 4.69 M/UL (ref 4.2–5.3)
SODIUM SERPL-SCNC: 135 MMOL/L (ref 136–145)
WBC # BLD AUTO: 6.2 K/UL (ref 4.6–13.2)

## 2021-06-22 PROCEDURE — 85025 COMPLETE CBC W/AUTO DIFF WBC: CPT

## 2021-06-22 PROCEDURE — 96374 THER/PROPH/DIAG INJ IV PUSH: CPT

## 2021-06-22 PROCEDURE — 99283 EMERGENCY DEPT VISIT LOW MDM: CPT

## 2021-06-22 PROCEDURE — 80048 BASIC METABOLIC PNL TOTAL CA: CPT

## 2021-06-22 PROCEDURE — 74011636637 HC RX REV CODE- 636/637: Performed by: EMERGENCY MEDICINE

## 2021-06-22 PROCEDURE — 74011250636 HC RX REV CODE- 250/636: Performed by: EMERGENCY MEDICINE

## 2021-06-22 PROCEDURE — 82962 GLUCOSE BLOOD TEST: CPT

## 2021-06-22 PROCEDURE — 96361 HYDRATE IV INFUSION ADD-ON: CPT

## 2021-06-22 RX ADMIN — SODIUM CHLORIDE 1000 ML: 900 INJECTION, SOLUTION INTRAVENOUS at 11:36

## 2021-06-22 RX ADMIN — HUMAN INSULIN 15 UNITS: 100 INJECTION, SOLUTION SUBCUTANEOUS at 11:39

## 2021-06-22 NOTE — ED TRIAGE NOTES
Pt reports being given first covid shot on 6/18/2021, reports having high blood sugar since, pt reports feeling tired over the last couple days. Spoke to PCP who told her to go into ER. Pt has been monitoring sugars for the last 5 days. Pt reports taking novolog 10u this am at 1000. Also reports not checking sugars as needed and eating things she shouldn't be.      6/18 - 595, 498, 342  6/19 - 397, 479, 298,  6/20 - 316, 320, 339,   6/21 - 285, 399, 500, 315,   6/22 - 287, 329.

## 2021-06-22 NOTE — ED PROVIDER NOTES
HPI patient presents today complaining of a several day history of high blood sugar. She says she been feeling weak dizzy and lethargic. She saw her PCP 5 days ago and was told that her blood sugar was higher than normal.  Patient was sent home to try adjusting her diet and insulin dose to correct the sugar. Patient called the PCP today stating that her symptoms had not changed and her PCP referred her to the emergency room for further evaluation. Patient denies any chest pain shortness of breath or abdominal pain. She says she has some polydipsia and polyuria. She says when she checked at home this morning, her blood sugar was in the 360 range. No other specifics given at this time. Past Medical History:   Diagnosis Date    Depression     Hepatitis C     Heroin use disorder, moderate, in early remission ([facility] [address]     NOW on [facility]    Ill-defined condition     Chronic back pain       No past surgical history on file.       Family History:   Problem Relation Age of Onset    Hypertension Mother     High Cholesterol Mother     COPD Mother     Hypertension Father        Social History     Socioeconomic History    Marital status: SINGLE     Spouse name: Not on file    Number of children: Not on file    Years of education: Not on file    Highest education level: Not on file   Occupational History    Not on file   Tobacco Use    Smoking status: Current Every Day Smoker     Packs/day: 0.25     Types: Cigarettes    Smokeless tobacco: Never Used   Substance and Sexual Activity    Alcohol use: No    Drug use: No     Comment: on methadone matinance program    Sexual activity: Never   Other Topics Concern    Not on file   Social History Narrative    Not on file     Social Determinants of Health     Financial Resource Strain:     Difficulty of Paying Living Expenses:    Food Insecurity:     Worried About Running Out of Food in the Last Year:     [address] in the Last Year: Transportation Needs:     Lack of Transportation (Medical):  Lack of Transportation (Non-Medical):    Physical Activity:     Days of Exercise per Week:     Minutes of Exercise per Session:    Stress:     Feeling of Stress :    Social Connections:     Frequency of Communication with Friends and Family:     Frequency of Social Gatherings with Friends and Family:     Attends Oriental orthodox Services:     Active Member of Clubs or Organizations:     Attends Club or Organization Meetings:     Marital Status:    Intimate Partner Violence:     Fear of Current or Ex-Partner:     Emotionally Abused:     Physically Abused:     Sexually Abused: ALLERGIES: Patient has no known allergies. Review of Systems   Constitutional: Negative. HENT: Negative. Eyes: Negative. Respiratory: Negative. Cardiovascular: Negative. Gastrointestinal: Negative. Genitourinary: Negative. Musculoskeletal: Negative. Skin: Negative. Neurological: Positive for weakness. Psychiatric/Behavioral: Negative. Vitals:    06/22/21 1045   BP: 129/77   Pulse: 69   Resp: 16   Temp: 98.3 °F (36.8 °C)   SpO2: 94%   Weight: 94.8 kg (209 lb)   Height: 5' 2\" (1.575 m)            Physical Exam  Vitals and nursing note reviewed. Constitutional:       Appearance: She is well-developed. HENT:      Head: Normocephalic and atraumatic. Nose: Nose normal.   Eyes:      Conjunctiva/sclera: Conjunctivae normal.      Pupils: Pupils are equal, round, and reactive to light. Cardiovascular:      Rate and Rhythm: Normal rate and regular rhythm. Pulmonary:      Effort: Pulmonary effort is normal.      Breath sounds: Normal breath sounds. Abdominal:      Palpations: Abdomen is soft. Musculoskeletal:         General: Normal range of motion. Cervical back: Normal range of motion and neck supple. Skin:     General: Skin is warm and dry.    Neurological:      Mental Status: She is alert and oriented to person, place, and time. MDM       Patient says she is feeling much better now after IV fluids and meds. Her repeat blood sugar is back in the acceptable range. Patient says she is ready to go home and she understands and agrees with the disposition and follow-up plan will follow up with her primary care physician as scheduled.   Daija Goldstein MD 12:48 PM  Procedures

## 2021-06-23 RX ORDER — LANCETS 33 GAUGE
EACH MISCELLANEOUS
Qty: 1 PACKAGE | Refills: 10 | Status: SHIPPED | OUTPATIENT
Start: 2021-06-23

## 2021-12-09 ENCOUNTER — OFFICE VISIT (OUTPATIENT)
Dept: FAMILY MEDICINE CLINIC | Age: 61
End: 2021-12-09
Payer: MEDICAID

## 2021-12-09 VITALS
SYSTOLIC BLOOD PRESSURE: 129 MMHG | TEMPERATURE: 98.4 F | OXYGEN SATURATION: 94 % | BODY MASS INDEX: 37 KG/M2 | DIASTOLIC BLOOD PRESSURE: 74 MMHG | WEIGHT: 208.8 LBS | RESPIRATION RATE: 20 BRPM | HEART RATE: 84 BPM | HEIGHT: 63 IN

## 2021-12-09 DIAGNOSIS — F17.219 CIGARETTE NICOTINE DEPENDENCE WITH NICOTINE-INDUCED DISORDER: ICD-10-CM

## 2021-12-09 DIAGNOSIS — R68.89 FORGETFULNESS: ICD-10-CM

## 2021-12-09 DIAGNOSIS — E11.8 CONTROLLED TYPE 2 DIABETES MELLITUS WITH COMPLICATION, WITHOUT LONG-TERM CURRENT USE OF INSULIN (HCC): ICD-10-CM

## 2021-12-09 DIAGNOSIS — K13.70 ORAL MUCOSAL LESION: ICD-10-CM

## 2021-12-09 DIAGNOSIS — I10 ESSENTIAL HYPERTENSION: ICD-10-CM

## 2021-12-09 DIAGNOSIS — B37.0 ORAL CANDIDIASIS: ICD-10-CM

## 2021-12-09 DIAGNOSIS — M25.562 CHRONIC PAIN OF BOTH KNEES: Primary | ICD-10-CM

## 2021-12-09 DIAGNOSIS — E78.2 MIXED HYPERLIPIDEMIA: ICD-10-CM

## 2021-12-09 DIAGNOSIS — G89.29 CHRONIC PAIN OF BOTH KNEES: Primary | ICD-10-CM

## 2021-12-09 DIAGNOSIS — M25.561 CHRONIC PAIN OF BOTH KNEES: Primary | ICD-10-CM

## 2021-12-09 PROCEDURE — 99214 OFFICE O/P EST MOD 30 MIN: CPT | Performed by: NURSE PRACTITIONER

## 2021-12-09 PROCEDURE — 3052F HG A1C>EQUAL 8.0%<EQUAL 9.0%: CPT | Performed by: NURSE PRACTITIONER

## 2021-12-09 RX ORDER — NYSTATIN 100000 [USP'U]/ML
1 SUSPENSION ORAL 4 TIMES DAILY
Qty: 180 ML | Refills: 0 | Status: SHIPPED | OUTPATIENT
Start: 2021-12-09 | End: 2022-03-02 | Stop reason: SDUPTHER

## 2021-12-09 NOTE — PROGRESS NOTES
MARIA EUGENIA Zuñiga is a 62 y.o. female  Chief Complaint   Patient presents with    Back Pain    Knee Pain     both     Reports she is dealing with some personal issues but she is in a much better place. She is still going to [employer]. She denies any desire to hurt or harm herself or other  She has chronic back pain. She does not take anything for pain due to her history. She will take OTC ibuprofen when pain is unbearable. She has knee pain and she needs to see an orthopedic provider. Pain in her knees is severe when going up and down stairs. She was in the 3rd grade when she had her first cortisone shot in her knees. She still smokes but she reduced. She is following with endocrinology. She reports her blood glucose was 132 this morning. She has had her COVID vaccines. She reports she has cut smoking. She reports she had a foot exam.   She has had some memory loss and she would like to see neurology. She has just been very forgetful. She is requesting a refill on her nystatin. She has dentures but when she wears them a lot she will get yeast infection and crusting at the corners of her mouth. Past Medical History  Past Medical History:   Diagnosis Date    Depression     Hepatitis C     Heroin use disorder, moderate, in early remission ([facility] [address]     NOW on [facility]    Ill-defined condition     Chronic back pain       Surgical History  History reviewed. No pertinent surgical history. Medications  Current Outpatient Medications   Medication Sig Dispense Refill    nystatin (MYCOSTATIN) 100,000 unit/mL suspension Take 5 mL by mouth four (4) times daily. swish and spit 180 mL 0    lancets (TRUEplus Lancets) 33 gauge misc USE TO TEST TWICE DAILY 1 Package 10    atorvastatin (LIPITOR) 10 mg tablet TAKE ONE TABLET BY MOUTH DAILY 90 Tab 4    lamoTRIgine (LaMICtal) 100 mg tablet 100 mg daily.       NovoLOG Mix 70-30FlexPen U-100 100 unit/mL (70-30) inpn 10 Units two (2) times a day.      BD Ultra-Fine Mini Pen Needle 31 gauge x 3/16\" ndle USE AS DIRECTED WITH LANTUS 100 Pen Needle 9    hydroCHLOROthiazide (HYDRODIURIL) 12.5 mg tablet TAKE ONE TABLET BY MOUTH DAILY 90 Tab 6    losartan (COZAAR) 25 mg tablet Take 25 mg by mouth daily.  aspirin 81 mg chewable tablet Take 81 mg by mouth daily.  glucose blood VI test strips (True Metrix Pro Test Strip) strip Test BG twice a day. Dx:  E11.9 200 Strip 3    metFORMIN ER 1,000 mg tr24 Take 500 mg by mouth. 2 daily      sertraline (ZOLOFT) 100 mg tablet TAKE TWO TABLETS BY MOUTH ONCE DAILY (Patient taking differently: Take 150 mg by mouth daily. TAKE TWO TABLETS BY MOUTH ONCE DAILY) 60 Tab 5    TRUE METRIX GLUCOSE METER misc   0    methadone HCl (METHADONE PO) Take 75 mg by mouth daily. Allergies  No Known Allergies    Family History  Family History   Problem Relation Age of Onset    Hypertension Mother     High Cholesterol Mother     COPD Mother     Hypertension Father        Social History  Social History     Socioeconomic History    Marital status: SINGLE     Spouse name: Not on file    Number of children: Not on file    Years of education: Not on file    Highest education level: Not on file   Occupational History    Not on file   Tobacco Use    Smoking status: Current Every Day Smoker     Packs/day: 0.25     Types: Cigarettes    Smokeless tobacco: Never Used   Substance and Sexual Activity    Alcohol use: No    Drug use: No     Comment: on methadone matinance program    Sexual activity: Never   Other Topics Concern    Not on file   Social History Narrative    Not on file     Social Determinants of Health     Financial Resource Strain:     Difficulty of Paying Living Expenses: Not on file   Food Insecurity:     Worried About Running Out of Food in the Last Year: Not on file    Jose Manuel of Food in the Last Year: Not on file   Transportation Needs:     Lack of Transportation (Medical):  Not on file    Lack of Transportation (Non-Medical): Not on file   Physical Activity:     Days of Exercise per Week: Not on file    Minutes of Exercise per Session: Not on file   Stress:     Feeling of Stress : Not on file   Social Connections:     Frequency of Communication with Friends and Family: Not on file    Frequency of Social Gatherings with Friends and Family: Not on file    Attends Cheondoism Services: Not on file    Active Member of [address] [place] [address] or Organizations: Not on file    Attends Club or Organization Meetings: Not on file    Marital Status: Not on file   Intimate Partner Violence:     Fear of Current or Ex-Partner: Not on file    Emotionally Abused: Not on file    Physically Abused: Not on file    Sexually Abused: Not on file   Housing Stability:     Unable to Pay for Housing in the Last Year: Not on file    Number of [place] in the Last Year: Not on file    Unstable Housing in the Last Year: Not on file       Problem List  Patient Active Problem List   Diagnosis Code    Severe obesity with body mass index (BMI) of 35.0 to 39.9 with serious comorbidity (HCC) E66.01    Discitis of thoracic region M46.44    Depression, major, recurrent, mild (HCC) F33.0    Type 2 diabetes mellitus with hyperglycemia, with long-term current use of insulin ([facility] [address] E11.65, Z79.4    Dyslipidemia associated with type 2 diabetes mellitus ([facility] [address] E11.69, E78.5       Review of Systems  Review of Systems   Constitutional: Negative for fever. HENT: Negative for sore throat. Respiratory: Negative for shortness of breath. Cardiovascular: Negative for chest pain and leg swelling. Gastrointestinal: Negative for abdominal pain, nausea and vomiting. Musculoskeletal: Positive for back pain and joint pain. Negative for falls. Psychiatric/Behavioral: Positive for memory loss.        Vital Signs  Vitals:    12/09/21 0902   BP: 129/74   Pulse: 84   Resp: 20   Temp: 98.4 °F (36.9 °C)   TempSrc: Oral   SpO2: 94%   Weight: 208 lb 12.8 oz (94.7 kg)   Height: 5' 2.5\" (1.588 m)   PainSc:   4   PainLoc: Generalized       Physical Exam  Physical Exam  Vitals reviewed. Constitutional:       General: She is not in acute distress. HENT:      Head: Normocephalic and atraumatic. Nose: Nose normal.      Mouth/Throat:      Mouth: Mucous membranes are moist.   Eyes:      Extraocular Movements: Extraocular movements intact. Pupils: Pupils are equal, round, and reactive to light. Cardiovascular:      Rate and Rhythm: Normal rate and regular rhythm. Pulses: Normal pulses. Pulmonary:      Effort: Pulmonary effort is normal.      Breath sounds: Normal breath sounds. Abdominal:      General: Abdomen is flat. Bowel sounds are normal. There is no distension. Palpations: Abdomen is soft. Tenderness: There is no abdominal tenderness. There is no right CVA tenderness or left CVA tenderness. Musculoskeletal:      Cervical back: Normal range of motion. Right lower leg: No edema. Left lower leg: No edema. Comments: Stiffness noted in knees. Knee pain with certain movements. Neurological:      Mental Status: She is alert and oriented to person, place, and time. Gait: Gait normal.   Psychiatric:         Mood and Affect: Mood normal.         Behavior: Behavior normal.         Thought Content:  Thought content normal.         Judgment: Judgment normal.         Diagnostics  Orders Placed This Encounter    METABOLIC PANEL, COMPREHENSIVE     Standing Status:   Future     Standing Expiration Date:   12/10/2022    CBC WITH AUTOMATED DIFF     Standing Status:   Future     Standing Expiration Date:   12/10/2022    LIPID PANEL     Standing Status:   Future     Standing Expiration Date:   12/10/2022    [facility] [id number]     Referral Priority:   Routine     Referral Type:   Consultation     Referral Reason:   Specialty Services Required     Referred to Provider:   Gaby Ramirez MD     Number of Visits Requested:   1    REFERRAL TO NEUROLOGY     Referral Priority:   Routine     Referral Type:   Consultation     Referral Reason:   Specialty Services Required     Referred to Provider:   Chris Ruvalcaba NP     Requested Specialty:   Neurology     Number of Visits Requested:   1    nystatin (MYCOSTATIN) 100,000 unit/mL suspension     Sig: Take 5 mL by mouth four (4) times daily. swish and spit     Dispense:  180 mL     Refill:  0       Results  Results for orders placed or performed during the hospital encounter of 06/22/21   CBC WITH AUTOMATED DIFF   Result Value Ref Range    WBC 6.2 4.6 - 13.2 K/uL    RBC 4.69 4.20 - 5.30 M/uL    HGB 13.1 12.0 - 16.0 g/dL    HCT 41.1 35.0 - 45.0 %    MCV 87.6 74.0 - 97.0 FL    MCH 27.9 24.0 - 34.0 PG    MCHC 31.9 31.0 - 37.0 g/dL    RDW 15.7 (H) 11.6 - 14.5 %    PLATELET 306 259 - 961 K/uL    MPV 11.5 9.2 - 11.8 FL    NEUTROPHILS 62 40 - 73 %    LYMPHOCYTES 26 21 - 52 %    MONOCYTES 8 3 - 10 %    EOSINOPHILS 3 0 - 5 %    BASOPHILS 1 0 - 2 %    ABS. NEUTROPHILS 3.9 1.8 - 8.0 K/UL    ABS. LYMPHOCYTES 1.6 0.9 - 3.6 K/UL    ABS. MONOCYTES 0.5 0.05 - 1.2 K/UL    ABS. EOSINOPHILS 0.2 0.0 - 0.4 K/UL    ABS. BASOPHILS 0.0 0.0 - 0.1 K/UL    DF AUTOMATED     METABOLIC PANEL, BASIC   Result Value Ref Range    Sodium 135 (L) 136 - 145 mmol/L    Potassium 3.6 3.5 - 5.5 mmol/L    Chloride 98 (L) 100 - 111 mmol/L    CO2 32 21 - 32 mmol/L    Anion gap 5 3.0 - 18 mmol/L    Glucose 326 (H) 74 - 99 mg/dL    BUN 8 7.0 - 18 MG/DL    Creatinine 0.77 0.6 - 1.3 MG/DL    BUN/Creatinine ratio 10 (L) 12 - 20      GFR est AA >60 >60 ml/min/1.73m2    GFR est non-AA >60 >60 ml/min/1.73m2    Calcium 8.9 8.5 - 10.1 MG/DL   GLUCOSE, POC   Result Value Ref Range    Glucose (POC) 351 (H) 70 - 110 mg/dL   GLUCOSE, POC   Result Value Ref Range    Glucose (POC) 139 (H) 70 - 110 mg/dL     Assessment and Plan  Diagnoses and all orders for this visit:    1. Chronic pain of both knees  -     REFERRAL TO ORTHOPEDICS    2. Forgetfulness  -     REFERRAL TO NEUROLOGY    3. Oral candidiasis  -     nystatin (MYCOSTATIN) 100,000 unit/mL suspension; Take 5 mL by mouth four (4) times daily. swish and spit    4. Oral mucosal lesion  -     nystatin (MYCOSTATIN) 100,000 unit/mL suspension; Take 5 mL by mouth four (4) times daily. swish and spit    5. Controlled type 2 diabetes mellitus with complication, without long-term current use of insulin (HCC)  -     METABOLIC PANEL, COMPREHENSIVE; Future  -     CBC WITH AUTOMATED DIFF; Future  -     LIPID PANEL; Future    6. Essential hypertension  -     METABOLIC PANEL, COMPREHENSIVE; Future  -     CBC WITH AUTOMATED DIFF; Future  -     LIPID PANEL; Future    7. Mixed hyperlipidemia  -     LIPID PANEL; Future    8. Cigarette nicotine dependence with nicotine-induced disorder      Smoking cessation encouraged. Follow up with endocrinology and counselor. Adjust diet and exercise as tolerated. After care summary printed and reviewed with patient. Plan reviewed with patient. Questions answered. Patient verbalized understanding of plan and is in agreement with plan. Patient to follow up in 3 months or earlier if symptoms worsen.      MILADYS Scott

## 2021-12-09 NOTE — PROGRESS NOTES
Zurdo Lopez presents today for   Chief Complaint   Patient presents with    Back Pain    Knee Pain     both       Dread Matute preferred language for health care discussion is english/other. Is someone accompanying this pt? no    Is the patient using any DME equipment during [address]? no    Depression Screening:  3 most recent PHQ Screens 12/9/2021   Little interest or pleasure in doing things Not at all   Feeling down, depressed, irritable, or hopeless Not at all   Total Score PHQ 2 0   Trouble falling or staying asleep, or sleeping too much -   Feeling tired or having little energy -   Poor appetite, weight loss, or overeating -   Feeling bad about yourself - or that you are a failure or have let yourself or your family down -   Trouble concentrating on things such as school, work, reading, or watching TV -   Moving or speaking so slowly that other people could have noticed; or the opposite being so fidgety that others notice -   Thoughts of being better off dead, or hurting yourself in some way -   PHQ 9 Score -   How difficult have these problems made it for you to do your work, take care of your home and get along with others -       Learning Assessment:  Learning Assessment 1/7/2021   PRIMARY LEARNER Patient   HIGHEST LEVEL OF EDUCATION - PRIMARY LEARNER  GRADUATED HIGH SCHOOL OR GED   BARRIERS PRIMARY LEARNER NONE   CO-LEARNER CAREGIVER No   PRIMARY LANGUAGE ENGLISH    NEED No   LEARNER PREFERENCE PRIMARY DEMONSTRATION   ANSWERED BY patient   RELATIONSHIP SELF       Abuse Screening:  Abuse Screening Questionnaire 1/7/2021   Do you ever feel afraid of your partner? N   Are you in a relationship with someone who physically or mentally threatens you? N   Is it safe for you to go home? Y       Generalized Anxiety  OTTO 2/7 5/27/2020   Feeling nervous, anxious or on edge?  0   Not being able to stop or control worrying? 0   TOTO-2 Subtotal 0         Health Maintenance Due   Topic Date Due    Pneumococcal 0-64 years (1 of 2 - PPSV23) Never done    Foot Exam Q1  Never done    COVID-19 Vaccine (1) Never done    DTaP/Tdap/Td series (1 - Tdap) Never done    Cervical cancer screen  Never done    Shingrix Vaccine Age 50> (1 of 2) Never done    MICROALBUMIN Q1  08/02/2020    Flu Vaccine (1) Never done   . Health Maintenance reviewed and discussed and ordered per Provider. Coordination of Care:  1. Have you been to the ER, urgent care clinic since your last visit? Hospitalized since your last visit? no    2. Have you seen or consulted any other health care providers outside of the [address] [place] [address] since your last visit? Include any pap smears or colon screening.  no      Advance Directive:  Discussed 1/7/21

## 2021-12-29 ENCOUNTER — OFFICE VISIT (OUTPATIENT)
Dept: ORTHOPEDIC SURGERY | Age: 61
End: 2021-12-29
Payer: MEDICAID

## 2021-12-29 VITALS
TEMPERATURE: 97.1 F | HEIGHT: 63 IN | BODY MASS INDEX: 36.86 KG/M2 | OXYGEN SATURATION: 98 % | RESPIRATION RATE: 16 BRPM | HEART RATE: 80 BPM | WEIGHT: 208 LBS

## 2021-12-29 DIAGNOSIS — M46.24 OSTEOMYELITIS OF THORACIC SPINE (HCC): ICD-10-CM

## 2021-12-29 DIAGNOSIS — G89.29 CHRONIC PAIN OF LEFT KNEE: ICD-10-CM

## 2021-12-29 DIAGNOSIS — M25.561 CHRONIC PAIN OF RIGHT KNEE: Primary | ICD-10-CM

## 2021-12-29 DIAGNOSIS — M17.12 PRIMARY OSTEOARTHRITIS OF LEFT KNEE: ICD-10-CM

## 2021-12-29 DIAGNOSIS — M25.562 CHRONIC PAIN OF LEFT KNEE: ICD-10-CM

## 2021-12-29 DIAGNOSIS — G89.29 CHRONIC PAIN OF RIGHT KNEE: Primary | ICD-10-CM

## 2021-12-29 DIAGNOSIS — M17.11 PRIMARY OSTEOARTHRITIS OF RIGHT KNEE: ICD-10-CM

## 2021-12-29 PROCEDURE — 99203 OFFICE O/P NEW LOW 30 MIN: CPT | Performed by: SPECIALIST

## 2021-12-29 PROCEDURE — 20610 DRAIN/INJ JOINT/BURSA W/O US: CPT | Performed by: SPECIALIST

## 2021-12-29 PROCEDURE — 73562 X-RAY EXAM OF KNEE 3: CPT | Performed by: SPECIALIST

## 2021-12-29 RX ORDER — BETAMETHASONE SODIUM PHOSPHATE AND BETAMETHASONE ACETATE 3; 3 MG/ML; MG/ML
6 INJECTION, SUSPENSION INTRA-ARTICULAR; INTRALESIONAL; INTRAMUSCULAR; SOFT TISSUE ONCE
Status: COMPLETED | OUTPATIENT
Start: 2021-12-29 | End: 2021-12-29

## 2021-12-29 RX ADMIN — BETAMETHASONE SODIUM PHOSPHATE AND BETAMETHASONE ACETATE 6 MG: 3; 3 INJECTION, SUSPENSION INTRA-ARTICULAR; INTRALESIONAL; INTRAMUSCULAR; SOFT TISSUE at 10:11

## 2021-12-29 NOTE — PROGRESS NOTES
Patient: Tiffany Westfall                MRN: [id number]       SSN: [id number]  YOB: 1960        AGE: 62 y.o. SEX: female    PCP: Georgina Schwab NP  01/03/22    Chief Complaint   Patient presents with    Knee Pain     bilateral     HISTORY:  Tiffany Westfall is a 62 y.o. female who is seen for bilateral knee pain. She has been experiencing bilateral knee pain for many years. She feels pain with standing, walking and stair climbing. She experiences startup pain after sitting. She does not recall any recent injury. She did injure her left knee as a child and she has a 1 inch scar below her left patella. 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 She has history of osteomyelitis of the thoracic spine T6-9. Pain Assessment  12/29/2021   Location of Pain Knee   Pain Location Comment -   Location Modifiers Right;Left   Severity of Pain 8   Quality of Pain Throbbing   Quality of Pain Comment -   Duration of Pain Persistent   Frequency of Pain Constant   Aggravating Factors Stairs; Walking;Standing   Aggravating Factors Comment -   Limiting Behavior Yes   Relieving Factors Nothing   Relieving Factors Comment -   Result of Injury No     Occupation, etc:  Ms. Lovie Lanes is retired since 2015. She worked at [employer] for 19 years. She has history of osteomyelitis of the thoracic spine T6-9. 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 She lives in [place] with her sister. She has 2 sons. One was killed in a car accident when he was 23. Her other son lives locally. She has an 5 yo grandson. Ms. Lovie Lanes weighs 208 lbs and is 5'2.5\" tall. She is diabetic. She takes Metformin.     Lab Results   Component Value Date/Time    Hemoglobin A1c (POC) 11.5 07/18/2019 11:30 AM    Hemoglobin A1c, External 8.3 03/09/2021 12:00 AM     Weight Metrics 12/29/2021 12/9/2021 6/22/2021 5/6/2021 3/23/2021 8/24/2020 6/1/2020   Weight 208 lb 208 lb 12.8 oz 209 lb 225 lb 221 lb 6.4 oz 226 lb 225 lb   BMI 37.44 kg/m2 37.58 kg/m2 38.23 kg/m2 39.86 kg/m2 41.83 kg/m2 42.7 kg/m2 42.51 kg/m2       Patient Active Problem List   Diagnosis Code    Severe obesity with body mass index (BMI) of 35.0 to 39.9 with serious comorbidity ([facility]) E66.01    Discitis of thoracic region M46.44    Depression, major, recurrent, mild ([facility]) F33.0    Type 2 diabetes mellitus with hyperglycemia, with long-term current use of insulin ([facility]) E11.65, Z79.4    Dyslipidemia associated with type 2 diabetes mellitus ([facility]) E11.69, E78.5     REVIEW OF SYSTEMS:    Constitutional Symptoms: Negative   Eyes: Negative   Ears, Nose, Throat and Mouth: Negative   Cardiovascular: Negative   Respiratory: Negative   Genitourinary: Per HPI   Gastrointestinal: Per HPI   Integumentary (Skin and/or Breast): Negative   Musculoskeletal: Per HPI   Endocrine/Rheumatologic: Negative   Neurological: Per HPI   Hematology/Lymphatic: Negative    Allergic/Immunologic: Negative   Phychiatric: Negative    Social History     Socioeconomic History    Marital status: SINGLE     Spouse name: Not on file    Number of children: Not on file    Years of education: Not on file    Highest education level: Not on file   Occupational History    Not on file   Tobacco Use    Smoking status: Current Every Day Smoker     Packs/day: 0.25     Types: Cigarettes    Smokeless tobacco: Never Used   Substance and Sexual Activity    Alcohol use: No    Drug use: No     Comment: on methadone matinance program    Sexual activity: Never   Other Topics Concern    Not on file   Social History Narrative    Not on file     Social Determinants of Health     Financial Resource Strain:     Difficulty of Paying Living Expenses: Not on file   Food Insecurity:     Worried About Running Out of Food in the Last Year: Not on file    Jose Manuel of Food in the Last Year: Not on [employer] Needs:     Lack of Transportation (Medical): Not on file    Lack of Transportation (Non-Medical): Not on file   Physical Activity:     Days of Exercise per Week: Not on file    Minutes of Exercise per Session: Not on file   Stress:     Feeling of Stress : Not on file   Social Connections:     Frequency of Communication with Friends and Family: Not on file    Frequency of Social Gatherings with Friends and Family: Not on file    Attends Mu-ism Services: Not on file    Active Member of [address] [place] [address] or Organizations: Not on file    Attends Club or Organization Meetings: Not on file    Marital Status: Not on file   Intimate Partner Violence:     Fear of Current or Ex-Partner: Not on file    Emotionally Abused: Not on file    Physically Abused: Not on file    Sexually Abused: Not on file   Housing Stability:     Unable to Pay for Housing in the Last Year: Not on file    Number of [place] in the Last Year: Not on file    Unstable Housing in the Last Year: Not on file      No Known Allergies   Current Outpatient Medications   Medication Sig    nystatin (MYCOSTATIN) 100,000 unit/mL suspension Take 5 mL by mouth four (4) times daily. swish and spit    lancets (TRUEplus Lancets) 33 gauge misc USE TO TEST TWICE DAILY    atorvastatin (LIPITOR) 10 mg tablet TAKE ONE TABLET BY MOUTH DAILY    lamoTRIgine (LaMICtal) 100 mg tablet 100 mg daily.  NovoLOG Mix 70-30FlexPen U-100 100 unit/mL (70-30) inpn 10 Units two (2) times a day.  BD Ultra-Fine Mini Pen Needle 31 gauge x 3/16\" ndle USE AS DIRECTED WITH LANTUS    hydroCHLOROthiazide (HYDRODIURIL) 12.5 mg tablet TAKE ONE TABLET BY MOUTH DAILY    losartan (COZAAR) 25 mg tablet Take 25 mg by mouth daily.  aspirin 81 mg chewable tablet Take 81 mg by mouth daily.  glucose blood VI test strips (True Metrix Pro Test Strip) strip Test BG twice a day. Dx:  E11.9    metFORMIN ER 1,000 mg tr24 Take 500 mg by mouth.  2 daily    sertraline (ZOLOFT) 100 mg tablet TAKE TWO TABLETS BY MOUTH ONCE DAILY (Patient taking differently: Take 150 mg by mouth daily. TAKE TWO TABLETS BY MOUTH ONCE DAILY)    TRUE METRIX GLUCOSE METER misc     methadone HCl (METHADONE PO) Take 75 mg by mouth daily. No current facility-administered medications for this visit. PHYSICAL EXAMINATION:  Visit Vitals  Pulse 80   Temp 97.1 °F (36.2 °C)   Resp 16   Ht 5' 2.5\" (1.588 m)   Wt 208 lb (94.3 kg)   SpO2 98%   BMI 37.44 kg/m²      ORTHO EXAMINATION:    Examination Right knee Left knee   Skin Intact Intact   Range of motion 100-5 110-0   Effusion - -   Medial joint line tenderness + +   Lateral joint line tenderness - -   Popliteal tenderness - -   Osteophytes palpable + +   Sarys - -   Patella crepitus + +   Anterior drawer - -   Lateral laxity - -   Medial laxity - -   Varus deformity - -   Valgus deformity - -   Pretibial edema 1+ 1+   Calf tenderness - -   1 inch small patellar scar on the left from a childhood injury  Multiple pigmented spots on lower legs    RADIOGRAPHS:  XR LEFT KNEE 12/29/21 MELISSA:  IMPRESSION:  Three views with bilateral knees on AP view - No fractures, no effusion, moderately severe joint space narrowing, large osteophytes present. Kellgren Delmer grade 3    XR RIGHT KNEE 12/29/21 MELISSA:  IMPRESSION:  Three views with bilateral knees on AP view - No fractures, no effusion, severe medial joint space narrowing, varus deformity, large osteophytes present, severe patellofemoral narrowing. Kellgren Delmer grade 4    IMPRESSION:      ICD-10-CM ICD-9-CM    1. Chronic pain of right knee  M25.561 719.46 AMB POC X-RAY KNEE 3 VIEW    G89.29 338.29 betamethasone (CELESTONE) injection 6 mg      ND DRAIN/INJECT LARGE JOINT/BURSA      PROCEDURE AUTHORIZATION TO    2. Chronic pain of left knee  M25.562 719.46 betamethasone (CELESTONE) injection 6 mg    G89.29 338.29 ND DRAIN/INJECT LARGE JOINT/BURSA      PROCEDURE AUTHORIZATION TO    3.  Osteomyelitis of thoracic spine ([facility] [address]  M46.24 730.28    4. Primary osteoarthritis of right knee  M17.11 715.16 betamethasone (CELESTONE) injection 6 mg      OK DRAIN/INJECT LARGE JOINT/BURSA      PROCEDURE AUTHORIZATION TO    5. Primary osteoarthritis of left knee  M17.12 715.16 betamethasone (CELESTONE) injection 6 mg      OK DRAIN/INJECT LARGE JOINT/BURSA      PROCEDURE AUTHORIZATION TO         PLAN:  Consider visco supplementation if pain continues. She will be referred for bariatric surgery consultation. After discussing treatment options, patient's bilateral knees were injected with 4 cc Marcaine and 1/2 cc Celestone. There is no need for surgery at this time. She will follow up as needed.      Scribed by Alex Givens MD [address]) as dictated by Alex Givens MD

## 2022-01-26 ENCOUNTER — OFFICE VISIT (OUTPATIENT)
Dept: ORTHOPEDIC SURGERY | Age: 62
End: 2022-01-26
Payer: MEDICAID

## 2022-01-26 VITALS — BODY MASS INDEX: 36.89 KG/M2 | HEIGHT: 63 IN | WEIGHT: 208.2 LBS | TEMPERATURE: 97.1 F

## 2022-01-26 DIAGNOSIS — M17.11 PRIMARY OSTEOARTHRITIS OF RIGHT KNEE: ICD-10-CM

## 2022-01-26 DIAGNOSIS — M17.12 PRIMARY OSTEOARTHRITIS OF LEFT KNEE: Primary | ICD-10-CM

## 2022-01-26 DIAGNOSIS — M25.561 CHRONIC PAIN OF RIGHT KNEE: ICD-10-CM

## 2022-01-26 DIAGNOSIS — G89.29 CHRONIC PAIN OF RIGHT KNEE: ICD-10-CM

## 2022-01-26 DIAGNOSIS — G89.29 CHRONIC PAIN OF LEFT KNEE: ICD-10-CM

## 2022-01-26 DIAGNOSIS — M25.562 CHRONIC PAIN OF LEFT KNEE: ICD-10-CM

## 2022-01-26 DIAGNOSIS — M54.50 LUMBAR PAIN: ICD-10-CM

## 2022-01-26 PROCEDURE — 20610 DRAIN/INJ JOINT/BURSA W/O US: CPT | Performed by: SPECIALIST

## 2022-01-26 PROCEDURE — 99213 OFFICE O/P EST LOW 20 MIN: CPT | Performed by: SPECIALIST

## 2022-01-26 NOTE — PATIENT INSTRUCTIONS

## 2022-01-26 NOTE — PROGRESS NOTES
Patient: Sandy Gonzalez                MRN: [id number]       SSN: [id number]  YOB: 1960        AGE: 62 y.o. SEX: female    PCP: Elvira Marquez NP  01/26/22    CC: BILATERAL KNEE AND BACK PAIN     HISTORY:  Sandy Gonzalez is a 62 y.o. female who is seen for bilateral knee and back pain. She has been experiencing bilateral knee pain for many years. She feels pain with standing, walking and stair climbing. She experiences startup pain after sitting. She does not recall any recent injury. She did injure her left knee as a child and she has a 1 inch scar below her left patella. 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 She has history of osteomyelitis of the thoracic spine T6-9. She was previously seen by Dr. Tara Tamez for her back pain. Pain Assessment  1/26/2022   Location of Pain Knee   Pain Location Comment -   Location Modifiers Left;Right   Severity of Pain 1   Quality of Pain Throbbing   Quality of Pain Comment -   Duration of Pain Persistent   Frequency of Pain Intermittent   Aggravating Factors Other (Comment)   Aggravating Factors Comment Any movement   Limiting Behavior Yes   Relieving Factors Rest   Relieving Factors Comment -   Result of Injury No     Occupation, etc:  Ms. Jazmin Barraza is retired since 2015. She worked at [employer] for 19 years. She has history of osteomyelitis of the thoracic spine T6-9. 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 Her mother is in the ICU due to [place]. She lives in [place] with her sister. She has 2 sons. One was killed in a car accident when he was 23. Her other son lives locally. She has an 5 yo grandson. Ms. Jazmin Barraza weighs 208 lbs and is 5'2.5\" tall. She is diabetic. She takes Metformin.      Lab Results   Component Value Date/Time    Hemoglobin A1c (POC) 11.5 07/18/2019 11:30 AM    Hemoglobin A1c, External 8.3 03/09/2021 12:00 AM     Weight Metrics 1/26/2022 12/29/2021 12/9/2021 6/22/2021 5/6/2021 3/23/2021 8/24/2020   Weight 208 lb 3.2 oz 208 lb 208 lb 12.8 oz 209 lb 225 lb 221 lb 6.4 oz 226 lb   BMI 37.47 kg/m2 37.44 kg/m2 37.58 kg/m2 38.23 kg/m2 39.86 kg/m2 41.83 kg/m2 42.7 kg/m2       Patient Active Problem List   Diagnosis Code    Severe obesity with body mass index (BMI) of 35.0 to 39.9 with serious comorbidity ([facility]) E66.01    Discitis of thoracic region M46.44    Depression, major, recurrent, mild ([facility]) F33.0    Type 2 diabetes mellitus with hyperglycemia, with long-term current use of insulin ([facility]) E11.65, Z79.4    Dyslipidemia associated with type 2 diabetes mellitus ([facility]) E11.69, E78.5     REVIEW OF SYSTEMS:    Constitutional Symptoms: Negative   Eyes: Negative   Ears, Nose, Throat and Mouth: Negative   Cardiovascular: Negative   Respiratory: Negative   Genitourinary: Per HPI   Gastrointestinal: Per HPI   Integumentary (Skin and/or Breast): Negative   Musculoskeletal: Per HPI   Endocrine/Rheumatologic: Negative   Neurological: Per HPI   Hematology/Lymphatic: Negative    Allergic/Immunologic: Negative   Phychiatric: Negative    Social History     Socioeconomic History    Marital status: SINGLE     Spouse name: Not on file    Number of children: Not on file    Years of education: Not on file    Highest education level: Not on file   Occupational History    Not on file   Tobacco Use    Smoking status: Current Every Day Smoker     Packs/day: 0.25     Types: Cigarettes    Smokeless tobacco: Never Used   Substance and Sexual Activity    Alcohol use: No    Drug use: No     Comment: on methadone matinance program    Sexual activity: Never   Other Topics Concern    Not on file   Social History Narrative    Not on file     Social Determinants of Health     Financial Resource Strain:     Difficulty of Paying Living Expenses: Not on file   Food Insecurity:     Worried About Running Out of Food in the Last Year: Not on file    Ran Out of Food in the Last Year: Not on file   Transportation Needs:     Lack of Transportation (Medical): Not on file    Lack of Transportation (Non-Medical): Not on file   Physical Activity:     Days of Exercise per Week: Not on file    Minutes of Exercise per Session: Not on file   Stress:     Feeling of Stress : Not on file   Social Connections:     Frequency of Communication with Friends and Family: Not on file    Frequency of Social Gatherings with Friends and Family: Not on file    Attends [employer] Services: Not on file    Active Member of [address] [place] [address] [employer] or Organizations: Not on file    Attends Club or Organization Meetings: Not on file    Marital Status: Not on file   Intimate Partner Violence:     Fear of Current or Ex-Partner: Not on file    Emotionally Abused: Not on file    Physically Abused: Not on file    Sexually Abused: Not on file   Housing Stability:     Unable to Pay for Housing in the Last Year: Not on file    Number of [place] in the Last Year: Not on file    Unstable Housing in the Last Year: Not on file      No Known Allergies   Current Outpatient Medications   Medication Sig    nystatin (MYCOSTATIN) 100,000 unit/mL suspension Take 5 mL by mouth four (4) times daily. swish and spit    lancets (TRUEplus Lancets) 33 gauge misc USE TO TEST TWICE DAILY    atorvastatin (LIPITOR) 10 mg tablet TAKE ONE TABLET BY MOUTH DAILY    lamoTRIgine (LaMICtal) 100 mg tablet 100 mg daily.  NovoLOG Mix 70-30FlexPen U-100 100 unit/mL (70-30) inpn 10 Units two (2) times a day.  BD Ultra-Fine Mini Pen Needle 31 gauge x 3/16\" ndle USE AS DIRECTED WITH LANTUS    hydroCHLOROthiazide (HYDRODIURIL) 12.5 mg tablet TAKE ONE TABLET BY MOUTH DAILY    losartan (COZAAR) 25 mg tablet Take 25 mg by mouth daily.  aspirin 81 mg chewable tablet Take 81 mg by mouth daily.  glucose blood VI test strips (True Metrix Pro Test Strip) strip Test BG twice a day.   Dx:  E11.9    metFORMIN ER 1,000 mg tr24 Take 500 mg by mouth. 2 daily    sertraline (ZOLOFT) 100 mg tablet TAKE TWO TABLETS BY MOUTH ONCE DAILY (Patient taking differently: Take 150 mg by mouth daily. TAKE TWO TABLETS BY MOUTH ONCE DAILY)    TRUE METRIX GLUCOSE METER misc     methadone HCl (METHADONE PO) Take 75 mg by mouth daily. No current facility-administered medications for this visit. PHYSICAL EXAMINATION:  Visit Vitals  Temp 97.1 °F (36.2 °C) (Temporal)   Ht 5' 2.5\" (1.588 m)   Wt 208 lb 3.2 oz (94.4 kg)   BMI 37.47 kg/m²      ORTHO EXAMINATION:    Examination Right knee Left knee   Skin Intact, psoriasis lower leg Intact, psoriasis lower leg   Range of motion 100-5 110-0   Effusion - -   Medial joint line tenderness + +   Lateral joint line tenderness - -   Popliteal tenderness - -   Osteophytes palpable + +   Sarys - -   Patella crepitus + +   Anterior drawer - -   Lateral laxity - -   Medial laxity - -   Varus deformity - -   Valgus deformity - -   Pretibial edema 1+ 1+   Calf tenderness - -   1 inch small patellar scar on the left from a childhood injury  Multiple pigmented spots on lower legs    TIME OUT:  Chart reviewed for the following:   Steve Gamboa MD, have reviewed the History, Physical and updated the Allergic reactions for Dread Matute   TIME OUT performed immediately prior to start of procedure:  Steve Gamboa MD, have performed the following reviews on Dread Wylie prior to the start of the procedure:          * Patient was identified by name and date of birth   * Agreement on procedure being performed was verified  * Risks and Benefits explained to the patient  * Procedure site verified and marked as necessary  * Patient was positioned for comfort  * Consent was obtained     Time: 10:34 AM     Date of procedure: 1/26/2022  Procedure performed by:  Nicola Lal MD  Ms. Matute tolerated the procedure well with no complications.      RADIOGRAPHS:  XR LEFT KNEE 12/29/21 MELISSA:  IMPRESSION:  Three views with bilateral knees on AP view - No fractures, no effusion, moderately severe joint space narrowing, large osteophytes present. Kellgren Delmer grade 3    XR RIGHT KNEE 12/29/21 MELISSA:  IMPRESSION:  Three views with bilateral knees on AP view - No fractures, no effusion, severe medial joint space narrowing, varus deformity, large osteophytes present, severe patellofemoral narrowing. Kellgren Delmer grade 4    IMPRESSION:      ICD-10-CM ICD-9-CM    1. Primary osteoarthritis of left knee  M17.12 715.16 sodium hyaluronate (SUPARTZ FX/EUFLEXXA/HYALGAN) 10 mg/mL injection syrg 20 mg      CO DRAIN/INJECT LARGE JOINT/BURSA   2. Chronic pain of left knee  M25.562 719.46 sodium hyaluronate (SUPARTZ FX/EUFLEXXA/HYALGAN) 10 mg/mL injection syrg 20 mg    G89.29 338.29 CO DRAIN/INJECT LARGE JOINT/BURSA   3. Primary osteoarthritis of right knee  M17.11 715.16 sodium hyaluronate (SUPARTZ FX/EUFLEXXA/HYALGAN) 10 mg/mL injection syrg 20 mg      CO DRAIN/INJECT LARGE JOINT/BURSA   4. Chronic pain of right knee  M25.561 719.46 sodium hyaluronate (SUPARTZ FX/EUFLEXXA/HYALGAN) 10 mg/mL injection syrg 20 mg    G89.29 338.29 CO DRAIN/INJECT LARGE JOINT/BURSA   5. Lumbar pain  M54.50 724.2         PLAN:  At home back exercises provided. After discussing treatment options, patient's knees were injected with 2 cc Euflexxa. There is no need for surgery. She will follow up in 1 week for Euflexxa #2.       Scribed by Jeremy Zelaya MD [address]) as dictated by Jeremy Zelaya MD

## 2022-02-02 ENCOUNTER — OFFICE VISIT (OUTPATIENT)
Dept: ORTHOPEDIC SURGERY | Age: 62
End: 2022-02-02
Payer: MEDICAID

## 2022-02-02 VITALS
BODY MASS INDEX: 36.89 KG/M2 | HEART RATE: 104 BPM | WEIGHT: 208.2 LBS | HEIGHT: 63 IN | OXYGEN SATURATION: 95 % | TEMPERATURE: 96.6 F

## 2022-02-02 DIAGNOSIS — G89.29 CHRONIC PAIN OF LEFT KNEE: ICD-10-CM

## 2022-02-02 DIAGNOSIS — M25.562 CHRONIC PAIN OF LEFT KNEE: ICD-10-CM

## 2022-02-02 DIAGNOSIS — M25.561 CHRONIC PAIN OF RIGHT KNEE: ICD-10-CM

## 2022-02-02 DIAGNOSIS — M17.12 PRIMARY OSTEOARTHRITIS OF LEFT KNEE: Primary | ICD-10-CM

## 2022-02-02 DIAGNOSIS — M17.11 PRIMARY OSTEOARTHRITIS OF RIGHT KNEE: ICD-10-CM

## 2022-02-02 DIAGNOSIS — G89.29 CHRONIC PAIN OF RIGHT KNEE: ICD-10-CM

## 2022-02-02 PROCEDURE — 20610 DRAIN/INJ JOINT/BURSA W/O US: CPT | Performed by: SPECIALIST

## 2022-02-02 NOTE — PROGRESS NOTES
Patient: Karen Burdick                MRN: [id number]       SSN: [id number]  YOB: 1960        AGE: 62 y.o. SEX: female  Body mass index is 37.47 kg/m². PCP: Johanne Magallanes NP  02/02/22    Chief Complaint   Patient presents with    Knee Pain     Bilateral L>R     HISTORY:  Karen Burdick is a 62 y.o. female who is seen for bilateral knee pain. ICD-10-CM ICD-9-CM    1. Primary osteoarthritis of left knee  M17.12 715.16 RI DRAIN/INJECT LARGE JOINT/BURSA      sodium hyaluronate (SUPARTZ FX/EUFLEXXA/HYALGAN) 10 mg/mL injection syrg 20 mg   2. Chronic pain of left knee  M25.562 719.46 RI DRAIN/INJECT LARGE JOINT/BURSA    G89.29 338.29 sodium hyaluronate (SUPARTZ FX/EUFLEXXA/HYALGAN) 10 mg/mL injection syrg 20 mg   3. Primary osteoarthritis of right knee  M17.11 715.16 RI DRAIN/INJECT LARGE JOINT/BURSA      sodium hyaluronate (SUPARTZ FX/EUFLEXXA/HYALGAN) 10 mg/mL injection syrg 20 mg   4. Chronic pain of right knee  M25.561 719.46 RI DRAIN/INJECT LARGE JOINT/BURSA    G89.29 338.29 sodium hyaluronate (SUPARTZ FX/EUFLEXXA/HYALGAN) 10 mg/mL injection syrg 20 mg       Chart reviewed for the following:   Gaudencio Santos MD, have reviewed the History, Physical and updated the Allergic reactions for [place] [address] performed immediately prior to start of procedure:  Gaudencio Santso MD, have performed the following reviews on Karen Burdick prior to the start of the procedure:            * Patient was identified by name and date of birth   * Agreement on procedure being performed was verified  * Risks and Benefits explained to the patient  * Procedure site verified and marked as necessary  * Patient was positioned for comfort  * Consent was obtained     Time: 9:43 AM     Date of procedure: 2/2/2022    Procedure performed by:  Torrey Willis MD    Ms. Matute tolerated the procedure well with no complications.     PLAN:  After discussing treatment options, patient's knees were injected with 2 cc of Euflexxa. Ms. Kalee Ruff will follow up in one week to complete her visco supplementation injection series.       Scribed by MD Lesia Palmer) as dictated by Slim Desouza MD

## 2022-02-07 ENCOUNTER — OFFICE VISIT (OUTPATIENT)
Dept: NEUROLOGY | Age: 62
End: 2022-02-07
Payer: MEDICAID

## 2022-02-07 VITALS
RESPIRATION RATE: 16 BRPM | SYSTOLIC BLOOD PRESSURE: 98 MMHG | BODY MASS INDEX: 36 KG/M2 | DIASTOLIC BLOOD PRESSURE: 58 MMHG | HEART RATE: 89 BPM | OXYGEN SATURATION: 95 % | WEIGHT: 200 LBS

## 2022-02-07 DIAGNOSIS — R41.9 COGNITIVE COMPLAINTS: Primary | ICD-10-CM

## 2022-02-07 DIAGNOSIS — R41.9 COGNITIVE COMPLAINTS: ICD-10-CM

## 2022-02-07 DIAGNOSIS — R47.89 WORD FINDING DIFFICULTY: ICD-10-CM

## 2022-02-07 PROCEDURE — 99204 OFFICE O/P NEW MOD 45 MIN: CPT | Performed by: NURSE PRACTITIONER

## 2022-02-07 NOTE — PATIENT INSTRUCTIONS
Patient instructions:  -MRI brain  -lab testing  -consider neuropsych evaluation  -follow up after MRI and labs
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
## 2022-02-07 NOTE — PROGRESS NOTES
Jorge Oilvera is a 62 y.o. female who is in the office as a New Patient. 1. Have you been to the ER, urgent care clinic since your last visit? Hospitalized since your last visit? No    2. Have you seen or consulted any other health care providers outside of the [address] [place] [address] since your last visit? Include any pap smears or colon screening.  No

## 2022-02-08 DIAGNOSIS — R47.89 WORD FINDING DIFFICULTY: ICD-10-CM

## 2022-02-08 DIAGNOSIS — R41.9 COGNITIVE COMPLAINTS: ICD-10-CM

## 2022-02-09 ENCOUNTER — OFFICE VISIT (OUTPATIENT)
Dept: ORTHOPEDIC SURGERY | Age: 62
End: 2022-02-09
Payer: MEDICAID

## 2022-02-09 VITALS
HEIGHT: 63 IN | WEIGHT: 204.8 LBS | OXYGEN SATURATION: 95 % | HEART RATE: 88 BPM | TEMPERATURE: 95.9 F | BODY MASS INDEX: 36.29 KG/M2

## 2022-02-09 DIAGNOSIS — M17.12 PRIMARY OSTEOARTHRITIS OF LEFT KNEE: Primary | ICD-10-CM

## 2022-02-09 DIAGNOSIS — M17.11 PRIMARY OSTEOARTHRITIS OF RIGHT KNEE: ICD-10-CM

## 2022-02-09 DIAGNOSIS — G89.29 CHRONIC PAIN OF RIGHT KNEE: ICD-10-CM

## 2022-02-09 DIAGNOSIS — G89.29 CHRONIC PAIN OF LEFT KNEE: ICD-10-CM

## 2022-02-09 DIAGNOSIS — M25.561 CHRONIC PAIN OF RIGHT KNEE: ICD-10-CM

## 2022-02-09 DIAGNOSIS — M25.562 CHRONIC PAIN OF LEFT KNEE: ICD-10-CM

## 2022-02-09 PROCEDURE — 20610 DRAIN/INJ JOINT/BURSA W/O US: CPT | Performed by: SPECIALIST

## 2022-02-09 NOTE — PROGRESS NOTES
Patient: Chidi Zamorano                MRN: [id number]       SSN: [id number]  YOB: 1960        AGE: 62 y.o. SEX: female  Body mass index is 36.86 kg/m². PCP: Wilton Kerns NP  02/09/22    Chief Complaint   Patient presents with    Knee Pain     #3 Euflexxa Nithin Knees     HISTORY:  Chidi Zamorano is a 62 y.o. female who is seen for bilateral knee pain. TIME OUT performed immediately prior to start of procedure:  Magda Tellez MD, have performed the following reviews on Chidi Zamorano prior to the start of the procedure:            * Patient was identified by name and date of birth   * Agreement on procedure being performed was verified  * Risks and Benefits explained to the patient  * Procedure site verified and marked as necessary  * Patient was positioned for comfort  * Consent was obtained     Time: 9:37 AM     Date of procedure: 2/9/2022    Procedure performed by:  Michela Balderas MD    Ms. Matute tolerated the procedure well with no complications      KRQ-[id number] ICD-9-CM    1. Primary osteoarthritis of left knee  M17.12 715.16 IL DRAIN/INJECT LARGE JOINT/BURSA      sodium hyaluronate (SUPARTZ FX/EUFLEXXA/HYALGAN) 10 mg/mL injection syrg 20 mg   2. Chronic pain of left knee  M25.562 719.46 IL DRAIN/INJECT LARGE JOINT/BURSA    G89.29 338.29 sodium hyaluronate (SUPARTZ FX/EUFLEXXA/HYALGAN) 10 mg/mL injection syrg 20 mg   3. Primary osteoarthritis of right knee  M17.11 715.16 IL DRAIN/INJECT LARGE JOINT/BURSA      sodium hyaluronate (SUPARTZ FX/EUFLEXXA/HYALGAN) 10 mg/mL injection syrg 20 mg   4. Chronic pain of right knee  M25.561 719.46 IL DRAIN/INJECT LARGE JOINT/BURSA    G89.29 338.29 sodium hyaluronate (SUPARTZ FX/EUFLEXXA/HYALGAN) 10 mg/mL injection syrg 20 mg     PLAN:  After discussing treatment options, patient's knees were injected with 2 cc of Euflexxa.   Ms. Suri Ferrer will follow up PRN now that she has completed her visco supplementation injection series.       Scribed by Kash Ott MD [facility]) as dictated by Kahs Ott MD

## 2022-03-02 DIAGNOSIS — K13.70 ORAL MUCOSAL LESION: ICD-10-CM

## 2022-03-02 DIAGNOSIS — B37.0 ORAL CANDIDIASIS: ICD-10-CM

## 2022-03-02 RX ORDER — NYSTATIN 100000 [USP'U]/ML
1 SUSPENSION ORAL 4 TIMES DAILY
Qty: 180 ML | Refills: 0 | Status: SHIPPED | OUTPATIENT
Start: 2022-03-02 | End: 2022-03-30 | Stop reason: SDUPTHER

## 2022-03-03 LAB
CREATININE, EXTERNAL: 0.72
HBA1C MFR BLD HPLC: 6.4 %
LDL-C, EXTERNAL: 75
TOTAL CHOLESTEROL, NCHOLT: 138

## 2022-03-19 PROBLEM — F33.0 DEPRESSION, MAJOR, RECURRENT, MILD (HCC): Status: ACTIVE | Noted: 2020-05-27

## 2022-03-19 PROBLEM — E11.69 DYSLIPIDEMIA ASSOCIATED WITH TYPE 2 DIABETES MELLITUS (HCC): Status: ACTIVE | Noted: 2021-01-06

## 2022-03-19 PROBLEM — Z79.4 TYPE 2 DIABETES MELLITUS WITH HYPERGLYCEMIA, WITH LONG-TERM CURRENT USE OF INSULIN (HCC): Status: ACTIVE | Noted: 2021-01-06

## 2022-03-19 PROBLEM — E11.65 TYPE 2 DIABETES MELLITUS WITH HYPERGLYCEMIA, WITH LONG-TERM CURRENT USE OF INSULIN (HCC): Status: ACTIVE | Noted: 2021-01-06

## 2022-03-19 PROBLEM — E78.5 DYSLIPIDEMIA ASSOCIATED WITH TYPE 2 DIABETES MELLITUS (HCC): Status: ACTIVE | Noted: 2021-01-06

## 2022-03-19 PROBLEM — E66.01 SEVERE OBESITY WITH BODY MASS INDEX (BMI) OF 35.0 TO 39.9 WITH SERIOUS COMORBIDITY (HCC): Status: ACTIVE | Noted: 2018-07-13

## 2022-03-20 PROBLEM — M46.44 DISCITIS OF THORACIC REGION: Status: ACTIVE | Noted: 2018-11-11

## 2022-03-29 PROBLEM — I10 ESSENTIAL HYPERTENSION: Status: ACTIVE | Noted: 2022-03-29

## 2022-03-29 NOTE — PROGRESS NOTES
Chief Complaint   Patient presents with   Osiel Leone    Diabetes    Cholesterol Problem    Hypertension    Depression     Assessment & Plan:     1. Oral thrush  Comments:  Recommend ENT, given recurrence, patient declines for now  Orders:  -     nystatin (MYCOSTATIN) 100,000 unit/mL suspension; Take 5 mL by mouth four (4) times daily. swish and spit, Normal, Disp-180 mL, R-0  2. Rash  Comments:  Patient to call back with dermatology of her preference for rerferral    Follow-up and Dispositions    · Return in about 4 weeks (around 4/27/2022) for  diabetes, cholesterol, blood pressure (30 MINUTES). Subjective:     HPI    Oral Thrush -  Onset:  Location:  Corners of mouth  Problem comes and goes    Rash -  Onset: 2018  Location:  Bilateral legs up to the knees  Has seen a dermatologist but did not go back  She also reports skin cancer that needs re-evaluation    Review of Systems   Constitutional: Negative for chills, fever and malaise/fatigue. HENT:        Reports white coating of tongue   Respiratory: Negative for shortness of breath. Cardiovascular: Negative for chest pain. Skin: Positive for rash. Objective:   /71 (BP 1 Location: Left upper arm, BP Patient Position: Sitting, BP Cuff Size: Large adult)   Pulse (!) 109   Temp 98.2 °F (36.8 °C) (Oral)   Resp 20   Ht 5' 2.5\" (1.588 m)   Wt 211 lb (95.7 kg)   SpO2 95%   BMI 37.98 kg/m²      Physical Exam  Vitals and nursing note reviewed. Constitutional:       General: She is not in acute distress. Appearance: She is not ill-appearing. HENT:      Head: Normocephalic and atraumatic. Comments: Thick white coating on tongue noted  Cardiovascular:      Rate and Rhythm: Regular rhythm. Tachycardia present. Heart sounds: No murmur heard. No friction rub. No gallop. Pulmonary:      Effort: Pulmonary effort is normal. No respiratory distress. Breath sounds: No wheezing, rhonchi or rales.    Skin:     General: Skin is warm and dry. Neurological:      General: No focal deficit present. Mental Status: She is alert and oriented to person, place, and time. Psychiatric:         Mood and Affect: Mood normal.         Thought Content:  Thought content normal.         Judgment: Judgment normal.            SONIA Mallory-C

## 2022-03-30 ENCOUNTER — HOSPITAL ENCOUNTER (OUTPATIENT)
Dept: LAB | Age: 62
Discharge: HOME | End: 2022-03-30

## 2022-03-30 ENCOUNTER — OFFICE VISIT (OUTPATIENT)
Dept: FAMILY MEDICINE CLINIC | Age: 62
End: 2022-03-30
Payer: MEDICAID

## 2022-03-30 VITALS
TEMPERATURE: 98.2 F | DIASTOLIC BLOOD PRESSURE: 71 MMHG | RESPIRATION RATE: 20 BRPM | HEIGHT: 63 IN | HEART RATE: 109 BPM | BODY MASS INDEX: 37.39 KG/M2 | WEIGHT: 211 LBS | SYSTOLIC BLOOD PRESSURE: 135 MMHG | OXYGEN SATURATION: 95 %

## 2022-03-30 DIAGNOSIS — B37.0 ORAL THRUSH: Primary | ICD-10-CM

## 2022-03-30 DIAGNOSIS — R21 RASH: ICD-10-CM

## 2022-03-30 LAB — XX-LABCORP SPECIMEN COL,LCBCF: NORMAL

## 2022-03-30 PROCEDURE — 99214 OFFICE O/P EST MOD 30 MIN: CPT | Performed by: NURSE PRACTITIONER

## 2022-03-30 PROCEDURE — 99001 SPECIMEN HANDLING PT-LAB: CPT

## 2022-03-30 RX ORDER — NYSTATIN 100000 [USP'U]/ML
1 SUSPENSION ORAL 4 TIMES DAILY
Qty: 180 ML | Refills: 0 | Status: SHIPPED | OUTPATIENT
Start: 2022-03-30 | End: 2022-10-27 | Stop reason: SDUPTHER

## 2022-03-30 RX ORDER — METFORMIN HYDROCHLORIDE 500 MG/1
500 TABLET, FILM COATED, EXTENDED RELEASE ORAL 2 TIMES DAILY
COMMUNITY

## 2022-03-30 RX ORDER — NYSTATIN 100000 [USP'U]/ML
1 SUSPENSION ORAL 4 TIMES DAILY
Qty: 180 ML | Refills: 0 | Status: CANCELLED | OUTPATIENT
Start: 2022-03-30

## 2022-03-30 NOTE — PROGRESS NOTES
Nicola White presents today for   Chief Complaint   Patient presents with   Jaci Suggs preferred language for health care discussion is english/other. Is someone accompanying this pt? no    Is the patient using any DME equipment during [address]? no    Depression Screening:  3 most recent PHQ Screens 3/30/2022   Little interest or pleasure in doing things Not at all   Feeling down, depressed, irritable, or hopeless Not at all   Total Score PHQ 2 0   Trouble falling or staying asleep, or sleeping too much -   Feeling tired or having little energy -   Poor appetite, weight loss, or overeating -   Feeling bad about yourself - or that you are a failure or have let yourself or your family down -   Trouble concentrating on things such as school, work, reading, or watching TV -   Moving or speaking so slowly that other people could have noticed; or the opposite being so fidgety that others notice -   Thoughts of being better off dead, or hurting yourself in some way -   PHQ 9 Score -   How difficult have these problems made it for you to do your work, take care of your home and get along with others -       Learning Assessment:  Learning Assessment 3/30/2022   PRIMARY LEARNER Patient   HIGHEST LEVEL OF EDUCATION - PRIMARY LEARNER  GRADUATED HIGH SCHOOL OR GED   BARRIERS PRIMARY LEARNER NONE   CO-LEARNER CAREGIVER No   PRIMARY LANGUAGE ENGLISH    NEED No   LEARNER PREFERENCE PRIMARY DEMONSTRATION   ANSWERED BY patient   RELATIONSHIP SELF       Abuse Screening:  Abuse Screening Questionnaire 3/30/2022   Do you ever feel afraid of your partner? N   Are you in a relationship with someone who physically or mentally threatens you? N   Is it safe for you to go home? Y       Generalized Anxiety  OTTO 2/7 5/27/2020   Feeling nervous, anxious or on edge?  0   Not being able to stop or control worrying? 0   OTTO-2 Subtotal 0         Health Maintenance Due   Topic Date Due    COVID-19 Vaccine (1) Never done    Pneumococcal 0-64 years (1 of 2 - PPSV23) Never done    Foot Exam Q1  Never done    DTaP/Tdap/Td series (1 - Tdap) Never done    Cervical cancer screen  Never done    Shingrix Vaccine Age 50> (1 of 2) Never done    MICROALBUMIN Q1  08/02/2020    Lipid Screen  02/03/2022    A1C test (Diabetic or Prediabetic)  03/09/2022   . Health Maintenance reviewed and discussed and ordered per Provider. Coordination of Care:  1. Have you been to the ER, urgent care clinic since your last visit? Hospitalized since your last visit? no    2. Have you seen or consulted any other health care providers outside of the [address] [place] [address] since your last visit? Include any pap smears or colon screening. Yes, endocrinology      Advance Directive:  1. Do you have an advance directive in place?  Patient Reply:no

## 2022-03-31 ENCOUNTER — TELEPHONE (OUTPATIENT)
Dept: FAMILY MEDICINE CLINIC | Age: 62
End: 2022-03-31

## 2022-03-31 DIAGNOSIS — E53.8 LOW FOLATE: ICD-10-CM

## 2022-03-31 DIAGNOSIS — R21 RASH: Primary | ICD-10-CM

## 2022-03-31 DIAGNOSIS — E53.8 LOW SERUM VITAMIN B12: ICD-10-CM

## 2022-03-31 DIAGNOSIS — E53.8 LOW SERUM VITAMIN B12: Primary | ICD-10-CM

## 2022-03-31 LAB
ALBUMIN SERPL-MCNC: 4.3 G/DL (ref 3.8–4.9)
ALBUMIN/GLOB SERPL: 1.2 {RATIO} (ref 1.2–2.2)
ALP SERPL-CCNC: 125 IU/L (ref 44–121)
ALT SERPL-CCNC: 23 IU/L (ref 0–32)
AST SERPL-CCNC: 28 IU/L (ref 0–40)
BASOPHILS # BLD AUTO: 0 X10E3/UL (ref 0–0.2)
BASOPHILS NFR BLD AUTO: 1 %
BILIRUB SERPL-MCNC: 0.2 MG/DL (ref 0–1.2)
BUN SERPL-MCNC: 8 MG/DL (ref 8–27)
BUN/CREAT SERPL: 10 (ref 12–28)
CALCIUM SERPL-MCNC: 9.4 MG/DL (ref 8.7–10.3)
CHLORIDE SERPL-SCNC: 98 MMOL/L (ref 96–106)
CO2 SERPL-SCNC: 22 MMOL/L (ref 20–29)
CREAT SERPL-MCNC: 0.77 MG/DL (ref 0.57–1)
EGFR: 88 ML/MIN/1.73
EOSINOPHIL # BLD AUTO: 0.2 X10E3/UL (ref 0–0.4)
EOSINOPHIL NFR BLD AUTO: 4 %
ERYTHROCYTE [DISTWIDTH] IN BLOOD BY AUTOMATED COUNT: 14.8 % (ref 11.7–15.4)
FOLATE SERPL-MCNC: 2.2 NG/ML
GLOBULIN SER CALC-MCNC: 3.6 G/DL (ref 1.5–4.5)
GLUCOSE SERPL-MCNC: 117 MG/DL (ref 65–99)
HCT VFR BLD AUTO: 44.7 % (ref 34–46.6)
HGB BLD-MCNC: 14.4 G/DL (ref 11.1–15.9)
IMM GRANULOCYTES # BLD AUTO: 0 X10E3/UL (ref 0–0.1)
IMM GRANULOCYTES NFR BLD AUTO: 0 %
LYMPHOCYTES # BLD AUTO: 1.5 X10E3/UL (ref 0.7–3.1)
LYMPHOCYTES NFR BLD AUTO: 23 %
MCH RBC QN AUTO: 28.7 PG (ref 26.6–33)
MCHC RBC AUTO-ENTMCNC: 32.2 G/DL (ref 31.5–35.7)
MCV RBC AUTO: 89 FL (ref 79–97)
MONOCYTES # BLD AUTO: 0.4 X10E3/UL (ref 0.1–0.9)
MONOCYTES NFR BLD AUTO: 7 %
NEUTROPHILS # BLD AUTO: 4.3 X10E3/UL (ref 1.4–7)
NEUTROPHILS NFR BLD AUTO: 65 %
PLATELET # BLD AUTO: 165 X10E3/UL (ref 150–450)
POTASSIUM SERPL-SCNC: 3.9 MMOL/L (ref 3.5–5.2)
PROT SERPL-MCNC: 7.9 G/DL (ref 6–8.5)
RBC # BLD AUTO: 5.01 X10E6/UL (ref 3.77–5.28)
RPR SER QL: NON REACTIVE
SODIUM SERPL-SCNC: 139 MMOL/L (ref 134–144)
T4 FREE SERPL-MCNC: 1.03 NG/DL (ref 0.82–1.77)
TSH SERPL DL<=0.005 MIU/L-ACNC: 4.84 UIU/ML (ref 0.45–4.5)
VIT B12 SERPL-MCNC: 274 PG/ML (ref 232–1245)
WBC # BLD AUTO: 6.4 X10E3/UL (ref 3.4–10.8)

## 2022-03-31 RX ORDER — FOLIC ACID 1 MG/1
1 TABLET ORAL DAILY
Qty: 30 TABLET | Refills: 5 | Status: SHIPPED | OUTPATIENT
Start: 2022-03-31 | End: 2022-10-11

## 2022-03-31 NOTE — TELEPHONE ENCOUNTER
----- Message from Juan Spence sent at 3/31/2022 12:26 PM EDT -----  Subject: Message to Provider    QUESTIONS  Information for Provider? pt needs another referral to, [facility]   [phone] on [address]   ---------------------------------------------------------------------------  --------------  [address]  What is the best way for the office to contact you? OK to leave message on   voicemail  Preferred Call Back Phone Number? [phone]  ---------------------------------------------------------------------------  --------------  SCRIPT ANSWERS  Relationship to Patient?  Self

## 2022-05-15 ENCOUNTER — APPOINTMENT (OUTPATIENT)
Dept: GENERAL RADIOLOGY | Age: 62
End: 2022-05-15
Attending: EMERGENCY MEDICINE
Payer: MEDICAID

## 2022-05-15 ENCOUNTER — HOSPITAL ENCOUNTER (EMERGENCY)
Age: 62
Discharge: HOME | End: 2022-05-15
Attending: EMERGENCY MEDICINE
Payer: MEDICAID

## 2022-05-15 VITALS
WEIGHT: 215 LBS | HEIGHT: 62 IN | DIASTOLIC BLOOD PRESSURE: 84 MMHG | BODY MASS INDEX: 39.56 KG/M2 | HEART RATE: 77 BPM | RESPIRATION RATE: 18 BRPM | TEMPERATURE: 99.1 F | SYSTOLIC BLOOD PRESSURE: 128 MMHG | OXYGEN SATURATION: 94 %

## 2022-05-15 DIAGNOSIS — S83.411A SPRAIN OF MEDIAL COLLATERAL LIGAMENT OF RIGHT KNEE, INITIAL ENCOUNTER: Primary | ICD-10-CM

## 2022-05-15 PROCEDURE — 74011250637 HC RX REV CODE- 250/637: Performed by: EMERGENCY MEDICINE

## 2022-05-15 PROCEDURE — 73562 X-RAY EXAM OF KNEE 3: CPT

## 2022-05-15 PROCEDURE — 99283 EMERGENCY DEPT VISIT LOW MDM: CPT

## 2022-05-15 PROCEDURE — 73610 X-RAY EXAM OF ANKLE: CPT

## 2022-05-15 RX ORDER — IBUPROFEN 600 MG/1
600 TABLET ORAL ONCE
Status: COMPLETED | OUTPATIENT
Start: 2022-05-15 | End: 2022-05-15

## 2022-05-15 RX ORDER — IBUPROFEN 600 MG/1
600 TABLET ORAL
Qty: 20 TABLET | Refills: 0 | Status: SHIPPED | OUTPATIENT
Start: 2022-05-15

## 2022-05-15 RX ADMIN — IBUPROFEN 600 MG: 600 TABLET ORAL at 18:42

## 2022-05-15 NOTE — ED PROVIDER NOTES
EMERGENCY DEPARTMENT HISTORY AND PHYSICAL EXAM    6:25 PM  Date: 5/15/2022  Patient Name: Prince Chavez    History of Presenting Illness     Chief Complaint   Patient presents with    Knee Pain    Ankle Pain        History Provided By: Patient    HPI: Prince Chavez is a 62 y.o. female with history of diabetes, hypertension, methadone for previous heroin use disorder. Patient is presenting with a right knee pain radiating down her leg to her ankle for the past 2 to 3 days. States that that she was going down the steps and almost fell, caught herself but the she thinks she might have twisted her knee. She has been able to ambulate however with difficulty due to pain. Also noted knee swelling. Denies any calf swelling or tenderness. No previous history of VTE, COVID-19 or recent immobilization. Denies any other injuries. States the pain is worse with ambulation and better with rest.  Has not taken any pain medicine for    Location:  Severity:  Timing/course: Onset/Duration:     PCP: Erna Conner NP    Past History     Past Medical History:  Past Medical History:   Diagnosis Date    Depression     Diabetes ([facility]     Hepatitis C     Heroin use disorder, moderate, in early remission ([facility]     NOW on [facility]    Hypertension     Ill-defined condition     Chronic back pain    Osteomyelitis of thoracic spine (HCC)        Past Surgical History:  History reviewed. No pertinent surgical history.     Family History:  Family History   Problem Relation Age of Onset    Hypertension Mother     High Cholesterol Mother     COPD Mother     Hypertension Father        Social History:  Social History     Tobacco Use    Smoking status: Current Every Day Smoker     Packs/day: 0.25     Types: Cigarettes    Smokeless tobacco: Never Used   Vaping Use    Vaping Use: Never used   Substance Use Topics    Alcohol use: No    Drug use: No     Comment: on methadone matinance program       Allergies:  No Known Allergies    Review of Systems   Review of Systems   Musculoskeletal: Positive for arthralgias and joint swelling. All other systems reviewed and are negative. Physical Exam     Patient Vitals for the past 12 hrs:   Temp Pulse Resp BP SpO2   05/15/22 1528 99.1 °F (37.3 °C) 77 18 128/84 94 %       Physical Exam  Vitals and nursing note reviewed. Constitutional:       General: She is not in acute distress. Appearance: Normal appearance. She is obese. HENT:      Head: Normocephalic and atraumatic. Eyes:      Extraocular Movements: Extraocular movements intact. Cardiovascular:      Rate and Rhythm: Normal rate. Pulses: Normal pulses. Pulmonary:      Effort: Pulmonary effort is normal. No respiratory distress. Musculoskeletal:      Cervical back: Normal range of motion and neck supple. Right knee: Swelling present. Tenderness present over the medial joint line and MCL. Right lower leg: No edema. Left lower leg: No edema. Skin:     General: Skin is warm and dry. Capillary Refill: Capillary refill takes less than 2 seconds. Findings: No erythema. Neurological:      General: No focal deficit present. Mental Status: She is alert and oriented to person, place, and time. Psychiatric:         Mood and Affect: Mood normal.         Behavior: Behavior normal.         Diagnostic Study Results     Labs -  No results found for this or any previous visit (from the past 12 hour(s)). Radiologic Studies -   XR KNEE RT 3 V    Result Date: 5/15/2022  1. No acute fracture or dislocation. 2.  Severe tricompartmental osteoarthritis        Medical Decision Making     ED Course: Progress Notes, Reevaluation, and Consults:    6:25 PM Initial assessment performed. The patients presenting problems have been discussed, and they/their family are in agreement with the care plan formulated and outlined with them.   I have encouraged them to ask questions as they arise throughout their visit. Provider Notes (Medical Decision Making): 61-year-old female with multiple comorbidities presenting with right knee pain radiating to her ankle after twisting injury. Well-appearing on exam and not in distress. Mild swelling to the medial part of the knee as well as diffuse tenderness. No calf tenderness. She is bimalleolar tenderness without any swelling. Positive medial stress test of the knee. X-ray was ordered in triage and read by the radiologist as negative for acute injuries, osteoarthritis. Likely contusion versus medial collateral ligament sprain. Discussed with patient that we will place her on knee immobilizer and crutches, provided with care instructions and return precautions as well as range of motion exercises. Procedures:     Critical Care Time:     Vital Signs-Reviewed the patient's vital signs. Reviewed pt's pulse ox reading. EKG: Interpreted by the EP. Time Interpreted:    Rate:    Rhythm:    Interpretation:   Comparison:     Records Reviewed: Nursing Notes and Old Medical Records (Time of Review: 6:25 PM)  -I am the first provider for this patient.  -I reviewed the vital signs, available nursing notes, past medical history, past surgical history, family history and social history. Current Outpatient Medications   Medication Sig Dispense Refill    folic acid (FOLVITE) 1 mg tablet Take 1 Tablet by mouth daily. 30 Tablet 5    metFORMIN (GLUMETZA ER) 500 mg TG24 24 hour tablet Take 500 mg by mouth two (2) times a day.  nystatin (MYCOSTATIN) 100,000 unit/mL suspension Take 5 mL by mouth four (4) times daily. swish and spit 180 mL 0    hydroCHLOROthiazide (HYDRODIURIL) 12.5 mg tablet TAKE ONE TABLET BY MOUTH DAILY 90 Tablet 0    atorvastatin (LIPITOR) 10 mg tablet TAKE ONE TABLET BY MOUTH DAILY 90 Tab 4    lamoTRIgine (LaMICtal) 100 mg tablet 100 mg daily.       NovoLOG Mix 70-30FlexPen U-100 100 unit/mL (70-30) inpn 10 Units two (2) times a day.      losartan (COZAAR) 25 mg tablet Take 25 mg by mouth daily.  aspirin 81 mg chewable tablet Take 81 mg by mouth daily.  sertraline (ZOLOFT) 100 mg tablet TAKE TWO TABLETS BY MOUTH ONCE DAILY (Patient taking differently: Take 150 mg by mouth daily. TAKE TWO TABLETS BY MOUTH ONCE DAILY) 60 Tab 5    methadone HCl (METHADONE PO) Take 75 mg by mouth daily.  lancets (TRUEplus Lancets) 33 gauge misc USE TO TEST TWICE DAILY 1 Package 10    BD Ultra-Fine Mini Pen Needle 31 gauge x 3/16\" ndle USE AS DIRECTED WITH LANTUS 100 Pen Needle 9    glucose blood VI test strips (True Metrix Pro Test Strip) strip Test BG twice a day. Dx:  E11.9 200 Strip 3    TRUE METRIX GLUCOSE METER misc   0        Clinical Impression     Clinical Impression: No diagnosis found. Disposition: dc      This note was dictated utilizing voice recognition software which may lead to typographical errors. I apologize in advance if the situation occurs. If questions arise please do not hesitate to contact me or call our department.     Roma Rodriguez MD  6:25 PM

## 2022-05-15 NOTE — ED TRIAGE NOTES
Patient c/o right knee pain and left ankle pain x 3 days. States knee and ankle pain began following a slip and fall down the steps at home. She denies striking head during fall. States twisting right knee. Advises that she has \"bad knees\".

## 2022-05-15 NOTE — Clinical Note
Fani Mccray was seen and treated in our emergency department on 5/15/2022.     She should be on limited weight baring activities for 1-2 weeks as tolerated Erinn Stack MD Alhawas, Reem A, MD

## 2022-05-15 NOTE — ED NOTES
Discharge instructions reviewed with patient. Patient verbalized understanding. Patient advised to follow up as directed on discharge instructions. Patient denies questions, needs or concerns at this time. Patient verbalized understanding. No s/sx of distress noted.   Patient received written discharge instructions from [facility]

## 2022-05-18 ENCOUNTER — OFFICE VISIT (OUTPATIENT)
Dept: ORTHOPEDIC SURGERY | Age: 62
End: 2022-05-18
Payer: MEDICAID

## 2022-05-18 VITALS — OXYGEN SATURATION: 95 % | HEART RATE: 70 BPM | HEIGHT: 62 IN | WEIGHT: 205 LBS | BODY MASS INDEX: 37.73 KG/M2

## 2022-05-18 DIAGNOSIS — G89.29 CHRONIC PAIN OF RIGHT KNEE: ICD-10-CM

## 2022-05-18 DIAGNOSIS — M17.12 PRIMARY OSTEOARTHRITIS OF LEFT KNEE: Primary | ICD-10-CM

## 2022-05-18 DIAGNOSIS — M25.562 CHRONIC PAIN OF LEFT KNEE: ICD-10-CM

## 2022-05-18 DIAGNOSIS — G89.29 CHRONIC PAIN OF LEFT KNEE: ICD-10-CM

## 2022-05-18 DIAGNOSIS — M17.11 PRIMARY OSTEOARTHRITIS OF RIGHT KNEE: ICD-10-CM

## 2022-05-18 DIAGNOSIS — M25.561 CHRONIC PAIN OF RIGHT KNEE: ICD-10-CM

## 2022-05-18 PROCEDURE — 20610 DRAIN/INJ JOINT/BURSA W/O US: CPT | Performed by: SPECIALIST

## 2022-05-18 PROCEDURE — 99213 OFFICE O/P EST LOW 20 MIN: CPT | Performed by: SPECIALIST

## 2022-05-18 RX ORDER — BETAMETHASONE SODIUM PHOSPHATE AND BETAMETHASONE ACETATE 3; 3 MG/ML; MG/ML
6 INJECTION, SUSPENSION INTRA-ARTICULAR; INTRALESIONAL; INTRAMUSCULAR; SOFT TISSUE ONCE
Status: COMPLETED | OUTPATIENT
Start: 2022-05-18 | End: 2022-05-18

## 2022-05-18 RX ADMIN — BETAMETHASONE SODIUM PHOSPHATE AND BETAMETHASONE ACETATE 6 MG: 3; 3 INJECTION, SUSPENSION INTRA-ARTICULAR; INTRALESIONAL; INTRAMUSCULAR; SOFT TISSUE at 10:06

## 2022-05-18 NOTE — PROGRESS NOTES
Patient: Karl Monzon                MRN: [id number]       SSN: [id number]  YOB: 1960        AGE: 62 y.o. SEX: female    PCP: Anika Orozco NP  05/18/22    CC: BILATERAL KNEE PAIN     HISTORY:  Karl Monzon is a 62 y.o. female who is seen for increased bilateral knee pain. She responded to an Euflexxa series on 2/9/22 but her pains have returned. She twisted her knee as she was walking down the stairs recently. She has been experiencing bilateral knee pain for many years. She feels pain with standing, walking and stair climbing. She experiences startup pain after sitting. She does not recall any recent injury. She did injure her left knee as a child and she has a 1 inch scar below her left patella. 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 She has history of osteomyelitis of the thoracic spine T6-9. She was previously seen by Dr. Sander Boston for her back pain. Pain Assessment  5/18/2022   Location of Pain Knee   Pain Location Comment -   Location Modifiers Right;Left   Severity of Pain 5   Quality of Pain Aching   Quality of Pain Comment -   Duration of Pain -   Frequency of Pain Constant   Aggravating Factors -   Aggravating Factors Comment -   Limiting Behavior -   Relieving Factors -   Relieving Factors Comment -   Result of Injury -     Occupation, etc:  Ms. Josiah Snow is retired since 2015. She worked at [employer] for 19 years. She misses her job. She has trouble gardening. She has history of osteomyelitis of the thoracic spine T6-9. 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 Her mother was in the ICU due to [place]. She recovered but she's still not doing great. She lives in [place] with her sister. She has 2 sons. One was killed in a car accident when he was 23. Her other son lives locally. She has an 7 yo grandson.  Ms. Josiah Snow weighs 205 lbs and is 5'2.5\" tall. Her normal adult weight is around 160 lbs. She is diabetic. She takes Metformin. She says food is her drug.     Lab Results   Component Value Date/Time    Hemoglobin A1c (POC) 11.5 07/18/2019 11:30 AM    Hemoglobin A1c, External 8.3 03/09/2021 12:00 AM     Weight Metrics 5/18/2022 5/15/2022 3/30/2022 2/9/2022 2/7/2022 2/2/2022 1/26/2022   Weight 205 lb 215 lb 211 lb 204 lb 12.8 oz 200 lb 208 lb 3.2 oz 208 lb 3.2 oz   BMI 37.49 kg/m2 39.32 kg/m2 37.98 kg/m2 36.86 kg/m2 36 kg/m2 37.47 kg/m2 37.47 kg/m2       Patient Active Problem List   Diagnosis Code    Severe obesity with body mass index (BMI) of 35.0 to 39.9 with serious comorbidity (HCC) E66.01    Discitis of thoracic region M46.44    Depression, major, recurrent, mild ([facility]) F33.0    Type 2 diabetes mellitus with hyperglycemia, with long-term current use of insulin (HCC) E11.65, Z79.4    Dyslipidemia associated with type 2 diabetes mellitus ([facility] E11.69, E78.5    Essential hypertension I10     REVIEW OF SYSTEMS:    Constitutional Symptoms: Negative   Eyes: Negative   Ears, Nose, Throat and Mouth: Negative   Cardiovascular: Negative   Respiratory: Negative   Genitourinary: Per HPI   Gastrointestinal: Per HPI   Integumentary (Skin and/or Breast): Negative   Musculoskeletal: Per HPI   Endocrine/Rheumatologic: Negative   Neurological: Per HPI   Hematology/Lymphatic: Negative    Allergic/Immunologic: Negative   Phychiatric: Negative    Social History     Socioeconomic History    Marital status: SINGLE     Spouse name: Not on file    Number of children: Not on file    Years of education: Not on file    Highest education level: Not on file   Occupational History    Not on file   Tobacco Use    Smoking status: Current Every Day Smoker     Packs/day: 0.25     Types: Cigarettes    Smokeless tobacco: Never Used   Vaping Use    Vaping Use: Never used   Substance and Sexual Activity    Alcohol use: No    Drug use: No     Comment: on methadone matinance program    Sexual activity: Never   Other Topics Concern    Not on file   Social History Narrative    Not on file     Social Determinants of Health     Financial Resource Strain:     Difficulty of Paying Living Expenses: Not on file   Food Insecurity:     Worried About Running Out of Food in the Last Year: Not on file    Jose Manuel of Food in the Last Year: Not on file   Transportation Needs:     Lack of Transportation (Medical): Not on file    Lack of Transportation (Non-Medical): Not on file   Physical Activity:     Days of Exercise per Week: Not on file    Minutes of Exercise per Session: Not on file   Stress:     Feeling of Stress : Not on file   Social Connections:     Frequency of Communication with Friends and Family: Not on file    Frequency of Social Gatherings with Friends and Family: Not on file    Attends Cheondoism Services: Not on file    Active Member of [address] [place] [address] [employer] or Organizations: Not on file    Attends Club or Organization Meetings: Not on file    Marital Status: Not on file   Intimate Partner Violence:     Fear of Current or Ex-Partner: Not on file    Emotionally Abused: Not on file    Physically Abused: Not on file    Sexually Abused: Not on file   Housing Stability:     Unable to Pay for Housing in the Last Year: Not on file    Number of [place] in the Last Year: Not on file    Unstable Housing in the Last Year: Not on file      No Known Allergies   Current Outpatient Medications   Medication Sig    ibuprofen (MOTRIN) 600 mg tablet Take 1 Tablet by mouth every six (6) hours as needed for Pain.  folic acid (FOLVITE) 1 mg tablet Take 1 Tablet by mouth daily.  metFORMIN (GLUMETZA ER) 500 mg TG24 24 hour tablet Take 500 mg by mouth two (2) times a day.  nystatin (MYCOSTATIN) 100,000 unit/mL suspension Take 5 mL by mouth four (4) times daily.  swish and spit    hydroCHLOROthiazide (HYDRODIURIL) 12.5 mg tablet TAKE ONE TABLET BY MOUTH DAILY    lancets (TRUEplus Lancets) 33 gauge misc USE TO TEST TWICE DAILY    atorvastatin (LIPITOR) 10 mg tablet TAKE ONE TABLET BY MOUTH DAILY    lamoTRIgine (LaMICtal) 100 mg tablet 100 mg daily.  NovoLOG Mix 70-30FlexPen U-100 100 unit/mL (70-30) inpn 10 Units two (2) times a day.  BD Ultra-Fine Mini Pen Needle 31 gauge x 3/16\" ndle USE AS DIRECTED WITH LANTUS    losartan (COZAAR) 25 mg tablet Take 25 mg by mouth daily.  aspirin 81 mg chewable tablet Take 81 mg by mouth daily.  glucose blood VI test strips (True Metrix Pro Test Strip) strip Test BG twice a day. Dx:  E11.9    sertraline (ZOLOFT) 100 mg tablet TAKE TWO TABLETS BY MOUTH ONCE DAILY (Patient taking differently: Take 150 mg by mouth daily. TAKE TWO TABLETS BY MOUTH ONCE DAILY)    TRUE METRIX GLUCOSE METER misc     methadone HCl (METHADONE PO) Take 75 mg by mouth daily. No current facility-administered medications for this visit.       PHYSICAL EXAMINATION:  Visit Vitals  Pulse 70   Ht 5' 2\" (1.575 m)   Wt 205 lb (93 kg)   SpO2 95%   BMI 37.49 kg/m²      ORTHO EXAMINATION:    Examination Right knee Left knee   Skin Intact, psoriasis lower leg Intact, psoriasis lower leg   Range of motion 100-5 110-0   Effusion - -   Medial joint line tenderness + +   Lateral joint line tenderness - -   Popliteal tenderness - -   Osteophytes palpable + +   Sarys - -   Patella crepitus + +   Anterior drawer - -   Lateral laxity - -   Medial laxity - -   Varus deformity - -   Valgus deformity - -   Pretibial edema 1+ 1+   Calf tenderness - -   1 inch small patellar scar on the left from a childhood injury  Multiple pigmented spots on lower legs    TIME OUT:  Chart reviewed for the following:   INaz MD, have reviewed the History, Physical and updated the Allergic reactions for Meeche A Matute   TIME OUT performed immediately prior to start of procedure:  Abbie Reynoso MD, have performed the following reviews on Meeche A Matute prior to the start of the procedure:          * Patient was identified by name and date of birth   * Agreement on procedure being performed was verified  * Risks and Benefits explained to the patient  * Procedure site verified and marked as necessary  * Patient was positioned for comfort  * Consent was obtained     Time: 9:55 AM     Date of procedure: 5/18/2022  Procedure performed by:  Naz Harmon MD  Ms. Matute tolerated the procedure well with no complications. RADIOGRAPHS:  XR LEFT KNEE 12/29/21 MELISSA:  IMPRESSION:  Three views with bilateral knees on AP view - No fractures, no effusion, moderately severe joint space narrowing, large osteophytes present. Kellgren Delmer grade 3    XR RIGHT KNEE 12/29/21 MELISSA:  IMPRESSION:  Three views with bilateral knees on AP view - No fractures, no effusion, severe medial joint space narrowing, varus deformity, large osteophytes present, severe patellofemoral narrowing. Kellgren Delmer grade 4    IMPRESSION:      ICD-10-CM ICD-9-CM    1. Primary osteoarthritis of left knee  M17.12 715.16 betamethasone (CELESTONE) injection 6 mg      KS DRAIN/INJECT LARGE JOINT/BURSA   2. Chronic pain of left knee  M25.562 719.46 betamethasone (CELESTONE) injection 6 mg    G89.29 338.29 KS DRAIN/INJECT LARGE JOINT/BURSA   3. Primary osteoarthritis of right knee  M17.11 715.16 betamethasone (CELESTONE) injection 6 mg      KS DRAIN/INJECT LARGE JOINT/BURSA   4. Chronic pain of right knee  M25.561 719.46 betamethasone (CELESTONE) injection 6 mg    G89.29 338.29 KS DRAIN/INJECT LARGE JOINT/BURSA      PLAN:  She will be referred for bariatric surgery consultation. After discussing treatment options, patient's knees were injected with 4 cc Marcaine and 1/2 cc Celestone. We discussed possible need for a knee arthroplasty at some time in the future if pain continues, pending weight loss. She will follow up as needed.      Scribed by Naz Harmon MD (Quan Baker) as dictated by Naz Harmon MD

## 2022-06-09 DIAGNOSIS — I10 ESSENTIAL HYPERTENSION: ICD-10-CM

## 2022-06-09 RX ORDER — HYDROCHLOROTHIAZIDE 12.5 MG/1
12.5 TABLET ORAL DAILY
Qty: 30 TABLET | Refills: 0 | Status: SHIPPED | OUTPATIENT
Start: 2022-06-09 | End: 2022-07-09

## 2022-07-09 DIAGNOSIS — I10 ESSENTIAL HYPERTENSION: ICD-10-CM

## 2022-07-09 RX ORDER — HYDROCHLOROTHIAZIDE 12.5 MG/1
TABLET ORAL
Qty: 30 TABLET | Refills: 0 | Status: SHIPPED | OUTPATIENT
Start: 2022-07-09 | End: 2022-08-08

## 2022-07-25 NOTE — PROGRESS NOTES
Bari Haywood female 62 y.o. who was evaluated via audio-only technology on 22 for   Chief Complaint   Patient presents with    Diabetes     BG   129    Cholesterol Problem     High chol    Hypertension     Assessment & Plan:     1. Type 2 diabetes mellitus with hyperglycemia, with long-term current use of insulin [facility])  Assessment & Plan:  A1c at goal of <7 as of 2022  Recheck A1c in 2 mos  Orders:  -     METABOLIC PANEL, COMPREHENSIVE; Future  -     HEMOGLOBIN A1C WITH EAG; Future  -     MICROALBUMIN, UR, RAND W/ MICROALB/CREAT RATIO; Future  2. Dyslipidemia associated with type 2 diabetes mellitus ([facility] [address]  Assessment & Plan:  LDL close to goal as of 2022, continue regimen  Recheck lipid panel in 2 mos  Orders:  -     LIPID PANEL; Future  -     METABOLIC PANEL, COMPREHENSIVE; Future  3. Essential hypertension  Assessment & Plan:  BP goal <130/80, continue regimen  Orders:  -     METABOLIC PANEL, COMPREHENSIVE; Future  4. Cigarette nicotine dependence without complication  Assessment & Plan: Motivated to quit, advised on OTC nicotine patch/gum for now  May consider Wellbutrin if ineffective    Follow-up and Dispositions    Return in about 4 weeks (around 2022) for diabetes, cholesterol, blood pressure, smoking cessation, lab results.        Subjective:     HPI    Type 2 DM-  Home BG readings range from 100-187  Hypoglycemia: No  On statin:  atorvastatin 10 mg  Comorbid: HLD, HTN, morbid obesity  Followed by endocrine: No  Current treatment: Metformin  mg BID, Novolog 70/30 10 units BID    Hyperlipidemia  Compliant with meds  Comorbid: type 2 DM, HTN  Current treatment: atorvastatin 10 mg    Hypertension-  Symptoms:  None  BP readings at home are 120s/80s  Comorbid: type 2 DM, HLD, morbid obesity  Current treatment: losartan 25 mg    Nicotine Dependence-  Years of smokin years  Amount:   1 ppd  Previous attempt to quit: No  Past medications used for cessation: n/a  Current symptoms: none  Motivated to quit because her sister quit smoking    Health Maintenance:  COVID-19 vac - due for booster  Pneumonia vac- recommended  Shingles vac - recommended  Tetanus vac - recommended  Urine micro - ordered  Diabetic foot exam - defer to next appointment  PAP - will schedule    Review of Systems   Constitutional:  Negative for chills, fever and malaise/fatigue. Respiratory:  Negative for shortness of breath. Cardiovascular:  Negative for chest pain. Objective:      Physical Exam   Constitutional: Alert and oriented, in no distress  HENT:   Ears:  Hearing grossly intact. Pulmonary/Chest: Does not sound dyspneic, no audible wheezes   Neurological:  Intact recent memory, answering questions appropriately. Psychiatric: Judgment and insight good, normal mood. David Been, was evaluated through an audio-only encounter. The patient (or guardian if applicable) is aware that this is a billable service, which includes applicable co-pays. This Virtual Visit was conducted with patient's (and/or legal guardian's) consent. The visit was conducted pursuant to the emergency declaration under the [address] [place] [address], [address] [place] [address] authority and the [employer] and [employer] Act. Patient identification was verified, and a caregiver was present when appropriate. The patient was located at: Home: [address] [place] [id number]  The provider was located at:  Facility (Appt Department): [address] [facility] [address] [phone]      Total time spent:  21-30 minutes  MILADYS Price

## 2022-07-28 ENCOUNTER — VIRTUAL VISIT (OUTPATIENT)
Dept: FAMILY MEDICINE CLINIC | Age: 62
End: 2022-07-28
Payer: MEDICAID

## 2022-07-28 ENCOUNTER — TELEPHONE (OUTPATIENT)
Dept: FAMILY MEDICINE CLINIC | Age: 62
End: 2022-07-28

## 2022-07-28 DIAGNOSIS — I10 ESSENTIAL HYPERTENSION: ICD-10-CM

## 2022-07-28 DIAGNOSIS — E78.5 DYSLIPIDEMIA ASSOCIATED WITH TYPE 2 DIABETES MELLITUS (HCC): ICD-10-CM

## 2022-07-28 DIAGNOSIS — F17.210 CIGARETTE NICOTINE DEPENDENCE WITHOUT COMPLICATION: Primary | ICD-10-CM

## 2022-07-28 DIAGNOSIS — F17.210 CIGARETTE NICOTINE DEPENDENCE WITHOUT COMPLICATION: ICD-10-CM

## 2022-07-28 DIAGNOSIS — E11.69 DYSLIPIDEMIA ASSOCIATED WITH TYPE 2 DIABETES MELLITUS (HCC): ICD-10-CM

## 2022-07-28 DIAGNOSIS — Z79.4 TYPE 2 DIABETES MELLITUS WITH HYPERGLYCEMIA, WITH LONG-TERM CURRENT USE OF INSULIN (HCC): Primary | ICD-10-CM

## 2022-07-28 DIAGNOSIS — E11.65 TYPE 2 DIABETES MELLITUS WITH HYPERGLYCEMIA, WITH LONG-TERM CURRENT USE OF INSULIN (HCC): Primary | ICD-10-CM

## 2022-07-28 PROCEDURE — 99443 PR PHYS/QHP TELEPHONE EVALUATION 21-30 MIN: CPT | Performed by: NURSE PRACTITIONER

## 2022-07-28 RX ORDER — BUPROPION HYDROCHLORIDE 150 MG/1
150 TABLET, EXTENDED RELEASE ORAL 2 TIMES DAILY
Qty: 180 TABLET | Refills: 0 | Status: SHIPPED
Start: 2022-07-28 | End: 2022-08-25 | Stop reason: ALTCHOICE

## 2022-07-28 NOTE — TELEPHONE ENCOUNTER
Wellbutrin 150 mg sent to pharmacy. Take once daily for 3 days then increase to BID. Stop smoking on day 7.

## 2022-07-28 NOTE — ASSESSMENT & PLAN NOTE
Motivated to quit, advised on OTC nicotine patch/gum for now  May consider Wellbutrin if ineffective

## 2022-07-28 NOTE — TELEPHONE ENCOUNTER
Patient advising that you spoke of different ways for her to stop smoking and had recommended welbutrin. ...advising she wants to try it as she does not have an income to buy gums and patches. Please advise.

## 2022-07-28 NOTE — PROGRESS NOTES
Jose Albertovictor m Jimenez presents today for   Chief Complaint   Patient presents with    Diabetes     BG   129    Cholesterol Problem     High chol    Hypertension       Dread Aragon preferred language for health care discussion is english/other. Is someone accompanying this pt? no    Is the patient using any DME equipment during [address]? no    Depression Screening:  3 most recent PHQ Screens 7/28/2022   PHQ Not Done -   Little interest or pleasure in doing things Not at all   Feeling down, depressed, irritable, or hopeless Not at all   Total Score PHQ 2 0   Trouble falling or staying asleep, or sleeping too much -   Feeling tired or having little energy -   Poor appetite, weight loss, or overeating -   Feeling bad about yourself - or that you are a failure or have let yourself or your family down -   Trouble concentrating on things such as school, work, reading, or watching TV -   Moving or speaking so slowly that other people could have noticed; or the opposite being so fidgety that others notice -   Thoughts of being better off dead, or hurting yourself in some way -   PHQ 9 Score -   How difficult have these problems made it for you to do your work, take care of your home and get along with others -       Learning Assessment:  Learning Assessment 3/30/2022   PRIMARY LEARNER Patient   HIGHEST LEVEL OF EDUCATION - PRIMARY LEARNER  GRADUATED HIGH SCHOOL OR GED   BARRIERS PRIMARY LEARNER NONE   CO-LEARNER CAREGIVER No   PRIMARY LANGUAGE ENGLISH    NEED No   LEARNER PREFERENCE PRIMARY DEMONSTRATION   ANSWERED BY patient   RELATIONSHIP SELF       Abuse Screening:  Abuse Screening Questionnaire 3/30/2022   Do you ever feel afraid of your partner? N   Are you in a relationship with someone who physically or mentally threatens you? N   Is it safe for you to go home? Y       Generalized Anxiety  OTTO 2/7 5/27/2020   Feeling nervous, anxious or on edge?  0   Not being able to stop or control worrying? 0   OTTO-2 Subtotal 0         Health Maintenance Due   Topic Date Due    Pneumococcal 0-64 years (1 - PCV) Never done    Foot Exam Q1  Never done    DTaP/Tdap/Td series (1 - Tdap) Never done    Cervical cancer screen  Never done    Shingrix Vaccine Age 50> (1 of 2) Never done    MICROALBUMIN Q1  08/02/2020    COVID-19 Vaccine (3 - Booster for Pfizer series) 03/05/2022   . Health Maintenance reviewed and discussed and ordered per Provider. Coordination of Care:  1. Have you been to the ER, urgent care clinic since your last visit? Hospitalized since your last visit? no    2. Have you seen or consulted any other health care providers outside of the [address] [place] [address] since your last visit? Include any pap smears or colon screening.  no      Advance Directive:  discussed 3/30/22

## 2022-07-28 NOTE — TELEPHONE ENCOUNTER
Please call and schedule patient with me in 4 weeks for diabetes, cholesterol, blood pressure, smoking cessation and lab results. 30 MINUTES please. Thank you!

## 2022-07-29 NOTE — TELEPHONE ENCOUNTER
Patient was called in regards to Wellbutrin. Explained to patient patient the instruction which patient understood verbal instructions.

## 2022-08-03 NOTE — TELEPHONE ENCOUNTER
Creatinine is elevated. Dr Jeannette Beltrán is a aware and is stopping Ambisome and sending in new orders.
Noted, thank you Eduar Borrero
Detail Level: Detailed

## 2022-08-08 DIAGNOSIS — I10 ESSENTIAL HYPERTENSION: ICD-10-CM

## 2022-08-08 RX ORDER — HYDROCHLOROTHIAZIDE 12.5 MG/1
TABLET ORAL
Qty: 30 TABLET | Refills: 0 | Status: SHIPPED | OUTPATIENT
Start: 2022-08-08 | End: 2022-09-09

## 2022-08-22 ENCOUNTER — HOSPITAL ENCOUNTER (OUTPATIENT)
Dept: LAB | Age: 62
Discharge: HOME | End: 2022-08-22

## 2022-08-22 LAB — XX-LABCORP SPECIMEN COL,LCBCF: NORMAL

## 2022-08-22 PROCEDURE — 99001 SPECIMEN HANDLING PT-LAB: CPT

## 2022-08-23 LAB
ALBUMIN SERPL-MCNC: 4.6 G/DL (ref 3.8–4.9)
ALBUMIN/CREAT UR: <12 MG/G CREAT (ref 0–29)
ALBUMIN/GLOB SERPL: 1.4 {RATIO} (ref 1.2–2.2)
ALP SERPL-CCNC: 135 IU/L (ref 44–121)
ALT SERPL-CCNC: 21 IU/L (ref 0–32)
AST SERPL-CCNC: 26 IU/L (ref 0–40)
BILIRUB SERPL-MCNC: <0.2 MG/DL (ref 0–1.2)
BUN SERPL-MCNC: 8 MG/DL (ref 8–27)
BUN/CREAT SERPL: 11 (ref 12–28)
CALCIUM SERPL-MCNC: 9.7 MG/DL (ref 8.7–10.3)
CHLORIDE SERPL-SCNC: 97 MMOL/L (ref 96–106)
CHOLEST SERPL-MCNC: 131 MG/DL (ref 100–199)
CO2 SERPL-SCNC: 18 MMOL/L (ref 20–29)
CREAT SERPL-MCNC: 0.76 MG/DL (ref 0.57–1)
CREAT UR-MCNC: 25.1 MG/DL
EGFR: 90 ML/MIN/1.73
EST. AVERAGE GLUCOSE BLD GHB EST-MCNC: 177 MG/DL
GLOBULIN SER CALC-MCNC: 3.4 G/DL (ref 1.5–4.5)
GLUCOSE SERPL-MCNC: 107 MG/DL (ref 65–99)
HBA1C MFR BLD: 7.8 % (ref 4.8–5.6)
HDLC SERPL-MCNC: 30 MG/DL
IMP & REVIEW OF LAB RESULTS: NORMAL
LDLC SERPL CALC-MCNC: 57 MG/DL (ref 0–99)
MICROALBUMIN UR-MCNC: <3 UG/ML
POTASSIUM SERPL-SCNC: 4.1 MMOL/L (ref 3.5–5.2)
PROT SERPL-MCNC: 8 G/DL (ref 6–8.5)
SODIUM SERPL-SCNC: 139 MMOL/L (ref 134–144)
TRIGL SERPL-MCNC: 276 MG/DL (ref 0–149)
VLDLC SERPL CALC-MCNC: 44 MG/DL (ref 5–40)

## 2022-08-24 NOTE — PROGRESS NOTES
Chief Complaint   Patient presents with    Diabetes    Cholesterol Problem     High chol    Hypertension    Nicotine Dependence     Smoking cessation    Results     Discuss lab results     Assessment & Plan:     1. Type 2 diabetes mellitus with hyperglycemia, with long-term current use of insulin (HCC)  Assessment & Plan:  A1c not at goal of <7, continue management per endo  Given copy of lab results to take to next endo appt  Continue regimen for now  Declined diabetic foot exam today  Orders:  -     METABOLIC PANEL, COMPREHENSIVE; Future  -     HEMOGLOBIN A1C WITH EAG; Future  2. Dyslipidemia associated with type 2 diabetes mellitus ([address]  Assessment & Plan:  LDL at goal of <70, continue regimen  Recheck lipid panel in 6 mos  Orders:  -     LIPID PANEL; Future  -     METABOLIC PANEL, COMPREHENSIVE; Future  3. Essential hypertension  Assessment & Plan:  BP at goal <130/80, continue regimen  4. Cigarette nicotine dependence without complication  Assessment & Plan:  Changed her mind about Wellbutrin, discontinued  Start nicotine patch and follow up in 6 weeks  Plan to decrease dose to 14 mg once completed 6 weeks on 21 mg of nicotine patch  Orders:  -     nicotine (NICODERM CQ) 21 mg/24 hr; 1 Patch by TransDERmal route every twenty-four (24) hours for 44 days. Indications: stop smoking, Normal, Disp-44 Patch, R-0  5. Encounter for immunization  Comments:  Declined Prevnar 20    Follow-up and Dispositions    Return in about 6 weeks (around 10/6/2022) for smoking cessation.        Subjective:     HPI    Type 2 DM-  Home BG readings range from 100-179  Hypoglycemia:  On statin: Atorvastatin 10 mg  Comorbid: HLD, HTN, morbid obesity  Followed by endocrine: Conor Fields, has an appointment sometime in November  Current treatment: Metformin  mg BID, Novolog 70/30 10 units BID    Hyperlipidemia  Compliant with meds  Comorbid: type 2 DM, HTN  Current treatment: Atorvastatin 10 mg    Hypertension-  Symptoms: Fatigue  BP readings at home are not being taken  Comorbid: type 2 DM, HLD, morbid obesity  Current treatment: Losartan 25 mg    Nicotine Dependence-  Years of smokin years  Amount:  1 ppd  Previous attempt to quit: yes  Past medications used for cessation: Was given Wellbutrin  mg 4 weeks ago but she did not start it because her friend had some mood swings from it  Current symptoms: None    Health Maintenance:  COVID-19 vac- due for booster  Pneumonia vac - recommended, declined Prevnar 20  Shingles vac - recommended  Tetanus vac - recommended  Diabetic foot exam - declined today, will do next time  PAP - will schedule    Review of Systems   Constitutional:  Negative for chills, fever and malaise/fatigue. Respiratory:  Negative for shortness of breath. Cardiovascular:  Negative for chest pain. Objective:   /79   Pulse 77   Temp 98.1 °F (36.7 °C) (Oral)   Resp 20   Ht 5' 2\" (1.575 m)   Wt 216 lb 3.2 oz (98.1 kg)   SpO2 93%   BMI 39.54 kg/m²      Physical Exam  Vitals and nursing note reviewed. Constitutional:       General: She is not in acute distress. Appearance: She is not ill-appearing. HENT:      Head: Normocephalic and atraumatic. Cardiovascular:      Rate and Rhythm: Normal rate and regular rhythm. Pulmonary:      Effort: Pulmonary effort is normal. No respiratory distress. Breath sounds: No wheezing, rhonchi or rales. Skin:     General: Skin is warm and dry. Neurological:      General: No focal deficit present. Mental Status: She is alert and oriented to person, place, and time. Psychiatric:         Mood and Affect: Mood normal.         Thought Content:  Thought content normal.         Judgment: Judgment normal.      Diabetic foot exam: patient declined      MILADYS Lynch

## 2022-08-24 NOTE — ASSESSMENT & PLAN NOTE
A1c not at goal of <7, continue management per endo  Given copy of lab results to take to next endo appt  Continue regimen for now  Declined diabetic foot exam today

## 2022-08-25 ENCOUNTER — OFFICE VISIT (OUTPATIENT)
Dept: FAMILY MEDICINE CLINIC | Age: 62
End: 2022-08-25
Payer: MEDICAID

## 2022-08-25 VITALS
BODY MASS INDEX: 39.79 KG/M2 | WEIGHT: 216.2 LBS | HEIGHT: 62 IN | RESPIRATION RATE: 20 BRPM | DIASTOLIC BLOOD PRESSURE: 79 MMHG | SYSTOLIC BLOOD PRESSURE: 117 MMHG | TEMPERATURE: 98.1 F | OXYGEN SATURATION: 93 % | HEART RATE: 77 BPM

## 2022-08-25 DIAGNOSIS — Z23 ENCOUNTER FOR IMMUNIZATION: ICD-10-CM

## 2022-08-25 DIAGNOSIS — F17.210 CIGARETTE NICOTINE DEPENDENCE WITHOUT COMPLICATION: ICD-10-CM

## 2022-08-25 DIAGNOSIS — E11.65 TYPE 2 DIABETES MELLITUS WITH HYPERGLYCEMIA, WITH LONG-TERM CURRENT USE OF INSULIN (HCC): Primary | ICD-10-CM

## 2022-08-25 DIAGNOSIS — E11.69 DYSLIPIDEMIA ASSOCIATED WITH TYPE 2 DIABETES MELLITUS (HCC): ICD-10-CM

## 2022-08-25 DIAGNOSIS — E78.5 DYSLIPIDEMIA ASSOCIATED WITH TYPE 2 DIABETES MELLITUS (HCC): ICD-10-CM

## 2022-08-25 DIAGNOSIS — Z79.4 TYPE 2 DIABETES MELLITUS WITH HYPERGLYCEMIA, WITH LONG-TERM CURRENT USE OF INSULIN (HCC): Primary | ICD-10-CM

## 2022-08-25 DIAGNOSIS — I10 ESSENTIAL HYPERTENSION: ICD-10-CM

## 2022-08-25 PROCEDURE — 99214 OFFICE O/P EST MOD 30 MIN: CPT | Performed by: NURSE PRACTITIONER

## 2022-08-25 PROCEDURE — 3051F HG A1C>EQUAL 7.0%<8.0%: CPT | Performed by: NURSE PRACTITIONER

## 2022-08-25 RX ORDER — IBUPROFEN 200 MG
1 TABLET ORAL EVERY 24 HOURS
Qty: 44 PATCH | Refills: 0 | Status: SHIPPED
Start: 2022-08-25 | End: 2022-10-06 | Stop reason: DRUGHIGH

## 2022-08-25 NOTE — PROGRESS NOTES
Jose Alberto Jimenez presents today for   Chief Complaint   Patient presents with    Diabetes    Cholesterol Problem     High chol    Hypertension    Nicotine Dependence     Smoking cessation    Results     Discuss lab results       Dread Aragon preferred language for health care discussion is english/other. Is someone accompanying this pt? no    Is the patient using any DME equipment during [address]? no    Depression Screening:  3 most recent PHQ Screens 8/25/2022   PHQ Not Done -   Little interest or pleasure in doing things Several days   Feeling down, depressed, irritable, or hopeless Several days   Total Score PHQ 2 2   Trouble falling or staying asleep, or sleeping too much Not at all   Feeling tired or having little energy Nearly every day   Poor appetite, weight loss, or overeating Not at all   Feeling bad about yourself - or that you are a failure or have let yourself or your family down Not at all   Trouble concentrating on things such as school, work, reading, or watching TV Not at all   Moving or speaking so slowly that other people could have noticed; or the opposite being so fidgety that others notice Not at all   Thoughts of being better off dead, or hurting yourself in some way Not at all   PHQ 9 Score 5   How difficult have these problems made it for you to do your work, take care of your home and get along with others Not difficult at all       Learning Assessment:  Learning Assessment 3/30/2022   PRIMARY LEARNER Patient   HIGHEST LEVEL OF EDUCATION - PRIMARY LEARNER  GRADUATED HIGH SCHOOL OR GED   BARRIERS PRIMARY LEARNER NONE   CO-LEARNER CAREGIVER No   PRIMARY LANGUAGE ENGLISH    NEED No   LEARNER PREFERENCE PRIMARY DEMONSTRATION   ANSWERED BY patient   RELATIONSHIP SELF       Abuse Screening:  Abuse Screening Questionnaire 3/30/2022   Do you ever feel afraid of your partner? N   Are you in a relationship with someone who physically or mentally threatens you?  N   Is it safe for you to go home? Y       Generalized Anxiety  OTTO 2/7 5/27/2020   Feeling nervous, anxious or on edge? 0   Not being able to stop or control worrying? 0   OTTO-2 Subtotal 0         Health Maintenance Due   Topic Date Due    Pneumococcal 0-64 years (1 - PCV) Never done    Foot Exam Q1  Never done    DTaP/Tdap/Td series (1 - Tdap) Never done    Cervical cancer screen  Never done    Shingrix Vaccine Age 50> (1 of 2) Never done    COVID-19 Vaccine (3 - Booster for Pfizer series) 03/05/2022    Eye Exam Retinal or Dilated  08/28/2022   . Health Maintenance reviewed and discussed and ordered per Provider. Coordination of Care:  1. Have you been to the ER, urgent care clinic since your last visit? Hospitalized since your last visit? no    2. Have you seen or consulted any other health care providers outside of the [address] [place] [address] since your last visit? Include any pap smears or colon screening.  no      Advance Directive:  discussed 3/30/22

## 2022-08-25 NOTE — ASSESSMENT & PLAN NOTE
Changed her mind about Wellbutrin, discontinued  Start nicotine patch and follow up in 6 weeks  Plan to decrease dose to 14 mg once completed 6 weeks on 21 mg of nicotine patch

## 2022-10-05 NOTE — PROGRESS NOTES
Dread Matute 62 y.o. female who was seen by synchronous (real-time) audio-video technology on 10/06/22 for   Chief Complaint   Patient presents with    Follow-up     6 week    Nicotine Dependence     Smoking cessation     Assessment & Plan:     1. Cigarette nicotine dependence without complication  Assessment & Plan:  Commended on successful cessation, continue nicotine patch, 14 mg pack sent to pharmacy  Orders:  -     nicotine (NICODERM CQ) 14 mg/24 hr patch; 1 Patch by TransDERmal route every twenty-four (24) hours for 42 days. , Normal, Disp-42 Patch, R-0  2. Encounter for screening mammogram for malignant neoplasm of breast  Comments:  Discussed importance of breast cancer screening, eventually agreed to have it done  Orders:  -     [facility] MAMMO BI SCREENING INCL CAD; Future  3. Screen for colon cancer  Comments:  Declines colonoscopy but patient willing to consider FIT at next appointment    Follow-up and Dispositions    Return in 4 months (on 2/3/2023) for diabetes, cholesterol, blood pressure, depression, lab results. Subjective:     HPI    Nicotine Dependence-  Years of smokin years  Amount:  1 ppd  Previous attempt to quit: yes  Past medications used for cessation: Was given Wellbutrin  mg 4 weeks ago but she did not start it because her friend had some mood swings from it  Current symptoms: None  Was started on Nicotine patch   Has not had a cigarette since 2022    Health Maintenance:  COVID-19 vac- due for booster  Flu vac - recommended  Shingles vac - recommended  Tetanus vac - recommended  Diabetic foot exam - next office visit  PAP - declines  Mammogram - ordered  Colonoscopy - declines but will consider FIT      Review of Systems   Constitutional:  Negative for chills, fever and malaise/fatigue. Respiratory:  Negative for shortness of breath. Cardiovascular:  Negative for chest pain.      Objective:        Physical Exam   Constitutional: Stable-appearing, in no distress, alert and oriented  HENT:   Head: Normocephalic and atraumatic. Ears:  Hearing grossly intact. Mouth:  No visible perioral lesions, cyanosis, or lip swelling. Pulmonary/Chest: Does not appear dyspneic, no audible wheezes or nasal flaring. Musculoskeletal: Grossly normal active ROM in upper extremities. Neurological:  Intact recent memory, no facial or eyelid drooping, no speech impairment, answering questions appropriately. Psychiatric: Judgment and insight good, normal mood and affect. Karen Burdick, was evaluated through a synchronous (real-time) audio-video encounter. The patient (or guardian if applicable) is aware that this is a billable service, which includes applicable co-pays. This Virtual Visit was conducted with patient's (and/or legal guardian's) consent. The visit was conducted pursuant to the emergency declaration under the [address] [place] [address], [address] [place] [address] [facility] authority and the [employer] General Act. Patient identification was verified, and a caregiver was present when appropriate. The patient was located at: Home: [address] [place] [id number]  The provider was located at:  Facility (Appt Department): [address] [place] [address]    MILADYS Schwarz

## 2022-10-06 ENCOUNTER — VIRTUAL VISIT (OUTPATIENT)
Dept: FAMILY MEDICINE CLINIC | Age: 62
End: 2022-10-06
Payer: MEDICAID

## 2022-10-06 DIAGNOSIS — Z12.31 ENCOUNTER FOR SCREENING MAMMOGRAM FOR MALIGNANT NEOPLASM OF BREAST: ICD-10-CM

## 2022-10-06 DIAGNOSIS — Z12.11 SCREEN FOR COLON CANCER: ICD-10-CM

## 2022-10-06 DIAGNOSIS — F17.210 CIGARETTE NICOTINE DEPENDENCE WITHOUT COMPLICATION: Primary | ICD-10-CM

## 2022-10-06 PROCEDURE — 99213 OFFICE O/P EST LOW 20 MIN: CPT | Performed by: NURSE PRACTITIONER

## 2022-10-06 RX ORDER — IBUPROFEN 200 MG
1 TABLET ORAL EVERY 24 HOURS
Qty: 42 PATCH | Refills: 0 | Status: SHIPPED | OUTPATIENT
Start: 2022-10-06 | End: 2022-11-17

## 2022-10-06 NOTE — PROGRESS NOTES
Leidy Sosa presents today for   Chief Complaint   Patient presents with    Follow-up     6 week    Nicotine Dependence     Smoking cessation       Dread Matute preferred language for health care discussion is english/other. Is someone accompanying this pt? no    Is the patient using any DME equipment during [address]? no    Depression Screening:  3 most recent PHQ Screens 10/6/2022   PHQ Not Done -   Little interest or pleasure in doing things Not at all   Feeling down, depressed, irritable, or hopeless Not at all   Total Score PHQ 2 0   Trouble falling or staying asleep, or sleeping too much -   Feeling tired or having little energy -   Poor appetite, weight loss, or overeating -   Feeling bad about yourself - or that you are a failure or have let yourself or your family down -   Trouble concentrating on things such as school, work, reading, or watching TV -   Moving or speaking so slowly that other people could have noticed; or the opposite being so fidgety that others notice -   Thoughts of being better off dead, or hurting yourself in some way -   PHQ 9 Score -   How difficult have these problems made it for you to do your work, take care of your home and get along with others -       Learning Assessment:  Learning Assessment 3/30/2022   PRIMARY LEARNER Patient   HIGHEST LEVEL OF EDUCATION - PRIMARY LEARNER  GRADUATED HIGH SCHOOL OR GED   BARRIERS PRIMARY LEARNER NONE   CO-LEARNER CAREGIVER No   PRIMARY LANGUAGE ENGLISH    NEED No   LEARNER PREFERENCE PRIMARY DEMONSTRATION   ANSWERED BY patient   RELATIONSHIP SELF       Abuse Screening:  Abuse Screening Questionnaire 3/30/2022   Do you ever feel afraid of your partner? N   Are you in a relationship with someone who physically or mentally threatens you? N   Is it safe for you to go home? Y       Generalized Anxiety  OTTO 2/7 5/27/2020   Feeling nervous, anxious or on edge?  0   Not being able to stop or control worrying? 0   OTTO-2 Subtotal 0 Health Maintenance Due   Topic Date Due    Foot Exam Q1  Never done    DTaP/Tdap/Td series (1 - Tdap) Never done    Cervical cancer screen  Never done    Shingrix Vaccine Age 50> (1 of 2) Never done    COVID-19 Vaccine (3 - Booster for Pfizer series) 03/05/2022    Flu Vaccine (1) Never done    Eye Exam Retinal or Dilated  08/28/2022   . Health Maintenance reviewed and discussed and ordered per Provider. Coordination of Care:  1. Have you been to the ER, urgent care clinic since your last visit? Hospitalized since your last visit? no    2. Have you seen or consulted any other health care providers outside of the [address] [place] [address] since your last visit? Include any pap smears or colon screening.  no      Advance Directive:  discussed 3/30/22

## 2022-10-12 ENCOUNTER — OFFICE VISIT (OUTPATIENT)
Dept: ORTHOPEDIC SURGERY | Age: 62
End: 2022-10-12
Payer: MEDICAID

## 2022-10-12 VITALS
TEMPERATURE: 98.4 F | HEIGHT: 62 IN | BODY MASS INDEX: 39.56 KG/M2 | HEART RATE: 100 BPM | OXYGEN SATURATION: 93 % | WEIGHT: 215 LBS

## 2022-10-12 DIAGNOSIS — G89.29 CHRONIC PAIN OF LEFT KNEE: ICD-10-CM

## 2022-10-12 DIAGNOSIS — M17.11 PRIMARY OSTEOARTHRITIS OF RIGHT KNEE: ICD-10-CM

## 2022-10-12 DIAGNOSIS — G89.29 CHRONIC PAIN OF RIGHT KNEE: ICD-10-CM

## 2022-10-12 DIAGNOSIS — M25.461 EFFUSION OF RIGHT KNEE: ICD-10-CM

## 2022-10-12 DIAGNOSIS — M25.562 CHRONIC PAIN OF LEFT KNEE: ICD-10-CM

## 2022-10-12 DIAGNOSIS — M25.561 CHRONIC PAIN OF RIGHT KNEE: ICD-10-CM

## 2022-10-12 DIAGNOSIS — M17.12 PRIMARY OSTEOARTHRITIS OF LEFT KNEE: Primary | ICD-10-CM

## 2022-10-12 PROCEDURE — 20610 DRAIN/INJ JOINT/BURSA W/O US: CPT | Performed by: SPECIALIST

## 2022-10-12 RX ORDER — BETAMETHASONE SODIUM PHOSPHATE AND BETAMETHASONE ACETATE 3; 3 MG/ML; MG/ML
6 INJECTION, SUSPENSION INTRA-ARTICULAR; INTRALESIONAL; INTRAMUSCULAR; SOFT TISSUE ONCE
Status: COMPLETED | OUTPATIENT
Start: 2022-10-12 | End: 2022-10-12

## 2022-10-12 RX ADMIN — BETAMETHASONE SODIUM PHOSPHATE AND BETAMETHASONE ACETATE 6 MG: 3; 3 INJECTION, SUSPENSION INTRA-ARTICULAR; INTRALESIONAL; INTRAMUSCULAR; SOFT TISSUE at 10:14

## 2022-10-12 NOTE — PROGRESS NOTES
Patient: Jacquelyn Lo                MRN: [id number]       SSN: [id number]  YOB: 1960        AGE: 62 y.o. SEX: female    PCP: Erma Soto NP  10/12/22    CC: BILATERAL KNEE PAIN     HISTORY:  Jacquelyn Lo is a 62 y.o. female who is seen for increased bilateral knee pain. She states that last weekend her right knee pain abruptly radiated into her leg and caused her excruciating pain. She responded to an Euflexxa series on 22 but her pains have returned. She twisted her knee as she was walking down the stairs recently. She has been experiencing bilateral knee pain for many years. She feels pain with standing, walking and stair climbing. She experiences startup pain after sitting. She does not recall any recent injury. She did injure her left knee as a child and she has a 1 inch scar below her left patella. 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 She has a h/o gout in her feet in the early 's. She has history of osteomyelitis of the thoracic spine T6-9. She was previously seen by Dr. Shobha Garcia for her back pain. Pain Assessment  10/12/2022   Location of Pain Knee   Pain Location Comment -   Location Modifiers Right   Severity of Pain 9   Quality of Pain Sharp   Quality of Pain Comment -   Duration of Pain Persistent   Frequency of Pain Constant   Aggravating Factors Bending   Aggravating Factors Comment -   Limiting Behavior Yes   Relieving Factors Rest   Relieving Factors Comment -   Result of Injury -     Occupation, etc:  Ms. Sruthi Holt is retired since . She worked at [employer] for 19 years. She misses her job. She has trouble gardening. She has history of osteomyelitis of the thoracic spine T6-9. 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 Her mother was in the ICU due to [place].  Her mother is now living in an assisted senior facility. She recovered but she's still not doing great. She lives in [address] with her sister. She has 2 sons. One was killed in a car accident when he was 23. Her other son lives locally. She has an 5 yo grandson. Ms. Nedra Ratliff weighs 215 lbs and is 5'2.5\" tall. She is diabetic. She takes Metformin. She says food is her drug.     Lab Results   Component Value Date/Time    Hemoglobin A1c 7.8 (H) 08/22/2022 12:00 AM    Hemoglobin A1c (POC) 11.5 07/18/2019 11:30 AM    Hemoglobin A1c, External 6.4 03/03/2022 07:37 AM     Weight Metrics 10/12/2022 8/25/2022 5/18/2022 5/15/2022 3/30/2022 2/9/2022 2/7/2022   Weight 215 lb 216 lb 3.2 oz 205 lb 215 lb 211 lb 204 lb 12.8 oz 200 lb   BMI 39.32 kg/m2 39.54 kg/m2 37.49 kg/m2 39.32 kg/m2 37.98 kg/m2 36.86 kg/m2 36 kg/m2       Patient Active Problem List   Diagnosis Code    Severe obesity with body mass index (BMI) of 35.0 to 39.9 with serious comorbidity ([facility]) E66.01    Discitis of thoracic region M46.44    Depression, major, recurrent, mild (HCC) F33.0    Type 2 diabetes mellitus with hyperglycemia, with long-term current use of insulin (HCC) E11.65, Z79.4    Dyslipidemia associated with type 2 diabetes mellitus (HCC) E11.69, E78.5    Essential hypertension I10    Cigarette nicotine dependence without complication H53.468     REVIEW OF SYSTEMS:    Constitutional Symptoms: Negative   Eyes: Negative   Ears, Nose, Throat and Mouth: Negative   Cardiovascular: Negative   Respiratory: Negative   Genitourinary: Per HPI   Gastrointestinal: Per HPI   Integumentary (Skin and/or Breast): Negative   Musculoskeletal: Per HPI   Endocrine/Rheumatologic: Negative   Neurological: Per HPI   Hematology/Lymphatic: Negative    Allergic/Immunologic: Negative   Phychiatric: Negative    Social History     Socioeconomic History    Marital status: SINGLE     Spouse name: Not on file    Number of children: Not on file    Years of education: Not on file    Highest education level: Not on file   Occupational History    Not on file   Tobacco Use    Smoking status: Every Day     Packs/day: 0.25     Types: Cigarettes    Smokeless tobacco: Never   Vaping Use    Vaping Use: Never used   Substance and Sexual Activity    Alcohol use: No    Drug use: No     Comment: on methadone matinance program    Sexual activity: Never   Other Topics Concern    Not on file   Social History Narrative    Not on file     Social Determinants of Health     Financial Resource Strain: Not on file   Food Insecurity: Not on file   Transportation Needs: Not on file   Physical Activity: Not on file   Stress: Not on file   Social Connections: Not on file   Intimate Partner Violence: Not on file   Housing Stability: Not on file      No Known Allergies   Current Outpatient Medications   Medication Sig    folic acid (FOLVITE) 1 mg tablet TAKE ONE TABLET BY MOUTH DAILY    nicotine (NICODERM CQ) 14 mg/24 hr patch 1 Patch by TransDERmal route every twenty-four (24) hours for 42 days. hydroCHLOROthiazide (HYDRODIURIL) 12.5 mg tablet TAKE ONE TABLET BY MOUTH DAILY    ibuprofen (MOTRIN) 600 mg tablet Take 1 Tablet by mouth every six (6) hours as needed for Pain.    metFORMIN (GLUMETZA ER) 500 mg TG24 24 hour tablet Take 500 mg by mouth two (2) times a day. nystatin (MYCOSTATIN) 100,000 unit/mL suspension Take 5 mL by mouth four (4) times daily. swish and spit    lancets (TRUEplus Lancets) 33 gauge misc USE TO TEST TWICE DAILY    atorvastatin (LIPITOR) 10 mg tablet TAKE ONE TABLET BY MOUTH DAILY    lamoTRIgine (LaMICtal) 100 mg tablet 100 mg daily. NovoLOG Mix 70-30FlexPen U-100 100 unit/mL (70-30) inpn 10 Units two (2) times a day. BD Ultra-Fine Mini Pen Needle 31 gauge x 3/16\" ndle USE AS DIRECTED WITH LANTUS    losartan (COZAAR) 25 mg tablet Take 25 mg by mouth daily. aspirin 81 mg chewable tablet Take 81 mg by mouth daily. glucose blood VI test strips (True Metrix Pro Test Strip) strip Test BG twice a day.   Dx:  E11.9    sertraline (ZOLOFT) 100 mg tablet TAKE TWO TABLETS BY MOUTH ONCE DAILY (Patient taking differently: Take 150 mg by mouth daily. TAKE TWO TABLETS BY MOUTH ONCE DAILY)    TRUE METRIX GLUCOSE METER misc     methadone HCl (METHADONE PO) Take 75 mg by mouth daily. No current facility-administered medications for this visit. PHYSICAL EXAMINATION:  Visit Vitals  Pulse 100   Temp 98.4 °F (36.9 °C) (Temporal)   Ht 5' 2\" (1.575 m)   Wt 215 lb (97.5 kg)   SpO2 93%   BMI 39.32 kg/m²        ORTHO EXAMINATION:    Examination Right knee Left knee   Skin Intact, psoriasis lower leg Intact, psoriasis lower leg   Range of motion 100-5 110-0   Effusion - -   Medial joint line tenderness + +   Lateral joint line tenderness - -   Popliteal tenderness - -   Osteophytes palpable + +   Sarys - -   Patella crepitus + +   Anterior drawer - -   Lateral laxity - -   Medial laxity - -   Varus deformity - -   Valgus deformity - -   Pretibial edema 1+ 1+   Calf tenderness - -   1 inch small patellar scar on the left from a childhood injury  Multiple pigmented spots on lower legs    TIME OUT:  Chart reviewed for the following:   I, Danielle Simmons MD, have reviewed the History, Physical and updated the Allergic reactions for Dread Matute   TIME OUT performed immediately prior to start of procedure:  Jeffy Lemos MD, have performed the following reviews on Dread Malone prior to the start of the procedure:          * Patient was identified by name and date of birth   * Agreement on procedure being performed was verified  * Risks and Benefits explained to the patient  * Procedure site verified and marked as necessary  * Patient was positioned for comfort  * Consent was obtained     Time: 10:02 AM    Date of procedure: 10/12/2022  Procedure performed by:  Danielle Simmons MD  Ms. Matute tolerated the procedure well with no complications.      RADIOGRAPHS:  XR RIGHT KNEE 5/15/22 [facility] ED  IMPRESSION  No acute fracture or dislocation Severe tricompartmental osteoarthritis     XR LEFT KNEE 12/29/21 [facility]:  IMPRESSION:  Three views with bilateral knees on AP view - No fractures, no effusion, moderately severe joint space narrowing, large osteophytes present. Kellgren Delmer grade 3    XR RIGHT KNEE 12/29/21 [facility]:  IMPRESSION:  Three views with bilateral knees on AP view - No fractures, no effusion, severe medial joint space narrowing, varus deformity, large osteophytes present, severe patellofemoral narrowing. Kellgren Delmer grade 4    IMPRESSION:      ICD-10-CM ICD-9-CM    1. Primary osteoarthritis of left knee  M17.12 715.16 betamethasone (CELESTONE) injection 6 mg      DRAIN/INJECT LARGE JOINT/BURSA      PROCEDURE AUTHORIZATION TO       2. Primary osteoarthritis of right knee  M17.11 715.16 betamethasone (CELESTONE) injection 6 mg      DRAIN/INJECT LARGE JOINT/BURSA      PROCEDURE AUTHORIZATION TO       3. Chronic pain of right knee  M25.561 719.46 betamethasone (CELESTONE) injection 6 mg    G89.29 338.29 DRAIN/INJECT LARGE JOINT/BURSA      PROCEDURE AUTHORIZATION TO       4. Chronic pain of left knee  M25.562 719.46 betamethasone (CELESTONE) injection 6 mg    G89.29 338.29 DRAIN/INJECT LARGE JOINT/BURSA      PROCEDURE AUTHORIZATION TO       5. Effusion of right knee  M25.461 719.06 DRAIN/INJECT LARGE JOINT/BURSA        PLAN: After timeout and under sterile conditions, right knee aspirated 35 cc of clear, light yellow fluid. The fluid was discarded. After discussing treatment options, patient's knees were injected with 4 cc Marcaine and 1/2 cc Celestone. Consider visco supplementation if pain continues. There is no need for surgery at this time. She will follow up as needed.     Scribed by Mary Victor ([address]) as dictated by Luna Soto MD

## 2022-10-27 DIAGNOSIS — B37.0 ORAL THRUSH: ICD-10-CM

## 2022-10-27 RX ORDER — NYSTATIN 100000 [USP'U]/ML
1 SUSPENSION ORAL 4 TIMES DAILY
Qty: 180 ML | Refills: 0 | Status: SHIPPED | OUTPATIENT
Start: 2022-10-27

## 2022-10-27 NOTE — TELEPHONE ENCOUNTER
Patient called and needs medication refill. Please advise. Requested Prescriptions     Pending Prescriptions Disp Refills    nystatin (MYCOSTATIN) 100,000 unit/mL suspension 180 mL 0     Sig: Take 5 mL by mouth four (4) times daily.  swish and spit

## 2022-11-10 DIAGNOSIS — E53.8 LOW FOLATE: ICD-10-CM

## 2022-11-10 RX ORDER — FOLIC ACID 1 MG/1
TABLET ORAL
Qty: 30 TABLET | Refills: 0 | Status: SHIPPED | OUTPATIENT
Start: 2022-11-10

## 2022-12-09 DIAGNOSIS — E53.8 LOW FOLATE: ICD-10-CM

## 2022-12-10 DIAGNOSIS — I10 ESSENTIAL HYPERTENSION: ICD-10-CM

## 2022-12-10 RX ORDER — HYDROCHLOROTHIAZIDE 12.5 MG/1
TABLET ORAL
Qty: 90 TABLET | Refills: 0 | Status: SHIPPED | OUTPATIENT
Start: 2022-12-10

## 2022-12-13 RX ORDER — FOLIC ACID 1 MG/1
TABLET ORAL
Qty: 30 TABLET | Refills: 0 | Status: SHIPPED | OUTPATIENT
Start: 2022-12-13

## 2023-01-23 DIAGNOSIS — E53.8 LOW FOLATE: ICD-10-CM

## 2023-01-23 RX ORDER — FOLIC ACID 1 MG/1
TABLET ORAL
Qty: 30 TABLET | Refills: 0 | Status: SHIPPED | OUTPATIENT
Start: 2023-01-23

## 2023-02-01 ENCOUNTER — OFFICE VISIT (OUTPATIENT)
Dept: ORTHOPEDIC SURGERY | Age: 63
End: 2023-02-01
Payer: MEDICAID

## 2023-02-01 VITALS — WEIGHT: 210 LBS | BODY MASS INDEX: 38.64 KG/M2 | HEIGHT: 62 IN | RESPIRATION RATE: 16 BRPM | TEMPERATURE: 97.8 F

## 2023-02-01 DIAGNOSIS — M46.26 OSTEOMYELITIS OF LUMBAR SPINE (HCC): ICD-10-CM

## 2023-02-01 DIAGNOSIS — M25.461 EFFUSION OF RIGHT KNEE: ICD-10-CM

## 2023-02-01 DIAGNOSIS — M17.12 PRIMARY OSTEOARTHRITIS OF LEFT KNEE: Primary | ICD-10-CM

## 2023-02-01 DIAGNOSIS — E11.9 TYPE 2 DIABETES MELLITUS WITHOUT COMPLICATION, UNSPECIFIED WHETHER LONG TERM INSULIN USE (HCC): ICD-10-CM

## 2023-02-01 DIAGNOSIS — R26.81 DIFFICULTY STANDING: ICD-10-CM

## 2023-02-01 DIAGNOSIS — M17.11 PRIMARY OSTEOARTHRITIS OF RIGHT KNEE: ICD-10-CM

## 2023-02-01 RX ORDER — BETAMETHASONE SODIUM PHOSPHATE AND BETAMETHASONE ACETATE 3; 3 MG/ML; MG/ML
3 INJECTION, SUSPENSION INTRA-ARTICULAR; INTRALESIONAL; INTRAMUSCULAR; SOFT TISSUE ONCE
Status: COMPLETED | OUTPATIENT
Start: 2023-02-01 | End: 2023-02-01

## 2023-02-01 RX ADMIN — BETAMETHASONE SODIUM PHOSPHATE AND BETAMETHASONE ACETATE 3 MG: 3; 3 INJECTION, SUSPENSION INTRA-ARTICULAR; INTRALESIONAL; INTRAMUSCULAR; SOFT TISSUE at 10:24

## 2023-02-01 NOTE — LETTER
2/1/2023 10:18 AM    [address]  [address] [address] [address]        To Whom It May Concern:    Jerardo Monaco is currently under the care of [address] [place], [place] [address]daphnie [place]. Due to orthopaedic condition please no long periods of standing .     If any questions or concerns please contact our office [phone]    Sincerely,      Cary Perea MD

## 2023-02-01 NOTE — Clinical Note
NOTIFICATION RETURN TO WORK / SCHOOL    2/1/2023 10:18 AM    [address]  [address] [address] [address]      To Whom It May Concern:    Jacquelyn Lo is currently under the care of [address] [place] [address] [place]. She will return to work/school on: ***    If there are questions or concerns please have the patient contact our office.         Sincerely,      Adal Morgan MD

## 2023-02-01 NOTE — PROGRESS NOTES
Patient: John Sanchez                MRN: [id number]       SSN: [id number]  YOB: 1960        AGE: 62 y.o. SEX: female     PCP: Amina Pham NP  2/1/23     CC: BILATERAL KNEE PAIN, RIGHT KNEE SWELLING     HISTORY:  John Sanchez is a 62 y.o. female who is seen for increased bilateral knee pain and right knee swelling. She states that last weekend her right knee pain abruptly radiated into her leg and caused her excruciating pain. She responded to an Euflexxa series on 2/9/22 but her pains have returned. She twisted her knee as she was walking down the stairs recently. She has been experiencing bilateral knee pain for many years. She feels pain with standing, walking and stair climbing. She experiences startup pain after sitting. She does not recall any recent injury. She did injure her left knee as a child and she has a 1 inch scar below her left patella. 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 She has a h/o gout in her feet in the early 2000's. She has history of osteomyelitis of the thoracic spine T6-9. She was previously seen by Dr. Janee Monaco for her back pain. Pain Assessment  10/12/2022   Location of Pain Knee   Pain Location Comment -   Location Modifiers Right   Severity of Pain 9   Quality of Pain Sharp   Quality of Pain Comment -   Duration of Pain Persistent   Frequency of Pain Constant   Aggravating Factors Bending   Aggravating Factors Comment -   Limiting Behavior Yes   Relieving Factors Rest   Relieving Factors Comment -   Result of Injury -      Occupation, etc:  Ms. General Dixon is retired since 2015. She worked at [employer] for 19 years. She misses her job. She has trouble gardening. She has history of osteomyelitis of the thoracic spine T6-9. 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 Her mother was in the ICU due to [place].  Her mother is now living in an assisted senior facility. She recovered but she's still not doing great. She lives in [place] with her sister. She has 2 sons. One was killed in a car accident when he was 23. Her other son lives locally. She has an 5 yo grandson. Ms. Thuan Barahona weighs 215 lbs and is 5'2.5\" tall. She is diabetic. She takes Metformin. She says food is her drug.            Lab Results   Component Value Date/Time     Hemoglobin A1c 7.8 (H) 08/22/2022 12:00 AM     Hemoglobin A1c (POC) 11.5 07/18/2019 11:30 AM     Hemoglobin A1c, External 6.4 03/03/2022 07:37 AM      Weight Metrics 10/12/2022 8/25/2022 5/18/2022 5/15/2022 3/30/2022 2/9/2022 2/7/2022   Weight 215 lb 216 lb 3.2 oz 205 lb 215 lb 211 lb 204 lb 12.8 oz 200 lb   BMI 39.32 kg/m2 39.54 kg/m2 37.49 kg/m2 39.32 kg/m2 37.98 kg/m2 36.86 kg/m2 36 kg/m2              Patient Active Problem List   Diagnosis Code    Severe obesity with body mass index (BMI) of 35.0 to 39.9 with serious comorbidity ([facility]) E66.01    Discitis of thoracic region M46.44    Depression, major, recurrent, mild (HCC) F33.0    Type 2 diabetes mellitus with hyperglycemia, with long-term current use of insulin (HCC) E11.65, Z79.4    Dyslipidemia associated with type 2 diabetes mellitus (HCC) E11.69, E78.5    Essential hypertension I10    Cigarette nicotine dependence without complication O19.890      REVIEW OF SYSTEMS:               Constitutional Symptoms: Negative              Eyes: Negative              Ears, Nose, Throat and Mouth: Negative              Cardiovascular: Negative              Respiratory: Negative              Genitourinary: Per HPI              Gastrointestinal: Per HPI              Integumentary (Skin and/or Breast): Negative              Musculoskeletal: Per HPI              Endocrine/Rheumatologic: Negative              Neurological: Per HPI              Hematology/Lymphatic: Negative               Allergic/Immunologic: Negative              Phychiatric: Negative Social History   Social History            Socioeconomic History    Marital status: SINGLE       Spouse name: Not on file    Number of children: Not on file    Years of education: Not on file    Highest education level: Not on file   Occupational History    Not on file   Tobacco Use    Smoking status: Every Day       Packs/day: 0.25       Types: Cigarettes    Smokeless tobacco: Never   Vaping Use    Vaping Use: Never used   Substance and Sexual Activity    Alcohol use: No    Drug use: No       Comment: on methadone matinance program    Sexual activity: Never   Other Topics Concern    Not on file   Social History Narrative    Not on file      Social Determinants of Health      Financial Resource Strain: Not on file   Food Insecurity: Not on file   Transportation Needs: Not on file   Physical Activity: Not on file   Stress: Not on file   Social Connections: Not on file   Intimate Partner Violence: Not on file   Housing Stability: Not on file         No Known Allergies        Current Outpatient Medications   Medication Sig    folic acid (FOLVITE) 1 mg tablet TAKE ONE TABLET BY MOUTH DAILY    nicotine (NICODERM CQ) 14 mg/24 hr patch 1 Patch by TransDERmal route every twenty-four (24) hours for 42 days. hydroCHLOROthiazide (HYDRODIURIL) 12.5 mg tablet TAKE ONE TABLET BY MOUTH DAILY    ibuprofen (MOTRIN) 600 mg tablet Take 1 Tablet by mouth every six (6) hours as needed for Pain.    metFORMIN (GLUMETZA ER) 500 mg TG24 24 hour tablet Take 500 mg by mouth two (2) times a day. nystatin (MYCOSTATIN) 100,000 unit/mL suspension Take 5 mL by mouth four (4) times daily. swish and spit    lancets (TRUEplus Lancets) 33 gauge misc USE TO TEST TWICE DAILY    atorvastatin (LIPITOR) 10 mg tablet TAKE ONE TABLET BY MOUTH DAILY    lamoTRIgine (LaMICtal) 100 mg tablet 100 mg daily. NovoLOG Mix 70-30FlexPen U-100 100 unit/mL (70-30) inpn 10 Units two (2) times a day.     BD Ultra-Fine Mini Pen Needle 31 gauge x 3/16\" ndle USE AS DIRECTED WITH LANTUS    losartan (COZAAR) 25 mg tablet Take 25 mg by mouth daily. aspirin 81 mg chewable tablet Take 81 mg by mouth daily. glucose blood VI test strips (True Metrix Pro Test Strip) strip Test BG twice a day. Dx:  E11.9    sertraline (ZOLOFT) 100 mg tablet TAKE TWO TABLETS BY MOUTH ONCE DAILY (Patient taking differently: Take 150 mg by mouth daily. TAKE TWO TABLETS BY MOUTH ONCE DAILY)    TRUE METRIX GLUCOSE METER misc      methadone HCl (METHADONE PO) Take 75 mg by mouth daily. No current facility-administered medications for this visit.       PHYSICAL EXAMINATION:  Visit Vitals  Pulse 100   Temp 98.4 °F (36.9 °C) (Temporal)   Ht 5' 2\" (1.575 m)   Wt 215 lb (97.5 kg)   SpO2 93%   BMI 39.32 kg/m²         ORTHO EXAMINATION:     Examination Right knee Left knee   Skin Intact, psoriasis lower leg Intact, psoriasis lower leg   Range of motion 100-5 110-0   Effusion - -   Medial joint line tenderness + +   Lateral joint line tenderness - -   Popliteal tenderness - -   Osteophytes palpable + +   Sarys - -   Patella crepitus + +   Anterior drawer - -   Lateral laxity - -   Medial laxity - -   Varus deformity - -   Valgus deformity - -   Pretibial edema 1+ 1+   Calf tenderness - -   1 inch small patellar scar on the left from a childhood injury  Multiple pigmented spots on lower legs     TIME OUT:  Chart reviewed for the following:              I, Torrey Willis MD, have reviewed the History, Physical and updated the Allergic reactions for Dread Matute   TIME OUT performed immediately prior to start of procedure:  Gaudencio Santos MD, have performed the following reviews on Dread Webber prior to the start of the procedure:          * Patient was identified by name and date of birth   * Agreement on procedure being performed was verified  * Risks and Benefits explained to the patient  * Procedure site verified and marked as necessary  * Patient was positioned for comfort  * Consent was obtained                Time: 10:02 AM     Date of procedure: 2/1/23  Procedure performed by:  Swapnil Natarajan MD  Ms. Matute tolerated the procedure well with no complications. RADIOGRAPHS:  XR RIGHT KNEE 5/15/22 [facility] ED  IMPRESSION  No acute fracture or dislocation   Severe tricompartmental osteoarthritis      XR LEFT KNEE 12/29/21 [facility]:  IMPRESSION:  Three views with bilateral knees on AP view - No fractures, no effusion, moderately severe joint space narrowing, large osteophytes present. Kellgren Delmer grade 3     XR RIGHT KNEE 12/29/21 [facility]:  IMPRESSION:  Three views with bilateral knees on AP view - No fractures, no effusion, severe medial joint space narrowing, varus deformity, large osteophytes present, severe patellofemoral narrowing. Kellgren Delmer grade 4     IMPRESSION:        ICD-10-CM ICD-9-CM     1. Primary osteoarthritis of left knee  M17.12 715.16 betamethasone (CELESTONE) injection 6 mg         DRAIN/INJECT LARGE JOINT/BURSA         PROCEDURE AUTHORIZATION TO        2. Primary osteoarthritis of right knee  M17.11 715.16 betamethasone (CELESTONE) injection 6 mg         DRAIN/INJECT LARGE JOINT/BURSA         PROCEDURE AUTHORIZATION TO        3. Chronic pain of right knee  M25.561 719.46 betamethasone (CELESTONE) injection 6 mg     G89.29 338.29 DRAIN/INJECT LARGE JOINT/BURSA         PROCEDURE AUTHORIZATION TO        4. Chronic pain of left knee  M25.562 719.46 betamethasone (CELESTONE) injection 6 mg     G89.29 338.29 DRAIN/INJECT LARGE JOINT/BURSA         PROCEDURE AUTHORIZATION TO        5. Effusion of right knee  M25.461 719.06 DRAIN/INJECT LARGE JOINT/BURSA          PLAN: After timeout and under sterile conditions, right knee aspirated 35 cc of clear, light yellow fluid. The fluid was discarded. After discussing treatment options, patient's knees were injected with 4 cc Marcaine and 1/2 cc Celestone.  Consider visco supplementation if pain continues. There is no need for surgery at this time. She will follow up as needed.

## 2023-02-02 ENCOUNTER — TELEPHONE (OUTPATIENT)
Dept: ORTHOPEDIC SURGERY | Age: 63
End: 2023-02-02

## 2023-02-02 NOTE — TELEPHONE ENCOUNTER
Patient called and said that she saw Theodore Bro for her Right Knee yesterday. That he had drained her knee. Patient said that today she is in a lot of pain. That her pain level is a 7. Patient said that the last time he had drained her knee, she did not have any pain, but then he had also given her a Cortisone injection. This time  did not give her a Cortisone injection. Patient is requesting a call back. Patient would like to know if this is normal.    Patient tel. [phone].

## 2023-02-02 NOTE — TELEPHONE ENCOUNTER
Patient advised she did receive a cortisone injection on 2/1/2023 as per Dr. Clarissa Orozco note and that it may take several days for the injection to have full effect. advised her to give it more time and use ice to help with her pain.

## 2023-02-03 DIAGNOSIS — E78.5 DYSLIPIDEMIA ASSOCIATED WITH TYPE 2 DIABETES MELLITUS (HCC): Primary | ICD-10-CM

## 2023-02-03 DIAGNOSIS — Z79.4 TYPE 2 DIABETES MELLITUS WITH HYPERGLYCEMIA, WITH LONG-TERM CURRENT USE OF INSULIN (HCC): Primary | ICD-10-CM

## 2023-02-03 DIAGNOSIS — E11.65 TYPE 2 DIABETES MELLITUS WITH HYPERGLYCEMIA, WITH LONG-TERM CURRENT USE OF INSULIN (HCC): Primary | ICD-10-CM

## 2023-02-03 DIAGNOSIS — E11.69 DYSLIPIDEMIA ASSOCIATED WITH TYPE 2 DIABETES MELLITUS (HCC): Primary | ICD-10-CM

## 2023-02-05 DIAGNOSIS — E11.69 DYSLIPIDEMIA ASSOCIATED WITH TYPE 2 DIABETES MELLITUS (HCC): ICD-10-CM

## 2023-02-05 DIAGNOSIS — Z79.4 TYPE 2 DIABETES MELLITUS WITH HYPERGLYCEMIA, WITH LONG-TERM CURRENT USE OF INSULIN (HCC): Primary | ICD-10-CM

## 2023-02-05 DIAGNOSIS — E78.5 DYSLIPIDEMIA ASSOCIATED WITH TYPE 2 DIABETES MELLITUS (HCC): ICD-10-CM

## 2023-02-05 DIAGNOSIS — E11.65 TYPE 2 DIABETES MELLITUS WITH HYPERGLYCEMIA, WITH LONG-TERM CURRENT USE OF INSULIN (HCC): Primary | ICD-10-CM

## 2023-02-21 ENCOUNTER — HOSPITAL ENCOUNTER (OUTPATIENT)
Facility: HOSPITAL | Age: 63
Discharge: HOME | End: 2023-02-24

## 2023-02-21 LAB — LABCORP SPECIMEN COLLECTION: NORMAL

## 2023-02-21 PROCEDURE — 99001 SPECIMEN HANDLING PT-LAB: CPT

## 2023-02-22 ASSESSMENT — ENCOUNTER SYMPTOMS: SHORTNESS OF BREATH: 0

## 2023-02-22 NOTE — PROGRESS NOTES
Chief Complaint   Patient presents with    Hypertension    Diabetes    Depression    Cholesterol Problem    Nicotine Dependence    Hemorrhoids    Other     Patient states that she has a prolapse Vagina. Last pap 2009     Assessment & Plan:     1. Vaginal prolapse  Comments:  Pelvic exam deferred per patient, consult gyn  Orders:  -     Amb External Referral To Gynecology  2. Hemorrhoids, unspecified hemorrhoid type  Comments:  Rectal exam deferred per patient, wants to hold off on GI consult  3. Cigarette nicotine dependence without complication  Assessment & Plan:  Started smoking again, restart nicotine patch  Orders:  -     nicotine (NICODERM CQ) 21 MG/24HR; Place 1 patch onto the skin daily, Disp-42 patch, R-0Normal  4. Type 2 diabetes mellitus with hyperglycemia, with long-term current use of insulin (HCC)  Assessment & Plan:  A1c at goal of <7, continue management per endo  Orders:  -     Comprehensive Metabolic Panel; Future  -     Hemoglobin A1C; Future  -     Lipid Panel; Future  5. Dyslipidemia associated with type 2 diabetes mellitus ([facility] [address]  Assessment & Plan:  LDL close to goal, continue regimen  Recheck lipid panel in 6 mos  Orders:  -     Comprehensive Metabolic Panel; Future  -     Lipid Panel; Future  6. Essential hypertension  Assessment & Plan:  BP at goal, <130/80, continue regimen  Orders:  -     Comprehensive Metabolic Panel; Future  -     Lipid Panel; Future  7. Depression, major, recurrent, mild (HCC)  Assessment & Plan:  Stable on regimen, continue management per psychiatry  Orders:  -     Comprehensive Metabolic Panel; Future  8. Encounter for screening mammogram for malignant neoplasm of breast  -     MICK DIGITAL SCREEN W OR WO CAD BILATERAL; Future  9. Encounter to discuss test results    Follow-up and Dispositions    Return in about 3 months (around 5/24/2023) for blood pressure, cholesterol, diabetes, (no labs)-30 MINUTES.        Subjective:     HPI    Vaginal Prolapse-  Noticed this about a year ago  Never mentioned it before because she was embarrassed  She is unable to urinate properly  Last PAP was 2009    Hemorrhoids-  Has been an opioid addict for 20 years  She is being managed at the methadone clinic  Has been constipated for \"a long time\"  She has been using stool softener  Denies any blood in the stool  Has never had a colonoscopy done    Type 2 DM-  Home BG readings range from 130-150  Hypoglycemia:  On statin: Atorvastatin 10 mg  Comorbid: HLD, HTN, morbid obesity  Followed by endocrine: Leo Davey, has an appointment sometime in April   Current treatment: Metformin  mg BID, Novolog 70/30 20 units BID     Hyperlipidemia  Compliant with meds  Comorbid: type 2 DM, HTN  Current treatment: Atorvastatin 10 mg     Hypertension-  Symptoms:  Fatigue  BP readings at home are in the 559H systolic  Comorbid: type 2 DM, HLD, morbid obesity  Current treatment: Losartan 25 mg     Nicotine Dependence-  Years of smokin years  Amount:  8 cigarettes per day  Previous attempt to quit: yes  Past medications used for cessation: Nicotine patch (second pack of 14 mg)  Current symptoms: None  She quit but restarted a month ago     Depression -  Presents today for depression  Ongoing symptoms include:  see PHQ-9  Current treatment:  sertraline 150 mg (1.5 tab of 100 mg), lamictal 100 mg  PHQ-9 Questionaire 2023 10/6/2022 2022   Little interest or pleasure in doing things 3 0 1   Feeling down, depressed, or hopeless 0 0 1   Trouble falling or staying asleep, or sleeping too much 0 - 0   Feeling tired or having little energy 1 - 3   Poor appetite or overeating 0 - 0   Feeling bad about yourself - or that you are a failure or have let yourself or your family down 0 - 0   Trouble concentrating on things, such as reading the newspaper or watching television 0 - 0   Moving or speaking so slowly that other people could have noticed.  Or the opposite - being so fidgety or restless that you have been moving around a lot more than usual 0 - 0   Thoughts that you would be better off dead, or of hurting yourself in some way 0 - -   PHQ-9 Total Score 4 0 5   If you checked off any problems, how difficult have these problems made it for you to do your work, take care of things at home, or get along with other people? 0 - -     Health Maintenance:  COVID-19 vac- due for booster  Flu vac - already received  Shingles vac - recommended  Tetanus vac - recommended  Diabetic foot exam - declined   PAP - referred    Review of Systems   Respiratory:  Negative for shortness of breath. Cardiovascular:  Negative for chest pain and leg swelling. Gastrointestinal:  Positive for constipation. Negative for blood in stool and rectal pain. Genitourinary:         Reports vaginal prolapse   Neurological:  Negative for dizziness, light-headedness and headaches. Objective:   /71 (Site: Left Upper Arm, Position: Sitting, Cuff Size: Large Adult)   Pulse 83   Temp 97.6 °F (36.4 °C) (Oral)   Resp 17   Ht 5' 2\" (1.575 m)   Wt 213 lb (96.6 kg)   SpO2 96%   BMI 38.96 kg/m²     Physical Exam  Vitals and nursing note reviewed. Constitutional:       General: She is not in acute distress. Appearance: She is not ill-appearing. HENT:      Head: Normocephalic and atraumatic. Cardiovascular:      Rate and Rhythm: Normal rate and regular rhythm. Pulmonary:      Effort: Pulmonary effort is normal. No respiratory distress. Breath sounds: No wheezing, rhonchi or rales. Genitourinary:     Comments: Rectal/pelvic exam deferred per patient  Musculoskeletal:         General: Normal range of motion. Skin:     General: Skin is warm and dry. Neurological:      General: No focal deficit present. Mental Status: She is alert. Psychiatric:         Mood and Affect: Mood normal.         Thought Content:  Thought content normal.         Judgment: Judgment normal.       TRACY Garcia

## 2023-02-24 ENCOUNTER — OFFICE VISIT (OUTPATIENT)
Age: 63
End: 2023-02-24
Payer: MEDICAID

## 2023-02-24 VITALS
TEMPERATURE: 97.6 F | DIASTOLIC BLOOD PRESSURE: 71 MMHG | SYSTOLIC BLOOD PRESSURE: 121 MMHG | RESPIRATION RATE: 17 BRPM | BODY MASS INDEX: 39.2 KG/M2 | WEIGHT: 213 LBS | HEART RATE: 83 BPM | OXYGEN SATURATION: 96 % | HEIGHT: 62 IN

## 2023-02-24 DIAGNOSIS — Z12.31 ENCOUNTER FOR SCREENING MAMMOGRAM FOR MALIGNANT NEOPLASM OF BREAST: ICD-10-CM

## 2023-02-24 DIAGNOSIS — Z79.4 TYPE 2 DIABETES MELLITUS WITH HYPERGLYCEMIA, WITH LONG-TERM CURRENT USE OF INSULIN (HCC): ICD-10-CM

## 2023-02-24 DIAGNOSIS — K64.9 HEMORRHOIDS, UNSPECIFIED HEMORRHOID TYPE: ICD-10-CM

## 2023-02-24 DIAGNOSIS — E78.5 DYSLIPIDEMIA ASSOCIATED WITH TYPE 2 DIABETES MELLITUS (HCC): ICD-10-CM

## 2023-02-24 DIAGNOSIS — Z71.2 ENCOUNTER TO DISCUSS TEST RESULTS: ICD-10-CM

## 2023-02-24 DIAGNOSIS — E11.69 DYSLIPIDEMIA ASSOCIATED WITH TYPE 2 DIABETES MELLITUS (HCC): ICD-10-CM

## 2023-02-24 DIAGNOSIS — I10 ESSENTIAL HYPERTENSION: ICD-10-CM

## 2023-02-24 DIAGNOSIS — F33.0 DEPRESSION, MAJOR, RECURRENT, MILD (HCC): ICD-10-CM

## 2023-02-24 DIAGNOSIS — N81.10 VAGINAL PROLAPSE: Primary | ICD-10-CM

## 2023-02-24 DIAGNOSIS — F17.210 CIGARETTE NICOTINE DEPENDENCE WITHOUT COMPLICATION: ICD-10-CM

## 2023-02-24 DIAGNOSIS — E11.65 TYPE 2 DIABETES MELLITUS WITH HYPERGLYCEMIA, WITH LONG-TERM CURRENT USE OF INSULIN (HCC): ICD-10-CM

## 2023-02-24 PROCEDURE — 3078F DIAST BP <80 MM HG: CPT | Performed by: NURSE PRACTITIONER

## 2023-02-24 PROCEDURE — 3074F SYST BP LT 130 MM HG: CPT | Performed by: NURSE PRACTITIONER

## 2023-02-24 PROCEDURE — 99214 OFFICE O/P EST MOD 30 MIN: CPT | Performed by: NURSE PRACTITIONER

## 2023-02-24 RX ORDER — NICOTINE 21 MG/24HR
1 PATCH, TRANSDERMAL 24 HOURS TRANSDERMAL DAILY
Qty: 42 PATCH | Refills: 0 | Status: SHIPPED | OUTPATIENT
Start: 2023-02-24 | End: 2023-04-07

## 2023-02-24 SDOH — ECONOMIC STABILITY: HOUSING INSECURITY
IN THE LAST 12 MONTHS, WAS THERE A TIME WHEN YOU DID NOT HAVE A STEADY PLACE TO SLEEP OR SLEPT IN A SHELTER (INCLUDING NOW)?: PATIENT REFUSED

## 2023-02-24 SDOH — ECONOMIC STABILITY: FOOD INSECURITY: WITHIN THE PAST 12 MONTHS, YOU WORRIED THAT YOUR FOOD WOULD RUN OUT BEFORE YOU GOT MONEY TO BUY MORE.: PATIENT DECLINED

## 2023-02-24 SDOH — ECONOMIC STABILITY: INCOME INSECURITY: HOW HARD IS IT FOR YOU TO PAY FOR THE VERY BASICS LIKE FOOD, HOUSING, MEDICAL CARE, AND HEATING?: PATIENT DECLINED

## 2023-02-24 SDOH — ECONOMIC STABILITY: FOOD INSECURITY: WITHIN THE PAST 12 MONTHS, THE FOOD YOU BOUGHT JUST DIDN'T LAST AND YOU DIDN'T HAVE MONEY TO GET MORE.: PATIENT DECLINED

## 2023-02-24 ASSESSMENT — PATIENT HEALTH QUESTIONNAIRE - PHQ9
8. MOVING OR SPEAKING SO SLOWLY THAT OTHER PEOPLE COULD HAVE NOTICED. OR THE OPPOSITE, BEING SO FIGETY OR RESTLESS THAT YOU HAVE BEEN MOVING AROUND A LOT MORE THAN USUAL: 0
1. LITTLE INTEREST OR PLEASURE IN DOING THINGS: 3
SUM OF ALL RESPONSES TO PHQ QUESTIONS 1-9: 4
2. FEELING DOWN, DEPRESSED OR HOPELESS: 0
3. TROUBLE FALLING OR STAYING ASLEEP: 0
SUM OF ALL RESPONSES TO PHQ QUESTIONS 1-9: 4
SUM OF ALL RESPONSES TO PHQ9 QUESTIONS 1 & 2: 3
7. TROUBLE CONCENTRATING ON THINGS, SUCH AS READING THE NEWSPAPER OR WATCHING TELEVISION: 0
9. THOUGHTS THAT YOU WOULD BE BETTER OFF DEAD, OR OF HURTING YOURSELF: 0
6. FEELING BAD ABOUT YOURSELF - OR THAT YOU ARE A FAILURE OR HAVE LET YOURSELF OR YOUR FAMILY DOWN: 0
4. FEELING TIRED OR HAVING LITTLE ENERGY: 1
10. IF YOU CHECKED OFF ANY PROBLEMS, HOW DIFFICULT HAVE THESE PROBLEMS MADE IT FOR YOU TO DO YOUR WORK, TAKE CARE OF THINGS AT HOME, OR GET ALONG WITH OTHER PEOPLE: 0
5. POOR APPETITE OR OVEREATING: 0

## 2023-02-24 ASSESSMENT — ENCOUNTER SYMPTOMS
CONSTIPATION: 1
BLOOD IN STOOL: 0
RECTAL PAIN: 0

## 2023-02-24 NOTE — PROGRESS NOTES
Mary Alice Davila presents today for   Chief Complaint   Patient presents with    Hypertension    Diabetes    Depression    Cholesterol Problem    Nicotine Dependence    Hemorrhoids    Other     Patient states that she has a prolapse Vagina. Last pap 2009       Is someone accompanying this pt? no    Is the patient using any DME equipment during [address]? no    Depression Screening:  PHQ-9 Questionaire 2/24/2023 10/6/2022 8/25/2022 7/28/2022 3/30/2022   Little interest or pleasure in doing things 3 0 1 0 0   Feeling down, depressed, or hopeless 0 0 1 0 0   Trouble falling or staying asleep, or sleeping too much 0 - 0 - -   Feeling tired or having little energy 1 - 3 - -   Poor appetite or overeating 0 - 0 - -   Feeling bad about yourself - or that you are a failure or have let yourself or your family down 0 - 0 - -   Trouble concentrating on things, such as reading the newspaper or watching television 0 - 0 - -   Moving or speaking so slowly that other people could have noticed. Or the opposite - being so fidgety or restless that you have been moving around a lot more than usual 0 - 0 - -   Thoughts that you would be better off dead, or of hurting yourself in some way 0 - - - -   PHQ-9 Total Score 4 0 5 0 0   If you checked off any problems, how difficult have these problems made it for you to do your work, take care of things at home, or get along with other people? 0 - - - -        ELZA 7-Anxiety   No flowsheet data found. Learning Assessment:  No question data found. Fall Risk  No flowsheet data found. Travel Screening:    Travel Screening       Question Response    In the last 10 days, have you been in contact with someone who was confirmed or suspected to have Coronavirus/COVID-19? No / Unsure    Have you had a COVID-19 viral test in the last 10 days? No    Do you have any of the following new or worsening symptoms? None of these    Have you traveled internationally or domestically in the last month?  No Travel History   Travel since 01/24/23    No documented travel since 01/24/23          Health Maintenance reviewed and discussed and ordered per Provider. Social Determinants of Health     Tobacco Use: High Risk    Smoking Tobacco Use: Every Day    Smokeless Tobacco Use: Never    Passive Exposure: Not on file   Alcohol Use: Not on file   Financial Resource Strain: Unknown    Difficulty of Paying Living Expenses: Patient refused   Food Insecurity: Unknown    Worried About Running Out of Food in the Last Year: Patient refused    [address] in the Last Year: Patient refused   Transportation Needs: Unknown    Lack of Transportation (Medical): Not on file    Lack of Transportation (Non-Medical): Patient refused   Physical Activity: Not on file   Stress: Not on file   Social Connections: Not on file   Intimate Partner Violence: Not on file   Depression: At risk    PHQ-2 Score: 4   Housing Stability: Unknown    Unable to Pay for Housing in the Last Year: Not on file    Number of [place] in the Last Year: Not on file    Unstable Housing in the Last Year: Patient refused        Health Maintenance Due   Topic Date Due    Diabetic foot exam  Never done    DTaP/Tdap/Td vaccine (1 - Tdap) Never done    Cervical cancer screen  Never done    Shingles vaccine (1 of 2) Never done    Diabetic retinal exam  08/28/2021    COVID-19 Vaccine (3 - Booster for Pfizer series) 11/30/2021    Flu vaccine (1) Never done   . Coordination of Care:  1. Have you been to the ER, urgent care clinic since your last visit? Hospitalized since your last visit? no    2. Have you seen or consulted any other health care providers outside of the [address] [place] [address] since your last visit? Include any pap smears or colon screening.  no

## 2023-03-09 DIAGNOSIS — I10 ESSENTIAL HYPERTENSION: Primary | ICD-10-CM

## 2023-03-09 RX ORDER — HYDROCHLOROTHIAZIDE 12.5 MG/1
TABLET ORAL
Qty: 90 TABLET | Refills: 0 | Status: SHIPPED | OUTPATIENT
Start: 2023-03-09

## 2023-03-18 ENCOUNTER — APPOINTMENT (OUTPATIENT)
Facility: HOSPITAL | Age: 63
End: 2023-03-18
Payer: MEDICAID

## 2023-03-18 ENCOUNTER — HOSPITAL ENCOUNTER (EMERGENCY)
Facility: HOSPITAL | Age: 63
Discharge: HOME | End: 2023-03-18
Attending: EMERGENCY MEDICINE
Payer: MEDICAID

## 2023-03-18 VITALS
RESPIRATION RATE: 18 BRPM | HEIGHT: 62 IN | HEART RATE: 70 BPM | BODY MASS INDEX: 36.8 KG/M2 | SYSTOLIC BLOOD PRESSURE: 179 MMHG | TEMPERATURE: 98.8 F | DIASTOLIC BLOOD PRESSURE: 97 MMHG | OXYGEN SATURATION: 99 % | WEIGHT: 200 LBS

## 2023-03-18 DIAGNOSIS — M25.522 LEFT ELBOW PAIN: Primary | ICD-10-CM

## 2023-03-18 PROCEDURE — 96372 THER/PROPH/DIAG INJ SC/IM: CPT

## 2023-03-18 PROCEDURE — 6360000002 HC RX W HCPCS: Performed by: EMERGENCY MEDICINE

## 2023-03-18 PROCEDURE — 99284 EMERGENCY DEPT VISIT MOD MDM: CPT

## 2023-03-18 PROCEDURE — 73070 X-RAY EXAM OF ELBOW: CPT

## 2023-03-18 RX ORDER — KETOROLAC TROMETHAMINE 15 MG/ML
30 INJECTION, SOLUTION INTRAMUSCULAR; INTRAVENOUS ONCE
Status: COMPLETED | OUTPATIENT
Start: 2023-03-18 | End: 2023-03-18

## 2023-03-18 RX ORDER — METHADONE HYDROCHLORIDE 10 MG/1
85 TABLET ORAL DAILY
COMMUNITY

## 2023-03-18 RX ORDER — HYDROCODONE BITARTRATE AND ACETAMINOPHEN 5; 325 MG/1; MG/1
2 TABLET ORAL
Status: DISCONTINUED | OUTPATIENT
Start: 2023-03-18 | End: 2023-03-18

## 2023-03-18 RX ORDER — IBUPROFEN 600 MG/1
600 TABLET ORAL EVERY 6 HOURS PRN
Qty: 30 TABLET | Refills: 0 | Status: SHIPPED | OUTPATIENT
Start: 2023-03-18

## 2023-03-18 RX ADMIN — KETOROLAC TROMETHAMINE 30 MG: 15 INJECTION, SOLUTION INTRAMUSCULAR; INTRAVENOUS at 06:49

## 2023-03-18 ASSESSMENT — PAIN DESCRIPTION - LOCATION
LOCATION: ARM
LOCATION: ARM

## 2023-03-18 ASSESSMENT — PAIN DESCRIPTION - ORIENTATION
ORIENTATION: LEFT
ORIENTATION: LEFT

## 2023-03-18 ASSESSMENT — PAIN SCALES - GENERAL
PAINLEVEL_OUTOF10: 10
PAINLEVEL_OUTOF10: 10

## 2023-03-18 ASSESSMENT — LIFESTYLE VARIABLES
HOW OFTEN DO YOU HAVE A DRINK CONTAINING ALCOHOL: MONTHLY OR LESS
HOW MANY STANDARD DRINKS CONTAINING ALCOHOL DO YOU HAVE ON A TYPICAL DAY: 1 OR 2

## 2023-03-18 ASSESSMENT — PAIN DESCRIPTION - ONSET: ONSET: AWAKENED FROM SLEEP

## 2023-03-18 ASSESSMENT — PAIN DESCRIPTION - FREQUENCY: FREQUENCY: CONTINUOUS

## 2023-03-18 ASSESSMENT — PAIN DESCRIPTION - PAIN TYPE: TYPE: ACUTE PAIN

## 2023-03-18 ASSESSMENT — PAIN DESCRIPTION - DIRECTION: RADIATING_TOWARDS: SHOULDER

## 2023-03-18 ASSESSMENT — PAIN DESCRIPTION - DESCRIPTORS
DESCRIPTORS: BURNING
DESCRIPTORS: BURNING

## 2023-03-18 ASSESSMENT — PAIN - FUNCTIONAL ASSESSMENT: PAIN_FUNCTIONAL_ASSESSMENT: 0-10

## 2023-03-18 NOTE — ED NOTES
Pt requesting ace wrap be taken off, stating she cant wear it right now. Pt verbalized understanding of purpose to ace wrap. I have reviewed discharge instruction and prescriptions with patient. Patient verbalized understanding and has no further questions at this time. Education taught and patient verbalized understanding of education. Teach back method used. Patients pain 5/10. Patient discharged ambulatory from triage to home.         Kayleen Sinha RN  03/18/23 7693

## 2023-03-18 NOTE — ED PROVIDER NOTES
EMERGENCY DEPARTMENT HISTORY AND PHYSICAL EXAM      Patient Name: Duran Blackwood  MRN: [id number]  YOB: 1960  Provider: Codie Baptiste MD  PCP: THOM Washington   Time/Date of evaluation: 6:47 AM EDT on 3/18/23    History of Presenting Illness     Chief Complaint   Patient presents with    Arm Pain     Left       History Provided By: Patient     History Spencer Gupta):   Duran Blackwood is a 65 y.o. female with a PMHX of depression, diabetes, hepatitis C, prior heroin use, now on methadone, hypertension, and a prior episode of osteomyelitis  who presents to the emergency department  by POV C/O left elbow pain that began yesterday morning. She states that the pain started in her left elbow area and it radiates up to her shoulder and down to her wrist.  She states that it is a sharp pain. She states that she cannot get into a comfortable position. She states that the pain is somewhat circular and her entire elbow hurts all the way around. She denies any recent trauma. The patient states that she is right-handed.         Past History     Past Medical History:  Past Medical History:   Diagnosis Date    Depression     Diabetes ([facility] [address]     Hepatitis C     Heroin use disorder, moderate, in early remission ([facility] [address]     [facility]    Hypertension     Ill-defined condition     Chronic back pain    Osteomyelitis of thoracic spine [facility])        Past Surgical History:  Past Surgical History:   Procedure Laterality Date    CT BIOPSY PERCUTANEOUS SUPERFICIAL BONE  6/21/2019    CT BIOPSY PERCUTANEOUS SUPERFICIAL BONE 6/21/2019 [facility] CT    IR PERC ASPIRATION INTERVERT DISC  11/12/2018    IR PERC ASPIRATION INTERVERT [facility] 11/12/2018 [facility] RAD ANGIO IR    IR PERC ASPIRATION INTERVERT DISC  11/15/2018    IR PERC ASPIRATION INTERVERT [facility] 11/15/2018 [facility] RAD ANGIO IR       Family History:  Family History   Problem Relation Age of Onset    High Cholesterol Mother     Hypertension Mother     COPD Mother Hypertension Father        Social History:  Social History     Tobacco Use    Smoking status: Every Day     Packs/day: 0.25     Types: Cigarettes    Smokeless tobacco: Never   Vaping Use    Vaping Use: Never used   Substance Use Topics    Alcohol use: No    Drug use: No       Medications:  Current Facility-Administered Medications   Medication Dose Route Frequency Provider Last Rate Last Admin    ketorolac (TORADOL) injection 30 mg  30 mg IntraMUSCular Once Law Mg MD         Current Outpatient Medications   Medication Sig Dispense Refill    methadone (DOLOPHINE) 10 MG tablet Take 85 mg by mouth daily.       hydroCHLOROthiazide (HYDRODIURIL) 12.5 MG tablet TAKE ONE TABLET BY MOUTH DAILY 90 tablet 0    nicotine (NICODERM CQ) 21 MG/24HR Place 1 patch onto the skin daily 42 patch 0    aspirin 81 MG chewable tablet Take 81 mg by mouth daily      atorvastatin (LIPITOR) 10 MG tablet TAKE ONE TABLET BY MOUTH DAILY      folic acid (FOLVITE) 1 MG tablet TAKE ONE TABLET BY MOUTH DAILY      ibuprofen (ADVIL;MOTRIN) 600 MG tablet Take 600 mg by mouth every 6 hours as needed      insulin aspart protamine-insulin aspart (NOVOLOG MIX 70/30 FLEXPEN) (70-30) 100 UNIT/ML injection 20 Units 2 times daily      lamoTRIgine (LAMICTAL) 100 MG tablet 100 mg daily      losartan (COZAAR) 25 MG tablet Take 25 mg by mouth daily      metFORMIN, MOD, (GLUMETZA) 500 MG extended release tablet Take 500 mg by mouth 2 times daily      nystatin (MYCOSTATIN) 522362 UNIT/ML suspension Take 500,000 Units by mouth 4 times daily      sertraline (ZOLOFT) 100 MG tablet Take 150 mg by mouth daily         Allergies:  No Known Allergies    Social Determinants of Health:  Social Determinants of Health     Tobacco Use: High Risk    Smoking Tobacco Use: Every Day    Smokeless Tobacco Use: Never    Passive Exposure: Not on file   Alcohol Use: Not on file   Financial Resource Strain: Unknown    Difficulty of Paying Living Expenses: Patient refused   Food Insecurity: Unknown    Worried About Running Out of Food in the Last Year: Patient refused    [address] in the Last Year: Patient refused   Transportation Needs: Unknown    Lack of Transportation (Medical): Not on file    Lack of Transportation (Non-Medical): Patient refused   Physical Activity: Not on file   Stress: Not on file   Social Connections: Not on file   Intimate Partner Violence: Not on file   Depression: At risk    PHQ-2 Score: 4   Housing Stability: Unknown    Unable to Pay for Housing in the Last Year: Not on file    Number of Places Lived in the Last Year: Not on file    Unstable Housing in the Last Year: Patient refused       Review of Systems     Negative except as listed above in HPI. Physical Exam     Vitals:    03/18/23 0601   BP: (!) 179/97   Pulse: 70   Resp: 18   Temp: 98.8 °F (37.1 °C)   TempSrc: Oral   Weight: 200 lb (90.7 kg)   Height: 5' 2\" (1.575 m)       Physical Exam  Vitals and nursing note reviewed. Constitutional:       Comments: Uncomfortable   HENT:      Head: Normocephalic and atraumatic. Right Ear: External ear normal.      Left Ear: External ear normal.      Nose: Nose normal.      Mouth/Throat:      Mouth: Mucous membranes are moist.      Pharynx: Oropharynx is clear. Eyes:      Extraocular Movements: Extraocular movements intact. Conjunctiva/sclera: Conjunctivae normal.      Pupils: Pupils are equal, round, and reactive to light. Cardiovascular:      Rate and Rhythm: Normal rate and regular rhythm. Pulses: Normal pulses. Heart sounds: Normal heart sounds. Pulmonary:      Effort: Pulmonary effort is normal.      Breath sounds: Normal breath sounds. Abdominal:      General: Abdomen is flat. Bowel sounds are normal.      Palpations: Abdomen is soft. Musculoskeletal:      Left elbow: Swelling present. No deformity, effusion or lacerations. Decreased range of motion. Tenderness present in medial epicondyle and lateral epicondyle.  No radial head or olecranon process tenderness. Cervical back: Normal range of motion and neck supple. Neurological:      Mental Status: She is alert. Diagnostic Study Results     Labs:  Recent Results (from the past 12 hour(s))   EKG 12 Lead    Collection Time: 03/18/23  6:32 AM   Result Value Ref Range    Ventricular Rate 62 BPM    Atrial Rate 62 BPM    P-R Interval 158 ms    QRS Duration 86 ms    Q-T Interval 432 ms    QTc Calculation (Bazett) 438 ms    P Axis 36 degrees    R Axis 8 degrees    T Axis 28 degrees    Diagnosis Normal sinus rhythm        Radiologic Studies:   XR ELBOW LEFT (2 VIEWS)    (Results Pending)     Multiple bony abnormalities of the left elbow but they appear to be old. EKG interpretation: (Preliminary)  EKG read by Dr. Saida Smith at 5361 sinus rhythm at a rate of 62, normal axis, normal intervals, no ST or T wave changes    Procedures     Procedures    ED Course     6:47 AM EDT I Saida Smith MD) am the first provider for this patient. Initial assessment performed. I reviewed the vital signs, available nursing notes, past medical history, past surgical history, family history and social history. The patients presenting problems have been discussed, and they are in agreement with the care plan formulated and outlined with them. I have encouraged them to ask questions as they arise throughout their visit. Records Reviewed: {Records Reviewed:54352::\"Nursing Notes\"}    Is this patient to be included in the SEP-1 core measure due to severe sepsis or septic shock?  {Sep-1 Core Yes/No:040279}    MEDICATIONS ADMINISTERED IN THE ED:  Medications   ketorolac (TORADOL) injection 30 mg (30 mg IntraMUSCular Given 3/18/23 7857)            Medical Decision Making     CC/HPI Summary, DDx, ED Course, and Reassessment: The patient is a 56-year-old woman with past medical history significant for prior heroin use, who is now on methadone, and has had prior episodes of osteomyelitis, who presents the ED today with diffuse left elbow pain. She also has decreased range of motion. My differential diagnosis includes medial epicondylitis, lateral epicondylitis, olecranon bursitis, arthritis versus a septic joint. A septic joint is less likely because she does not have any surrounding erythema. The patient does decline any blood work at this time. She did would just like to get pain medication and an x-ray. Disposition Considerations (tests considered but not done, Admit vs D/C, Shared Decision Making, Pt Expectation of Test or Tx.): Blood work      Critical Care Time:     ***    Harpreet Vital MD    Diagnosis and Disposition     I Maranda Xiao MD am the primary clinician of record. DIAGNOSIS: No diagnosis found. DISPOSITION        PATIENT REFERRED TO:  No follow-up provider specified. DISCHARGE MEDICATIONS:  New Prescriptions    No medications on file       DISCONTINUED MEDICATIONS:  Discontinued Medications    No medications on file              (Please note that portions of this note were completed with a voice recognition program.  Efforts were made to edit the dictations but occasionally words are mis-transcribed.)    Maranda Xiao MD (electronically signed)        Dragon Disclaimer     Please note that this dictation was completed with Hemoteq, the computer voice recognition software. Quite often unanticipated grammatical, syntax, homophones, and other interpretive errors are inadvertently transcribed by the computer software. Please disregard these errors. Please excuse any errors that have escaped final proofreading.

## 2023-03-18 NOTE — ED TRIAGE NOTES
Pt came in ambulatory with c/o left arm pain starting from forearm radiating to left shoulder. Pain started yesterday morning, but worse at 0130 this morning. States she took ibuprofen with no relief. Denies any trauma and injury to arm.     Past Medical History:   Diagnosis Date    Depression     Diabetes ([facility] [address]     Hepatitis C     Heroin use disorder, moderate, in early remission ([address]     [facility] on [facility]    Hypertension     Ill-defined condition     Chronic back pain    Osteomyelitis of thoracic spine ([facility] [address]

## 2023-03-19 LAB
EKG ATRIAL RATE: 62 BPM
EKG DIAGNOSIS: NORMAL
EKG P AXIS: 36 DEGREES
EKG P-R INTERVAL: 158 MS
EKG Q-T INTERVAL: 432 MS
EKG QRS DURATION: 86 MS
EKG QTC CALCULATION (BAZETT): 438 MS
EKG R AXIS: 8 DEGREES
EKG T AXIS: 28 DEGREES
EKG VENTRICULAR RATE: 62 BPM

## 2023-03-21 RX ORDER — FOLIC ACID 1 MG/1
TABLET ORAL
Qty: 30 TABLET | Refills: 0 | Status: SHIPPED | OUTPATIENT
Start: 2023-03-21

## 2023-03-25 ENCOUNTER — HOSPITAL ENCOUNTER (OUTPATIENT)
Facility: HOSPITAL | Age: 63
End: 2023-03-25
Payer: MEDICAID

## 2023-03-25 DIAGNOSIS — Z12.31 ENCOUNTER FOR SCREENING MAMMOGRAM FOR MALIGNANT NEOPLASM OF BREAST: ICD-10-CM

## 2023-03-25 PROCEDURE — 77067 SCR MAMMO BI INCL CAD: CPT

## 2023-03-27 ENCOUNTER — OFFICE VISIT (OUTPATIENT)
Age: 63
End: 2023-03-27
Payer: MEDICAID

## 2023-03-27 VITALS — HEIGHT: 63 IN | WEIGHT: 213 LBS | TEMPERATURE: 96.9 F | BODY MASS INDEX: 37.74 KG/M2

## 2023-03-27 DIAGNOSIS — M25.622 DECREASED RANGE OF MOTION OF LEFT ELBOW: ICD-10-CM

## 2023-03-27 DIAGNOSIS — M19.012 ARTHRITIS OF LEFT SHOULDER REGION: ICD-10-CM

## 2023-03-27 DIAGNOSIS — M77.12 LATERAL EPICONDYLITIS OF LEFT ELBOW: ICD-10-CM

## 2023-03-27 DIAGNOSIS — M19.022 ARTHRITIS OF LEFT ELBOW: ICD-10-CM

## 2023-03-27 DIAGNOSIS — M25.512 ACUTE PAIN OF LEFT SHOULDER: Primary | ICD-10-CM

## 2023-03-27 PROCEDURE — 73030 X-RAY EXAM OF SHOULDER: CPT | Performed by: PHYSICIAN ASSISTANT

## 2023-03-27 PROCEDURE — 99214 OFFICE O/P EST MOD 30 MIN: CPT | Performed by: PHYSICIAN ASSISTANT

## 2023-03-27 RX ORDER — PEN NEEDLE, DIABETIC 31 GX3/16"
NEEDLE, DISPOSABLE MISCELLANEOUS
COMMUNITY
Start: 2023-03-06

## 2023-03-27 RX ORDER — METFORMIN HYDROCHLORIDE 500 MG/1
TABLET, EXTENDED RELEASE ORAL
COMMUNITY
Start: 2023-03-12

## 2023-03-27 RX ORDER — INSULIN GLARGINE 100 [IU]/ML
10 INJECTION, SOLUTION SUBCUTANEOUS EVERY EVENING
COMMUNITY

## 2023-03-27 NOTE — PROGRESS NOTES
have asked the patient to schedule an appointment with their primary care provider for follow-up on general health maintenance concerns. Today all the patient's questions were answered to their satisfaction. Copies of x-rays reviewed if obtained this visit, and provided to patient. Dictation disclaimer:  Please note that this dictation was completed with \"Dragon\", the computer voice recognition software. Today's exam was dictated using fluency dictation software (voice recognition software). Occasionally, sound-a-like computer induced   dictation errors will populate the report. Quite often unanticipated grammatical, syntax, homophones, and other interpretive errors are inadvertently transcribed by the computer software. Please disregard these errors. Please excuse any errors that have escaped final proofreading. Fernanda SAUCEDA, APC, MPAS, PA-C  [facility]

## 2023-03-31 ENCOUNTER — TELEPHONE (OUTPATIENT)
Facility: CLINIC | Age: 63
End: 2023-03-31

## 2023-03-31 NOTE — TELEPHONE ENCOUNTER
----- Message from THOM Cooley sent at 3/27/2023  1:36 PM EDT -----  Mammogram normal, we will repeat in 1 year.

## 2023-04-27 RX ORDER — FOLIC ACID 1 MG/1
TABLET ORAL
Qty: 30 TABLET | Refills: 0 | Status: SHIPPED | OUTPATIENT
Start: 2023-04-27

## 2023-06-05 RX ORDER — FOLIC ACID 1 MG/1
TABLET ORAL
Qty: 30 TABLET | Refills: 0 | Status: SHIPPED | OUTPATIENT
Start: 2023-06-05

## 2023-06-06 DIAGNOSIS — I10 ESSENTIAL HYPERTENSION: ICD-10-CM

## 2023-06-06 RX ORDER — HYDROCHLOROTHIAZIDE 12.5 MG/1
TABLET ORAL
Qty: 90 TABLET | Refills: 0 | Status: SHIPPED | OUTPATIENT
Start: 2023-06-06

## 2023-06-06 NOTE — TELEPHONE ENCOUNTER
Refill approved, due for follow up in August, fasting labs 1 week before. Please call to schedule for 30 minutes. Thank you!

## 2023-08-03 ENCOUNTER — TELEPHONE (OUTPATIENT)
Facility: CLINIC | Age: 63
End: 2023-08-03

## 2023-08-03 PROBLEM — M46.44 DISCITIS OF THORACIC REGION: Status: RESOLVED | Noted: 2018-11-11 | Resolved: 2023-08-03

## 2023-08-03 ASSESSMENT — ENCOUNTER SYMPTOMS: SHORTNESS OF BREATH: 0

## 2023-08-03 NOTE — TELEPHONE ENCOUNTER
Please call Dr. Elena Mckenna office (endo) and request most recent notes and labs. Need this before Monday's appointment. Thank you!

## 2023-08-03 NOTE — TELEPHONE ENCOUNTER
Spoke w/ Con Quails and requested notes and labs to be faxed to us. Made her aware pt has appt Monday and we will need prior to this.

## 2023-08-03 NOTE — ASSESSMENT & PLAN NOTE
A1c at goal of <7  Continue management per endo, will request records from Dr. Alyssa Mack  Defer future A1c checks to endo

## 2023-08-05 ENCOUNTER — HOSPITAL ENCOUNTER (OUTPATIENT)
Facility: HOSPITAL | Age: 63
Discharge: HOME | End: 2023-08-08
Payer: MEDICAID

## 2023-08-05 DIAGNOSIS — E11.65 TYPE 2 DIABETES MELLITUS WITH HYPERGLYCEMIA, WITH LONG-TERM CURRENT USE OF INSULIN (HCC): ICD-10-CM

## 2023-08-05 DIAGNOSIS — Z79.4 TYPE 2 DIABETES MELLITUS WITH HYPERGLYCEMIA, WITH LONG-TERM CURRENT USE OF INSULIN (HCC): ICD-10-CM

## 2023-08-05 DIAGNOSIS — E11.69 DYSLIPIDEMIA ASSOCIATED WITH TYPE 2 DIABETES MELLITUS (HCC): ICD-10-CM

## 2023-08-05 DIAGNOSIS — F33.0 DEPRESSION, MAJOR, RECURRENT, MILD (HCC): ICD-10-CM

## 2023-08-05 DIAGNOSIS — I10 ESSENTIAL HYPERTENSION: ICD-10-CM

## 2023-08-05 DIAGNOSIS — E78.5 DYSLIPIDEMIA ASSOCIATED WITH TYPE 2 DIABETES MELLITUS (HCC): ICD-10-CM

## 2023-08-05 LAB
ALBUMIN SERPL-MCNC: 3.2 G/DL (ref 3.4–5)
ALBUMIN/GLOB SERPL: 0.7 (ref 0.8–1.7)
ALP SERPL-CCNC: 99 U/L (ref 45–117)
ALT SERPL-CCNC: 18 U/L (ref 13–56)
ANION GAP SERPL CALC-SCNC: 4 MMOL/L (ref 3–18)
AST SERPL-CCNC: 21 U/L (ref 10–38)
BILIRUB SERPL-MCNC: 0.4 MG/DL (ref 0.2–1)
BUN SERPL-MCNC: 10 MG/DL (ref 7–18)
BUN/CREAT SERPL: 13 (ref 12–20)
CALCIUM SERPL-MCNC: 9.4 MG/DL (ref 8.5–10.1)
CHLORIDE SERPL-SCNC: 102 MMOL/L (ref 100–111)
CHOLEST SERPL-MCNC: 120 MG/DL
CO2 SERPL-SCNC: 32 MMOL/L (ref 21–32)
CREAT SERPL-MCNC: 0.75 MG/DL (ref 0.6–1.3)
EST. AVERAGE GLUCOSE BLD GHB EST-MCNC: 126 MG/DL
GLOBULIN SER CALC-MCNC: 4.6 G/DL (ref 2–4)
GLUCOSE SERPL-MCNC: 109 MG/DL (ref 74–99)
HBA1C MFR BLD: 6 % (ref 4.2–5.6)
HDLC SERPL-MCNC: 34 MG/DL (ref 40–60)
HDLC SERPL: 3.5 (ref 0–5)
LDLC SERPL CALC-MCNC: 64.4 MG/DL (ref 0–100)
LIPID PANEL: ABNORMAL
POTASSIUM SERPL-SCNC: 3.9 MMOL/L (ref 3.5–5.5)
PROT SERPL-MCNC: 7.8 G/DL (ref 6.4–8.2)
SODIUM SERPL-SCNC: 138 MMOL/L (ref 136–145)
TRIGL SERPL-MCNC: 108 MG/DL
VLDLC SERPL CALC-MCNC: 21.6 MG/DL

## 2023-08-05 PROCEDURE — 83036 HEMOGLOBIN GLYCOSYLATED A1C: CPT

## 2023-08-05 PROCEDURE — 80053 COMPREHEN METABOLIC PANEL: CPT

## 2023-08-05 PROCEDURE — 36415 COLL VENOUS BLD VENIPUNCTURE: CPT

## 2023-08-05 PROCEDURE — 80061 LIPID PANEL: CPT

## 2023-08-07 ENCOUNTER — OFFICE VISIT (OUTPATIENT)
Facility: CLINIC | Age: 63
End: 2023-08-07
Payer: MEDICAID

## 2023-08-07 VITALS
HEART RATE: 94 BPM | DIASTOLIC BLOOD PRESSURE: 75 MMHG | OXYGEN SATURATION: 93 % | RESPIRATION RATE: 16 BRPM | WEIGHT: 202 LBS | BODY MASS INDEX: 35.79 KG/M2 | HEIGHT: 63 IN | TEMPERATURE: 97.9 F | SYSTOLIC BLOOD PRESSURE: 130 MMHG

## 2023-08-07 DIAGNOSIS — E11.69 DYSLIPIDEMIA ASSOCIATED WITH TYPE 2 DIABETES MELLITUS (HCC): ICD-10-CM

## 2023-08-07 DIAGNOSIS — Z79.4 TYPE 2 DIABETES MELLITUS WITH HYPERGLYCEMIA, WITH LONG-TERM CURRENT USE OF INSULIN (HCC): ICD-10-CM

## 2023-08-07 DIAGNOSIS — I10 ESSENTIAL HYPERTENSION: Primary | ICD-10-CM

## 2023-08-07 DIAGNOSIS — E78.5 DYSLIPIDEMIA ASSOCIATED WITH TYPE 2 DIABETES MELLITUS (HCC): ICD-10-CM

## 2023-08-07 DIAGNOSIS — E11.65 TYPE 2 DIABETES MELLITUS WITH HYPERGLYCEMIA, WITH LONG-TERM CURRENT USE OF INSULIN (HCC): ICD-10-CM

## 2023-08-07 DIAGNOSIS — F33.0 DEPRESSION, MAJOR, RECURRENT, MILD (HCC): ICD-10-CM

## 2023-08-07 DIAGNOSIS — Z71.2 ENCOUNTER TO DISCUSS TEST RESULTS: ICD-10-CM

## 2023-08-07 DIAGNOSIS — Z23 NEED FOR PROPHYLACTIC VACCINATION AGAINST DIPHTHERIA-TETANUS-PERTUSSIS (DTP): ICD-10-CM

## 2023-08-07 PROCEDURE — 3044F HG A1C LEVEL LT 7.0%: CPT | Performed by: NURSE PRACTITIONER

## 2023-08-07 PROCEDURE — 99214 OFFICE O/P EST MOD 30 MIN: CPT | Performed by: NURSE PRACTITIONER

## 2023-08-07 PROCEDURE — 3078F DIAST BP <80 MM HG: CPT | Performed by: NURSE PRACTITIONER

## 2023-08-07 PROCEDURE — 3075F SYST BP GE 130 - 139MM HG: CPT | Performed by: NURSE PRACTITIONER

## 2023-08-07 ASSESSMENT — PATIENT HEALTH QUESTIONNAIRE - PHQ9
5. POOR APPETITE OR OVEREATING: 0
SUM OF ALL RESPONSES TO PHQ QUESTIONS 1-9: 0
SUM OF ALL RESPONSES TO PHQ QUESTIONS 1-9: 0
7. TROUBLE CONCENTRATING ON THINGS, SUCH AS READING THE NEWSPAPER OR WATCHING TELEVISION: 0
SUM OF ALL RESPONSES TO PHQ QUESTIONS 1-9: 0
4. FEELING TIRED OR HAVING LITTLE ENERGY: 0
3. TROUBLE FALLING OR STAYING ASLEEP: 0
SUM OF ALL RESPONSES TO PHQ QUESTIONS 1-9: 0
SUM OF ALL RESPONSES TO PHQ9 QUESTIONS 1 & 2: 0
8. MOVING OR SPEAKING SO SLOWLY THAT OTHER PEOPLE COULD HAVE NOTICED. OR THE OPPOSITE, BEING SO FIGETY OR RESTLESS THAT YOU HAVE BEEN MOVING AROUND A LOT MORE THAN USUAL: 0
1. LITTLE INTEREST OR PLEASURE IN DOING THINGS: 0
9. THOUGHTS THAT YOU WOULD BE BETTER OFF DEAD, OR OF HURTING YOURSELF: 0
10. IF YOU CHECKED OFF ANY PROBLEMS, HOW DIFFICULT HAVE THESE PROBLEMS MADE IT FOR YOU TO DO YOUR WORK, TAKE CARE OF THINGS AT HOME, OR GET ALONG WITH OTHER PEOPLE: 0
2. FEELING DOWN, DEPRESSED OR HOPELESS: 0
6. FEELING BAD ABOUT YOURSELF - OR THAT YOU ARE A FAILURE OR HAVE LET YOURSELF OR YOUR FAMILY DOWN: 0

## 2023-08-07 NOTE — PROGRESS NOTES
Chief Complaint   Patient presents with    Hypertension    Cholesterol Problem    Depression     Assessment & Plan:     1. Essential hypertension  Assessment & Plan:  BP much-improved, goal <130/80  Continue Losartan 25 mg, HCTZ 12.5 mg daily  Orders:  -     CBC with Auto Differential; Future  -     Comprehensive Metabolic Panel; Future  2. Dyslipidemia associated with type 2 diabetes mellitus ([address])  Assessment & Plan:  LDL at goal of <70, continue atorvastatin 10 mg QHS  Recheck lipid panel in 6 mos  Orders:  -     Comprehensive Metabolic Panel; Future  -     Lipid Panel; Future  3. Type 2 diabetes mellitus with hyperglycemia, with long-term current use of insulin (HCC)  Assessment & Plan:  A1c at goal of <7  Continue management per endo, will request records from Dr. Nicola Echeverria  Defer future A1c checks to endo  Orders:  -     Comprehensive Metabolic Panel; Future  4. Depression, major, recurrent, mild ([address])  Assessment & Plan:  Stable, continue management per psychiatry  Orders:  -     CBC with Auto Differential; Future  -     Comprehensive Metabolic Panel; Future  5. Need for prophylactic vaccination against diphtheria-tetanus-pertussis (DTP)  Comments:  Declined TDAP  6. Encounter to discuss test results  Comments:  Fasting lab results reviewed in detail with patient    Follow-up and Dispositions    Return in about 6 months (around 2/7/2024) for diabetes, cholesterol, blood pressure, lab results.        Subjective:     HPI    Type 2 DM-  Home BG readings range from 120-160  Hypoglycemia:  On statin: Atorvastatin 10 mg  Comorbid: HLD, HTN, morbid obesity  Followed by endocrine: Leo Echeverria, has an appointment sometime in April   Current treatment: Metformin  mg BID, Novolog 70/30 20 units BID     Hyperlipidemia  Compliant with meds  Comorbid: type 2 DM, HTN  Current treatment: Atorvastatin 10 mg     Hypertension-  Symptoms:  Fatigue  BP readings at home are not being checked, has a
Drinks: 1 or 2    Frequency of Binge Drinking: Never   Financial Resource Strain: Unknown    Difficulty of Paying Living Expenses: Patient refused   Food Insecurity: Unknown    Worried About Running Out of Food in the Last Year: Patient refused    [address] in the Last Year: Patient refused   Transportation Needs: Unknown    Lack of Transportation (Medical): Not on file    Lack of Transportation (Non-Medical): Patient refused   Physical Activity: Not on file   Stress: Not on file   Social Connections: Not on file   Intimate Partner Violence: Not on file   Depression: Not at risk    PHQ-2 Score: 0   Housing Stability: Unknown    Unable to Pay for Housing in the Last Year: Not on file    Number of [address] in the Last Year: Not on file    Unstable Housing in the Last Year: Patient refused        Health Maintenance Due   Topic Date Due    Diabetic foot exam  Never done    DTaP/Tdap/Td vaccine (1 - Tdap) Never done    Cervical cancer screen  Never done    Shingles vaccine (1 of 2) Never done    Diabetic retinal exam  08/28/2021    COVID-19 Vaccine (3 - Booster for Pfizer series) 11/30/2021    Flu vaccine (1) Never done    Diabetic Alb to Cr ratio (uACR) test  08/22/2023   . Coordination of Care:  1. Have you been to the ER, urgent care clinic since your last visit? Hospitalized since your last visit? no    2. Have you seen or consulted any other health care providers outside of the [address] [place] [address] since your last visit? Include any pap smears or colon screening.  no

## 2023-09-06 DIAGNOSIS — I10 ESSENTIAL HYPERTENSION: ICD-10-CM

## 2023-09-06 RX ORDER — HYDROCHLOROTHIAZIDE 12.5 MG/1
TABLET ORAL
Qty: 90 TABLET | Refills: 1 | Status: SHIPPED | OUTPATIENT
Start: 2023-09-06

## 2023-11-14 ASSESSMENT — ENCOUNTER SYMPTOMS: SHORTNESS OF BREATH: 0

## 2023-11-16 ENCOUNTER — OFFICE VISIT (OUTPATIENT)
Facility: CLINIC | Age: 63
End: 2023-11-16
Payer: MEDICAID

## 2023-11-16 VITALS
HEART RATE: 81 BPM | OXYGEN SATURATION: 98 % | WEIGHT: 195 LBS | SYSTOLIC BLOOD PRESSURE: 123 MMHG | BODY MASS INDEX: 34.54 KG/M2 | DIASTOLIC BLOOD PRESSURE: 71 MMHG | TEMPERATURE: 96.8 F

## 2023-11-16 DIAGNOSIS — B37.0 ORAL THRUSH: Primary | ICD-10-CM

## 2023-11-16 PROCEDURE — 3074F SYST BP LT 130 MM HG: CPT | Performed by: NURSE PRACTITIONER

## 2023-11-16 PROCEDURE — 99214 OFFICE O/P EST MOD 30 MIN: CPT | Performed by: NURSE PRACTITIONER

## 2023-11-16 PROCEDURE — 3078F DIAST BP <80 MM HG: CPT | Performed by: NURSE PRACTITIONER

## 2023-11-16 NOTE — PROGRESS NOTES
Mark Lauren presents today for No chief complaint on file. Is someone accompanying this pt? NO    Is the patient using any DME equipment during OV? NO    Depression Screenin/7/2023    11:24 AM 2023     1:43 PM 10/6/2022     1:01 PM 2022     2:26 PM 2022     8:45 AM 3/30/2022     1:07 PM   PHQ-9 Questionaire   Little interest or pleasure in doing things 0 3 0 1 0 0   Feeling down, depressed, or hopeless 0 0 0 1 0 0   Trouble falling or staying asleep, or sleeping too much 0 0  0     Feeling tired or having little energy 0 1  3     Poor appetite or overeating 0 0  0     Feeling bad about yourself - or that you are a failure or have let yourself or your family down 0 0  0     Trouble concentrating on things, such as reading the newspaper or watching television 0 0  0     Moving or speaking so slowly that other people could have noticed. Or the opposite - being so fidgety or restless that you have been moving around a lot more than usual 0 0  0     Thoughts that you would be better off dead, or of hurting yourself in some way 0 0       PHQ-9 Total Score 0 4 0 5 0 0   If you checked off any problems, how difficult have these problems made it for you to do your work, take care of things at home, or get along with other people? 0 0            ELZA 7-Anxiety        No data to display                 Learning Assessment:  No question data found. Fall Risk       No data to display                   Travel Screening:    Travel Screening     No screening recorded since 11/15/23 0000       Travel History   Travel since 10/16/23    No documented travel since 10/16/23          Health Maintenance reviewed and discussed and ordered per Provider.   Social Determinants of Health     Tobacco Use: High Risk (2023)    Patient History     Smoking Tobacco Use: Every Day     Smokeless Tobacco Use: Never     Passive Exposure: Not on file   Alcohol Use: Not At Risk (3/18/2023)    AUDIT-C     Frequency
Pulmonary:      Effort: Pulmonary effort is normal. No respiratory distress. Breath sounds: No wheezing, rhonchi or rales. Musculoskeletal:         General: Normal range of motion. Skin:     General: Skin is warm and dry. Neurological:      General: No focal deficit present. Mental Status: She is alert. Psychiatric:         Mood and Affect: Mood normal.         Thought Content:  Thought content normal.         Judgment: Judgment normal.       CLEOPATRA MelvinC

## 2023-11-17 DIAGNOSIS — I10 ESSENTIAL HYPERTENSION: Primary | ICD-10-CM

## 2023-11-17 NOTE — TELEPHONE ENCOUNTER
[facility]: ADHERENCE REVIEW  Identified care gap per [employer] fills at [address]  : ACE/ARB adherence    Patient also appears to be prescribed: Statin    ASSESSMENT    ACE/ARB ADHERENCE    Insurance Records claims through  23  (Prior Year [address] = not reported; YTD [address] = FIRST FILL; Potential Fail Date: 23):   LOSARTAN POT TAB 25 MG last filled on 23 for 90 day supply. Next refill due: 10.30.23 -PAST DUE 18 DAYS     Prescribed si tablet/capsule daily    Per Insurer Portal: last filled on 23 for 90 day supply. Per [address] [place] [address]: last picked up on 23 for 90 day supply. Billed through [employer] [place] [place] [employer] ([employer] 0 refills remaining. BP Readings from Last 3 Encounters:   23 123/71   23 130/75   23 (!) 179/97     Estimated Creatinine Clearance: 82 mL/min (based on SCr of 0.75 mg/dL). Lab Results   Component Value Date    CREATININE 0.75 2023     Lab Results   Component Value Date    K 3.9 2023     STATIN ADHERENCE    Insurance Records claims through  23  (Prior Year  [address] = not reported; YTD PDC = % - FAILED):   ATORVASTATIN TAB 10 MG last filled on 23 for 90 day supply. Next refill due: 23 -PAST DUE 55 DAYS     Prescribed si tablet/capsule daily    Per Insurer Portal: last filled on 23 for 90 day supply. Per [address] [place] [address]: last picked up on 23 for 90 day supply. Billed through [employer] [place] [employer] ([employer] 0 refills remaining. Lab Results   Component Value Date    CHOL 120 2023    TRIG 108 2023    HDL 34 (L) 2023    LDLCALC 64.4 2023     ALT   Date Value Ref Range Status   2023 18 13 - 56 U/L Final     AST   Date Value Ref Range Status   2023 21 10 - 38 U/L Final     The ASCVD Risk score (Kurt ANTONIO, et al., 2019) failed to calculate for the following reasons:     The valid total cholesterol range is 130 to 320 mg/dL     PLAN    Per insurer report,

## 2023-11-17 NOTE — TELEPHONE ENCOUNTER
Jimmy Silva, ELINOR-C ,    Margaret Barrera has been flagged for Adherence by [place]  [employer] [facility] Patient's insurance will allow a 100 day supply for a $0 Copay. Drug:  Last Filled:  Due:  Qty:  Supply:  Prescriber:  Pharmacy:    Refill Status:  Max Refill Date:  Absolute Fail Date: LOSARTAN POT TAB 25MG   2023  10/30/2023  90  90 Days  [id number] [phone]  18 days late  10/30/2023  2023       I have pended refills for your approval and changed to a 100 day supply. Please let me know if there is anything I can help with. LOV:23  NOV:23    Thank you,  Roberto Cooper, PharmD, [facility], toll free: [phone] Option 2    Requested Prescriptions     Pending Prescriptions Disp Refills    losartan (COZAAR) 25 MG tablet 100 tablet 3     Sig: Take 1 tablet by mouth daily        ================================================================================  New rx sent and approved on 23 Thank you Therese García   For Pharmacy Admin Tracking Only    Program: Kaushik in place:  No  Recommendation Provided To: Provider: 1 via Note to Provider  Intervention Detail: Adherence Monitorin and New Rx: 1, reason: Improve Adherence  Intervention Accepted By: Provider: 1  Gap Closed?: Yes   Time Spent (min): 30

## 2023-11-20 RX ORDER — LOSARTAN POTASSIUM 25 MG/1
25 TABLET ORAL DAILY
Qty: 90 TABLET | Refills: 0 | Status: SHIPPED | OUTPATIENT
Start: 2023-11-20

## 2023-11-21 ENCOUNTER — TELEPHONE (OUTPATIENT)
Facility: CLINIC | Age: 63
End: 2023-11-21

## 2023-11-21 NOTE — TELEPHONE ENCOUNTER
----- Message from Kia Carpenter sent at 11/13/2023  1:22 PM EST -----  Subject: Refill Request    QUESTIONS  Name of Medication? nystatin (MYCOSTATIN) 059900 UNIT/ML suspension  Patient-reported dosage and instructions? Take 5 mLs by mouth 4 times   daily  How many days do you have left? 0  Preferred Pharmacy? [address] [place] [address] [id number]  Pharmacy phone number (if available)? [phone]  ---------------------------------------------------------------------------  --------------  Mara Sell INFO  What is the best way for the office to contact you? OK to leave message on   voicemail  Preferred Call Back Phone Number? [phone]  ---------------------------------------------------------------------------  --------------  SCRIPT ANSWERS  Relationship to Patient?  Self

## 2023-12-13 LAB
HBA1C MFR BLD HPLC: 6 %
LDL CHOLESTEROL, EXTERNAL: 78

## 2024-02-03 DIAGNOSIS — E11.65 TYPE 2 DIABETES MELLITUS WITH HYPERGLYCEMIA, WITH LONG-TERM CURRENT USE OF INSULIN (HCC): ICD-10-CM

## 2024-02-03 DIAGNOSIS — E11.69 DYSLIPIDEMIA ASSOCIATED WITH TYPE 2 DIABETES MELLITUS (HCC): ICD-10-CM

## 2024-02-03 DIAGNOSIS — E78.5 DYSLIPIDEMIA ASSOCIATED WITH TYPE 2 DIABETES MELLITUS (HCC): ICD-10-CM

## 2024-02-03 DIAGNOSIS — I10 ESSENTIAL HYPERTENSION: ICD-10-CM

## 2024-02-03 DIAGNOSIS — Z79.4 TYPE 2 DIABETES MELLITUS WITH HYPERGLYCEMIA, WITH LONG-TERM CURRENT USE OF INSULIN (HCC): ICD-10-CM

## 2024-02-03 DIAGNOSIS — F33.0 DEPRESSION, MAJOR, RECURRENT, MILD (HCC): ICD-10-CM

## 2024-02-06 ASSESSMENT — ENCOUNTER SYMPTOMS: SHORTNESS OF BREATH: 0

## 2024-02-07 ENCOUNTER — OFFICE VISIT (OUTPATIENT)
Facility: CLINIC | Age: 64
End: 2024-02-07
Payer: MEDICARE

## 2024-02-07 ENCOUNTER — HOSPITAL ENCOUNTER (OUTPATIENT)
Facility: HOSPITAL | Age: 64
Setting detail: SPECIMEN
Discharge: HOME | End: 2024-02-10
Payer: MEDICARE

## 2024-02-07 VITALS
OXYGEN SATURATION: 95 % | BODY MASS INDEX: 34.54 KG/M2 | DIASTOLIC BLOOD PRESSURE: 62 MMHG | TEMPERATURE: 98.3 F | RESPIRATION RATE: 20 BRPM | WEIGHT: 195 LBS | HEART RATE: 88 BPM | SYSTOLIC BLOOD PRESSURE: 98 MMHG

## 2024-02-07 DIAGNOSIS — I10 ESSENTIAL HYPERTENSION: Primary | ICD-10-CM

## 2024-02-07 DIAGNOSIS — F33.0 DEPRESSION, MAJOR, RECURRENT, MILD (HCC): ICD-10-CM

## 2024-02-07 DIAGNOSIS — E11.69 DYSLIPIDEMIA ASSOCIATED WITH TYPE 2 DIABETES MELLITUS (HCC): ICD-10-CM

## 2024-02-07 DIAGNOSIS — Z79.4 TYPE 2 DIABETES MELLITUS WITH HYPERGLYCEMIA, WITH LONG-TERM CURRENT USE OF INSULIN (HCC): ICD-10-CM

## 2024-02-07 DIAGNOSIS — I10 ESSENTIAL HYPERTENSION: ICD-10-CM

## 2024-02-07 DIAGNOSIS — E11.65 TYPE 2 DIABETES MELLITUS WITH HYPERGLYCEMIA, WITH LONG-TERM CURRENT USE OF INSULIN (HCC): ICD-10-CM

## 2024-02-07 DIAGNOSIS — E78.5 DYSLIPIDEMIA ASSOCIATED WITH TYPE 2 DIABETES MELLITUS (HCC): ICD-10-CM

## 2024-02-07 DIAGNOSIS — L72.0 MILIA: ICD-10-CM

## 2024-02-07 LAB
CREAT UR-MCNC: 91 MG/DL (ref 30–125)
HBA1C MFR BLD: 6 %
MICROALBUMIN UR-MCNC: 0.84 MG/DL (ref 0–3)
MICROALBUMIN/CREAT UR-RTO: 9 MG/G (ref 0–30)

## 2024-02-07 PROCEDURE — 3046F HEMOGLOBIN A1C LEVEL >9.0%: CPT | Performed by: NURSE PRACTITIONER

## 2024-02-07 PROCEDURE — 83036 HEMOGLOBIN GLYCOSYLATED A1C: CPT | Performed by: NURSE PRACTITIONER

## 2024-02-07 PROCEDURE — 82043 UR ALBUMIN QUANTITATIVE: CPT

## 2024-02-07 PROCEDURE — 3017F COLORECTAL CA SCREEN DOC REV: CPT | Performed by: NURSE PRACTITIONER

## 2024-02-07 PROCEDURE — 3078F DIAST BP <80 MM HG: CPT | Performed by: NURSE PRACTITIONER

## 2024-02-07 PROCEDURE — 2022F DILAT RTA XM EVC RTNOPTHY: CPT | Performed by: NURSE PRACTITIONER

## 2024-02-07 PROCEDURE — 4004F PT TOBACCO SCREEN RCVD TLK: CPT | Performed by: NURSE PRACTITIONER

## 2024-02-07 PROCEDURE — 3074F SYST BP LT 130 MM HG: CPT | Performed by: NURSE PRACTITIONER

## 2024-02-07 PROCEDURE — G8484 FLU IMMUNIZE NO ADMIN: HCPCS | Performed by: NURSE PRACTITIONER

## 2024-02-07 PROCEDURE — G8417 CALC BMI ABV UP PARAM F/U: HCPCS | Performed by: NURSE PRACTITIONER

## 2024-02-07 PROCEDURE — 82570 ASSAY OF URINE CREATININE: CPT

## 2024-02-07 PROCEDURE — G8427 DOCREV CUR MEDS BY ELIG CLIN: HCPCS | Performed by: NURSE PRACTITIONER

## 2024-02-07 PROCEDURE — 99214 OFFICE O/P EST MOD 30 MIN: CPT | Performed by: NURSE PRACTITIONER

## 2024-02-07 ASSESSMENT — PATIENT HEALTH QUESTIONNAIRE - PHQ9
8. MOVING OR SPEAKING SO SLOWLY THAT OTHER PEOPLE COULD HAVE NOTICED. OR THE OPPOSITE, BEING SO FIGETY OR RESTLESS THAT YOU HAVE BEEN MOVING AROUND A LOT MORE THAN USUAL: 1
9. THOUGHTS THAT YOU WOULD BE BETTER OFF DEAD, OR OF HURTING YOURSELF: 0
3. TROUBLE FALLING OR STAYING ASLEEP: 1
SUM OF ALL RESPONSES TO PHQ QUESTIONS 1-9: 14
5. POOR APPETITE OR OVEREATING: 1
SUM OF ALL RESPONSES TO PHQ QUESTIONS 1-9: 14
SUM OF ALL RESPONSES TO PHQ QUESTIONS 1-9: 14
6. FEELING BAD ABOUT YOURSELF - OR THAT YOU ARE A FAILURE OR HAVE LET YOURSELF OR YOUR FAMILY DOWN: 3
10. IF YOU CHECKED OFF ANY PROBLEMS, HOW DIFFICULT HAVE THESE PROBLEMS MADE IT FOR YOU TO DO YOUR WORK, TAKE CARE OF THINGS AT HOME, OR GET ALONG WITH OTHER PEOPLE: 1
1. LITTLE INTEREST OR PLEASURE IN DOING THINGS: 1
4. FEELING TIRED OR HAVING LITTLE ENERGY: 3
SUM OF ALL RESPONSES TO PHQ QUESTIONS 1-9: 14
7. TROUBLE CONCENTRATING ON THINGS, SUCH AS READING THE NEWSPAPER OR WATCHING TELEVISION: 3
SUM OF ALL RESPONSES TO PHQ9 QUESTIONS 1 & 2: 2
2. FEELING DOWN, DEPRESSED OR HOPELESS: 1

## 2024-02-07 NOTE — PROGRESS NOTES
Luisana Kohler presents today for   Chief Complaint   Patient presents with    Hypertension    Diabetes    Depression    Cholesterol Problem       Is someone accompanying this pt? no    Is the patient using any DME equipment during OV? no    Depression Screenin/7/2024    11:16 AM 2023    11:24 AM 2023     1:43 PM 10/6/2022     1:01 PM 2022     2:26 PM 2022     8:45 AM 3/30/2022     1:07 PM   PHQ-9 Questionaire   Little interest or pleasure in doing things 1 0 3 0 1 0 0   Feeling down, depressed, or hopeless 1 0 0 0 1 0 0   Trouble falling or staying asleep, or sleeping too much 1 0 0  0     Feeling tired or having little energy 3 0 1  3     Poor appetite or overeating 1 0 0  0     Feeling bad about yourself - or that you are a failure or have let yourself or your family down 3 0 0  0     Trouble concentrating on things, such as reading the newspaper or watching television 3 0 0  0     Moving or speaking so slowly that other people could have noticed. Or the opposite - being so fidgety or restless that you have been moving around a lot more than usual 1 0 0  0     Thoughts that you would be better off dead, or of hurting yourself in some way 0 0 0       PHQ-9 Total Score 14 0 4 0 5 0 0   If you checked off any problems, how difficult have these problems made it for you to do your work, take care of things at home, or get along with other people? 1 0 0            ELZA 7-Anxiety        No data to display                   Learning Assessment:  Who is the primary learner? Patient    What is the preferred language for health care of the primary learner? ENGLISH    How does the primary learner prefer to learn new concepts? DEMONSTRATION    Answered By patient    Relationship to Learner SELF         Fall Risk       No data to display                   Travel Screening:    Travel Screening       Question Response    Have you been in contact with someone who was sick? No / Unsure    Do you have 
-  Presents today for depression  Ongoing symptoms include:  see PHQ-9  Current treatment:  sertraline 150 mg (1.5 tab of 100 mg), lamictal 100 mg  Followed by psychiatry      2/7/2024    11:16 AM 8/7/2023    11:24 AM 2/24/2023     1:43 PM   PHQ-9 Questionaire   Little interest or pleasure in doing things 1 0 3   Feeling down, depressed, or hopeless 1 0 0   Trouble falling or staying asleep, or sleeping too much 1 0 0   Feeling tired or having little energy 3 0 1   Poor appetite or overeating 1 0 0   Feeling bad about yourself - or that you are a failure or have let yourself or your family down 3 0 0   Trouble concentrating on things, such as reading the newspaper or watching television 3 0 0   Moving or speaking so slowly that other people could have noticed. Or the opposite - being so fidgety or restless that you have been moving around a lot more than usual 1 0 0   Thoughts that you would be better off dead, or of hurting yourself in some way 0 0 0   PHQ-9 Total Score 14 0 4   If you checked off any problems, how difficult have these problems made it for you to do your work, take care of things at home, or get along with other people? 1 0 0     Health Maintenance:  COVID-19 vac- due for booster  Shingles vac - recommended  Diabetic foot exam - declined   Diabetic eye exam - has an appointment  Urine micro - done today  MWV - will schedule    Review of Systems   Respiratory:  Negative for shortness of breath.    Cardiovascular:  Negative for chest pain and leg swelling.   Neurological:  Negative for dizziness, light-headedness and headaches.     Objective:   BP 98/62 (Site: Left Upper Arm, Position: Sitting)   Pulse 88   Temp 98.3 °F (36.8 °C) (Oral)   Resp 20   Wt 88.5 kg (195 lb)   SpO2 95%   BMI 34.54 kg/m²     Physical Exam  Vitals and nursing note reviewed.   Constitutional:       General: She is not in acute distress.     Appearance: She is not ill-appearing.   HENT:      Head: Normocephalic and

## 2024-02-07 NOTE — PATIENT INSTRUCTIONS
Jessica Mcghee NP   [address]   [facility] [address]   Phone: [phone]   Fax: [phone]     [facility]  [phone]    Dr Reid Lewis  [address] [place] [address]  Phone [phone]  Fax [phone]

## 2024-02-08 ENCOUNTER — TELEPHONE (OUTPATIENT)
Facility: CLINIC | Age: 64
End: 2024-02-08

## 2024-02-08 NOTE — TELEPHONE ENCOUNTER
Patient need GYN that takes her insurance the one we referred her to does not accept it. She found one in [place] but she does not want to travel that far. Please advise

## 2024-02-12 ENCOUNTER — TELEPHONE (OUTPATIENT)
Facility: CLINIC | Age: 64
End: 2024-02-12

## 2024-02-16 NOTE — TELEPHONE ENCOUNTER
Pt wants to know if she can see one provider for vaginal and rectal prolapse . Would the gyn fix both ?     She also wanted to let you know her  humalog dose  I put this on her med list .

## 2024-03-10 DIAGNOSIS — I10 ESSENTIAL HYPERTENSION: ICD-10-CM

## 2024-03-12 RX ORDER — HYDROCHLOROTHIAZIDE 12.5 MG/1
TABLET ORAL
Qty: 90 TABLET | Refills: 1 | Status: SHIPPED | OUTPATIENT
Start: 2024-03-12

## 2024-03-12 NOTE — TELEPHONE ENCOUNTER
Last seen 2/7/2024   Last labs 08/05/23  Last filled  09/06/23  Next appointment 3/27/2024     Last Assessment & Plan Note   Written:Patricia Sharma, ELINOR-C2/7/2024 2:54 PM    SBP slightly low today, asymptomatic  Continue Losartan 25 mg, HCTZ 12.5 mg daily for now       Lab Results   Component Value Date     08/05/2023    K 3.9 08/05/2023     08/05/2023    CO2 32 08/05/2023    BUN 10 08/05/2023    CREATININE 0.75 08/05/2023    GLUCOSE 109 (H) 08/05/2023    CALCIUM 9.4 08/05/2023    PROT 7.8 08/05/2023    LABALBU 3.2 (L) 08/05/2023    BILITOT 0.4 08/05/2023    ALKPHOS 99 08/05/2023    AST 21 08/05/2023    ALT 18 08/05/2023    LABGLOM >60 08/05/2023    GFRAA >60 06/22/2021    AGRATIO 0.7 (L) 08/05/2023    GLOB 4.6 (H) 08/05/2023

## 2024-04-08 DIAGNOSIS — I10 ESSENTIAL HYPERTENSION: ICD-10-CM

## 2024-04-08 RX ORDER — LOSARTAN POTASSIUM 25 MG/1
25 TABLET ORAL DAILY
Qty: 30 TABLET | Refills: 0 | Status: SHIPPED | OUTPATIENT
Start: 2024-04-08

## 2024-04-08 NOTE — TELEPHONE ENCOUNTER
Last seen 2/7/2024   Last labs 08/05/23  Last filled  11/20/23  Next appointment 4/22/2024     Needs labs 30 days only .     Last Assessment & Plan Note   Written:Patricia Sharma, ELINOR-C2/7/2024 2:54 PM    SBP slightly low today, asymptomatic  Continue Losartan 25 mg, HCTZ 12.5 mg daily for now     Lab Results   Component Value Date     08/05/2023    K 3.9 08/05/2023     08/05/2023    CO2 32 08/05/2023    BUN 10 08/05/2023    CREATININE 0.75 08/05/2023    GLUCOSE 109 (H) 08/05/2023    CALCIUM 9.4 08/05/2023    PROT 7.8 08/05/2023    LABALBU 3.2 (L) 08/05/2023    BILITOT 0.4 08/05/2023    ALKPHOS 99 08/05/2023    AST 21 08/05/2023    ALT 18 08/05/2023    LABGLOM >60 08/05/2023    GFRAA >60 06/22/2021    AGRATIO 0.7 (L) 08/05/2023    GLOB 4.6 (H) 08/05/2023

## 2024-04-19 NOTE — PROGRESS NOTES
Luisana Kohler presents today for   Chief Complaint   Patient presents with    Medicare AWV       Is someone accompanying this pt? no    Is the patient using any DME equipment during OV? no    Depression Screenin/22/2024     2:30 PM 2024    11:16 AM 2023    11:24 AM 2023     1:43 PM 10/6/2022     1:01 PM 2022     2:26 PM 2022     8:45 AM   PHQ-9 Questionaire   Little interest or pleasure in doing things 0 1 0 3 0 1 0   Feeling down, depressed, or hopeless 1 1 0 0 0 1 0   Trouble falling or staying asleep, or sleeping too much 0 1 0 0  0    Feeling tired or having little energy 0 3 0 1  3    Poor appetite or overeating 0 1 0 0  0    Feeling bad about yourself - or that you are a failure or have let yourself or your family down 0 3 0 0  0    Trouble concentrating on things, such as reading the newspaper or watching television 0 3 0 0  0    Moving or speaking so slowly that other people could have noticed. Or the opposite - being so fidgety or restless that you have been moving around a lot more than usual 0 1 0 0  0    Thoughts that you would be better off dead, or of hurting yourself in some way 0 0 0 0      PHQ-9 Total Score 1 14 0 4 0 5 0   If you checked off any problems, how difficult have these problems made it for you to do your work, take care of things at home, or get along with other people? 0 1 0 0           LEZA 7-Anxiety        No data to display                 Travel Screening:    Travel Screening     No screening recorded since 24 1036       Travel History   Travel since 24    No documented travel since 24          Health Maintenance reviewed and discussed and ordered per Provider.  Transportation Needs: Unknown (2023)    PRAPARE - Transportation     Lack of Transportation (Medical): Not on file     Lack of Transportation (Non-Medical): Patient declined      Food Insecurity: Not on file (2023)     Financial Resource Strain: Patient Declined

## 2024-04-22 ENCOUNTER — OFFICE VISIT (OUTPATIENT)
Facility: CLINIC | Age: 64
End: 2024-04-22
Payer: MEDICARE

## 2024-04-22 VITALS
TEMPERATURE: 97.1 F | HEIGHT: 63 IN | OXYGEN SATURATION: 97 % | BODY MASS INDEX: 34.02 KG/M2 | WEIGHT: 192 LBS | SYSTOLIC BLOOD PRESSURE: 121 MMHG | HEART RATE: 72 BPM | DIASTOLIC BLOOD PRESSURE: 73 MMHG | RESPIRATION RATE: 20 BRPM

## 2024-04-22 DIAGNOSIS — Z00.00 MEDICARE ANNUAL WELLNESS VISIT, SUBSEQUENT: Primary | ICD-10-CM

## 2024-04-22 DIAGNOSIS — I10 ESSENTIAL HYPERTENSION: ICD-10-CM

## 2024-04-22 DIAGNOSIS — Z71.89 ACP (ADVANCE CARE PLANNING): ICD-10-CM

## 2024-04-22 DIAGNOSIS — Z23 NEED FOR PROPHYLACTIC VACCINATION AGAINST STREPTOCOCCUS PNEUMONIAE (PNEUMOCOCCUS): ICD-10-CM

## 2024-04-22 DIAGNOSIS — F17.210 CIGARETTE NICOTINE DEPENDENCE WITHOUT COMPLICATION: ICD-10-CM

## 2024-04-22 DIAGNOSIS — N81.9 FEMALE GENITAL PROLAPSE, UNSPECIFIED TYPE: ICD-10-CM

## 2024-04-22 PROCEDURE — G0439 PPPS, SUBSEQ VISIT: HCPCS | Performed by: NURSE PRACTITIONER

## 2024-04-22 PROCEDURE — 3078F DIAST BP <80 MM HG: CPT | Performed by: NURSE PRACTITIONER

## 2024-04-22 PROCEDURE — 3017F COLORECTAL CA SCREEN DOC REV: CPT | Performed by: NURSE PRACTITIONER

## 2024-04-22 PROCEDURE — G8427 DOCREV CUR MEDS BY ELIG CLIN: HCPCS | Performed by: NURSE PRACTITIONER

## 2024-04-22 PROCEDURE — 99213 OFFICE O/P EST LOW 20 MIN: CPT | Performed by: NURSE PRACTITIONER

## 2024-04-22 PROCEDURE — G8417 CALC BMI ABV UP PARAM F/U: HCPCS | Performed by: NURSE PRACTITIONER

## 2024-04-22 PROCEDURE — 4004F PT TOBACCO SCREEN RCVD TLK: CPT | Performed by: NURSE PRACTITIONER

## 2024-04-22 PROCEDURE — 3074F SYST BP LT 130 MM HG: CPT | Performed by: NURSE PRACTITIONER

## 2024-04-22 PROCEDURE — 99497 ADVNCD CARE PLAN 30 MIN: CPT | Performed by: NURSE PRACTITIONER

## 2024-04-22 RX ORDER — METHADONE HYDROCHLORIDE 5 MG/5ML
85 SOLUTION ORAL DAILY
COMMUNITY

## 2024-04-22 SDOH — ECONOMIC STABILITY: FOOD INSECURITY: WITHIN THE PAST 12 MONTHS, YOU WORRIED THAT YOUR FOOD WOULD RUN OUT BEFORE YOU GOT MONEY TO BUY MORE.: NEVER TRUE

## 2024-04-22 SDOH — ECONOMIC STABILITY: FOOD INSECURITY: WITHIN THE PAST 12 MONTHS, THE FOOD YOU BOUGHT JUST DIDN'T LAST AND YOU DIDN'T HAVE MONEY TO GET MORE.: NEVER TRUE

## 2024-04-22 SDOH — ECONOMIC STABILITY: HOUSING INSECURITY
IN THE LAST 12 MONTHS, WAS THERE A TIME WHEN YOU DID NOT HAVE A STEADY PLACE TO SLEEP OR SLEPT IN A SHELTER (INCLUDING NOW)?: NO

## 2024-04-22 SDOH — ECONOMIC STABILITY: INCOME INSECURITY: HOW HARD IS IT FOR YOU TO PAY FOR THE VERY BASICS LIKE FOOD, HOUSING, MEDICAL CARE, AND HEATING?: NOT HARD AT ALL

## 2024-04-22 ASSESSMENT — PATIENT HEALTH QUESTIONNAIRE - PHQ9
SUM OF ALL RESPONSES TO PHQ QUESTIONS 1-9: 1
SUM OF ALL RESPONSES TO PHQ QUESTIONS 1-9: 1
6. FEELING BAD ABOUT YOURSELF - OR THAT YOU ARE A FAILURE OR HAVE LET YOURSELF OR YOUR FAMILY DOWN: NOT AT ALL
4. FEELING TIRED OR HAVING LITTLE ENERGY: NOT AT ALL
SUM OF ALL RESPONSES TO PHQ QUESTIONS 1-9: 1
9. THOUGHTS THAT YOU WOULD BE BETTER OFF DEAD, OR OF HURTING YOURSELF: NOT AT ALL
5. POOR APPETITE OR OVEREATING: NOT AT ALL
SUM OF ALL RESPONSES TO PHQ9 QUESTIONS 1 & 2: 1
8. MOVING OR SPEAKING SO SLOWLY THAT OTHER PEOPLE COULD HAVE NOTICED. OR THE OPPOSITE, BEING SO FIGETY OR RESTLESS THAT YOU HAVE BEEN MOVING AROUND A LOT MORE THAN USUAL: NOT AT ALL
1. LITTLE INTEREST OR PLEASURE IN DOING THINGS: NOT AT ALL
SUM OF ALL RESPONSES TO PHQ QUESTIONS 1-9: 1
10. IF YOU CHECKED OFF ANY PROBLEMS, HOW DIFFICULT HAVE THESE PROBLEMS MADE IT FOR YOU TO DO YOUR WORK, TAKE CARE OF THINGS AT HOME, OR GET ALONG WITH OTHER PEOPLE: NOT DIFFICULT AT ALL
7. TROUBLE CONCENTRATING ON THINGS, SUCH AS READING THE NEWSPAPER OR WATCHING TELEVISION: NOT AT ALL
3. TROUBLE FALLING OR STAYING ASLEEP: NOT AT ALL
2. FEELING DOWN, DEPRESSED OR HOPELESS: SEVERAL DAYS

## 2024-04-22 ASSESSMENT — ENCOUNTER SYMPTOMS
SHORTNESS OF BREATH: 0
CONSTIPATION: 1

## 2024-04-22 ASSESSMENT — LIFESTYLE VARIABLES
HOW MANY STANDARD DRINKS CONTAINING ALCOHOL DO YOU HAVE ON A TYPICAL DAY: PATIENT DOES NOT DRINK
HOW OFTEN DO YOU HAVE A DRINK CONTAINING ALCOHOL: NEVER

## 2024-04-22 NOTE — PATIENT INSTRUCTIONS
information.      Personalized Preventive Plan for Luisana Kohler - 4/22/2024  Medicare offers a range of preventive health benefits. Some of the tests and screenings are paid in full while other may be subject to a deductible, co-insurance, and/or copay.    Some of these benefits include a comprehensive review of your medical history including lifestyle, illnesses that may run in your family, and various assessments and screenings as appropriate.    After reviewing your medical record and screening and assessments performed today your provider may have ordered immunizations, labs, imaging, and/or referrals for you.  A list of these orders (if applicable) as well as your Preventive Care list are included within your After Visit Summary for your review.    Other Preventive Recommendations:    A preventive eye exam performed by an eye specialist is recommended every 1-2 years to screen for glaucoma; cataracts, macular degeneration, and other eye disorders.  A preventive dental visit is recommended every 6 months.  Try to get at least 150 minutes of exercise per week or 10,000 steps per day on a pedometer .  Order or download the FREE \"Exercise & Physical Activity: Your Everyday Guide\" from [employer] [place] [employer]. Call 1-769.611.4781 or search [employer] [place] [employer] online.  You need 2217-5148 mg of calcium and 2667-3934 IU of vitamin D per day. It is possible to meet your calcium requirement with diet alone, but a vitamin D supplement is usually necessary to meet this goal.  When exposed to the sun, use a sunscreen that protects against both UVA and UVB radiation with an SPF of 30 or greater. Reapply every 2 to 3 hours or after sweating, drying off with a towel, or swimming.  Always wear a seat belt when traveling in a car. Always wear a helmet when riding a bicycle or motorcycle.

## 2024-04-22 NOTE — PROGRESS NOTES
Medicare Annual Wellness Visit    Luisana Kohler is here for Medicare AWV    Assessment & Plan   Medicare annual wellness visit, subsequent  ACP (advance care planning)  Cigarette nicotine dependence without complication  Assessment & Plan:  Stopped smoking a month ago, commended  Need for prophylactic vaccination against Streptococcus pneumoniae (pneumococcus)  Comments:  Declined Prevnar 20     Recommendations for Preventive Services Due: see orders and patient instructions/AVS.  Recommended screening schedule for the next 5-10 years is provided to the patient in written form: see Patient Instructions/AVS.       Return in about 6 weeks (around 6/3/2024) for diabetes, cholesterol, blood pressure, lab results, (virtually).     Subjective       Patient's complete Health Risk Assessment and screening values have been reviewed and are found in Flowsheets. The following problems were reviewed today and where indicated follow up appointments were made and/or referrals ordered.    Positive Risk Factor Screenings with Interventions:           Controlled Medication Review:      Today's Pain Level: No data recorded     Opioid Risk: (High risk score ?55) Opioid risk score: The patient doesn't have any registry metric data available      Last PDMP [place] as Reviewed:  Review User Review Instant Review Result     @            Activity, Diet, and Weight:  On average, how many days per week do you engage in moderate to strenuous exercise (like a brisk walk)?: 0 days  On average, how many minutes do you engage in exercise at this level?: 0 min    Do you eat balanced/healthy meals regularly?: Yes    Body mass index is 34.56 kg/m². (!) Abnormal      Inactivity Interventions:  See AVS for additional education material  Obesity Interventions:  See AVS for additional education material              Vision Screen:  Do you have difficulty driving, watching TV, or doing any of your daily activities because of your eyesight?: (!) Yes  Have

## 2024-04-22 NOTE — ASSESSMENT & PLAN NOTE
BP at goal, <130/80  Continue Losartan 25 mg daily, HCTZ 12.5 mg daily  We discussed importance of getting fasting labs done, advised that what endocrine is ordering may not include lipid panel or CMP, agrees to get her labs done prior to next appointment

## 2024-04-22 NOTE — PROGRESS NOTES
Chief Complaint   Patient presents with    Medicare AW    Hypertension     Assessment & Plan:     1. Essential hypertension  Assessment & Plan:  BP at goal, <130/80  Continue Losartan 25 mg daily, HCTZ 12.5 mg daily  We discussed importance of getting fasting labs done, advised that what endocrine is ordering may not include lipid panel or CMP, agrees to get her labs done prior to next appointment  2. Female genital prolapse, unspecified type  Assessment & Plan:  Continue management per gyn  3. Cigarette nicotine dependence without complication  Assessment & Plan:  Stopped smoking a month ago, commended  4. Need for prophylactic vaccination against Streptococcus pneumoniae (pneumococcus)  Comments:  Declined Prevnar 20    Follow-up and Dispositions    Return in about 6 weeks (around 6/3/2024) for diabetes, cholesterol, blood pressure, lab results, (virtually).       Subjective:     HPI    Hypertension-  Symptoms:  None  BP readings at home are not being checked, has a monitor  Comorbid: type 2 DM, HLD, morbid obesity  Current treatment: Losartan 25 mg, HCTZ 12.5 mg daily    Bladder/Rectal Prolapse-  Went to see gyn 3 weeks, does not recall who she went to  Was that the pesserie placed in may help with rectal prolapse  She was told this was not necessary  Instead she was told to take Miralax for her constipation but she does not want to see a GI specialist  She wants to hold off on any procedure      Review of Systems   Respiratory:  Negative for shortness of breath.    Cardiovascular:  Negative for chest pain and leg swelling.   Gastrointestinal:  Positive for constipation.   Neurological:  Negative for dizziness, light-headedness and headaches.     Objective:   /73 (Site: Right Upper Arm, Position: Sitting)   Pulse 72   Temp 97.1 °F (36.2 °C) (Temporal)   Resp 20   Ht 1.588 m (5' 2.5\")   Wt 87.1 kg (192 lb)   SpO2 97%   BMI 34.56 kg/m²     Physical Exam  Vitals and nursing note reviewed.

## 2024-04-22 NOTE — PROGRESS NOTES
Advance Care Planning     Advance Care Planning (ACP) Physician/NP/PA Conversation    Date of Conversation: 4/22/2024  Conducted with: Patient with Decision Making Capacity    Healthcare Decision Maker:    Primary Decision Maker: Smiley Guzman - Other, Legal Guardian - [phone]  Click here to complete Healthcare Decision Makers including selection of the Healthcare Decision Maker Relationship (ie \"Primary\").         Care Preferences:    Hospitalization:  \"If your health worsens and it becomes clear that your chance of recovery is unlikely, what would be your preference regarding hospitalization?\"  The patient would prefer comfort-focused treatment without hospitalization.    Ventilation:  \"If you were unable to breath on your own and your chance of recovery was unlikely, what would be your preference about the use of a ventilator (breathing machine) if it was available to you?\"  The patient is unsure, depends on the situation    Resuscitation:  \"In the event your heart stopped as a result of an underlying serious health condition, would you want attempts made to restart your heart, or would you prefer a natural death?\"  No, do NOT attempt to resuscitate.      Conversation Outcomes / Follow-Up Plan:  ACP in process - information provided, considering goals and options      Length of Voluntary ACP Conversation in minutes:  16 minutes    THOM Zurita

## 2024-05-07 DIAGNOSIS — I10 ESSENTIAL HYPERTENSION: ICD-10-CM

## 2024-05-07 NOTE — TELEPHONE ENCOUNTER
Last seen 4/22/2024   Last labs 8/5/23  Last filled  4/8/24  Next appointment 6/3/2024     Lab Results   Component Value Date     08/05/2023    K 3.9 08/05/2023     08/05/2023    CO2 32 08/05/2023    BUN 10 08/05/2023    CREATININE 0.75 08/05/2023    GLUCOSE 109 (H) 08/05/2023    CALCIUM 9.4 08/05/2023    BILITOT 0.4 08/05/2023    ALKPHOS 99 08/05/2023    AST 21 08/05/2023    ALT 18 08/05/2023    LABGLOM >60 08/05/2023    GFRAA >60 06/22/2021    AGRATIO 1.2 02/21/2023    AGRATIO 1.2 02/21/2023    GLOB 4.6 (H) 08/05/2023

## 2024-05-08 RX ORDER — LOSARTAN POTASSIUM 25 MG/1
25 TABLET ORAL DAILY
Qty: 30 TABLET | Refills: 0 | Status: SHIPPED | OUTPATIENT
Start: 2024-05-08

## 2024-05-29 ENCOUNTER — HOSPITAL ENCOUNTER (OUTPATIENT)
Facility: HOSPITAL | Age: 64
Discharge: HOME | End: 2024-06-01

## 2024-05-29 LAB — LABCORP SPECIMEN COLLECTION: NORMAL

## 2024-05-29 PROCEDURE — 99001 SPECIMEN HANDLING PT-LAB: CPT

## 2024-05-30 LAB
ALBUMIN SERPL-MCNC: 4.1 G/DL (ref 3.9–4.9)
ALBUMIN/GLOB SERPL: 1.2 {RATIO} (ref 1.2–2.2)
ALP SERPL-CCNC: 107 IU/L (ref 44–121)
ALT SERPL-CCNC: 11 IU/L (ref 0–32)
AST SERPL-CCNC: 18 IU/L (ref 0–40)
BASOPHILS # BLD AUTO: 0 X10E3/UL (ref 0–0.2)
BASOPHILS NFR BLD AUTO: 0 %
BILIRUB SERPL-MCNC: <0.2 MG/DL (ref 0–1.2)
BUN SERPL-MCNC: 9 MG/DL (ref 8–27)
BUN/CREAT SERPL: 12 (ref 12–28)
CALCIUM SERPL-MCNC: 9.5 MG/DL (ref 8.7–10.3)
CHLORIDE SERPL-SCNC: 102 MMOL/L (ref 96–106)
CHOLEST SERPL-MCNC: 131 MG/DL (ref 100–199)
CO2 SERPL-SCNC: 25 MMOL/L (ref 20–29)
CREAT SERPL-MCNC: 0.75 MG/DL (ref 0.57–1)
EGFRCR SERPLBLD CKD-EPI 2021: 90 ML/MIN/1.73
EOSINOPHIL # BLD AUTO: 0.2 X10E3/UL (ref 0–0.4)
EOSINOPHIL NFR BLD AUTO: 3 %
ERYTHROCYTE [DISTWIDTH] IN BLOOD BY AUTOMATED COUNT: 14.4 % (ref 11.7–15.4)
ESTIMATED AVERAGE GLUCOSE: 64
GLOBULIN SER CALC-MCNC: 3.3 G/DL (ref 1.5–4.5)
GLUCOSE SERPL-MCNC: 110 MG/DL (ref 70–99)
HBA1C MFR BLD: 5.9 %
HCT VFR BLD AUTO: 47.3 % (ref 34–46.6)
HDLC SERPL-MCNC: 40 MG/DL
HGB BLD-MCNC: 15.2 G/DL (ref 11.1–15.9)
IMM GRANULOCYTES # BLD AUTO: 0 X10E3/UL (ref 0–0.1)
IMM GRANULOCYTES NFR BLD AUTO: 0 %
LDLC SERPL CALC-MCNC: 70 MG/DL (ref 0–99)
LYMPHOCYTES # BLD AUTO: 1.4 X10E3/UL (ref 0.7–3.1)
LYMPHOCYTES NFR BLD AUTO: 19 %
MCH RBC QN AUTO: 28.6 PG (ref 26.6–33)
MCHC RBC AUTO-ENTMCNC: 32.1 G/DL (ref 31.5–35.7)
MCV RBC AUTO: 89 FL (ref 79–97)
MONOCYTES # BLD AUTO: 0.4 X10E3/UL (ref 0.1–0.9)
MONOCYTES NFR BLD AUTO: 6 %
NEUTROPHILS # BLD AUTO: 5.3 X10E3/UL (ref 1.4–7)
NEUTROPHILS NFR BLD AUTO: 72 %
PLATELET # BLD AUTO: 177 X10E3/UL (ref 150–450)
POTASSIUM SERPL-SCNC: 4.1 MMOL/L (ref 3.5–5.2)
PROT SERPL-MCNC: 7.4 G/DL (ref 6–8.5)
RBC # BLD AUTO: 5.32 X10E6/UL (ref 3.77–5.28)
SODIUM SERPL-SCNC: 142 MMOL/L (ref 134–144)
TRIGL SERPL-MCNC: 112 MG/DL (ref 0–149)
VLDLC SERPL CALC-MCNC: 21 MG/DL (ref 5–40)
WBC # BLD AUTO: 7.3 X10E3/UL (ref 3.4–10.8)

## 2024-06-03 ENCOUNTER — TELEPHONE (OUTPATIENT)
Facility: CLINIC | Age: 64
End: 2024-06-03

## 2024-06-03 ENCOUNTER — TELEMEDICINE (OUTPATIENT)
Facility: CLINIC | Age: 64
End: 2024-06-03
Payer: MEDICARE

## 2024-06-03 DIAGNOSIS — B37.0 ORAL THRUSH: ICD-10-CM

## 2024-06-03 DIAGNOSIS — I10 ESSENTIAL HYPERTENSION: ICD-10-CM

## 2024-06-03 DIAGNOSIS — E11.69 DYSLIPIDEMIA ASSOCIATED WITH TYPE 2 DIABETES MELLITUS (HCC): ICD-10-CM

## 2024-06-03 DIAGNOSIS — Z79.4 TYPE 2 DIABETES MELLITUS WITH HYPERGLYCEMIA, WITH LONG-TERM CURRENT USE OF INSULIN (HCC): Primary | ICD-10-CM

## 2024-06-03 DIAGNOSIS — F33.0 DEPRESSION, MAJOR, RECURRENT, MILD (HCC): ICD-10-CM

## 2024-06-03 DIAGNOSIS — E78.5 DYSLIPIDEMIA ASSOCIATED WITH TYPE 2 DIABETES MELLITUS (HCC): ICD-10-CM

## 2024-06-03 DIAGNOSIS — B37.2 CANDIDAL INTERTRIGO: ICD-10-CM

## 2024-06-03 DIAGNOSIS — E11.65 TYPE 2 DIABETES MELLITUS WITH HYPERGLYCEMIA, WITH LONG-TERM CURRENT USE OF INSULIN (HCC): Primary | ICD-10-CM

## 2024-06-03 PROCEDURE — 99443 PR PHYS/QHP TELEPHONE EVALUATION 21-30 MIN: CPT | Performed by: NURSE PRACTITIONER

## 2024-06-03 RX ORDER — NYSTATIN 100000 [USP'U]/G
POWDER TOPICAL
Qty: 60 G | Refills: 2 | Status: SHIPPED | OUTPATIENT
Start: 2024-06-03

## 2024-06-03 SDOH — ECONOMIC STABILITY: FOOD INSECURITY: WITHIN THE PAST 12 MONTHS, THE FOOD YOU BOUGHT JUST DIDN'T LAST AND YOU DIDN'T HAVE MONEY TO GET MORE.: NEVER TRUE

## 2024-06-03 SDOH — ECONOMIC STABILITY: FOOD INSECURITY: WITHIN THE PAST 12 MONTHS, YOU WORRIED THAT YOUR FOOD WOULD RUN OUT BEFORE YOU GOT MONEY TO BUY MORE.: NEVER TRUE

## 2024-06-03 SDOH — ECONOMIC STABILITY: INCOME INSECURITY: HOW HARD IS IT FOR YOU TO PAY FOR THE VERY BASICS LIKE FOOD, HOUSING, MEDICAL CARE, AND HEATING?: NOT HARD AT ALL

## 2024-06-03 ASSESSMENT — PATIENT HEALTH QUESTIONNAIRE - PHQ9
5. POOR APPETITE OR OVEREATING: NOT AT ALL
2. FEELING DOWN, DEPRESSED OR HOPELESS: SEVERAL DAYS
SUM OF ALL RESPONSES TO PHQ QUESTIONS 1-9: 5
6. FEELING BAD ABOUT YOURSELF - OR THAT YOU ARE A FAILURE OR HAVE LET YOURSELF OR YOUR FAMILY DOWN: NOT AT ALL
SUM OF ALL RESPONSES TO PHQ QUESTIONS 1-9: 5
9. THOUGHTS THAT YOU WOULD BE BETTER OFF DEAD, OR OF HURTING YOURSELF: NOT AT ALL
1. LITTLE INTEREST OR PLEASURE IN DOING THINGS: SEVERAL DAYS
7. TROUBLE CONCENTRATING ON THINGS, SUCH AS READING THE NEWSPAPER OR WATCHING TELEVISION: SEVERAL DAYS
SUM OF ALL RESPONSES TO PHQ9 QUESTIONS 1 & 2: 2
SUM OF ALL RESPONSES TO PHQ QUESTIONS 1-9: 5
3. TROUBLE FALLING OR STAYING ASLEEP: SEVERAL DAYS
4. FEELING TIRED OR HAVING LITTLE ENERGY: NOT AT ALL
10. IF YOU CHECKED OFF ANY PROBLEMS, HOW DIFFICULT HAVE THESE PROBLEMS MADE IT FOR YOU TO DO YOUR WORK, TAKE CARE OF THINGS AT HOME, OR GET ALONG WITH OTHER PEOPLE: NOT DIFFICULT AT ALL
SUM OF ALL RESPONSES TO PHQ QUESTIONS 1-9: 5
8. MOVING OR SPEAKING SO SLOWLY THAT OTHER PEOPLE COULD HAVE NOTICED. OR THE OPPOSITE, BEING SO FIGETY OR RESTLESS THAT YOU HAVE BEEN MOVING AROUND A LOT MORE THAN USUAL: SEVERAL DAYS

## 2024-06-03 NOTE — TELEPHONE ENCOUNTER
Pharmacy called stating that nystatin (MYCOSTATIN) [id number] UNIT/ML suspension [[id number]] is on national back order and they want to know if there is anything else that can be given to her

## 2024-06-03 NOTE — TELEPHONE ENCOUNTER
S Situation: Patient called stating Nystatin solution is on back order and needing an alternative    B Background: Patient had virtual appt today and was prescribed Nystatin solution along with powder     A Assessment: none     R Recommendation/Action:  contacted pharmacy and spoke w/ Sal and he suggested going to another pharmacy  Spoke w/ patient and she [facility]

## 2024-06-03 NOTE — TELEPHONE ENCOUNTER
Please call and schedule patient in 6 mos for diabetes, cholesterol, blood pressure, depression, lab results (cmp, lipid (A1c per endo) (30 min).  Thank you.

## 2024-06-03 NOTE — PROGRESS NOTES
Luisana Kohler presents today for   Chief Complaint   Patient presents with    Diabetes    Cholesterol Problem    Hypertension    Depression    Discuss Labs    Thrush       Is the patient in the [facility] ? yes    Is someone accompanying this pt? no    Is the patient using any DME equipment during OV? no    Depression Screenin/3/2024    10:50 AM 2024     2:30 PM 2024    11:16 AM 2023    11:24 AM 2023     1:43 PM 10/6/2022     1:01 PM 2022     2:26 PM   PHQ-9 Questionaire   Little interest or pleasure in doing things 1 0 1 0 3 0 1   Feeling down, depressed, or hopeless 1 1 1 0 0 0 1   Trouble falling or staying asleep, or sleeping too much 1 0 1 0 0  0   Feeling tired or having little energy 0 0 3 0 1  3   Poor appetite or overeating 0 0 1 0 0  0   Feeling bad about yourself - or that you are a failure or have let yourself or your family down 0 0 3 0 0  0   Trouble concentrating on things, such as reading the newspaper or watching television 1 0 3 0 0  0   Moving or speaking so slowly that other people could have noticed. Or the opposite - being so fidgety or restless that you have been moving around a lot more than usual 1 0 1 0 0  0   Thoughts that you would be better off dead, or of hurting yourself in some way 0 0 0 0 0     PHQ-9 Total Score 5 1 14 0 4 0 5   If you checked off any problems, how difficult have these problems made it for you to do your work, take care of things at home, or get along with other people? 0 0 1 0 0          ELZA 7-Anxiety        No data to display                   Learning Assessment:  No question data found.     Fall Risk       No data to display                   Travel Screening:    Travel Screening       Question Response    Have you been in contact with someone who was sick? No / Unsure    Do you have any of the following new or worsening symptoms? None of these    Have you traveled internationally or domestically in the last month? No 
Stable-appearing, in no distress, alert and oriented  HENT:   Head: Normocephalic and atraumatic.   Ears:  Hearing grossly intact.  Mouth:  No visible perioral lesions, cyanosis, or lip swelling.  Pulmonary/Chest: Does not appear dyspneic, no audible wheezes or nasal flaring.  Musculoskeletal: Grossly normal active ROM in upper extremities.   Neurological:  Intact recent memory, no facial or eyelid drooping, no speech impairment, answering questions appropriately.  Psychiatric: Judgment and insight good, normal mood and affect.    Luisana Kohler, was evaluated through audio-only encounter. The patient (or guardian if applicable) is aware that this is a billable service, which includes applicable co-pays. This Virtual Visit was conducted with patient's (and/or legal guardian's) consent. Patient identification was verified, and a caregiver was present when appropriate.   The patient was located at Home: [address] [place] [id number]  Provider was located at Facility (Appt Dept): [address] [place] [id number]  Confirm you are appropriately licensed, registered, or certified to deliver care in the [facility] where the patient is located as indicated above. If you are not or unsure, please re-schedule the visit: Yes, I confirm.     Total time spent:  21-30 min      TRACY Zurita

## 2024-06-04 NOTE — TELEPHONE ENCOUNTER
Tried calling patient to get scheduled no answer and no voicemail to leave message. I will attempt to call patient two more times per protocol.

## 2024-06-05 NOTE — TELEPHONE ENCOUNTER
Second attempt, to reach patient no answer no voicemail to leave message. I will attempt to call patient 1 more time per protocol.

## 2024-06-20 DIAGNOSIS — I10 ESSENTIAL HYPERTENSION: ICD-10-CM

## 2024-06-21 RX ORDER — LOSARTAN POTASSIUM 25 MG/1
25 TABLET ORAL DAILY
Qty: 90 TABLET | Refills: 1 | Status: SHIPPED | OUTPATIENT
Start: 2024-06-21

## 2024-06-21 NOTE — TELEPHONE ENCOUNTER
Last seen 6/3/2024   Last labs 5/29/24  Last filled 5/8/24  Next appointment 12/5/2024     3. Essential hypertension  Assessment & Plan:  BP goal <130/80  Continue Losartan 25 mg, HCTZ 12.5 mg daily  Orders:  -     Comprehensive Metabolic Panel; Future  -     Lipid Panel; Future    Lab Results   Component Value Date     05/29/2024    K 4.1 05/29/2024     05/29/2024    CO2 25 05/29/2024    BUN 9 05/29/2024    CREATININE 0.75 05/29/2024    GLUCOSE 110 (H) 05/29/2024    CALCIUM 9.5 05/29/2024    BILITOT <0.2 05/29/2024    ALKPHOS 107 05/29/2024    AST 18 05/29/2024    ALT 11 05/29/2024    LABGLOM 90 05/29/2024    GFRAA >60 06/22/2021    AGRATIO 1.2 05/29/2024    GLOB 4.6 (H) 08/05/2023

## 2024-09-26 DIAGNOSIS — I10 ESSENTIAL HYPERTENSION: ICD-10-CM

## 2024-09-29 RX ORDER — HYDROCHLOROTHIAZIDE 12.5 MG/1
TABLET ORAL
Qty: 90 TABLET | Refills: 0 | Status: SHIPPED | OUTPATIENT
Start: 2024-09-29

## 2024-12-04 PROBLEM — E11.8 TYPE 2 DIABETES MELLITUS WITH COMPLICATION, WITH LONG-TERM CURRENT USE OF INSULIN (HCC): Status: ACTIVE | Noted: 2021-01-06

## 2024-12-04 PROBLEM — Z79.4 TYPE 2 DIABETES MELLITUS WITH COMPLICATION, WITH LONG-TERM CURRENT USE OF INSULIN (HCC): Status: ACTIVE | Noted: 2021-01-06

## 2024-12-12 LAB
ESTIMATED AVERAGE GLUCOSE: NORMAL
HBA1C MFR BLD: 5.7 %

## 2024-12-13 ENCOUNTER — TELEPHONE (OUTPATIENT)
Facility: CLINIC | Age: 64
End: 2024-12-13

## 2024-12-13 NOTE — TELEPHONE ENCOUNTER
Okay to schedule virtual? I received her Endo note (lipid panel and A1C were the only labs drawn). Please advise.

## 2024-12-13 NOTE — TELEPHONE ENCOUNTER
Patient called in to request an virtual appointment due to her transportation. She states that she has lab results from endo that could possibly be used for her virtual visit. She wants to get an appointment before she runs out of her medication. Please advise.

## 2024-12-16 ASSESSMENT — ENCOUNTER SYMPTOMS: SHORTNESS OF BREATH: 0

## 2024-12-16 NOTE — ASSESSMENT & PLAN NOTE
12/12/2024 endo notes reviewed, LDL 71, close to goal of <70  Continue atorvastatin 10 mg qhs  AST 19, ALT 14 per 12/12/2024 endo labs  Fasting labs per endo

## 2024-12-16 NOTE — ASSESSMENT & PLAN NOTE
12/12/2024 endo notes reviewed, noted A1c of 5.7, at goal of <7  Continue management per endocrine

## 2024-12-16 NOTE — PROGRESS NOTES
Luisana Kohler 64 y.o. female who was seen by synchronous (real-time) audio-video technology on 12/17/24 for   Chief Complaint   Patient presents with    Diabetes    Hyperlipidemia    Hypertension    Depression     Assessment & Plan:     1. Type 2 diabetes mellitus with complication, with long-term current use of insulin (HCC)  Assessment & Plan:  12/12/2024 endo notes reviewed, noted A1c of 5.7, at goal of <7  Continue management per endocrine  2. Dyslipidemia associated with type 2 diabetes mellitus (HCC)  Assessment & Plan:  12/12/2024 endo notes reviewed, LDL 71, close to goal of <70  Continue atorvastatin 10 mg qhs  AST 19, ALT 14 per 12/12/2024 endo labs  Fasting labs per endo  3. Essential hypertension  Assessment & Plan:  BP goal <130/80  Continue Losartan 25 mg, HCTZ 12.5 mg daily  12/12/2024  per endo labs  The following orders have not been finalized:  Orders:  -     losartan (COZAAR) 25 MG tablet  -     hydroCHLOROthiazide 12.5 MG tablet  4. Depression, major, recurrent, mild (HCC)  Assessment & Plan:  Stable, continue management per psychiatry    Follow-up and Dispositions    Return in about 6 months (around 6/17/2025) for MEDICARE WELLNESS VISIT, diabetes, cholesterol, blood pressure, lab results .       SUBJECTIVE:     HPI    Type 2 DM-  Home BG readings range from 120-140  Hypoglycemia:  On statin: Atorvastatin 10 mg  Comorbid: HLD, HTN, morbid obesity  Followed by endocrine: Seenely Lucia, last seen on 12/12/2024  Current treatment: Metformin  mg BID, Humalog 75/25 16 units BID  She states that her latest A1c was 5.7     Hyperlipidemia  Compliant with meds  Comorbid: type 2 DM, HTN  Current treatment: Atorvastatin 10 mg  12/12/2024 lipids from endo as follows:  Total cholesterol 132  Triglycerides 156  HDL 30  LDL 71     Hypertension-  Symptoms:  None  BP readings at home are not being checked, has a monitor  Comorbid: type 2 DM, HLD, morbid obesity  Current treatment: Losartan

## 2024-12-17 ENCOUNTER — TELEPHONE (OUTPATIENT)
Facility: CLINIC | Age: 64
End: 2024-12-17

## 2024-12-17 ENCOUNTER — TELEMEDICINE (OUTPATIENT)
Facility: CLINIC | Age: 64
End: 2024-12-17

## 2024-12-17 DIAGNOSIS — F33.0 DEPRESSION, MAJOR, RECURRENT, MILD (HCC): ICD-10-CM

## 2024-12-17 DIAGNOSIS — E78.5 DYSLIPIDEMIA ASSOCIATED WITH TYPE 2 DIABETES MELLITUS (HCC): ICD-10-CM

## 2024-12-17 DIAGNOSIS — E11.69 DYSLIPIDEMIA ASSOCIATED WITH TYPE 2 DIABETES MELLITUS (HCC): ICD-10-CM

## 2024-12-17 DIAGNOSIS — E11.8 TYPE 2 DIABETES MELLITUS WITH COMPLICATION, WITH LONG-TERM CURRENT USE OF INSULIN (HCC): Primary | ICD-10-CM

## 2024-12-17 DIAGNOSIS — I10 ESSENTIAL HYPERTENSION: ICD-10-CM

## 2024-12-17 DIAGNOSIS — Z79.4 TYPE 2 DIABETES MELLITUS WITH COMPLICATION, WITH LONG-TERM CURRENT USE OF INSULIN (HCC): Primary | ICD-10-CM

## 2024-12-17 RX ORDER — HYDROCHLOROTHIAZIDE 12.5 MG/1
12.5 TABLET ORAL DAILY
Qty: 90 TABLET | Refills: 1 | Status: SHIPPED | OUTPATIENT
Start: 2024-12-17

## 2024-12-17 RX ORDER — LOSARTAN POTASSIUM 25 MG/1
25 TABLET ORAL DAILY
Qty: 90 TABLET | Refills: 1 | Status: SHIPPED | OUTPATIENT
Start: 2024-12-17

## 2024-12-17 SDOH — ECONOMIC STABILITY: FOOD INSECURITY: WITHIN THE PAST 12 MONTHS, YOU WORRIED THAT YOUR FOOD WOULD RUN OUT BEFORE YOU GOT MONEY TO BUY MORE.: PATIENT DECLINED

## 2024-12-17 SDOH — ECONOMIC STABILITY: FOOD INSECURITY: WITHIN THE PAST 12 MONTHS, THE FOOD YOU BOUGHT JUST DIDN'T LAST AND YOU DIDN'T HAVE MONEY TO GET MORE.: PATIENT DECLINED

## 2024-12-17 SDOH — ECONOMIC STABILITY: INCOME INSECURITY: HOW HARD IS IT FOR YOU TO PAY FOR THE VERY BASICS LIKE FOOD, HOUSING, MEDICAL CARE, AND HEATING?: PATIENT DECLINED

## 2024-12-17 ASSESSMENT — PATIENT HEALTH QUESTIONNAIRE - PHQ9
6. FEELING BAD ABOUT YOURSELF - OR THAT YOU ARE A FAILURE OR HAVE LET YOURSELF OR YOUR FAMILY DOWN: NOT AT ALL
SUM OF ALL RESPONSES TO PHQ QUESTIONS 1-9: 8
SUM OF ALL RESPONSES TO PHQ QUESTIONS 1-9: 8
3. TROUBLE FALLING OR STAYING ASLEEP: NEARLY EVERY DAY
5. POOR APPETITE OR OVEREATING: NOT AT ALL
SUM OF ALL RESPONSES TO PHQ9 QUESTIONS 1 & 2: 2
SUM OF ALL RESPONSES TO PHQ QUESTIONS 1-9: 8
SUM OF ALL RESPONSES TO PHQ QUESTIONS 1-9: 8
1. LITTLE INTEREST OR PLEASURE IN DOING THINGS: SEVERAL DAYS
7. TROUBLE CONCENTRATING ON THINGS, SUCH AS READING THE NEWSPAPER OR WATCHING TELEVISION: NOT AT ALL
9. THOUGHTS THAT YOU WOULD BE BETTER OFF DEAD, OR OF HURTING YOURSELF: NOT AT ALL
8. MOVING OR SPEAKING SO SLOWLY THAT OTHER PEOPLE COULD HAVE NOTICED. OR THE OPPOSITE, BEING SO FIGETY OR RESTLESS THAT YOU HAVE BEEN MOVING AROUND A LOT MORE THAN USUAL: NOT AT ALL
4. FEELING TIRED OR HAVING LITTLE ENERGY: NEARLY EVERY DAY
2. FEELING DOWN, DEPRESSED OR HOPELESS: SEVERAL DAYS

## 2024-12-17 NOTE — PROGRESS NOTES
Luisana DOBSON Kohler presents today for   Chief Complaint   Patient presents with    Diabetes    Hyperlipidemia    Hypertension    Depression         Is the patient in the [facility] ? Yes     Is someone accompanying this pt? no    Is the patient using any DME equipment during OV? no    Depression Screenin/17/2024    10:03 AM 6/3/2024    10:50 AM 2024     2:30 PM 2024    11:16 AM 2023    11:24 AM 2023     1:43 PM 10/6/2022     1:01 PM   PHQ-9 Questionaire   Little interest or pleasure in doing things 1 1 0 1 0 3 0   Feeling down, depressed, or hopeless 1 1 1 1 0 0 0   Trouble falling or staying asleep, or sleeping too much 3 1 0 1 0 0    Feeling tired or having little energy 3 0 0 3 0 1    Poor appetite or overeating 0 0 0 1 0 0    Feeling bad about yourself - or that you are a failure or have let yourself or your family down 0 0 0 3 0 0    Trouble concentrating on things, such as reading the newspaper or watching television 0 1 0 3 0 0    Moving or speaking so slowly that other people could have noticed. Or the opposite - being so fidgety or restless that you have been moving around a lot more than usual 0 1 0 1 0 0    Thoughts that you would be better off dead, or of hurting yourself in some way 0 0 0 0 0 0    PHQ-9 Total Score 8 5 1 14 0 4 0   If you checked off any problems, how difficult have these problems made it for you to do your work, take care of things at home, or get along with other people?  0 0 1 0 0         ELZA 7-Anxiety        No data to display                   Learning Assessment:  No question data found.     Fall Risk       No data to display                   Travel Screening:    Travel Screening     No screening recorded since 24 0000       Travel History   Travel since 24    No documented travel since 24          Health Maintenance reviewed and discussed and ordered per Provider.  Transportation Needs: Unknown (2024)    PATO -

## 2024-12-17 NOTE — TELEPHONE ENCOUNTER
Please schedule patient for in-person follow up in 6 mos for MEDICARE WELLNESS VISIT, diabetes, cholesterol, blood pressure, lab results.  Thank you!

## 2024-12-19 NOTE — PROGRESS NOTES
[facility] Progress Note    Date: 2018    Name: Dennis Treadwell    MRN: [id number]         : 1960      Ms. Elio Ruano arrived to [facility]. Ms. Elio Ruano was assessed and education was provided. Ms. Eddie Tim vitals were reviewed. Visit Vitals  /84 (BP 1 Location: Left arm, BP Patient Position: Sitting)   Pulse 79   Temp 98.3 °F (36.8 °C)   Resp 18   SpO2 94%   Breastfeeding? No       Pt with Right upper arm double lumen PICC line. Each lumen flushes without difficulty and positive for blood return. Dressing CDI. No redness, bruising, or swelling noted at site and/ or extremity. Pt denies tenderness. Anidulafungin was administered as ordered via purple lumen followed by normal saline flush. Ms. Elio Ruano tolerated well without complaints. Flushed both lumens of pt's PICC line with heparin per order. New curos caps applied to the end of each lumen. Ms. Elio Ruano was discharged from [place] [address] in stable condition at 1025. She is to return on 2019 at 0930 for her next appointment.     Romaine Fisher RN  2018  [address] discharged to facility accompanied by EMS.   Patient provided with the following educational materials upon discharge:  discharge packet / AVS.   Valuables and belongings sent with patient.   discharge summary, discharge instructions, medications, and follow up appointments reviewed with patient and EMS.  Patient and EMS verbalized understanding    [facility] and verbally gave report to ABILIO Sanchez.

## 2025-01-29 DIAGNOSIS — B37.0 ORAL THRUSH: ICD-10-CM

## 2025-01-30 RX ORDER — NYSTATIN 100000 [USP'U]/ML
SUSPENSION ORAL
Qty: 473 ML | Refills: 0 | Status: SHIPPED | OUTPATIENT
Start: 2025-01-30

## 2025-01-30 NOTE — TELEPHONE ENCOUNTER
Labs:   Lab Results   Component Value Date     05/29/2024    K 4.1 05/29/2024     05/29/2024    CO2 25 05/29/2024    BUN 9 05/29/2024    CREATININE 0.75 05/29/2024    GLUCOSE 110 (H) 05/29/2024    CALCIUM 9.5 05/29/2024    BILITOT <0.2 05/29/2024    ALKPHOS 107 05/29/2024    AST 18 05/29/2024    ALT 11 05/29/2024    LABGLOM 90 05/29/2024    GFRAA >60 06/22/2021    AGRATIO 1.2 05/29/2024    GLOB 4.6 (H) 08/05/2023        Additional Notes: